# Patient Record
Sex: FEMALE | Race: BLACK OR AFRICAN AMERICAN | Employment: UNEMPLOYED | URBAN - METROPOLITAN AREA
[De-identification: names, ages, dates, MRNs, and addresses within clinical notes are randomized per-mention and may not be internally consistent; named-entity substitution may affect disease eponyms.]

---

## 2018-09-13 ENCOUNTER — APPOINTMENT (OUTPATIENT)
Dept: GENERAL RADIOLOGY | Age: 38
End: 2018-09-13
Payer: MEDICAID

## 2018-09-13 ENCOUNTER — HOSPITAL ENCOUNTER (EMERGENCY)
Age: 38
Discharge: HOME | End: 2018-09-13
Payer: MEDICAID

## 2018-09-13 VITALS
DIASTOLIC BLOOD PRESSURE: 91 MMHG | OXYGEN SATURATION: 99 % | TEMPERATURE: 97.2 F | BODY MASS INDEX: 31.41 KG/M2 | HEART RATE: 90 BPM | WEIGHT: 160 LBS | SYSTOLIC BLOOD PRESSURE: 141 MMHG | RESPIRATION RATE: 17 BRPM | HEIGHT: 60 IN

## 2018-09-13 DIAGNOSIS — S70.01XA CONTUSION OF RIGHT HIP, INITIAL ENCOUNTER: ICD-10-CM
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
## 2018-09-13 LAB
CHP ED QC CHECK: YES
PREGNANCY TEST URINE, POC: NORMAL

## 2018-09-13 PROCEDURE — 6370000000 HC RX 637 (ALT 250 FOR IP): Performed by: NURSE PRACTITIONER

## 2018-09-13 PROCEDURE — 99283 EMERGENCY DEPT VISIT LOW MDM: CPT

## 2018-09-13 PROCEDURE — 73502 X-RAY EXAM HIP UNI 2-3 VIEWS: CPT

## 2018-09-13 RX ORDER — ACETAMINOPHEN 500 MG
1000 TABLET ORAL ONCE
Status: COMPLETED | OUTPATIENT
Start: 2018-09-13 | End: 2018-09-13

## 2018-09-13 RX ORDER — ACETAMINOPHEN 325 MG/1
650 TABLET ORAL EVERY 6 HOURS PRN
Qty: 120 TABLET | Refills: 0 | Status: SHIPPED | OUTPATIENT
Start: 2018-09-13 | End: 2019-06-28 | Stop reason: ALTCHOICE

## 2018-09-13 RX ADMIN — ACETAMINOPHEN 1000 MG: 500 TABLET, FILM COATED ORAL at 15:18

## 2018-09-13 ASSESSMENT — PAIN SCALES - GENERAL: PAINLEVEL_OUTOF10: 10

## 2018-09-14 NOTE — ED PROVIDER NOTES
Atraumatic, without temporal or scalp tenderness. Eyes:  PERRL, EOMI. No periorbital ecchymoses. Conjunctiva: normal.  Ears:  External ears without lesions. Throat:  Pharynx without lesions. Airway patient. Neck:  Supple. Nontender. Chest:  Symmetrical without visible rash or tenderness. Respiratory:  Clear to auscultation and breath sounds equal.  CV:  Regular rate and rhythm, normal heart sounds, without pathological murmurs. GI:  Abdmen Soft, nontender. No firm or pulsatile mass. No abrasions, ecchymoses, or lacerations. Back:  No costovertebral, paravertebral, intervertebral, or vertebral tenderness or spasm. Musculoskeletal:  Pelvis:  Bilateral.        Tenderness: none. Stability:  stable to palpation. Hip(s):  Right: hip. Tenderness:  mild. Swelling: None. Deformity: no.       ROM: full range of motion. Skin: no erythema, rash or swelling noted. Neurovascular: Motor deficit: none. Sensory deficit: none. Pulse deficit: none. Capillary refill: normal.       Calf Tenderness:  No Bilateral.          Edema:  none Both lower extremity(s). Gait:  normal.  Integument:  No abrasions, ecchymoses, or lacerations unless noted elsewhere. Neurological:  Orientation age-appropriate. Motor functions intact. Lab / Imaging Results   (All laboratory and radiology results have been personally reviewed by myself)  Labs:  Results for orders placed or performed during the hospital encounter of 09/13/18   POC Pregnancy Urine Qual   Result Value Ref Range    Preg Test, Ur neg     QC OK? yes      Imaging: All Radiology results interpreted by Radiologist unless otherwise noted. XR HIP RIGHT (2-3 VIEWS)   Final Result   Progressive degenerative change of the upper right hip joint space. No evidence of fracture or other acute findings.         ED Course / Medical Decision Making     Medications   acetaminophen (TYLENOL) tablet 1,000 mg (1,000 mg Oral Given 9/13/18 8557)          Consult(s):   None    Procedure(s):   none    MDM: Mechanical fall, x-rays obtained, reassuring. Plan is for symptom control and appropriate outpatient follow-up. Instructed to return ED for any new or worsening symptoms. Counseling: The emergency provider has spoken with the patient and discussed todays results, in addition to providing specific details for the plan of care and counseling regarding the diagnosis and prognosis. Questions are answered at this time and they are agreeable with the plan. Assessment      1. Fall, initial encounter    2. Contusion of right hip, initial encounter      Plan   Discharge to home  Patient condition is good    New Medications     Discharge Medication List as of 9/13/2018  4:04 PM      START taking these medications    Details   acetaminophen (AMINOFEN) 325 MG tablet Take 2 tablets by mouth every 6 hours as needed for Pain, Disp-120 tablet, R-0Print           Electronically signed by MARK Lowe CNP   DD: 9/14/18  **This report was transcribed using voice recognition software. Every effort was made to ensure accuracy; however, inadvertent computerized transcription errors may be present.   END OF ED PROVIDER NOTE      MARK Henao CNP  09/14/18 [id number]

## 2018-10-15 ENCOUNTER — APPOINTMENT (OUTPATIENT)
Dept: GENERAL RADIOLOGY | Age: 38
End: 2018-10-15
Payer: MEDICAID

## 2018-10-15 ENCOUNTER — HOSPITAL ENCOUNTER (EMERGENCY)
Age: 38
Discharge: HOME | End: 2018-10-15
Attending: EMERGENCY MEDICINE
Payer: MEDICAID

## 2018-10-15 VITALS
HEART RATE: 82 BPM | DIASTOLIC BLOOD PRESSURE: 82 MMHG | SYSTOLIC BLOOD PRESSURE: 140 MMHG | WEIGHT: 190 LBS | HEIGHT: 59 IN | OXYGEN SATURATION: 98 % | RESPIRATION RATE: 16 BRPM | BODY MASS INDEX: 38.3 KG/M2 | TEMPERATURE: 97.4 F

## 2018-10-15 DIAGNOSIS — R07.9 CHEST PAIN, UNSPECIFIED TYPE: Primary | ICD-10-CM

## 2018-10-15 LAB
ALBUMIN SERPL-MCNC: 3.9 G/DL (ref 3.5–5.2)
ALP BLD-CCNC: 122 U/L (ref 35–104)
ALT SERPL-CCNC: 23 U/L (ref 0–32)
ANION GAP SERPL CALCULATED.3IONS-SCNC: 11 MMOL/L (ref 7–16)
AST SERPL-CCNC: 15 U/L (ref 0–31)
BASOPHILS ABSOLUTE: 0.02 E9/L (ref 0–0.2)
BASOPHILS RELATIVE PERCENT: 0.3 % (ref 0–2)
BILIRUB SERPL-MCNC: 0.2 MG/DL (ref 0–1.2)
BUN BLDV-MCNC: 10 MG/DL (ref 6–20)
CALCIUM SERPL-MCNC: 9.4 MG/DL (ref 8.6–10.2)
CHLORIDE BLD-SCNC: 98 MMOL/L (ref 98–107)
CO2: 27 MMOL/L (ref 22–29)
CREAT SERPL-MCNC: 0.8 MG/DL (ref 0.5–1)
EOSINOPHILS ABSOLUTE: 0.16 E9/L (ref 0.05–0.5)
EOSINOPHILS RELATIVE PERCENT: 2.1 % (ref 0–6)
GFR AFRICAN AMERICAN: >60
GFR NON-AFRICAN AMERICAN: >60 ML/MIN/1.73
GLUCOSE BLD-MCNC: 124 MG/DL (ref 74–109)
HCT VFR BLD CALC: 43.6 % (ref 34–48)
HEMOGLOBIN: 13.7 G/DL (ref 11.5–15.5)
IMMATURE GRANULOCYTES #: 0.03 E9/L
IMMATURE GRANULOCYTES %: 0.4 % (ref 0–5)
LYMPHOCYTES ABSOLUTE: 1.92 E9/L (ref 1.5–4)
LYMPHOCYTES RELATIVE PERCENT: 25.5 % (ref 20–42)
MCH RBC QN AUTO: 27 PG (ref 26–35)
MCHC RBC AUTO-ENTMCNC: 31.4 % (ref 32–34.5)
MCV RBC AUTO: 86 FL (ref 80–99.9)
MONOCYTES ABSOLUTE: 0.54 E9/L (ref 0.1–0.95)
MONOCYTES RELATIVE PERCENT: 7.2 % (ref 2–12)
NEUTROPHILS ABSOLUTE: 4.87 E9/L (ref 1.8–7.3)
NEUTROPHILS RELATIVE PERCENT: 64.5 % (ref 43–80)
PDW BLD-RTO: 13.2 FL (ref 11.5–15)
PLATELET # BLD: 221 E9/L (ref 130–450)
PMV BLD AUTO: 9.3 FL (ref 7–12)
POTASSIUM SERPL-SCNC: 3.3 MMOL/L (ref 3.5–5)
RBC # BLD: 5.07 E12/L (ref 3.5–5.5)
SODIUM BLD-SCNC: 136 MMOL/L (ref 132–146)
TOTAL PROTEIN: 7.3 G/DL (ref 6.4–8.3)
TROPONIN: <0.01 NG/ML (ref 0–0.03)
WBC # BLD: 7.5 E9/L (ref 4.5–11.5)

## 2018-10-15 PROCEDURE — 85025 COMPLETE CBC W/AUTO DIFF WBC: CPT

## 2018-10-15 PROCEDURE — 71046 X-RAY EXAM CHEST 2 VIEWS: CPT

## 2018-10-15 PROCEDURE — 6360000002 HC RX W HCPCS: Performed by: EMERGENCY MEDICINE

## 2018-10-15 PROCEDURE — 36415 COLL VENOUS BLD VENIPUNCTURE: CPT

## 2018-10-15 PROCEDURE — 84484 ASSAY OF TROPONIN QUANT: CPT

## 2018-10-15 PROCEDURE — 96374 THER/PROPH/DIAG INJ IV PUSH: CPT

## 2018-10-15 PROCEDURE — 99285 EMERGENCY DEPT VISIT HI MDM: CPT

## 2018-10-15 PROCEDURE — 80053 COMPREHEN METABOLIC PANEL: CPT

## 2018-10-15 RX ORDER — KETOROLAC TROMETHAMINE 30 MG/ML
30 INJECTION, SOLUTION INTRAMUSCULAR; INTRAVENOUS ONCE
Status: COMPLETED | OUTPATIENT
Start: 2018-10-15 | End: 2018-10-15

## 2018-10-15 RX ADMIN — KETOROLAC TROMETHAMINE 30 MG: 30 INJECTION, SOLUTION INTRAMUSCULAR; INTRAVENOUS at 16:59

## 2018-10-15 ASSESSMENT — ENCOUNTER SYMPTOMS
ABDOMINAL PAIN: 0
COUGH: 0
SINUS PRESSURE: 0
BACK PAIN: 0
NAUSEA: 0
SHORTNESS OF BREATH: 0
SINUS PAIN: 0

## 2018-10-15 ASSESSMENT — PAIN DESCRIPTION - PAIN TYPE: TYPE: ACUTE PAIN

## 2018-10-15 ASSESSMENT — PAIN SCALES - GENERAL
PAINLEVEL_OUTOF10: 10
PAINLEVEL_OUTOF10: 10

## 2018-10-15 ASSESSMENT — PAIN DESCRIPTION - ONSET: ONSET: ON-GOING

## 2018-10-15 ASSESSMENT — PAIN DESCRIPTION - FREQUENCY: FREQUENCY: INTERMITTENT

## 2018-10-15 ASSESSMENT — PAIN DESCRIPTION - DESCRIPTORS: DESCRIPTORS: SHARP

## 2018-10-15 ASSESSMENT — PAIN DESCRIPTION - LOCATION: LOCATION: CHEST

## 2018-10-15 ASSESSMENT — PAIN DESCRIPTION - ORIENTATION: ORIENTATION: MID

## 2018-10-15 NOTE — ED NOTES
FIRST PROVIDER CONTACT ASSESSMENT NOTE      Department of Emergency Medicine   10/15/18  2:13 PM    Chief Complaint: Chest Pain (mid started yesterday)      History of Present Illness:    Renee Hernandez is a 41 y.o. female who presents to the ED by private car for chest pain x 1 day. Focused Screening Exam:  Constitutional:  Alert, appears stated age and is in no distress. *ALLERGIES*     Darvocet [propoxyphene n-acetaminophen]; Ketorolac; Meloxicam; Tramadol;  Ibuprofen; and Naproxen     ED Triage Vitals [10/15/18 1407]   BP Temp Temp Source Pulse Resp SpO2 Height Weight   (!) 136/96 96.6 °F (35.9 °C) Temporal 95 17 98 % 4' 11\" (1.499 m) 190 lb (86.2 kg)        Initial Plan of Care:  Initiate Treatment-Testing, Proceed toTreatment Area When Bed Available for ED Attending/MLP to Continue Care    -----------------END OF FIRST PROVIDER CONTACT ASSESSMENT NOTE--------------  Electronically signed by MANUELA Dangelo   DD: 10/15/18       Juancarlos Dangelo  10/15/18 4147

## 2018-10-15 NOTE — ED PROVIDER NOTES
This is a 72-year-old female with past medical history of substance abuse and asthma was as to the ED for evaluation of chest pain. She states that 2 days ago she developed chest pain which she describes as located across her entire chest during the time she was lifting weights. Patient states that her pain is worsened with movements and is improved with rest. She denies any shortness of breath or nausea or recent travel or recent surgeries. The history is provided by the patient. No  was used. Review of Systems   Constitutional: Negative for fever. HENT: Negative for congestion, sinus pain and sinus pressure. Eyes: Negative for visual disturbance. Respiratory: Negative for cough and shortness of breath. Cardiovascular: Positive for chest pain. Gastrointestinal: Negative for abdominal pain and nausea. Endocrine: Negative for polyuria. Genitourinary: Negative for dysuria. Musculoskeletal: Negative for back pain. Skin: Negative for rash. Allergic/Immunologic: Negative for immunocompromised state. Neurological: Negative for headaches. Hematological: Does not bruise/bleed easily. Psychiatric/Behavioral: Negative for confusion. Physical Exam   Constitutional: She is oriented to person, place, and time. She appears well-developed and well-nourished. No distress. HENT:   Head: Normocephalic and atraumatic. Mouth/Throat: Oropharynx is clear and moist.   Eyes: EOM are normal.   Neck: Normal range of motion. Neck supple. No JVD present. Cardiovascular: Normal rate, regular rhythm and intact distal pulses. No murmur heard. Pulmonary/Chest: Effort normal and breath sounds normal. No respiratory distress. She has no wheezes. She has no rales. Chest pain elicited upon palpations to bilateral chest   Abdominal: Soft. There is no tenderness. There is no rebound and no guarding. Musculoskeletal: She exhibits no edema.    Neurological: She is alert and DO  Resident  10/15/18 9955

## 2018-10-18 ENCOUNTER — APPOINTMENT (OUTPATIENT)
Dept: GENERAL RADIOLOGY | Age: 38
End: 2018-10-18
Payer: MEDICAID

## 2018-10-18 ENCOUNTER — HOSPITAL ENCOUNTER (EMERGENCY)
Age: 38
Discharge: HOME | End: 2018-10-18
Payer: MEDICAID

## 2018-10-18 VITALS
SYSTOLIC BLOOD PRESSURE: 138 MMHG | BODY MASS INDEX: 37.9 KG/M2 | TEMPERATURE: 97.2 F | HEIGHT: 59 IN | RESPIRATION RATE: 16 BRPM | OXYGEN SATURATION: 98 % | WEIGHT: 188 LBS | HEART RATE: 100 BPM | DIASTOLIC BLOOD PRESSURE: 93 MMHG

## 2018-10-18 DIAGNOSIS — R07.89 CHEST WALL PAIN: Primary | ICD-10-CM

## 2018-10-18 DIAGNOSIS — M25.551 RIGHT HIP PAIN: ICD-10-CM

## 2018-10-18 LAB
EKG ATRIAL RATE: 98 BPM
EKG P AXIS: 53 DEGREES
EKG P-R INTERVAL: 142 MS
EKG Q-T INTERVAL: 332 MS
EKG QRS DURATION: 84 MS
EKG QTC CALCULATION (BAZETT): 423 MS
EKG R AXIS: 62 DEGREES
EKG T AXIS: -12 DEGREES
EKG VENTRICULAR RATE: 98 BPM

## 2018-10-18 PROCEDURE — 99285 EMERGENCY DEPT VISIT HI MDM: CPT

## 2018-10-18 PROCEDURE — 73502 X-RAY EXAM HIP UNI 2-3 VIEWS: CPT

## 2018-10-18 ASSESSMENT — PAIN DESCRIPTION - PAIN TYPE: TYPE: ACUTE PAIN

## 2018-10-18 ASSESSMENT — PAIN DESCRIPTION - LOCATION: LOCATION: CHEST;HIP

## 2018-10-18 ASSESSMENT — PAIN SCALES - GENERAL: PAINLEVEL_OUTOF10: 10

## 2018-10-18 ASSESSMENT — PAIN DESCRIPTION - ORIENTATION: ORIENTATION: RIGHT

## 2018-10-22 LAB
EKG ATRIAL RATE: 87 BPM
EKG ATRIAL RATE: 88 BPM
EKG P AXIS: 51 DEGREES
EKG P AXIS: 53 DEGREES
EKG P-R INTERVAL: 148 MS
EKG P-R INTERVAL: 154 MS
EKG Q-T INTERVAL: 342 MS
EKG Q-T INTERVAL: 358 MS
EKG QRS DURATION: 86 MS
EKG QRS DURATION: 90 MS
EKG QTC CALCULATION (BAZETT): 411 MS
EKG QTC CALCULATION (BAZETT): 433 MS
EKG R AXIS: 64 DEGREES
EKG R AXIS: 71 DEGREES
EKG T AXIS: -19 DEGREES
EKG T AXIS: -7 DEGREES
EKG VENTRICULAR RATE: 87 BPM
EKG VENTRICULAR RATE: 88 BPM

## 2018-10-25 ENCOUNTER — HOSPITAL ENCOUNTER (EMERGENCY)
Age: 38
Discharge: HOME | End: 2018-10-25
Payer: MEDICAID

## 2018-10-25 VITALS
WEIGHT: 194 LBS | BODY MASS INDEX: 39.18 KG/M2 | OXYGEN SATURATION: 99 % | DIASTOLIC BLOOD PRESSURE: 87 MMHG | RESPIRATION RATE: 16 BRPM | SYSTOLIC BLOOD PRESSURE: 150 MMHG | HEART RATE: 98 BPM | TEMPERATURE: 98.5 F

## 2018-10-25 DIAGNOSIS — N39.0 URINARY TRACT INFECTION WITH HEMATURIA, SITE UNSPECIFIED: Primary | ICD-10-CM

## 2018-10-25 DIAGNOSIS — R31.9 URINARY TRACT INFECTION WITH HEMATURIA, SITE UNSPECIFIED: Primary | ICD-10-CM

## 2018-10-25 LAB
BACTERIA: ABNORMAL /HPF
BILIRUBIN URINE: NEGATIVE
BLOOD, URINE: NEGATIVE
CHP ED QC CHECK: YES
CLARITY: ABNORMAL
COLOR: YELLOW
EPITHELIAL CELLS, UA: ABNORMAL /HPF
GLUCOSE URINE: NEGATIVE MG/DL
KETONES, URINE: NEGATIVE MG/DL
LEUKOCYTE ESTERASE, URINE: ABNORMAL
NITRITE, URINE: NEGATIVE
PH UA: 6.5 (ref 5–9)
PREGNANCY TEST URINE, POC: NEGATIVE
PROTEIN UA: NEGATIVE MG/DL
RBC UA: ABNORMAL /HPF (ref 0–2)
SPECIFIC GRAVITY UA: 1.02 (ref 1–1.03)
UROBILINOGEN, URINE: 0.2 E.U./DL
WBC UA: ABNORMAL /HPF (ref 0–5)

## 2018-10-25 PROCEDURE — 99283 EMERGENCY DEPT VISIT LOW MDM: CPT

## 2018-10-25 PROCEDURE — 87088 URINE BACTERIA CULTURE: CPT

## 2018-10-25 PROCEDURE — 81001 URINALYSIS AUTO W/SCOPE: CPT

## 2018-10-25 RX ORDER — PHENAZOPYRIDINE HYDROCHLORIDE 100 MG/1
100 TABLET, FILM COATED ORAL 3 TIMES DAILY PRN
Qty: 9 TABLET | Refills: 0 | Status: SHIPPED | OUTPATIENT
Start: 2018-10-25 | End: 2018-10-28

## 2018-10-25 RX ORDER — CEFDINIR 300 MG/1
300 CAPSULE ORAL 2 TIMES DAILY
Qty: 20 CAPSULE | Refills: 0 | Status: SHIPPED | OUTPATIENT
Start: 2018-10-25 | End: 2018-11-04

## 2018-10-25 ASSESSMENT — PAIN DESCRIPTION - LOCATION: LOCATION: ABDOMEN

## 2018-10-25 ASSESSMENT — PAIN SCALES - GENERAL: PAINLEVEL_OUTOF10: 10

## 2018-10-25 ASSESSMENT — PAIN DESCRIPTION - DESCRIPTORS: DESCRIPTORS: BURNING;PRESSURE

## 2018-10-25 ASSESSMENT — PAIN DESCRIPTION - ORIENTATION: ORIENTATION: LOWER

## 2018-10-25 NOTE — ED PROVIDER NOTES
°F (36.6 °C) Temporal 103 17 98 % -- 194 lb (88 kg)      Oxygen Saturation Interpretation: Normal.    · Constitutional:  Alert, development consistent with age. NAD  · HEENT:  NC/NT. Airway patent. · Neck:  Normal ROM. Supple. No midline or paraspinal tenderness. No step-offs or deformities. No meningeal signs or lymphadenopathy. · Respiratory:  Lungs Clear to auscultation and breath sounds equal.  · CV:  Regular rate and rhythm, normal heart sounds, without pathological murmurs, ectopy, gallops, or rubs. · GI:  General Appearance: normal.         Bowel sounds: normal bowel sounds. Distension:  None. Tenderness: mild tenderness is present Over the bladder, no rebound tenderness, no guarding. Nonsurgical abdomen. Liver: non-palpable. Spleen:  non-palpable. Pulsatile Mass: absent. ·    : (chaperone present during examination). not indicated / deferred. · Back: CVA Tenderness: No.  · Integument:  Normal turgor. Warm, dry, without visible rash, unless noted elsewhere. Lymphatics: No lymphangitis or adenopathy noted. · Neurological:  Oriented. Motor functions intact.     Lab / Imaging Results   (All laboratory and radiology results have been personally reviewed by myself)  Labs:  Results for orders placed or performed during the hospital encounter of 10/25/18   Urinalysis   Result Value Ref Range    Color, UA Yellow Straw/Yellow    Clarity, UA SLCLOUDY Clear    Glucose, Ur Negative Negative mg/dL    Bilirubin Urine Negative Negative    Ketones, Urine Negative Negative mg/dL    Specific Gravity, UA 1.025 1.005 - 1.030    Blood, Urine Negative Negative    pH, UA 6.5 5.0 - 9.0    Protein, UA Negative Negative mg/dL    Urobilinogen, Urine 0.2 <2.0 E.U./dL    Nitrite, Urine Negative Negative    Leukocyte Esterase, Urine SMALL (A) Negative   Microscopic Urinalysis   Result Value Ref Range    WBC, UA 5-10 0 - 5 /HPF    RBC, UA 1-3 0 - 2 /HPF

## 2018-10-27 LAB — URINE CULTURE, ROUTINE: NORMAL

## 2018-12-05 ENCOUNTER — APPOINTMENT (OUTPATIENT)
Dept: GENERAL RADIOLOGY | Age: 38
End: 2018-12-05
Payer: MEDICAID

## 2018-12-05 ENCOUNTER — HOSPITAL ENCOUNTER (EMERGENCY)
Age: 38
Discharge: HOME | End: 2018-12-05
Payer: MEDICAID

## 2018-12-05 VITALS
HEIGHT: 59 IN | TEMPERATURE: 97 F | BODY MASS INDEX: 39.31 KG/M2 | SYSTOLIC BLOOD PRESSURE: 139 MMHG | RESPIRATION RATE: 14 BRPM | HEART RATE: 91 BPM | DIASTOLIC BLOOD PRESSURE: 92 MMHG | OXYGEN SATURATION: 100 % | WEIGHT: 195 LBS

## 2018-12-05 DIAGNOSIS — S69.91XA INJURY OF FINGER OF RIGHT HAND, INITIAL ENCOUNTER: Primary | ICD-10-CM

## 2018-12-05 PROCEDURE — 6360000002 HC RX W HCPCS: Performed by: PHYSICIAN ASSISTANT

## 2018-12-05 PROCEDURE — 12001 RPR S/N/AX/GEN/TRNK 2.5CM/<: CPT

## 2018-12-05 PROCEDURE — 73140 X-RAY EXAM OF FINGER(S): CPT

## 2018-12-05 PROCEDURE — 6370000000 HC RX 637 (ALT 250 FOR IP): Performed by: PHYSICIAN ASSISTANT

## 2018-12-05 PROCEDURE — 90471 IMMUNIZATION ADMIN: CPT | Performed by: PHYSICIAN ASSISTANT

## 2018-12-05 PROCEDURE — 99282 EMERGENCY DEPT VISIT SF MDM: CPT

## 2018-12-05 PROCEDURE — 90715 TDAP VACCINE 7 YRS/> IM: CPT | Performed by: PHYSICIAN ASSISTANT

## 2018-12-05 RX ORDER — ACETAMINOPHEN 500 MG
1000 TABLET ORAL ONCE
Status: COMPLETED | OUTPATIENT
Start: 2018-12-05 | End: 2018-12-05

## 2018-12-05 RX ADMIN — ACETAMINOPHEN 1000 MG: 500 TABLET ORAL at 17:56

## 2018-12-05 RX ADMIN — TETANUS TOXOID, REDUCED DIPHTHERIA TOXOID AND ACELLULAR PERTUSSIS VACCINE, ADSORBED 0.5 ML: 5; 2.5; 8; 8; 2.5 SUSPENSION INTRAMUSCULAR at 17:56

## 2018-12-05 ASSESSMENT — PAIN SCALES - GENERAL: PAINLEVEL_OUTOF10: 10

## 2018-12-05 NOTE — ED PROVIDER NOTES
[facility]     Department of Emergency Medicine   ED  Provider Note  Admit Date/RoomTime: 12/5/2018  4:59 PM  ED Room: 31/31    Chief Complaint:   Finger Injury (shut finer in door at work)    History of Present Illness      Ryan Collier is a 41 y.o. old female presents to the emergency department for finger injury. Patient states she shut her right 5th finger in a door at work. She has 1cm superficial laceration noted. She reports pain to area but continues to have full ROM of affected finger. She is unsure of her last tetanus shot and will be updated at today's visit. She denies any other injury or concerns. Patient is alert and oriented x3 and in no apparent distress. ROS   Pertinent positives and negatives are stated within HPI, all other systems reviewed and are negative. Past Medical History:   Past Medical History:   Diagnosis Date    Asthma     Bronchitis     Movement disorder     Seizure disorder (HCC)     [facility] [address]     Suicidal overdose ([facility] [address] 6/27/2014    Tobacco abuse     Tobacco abuse      Surgical History:   Past Surgical History:   Procedure Laterality Date    CHOLECYSTECTOMY      FRACTURE SURGERY      R ANKLE TORN LIGAMENTS    ROTATOR CUFF REPAIR       Social History:  reports that she has been smoking Cigarettes. She has a 7.50 pack-year smoking history. She has never used smokeless tobacco. She reports that she does not drink alcohol or use drugs. Family History: family history includes Cancer in her mother. Allergies: Darvocet [propoxyphene n-acetaminophen]; Ketorolac; Meloxicam; Tramadol; Ibuprofen; and Naproxen    Physical Exam     Vitals:    12/05/18 1656   BP: (!) 139/92   Pulse: 91   Resp: 14   Temp: 97 °F (36.1 °C)   SpO2: 100%   Weight: 195 lb (88.5 kg)   Height: 4' 11\" (1.499 m)     Oxygen Saturation Interpretation: Normal.    Constitutional:  Alert, development consistent with age, NAD  HEENT:  NC/NT. Airway patent. Neck:  Normal ROM.

## 2019-03-03 ENCOUNTER — APPOINTMENT (OUTPATIENT)
Dept: GENERAL RADIOLOGY | Age: 39
DRG: 139 | End: 2019-03-03
Payer: MEDICAID

## 2019-03-03 ENCOUNTER — HOSPITAL ENCOUNTER (INPATIENT)
Age: 39
LOS: 4 days | Discharge: HOME | DRG: 139 | End: 2019-03-07
Attending: EMERGENCY MEDICINE | Admitting: INTERNAL MEDICINE
Payer: MEDICAID

## 2019-03-03 DIAGNOSIS — J96.01 ACUTE RESPIRATORY FAILURE WITH HYPOXIA (HCC): ICD-10-CM

## 2019-03-03 DIAGNOSIS — J18.9 PNEUMONIA DUE TO ORGANISM: ICD-10-CM

## 2019-03-03 DIAGNOSIS — A41.9 SEPTICEMIA (HCC): Primary | ICD-10-CM

## 2019-03-03 LAB
ALBUMIN SERPL-MCNC: 4.1 G/DL (ref 3.5–5.2)
ALP BLD-CCNC: 128 U/L (ref 35–104)
ALT SERPL-CCNC: 15 U/L (ref 0–32)
ANION GAP SERPL CALCULATED.3IONS-SCNC: 13 MMOL/L (ref 7–16)
AST SERPL-CCNC: 26 U/L (ref 0–31)
BASOPHILS ABSOLUTE: 0.03 E9/L (ref 0–0.2)
BASOPHILS RELATIVE PERCENT: 0.2 % (ref 0–2)
BILIRUB SERPL-MCNC: 0.2 MG/DL (ref 0–1.2)
BUN BLDV-MCNC: 6 MG/DL (ref 6–20)
CALCIUM SERPL-MCNC: 9.5 MG/DL (ref 8.6–10.2)
CHLORIDE BLD-SCNC: 103 MMOL/L (ref 98–107)
CO2: 25 MMOL/L (ref 22–29)
CREAT SERPL-MCNC: 0.8 MG/DL (ref 0.5–1)
EOSINOPHILS ABSOLUTE: 0.08 E9/L (ref 0.05–0.5)
EOSINOPHILS RELATIVE PERCENT: 0.4 % (ref 0–6)
GFR AFRICAN AMERICAN: >60
GFR NON-AFRICAN AMERICAN: >60 ML/MIN/1.73
GLUCOSE BLD-MCNC: 97 MG/DL (ref 74–99)
HCT VFR BLD CALC: 45.8 % (ref 34–48)
HEMOGLOBIN: 14.6 G/DL (ref 11.5–15.5)
IMMATURE GRANULOCYTES #: 0.15 E9/L
IMMATURE GRANULOCYTES %: 0.8 % (ref 0–5)
INFLUENZA A BY PCR: NOT DETECTED
INFLUENZA B BY PCR: NOT DETECTED
LACTIC ACID: 1.8 MMOL/L (ref 0.5–2.2)
LYMPHOCYTES ABSOLUTE: 2.01 E9/L (ref 1.5–4)
LYMPHOCYTES RELATIVE PERCENT: 10.4 % (ref 20–42)
MCH RBC QN AUTO: 26.9 PG (ref 26–35)
MCHC RBC AUTO-ENTMCNC: 31.9 % (ref 32–34.5)
MCV RBC AUTO: 84.3 FL (ref 80–99.9)
MONOCYTES ABSOLUTE: 1.18 E9/L (ref 0.1–0.95)
MONOCYTES RELATIVE PERCENT: 6.1 % (ref 2–12)
NEUTROPHILS ABSOLUTE: 15.97 E9/L (ref 1.8–7.3)
NEUTROPHILS RELATIVE PERCENT: 82.1 % (ref 43–80)
PDW BLD-RTO: 13.9 FL (ref 11.5–15)
PLATELET # BLD: 245 E9/L (ref 130–450)
PMV BLD AUTO: 10.2 FL (ref 7–12)
POTASSIUM SERPL-SCNC: 4.4 MMOL/L (ref 3.5–5)
PRO-BNP: 71 PG/ML (ref 0–125)
RBC # BLD: 5.43 E12/L (ref 3.5–5.5)
SODIUM BLD-SCNC: 141 MMOL/L (ref 132–146)
TOTAL PROTEIN: 8.3 G/DL (ref 6.4–8.3)
TROPONIN: <0.01 NG/ML (ref 0–0.03)
WBC # BLD: 19.4 E9/L (ref 4.5–11.5)

## 2019-03-03 PROCEDURE — 96375 TX/PRO/DX INJ NEW DRUG ADDON: CPT

## 2019-03-03 PROCEDURE — 2580000003 HC RX 258: Performed by: INTERNAL MEDICINE

## 2019-03-03 PROCEDURE — 83605 ASSAY OF LACTIC ACID: CPT

## 2019-03-03 PROCEDURE — 94760 N-INVAS EAR/PLS OXIMETRY 1: CPT

## 2019-03-03 PROCEDURE — 93005 ELECTROCARDIOGRAM TRACING: CPT | Performed by: EMERGENCY MEDICINE

## 2019-03-03 PROCEDURE — 6370000000 HC RX 637 (ALT 250 FOR IP): Performed by: EMERGENCY MEDICINE

## 2019-03-03 PROCEDURE — 2580000003 HC RX 258: Performed by: EMERGENCY MEDICINE

## 2019-03-03 PROCEDURE — 83880 ASSAY OF NATRIURETIC PEPTIDE: CPT

## 2019-03-03 PROCEDURE — 36415 COLL VENOUS BLD VENIPUNCTURE: CPT

## 2019-03-03 PROCEDURE — 1200000000 HC SEMI PRIVATE

## 2019-03-03 PROCEDURE — 85025 COMPLETE CBC W/AUTO DIFF WBC: CPT

## 2019-03-03 PROCEDURE — 84484 ASSAY OF TROPONIN QUANT: CPT

## 2019-03-03 PROCEDURE — 80053 COMPREHEN METABOLIC PANEL: CPT

## 2019-03-03 PROCEDURE — 71045 X-RAY EXAM CHEST 1 VIEW: CPT

## 2019-03-03 PROCEDURE — 87040 BLOOD CULTURE FOR BACTERIA: CPT

## 2019-03-03 PROCEDURE — 94640 AIRWAY INHALATION TREATMENT: CPT

## 2019-03-03 PROCEDURE — 99285 EMERGENCY DEPT VISIT HI MDM: CPT

## 2019-03-03 PROCEDURE — 6370000000 HC RX 637 (ALT 250 FOR IP): Performed by: INTERNAL MEDICINE

## 2019-03-03 PROCEDURE — 87502 INFLUENZA DNA AMP PROBE: CPT

## 2019-03-03 PROCEDURE — 94664 DEMO&/EVAL PT USE INHALER: CPT

## 2019-03-03 PROCEDURE — 6360000002 HC RX W HCPCS: Performed by: INTERNAL MEDICINE

## 2019-03-03 PROCEDURE — 96374 THER/PROPH/DIAG INJ IV PUSH: CPT

## 2019-03-03 PROCEDURE — 6360000002 HC RX W HCPCS: Performed by: EMERGENCY MEDICINE

## 2019-03-03 RX ORDER — SODIUM CHLORIDE 9 MG/ML
INJECTION, SOLUTION INTRAVENOUS CONTINUOUS
Status: DISCONTINUED | OUTPATIENT
Start: 2019-03-03 | End: 2019-03-06

## 2019-03-03 RX ORDER — IPRATROPIUM BROMIDE AND ALBUTEROL SULFATE 2.5; .5 MG/3ML; MG/3ML
1 SOLUTION RESPIRATORY (INHALATION)
Status: DISCONTINUED | OUTPATIENT
Start: 2019-03-04 | End: 2019-03-07 | Stop reason: HOSPADM

## 2019-03-03 RX ORDER — ACETAMINOPHEN 325 MG/1
650 TABLET ORAL EVERY 4 HOURS PRN
Status: DISCONTINUED | OUTPATIENT
Start: 2019-03-03 | End: 2019-03-04 | Stop reason: SDUPTHER

## 2019-03-03 RX ORDER — SODIUM CHLORIDE 0.9 % (FLUSH) 0.9 %
10 SYRINGE (ML) INJECTION PRN
Status: DISCONTINUED | OUTPATIENT
Start: 2019-03-03 | End: 2019-03-07 | Stop reason: HOSPADM

## 2019-03-03 RX ORDER — 0.9 % SODIUM CHLORIDE 0.9 %
1000 INTRAVENOUS SOLUTION INTRAVENOUS ONCE
Status: COMPLETED | OUTPATIENT
Start: 2019-03-03 | End: 2019-03-03

## 2019-03-03 RX ORDER — IPRATROPIUM BROMIDE AND ALBUTEROL SULFATE 2.5; .5 MG/3ML; MG/3ML
3 SOLUTION RESPIRATORY (INHALATION) ONCE
Status: COMPLETED | OUTPATIENT
Start: 2019-03-03 | End: 2019-03-03

## 2019-03-03 RX ORDER — FENTANYL CITRATE 50 UG/ML
25 INJECTION, SOLUTION INTRAMUSCULAR; INTRAVENOUS
Status: DISCONTINUED | OUTPATIENT
Start: 2019-03-03 | End: 2019-03-07 | Stop reason: HOSPADM

## 2019-03-03 RX ORDER — FENTANYL CITRATE 50 UG/ML
50 INJECTION, SOLUTION INTRAMUSCULAR; INTRAVENOUS ONCE
Status: COMPLETED | OUTPATIENT
Start: 2019-03-03 | End: 2019-03-03

## 2019-03-03 RX ORDER — METHYLPREDNISOLONE SODIUM SUCCINATE 125 MG/2ML
125 INJECTION, POWDER, LYOPHILIZED, FOR SOLUTION INTRAMUSCULAR; INTRAVENOUS ONCE
Status: COMPLETED | OUTPATIENT
Start: 2019-03-03 | End: 2019-03-03

## 2019-03-03 RX ORDER — SODIUM CHLORIDE 0.9 % (FLUSH) 0.9 %
10 SYRINGE (ML) INJECTION EVERY 12 HOURS SCHEDULED
Status: DISCONTINUED | OUTPATIENT
Start: 2019-03-03 | End: 2019-03-07 | Stop reason: HOSPADM

## 2019-03-03 RX ORDER — METHYLPREDNISOLONE SODIUM SUCCINATE 40 MG/ML
40 INJECTION, POWDER, LYOPHILIZED, FOR SOLUTION INTRAMUSCULAR; INTRAVENOUS EVERY 8 HOURS
Status: DISCONTINUED | OUTPATIENT
Start: 2019-03-03 | End: 2019-03-07 | Stop reason: HOSPADM

## 2019-03-03 RX ORDER — ONDANSETRON 2 MG/ML
4 INJECTION INTRAMUSCULAR; INTRAVENOUS EVERY 6 HOURS PRN
Status: DISCONTINUED | OUTPATIENT
Start: 2019-03-03 | End: 2019-03-07 | Stop reason: HOSPADM

## 2019-03-03 RX ORDER — ACETAMINOPHEN 500 MG
1000 TABLET ORAL ONCE
Status: COMPLETED | OUTPATIENT
Start: 2019-03-03 | End: 2019-03-03

## 2019-03-03 RX ADMIN — METHYLPREDNISOLONE SODIUM SUCCINATE 40 MG: 40 INJECTION, POWDER, FOR SOLUTION INTRAMUSCULAR; INTRAVENOUS at 21:04

## 2019-03-03 RX ADMIN — Medication 10 ML: at 21:04

## 2019-03-03 RX ADMIN — SODIUM CHLORIDE: 9 INJECTION, SOLUTION INTRAVENOUS at 21:03

## 2019-03-03 RX ADMIN — IPRATROPIUM BROMIDE AND ALBUTEROL SULFATE 3 AMPULE: .5; 3 SOLUTION RESPIRATORY (INHALATION) at 17:20

## 2019-03-03 RX ADMIN — CEFEPIME HYDROCHLORIDE 2 G: 2 INJECTION, POWDER, FOR SOLUTION INTRAVENOUS at 18:03

## 2019-03-03 RX ADMIN — ENOXAPARIN SODIUM 40 MG: 40 INJECTION SUBCUTANEOUS at 21:04

## 2019-03-03 RX ADMIN — VANCOMYCIN HYDROCHLORIDE 2000 MG: 10 INJECTION, POWDER, LYOPHILIZED, FOR SOLUTION INTRAVENOUS at 18:40

## 2019-03-03 RX ADMIN — METHYLPREDNISOLONE SODIUM SUCCINATE 125 MG: 125 INJECTION, POWDER, FOR SOLUTION INTRAMUSCULAR; INTRAVENOUS at 17:32

## 2019-03-03 RX ADMIN — FENTANYL CITRATE 25 MCG: 50 INJECTION INTRAMUSCULAR; INTRAVENOUS at 21:58

## 2019-03-03 RX ADMIN — FENTANYL CITRATE 50 MCG: 50 INJECTION INTRAMUSCULAR; INTRAVENOUS at 18:54

## 2019-03-03 RX ADMIN — ACETAMINOPHEN 1000 MG: 500 TABLET ORAL at 17:33

## 2019-03-03 RX ADMIN — SODIUM CHLORIDE 1000 ML: 9 INJECTION, SOLUTION INTRAVENOUS at 17:29

## 2019-03-03 RX ADMIN — ACETAMINOPHEN 650 MG: 325 TABLET, FILM COATED ORAL at 20:57

## 2019-03-03 ASSESSMENT — ENCOUNTER SYMPTOMS
SHORTNESS OF BREATH: 1
SINUS PAIN: 1
SINUS PRESSURE: 1
ABDOMINAL PAIN: 0
NAUSEA: 0
BACK PAIN: 0
COUGH: 1

## 2019-03-03 ASSESSMENT — PAIN DESCRIPTION - PAIN TYPE
TYPE: ACUTE PAIN

## 2019-03-03 ASSESSMENT — PAIN DESCRIPTION - ORIENTATION
ORIENTATION: LEFT
ORIENTATION: RIGHT;LEFT
ORIENTATION: RIGHT;LEFT

## 2019-03-03 ASSESSMENT — PAIN DESCRIPTION - PROGRESSION
CLINICAL_PROGRESSION: NOT CHANGED
CLINICAL_PROGRESSION: NOT CHANGED

## 2019-03-03 ASSESSMENT — PAIN SCALES - GENERAL
PAINLEVEL_OUTOF10: 9
PAINLEVEL_OUTOF10: 10
PAINLEVEL_OUTOF10: 0
PAINLEVEL_OUTOF10: 10
PAINLEVEL_OUTOF10: 9

## 2019-03-03 ASSESSMENT — PAIN DESCRIPTION - DESCRIPTORS
DESCRIPTORS: ACHING;SHOOTING;DISCOMFORT
DESCRIPTORS: SHOOTING;ACHING;DISCOMFORT

## 2019-03-03 ASSESSMENT — PAIN - FUNCTIONAL ASSESSMENT
PAIN_FUNCTIONAL_ASSESSMENT: PREVENTS OR INTERFERES SOME ACTIVE ACTIVITIES AND ADLS
PAIN_FUNCTIONAL_ASSESSMENT: PREVENTS OR INTERFERES SOME ACTIVE ACTIVITIES AND ADLS

## 2019-03-03 ASSESSMENT — PAIN DESCRIPTION - ONSET
ONSET: ON-GOING
ONSET: ON-GOING

## 2019-03-03 ASSESSMENT — PAIN DESCRIPTION - LOCATION
LOCATION: CHEST

## 2019-03-03 ASSESSMENT — PAIN DESCRIPTION - FREQUENCY
FREQUENCY: CONTINUOUS
FREQUENCY: CONTINUOUS

## 2019-03-03 ASSESSMENT — PAIN DESCRIPTION - DIRECTION
RADIATING_TOWARDS: UPPER
RADIATING_TOWARDS: UPPER

## 2019-03-04 LAB
ALBUMIN SERPL-MCNC: 3.6 G/DL (ref 3.5–5.2)
ALP BLD-CCNC: 115 U/L (ref 35–104)
ALT SERPL-CCNC: 21 U/L (ref 0–32)
ANION GAP SERPL CALCULATED.3IONS-SCNC: 16 MMOL/L (ref 7–16)
AST SERPL-CCNC: 23 U/L (ref 0–31)
BACTERIA: ABNORMAL /HPF
BASOPHILS ABSOLUTE: 0.02 E9/L (ref 0–0.2)
BASOPHILS RELATIVE PERCENT: 0.1 % (ref 0–2)
BILIRUB SERPL-MCNC: <0.2 MG/DL (ref 0–1.2)
BILIRUBIN URINE: NEGATIVE
BLOOD, URINE: ABNORMAL
BUN BLDV-MCNC: 8 MG/DL (ref 6–20)
CALCIUM SERPL-MCNC: 9 MG/DL (ref 8.6–10.2)
CHLORIDE BLD-SCNC: 109 MMOL/L (ref 98–107)
CLARITY: ABNORMAL
CO2: 18 MMOL/L (ref 22–29)
COLOR: YELLOW
CREAT SERPL-MCNC: 0.7 MG/DL (ref 0.5–1)
EOSINOPHILS ABSOLUTE: 0.01 E9/L (ref 0.05–0.5)
EOSINOPHILS RELATIVE PERCENT: 0 % (ref 0–6)
EPITHELIAL CELLS, UA: ABNORMAL /HPF
FILM ARRAY ADENOVIRUS: NORMAL
FILM ARRAY BORDETELLA PERTUSSIS: NORMAL
FILM ARRAY CHLAMYDOPHILIA PNEUMONIAE: NORMAL
FILM ARRAY CORONAVIRUS 229E: NORMAL
FILM ARRAY CORONAVIRUS HKU1: NORMAL
FILM ARRAY CORONAVIRUS NL63: NORMAL
FILM ARRAY CORONAVIRUS OC43: NORMAL
FILM ARRAY INFLUENZA A VIRUS 09H1: NORMAL
FILM ARRAY INFLUENZA A VIRUS H1: NORMAL
FILM ARRAY INFLUENZA A VIRUS H3: NORMAL
FILM ARRAY INFLUENZA A VIRUS: NORMAL
FILM ARRAY INFLUENZA B: NORMAL
FILM ARRAY METAPNEUMOVIRUS: NORMAL
FILM ARRAY MYCOPLASMA PNEUMONIAE: NORMAL
FILM ARRAY PARAINFLUENZA VIRUS 1: NORMAL
FILM ARRAY PARAINFLUENZA VIRUS 2: NORMAL
FILM ARRAY PARAINFLUENZA VIRUS 3: NORMAL
FILM ARRAY PARAINFLUENZA VIRUS 4: NORMAL
FILM ARRAY RESPIRATORY SYNCITIAL VIRUS: NORMAL
FILM ARRAY RHINOVIRUS/ENTEROVIRUS: NORMAL
GFR AFRICAN AMERICAN: >60
GFR NON-AFRICAN AMERICAN: >60 ML/MIN/1.73
GLUCOSE BLD-MCNC: 143 MG/DL (ref 74–99)
GLUCOSE URINE: 100 MG/DL
HCT VFR BLD CALC: 42.1 % (ref 34–48)
HEMOGLOBIN: 13.1 G/DL (ref 11.5–15.5)
IMMATURE GRANULOCYTES #: 0.25 E9/L
IMMATURE GRANULOCYTES %: 1.2 % (ref 0–5)
KETONES, URINE: NEGATIVE MG/DL
L. PNEUMOPHILA SEROGP 1 UR AG: NORMAL
LEUKOCYTE ESTERASE, URINE: NEGATIVE
LYMPHOCYTES ABSOLUTE: 1.29 E9/L (ref 1.5–4)
LYMPHOCYTES RELATIVE PERCENT: 6.3 % (ref 20–42)
MCH RBC QN AUTO: 26.7 PG (ref 26–35)
MCHC RBC AUTO-ENTMCNC: 31.1 % (ref 32–34.5)
MCV RBC AUTO: 85.9 FL (ref 80–99.9)
MONOCYTES ABSOLUTE: 0.87 E9/L (ref 0.1–0.95)
MONOCYTES RELATIVE PERCENT: 4.2 % (ref 2–12)
NEUTROPHILS ABSOLUTE: 18.11 E9/L (ref 1.8–7.3)
NEUTROPHILS RELATIVE PERCENT: 88.2 % (ref 43–80)
NITRITE, URINE: NEGATIVE
PDW BLD-RTO: 13.8 FL (ref 11.5–15)
PH UA: 5.5 (ref 5–9)
PLATELET # BLD: 247 E9/L (ref 130–450)
PMV BLD AUTO: 10.3 FL (ref 7–12)
POTASSIUM REFLEX MAGNESIUM: 4.2 MMOL/L (ref 3.5–5)
PROTEIN UA: ABNORMAL MG/DL
RBC # BLD: 4.9 E12/L (ref 3.5–5.5)
RBC UA: >20 /HPF (ref 0–2)
SODIUM BLD-SCNC: 143 MMOL/L (ref 132–146)
SPECIFIC GRAVITY UA: 1.01 (ref 1–1.03)
STREP PNEUMONIAE ANTIGEN, URINE: NORMAL
TOTAL PROTEIN: 7.3 G/DL (ref 6.4–8.3)
TRICHOMONAS: ABNORMAL /HPF
UROBILINOGEN, URINE: 0.2 E.U./DL
WBC # BLD: 20.6 E9/L (ref 4.5–11.5)
WBC UA: ABNORMAL /HPF (ref 0–5)

## 2019-03-04 PROCEDURE — 87798 DETECT AGENT NOS DNA AMP: CPT

## 2019-03-04 PROCEDURE — 87633 RESP VIRUS 12-25 TARGETS: CPT

## 2019-03-04 PROCEDURE — 87486 CHLMYD PNEUM DNA AMP PROBE: CPT

## 2019-03-04 PROCEDURE — 6360000002 HC RX W HCPCS: Performed by: INTERNAL MEDICINE

## 2019-03-04 PROCEDURE — 6370000000 HC RX 637 (ALT 250 FOR IP): Performed by: INTERNAL MEDICINE

## 2019-03-04 PROCEDURE — 80053 COMPREHEN METABOLIC PANEL: CPT

## 2019-03-04 PROCEDURE — 2700000000 HC OXYGEN THERAPY PER DAY

## 2019-03-04 PROCEDURE — 2580000003 HC RX 258: Performed by: INTERNAL MEDICINE

## 2019-03-04 PROCEDURE — 94760 N-INVAS EAR/PLS OXIMETRY 1: CPT

## 2019-03-04 PROCEDURE — 85025 COMPLETE CBC W/AUTO DIFF WBC: CPT

## 2019-03-04 PROCEDURE — 81001 URINALYSIS AUTO W/SCOPE: CPT

## 2019-03-04 PROCEDURE — 87450 HC DIRECT STREP B ANTIGEN: CPT

## 2019-03-04 PROCEDURE — 94640 AIRWAY INHALATION TREATMENT: CPT

## 2019-03-04 PROCEDURE — 1200000000 HC SEMI PRIVATE

## 2019-03-04 PROCEDURE — 87581 M.PNEUMON DNA AMP PROBE: CPT

## 2019-03-04 PROCEDURE — 36415 COLL VENOUS BLD VENIPUNCTURE: CPT

## 2019-03-04 RX ORDER — PANTOPRAZOLE SODIUM 20 MG/1
20 TABLET, DELAYED RELEASE ORAL
Status: DISCONTINUED | OUTPATIENT
Start: 2019-03-04 | End: 2019-03-07 | Stop reason: HOSPADM

## 2019-03-04 RX ORDER — ACETAMINOPHEN 325 MG/1
650 TABLET ORAL EVERY 4 HOURS PRN
Status: DISCONTINUED | OUTPATIENT
Start: 2019-03-04 | End: 2019-03-07 | Stop reason: HOSPADM

## 2019-03-04 RX ORDER — OXYCODONE AND ACETAMINOPHEN 10; 325 MG/1; MG/1
1 TABLET ORAL EVERY 6 HOURS PRN
Status: DISCONTINUED | OUTPATIENT
Start: 2019-03-04 | End: 2019-03-07 | Stop reason: HOSPADM

## 2019-03-04 RX ORDER — CARBAMAZEPINE 200 MG/1
200 TABLET, EXTENDED RELEASE ORAL NIGHTLY
Status: DISCONTINUED | OUTPATIENT
Start: 2019-03-04 | End: 2019-03-07 | Stop reason: HOSPADM

## 2019-03-04 RX ORDER — HYDROCODONE BITARTRATE AND ACETAMINOPHEN 5; 325 MG/1; MG/1
1 TABLET ORAL EVERY 6 HOURS PRN
Status: DISCONTINUED | OUTPATIENT
Start: 2019-03-04 | End: 2019-03-04

## 2019-03-04 RX ADMIN — FENTANYL CITRATE 25 MCG: 50 INJECTION INTRAMUSCULAR; INTRAVENOUS at 07:55

## 2019-03-04 RX ADMIN — IPRATROPIUM BROMIDE AND ALBUTEROL SULFATE 1 AMPULE: .5; 3 SOLUTION RESPIRATORY (INHALATION) at 16:19

## 2019-03-04 RX ADMIN — FENTANYL CITRATE 25 MCG: 50 INJECTION INTRAMUSCULAR; INTRAVENOUS at 22:54

## 2019-03-04 RX ADMIN — ENOXAPARIN SODIUM 40 MG: 40 INJECTION SUBCUTANEOUS at 07:55

## 2019-03-04 RX ADMIN — VANCOMYCIN HYDROCHLORIDE 1250 MG: 10 INJECTION, POWDER, LYOPHILIZED, FOR SOLUTION INTRAVENOUS at 17:12

## 2019-03-04 RX ADMIN — FENTANYL CITRATE 25 MCG: 50 INJECTION INTRAMUSCULAR; INTRAVENOUS at 00:14

## 2019-03-04 RX ADMIN — FENTANYL CITRATE 25 MCG: 50 INJECTION INTRAMUSCULAR; INTRAVENOUS at 05:01

## 2019-03-04 RX ADMIN — METHYLPREDNISOLONE SODIUM SUCCINATE 40 MG: 40 INJECTION, POWDER, FOR SOLUTION INTRAMUSCULAR; INTRAVENOUS at 05:01

## 2019-03-04 RX ADMIN — Medication 10 ML: at 05:01

## 2019-03-04 RX ADMIN — FENTANYL CITRATE 25 MCG: 50 INJECTION INTRAMUSCULAR; INTRAVENOUS at 18:13

## 2019-03-04 RX ADMIN — PANTOPRAZOLE SODIUM 20 MG: 20 TABLET, DELAYED RELEASE ORAL at 16:41

## 2019-03-04 RX ADMIN — FENTANYL CITRATE 25 MCG: 50 INJECTION INTRAMUSCULAR; INTRAVENOUS at 13:57

## 2019-03-04 RX ADMIN — CEFEPIME HYDROCHLORIDE 2 G: 2 INJECTION, POWDER, FOR SOLUTION INTRAVENOUS at 10:21

## 2019-03-04 RX ADMIN — Medication 10 ML: at 21:15

## 2019-03-04 RX ADMIN — FENTANYL CITRATE 25 MCG: 50 INJECTION INTRAMUSCULAR; INTRAVENOUS at 02:30

## 2019-03-04 RX ADMIN — IPRATROPIUM BROMIDE AND ALBUTEROL SULFATE 1 AMPULE: .5; 3 SOLUTION RESPIRATORY (INHALATION) at 09:16

## 2019-03-04 RX ADMIN — OXYCODONE AND ACETAMINOPHEN 1 TABLET: 10; 325 TABLET ORAL at 17:12

## 2019-03-04 RX ADMIN — HYDROCODONE BITARTRATE AND ACETAMINOPHEN 1 TABLET: 5; 325 TABLET ORAL at 09:48

## 2019-03-04 RX ADMIN — FENTANYL CITRATE 25 MCG: 50 INJECTION INTRAMUSCULAR; INTRAVENOUS at 16:07

## 2019-03-04 RX ADMIN — METHYLPREDNISOLONE SODIUM SUCCINATE 40 MG: 40 INJECTION, POWDER, FOR SOLUTION INTRAMUSCULAR; INTRAVENOUS at 13:57

## 2019-03-04 RX ADMIN — CEFEPIME HYDROCHLORIDE 2 G: 2 INJECTION, POWDER, FOR SOLUTION INTRAVENOUS at 17:12

## 2019-03-04 RX ADMIN — VANCOMYCIN HYDROCHLORIDE 1250 MG: 10 INJECTION, POWDER, LYOPHILIZED, FOR SOLUTION INTRAVENOUS at 06:13

## 2019-03-04 RX ADMIN — METHYLPREDNISOLONE SODIUM SUCCINATE 40 MG: 40 INJECTION, POWDER, FOR SOLUTION INTRAMUSCULAR; INTRAVENOUS at 20:39

## 2019-03-04 RX ADMIN — FENTANYL CITRATE 25 MCG: 50 INJECTION INTRAMUSCULAR; INTRAVENOUS at 20:39

## 2019-03-04 RX ADMIN — CEFEPIME HYDROCHLORIDE 2 G: 2 INJECTION, POWDER, FOR SOLUTION INTRAVENOUS at 02:30

## 2019-03-04 RX ADMIN — CARBAMAZEPINE 200 MG: 200 TABLET, EXTENDED RELEASE ORAL at 20:39

## 2019-03-04 RX ADMIN — IPRATROPIUM BROMIDE AND ALBUTEROL SULFATE 1 AMPULE: .5; 3 SOLUTION RESPIRATORY (INHALATION) at 20:13

## 2019-03-04 RX ADMIN — IPRATROPIUM BROMIDE AND ALBUTEROL SULFATE 1 AMPULE: .5; 3 SOLUTION RESPIRATORY (INHALATION) at 13:41

## 2019-03-04 ASSESSMENT — PAIN DESCRIPTION - DIRECTION
RADIATING_TOWARDS: UPPER

## 2019-03-04 ASSESSMENT — PAIN DESCRIPTION - PAIN TYPE
TYPE: ACUTE PAIN

## 2019-03-04 ASSESSMENT — PAIN DESCRIPTION - ONSET
ONSET: ON-GOING

## 2019-03-04 ASSESSMENT — PAIN SCALES - GENERAL
PAINLEVEL_OUTOF10: 9
PAINLEVEL_OUTOF10: 10
PAINLEVEL_OUTOF10: 8
PAINLEVEL_OUTOF10: 10
PAINLEVEL_OUTOF10: 9
PAINLEVEL_OUTOF10: 9
PAINLEVEL_OUTOF10: 10
PAINLEVEL_OUTOF10: 9
PAINLEVEL_OUTOF10: 8
PAINLEVEL_OUTOF10: 9

## 2019-03-04 ASSESSMENT — PAIN DESCRIPTION - DESCRIPTORS
DESCRIPTORS: ACHING;DISCOMFORT;SHOOTING
DESCRIPTORS: ACHING;DISCOMFORT

## 2019-03-04 ASSESSMENT — PAIN DESCRIPTION - FREQUENCY
FREQUENCY: CONTINUOUS

## 2019-03-04 ASSESSMENT — PAIN DESCRIPTION - ORIENTATION
ORIENTATION: UPPER
ORIENTATION: RIGHT;LEFT
ORIENTATION: UPPER

## 2019-03-04 ASSESSMENT — PAIN DESCRIPTION - LOCATION
LOCATION: CHEST

## 2019-03-04 ASSESSMENT — PAIN DESCRIPTION - PROGRESSION
CLINICAL_PROGRESSION: GRADUALLY IMPROVING
CLINICAL_PROGRESSION: NOT CHANGED
CLINICAL_PROGRESSION: GRADUALLY IMPROVING
CLINICAL_PROGRESSION: NOT CHANGED

## 2019-03-05 ENCOUNTER — APPOINTMENT (OUTPATIENT)
Dept: CT IMAGING | Age: 39
DRG: 139 | End: 2019-03-05
Payer: MEDICAID

## 2019-03-05 LAB
ALBUMIN SERPL-MCNC: 3.3 G/DL (ref 3.5–5.2)
ALP BLD-CCNC: 125 U/L (ref 35–104)
ALT SERPL-CCNC: 42 U/L (ref 0–32)
ANION GAP SERPL CALCULATED.3IONS-SCNC: 12 MMOL/L (ref 7–16)
AST SERPL-CCNC: 25 U/L (ref 0–31)
BASOPHILS ABSOLUTE: 0.03 E9/L (ref 0–0.2)
BASOPHILS RELATIVE PERCENT: 0.2 % (ref 0–2)
BILIRUB SERPL-MCNC: <0.2 MG/DL (ref 0–1.2)
BUN BLDV-MCNC: 14 MG/DL (ref 6–20)
CALCIUM SERPL-MCNC: 9.1 MG/DL (ref 8.6–10.2)
CHLORIDE BLD-SCNC: 106 MMOL/L (ref 98–107)
CO2: 22 MMOL/L (ref 22–29)
CREAT SERPL-MCNC: 0.7 MG/DL (ref 0.5–1)
EKG ATRIAL RATE: 115 BPM
EKG P AXIS: 37 DEGREES
EKG P-R INTERVAL: 130 MS
EKG Q-T INTERVAL: 290 MS
EKG QRS DURATION: 78 MS
EKG QTC CALCULATION (BAZETT): 401 MS
EKG R AXIS: 39 DEGREES
EKG T AXIS: -16 DEGREES
EKG VENTRICULAR RATE: 115 BPM
EOSINOPHILS ABSOLUTE: 0.01 E9/L (ref 0.05–0.5)
EOSINOPHILS RELATIVE PERCENT: 0.1 % (ref 0–6)
GFR AFRICAN AMERICAN: >60
GFR NON-AFRICAN AMERICAN: >60 ML/MIN/1.73
GLUCOSE BLD-MCNC: 125 MG/DL (ref 74–99)
HCG(URINE) PREGNANCY TEST: NEGATIVE
HCT VFR BLD CALC: 38.6 % (ref 34–48)
HEMOGLOBIN: 11.9 G/DL (ref 11.5–15.5)
IMMATURE GRANULOCYTES #: 0.4 E9/L
IMMATURE GRANULOCYTES %: 2.1 % (ref 0–5)
LYMPHOCYTES ABSOLUTE: 1.39 E9/L (ref 1.5–4)
LYMPHOCYTES RELATIVE PERCENT: 7.5 % (ref 20–42)
MCH RBC QN AUTO: 26.2 PG (ref 26–35)
MCHC RBC AUTO-ENTMCNC: 30.8 % (ref 32–34.5)
MCV RBC AUTO: 85 FL (ref 80–99.9)
MONOCYTES ABSOLUTE: 0.8 E9/L (ref 0.1–0.95)
MONOCYTES RELATIVE PERCENT: 4.3 % (ref 2–12)
NEUTROPHILS ABSOLUTE: 16 E9/L (ref 1.8–7.3)
NEUTROPHILS RELATIVE PERCENT: 85.8 % (ref 43–80)
PDW BLD-RTO: 13.6 FL (ref 11.5–15)
PLATELET # BLD: 238 E9/L (ref 130–450)
PMV BLD AUTO: 10.1 FL (ref 7–12)
POTASSIUM REFLEX MAGNESIUM: 4.6 MMOL/L (ref 3.5–5)
RBC # BLD: 4.54 E12/L (ref 3.5–5.5)
SODIUM BLD-SCNC: 140 MMOL/L (ref 132–146)
TOTAL PROTEIN: 6.9 G/DL (ref 6.4–8.3)
VANCOMYCIN TROUGH: 6.1 MCG/ML (ref 5–16)
WBC # BLD: 18.6 E9/L (ref 4.5–11.5)

## 2019-03-05 PROCEDURE — 6360000004 HC RX CONTRAST MEDICATION: Performed by: RADIOLOGY

## 2019-03-05 PROCEDURE — 6370000000 HC RX 637 (ALT 250 FOR IP): Performed by: INTERNAL MEDICINE

## 2019-03-05 PROCEDURE — 71275 CT ANGIOGRAPHY CHEST: CPT

## 2019-03-05 PROCEDURE — 2580000003 HC RX 258

## 2019-03-05 PROCEDURE — 99254 IP/OBS CNSLTJ NEW/EST MOD 60: CPT | Performed by: INTERNAL MEDICINE

## 2019-03-05 PROCEDURE — 85025 COMPLETE CBC W/AUTO DIFF WBC: CPT

## 2019-03-05 PROCEDURE — 2700000000 HC OXYGEN THERAPY PER DAY

## 2019-03-05 PROCEDURE — 94640 AIRWAY INHALATION TREATMENT: CPT

## 2019-03-05 PROCEDURE — 94760 N-INVAS EAR/PLS OXIMETRY 1: CPT

## 2019-03-05 PROCEDURE — 6360000002 HC RX W HCPCS: Performed by: INTERNAL MEDICINE

## 2019-03-05 PROCEDURE — 36415 COLL VENOUS BLD VENIPUNCTURE: CPT

## 2019-03-05 PROCEDURE — 80202 ASSAY OF VANCOMYCIN: CPT

## 2019-03-05 PROCEDURE — 2580000003 HC RX 258: Performed by: INTERNAL MEDICINE

## 2019-03-05 PROCEDURE — 81025 URINE PREGNANCY TEST: CPT

## 2019-03-05 PROCEDURE — 1200000000 HC SEMI PRIVATE

## 2019-03-05 PROCEDURE — 80053 COMPREHEN METABOLIC PANEL: CPT

## 2019-03-05 PROCEDURE — 6360000002 HC RX W HCPCS

## 2019-03-05 RX ORDER — GUAIFENESIN 400 MG/1
400 TABLET ORAL 3 TIMES DAILY
Status: DISCONTINUED | OUTPATIENT
Start: 2019-03-05 | End: 2019-03-07 | Stop reason: HOSPADM

## 2019-03-05 RX ADMIN — CARBAMAZEPINE 200 MG: 200 TABLET, EXTENDED RELEASE ORAL at 21:30

## 2019-03-05 RX ADMIN — FENTANYL CITRATE 25 MCG: 50 INJECTION INTRAMUSCULAR; INTRAVENOUS at 21:29

## 2019-03-05 RX ADMIN — FENTANYL CITRATE 25 MCG: 50 INJECTION INTRAMUSCULAR; INTRAVENOUS at 12:33

## 2019-03-05 RX ADMIN — IPRATROPIUM BROMIDE AND ALBUTEROL SULFATE 1 AMPULE: .5; 3 SOLUTION RESPIRATORY (INHALATION) at 11:40

## 2019-03-05 RX ADMIN — IPRATROPIUM BROMIDE AND ALBUTEROL SULFATE 1 AMPULE: .5; 3 SOLUTION RESPIRATORY (INHALATION) at 19:25

## 2019-03-05 RX ADMIN — METHYLPREDNISOLONE SODIUM SUCCINATE 40 MG: 40 INJECTION, POWDER, FOR SOLUTION INTRAMUSCULAR; INTRAVENOUS at 21:29

## 2019-03-05 RX ADMIN — ENOXAPARIN SODIUM 40 MG: 40 INJECTION SUBCUTANEOUS at 09:34

## 2019-03-05 RX ADMIN — CEFEPIME HYDROCHLORIDE 2 G: 2 INJECTION, POWDER, FOR SOLUTION INTRAVENOUS at 10:42

## 2019-03-05 RX ADMIN — IOPAMIDOL 60 ML: 755 INJECTION, SOLUTION INTRAVENOUS at 20:52

## 2019-03-05 RX ADMIN — FENTANYL CITRATE 25 MCG: 50 INJECTION INTRAMUSCULAR; INTRAVENOUS at 09:34

## 2019-03-05 RX ADMIN — GUAIFENESIN 400 MG: 400 TABLET ORAL at 21:29

## 2019-03-05 RX ADMIN — IPRATROPIUM BROMIDE AND ALBUTEROL SULFATE 1 AMPULE: .5; 3 SOLUTION RESPIRATORY (INHALATION) at 15:49

## 2019-03-05 RX ADMIN — VANCOMYCIN HYDROCHLORIDE 1250 MG: 10 INJECTION, POWDER, LYOPHILIZED, FOR SOLUTION INTRAVENOUS at 11:18

## 2019-03-05 RX ADMIN — Medication 10 ML: at 21:29

## 2019-03-05 RX ADMIN — CEFEPIME HYDROCHLORIDE 2 G: 2 INJECTION, POWDER, FOR SOLUTION INTRAVENOUS at 01:49

## 2019-03-05 RX ADMIN — FENTANYL CITRATE 25 MCG: 50 INJECTION INTRAMUSCULAR; INTRAVENOUS at 04:01

## 2019-03-05 RX ADMIN — PANTOPRAZOLE SODIUM 20 MG: 20 TABLET, DELAYED RELEASE ORAL at 06:40

## 2019-03-05 RX ADMIN — CEFEPIME HYDROCHLORIDE 2 G: 2 INJECTION, POWDER, FOR SOLUTION INTRAVENOUS at 16:50

## 2019-03-05 RX ADMIN — PANTOPRAZOLE SODIUM 20 MG: 20 TABLET, DELAYED RELEASE ORAL at 16:50

## 2019-03-05 RX ADMIN — OXYCODONE AND ACETAMINOPHEN 1 TABLET: 10; 325 TABLET ORAL at 01:00

## 2019-03-05 RX ADMIN — FENTANYL CITRATE 25 MCG: 50 INJECTION INTRAMUSCULAR; INTRAVENOUS at 23:58

## 2019-03-05 RX ADMIN — METHYLPREDNISOLONE SODIUM SUCCINATE 40 MG: 40 INJECTION, POWDER, FOR SOLUTION INTRAMUSCULAR; INTRAVENOUS at 03:53

## 2019-03-05 RX ADMIN — SODIUM CHLORIDE: 9 INJECTION, SOLUTION INTRAVENOUS at 16:59

## 2019-03-05 RX ADMIN — METHYLPREDNISOLONE SODIUM SUCCINATE 40 MG: 40 INJECTION, POWDER, FOR SOLUTION INTRAMUSCULAR; INTRAVENOUS at 12:33

## 2019-03-05 RX ADMIN — OXYCODONE AND ACETAMINOPHEN 1 TABLET: 10; 325 TABLET ORAL at 07:41

## 2019-03-05 RX ADMIN — SODIUM CHLORIDE: 9 INJECTION, SOLUTION INTRAVENOUS at 02:51

## 2019-03-05 RX ADMIN — VANCOMYCIN HYDROCHLORIDE 1250 MG: 10 INJECTION, POWDER, LYOPHILIZED, FOR SOLUTION INTRAVENOUS at 18:44

## 2019-03-05 RX ADMIN — OXYCODONE AND ACETAMINOPHEN 1 TABLET: 10; 325 TABLET ORAL at 19:28

## 2019-03-05 ASSESSMENT — PAIN SCALES - GENERAL
PAINLEVEL_OUTOF10: 10
PAINLEVEL_OUTOF10: 9
PAINLEVEL_OUTOF10: 9
PAINLEVEL_OUTOF10: 10
PAINLEVEL_OUTOF10: 10
PAINLEVEL_OUTOF10: 9
PAINLEVEL_OUTOF10: 9
PAINLEVEL_OUTOF10: 8
PAINLEVEL_OUTOF10: 9

## 2019-03-05 ASSESSMENT — PAIN DESCRIPTION - ONSET
ONSET: ON-GOING

## 2019-03-05 ASSESSMENT — PAIN DESCRIPTION - PAIN TYPE
TYPE: ACUTE PAIN

## 2019-03-05 ASSESSMENT — PAIN DESCRIPTION - DESCRIPTORS
DESCRIPTORS: ACHING;DISCOMFORT
DESCRIPTORS: ACHING;CRAMPING;DISCOMFORT
DESCRIPTORS: ACHING;CRAMPING;DISCOMFORT
DESCRIPTORS: ACHING;DISCOMFORT

## 2019-03-05 ASSESSMENT — PAIN DESCRIPTION - FREQUENCY
FREQUENCY: CONTINUOUS

## 2019-03-05 ASSESSMENT — PAIN DESCRIPTION - LOCATION
LOCATION: CHEST

## 2019-03-05 ASSESSMENT — PAIN DESCRIPTION - ORIENTATION
ORIENTATION: UPPER

## 2019-03-05 ASSESSMENT — PAIN DESCRIPTION - PROGRESSION
CLINICAL_PROGRESSION: NOT CHANGED

## 2019-03-06 LAB
ALBUMIN SERPL-MCNC: 3.7 G/DL (ref 3.5–5.2)
ALP BLD-CCNC: 102 U/L (ref 35–104)
ALT SERPL-CCNC: 59 U/L (ref 0–32)
ANION GAP SERPL CALCULATED.3IONS-SCNC: 17 MMOL/L (ref 7–16)
AST SERPL-CCNC: 18 U/L (ref 0–31)
BASOPHILS ABSOLUTE: 0.01 E9/L (ref 0–0.2)
BASOPHILS RELATIVE PERCENT: 0.1 % (ref 0–2)
BILIRUB SERPL-MCNC: <0.2 MG/DL (ref 0–1.2)
BUN BLDV-MCNC: 20 MG/DL (ref 6–20)
CALCIUM SERPL-MCNC: 8.7 MG/DL (ref 8.6–10.2)
CHLORIDE BLD-SCNC: 101 MMOL/L (ref 98–107)
CO2: 22 MMOL/L (ref 22–29)
CREAT SERPL-MCNC: 0.8 MG/DL (ref 0.5–1)
EOSINOPHILS ABSOLUTE: 0.02 E9/L (ref 0.05–0.5)
EOSINOPHILS RELATIVE PERCENT: 0.1 % (ref 0–6)
GFR AFRICAN AMERICAN: >60
GFR NON-AFRICAN AMERICAN: >60 ML/MIN/1.73
GLUCOSE BLD-MCNC: 166 MG/DL (ref 74–99)
HCT VFR BLD CALC: 38 % (ref 34–48)
HEMOGLOBIN: 11.9 G/DL (ref 11.5–15.5)
IMMATURE GRANULOCYTES #: 0.24 E9/L
IMMATURE GRANULOCYTES %: 1.7 % (ref 0–5)
LYMPHOCYTES ABSOLUTE: 1.36 E9/L (ref 1.5–4)
LYMPHOCYTES RELATIVE PERCENT: 9.9 % (ref 20–42)
MCH RBC QN AUTO: 26.3 PG (ref 26–35)
MCHC RBC AUTO-ENTMCNC: 31.3 % (ref 32–34.5)
MCV RBC AUTO: 83.9 FL (ref 80–99.9)
MONOCYTES ABSOLUTE: 0.35 E9/L (ref 0.1–0.95)
MONOCYTES RELATIVE PERCENT: 2.5 % (ref 2–12)
NEUTROPHILS ABSOLUTE: 11.75 E9/L (ref 1.8–7.3)
NEUTROPHILS RELATIVE PERCENT: 85.7 % (ref 43–80)
PDW BLD-RTO: 13.6 FL (ref 11.5–15)
PLATELET # BLD: 229 E9/L (ref 130–450)
PMV BLD AUTO: 9.6 FL (ref 7–12)
POTASSIUM REFLEX MAGNESIUM: 4.1 MMOL/L (ref 3.5–5)
RBC # BLD: 4.53 E12/L (ref 3.5–5.5)
SODIUM BLD-SCNC: 140 MMOL/L (ref 132–146)
TOTAL PROTEIN: 6.9 G/DL (ref 6.4–8.3)
VANCOMYCIN TROUGH: 15.8 MCG/ML (ref 5–16)
WBC # BLD: 13.7 E9/L (ref 4.5–11.5)

## 2019-03-06 PROCEDURE — 80053 COMPREHEN METABOLIC PANEL: CPT

## 2019-03-06 PROCEDURE — 94640 AIRWAY INHALATION TREATMENT: CPT

## 2019-03-06 PROCEDURE — 2580000003 HC RX 258

## 2019-03-06 PROCEDURE — 1200000000 HC SEMI PRIVATE

## 2019-03-06 PROCEDURE — 2580000003 HC RX 258: Performed by: INTERNAL MEDICINE

## 2019-03-06 PROCEDURE — 6370000000 HC RX 637 (ALT 250 FOR IP): Performed by: INTERNAL MEDICINE

## 2019-03-06 PROCEDURE — 36415 COLL VENOUS BLD VENIPUNCTURE: CPT

## 2019-03-06 PROCEDURE — 99233 SBSQ HOSP IP/OBS HIGH 50: CPT | Performed by: INTERNAL MEDICINE

## 2019-03-06 PROCEDURE — 2700000000 HC OXYGEN THERAPY PER DAY

## 2019-03-06 PROCEDURE — 6360000002 HC RX W HCPCS

## 2019-03-06 PROCEDURE — 85025 COMPLETE CBC W/AUTO DIFF WBC: CPT

## 2019-03-06 PROCEDURE — 80202 ASSAY OF VANCOMYCIN: CPT

## 2019-03-06 PROCEDURE — 6360000002 HC RX W HCPCS: Performed by: INTERNAL MEDICINE

## 2019-03-06 RX ADMIN — METHYLPREDNISOLONE SODIUM SUCCINATE 40 MG: 40 INJECTION, POWDER, FOR SOLUTION INTRAMUSCULAR; INTRAVENOUS at 20:11

## 2019-03-06 RX ADMIN — METHYLPREDNISOLONE SODIUM SUCCINATE 40 MG: 40 INJECTION, POWDER, FOR SOLUTION INTRAMUSCULAR; INTRAVENOUS at 05:41

## 2019-03-06 RX ADMIN — OXYCODONE AND ACETAMINOPHEN 1 TABLET: 10; 325 TABLET ORAL at 23:43

## 2019-03-06 RX ADMIN — VANCOMYCIN HYDROCHLORIDE 1250 MG: 10 INJECTION, POWDER, LYOPHILIZED, FOR SOLUTION INTRAVENOUS at 19:33

## 2019-03-06 RX ADMIN — VANCOMYCIN HYDROCHLORIDE 1250 MG: 10 INJECTION, POWDER, LYOPHILIZED, FOR SOLUTION INTRAVENOUS at 11:13

## 2019-03-06 RX ADMIN — ENOXAPARIN SODIUM 40 MG: 40 INJECTION SUBCUTANEOUS at 08:46

## 2019-03-06 RX ADMIN — CARBAMAZEPINE 200 MG: 200 TABLET, EXTENDED RELEASE ORAL at 20:11

## 2019-03-06 RX ADMIN — OXYCODONE AND ACETAMINOPHEN 1 TABLET: 10; 325 TABLET ORAL at 01:56

## 2019-03-06 RX ADMIN — PANTOPRAZOLE SODIUM 20 MG: 20 TABLET, DELAYED RELEASE ORAL at 18:30

## 2019-03-06 RX ADMIN — IPRATROPIUM BROMIDE AND ALBUTEROL SULFATE 1 AMPULE: .5; 3 SOLUTION RESPIRATORY (INHALATION) at 13:31

## 2019-03-06 RX ADMIN — FENTANYL CITRATE 25 MCG: 50 INJECTION INTRAMUSCULAR; INTRAVENOUS at 13:15

## 2019-03-06 RX ADMIN — CEFEPIME HYDROCHLORIDE 2 G: 2 INJECTION, POWDER, FOR SOLUTION INTRAVENOUS at 18:30

## 2019-03-06 RX ADMIN — VANCOMYCIN HYDROCHLORIDE 1250 MG: 10 INJECTION, POWDER, LYOPHILIZED, FOR SOLUTION INTRAVENOUS at 03:42

## 2019-03-06 RX ADMIN — FENTANYL CITRATE 25 MCG: 50 INJECTION INTRAMUSCULAR; INTRAVENOUS at 22:43

## 2019-03-06 RX ADMIN — FENTANYL CITRATE 25 MCG: 50 INJECTION INTRAMUSCULAR; INTRAVENOUS at 20:11

## 2019-03-06 RX ADMIN — METHYLPREDNISOLONE SODIUM SUCCINATE 40 MG: 40 INJECTION, POWDER, FOR SOLUTION INTRAMUSCULAR; INTRAVENOUS at 12:34

## 2019-03-06 RX ADMIN — CEFEPIME HYDROCHLORIDE 2 G: 2 INJECTION, POWDER, FOR SOLUTION INTRAVENOUS at 08:46

## 2019-03-06 RX ADMIN — IPRATROPIUM BROMIDE AND ALBUTEROL SULFATE 1 AMPULE: .5; 3 SOLUTION RESPIRATORY (INHALATION) at 17:01

## 2019-03-06 RX ADMIN — FENTANYL CITRATE 25 MCG: 50 INJECTION INTRAMUSCULAR; INTRAVENOUS at 05:41

## 2019-03-06 RX ADMIN — FENTANYL CITRATE 25 MCG: 50 INJECTION INTRAMUSCULAR; INTRAVENOUS at 03:10

## 2019-03-06 RX ADMIN — IPRATROPIUM BROMIDE AND ALBUTEROL SULFATE 1 AMPULE: .5; 3 SOLUTION RESPIRATORY (INHALATION) at 09:25

## 2019-03-06 RX ADMIN — CEFEPIME HYDROCHLORIDE 2 G: 2 INJECTION, POWDER, FOR SOLUTION INTRAVENOUS at 01:57

## 2019-03-06 RX ADMIN — SODIUM CHLORIDE: 9 INJECTION, SOLUTION INTRAVENOUS at 08:49

## 2019-03-06 RX ADMIN — GUAIFENESIN 400 MG: 400 TABLET ORAL at 20:11

## 2019-03-06 RX ADMIN — Medication 10 ML: at 20:12

## 2019-03-06 RX ADMIN — OXYCODONE AND ACETAMINOPHEN 1 TABLET: 10; 325 TABLET ORAL at 08:46

## 2019-03-06 RX ADMIN — FENTANYL CITRATE 25 MCG: 50 INJECTION INTRAMUSCULAR; INTRAVENOUS at 10:40

## 2019-03-06 RX ADMIN — IPRATROPIUM BROMIDE AND ALBUTEROL SULFATE 1 AMPULE: .5; 3 SOLUTION RESPIRATORY (INHALATION) at 21:05

## 2019-03-06 RX ADMIN — PANTOPRAZOLE SODIUM 20 MG: 20 TABLET, DELAYED RELEASE ORAL at 06:58

## 2019-03-06 RX ADMIN — GUAIFENESIN 400 MG: 400 TABLET ORAL at 14:10

## 2019-03-06 RX ADMIN — OXYCODONE AND ACETAMINOPHEN 1 TABLET: 10; 325 TABLET ORAL at 17:24

## 2019-03-06 RX ADMIN — GUAIFENESIN 400 MG: 400 TABLET ORAL at 08:45

## 2019-03-06 ASSESSMENT — PAIN DESCRIPTION - PROGRESSION
CLINICAL_PROGRESSION: NOT CHANGED
CLINICAL_PROGRESSION: NOT CHANGED
CLINICAL_PROGRESSION: GRADUALLY WORSENING
CLINICAL_PROGRESSION: NOT CHANGED

## 2019-03-06 ASSESSMENT — PAIN DESCRIPTION - LOCATION
LOCATION: CHEST

## 2019-03-06 ASSESSMENT — PAIN SCALES - GENERAL
PAINLEVEL_OUTOF10: 9
PAINLEVEL_OUTOF10: 7
PAINLEVEL_OUTOF10: 8
PAINLEVEL_OUTOF10: 9
PAINLEVEL_OUTOF10: 7
PAINLEVEL_OUTOF10: 10
PAINLEVEL_OUTOF10: 7
PAINLEVEL_OUTOF10: 9

## 2019-03-06 ASSESSMENT — PAIN DESCRIPTION - DESCRIPTORS
DESCRIPTORS: ACHING;CRAMPING;DISCOMFORT
DESCRIPTORS: ACHING;CRAMPING;DISCOMFORT
DESCRIPTORS: ACHING;SHARP

## 2019-03-06 ASSESSMENT — PAIN DESCRIPTION - ONSET
ONSET: ON-GOING

## 2019-03-06 ASSESSMENT — PAIN DESCRIPTION - ORIENTATION
ORIENTATION: UPPER

## 2019-03-06 ASSESSMENT — PAIN DESCRIPTION - FREQUENCY
FREQUENCY: CONTINUOUS
FREQUENCY: CONTINUOUS

## 2019-03-06 ASSESSMENT — PAIN - FUNCTIONAL ASSESSMENT: PAIN_FUNCTIONAL_ASSESSMENT: PREVENTS OR INTERFERES SOME ACTIVE ACTIVITIES AND ADLS

## 2019-03-06 ASSESSMENT — PAIN DESCRIPTION - PAIN TYPE
TYPE: ACUTE PAIN

## 2019-03-07 VITALS
HEIGHT: 59 IN | BODY MASS INDEX: 37.64 KG/M2 | DIASTOLIC BLOOD PRESSURE: 63 MMHG | WEIGHT: 186.7 LBS | HEART RATE: 78 BPM | RESPIRATION RATE: 18 BRPM | OXYGEN SATURATION: 100 % | TEMPERATURE: 97.4 F | SYSTOLIC BLOOD PRESSURE: 138 MMHG

## 2019-03-07 LAB
ALBUMIN SERPL-MCNC: 3.5 G/DL (ref 3.5–5.2)
ALP BLD-CCNC: 98 U/L (ref 35–104)
ALT SERPL-CCNC: 59 U/L (ref 0–32)
ANION GAP SERPL CALCULATED.3IONS-SCNC: 16 MMOL/L (ref 7–16)
AST SERPL-CCNC: 20 U/L (ref 0–31)
BASOPHILS ABSOLUTE: 0.03 E9/L (ref 0–0.2)
BASOPHILS RELATIVE PERCENT: 0.2 % (ref 0–2)
BILIRUB SERPL-MCNC: <0.2 MG/DL (ref 0–1.2)
BUN BLDV-MCNC: 15 MG/DL (ref 6–20)
CALCIUM SERPL-MCNC: 8.6 MG/DL (ref 8.6–10.2)
CHLORIDE BLD-SCNC: 100 MMOL/L (ref 98–107)
CO2: 25 MMOL/L (ref 22–29)
CREAT SERPL-MCNC: 0.7 MG/DL (ref 0.5–1)
EOSINOPHILS ABSOLUTE: 0.01 E9/L (ref 0.05–0.5)
EOSINOPHILS RELATIVE PERCENT: 0.1 % (ref 0–6)
GFR AFRICAN AMERICAN: >60
GFR NON-AFRICAN AMERICAN: >60 ML/MIN/1.73
GLUCOSE BLD-MCNC: 114 MG/DL (ref 74–99)
HCT VFR BLD CALC: 38.4 % (ref 34–48)
HEMOGLOBIN: 12.1 G/DL (ref 11.5–15.5)
IMMATURE GRANULOCYTES #: 0.27 E9/L
IMMATURE GRANULOCYTES %: 1.9 % (ref 0–5)
LYMPHOCYTES ABSOLUTE: 1.76 E9/L (ref 1.5–4)
LYMPHOCYTES RELATIVE PERCENT: 12.1 % (ref 20–42)
MCH RBC QN AUTO: 26.1 PG (ref 26–35)
MCHC RBC AUTO-ENTMCNC: 31.5 % (ref 32–34.5)
MCV RBC AUTO: 82.9 FL (ref 80–99.9)
MONOCYTES ABSOLUTE: 0.99 E9/L (ref 0.1–0.95)
MONOCYTES RELATIVE PERCENT: 6.8 % (ref 2–12)
NEUTROPHILS ABSOLUTE: 11.46 E9/L (ref 1.8–7.3)
NEUTROPHILS RELATIVE PERCENT: 78.9 % (ref 43–80)
PDW BLD-RTO: 13.5 FL (ref 11.5–15)
PLATELET # BLD: 248 E9/L (ref 130–450)
PMV BLD AUTO: 9.8 FL (ref 7–12)
POTASSIUM REFLEX MAGNESIUM: 4.2 MMOL/L (ref 3.5–5)
RBC # BLD: 4.63 E12/L (ref 3.5–5.5)
SODIUM BLD-SCNC: 141 MMOL/L (ref 132–146)
TOTAL PROTEIN: 6.7 G/DL (ref 6.4–8.3)
WBC # BLD: 14.5 E9/L (ref 4.5–11.5)

## 2019-03-07 PROCEDURE — 6360000002 HC RX W HCPCS: Performed by: INTERNAL MEDICINE

## 2019-03-07 PROCEDURE — 85025 COMPLETE CBC W/AUTO DIFF WBC: CPT

## 2019-03-07 PROCEDURE — 2580000003 HC RX 258

## 2019-03-07 PROCEDURE — 6360000002 HC RX W HCPCS

## 2019-03-07 PROCEDURE — 36415 COLL VENOUS BLD VENIPUNCTURE: CPT

## 2019-03-07 PROCEDURE — 2580000003 HC RX 258: Performed by: INTERNAL MEDICINE

## 2019-03-07 PROCEDURE — 94640 AIRWAY INHALATION TREATMENT: CPT

## 2019-03-07 PROCEDURE — 6370000000 HC RX 637 (ALT 250 FOR IP): Performed by: INTERNAL MEDICINE

## 2019-03-07 PROCEDURE — 80053 COMPREHEN METABOLIC PANEL: CPT

## 2019-03-07 RX ORDER — GUAIFENESIN 400 MG/1
400 TABLET ORAL 3 TIMES DAILY
Qty: 56 TABLET | Refills: 0 | Status: SHIPPED | OUTPATIENT
Start: 2019-03-07 | End: 2019-09-30 | Stop reason: ALTCHOICE

## 2019-03-07 RX ORDER — LEVOFLOXACIN 750 MG/1
750 TABLET ORAL DAILY
Qty: 2 TABLET | Refills: 0 | Status: SHIPPED | OUTPATIENT
Start: 2019-03-07 | End: 2019-03-09

## 2019-03-07 RX ADMIN — CEFEPIME HYDROCHLORIDE 2 G: 2 INJECTION, POWDER, FOR SOLUTION INTRAVENOUS at 01:19

## 2019-03-07 RX ADMIN — VANCOMYCIN HYDROCHLORIDE 1250 MG: 10 INJECTION, POWDER, LYOPHILIZED, FOR SOLUTION INTRAVENOUS at 14:23

## 2019-03-07 RX ADMIN — FENTANYL CITRATE 25 MCG: 50 INJECTION INTRAMUSCULAR; INTRAVENOUS at 00:52

## 2019-03-07 RX ADMIN — PANTOPRAZOLE SODIUM 20 MG: 20 TABLET, DELAYED RELEASE ORAL at 15:58

## 2019-03-07 RX ADMIN — Medication 10 ML: at 09:37

## 2019-03-07 RX ADMIN — CEFEPIME HYDROCHLORIDE 2 G: 2 INJECTION, POWDER, FOR SOLUTION INTRAVENOUS at 10:48

## 2019-03-07 RX ADMIN — METHYLPREDNISOLONE SODIUM SUCCINATE 40 MG: 40 INJECTION, POWDER, FOR SOLUTION INTRAMUSCULAR; INTRAVENOUS at 12:29

## 2019-03-07 RX ADMIN — IPRATROPIUM BROMIDE AND ALBUTEROL SULFATE 1 AMPULE: .5; 3 SOLUTION RESPIRATORY (INHALATION) at 13:55

## 2019-03-07 RX ADMIN — PANTOPRAZOLE SODIUM 20 MG: 20 TABLET, DELAYED RELEASE ORAL at 05:24

## 2019-03-07 RX ADMIN — OXYCODONE AND ACETAMINOPHEN 1 TABLET: 10; 325 TABLET ORAL at 15:58

## 2019-03-07 RX ADMIN — ENOXAPARIN SODIUM 40 MG: 40 INJECTION SUBCUTANEOUS at 09:37

## 2019-03-07 RX ADMIN — VANCOMYCIN HYDROCHLORIDE 1250 MG: 10 INJECTION, POWDER, LYOPHILIZED, FOR SOLUTION INTRAVENOUS at 05:24

## 2019-03-07 RX ADMIN — GUAIFENESIN 400 MG: 400 TABLET ORAL at 14:58

## 2019-03-07 RX ADMIN — FENTANYL CITRATE 25 MCG: 50 INJECTION INTRAMUSCULAR; INTRAVENOUS at 05:29

## 2019-03-07 RX ADMIN — GUAIFENESIN 400 MG: 400 TABLET ORAL at 09:37

## 2019-03-07 RX ADMIN — METHYLPREDNISOLONE SODIUM SUCCINATE 40 MG: 40 INJECTION, POWDER, FOR SOLUTION INTRAMUSCULAR; INTRAVENOUS at 05:24

## 2019-03-07 RX ADMIN — IPRATROPIUM BROMIDE AND ALBUTEROL SULFATE 1 AMPULE: .5; 3 SOLUTION RESPIRATORY (INHALATION) at 09:42

## 2019-03-07 RX ADMIN — OXYCODONE AND ACETAMINOPHEN 1 TABLET: 10; 325 TABLET ORAL at 09:38

## 2019-03-07 ASSESSMENT — PAIN DESCRIPTION - PROGRESSION
CLINICAL_PROGRESSION: NOT CHANGED
CLINICAL_PROGRESSION: NOT CHANGED

## 2019-03-07 ASSESSMENT — PAIN DESCRIPTION - FREQUENCY
FREQUENCY: INTERMITTENT
FREQUENCY: INTERMITTENT

## 2019-03-07 ASSESSMENT — PAIN DESCRIPTION - LOCATION
LOCATION: CHEST
LOCATION: CHEST

## 2019-03-07 ASSESSMENT — PAIN SCALES - GENERAL
PAINLEVEL_OUTOF10: 5
PAINLEVEL_OUTOF10: 0
PAINLEVEL_OUTOF10: 0
PAINLEVEL_OUTOF10: 8
PAINLEVEL_OUTOF10: 7
PAINLEVEL_OUTOF10: 6
PAINLEVEL_OUTOF10: 7
PAINLEVEL_OUTOF10: 10

## 2019-03-07 ASSESSMENT — PAIN DESCRIPTION - DESCRIPTORS
DESCRIPTORS: DISCOMFORT
DESCRIPTORS: DISCOMFORT

## 2019-03-07 ASSESSMENT — PAIN DESCRIPTION - PAIN TYPE
TYPE: ACUTE PAIN
TYPE: ACUTE PAIN

## 2019-03-07 ASSESSMENT — PAIN DESCRIPTION - ONSET
ONSET: GRADUAL
ONSET: GRADUAL

## 2019-03-07 ASSESSMENT — PAIN DESCRIPTION - DIRECTION: RADIATING_TOWARDS: UPPER

## 2019-03-07 ASSESSMENT — PAIN DESCRIPTION - ORIENTATION
ORIENTATION: LEFT;UPPER
ORIENTATION: LEFT;UPPER

## 2019-03-08 LAB
BLOOD CULTURE, ROUTINE: NORMAL
CULTURE, BLOOD 2: NORMAL

## 2019-03-29 ENCOUNTER — TELEPHONE (OUTPATIENT)
Dept: OTHER | Facility: CLINIC | Age: 39
End: 2019-03-29

## 2019-03-29 ENCOUNTER — HOSPITAL ENCOUNTER (EMERGENCY)
Age: 39
Discharge: HOME | End: 2019-03-29
Payer: MEDICAID

## 2019-03-29 ENCOUNTER — APPOINTMENT (OUTPATIENT)
Dept: CT IMAGING | Age: 39
End: 2019-03-29
Payer: MEDICAID

## 2019-03-29 VITALS
TEMPERATURE: 97.8 F | OXYGEN SATURATION: 95 % | WEIGHT: 185 LBS | DIASTOLIC BLOOD PRESSURE: 89 MMHG | HEART RATE: 85 BPM | HEIGHT: 58 IN | SYSTOLIC BLOOD PRESSURE: 153 MMHG | RESPIRATION RATE: 16 BRPM | BODY MASS INDEX: 38.83 KG/M2

## 2019-03-29 DIAGNOSIS — S09.90XA CLOSED HEAD INJURY, INITIAL ENCOUNTER: ICD-10-CM
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
## 2019-03-29 LAB
HCG, URINE, POC: NEGATIVE
Lab: NORMAL
NEGATIVE QC PASS/FAIL: NORMAL
POSITIVE QC PASS/FAIL: NORMAL

## 2019-03-29 PROCEDURE — 90471 IMMUNIZATION ADMIN: CPT | Performed by: NURSE PRACTITIONER

## 2019-03-29 PROCEDURE — 99285 EMERGENCY DEPT VISIT HI MDM: CPT

## 2019-03-29 PROCEDURE — 70450 CT HEAD/BRAIN W/O DYE: CPT

## 2019-03-29 PROCEDURE — 72125 CT NECK SPINE W/O DYE: CPT

## 2019-03-29 PROCEDURE — 6360000002 HC RX W HCPCS: Performed by: NURSE PRACTITIONER

## 2019-03-29 PROCEDURE — 6370000000 HC RX 637 (ALT 250 FOR IP): Performed by: NURSE PRACTITIONER

## 2019-03-29 PROCEDURE — 90715 TDAP VACCINE 7 YRS/> IM: CPT | Performed by: NURSE PRACTITIONER

## 2019-03-29 RX ORDER — HYDROCODONE BITARTRATE AND ACETAMINOPHEN 5; 325 MG/1; MG/1
1 TABLET ORAL ONCE
Status: COMPLETED | OUTPATIENT
Start: 2019-03-29 | End: 2019-03-29

## 2019-03-29 RX ADMIN — HYDROCODONE BITARTRATE AND ACETAMINOPHEN 1 TABLET: 5; 325 TABLET ORAL at 12:11

## 2019-03-29 RX ADMIN — TETANUS TOXOID, REDUCED DIPHTHERIA TOXOID AND ACELLULAR PERTUSSIS VACCINE, ADSORBED 0.5 ML: 5; 2.5; 8; 8; 2.5 SUSPENSION INTRAMUSCULAR at 12:11

## 2019-03-29 ASSESSMENT — PAIN DESCRIPTION - LOCATION: LOCATION: HEAD;EYE

## 2019-03-29 ASSESSMENT — PAIN DESCRIPTION - FREQUENCY: FREQUENCY: CONTINUOUS

## 2019-03-29 ASSESSMENT — PAIN DESCRIPTION - DESCRIPTORS: DESCRIPTORS: SHARP;THROBBING

## 2019-03-29 ASSESSMENT — PAIN SCALES - GENERAL: PAINLEVEL_OUTOF10: 10

## 2019-03-29 ASSESSMENT — PAIN DESCRIPTION - ORIENTATION: ORIENTATION: RIGHT

## 2019-03-29 ASSESSMENT — PAIN DESCRIPTION - PAIN TYPE: TYPE: ACUTE PAIN
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
## 2019-06-28 ENCOUNTER — APPOINTMENT (OUTPATIENT)
Dept: GENERAL RADIOLOGY | Age: 39
End: 2019-06-28
Payer: MEDICAID

## 2019-06-28 ENCOUNTER — HOSPITAL ENCOUNTER (EMERGENCY)
Age: 39
Discharge: HOME | End: 2019-06-28
Payer: MEDICAID

## 2019-06-28 VITALS
BODY MASS INDEX: 38.83 KG/M2 | DIASTOLIC BLOOD PRESSURE: 107 MMHG | OXYGEN SATURATION: 98 % | SYSTOLIC BLOOD PRESSURE: 163 MMHG | WEIGHT: 185 LBS | RESPIRATION RATE: 16 BRPM | HEIGHT: 58 IN | TEMPERATURE: 97.8 F | HEART RATE: 103 BPM

## 2019-06-28 DIAGNOSIS — S63.502A SPRAIN OF LEFT WRIST, INITIAL ENCOUNTER: Primary | ICD-10-CM

## 2019-06-28 DIAGNOSIS — S76.011A HIP STRAIN, RIGHT, INITIAL ENCOUNTER: ICD-10-CM

## 2019-06-28 LAB
HCG, URINE, POC: NEGATIVE
Lab: NORMAL
NEGATIVE QC PASS/FAIL: NORMAL
POSITIVE QC PASS/FAIL: NORMAL

## 2019-06-28 PROCEDURE — 73110 X-RAY EXAM OF WRIST: CPT

## 2019-06-28 PROCEDURE — 6370000000 HC RX 637 (ALT 250 FOR IP): Performed by: PHYSICIAN ASSISTANT

## 2019-06-28 PROCEDURE — 99283 EMERGENCY DEPT VISIT LOW MDM: CPT

## 2019-06-28 PROCEDURE — 73502 X-RAY EXAM HIP UNI 2-3 VIEWS: CPT

## 2019-06-28 RX ORDER — ACETAMINOPHEN 325 MG/1
650 TABLET ORAL EVERY 6 HOURS PRN
Qty: 20 TABLET | Refills: 0 | Status: SHIPPED | OUTPATIENT
Start: 2019-06-28 | End: 2019-09-30 | Stop reason: DRUGHIGH

## 2019-06-28 RX ORDER — CYCLOBENZAPRINE HCL 10 MG
10 TABLET ORAL 3 TIMES DAILY PRN
Qty: 15 TABLET | Refills: 0 | Status: SHIPPED | OUTPATIENT
Start: 2019-06-28 | End: 2019-07-03

## 2019-06-28 RX ORDER — ACETAMINOPHEN 325 MG/1
650 TABLET ORAL ONCE
Status: COMPLETED | OUTPATIENT
Start: 2019-06-28 | End: 2019-06-28

## 2019-06-28 RX ADMIN — ACETAMINOPHEN 650 MG: 325 TABLET, FILM COATED ORAL at 17:07

## 2019-06-28 ASSESSMENT — PAIN SCALES - GENERAL
PAINLEVEL_OUTOF10: 10
PAINLEVEL_OUTOF10: 10

## 2019-06-28 ASSESSMENT — PAIN DESCRIPTION - FREQUENCY: FREQUENCY: CONTINUOUS

## 2019-06-28 ASSESSMENT — PAIN DESCRIPTION - PAIN TYPE: TYPE: ACUTE PAIN

## 2019-06-28 ASSESSMENT — PAIN DESCRIPTION - ORIENTATION: ORIENTATION: RIGHT

## 2019-06-28 ASSESSMENT — PAIN DESCRIPTION - PROGRESSION: CLINICAL_PROGRESSION: GRADUALLY WORSENING

## 2019-06-28 ASSESSMENT — PAIN DESCRIPTION - LOCATION: LOCATION: HIP;WRIST

## 2019-06-28 ASSESSMENT — PAIN DESCRIPTION - DESCRIPTORS: DESCRIPTORS: ACHING

## 2019-06-28 NOTE — ED PROVIDER NOTES
characterization. ED Course / Medical Decision Making     Medications   acetaminophen (TYLENOL) tablet 650 mg (650 mg Oral Given 6/28/19 1705)   Consult(s):   None    Procedure(s):   none    MDM:   Patient in no acute distress. Vital signs stable. Imaging negative for acute process. Placed in a Velcro wrist splint. patient will follow with PCP. Educated on the signs and symptoms to return to the ED. Discharged in stable condition. Counseling: The emergency provider has spoken with the patient and discussed todays results, in addition to providing specific details for the plan of care and counseling regarding the diagnosis and prognosis. Questions are answered at this time and they are agreeable with the plan. Assessment      1. Sprain of left wrist, initial encounter    2. Hip strain, right, initial encounter      Plan   Discharge to home  Patient condition is good    New Medications     New Prescriptions    ACETAMINOPHEN (TYLENOL) 325 MG TABLET    Take 2 tablets by mouth every 6 hours as needed for Pain    CYCLOBENZAPRINE (FLEXERIL) 10 MG TABLET    Take 1 tablet by mouth 3 times daily as needed for Muscle spasms     Electronically signed by MANUELA Alicia   DD: 6/28/19  **This report was transcribed using voice recognition software. Every effort was made to ensure accuracy; however, inadvertent computerized transcription errors may be present.   END OF ED PROVIDER NOTE         Juancarlos Alicia  06/28/19 4721

## 2019-09-30 ENCOUNTER — HOSPITAL ENCOUNTER (EMERGENCY)
Age: 39
Discharge: HOME | End: 2019-10-01
Attending: EMERGENCY MEDICINE
Payer: MEDICAID

## 2019-09-30 DIAGNOSIS — F14.10 COCAINE ABUSE (HCC): ICD-10-CM

## 2019-09-30 DIAGNOSIS — F10.10 ALCOHOL ABUSE: Primary | ICD-10-CM

## 2019-09-30 LAB
ACETAMINOPHEN LEVEL: <5 MCG/ML (ref 10–30)
ALBUMIN SERPL-MCNC: 4 G/DL (ref 3.5–5.2)
ALP BLD-CCNC: 122 U/L (ref 35–104)
ALT SERPL-CCNC: 15 U/L (ref 0–32)
AMPHETAMINE SCREEN, URINE: NOT DETECTED
ANION GAP SERPL CALCULATED.3IONS-SCNC: 11 MMOL/L (ref 7–16)
AST SERPL-CCNC: 16 U/L (ref 0–31)
BARBITURATE SCREEN URINE: NOT DETECTED
BASOPHILS ABSOLUTE: 0.03 E9/L (ref 0–0.2)
BASOPHILS RELATIVE PERCENT: 0.3 % (ref 0–2)
BENZODIAZEPINE SCREEN, URINE: NOT DETECTED
BILIRUB SERPL-MCNC: 0.2 MG/DL (ref 0–1.2)
BILIRUBIN URINE: NEGATIVE
BLOOD, URINE: NEGATIVE
BUN BLDV-MCNC: 11 MG/DL (ref 6–20)
CALCIUM SERPL-MCNC: 9.3 MG/DL (ref 8.6–10.2)
CANNABINOID SCREEN URINE: NOT DETECTED
CHLORIDE BLD-SCNC: 104 MMOL/L (ref 98–107)
CLARITY: CLEAR
CO2: 26 MMOL/L (ref 22–29)
COCAINE METABOLITE SCREEN URINE: POSITIVE
COLOR: YELLOW
CREAT SERPL-MCNC: 0.8 MG/DL (ref 0.5–1)
EOSINOPHILS ABSOLUTE: 0.16 E9/L (ref 0.05–0.5)
EOSINOPHILS RELATIVE PERCENT: 1.8 % (ref 0–6)
ETHANOL: <10 MG/DL (ref 0–0.08)
GFR AFRICAN AMERICAN: >60
GFR NON-AFRICAN AMERICAN: >60 ML/MIN/1.73
GLUCOSE BLD-MCNC: 102 MG/DL (ref 74–99)
GLUCOSE URINE: NEGATIVE MG/DL
HCG, URINE, POC: NEGATIVE
HCT VFR BLD CALC: 42.1 % (ref 34–48)
HEMOGLOBIN: 13.6 G/DL (ref 11.5–15.5)
IMMATURE GRANULOCYTES #: 0.09 E9/L
IMMATURE GRANULOCYTES %: 1 % (ref 0–5)
KETONES, URINE: NEGATIVE MG/DL
LEUKOCYTE ESTERASE, URINE: NEGATIVE
LYMPHOCYTES ABSOLUTE: 1.81 E9/L (ref 1.5–4)
LYMPHOCYTES RELATIVE PERCENT: 20.9 % (ref 20–42)
Lab: ABNORMAL
Lab: NORMAL
MCH RBC QN AUTO: 26.5 PG (ref 26–35)
MCHC RBC AUTO-ENTMCNC: 32.3 % (ref 32–34.5)
MCV RBC AUTO: 82.1 FL (ref 80–99.9)
METHADONE SCREEN, URINE: NOT DETECTED
MONOCYTES ABSOLUTE: 0.76 E9/L (ref 0.1–0.95)
MONOCYTES RELATIVE PERCENT: 8.8 % (ref 2–12)
NEGATIVE QC PASS/FAIL: NORMAL
NEUTROPHILS ABSOLUTE: 5.81 E9/L (ref 1.8–7.3)
NEUTROPHILS RELATIVE PERCENT: 67.2 % (ref 43–80)
NITRITE, URINE: NEGATIVE
OPIATE SCREEN URINE: NOT DETECTED
PDW BLD-RTO: 14.6 FL (ref 11.5–15)
PH UA: 5.5 (ref 5–9)
PHENCYCLIDINE SCREEN URINE: NOT DETECTED
PLATELET # BLD: 245 E9/L (ref 130–450)
PMV BLD AUTO: 9.1 FL (ref 7–12)
POSITIVE QC PASS/FAIL: NORMAL
POTASSIUM SERPL-SCNC: 4.4 MMOL/L (ref 3.5–5)
PROPOXYPHENE SCREEN: NOT DETECTED
PROTEIN UA: NEGATIVE MG/DL
RBC # BLD: 5.13 E12/L (ref 3.5–5.5)
SALICYLATE, SERUM: <0.3 MG/DL (ref 0–30)
SODIUM BLD-SCNC: 141 MMOL/L (ref 132–146)
SPECIFIC GRAVITY UA: 1.02 (ref 1–1.03)
TOTAL PROTEIN: 7.4 G/DL (ref 6.4–8.3)
TRICYCLIC ANTIDEPRESSANTS SCREEN SERUM: NEGATIVE NG/ML
UROBILINOGEN, URINE: 0.2 E.U./DL
WBC # BLD: 8.7 E9/L (ref 4.5–11.5)

## 2019-09-30 PROCEDURE — 80307 DRUG TEST PRSMV CHEM ANLYZR: CPT

## 2019-09-30 PROCEDURE — G0480 DRUG TEST DEF 1-7 CLASSES: HCPCS

## 2019-09-30 PROCEDURE — 81003 URINALYSIS AUTO W/O SCOPE: CPT

## 2019-09-30 PROCEDURE — 93005 ELECTROCARDIOGRAM TRACING: CPT | Performed by: EMERGENCY MEDICINE

## 2019-09-30 PROCEDURE — 85025 COMPLETE CBC W/AUTO DIFF WBC: CPT

## 2019-09-30 PROCEDURE — 80053 COMPREHEN METABOLIC PANEL: CPT

## 2019-09-30 PROCEDURE — 6370000000 HC RX 637 (ALT 250 FOR IP): Performed by: EMERGENCY MEDICINE

## 2019-09-30 PROCEDURE — 36415 COLL VENOUS BLD VENIPUNCTURE: CPT

## 2019-09-30 PROCEDURE — 99284 EMERGENCY DEPT VISIT MOD MDM: CPT

## 2019-09-30 RX ORDER — FOLIC ACID 1 MG/1
1 TABLET ORAL DAILY
Status: DISCONTINUED | OUTPATIENT
Start: 2019-09-30 | End: 2019-10-01 | Stop reason: HOSPADM

## 2019-09-30 RX ORDER — MIRTAZAPINE 45 MG/1
45 TABLET, FILM COATED ORAL NIGHTLY
Refills: 0 | COMMUNITY
Start: 2019-08-19 | End: 2021-04-15 | Stop reason: ALTCHOICE

## 2019-09-30 RX ORDER — HYDROCHLOROTHIAZIDE 25 MG/1
25 TABLET ORAL DAILY
COMMUNITY
End: 2021-04-15 | Stop reason: ALTCHOICE

## 2019-09-30 RX ORDER — PSEUDOEPHED/ACETAMINOPH/DIPHEN 30MG-500MG
1000 TABLET ORAL EVERY 4 HOURS PRN
Refills: 0 | COMMUNITY
Start: 2019-08-06 | End: 2020-02-23 | Stop reason: ALTCHOICE

## 2019-09-30 RX ORDER — CARBAMAZEPINE 200 MG/1
200 TABLET ORAL EVERY 12 HOURS
Refills: 0 | COMMUNITY
Start: 2019-07-31 | End: 2021-04-15 | Stop reason: ALTCHOICE

## 2019-09-30 RX ORDER — GUAIFENESIN AND DEXTROMETHORPHAN HYDROBROMIDE 400; 20 MG/1; MG/1
1 TABLET ORAL ONCE
Status: COMPLETED | OUTPATIENT
Start: 2019-09-30 | End: 2019-09-30

## 2019-09-30 RX ORDER — THIAMINE MONONITRATE (VIT B1) 100 MG
100 TABLET ORAL DAILY
Status: DISCONTINUED | OUTPATIENT
Start: 2019-09-30 | End: 2019-10-01 | Stop reason: HOSPADM

## 2019-09-30 RX ADMIN — FOLIC ACID 1 MG: 1 TABLET ORAL at 17:23

## 2019-09-30 RX ADMIN — DEXTROMETHORPHAN HBR AND GUAIFENESIN 1 TABLET: 20; 400 TABLET, FILM COATED ORAL at 20:07

## 2019-09-30 RX ADMIN — Medication 100 MG: at 18:52

## 2019-10-01 VITALS
WEIGHT: 185 LBS | BODY MASS INDEX: 38.83 KG/M2 | HEART RATE: 88 BPM | OXYGEN SATURATION: 99 % | DIASTOLIC BLOOD PRESSURE: 87 MMHG | HEIGHT: 58 IN | SYSTOLIC BLOOD PRESSURE: 159 MMHG | RESPIRATION RATE: 16 BRPM | TEMPERATURE: 98.7 F

## 2019-10-01 LAB
EKG ATRIAL RATE: 73 BPM
EKG P AXIS: 46 DEGREES
EKG P-R INTERVAL: 132 MS
EKG Q-T INTERVAL: 396 MS
EKG QRS DURATION: 86 MS
EKG QTC CALCULATION (BAZETT): 436 MS
EKG R AXIS: 55 DEGREES
EKG T AXIS: 23 DEGREES
EKG VENTRICULAR RATE: 73 BPM

## 2019-10-01 PROCEDURE — 93010 ELECTROCARDIOGRAM REPORT: CPT | Performed by: INTERNAL MEDICINE

## 2019-10-01 PROCEDURE — 6370000000 HC RX 637 (ALT 250 FOR IP): Performed by: EMERGENCY MEDICINE

## 2019-10-01 RX ORDER — ACETAMINOPHEN 500 MG
1000 TABLET ORAL ONCE
Status: COMPLETED | OUTPATIENT
Start: 2019-10-01 | End: 2019-10-01

## 2019-10-01 RX ADMIN — FOLIC ACID 1 MG: 1 TABLET ORAL at 08:56

## 2019-10-01 RX ADMIN — ACETAMINOPHEN 1000 MG: 500 TABLET ORAL at 04:18

## 2019-10-01 RX ADMIN — Medication 100 MG: at 08:56

## 2019-10-01 ASSESSMENT — PAIN SCALES - GENERAL: PAINLEVEL_OUTOF10: 10

## 2019-10-05 ENCOUNTER — HOSPITAL ENCOUNTER (EMERGENCY)
Age: 39
Discharge: HOME | End: 2019-10-05
Attending: EMERGENCY MEDICINE
Payer: MEDICAID

## 2019-10-05 ENCOUNTER — APPOINTMENT (OUTPATIENT)
Dept: GENERAL RADIOLOGY | Age: 39
End: 2019-10-05
Payer: MEDICAID

## 2019-10-05 VITALS
SYSTOLIC BLOOD PRESSURE: 147 MMHG | RESPIRATION RATE: 16 BRPM | WEIGHT: 180 LBS | HEART RATE: 108 BPM | BODY MASS INDEX: 36.29 KG/M2 | DIASTOLIC BLOOD PRESSURE: 78 MMHG | HEIGHT: 59 IN | TEMPERATURE: 99 F | OXYGEN SATURATION: 96 %

## 2019-10-05 DIAGNOSIS — J18.9 PNEUMONIA DUE TO ORGANISM: Primary | ICD-10-CM

## 2019-10-05 LAB
ANION GAP SERPL CALCULATED.3IONS-SCNC: 15 MMOL/L (ref 7–16)
BUN BLDV-MCNC: 7 MG/DL (ref 6–20)
CALCIUM SERPL-MCNC: 9.5 MG/DL (ref 8.6–10.2)
CHLORIDE BLD-SCNC: 98 MMOL/L (ref 98–107)
CO2: 20 MMOL/L (ref 22–29)
COCAINE, CONFIRM, URINE: >1000 NG/ML
CREAT SERPL-MCNC: 0.7 MG/DL (ref 0.5–1)
GFR AFRICAN AMERICAN: >60
GFR NON-AFRICAN AMERICAN: >60 ML/MIN/1.73
GLUCOSE BLD-MCNC: 110 MG/DL (ref 74–99)
HCT VFR BLD CALC: 43.8 % (ref 34–48)
HEMOGLOBIN: 14.1 G/DL (ref 11.5–15.5)
LACTIC ACID: 2.8 MMOL/L (ref 0.5–2.2)
MCH RBC QN AUTO: 26.8 PG (ref 26–35)
MCHC RBC AUTO-ENTMCNC: 32.2 % (ref 32–34.5)
MCV RBC AUTO: 83.1 FL (ref 80–99.9)
PDW BLD-RTO: 14.6 FL (ref 11.5–15)
PLATELET # BLD: 226 E9/L (ref 130–450)
PMV BLD AUTO: 9.7 FL (ref 7–12)
POTASSIUM REFLEX MAGNESIUM: 4.4 MMOL/L (ref 3.5–5)
RBC # BLD: 5.27 E12/L (ref 3.5–5.5)
SODIUM BLD-SCNC: 133 MMOL/L (ref 132–146)
WBC # BLD: 23.3 E9/L (ref 4.5–11.5)

## 2019-10-05 PROCEDURE — 99284 EMERGENCY DEPT VISIT MOD MDM: CPT

## 2019-10-05 PROCEDURE — 6370000000 HC RX 637 (ALT 250 FOR IP): Performed by: GENERAL PRACTICE

## 2019-10-05 PROCEDURE — 96367 TX/PROPH/DG ADDL SEQ IV INF: CPT

## 2019-10-05 PROCEDURE — 83605 ASSAY OF LACTIC ACID: CPT

## 2019-10-05 PROCEDURE — 6360000002 HC RX W HCPCS: Performed by: GENERAL PRACTICE

## 2019-10-05 PROCEDURE — 96365 THER/PROPH/DIAG IV INF INIT: CPT

## 2019-10-05 PROCEDURE — 94640 AIRWAY INHALATION TREATMENT: CPT

## 2019-10-05 PROCEDURE — 6360000002 HC RX W HCPCS

## 2019-10-05 PROCEDURE — 85027 COMPLETE CBC AUTOMATED: CPT

## 2019-10-05 PROCEDURE — 96375 TX/PRO/DX INJ NEW DRUG ADDON: CPT

## 2019-10-05 PROCEDURE — 94664 DEMO&/EVAL PT USE INHALER: CPT

## 2019-10-05 PROCEDURE — 2580000003 HC RX 258: Performed by: GENERAL PRACTICE

## 2019-10-05 PROCEDURE — 80048 BASIC METABOLIC PNL TOTAL CA: CPT

## 2019-10-05 PROCEDURE — 71045 X-RAY EXAM CHEST 1 VIEW: CPT

## 2019-10-05 RX ORDER — IPRATROPIUM BROMIDE AND ALBUTEROL SULFATE 2.5; .5 MG/3ML; MG/3ML
3 SOLUTION RESPIRATORY (INHALATION) ONCE
Status: COMPLETED | OUTPATIENT
Start: 2019-10-05 | End: 2019-10-05

## 2019-10-05 RX ORDER — ONDANSETRON 2 MG/ML
4 INJECTION INTRAMUSCULAR; INTRAVENOUS EVERY 6 HOURS PRN
Status: DISCONTINUED | OUTPATIENT
Start: 2019-10-05 | End: 2019-10-05 | Stop reason: HOSPADM

## 2019-10-05 RX ORDER — METHYLPREDNISOLONE SODIUM SUCCINATE 125 MG/2ML
125 INJECTION, POWDER, LYOPHILIZED, FOR SOLUTION INTRAMUSCULAR; INTRAVENOUS DAILY
Status: DISCONTINUED | OUTPATIENT
Start: 2019-10-05 | End: 2019-10-05

## 2019-10-05 RX ORDER — AZITHROMYCIN 250 MG/1
250 TABLET, FILM COATED ORAL DAILY
Qty: 4 TABLET | Refills: 0 | Status: SHIPPED | OUTPATIENT
Start: 2019-10-05 | End: 2019-10-09

## 2019-10-05 RX ORDER — CEFDINIR 300 MG/1
300 CAPSULE ORAL 2 TIMES DAILY
Qty: 20 CAPSULE | Refills: 0 | Status: SHIPPED | OUTPATIENT
Start: 2019-10-05 | End: 2019-10-15

## 2019-10-05 RX ORDER — METHYLPREDNISOLONE SODIUM SUCCINATE 125 MG/2ML
INJECTION, POWDER, LYOPHILIZED, FOR SOLUTION INTRAMUSCULAR; INTRAVENOUS
Status: COMPLETED
Start: 2019-10-05 | End: 2019-10-05

## 2019-10-05 RX ORDER — ALBUTEROL SULFATE 90 UG/1
2 AEROSOL, METERED RESPIRATORY (INHALATION) 4 TIMES DAILY PRN
Qty: 1 INHALER | Refills: 0 | Status: SHIPPED | OUTPATIENT
Start: 2019-10-05 | End: 2019-11-30 | Stop reason: SDUPTHER

## 2019-10-05 RX ORDER — METHYLPREDNISOLONE SODIUM SUCCINATE 125 MG/2ML
125 INJECTION, POWDER, LYOPHILIZED, FOR SOLUTION INTRAMUSCULAR; INTRAVENOUS ONCE
Status: COMPLETED | OUTPATIENT
Start: 2019-10-05 | End: 2019-10-05

## 2019-10-05 RX ORDER — PREDNISONE 20 MG/1
50 TABLET ORAL DAILY
Qty: 5 TABLET | Refills: 0 | Status: SHIPPED | OUTPATIENT
Start: 2019-10-05 | End: 2019-10-10

## 2019-10-05 RX ADMIN — CEFTRIAXONE 1 G: 1 INJECTION, POWDER, FOR SOLUTION INTRAMUSCULAR; INTRAVENOUS at 06:12

## 2019-10-05 RX ADMIN — ONDANSETRON 4 MG: 2 INJECTION INTRAMUSCULAR; INTRAVENOUS at 05:47

## 2019-10-05 RX ADMIN — IPRATROPIUM BROMIDE AND ALBUTEROL SULFATE 3 AMPULE: .5; 3 SOLUTION RESPIRATORY (INHALATION) at 05:42

## 2019-10-05 RX ADMIN — HYDROCODONE BITARTRATE AND HOMATROPINE METHYLBROMIDE 5 ML: 5; 1.5 SOLUTION ORAL at 06:26

## 2019-10-05 RX ADMIN — METHYLPREDNISOLONE SODIUM SUCCINATE 125 MG: 125 INJECTION, POWDER, FOR SOLUTION INTRAMUSCULAR; INTRAVENOUS at 05:46

## 2019-10-05 RX ADMIN — METHYLPREDNISOLONE SODIUM SUCCINATE 125 MG: 125 INJECTION, POWDER, LYOPHILIZED, FOR SOLUTION INTRAMUSCULAR; INTRAVENOUS at 05:46

## 2019-10-05 RX ADMIN — AZITHROMYCIN DIHYDRATE 500 MG: 500 INJECTION, POWDER, LYOPHILIZED, FOR SOLUTION INTRAVENOUS at 06:26

## 2019-10-05 ASSESSMENT — ENCOUNTER SYMPTOMS
SORE THROAT: 0
COUGH: 1
VOMITING: 0
CHEST TIGHTNESS: 0
SHORTNESS OF BREATH: 1
CHOKING: 0
NAUSEA: 0
DIARRHEA: 0
ABDOMINAL PAIN: 0
SINUS PAIN: 0
EYE PAIN: 0
WHEEZING: 1

## 2019-10-05 ASSESSMENT — PAIN DESCRIPTION - PAIN TYPE: TYPE: ACUTE PAIN

## 2019-10-05 ASSESSMENT — PAIN DESCRIPTION - LOCATION: LOCATION: BACK

## 2019-10-05 ASSESSMENT — PAIN SCALES - GENERAL: PAINLEVEL_OUTOF10: 10

## 2019-11-30 ENCOUNTER — APPOINTMENT (OUTPATIENT)
Dept: GENERAL RADIOLOGY | Age: 39
End: 2019-11-30
Payer: MEDICAID

## 2019-11-30 ENCOUNTER — HOSPITAL ENCOUNTER (EMERGENCY)
Age: 39
Discharge: HOME | End: 2019-11-30
Payer: MEDICAID

## 2019-11-30 VITALS
DIASTOLIC BLOOD PRESSURE: 98 MMHG | HEIGHT: 59 IN | HEART RATE: 85 BPM | OXYGEN SATURATION: 100 % | TEMPERATURE: 97.9 F | SYSTOLIC BLOOD PRESSURE: 185 MMHG | WEIGHT: 185 LBS | RESPIRATION RATE: 14 BRPM | BODY MASS INDEX: 37.29 KG/M2

## 2019-11-30 DIAGNOSIS — S61.209A AVULSION OF SKIN OF FINGER, INITIAL ENCOUNTER: Primary | ICD-10-CM

## 2019-11-30 PROCEDURE — 12001 RPR S/N/AX/GEN/TRNK 2.5CM/<: CPT

## 2019-11-30 PROCEDURE — 90471 IMMUNIZATION ADMIN: CPT | Performed by: PHYSICIAN ASSISTANT

## 2019-11-30 PROCEDURE — 6370000000 HC RX 637 (ALT 250 FOR IP): Performed by: PHYSICIAN ASSISTANT

## 2019-11-30 PROCEDURE — 90715 TDAP VACCINE 7 YRS/> IM: CPT | Performed by: PHYSICIAN ASSISTANT

## 2019-11-30 PROCEDURE — 73140 X-RAY EXAM OF FINGER(S): CPT

## 2019-11-30 PROCEDURE — 99283 EMERGENCY DEPT VISIT LOW MDM: CPT

## 2019-11-30 PROCEDURE — 6360000002 HC RX W HCPCS: Performed by: PHYSICIAN ASSISTANT

## 2019-11-30 RX ORDER — ACETAMINOPHEN 650 MG
TABLET, EXTENDED RELEASE ORAL ONCE
Status: DISCONTINUED | OUTPATIENT
Start: 2019-11-30 | End: 2019-12-01 | Stop reason: HOSPADM

## 2019-11-30 RX ORDER — ACETAMINOPHEN 500 MG
1000 TABLET ORAL ONCE
Status: COMPLETED | OUTPATIENT
Start: 2019-11-30 | End: 2019-11-30

## 2019-11-30 RX ORDER — BACITRACIN, NEOMYCIN, POLYMYXIN B 400; 3.5; 5 [USP'U]/G; MG/G; [USP'U]/G
OINTMENT TOPICAL
Qty: 1 TUBE | Refills: 0 | Status: SHIPPED | OUTPATIENT
Start: 2019-11-30 | End: 2019-12-10

## 2019-11-30 RX ADMIN — ACETAMINOPHEN 1000 MG: 500 TABLET ORAL at 21:15

## 2019-11-30 RX ADMIN — TETANUS TOXOID, REDUCED DIPHTHERIA TOXOID AND ACELLULAR PERTUSSIS VACCINE, ADSORBED 0.5 ML: 5; 2.5; 8; 8; 2.5 SUSPENSION INTRAMUSCULAR at 20:57

## 2019-11-30 ASSESSMENT — PAIN DESCRIPTION - DESCRIPTORS: DESCRIPTORS: ACHING;THROBBING

## 2019-11-30 ASSESSMENT — PAIN DESCRIPTION - LOCATION: LOCATION: FINGER (COMMENT WHICH ONE)

## 2019-11-30 ASSESSMENT — PAIN SCALES - GENERAL
PAINLEVEL_OUTOF10: 10
PAINLEVEL_OUTOF10: 10

## 2019-11-30 ASSESSMENT — PAIN DESCRIPTION - PAIN TYPE: TYPE: ACUTE PAIN

## 2019-11-30 ASSESSMENT — PAIN DESCRIPTION - ORIENTATION: ORIENTATION: LEFT

## 2019-11-30 ASSESSMENT — PAIN DESCRIPTION - ONSET: ONSET: GRADUAL

## 2019-11-30 ASSESSMENT — PAIN DESCRIPTION - PROGRESSION: CLINICAL_PROGRESSION: GRADUALLY WORSENING

## 2019-11-30 ASSESSMENT — PAIN - FUNCTIONAL ASSESSMENT: PAIN_FUNCTIONAL_ASSESSMENT: ACTIVITIES ARE NOT PREVENTED

## 2019-11-30 ASSESSMENT — PAIN DESCRIPTION - FREQUENCY: FREQUENCY: CONTINUOUS

## 2020-02-09 ENCOUNTER — HOSPITAL ENCOUNTER (EMERGENCY)
Age: 40
Discharge: HOME | End: 2020-02-09
Attending: EMERGENCY MEDICINE
Payer: MEDICAID

## 2020-02-09 ENCOUNTER — APPOINTMENT (OUTPATIENT)
Dept: CT IMAGING | Age: 40
End: 2020-02-09
Payer: MEDICAID

## 2020-02-09 ENCOUNTER — APPOINTMENT (OUTPATIENT)
Dept: GENERAL RADIOLOGY | Age: 40
End: 2020-02-09
Payer: MEDICAID

## 2020-02-09 VITALS
OXYGEN SATURATION: 99 % | WEIGHT: 189 LBS | HEIGHT: 59 IN | RESPIRATION RATE: 16 BRPM | TEMPERATURE: 98 F | SYSTOLIC BLOOD PRESSURE: 120 MMHG | HEART RATE: 79 BPM | BODY MASS INDEX: 38.1 KG/M2 | DIASTOLIC BLOOD PRESSURE: 83 MMHG

## 2020-02-09 LAB
HCG, URINE, POC: NEGATIVE
Lab: NORMAL
NEGATIVE QC PASS/FAIL: NORMAL
POSITIVE QC PASS/FAIL: NORMAL

## 2020-02-09 PROCEDURE — 6370000000 HC RX 637 (ALT 250 FOR IP): Performed by: STUDENT IN AN ORGANIZED HEALTH CARE EDUCATION/TRAINING PROGRAM

## 2020-02-09 PROCEDURE — 72125 CT NECK SPINE W/O DYE: CPT

## 2020-02-09 PROCEDURE — 73502 X-RAY EXAM HIP UNI 2-3 VIEWS: CPT

## 2020-02-09 PROCEDURE — 99284 EMERGENCY DEPT VISIT MOD MDM: CPT

## 2020-02-09 PROCEDURE — 70450 CT HEAD/BRAIN W/O DYE: CPT

## 2020-02-09 RX ORDER — HYDROCODONE BITARTRATE AND ACETAMINOPHEN 5; 325 MG/1; MG/1
2 TABLET ORAL ONCE
Status: COMPLETED | OUTPATIENT
Start: 2020-02-09 | End: 2020-02-09

## 2020-02-09 RX ORDER — HYDROCODONE BITARTRATE AND ACETAMINOPHEN 5; 325 MG/1; MG/1
1 TABLET ORAL EVERY 4 HOURS PRN
Qty: 15 TABLET | Refills: 0 | Status: SHIPPED | OUTPATIENT
Start: 2020-02-09 | End: 2020-02-12

## 2020-02-09 RX ADMIN — HYDROCODONE BITARTRATE AND ACETAMINOPHEN 2 TABLET: 5; 325 TABLET ORAL at 03:48

## 2020-02-09 ASSESSMENT — ENCOUNTER SYMPTOMS
NAUSEA: 0
CHEST TIGHTNESS: 0
VISUAL CHANGE: 0
SHORTNESS OF BREATH: 0
PHOTOPHOBIA: 0
ABDOMINAL PAIN: 0
WHEEZING: 0
BOWEL INCONTINENCE: 0
VOMITING: 0

## 2020-02-09 ASSESSMENT — PAIN SCALES - GENERAL
PAINLEVEL_OUTOF10: 10
PAINLEVEL_OUTOF10: 3
PAINLEVEL_OUTOF10: 10

## 2020-02-09 ASSESSMENT — PAIN DESCRIPTION - ORIENTATION: ORIENTATION: RIGHT

## 2020-02-09 ASSESSMENT — PAIN DESCRIPTION - LOCATION: LOCATION: LEG

## 2020-02-09 ASSESSMENT — PAIN DESCRIPTION - PAIN TYPE
TYPE: ACUTE PAIN
TYPE: ACUTE PAIN

## 2020-02-09 NOTE — ED PROVIDER NOTES
incontinence, nausea and vomiting. Genitourinary: Negative for hematuria. Musculoskeletal: Positive for neck pain. Negative for neck stiffness. Skin: Negative for pallor, rash and wound. Neurological: Positive for tingling and weakness. Negative for dizziness, loss of consciousness, syncope, light-headedness, numbness and headaches. Physical Exam  Vitals signs and nursing note reviewed. Constitutional:       General: She is not in acute distress. Appearance: Normal appearance. She is well-developed. She is not ill-appearing. HENT:      Head: Normocephalic and atraumatic. Mouth/Throat:      Mouth: Mucous membranes are moist.   Eyes:      Extraocular Movements: Extraocular movements intact. Pupils: Pupils are equal, round, and reactive to light. Neck:      Musculoskeletal: Normal range of motion and neck supple. Muscular tenderness (Mild midline tenderness palpation.) present. Cardiovascular:      Rate and Rhythm: Normal rate and regular rhythm. Pulses: Normal pulses. Heart sounds: Normal heart sounds. No murmur. Pulmonary:      Effort: Pulmonary effort is normal. No respiratory distress. Breath sounds: Normal breath sounds. No wheezing or rales. Abdominal:      General: Abdomen is flat. Bowel sounds are normal.      Palpations: Abdomen is soft. Tenderness: There is no abdominal tenderness. There is no guarding or rebound. Musculoskeletal:         General: Tenderness (Tenderness to palpation of the right lateral thigh.) present. No swelling. Skin:     General: Skin is warm and dry. Neurological:      Mental Status: She is alert and oriented to person, place, and time. Cranial Nerves: No cranial nerve deficit.       Coordination: Coordination normal.          Procedures     MDM  Number of Diagnoses or Management Options  Fall, initial encounter:   Neck pain:   Right leg pain:   Diagnosis management comments: Patient is a 42-year-old female that presents to the emergency department for evaluation of fall, neck pain, leg pain. Patient resting in bed though mildly uncomfortable. There is midline tenderness palpation and CT of the head and neck were performed. Both were unremarkable showing no acute process. X-ray of the right hip and pelvis showed no signs of fracture. Patient was able to ambulate after giving Norco for pain control. She felt comfortable being discharged home. Patient instructed to follow-up with her primary care physician in 1 week if symptoms have not improved. Symptoms for return to the emergency room were discussed with the patient. Discharged home in stable condition.              --------------------------------------------- PAST HISTORY ---------------------------------------------  Past Medical History:  has a past medical history of Asthma, Bronchitis, Movement disorder, Seizure disorder ([facility] [address] Seizure disorder ([facility] [facility] Tobacco abuse, and Tobacco abuse. Past Surgical History:  has a past surgical history that includes fracture surgery; Cholecystectomy; and Rotator cuff repair. Social History:  reports that she has been smoking cigarettes. She has a 7.50 pack-year smoking history. She has never used smokeless tobacco. She reports current alcohol use. She reports that she does not use drugs. Family History: family history includes Cancer in her mother. The patients home medications have been reviewed. Allergies: Tramadol; Darvocet [propoxyphene n-acetaminophen];  Ibuprofen; Ketorolac; Meloxicam; and Naproxen    -------------------------------------------------- RESULTS -------------------------------------------------  Labs:  Results for orders placed or performed during the hospital encounter of 02/09/20   POC Pregnancy Urine Qual   Result Value Ref Range    HCG, Urine, POC Negative Negative    Lot Number [id number]     Positive QC Pass/Fail Pass     Negative QC Pass/Fail Pass Radiology:  CT Head WO Contrast   Final Result   No evidence of acute intracranial hemorrhage. This report has been electronically signed by Mega Gandhi MD.      CT Cervical Spine WO Contrast   Final Result   No evidence of acute fracture. This report has been electronically signed by Mega Gandhi MD.      XR HIP 2-3 VW W PELVIS RIGHT    (Results Pending)       ------------------------- NURSING NOTES AND VITALS REVIEWED ---------------------------  Date / Time Roomed:  2/9/2020  3:10 AM  ED Bed Assignment:  17/17    The nursing notes within the ED encounter and vital signs as below have been reviewed. /83   Pulse 79   Temp 98 °F (36.7 °C) (Oral)   Resp 16   Ht 4' 11\" (1.499 m)   Wt 189 lb (85.7 kg)   LMP 01/30/2020   SpO2 99%   BMI 38.17 kg/m²   Oxygen Saturation Interpretation: Normal      ------------------------------------------ PROGRESS NOTES ------------------------------------------  6:34 AM  I have spoken with the patient and discussed todays results, in addition to providing specific details for the plan of care and counseling regarding the diagnosis and prognosis. Their questions are answered at this time and they are agreeable with the plan. I discussed at length with them reasons for immediate return here for re evaluation. They will followup with their primary care physician by calling their office on Monday.      --------------------------------- ADDITIONAL PROVIDER NOTES ---------------------------------  At this time the patient is without objective evidence of an acute process requiring hospitalization or inpatient management. They have remained hemodynamically stable throughout their entire ED visit and are stable for discharge with outpatient follow-up. The plan has been discussed in detail and they are aware of the specific conditions for emergent return, as well as the importance of follow-up.       New Prescriptions    HYDROCODONE-ACETAMINOPHEN ([address]) 5-325 MG PER TABLET    Take 1 tablet by mouth every 4 hours as needed for Pain for up to 3 days. Intended supply: 3 days. Take lowest dose possible to manage pain       Diagnosis:  1. Fall, initial encounter    2. Right leg pain    3. Neck pain        Disposition:  Patient's disposition: Discharge to home  Patient's condition is stable.        Amaury Rodriguez., DO  Resident  02/09/20 4519

## 2020-02-23 ENCOUNTER — HOSPITAL ENCOUNTER (EMERGENCY)
Age: 40
Discharge: HOME | End: 2020-02-23
Attending: EMERGENCY MEDICINE
Payer: MEDICAID

## 2020-02-23 ENCOUNTER — APPOINTMENT (OUTPATIENT)
Dept: CT IMAGING | Age: 40
End: 2020-02-23
Payer: MEDICAID

## 2020-02-23 VITALS
HEART RATE: 88 BPM | DIASTOLIC BLOOD PRESSURE: 66 MMHG | SYSTOLIC BLOOD PRESSURE: 120 MMHG | OXYGEN SATURATION: 98 % | WEIGHT: 189 LBS | TEMPERATURE: 98.5 F | BODY MASS INDEX: 38.1 KG/M2 | RESPIRATION RATE: 18 BRPM | HEIGHT: 59 IN

## 2020-02-23 PROCEDURE — 6360000002 HC RX W HCPCS: Performed by: EMERGENCY MEDICINE

## 2020-02-23 PROCEDURE — 99284 EMERGENCY DEPT VISIT MOD MDM: CPT

## 2020-02-23 PROCEDURE — 72192 CT PELVIS W/O DYE: CPT

## 2020-02-23 PROCEDURE — 6370000000 HC RX 637 (ALT 250 FOR IP): Performed by: EMERGENCY MEDICINE

## 2020-02-23 PROCEDURE — 96372 THER/PROPH/DIAG INJ SC/IM: CPT

## 2020-02-23 RX ORDER — ONDANSETRON 4 MG/1
8 TABLET, ORALLY DISINTEGRATING ORAL ONCE
Status: COMPLETED | OUTPATIENT
Start: 2020-02-23 | End: 2020-02-23

## 2020-02-23 RX ORDER — HYDROCODONE BITARTRATE AND ACETAMINOPHEN 5; 325 MG/1; MG/1
1 TABLET ORAL EVERY 6 HOURS PRN
Qty: 8 TABLET | Refills: 0 | Status: SHIPPED | OUTPATIENT
Start: 2020-02-23 | End: 2020-02-26

## 2020-02-23 RX ORDER — TRAZODONE HYDROCHLORIDE 100 MG/1
100 TABLET ORAL NIGHTLY PRN
COMMUNITY
End: 2021-04-15 | Stop reason: ALTCHOICE

## 2020-02-23 RX ORDER — CYCLOBENZAPRINE HCL 10 MG
10 TABLET ORAL 3 TIMES DAILY PRN
Qty: 12 TABLET | Refills: 0 | Status: SHIPPED | OUTPATIENT
Start: 2020-02-23 | End: 2020-03-04

## 2020-02-23 RX ADMIN — ONDANSETRON 8 MG: 4 TABLET, ORALLY DISINTEGRATING ORAL at 03:50

## 2020-02-23 RX ADMIN — HYDROMORPHONE HYDROCHLORIDE 1 MG: 1 INJECTION, SOLUTION INTRAMUSCULAR; INTRAVENOUS; SUBCUTANEOUS at 03:50

## 2020-02-23 ASSESSMENT — PAIN SCALES - GENERAL
PAINLEVEL_OUTOF10: 10
PAINLEVEL_OUTOF10: 10

## 2020-02-23 ASSESSMENT — PAIN DESCRIPTION - PROGRESSION: CLINICAL_PROGRESSION: NOT CHANGED

## 2020-02-23 ASSESSMENT — PAIN DESCRIPTION - DESCRIPTORS: DESCRIPTORS: ACHING;CONSTANT;DISCOMFORT

## 2020-02-23 ASSESSMENT — PAIN DESCRIPTION - LOCATION: LOCATION: BACK;LEG

## 2020-02-23 ASSESSMENT — PAIN DESCRIPTION - ORIENTATION: ORIENTATION: RIGHT;LEFT

## 2020-02-23 ASSESSMENT — PAIN DESCRIPTION - ONSET: ONSET: ON-GOING

## 2020-02-23 ASSESSMENT — PAIN DESCRIPTION - FREQUENCY: FREQUENCY: CONTINUOUS

## 2020-02-23 ASSESSMENT — PAIN DESCRIPTION - PAIN TYPE: TYPE: ACUTE PAIN

## 2020-02-23 NOTE — ED PROVIDER NOTES
HPI:  2/23/20, Time: 3:36 AM        Rufino Snow is a 46 y.o. female presenting to the ED for severe pain after falling backward onto her buttocks after falling down stairs, beginning 2 hours ago. The complaint has been persistent, severe in severity, and worsened by changing position. No headache, no neck pain, no chest/rib pain, no abdominal pain, no n/v/d, No other complaints. Review of Systems:   Pertinent positives and negatives are stated within HPI, all other systems reviewed and are negative.    --------------------------------------------- PAST HISTORY ---------------------------------------------  Past Medical History:  has a past medical history of Asthma, Bronchitis, Movement disorder, Seizure disorder ([facility] [address] Seizure disorder ([facility] Suicidal overdose ([facility] Tobacco abuse, and Tobacco abuse. Past Surgical History:  has a past surgical history that includes fracture surgery; Cholecystectomy; and Rotator cuff repair. Social History:  reports that she has been smoking cigarettes. She has a 7.50 pack-year smoking history. She has never used smokeless tobacco. She reports current alcohol use. She reports that she does not use drugs. Family History: family history includes Cancer in her mother. The patients home medications have been reviewed. Allergies: Tramadol; Darvocet [propoxyphene n-acetaminophen]; Ibuprofen; Ketorolac; Meloxicam; and Naproxen    -------------------------------------------------- RESULTS -------------------------------------------------  All laboratory and radiology results have been personally reviewed by myself   LABS:  No results found for this visit on 02/23/20. RADIOLOGY:  Interpreted by Radiologist.  CT PELVIS WO CONTRAST Additional Contrast? None   Final Result   1. There are no acute osseous findings. 2. Severe degenerative joint disease of the right hip as described above.     3. Diverticulosis       This report has been electronically signed by

## 2020-06-30 ENCOUNTER — HOSPITAL ENCOUNTER (EMERGENCY)
Age: 40
Discharge: HOME | End: 2020-06-30
Attending: EMERGENCY MEDICINE
Payer: MEDICAID

## 2020-06-30 ENCOUNTER — APPOINTMENT (OUTPATIENT)
Dept: GENERAL RADIOLOGY | Age: 40
End: 2020-06-30
Payer: MEDICAID

## 2020-06-30 ENCOUNTER — APPOINTMENT (OUTPATIENT)
Dept: CT IMAGING | Age: 40
End: 2020-06-30
Payer: MEDICAID

## 2020-06-30 VITALS
WEIGHT: 190 LBS | HEART RATE: 81 BPM | HEIGHT: 60 IN | TEMPERATURE: 98.6 F | SYSTOLIC BLOOD PRESSURE: 108 MMHG | BODY MASS INDEX: 37.3 KG/M2 | RESPIRATION RATE: 18 BRPM | DIASTOLIC BLOOD PRESSURE: 80 MMHG | OXYGEN SATURATION: 99 %

## 2020-06-30 LAB
ACETAMINOPHEN LEVEL: <5 MCG/ML (ref 10–30)
ALBUMIN SERPL-MCNC: 4.4 G/DL (ref 3.5–5.2)
ALP BLD-CCNC: 121 U/L (ref 35–104)
ALT SERPL-CCNC: 16 U/L (ref 0–32)
AMPHETAMINE SCREEN, URINE: NOT DETECTED
ANION GAP SERPL CALCULATED.3IONS-SCNC: 19 MMOL/L (ref 7–16)
APTT: 24.9 SEC (ref 24.5–35.1)
AST SERPL-CCNC: 17 U/L (ref 0–31)
BARBITURATE SCREEN URINE: NOT DETECTED
BASOPHILS ABSOLUTE: 0.03 E9/L (ref 0–0.2)
BASOPHILS RELATIVE PERCENT: 0.2 % (ref 0–2)
BENZODIAZEPINE SCREEN, URINE: NOT DETECTED
BILIRUB SERPL-MCNC: 0.3 MG/DL (ref 0–1.2)
BILIRUBIN URINE: NEGATIVE
BLOOD, URINE: NEGATIVE
BUN BLDV-MCNC: 10 MG/DL (ref 6–20)
CALCIUM SERPL-MCNC: 8.4 MG/DL (ref 8.6–10.2)
CANNABINOID SCREEN URINE: NOT DETECTED
CHLORIDE BLD-SCNC: 105 MMOL/L (ref 98–107)
CLARITY: CLEAR
CO2: 16 MMOL/L (ref 22–29)
COCAINE METABOLITE SCREEN URINE: POSITIVE
COLOR: YELLOW
CREAT SERPL-MCNC: 0.9 MG/DL (ref 0.5–1)
EKG ATRIAL RATE: 125 BPM
EKG P AXIS: 43 DEGREES
EKG P-R INTERVAL: 144 MS
EKG Q-T INTERVAL: 310 MS
EKG QRS DURATION: 74 MS
EKG QTC CALCULATION (BAZETT): 447 MS
EKG R AXIS: 71 DEGREES
EKG T AXIS: 10 DEGREES
EKG VENTRICULAR RATE: 125 BPM
EOSINOPHILS ABSOLUTE: 0.06 E9/L (ref 0.05–0.5)
EOSINOPHILS RELATIVE PERCENT: 0.4 % (ref 0–6)
ETHANOL: 218 MG/DL (ref 0–0.08)
FENTANYL SCREEN, URINE: NOT DETECTED
GFR AFRICAN AMERICAN: >60
GFR NON-AFRICAN AMERICAN: >60 ML/MIN/1.73
GLUCOSE BLD-MCNC: 101 MG/DL (ref 74–99)
GLUCOSE URINE: NEGATIVE MG/DL
HCT VFR BLD CALC: 39.3 % (ref 34–48)
HEMOGLOBIN: 12.8 G/DL (ref 11.5–15.5)
IMMATURE GRANULOCYTES #: 0.1 E9/L
IMMATURE GRANULOCYTES %: 0.7 % (ref 0–5)
INR BLD: 1.2
KETONES, URINE: NEGATIVE MG/DL
LEUKOCYTE ESTERASE, URINE: NEGATIVE
LYMPHOCYTES ABSOLUTE: 2.51 E9/L (ref 1.5–4)
LYMPHOCYTES RELATIVE PERCENT: 17.5 % (ref 20–42)
Lab: ABNORMAL
MAGNESIUM: 2.4 MG/DL (ref 1.6–2.6)
MCH RBC QN AUTO: 25.1 PG (ref 26–35)
MCHC RBC AUTO-ENTMCNC: 32.6 % (ref 32–34.5)
MCV RBC AUTO: 77.1 FL (ref 80–99.9)
METER GLUCOSE: 104 MG/DL (ref 74–99)
METHADONE SCREEN, URINE: NOT DETECTED
MONOCYTES ABSOLUTE: 0.98 E9/L (ref 0.1–0.95)
MONOCYTES RELATIVE PERCENT: 6.8 % (ref 2–12)
NEUTROPHILS ABSOLUTE: 10.7 E9/L (ref 1.8–7.3)
NEUTROPHILS RELATIVE PERCENT: 74.4 % (ref 43–80)
NITRITE, URINE: NEGATIVE
OPIATE SCREEN URINE: NOT DETECTED
OXYCODONE URINE: NOT DETECTED
PDW BLD-RTO: 14.9 FL (ref 11.5–15)
PH UA: 6 (ref 5–9)
PHENCYCLIDINE SCREEN URINE: NOT DETECTED
PLATELET # BLD: 239 E9/L (ref 130–450)
PMV BLD AUTO: 9.5 FL (ref 7–12)
POTASSIUM REFLEX MAGNESIUM: 3.4 MMOL/L (ref 3.5–5)
PROTEIN UA: NEGATIVE MG/DL
PROTHROMBIN TIME: 13 SEC (ref 9.3–12.4)
RBC # BLD: 5.1 E12/L (ref 3.5–5.5)
SALICYLATE, SERUM: <0.3 MG/DL (ref 0–30)
SODIUM BLD-SCNC: 140 MMOL/L (ref 132–146)
SPECIFIC GRAVITY UA: <=1.005 (ref 1–1.03)
TOTAL PROTEIN: 7.4 G/DL (ref 6.4–8.3)
TRICYCLIC ANTIDEPRESSANTS SCREEN SERUM: NEGATIVE NG/ML
TROPONIN: <0.01 NG/ML (ref 0–0.03)
UROBILINOGEN, URINE: 0.2 E.U./DL
WBC # BLD: 14.4 E9/L (ref 4.5–11.5)

## 2020-06-30 PROCEDURE — 85025 COMPLETE CBC W/AUTO DIFF WBC: CPT

## 2020-06-30 PROCEDURE — 82962 GLUCOSE BLOOD TEST: CPT

## 2020-06-30 PROCEDURE — 85730 THROMBOPLASTIN TIME PARTIAL: CPT

## 2020-06-30 PROCEDURE — 72125 CT NECK SPINE W/O DYE: CPT

## 2020-06-30 PROCEDURE — 81003 URINALYSIS AUTO W/O SCOPE: CPT

## 2020-06-30 PROCEDURE — 70450 CT HEAD/BRAIN W/O DYE: CPT

## 2020-06-30 PROCEDURE — 6360000002 HC RX W HCPCS: Performed by: EMERGENCY MEDICINE

## 2020-06-30 PROCEDURE — 93005 ELECTROCARDIOGRAM TRACING: CPT | Performed by: EMERGENCY MEDICINE

## 2020-06-30 PROCEDURE — 83735 ASSAY OF MAGNESIUM: CPT

## 2020-06-30 PROCEDURE — 93010 ELECTROCARDIOGRAM REPORT: CPT | Performed by: INTERNAL MEDICINE

## 2020-06-30 PROCEDURE — 99284 EMERGENCY DEPT VISIT MOD MDM: CPT

## 2020-06-30 PROCEDURE — 85610 PROTHROMBIN TIME: CPT

## 2020-06-30 PROCEDURE — 96374 THER/PROPH/DIAG INJ IV PUSH: CPT

## 2020-06-30 PROCEDURE — 80053 COMPREHEN METABOLIC PANEL: CPT

## 2020-06-30 PROCEDURE — 84484 ASSAY OF TROPONIN QUANT: CPT

## 2020-06-30 PROCEDURE — 96375 TX/PRO/DX INJ NEW DRUG ADDON: CPT

## 2020-06-30 PROCEDURE — G0480 DRUG TEST DEF 1-7 CLASSES: HCPCS

## 2020-06-30 PROCEDURE — 80307 DRUG TEST PRSMV CHEM ANLYZR: CPT

## 2020-06-30 PROCEDURE — 2580000003 HC RX 258: Performed by: EMERGENCY MEDICINE

## 2020-06-30 PROCEDURE — 71045 X-RAY EXAM CHEST 1 VIEW: CPT

## 2020-06-30 RX ORDER — NALOXONE HYDROCHLORIDE 1 MG/ML
2 INJECTION INTRAMUSCULAR; INTRAVENOUS; SUBCUTANEOUS ONCE
Status: COMPLETED | OUTPATIENT
Start: 2020-06-30 | End: 2020-06-30

## 2020-06-30 RX ORDER — LEVETIRACETAM 10 MG/ML
1000 INJECTION INTRAVASCULAR ONCE
Status: COMPLETED | OUTPATIENT
Start: 2020-06-30 | End: 2020-06-30

## 2020-06-30 RX ORDER — SODIUM CHLORIDE 9 MG/ML
1000 INJECTION, SOLUTION INTRAVENOUS CONTINUOUS
Status: DISCONTINUED | OUTPATIENT
Start: 2020-06-30 | End: 2020-06-30 | Stop reason: HOSPADM

## 2020-06-30 RX ORDER — LORAZEPAM 2 MG/ML
1 INJECTION INTRAMUSCULAR ONCE
Status: COMPLETED | OUTPATIENT
Start: 2020-06-30 | End: 2020-06-30

## 2020-06-30 RX ADMIN — LEVETIRACETAM 1000 MG: 10 INJECTION INTRAVENOUS at 12:14

## 2020-06-30 RX ADMIN — NALOXONE HYDROCHLORIDE 2 MG: 1 INJECTION PARENTERAL at 11:52

## 2020-06-30 RX ADMIN — LORAZEPAM 1 MG: 2 INJECTION INTRAMUSCULAR; INTRAVENOUS at 12:42

## 2020-06-30 RX ADMIN — SODIUM CHLORIDE 1000 ML: 9 INJECTION, SOLUTION INTRAVENOUS at 11:52

## 2020-06-30 NOTE — ED NOTES
Bed: 13  Expected date:   Expected time:   Means of arrival:   Comments:  [address], RN  06/30/20 [id number]

## 2020-06-30 NOTE — ED NOTES
This rn transported pt. To CT when she had seizure, approximately 30 seconds. Notified Dr. Jeremias Wright no new orders at this time, will continue to monitor.      Lisa Rivera RN  06/30/20 Alphonse Roche RN  06/30/20 [id number]
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
## 2020-10-05 ENCOUNTER — HOSPITAL ENCOUNTER (EMERGENCY)
Age: 40
Discharge: HOME | End: 2020-10-05
Payer: MEDICAID

## 2020-10-05 VITALS
DIASTOLIC BLOOD PRESSURE: 82 MMHG | BODY MASS INDEX: 37.3 KG/M2 | SYSTOLIC BLOOD PRESSURE: 137 MMHG | HEART RATE: 92 BPM | TEMPERATURE: 99.5 F | HEIGHT: 60 IN | OXYGEN SATURATION: 99 % | WEIGHT: 190 LBS | RESPIRATION RATE: 16 BRPM

## 2020-10-05 LAB — STREP GRP A PCR: POSITIVE

## 2020-10-05 PROCEDURE — 6370000000 HC RX 637 (ALT 250 FOR IP): Performed by: PHYSICIAN ASSISTANT

## 2020-10-05 PROCEDURE — 87880 STREP A ASSAY W/OPTIC: CPT

## 2020-10-05 PROCEDURE — 99283 EMERGENCY DEPT VISIT LOW MDM: CPT

## 2020-10-05 PROCEDURE — 99282 EMERGENCY DEPT VISIT SF MDM: CPT

## 2020-10-05 RX ORDER — AMOXICILLIN 250 MG/1
500 CAPSULE ORAL ONCE
Status: COMPLETED | OUTPATIENT
Start: 2020-10-05 | End: 2020-10-05

## 2020-10-05 RX ORDER — AMOXICILLIN 500 MG/1
500 CAPSULE ORAL 2 TIMES DAILY
Qty: 14 CAPSULE | Refills: 0 | Status: SHIPPED | OUTPATIENT
Start: 2020-10-05 | End: 2020-10-12

## 2020-10-05 RX ORDER — LIDOCAINE HYDROCHLORIDE 20 MG/ML
15 SOLUTION OROPHARYNGEAL ONCE
Status: COMPLETED | OUTPATIENT
Start: 2020-10-05 | End: 2020-10-05

## 2020-10-05 RX ADMIN — LIDOCAINE HYDROCHLORIDE 15 ML: 20 SOLUTION ORAL; TOPICAL at 14:09

## 2020-10-05 RX ADMIN — AMOXICILLIN 500 MG: 250 CAPSULE ORAL at 14:08

## 2020-10-05 ASSESSMENT — PAIN DESCRIPTION - LOCATION: LOCATION: THROAT

## 2020-10-05 ASSESSMENT — PAIN DESCRIPTION - PAIN TYPE: TYPE: ACUTE PAIN

## 2020-10-05 ASSESSMENT — PAIN SCALES - GENERAL: PAINLEVEL_OUTOF10: 10

## 2020-10-05 ASSESSMENT — PAIN DESCRIPTION - DESCRIPTORS: DESCRIPTORS: ACHING

## 2020-10-05 NOTE — ED PROVIDER NOTES
[facility]    HPI:  10/5/20, Time: 1:03 PM EDT         Jasbir Gaming is a 45 y.o. female presenting to the ED for sore throat, beginning 3 days ago. The complaint has been persistent, moderate in severity, and worsened by swallowing. Patient states she feels the anterior aspect of her neck is also slightly swollen. Afebrile at home without recent travel or sick contacts. Denies all other symptoms at this time. Review of Systems:   Pertinent positives and negatives are stated within HPI, all other systems reviewed and are negative.          --------------------------------------------- PAST HISTORY ---------------------------------------------  Past Medical History:  has a past medical history of Asthma, Bronchitis, Movement disorder, Seizure disorder ([facility] [address] Seizure disorder ([facility] [address] Suicidal overdose ([facility] Tobacco abuse, and Tobacco abuse. Past Surgical History:  has a past surgical history that includes fracture surgery; Cholecystectomy; and Rotator cuff repair. Social History:  reports that she has been smoking cigarettes. She has a 7.50 pack-year smoking history. She has never used smokeless tobacco. She reports current alcohol use. She reports that she does not use drugs. Family History: family history includes Cancer in her mother. The patients home medications have been reviewed. Allergies: Tramadol; Darvocet [propoxyphene n-acetaminophen];  Ibuprofen; Ketorolac; Meloxicam; and Naproxen    -------------------------------------------------- RESULTS -------------------------------------------------  All laboratory and radiology results have been personally reviewed by myself   LABS:  Results for orders placed or performed during the hospital encounter of 10/05/20   Strep Screen Group A Throat    Specimen: Throat   Result Value Ref Range    Strep Grp A PCR POSITIVE Negative       RADIOLOGY:  Interpreted by Radiologist.  No orders to display       ------------------------- NURSING NOTES AND VITALS REVIEWED ---------------------------   The nursing notes within the ED encounter and vital signs as below have been reviewed. /82   Pulse 92   Temp 99.5 °F (37.5 °C)   Resp 16   Ht 5' (1.524 m)   Wt 190 lb (86.2 kg)   SpO2 99%   BMI 37.11 kg/m²   Oxygen Saturation Interpretation: Normal      ---------------------------------------------------PHYSICAL EXAM--------------------------------------      Constitutional/General: Alert and oriented x3, well appearing, non toxic in NAD  Head: Normocephalic and atraumatic  Eyes: PERRL, EOMI  Mouth: Oropharynx clear, handling secretions, no trismus, mild tonsillar enlargement bilaterally with erythema, uvula midline, no airway compromise. Neck: Supple, full ROM, tender bilateral anterior cervical lymphadenopathy present  Pulmonary: Lungs clear to auscultation bilaterally, no wheezes, rales, or rhonchi. Not in respiratory distress  Cardiovascular:  Regular rate and rhythm, no murmurs, gallops, or rubs. 2+ distal pulses  Abdomen: Soft, non tender, non distended,   Extremities: Moves all extremities x 4. Warm and well perfused  Skin: warm and dry without rash  Neurologic: GCS 15,  Psych: Normal Affect      ------------------------------ ED COURSE/MEDICAL DECISION MAKING----------------------  Medications   lidocaine viscous hcl (XYLOCAINE) 2 % solution 15 mL (15 mLs Mouth/Throat Given 10/5/20 4849)   amoxicillin (AMOXIL) capsule 500 mg (500 mg Oral Given 10/5/20 1408)         ED COURSE:  ED Course as of Oct 05 2010   Mon Oct 05, 2020   1353 Reassessed patient. Remains stable. No respiratory distress or airway compromise. Discussed results and recommendations. [MS]      ED Course User Index  [MS] Juancarlos Harvey       Medical Decision Making:    Patient is a 60-year-old female presenting to the emergency department sore throat. Rapid strep was positive. Antibiotic therapy was initiated in the ED.   Patient prescribed antibiotics and Magic mouthwash for symptom control at home. Nontoxic-appearing, afebrile in the ED. Recommended close follow-up with PCP for recheck return to the emergency department any new or worsening symptoms. Patient voiced understanding is agreeable to the above treatment plan. Counseling: The emergency provider has spoken with the patient and discussed todays results, in addition to providing specific details for the plan of care and counseling regarding the diagnosis and prognosis. Questions are answered at this time and they are agreeable with the plan.      --------------------------------- IMPRESSION AND DISPOSITION ---------------------------------    IMPRESSION  1. Strep pharyngitis        DISPOSITION  Disposition: Discharge to home  Patient condition is stable      NOTE: This report was transcribed using voice recognition software.  Every effort was made to ensure accuracy; however, inadvertent computerized transcription errors may be present        Geovanny Paul, [address]  10/05/20 2010

## 2020-11-25 VITALS
DIASTOLIC BLOOD PRESSURE: 86 MMHG | SYSTOLIC BLOOD PRESSURE: 134 MMHG | HEART RATE: 95 BPM | OXYGEN SATURATION: 97 % | RESPIRATION RATE: 18 BRPM | HEIGHT: 59 IN | WEIGHT: 185 LBS | TEMPERATURE: 97.3 F | BODY MASS INDEX: 37.29 KG/M2

## 2020-11-25 PROCEDURE — 96375 TX/PRO/DX INJ NEW DRUG ADDON: CPT

## 2020-11-25 PROCEDURE — 93005 ELECTROCARDIOGRAM TRACING: CPT | Performed by: PHYSICIAN ASSISTANT

## 2020-11-25 PROCEDURE — 99283 EMERGENCY DEPT VISIT LOW MDM: CPT

## 2020-11-25 PROCEDURE — 96374 THER/PROPH/DIAG INJ IV PUSH: CPT

## 2020-11-26 ENCOUNTER — APPOINTMENT (OUTPATIENT)
Dept: GENERAL RADIOLOGY | Age: 40
End: 2020-11-26
Payer: MEDICAID

## 2020-11-26 ENCOUNTER — HOSPITAL ENCOUNTER (EMERGENCY)
Age: 40
Discharge: HOME | End: 2020-11-26
Payer: MEDICAID

## 2020-11-26 LAB
ALBUMIN SERPL-MCNC: 3.7 G/DL (ref 3.5–5.2)
ALP BLD-CCNC: 100 U/L (ref 35–104)
ALT SERPL-CCNC: 23 U/L (ref 0–32)
AMPHETAMINE SCREEN, URINE: NOT DETECTED
ANION GAP SERPL CALCULATED.3IONS-SCNC: 12 MMOL/L (ref 7–16)
AST SERPL-CCNC: 25 U/L (ref 0–31)
BACTERIA: ABNORMAL /HPF
BARBITURATE SCREEN URINE: NOT DETECTED
BASOPHILS ABSOLUTE: 0.03 E9/L (ref 0–0.2)
BASOPHILS RELATIVE PERCENT: 0.4 % (ref 0–2)
BENZODIAZEPINE SCREEN, URINE: NOT DETECTED
BILIRUB SERPL-MCNC: <0.2 MG/DL (ref 0–1.2)
BILIRUBIN URINE: NEGATIVE
BLOOD, URINE: ABNORMAL
BUN BLDV-MCNC: 16 MG/DL (ref 6–20)
CALCIUM SERPL-MCNC: 8.5 MG/DL (ref 8.6–10.2)
CANNABINOID SCREEN URINE: NOT DETECTED
CHLORIDE BLD-SCNC: 103 MMOL/L (ref 98–107)
CLARITY: CLEAR
CO2: 22 MMOL/L (ref 22–29)
COCAINE METABOLITE SCREEN URINE: POSITIVE
COLOR: YELLOW
CREAT SERPL-MCNC: 0.9 MG/DL (ref 0.5–1)
EKG ATRIAL RATE: 100 BPM
EKG P AXIS: 56 DEGREES
EKG P-R INTERVAL: 130 MS
EKG Q-T INTERVAL: 362 MS
EKG QRS DURATION: 82 MS
EKG QTC CALCULATION (BAZETT): 466 MS
EKG R AXIS: 76 DEGREES
EKG T AXIS: 33 DEGREES
EKG VENTRICULAR RATE: 100 BPM
EOSINOPHILS ABSOLUTE: 0.14 E9/L (ref 0.05–0.5)
EOSINOPHILS RELATIVE PERCENT: 1.7 % (ref 0–6)
EPITHELIAL CELLS, UA: ABNORMAL /HPF
FENTANYL SCREEN, URINE: NOT DETECTED
GFR AFRICAN AMERICAN: >60
GFR NON-AFRICAN AMERICAN: >60 ML/MIN/1.73
GLUCOSE BLD-MCNC: 97 MG/DL (ref 74–99)
GLUCOSE URINE: NEGATIVE MG/DL
HCG(URINE) PREGNANCY TEST: NEGATIVE
HCT VFR BLD CALC: 37.3 % (ref 34–48)
HEMOGLOBIN: 12.1 G/DL (ref 11.5–15.5)
IMMATURE GRANULOCYTES #: 0.03 E9/L
IMMATURE GRANULOCYTES %: 0.4 % (ref 0–5)
KETONES, URINE: NEGATIVE MG/DL
LEUKOCYTE ESTERASE, URINE: ABNORMAL
LIPASE: 32 U/L (ref 13–60)
LYMPHOCYTES ABSOLUTE: 2.88 E9/L (ref 1.5–4)
LYMPHOCYTES RELATIVE PERCENT: 35 % (ref 20–42)
Lab: ABNORMAL
MCH RBC QN AUTO: 26.4 PG (ref 26–35)
MCHC RBC AUTO-ENTMCNC: 32.4 % (ref 32–34.5)
MCV RBC AUTO: 81.3 FL (ref 80–99.9)
METHADONE SCREEN, URINE: NOT DETECTED
MONOCYTES ABSOLUTE: 0.79 E9/L (ref 0.1–0.95)
MONOCYTES RELATIVE PERCENT: 9.6 % (ref 2–12)
NEUTROPHILS ABSOLUTE: 4.35 E9/L (ref 1.8–7.3)
NEUTROPHILS RELATIVE PERCENT: 52.9 % (ref 43–80)
NITRITE, URINE: NEGATIVE
OPIATE SCREEN URINE: NOT DETECTED
OXYCODONE URINE: NOT DETECTED
PDW BLD-RTO: 16.1 FL (ref 11.5–15)
PH UA: 5.5 (ref 5–9)
PHENCYCLIDINE SCREEN URINE: NOT DETECTED
PLATELET # BLD: 266 E9/L (ref 130–450)
PMV BLD AUTO: 9.7 FL (ref 7–12)
POTASSIUM SERPL-SCNC: 3.8 MMOL/L (ref 3.5–5)
PROTEIN UA: NEGATIVE MG/DL
RBC # BLD: 4.59 E12/L (ref 3.5–5.5)
RBC UA: ABNORMAL /HPF (ref 0–2)
SODIUM BLD-SCNC: 137 MMOL/L (ref 132–146)
SPECIFIC GRAVITY UA: 1.02 (ref 1–1.03)
TOTAL PROTEIN: 7.2 G/DL (ref 6.4–8.3)
TROPONIN: <0.01 NG/ML (ref 0–0.03)
UROBILINOGEN, URINE: 0.2 E.U./DL
WBC # BLD: 8.2 E9/L (ref 4.5–11.5)
WBC UA: ABNORMAL /HPF (ref 0–5)

## 2020-11-26 PROCEDURE — 96375 TX/PRO/DX INJ NEW DRUG ADDON: CPT

## 2020-11-26 PROCEDURE — 80053 COMPREHEN METABOLIC PANEL: CPT

## 2020-11-26 PROCEDURE — 85025 COMPLETE CBC W/AUTO DIFF WBC: CPT

## 2020-11-26 PROCEDURE — 6360000002 HC RX W HCPCS: Performed by: PHYSICIAN ASSISTANT

## 2020-11-26 PROCEDURE — C9113 INJ PANTOPRAZOLE SODIUM, VIA: HCPCS | Performed by: PHYSICIAN ASSISTANT

## 2020-11-26 PROCEDURE — 84484 ASSAY OF TROPONIN QUANT: CPT

## 2020-11-26 PROCEDURE — 83690 ASSAY OF LIPASE: CPT

## 2020-11-26 PROCEDURE — 96374 THER/PROPH/DIAG INJ IV PUSH: CPT

## 2020-11-26 PROCEDURE — 71046 X-RAY EXAM CHEST 2 VIEWS: CPT

## 2020-11-26 PROCEDURE — 2580000003 HC RX 258

## 2020-11-26 PROCEDURE — 80307 DRUG TEST PRSMV CHEM ANLYZR: CPT

## 2020-11-26 PROCEDURE — 81025 URINE PREGNANCY TEST: CPT

## 2020-11-26 PROCEDURE — 6370000000 HC RX 637 (ALT 250 FOR IP): Performed by: PHYSICIAN ASSISTANT

## 2020-11-26 PROCEDURE — 81001 URINALYSIS AUTO W/SCOPE: CPT

## 2020-11-26 RX ORDER — HYDROCODONE BITARTRATE AND ACETAMINOPHEN 5; 325 MG/1; MG/1
1 TABLET ORAL EVERY 6 HOURS PRN
Qty: 6 TABLET | Refills: 0 | Status: SHIPPED | OUTPATIENT
Start: 2020-11-26 | End: 2020-11-28

## 2020-11-26 RX ORDER — PANTOPRAZOLE SODIUM 40 MG/1
40 TABLET, DELAYED RELEASE ORAL DAILY
Qty: 10 TABLET | Refills: 0 | Status: SHIPPED | OUTPATIENT
Start: 2020-11-26 | End: 2021-04-15 | Stop reason: ALTCHOICE

## 2020-11-26 RX ORDER — PANTOPRAZOLE SODIUM 40 MG/10ML
40 INJECTION, POWDER, LYOPHILIZED, FOR SOLUTION INTRAVENOUS ONCE
Status: COMPLETED | OUTPATIENT
Start: 2020-11-26 | End: 2020-11-26

## 2020-11-26 RX ORDER — SUCRALFATE 1 G/1
1 TABLET ORAL 4 TIMES DAILY
Qty: 120 TABLET | Refills: 3 | Status: SHIPPED | OUTPATIENT
Start: 2020-11-26 | End: 2021-04-15 | Stop reason: ALTCHOICE

## 2020-11-26 RX ORDER — FENTANYL CITRATE 50 UG/ML
75 INJECTION, SOLUTION INTRAMUSCULAR; INTRAVENOUS ONCE
Status: COMPLETED | OUTPATIENT
Start: 2020-11-26 | End: 2020-11-26

## 2020-11-26 RX ADMIN — LIDOCAINE HYDROCHLORIDE: 20 SOLUTION ORAL; TOPICAL at 01:55

## 2020-11-26 RX ADMIN — FENTANYL CITRATE 75 MCG: 50 INJECTION, SOLUTION INTRAMUSCULAR; INTRAVENOUS at 02:55

## 2020-11-26 RX ADMIN — PANTOPRAZOLE SODIUM 40 MG: 40 INJECTION, POWDER, FOR SOLUTION INTRAVENOUS at 01:55

## 2020-11-26 RX ADMIN — WATER 10 ML: 1 INJECTION INTRAMUSCULAR; INTRAVENOUS; SUBCUTANEOUS at 01:55

## 2020-11-26 ASSESSMENT — PAIN SCALES - GENERAL: PAINLEVEL_OUTOF10: 10

## 2020-11-26 NOTE — ED PROVIDER NOTES
[address]  Department of Emergency Medicine   ED  Encounter Note  Admit Date/RoomTime: 11/26/2020 12:23 AM  ED Room: 17/17    Chief Complaint:   Back Pain (mid-upper back pain and breast pain x4 days); Breast Pain; and Emesis    History of Present Illness   Source of history provided by:  patient. History/Exam Limitations: none. Kiera Duque is a 45 y.o. old female who presents to the emergency department by private vehicle, for acute, aching upper back pain upper and middle thoracic spine pain without radiation, for 4 day(s) prior to arrival.  The pain was caused by no known injury. She has a history of no prior back problems, but reports arthritis. Since onset the symptoms have been constant and moderate in severity. There has been no bowel or bladder incontinence associated with the pain. There have been associated symptoms of nipple pain left >right and denies any weakness, numbness, hematuria, abdominal pain, fever, recent steroid use, new numbness, new weakness, chest pain, pelvic pain, perianal numbness or URI symptoms. The the pain is aggraveated by pressure on th eare and moving and relieved by nothing, pt has not tried any tylenol. She is allergic to motrin. Pt denies any injury. She works as a house keeper. Pt just finished menstrual period today. No recent travel or surgeries, no history of clots or CAD. Pt is a smoker, no oral contraceptives. .  ROS   Pertinent positives and negatives are stated within HPI, all other systems reviewed and are negative. Past Medical History:  has a past medical history of Asthma, Bronchitis, Movement disorder, Seizure disorder ([address] Seizure disorder ([address] Suicidal overdose ([address] Tobacco abuse, and Tobacco abuse. Surgical History:  has a past surgical history that includes fracture surgery; Cholecystectomy; and Rotator cuff repair.     Social History:  reports that she has been smoking (All laboratory and radiology results have been personally reviewed by myself)  Labs:  Results for orders placed or performed during the hospital encounter of 11/26/20   CBC Auto Differential   Result Value Ref Range    WBC 8.2 4.5 - 11.5 E9/L    RBC 4.59 3.50 - 5.50 E12/L    Hemoglobin 12.1 11.5 - 15.5 g/dL    Hematocrit 37.3 34.0 - 48.0 %    MCV 81.3 80.0 - 99.9 fL    MCH 26.4 26.0 - 35.0 pg    MCHC 32.4 32.0 - 34.5 %    RDW 16.1 (H) 11.5 - 15.0 fL    Platelets 295 398 - 175 E9/L    MPV 9.7 7.0 - 12.0 fL    Neutrophils % 52.9 43.0 - 80.0 %    Immature Granulocytes % 0.4 0.0 - 5.0 %    Lymphocytes % 35.0 20.0 - 42.0 %    Monocytes % 9.6 2.0 - 12.0 %    Eosinophils % 1.7 0.0 - 6.0 %    Basophils % 0.4 0.0 - 2.0 %    Neutrophils Absolute 4.35 1.80 - 7.30 E9/L    Immature Granulocytes # 0.03 E9/L    Lymphocytes Absolute 2.88 1.50 - 4.00 E9/L    Monocytes Absolute 0.79 0.10 - 0.95 E9/L    Eosinophils Absolute 0.14 0.05 - 0.50 E9/L    Basophils Absolute 0.03 0.00 - 0.20 E9/L   Comprehensive Metabolic Panel   Result Value Ref Range    Sodium 137 132 - 146 mmol/L    Potassium 3.8 3.5 - 5.0 mmol/L    Chloride 103 98 - 107 mmol/L    CO2 22 22 - 29 mmol/L    Anion Gap 12 7 - 16 mmol/L    Glucose 97 74 - 99 mg/dL    BUN 16 6 - 20 mg/dL    CREATININE 0.9 0.5 - 1.0 mg/dL    GFR Non-African American >60 >=60 mL/min/1.73    GFR African American >60     Calcium 8.5 (L) 8.6 - 10.2 mg/dL    Total Protein 7.2 6.4 - 8.3 g/dL    Alb 3.7 3.5 - 5.2 g/dL    Total Bilirubin <0.2 0.0 - 1.2 mg/dL    Alkaline Phosphatase 100 35 - 104 U/L    ALT 23 0 - 32 U/L    AST 25 0 - 31 U/L   Troponin   Result Value Ref Range    Troponin <0.01 0.00 - 0.03 ng/mL   Urinalysis   Result Value Ref Range    Color, UA Yellow Straw/Yellow    Clarity, UA Clear Clear    Glucose, Ur Negative Negative mg/dL    Bilirubin Urine Negative Negative    Ketones, Urine Negative Negative mg/dL    Specific Gravity, UA 1.025 1.005 - 1.030    Blood, Urine LARGE (A) Negative pH, UA 5.5 5.0 - 9.0    Protein, UA Negative Negative mg/dL    Urobilinogen, Urine 0.2 <2.0 E.U./dL    Nitrite, Urine Negative Negative    Leukocyte Esterase, Urine TRACE (A) Negative   Lipase   Result Value Ref Range    Lipase 32 13 - 60 U/L   Urine Drug Screen   Result Value Ref Range    Amphetamine Screen, Urine NOT DETECTED Negative <1000 ng/mL    Barbiturate Screen, Ur NOT DETECTED Negative < 200 ng/mL    Benzodiazepine Screen, Urine NOT DETECTED Negative < 200 ng/mL    Cannabinoid Scrn, Ur NOT DETECTED Negative < 50ng/mL    Cocaine Metabolite Screen, Urine POSITIVE (A) Negative < 300 ng/mL    Opiate Scrn, Ur NOT DETECTED Negative < 300ng/mL    PCP Screen, Urine NOT DETECTED Negative < 25 ng/mL    Methadone Screen, Urine NOT DETECTED Negative <300 ng/mL    Oxycodone Urine NOT DETECTED Negative <100 ng/mL    FENTANYL SCREEN, URINE NOT DETECTED Negative <1 ng/mL    Drug Screen Comment: see below    Microscopic Urinalysis   Result Value Ref Range    WBC, UA 1-3 0 - 5 /HPF    RBC, UA 5-10 (A) 0 - 2 /HPF    Epithelial Cells, UA MODERATE /HPF    Bacteria, UA MODERATE (A) None Seen /HPF   Pregnancy, urine   Result Value Ref Range    HCG(Urine) Pregnancy Test NEGATIVE NEGATIVE   EKG 12 Lead   Result Value Ref Range    Ventricular Rate 100 BPM    Atrial Rate 100 BPM    P-R Interval 130 ms    QRS Duration 82 ms    Q-T Interval 362 ms    QTc Calculation (Bazett) 466 ms    P Axis 56 degrees    R Axis 76 degrees    T Axis 33 degrees     EKG #1:  Interpreted by emergency department physician unless otherwise noted. Time:  2233    Rate: 100  Rhythm: Sinus. Interpretation: normal EKG, normal sinus rhythm. Imaging: All Radiology results interpreted by Radiologist unless otherwise noted. XR CHEST (2 VW)   Final Result   There is no acute abnormality seen.           ED Course / Medical Decision Making     Medications   pantoprazole (PROTONIX) injection 40 mg (40 mg Intravenous Given 11/26/20 0155)   aluminum & magnesium hydroxide-simethicone (MAALOX) 30 mL, lidocaine viscous hcl (XYLOCAINE) 5 mL (GI COCKTAIL) ( Oral Given 11/26/20 0155)   sterile water injection (10 mLs  Given 11/26/20 0155)   fentaNYL (SUBLIMAZE) injection 75 mcg (75 mcg Intravenous Given 11/26/20 0255)        Re-examination:  11/26/20       Time: Pt had no improvement with GI cocktail. Did some fentanyl and she did have complete pain relief with that. Patient then wanted a TV dinner which I prepared for her. I discussed findings with patient at this time. Patient reports she just got off her menstrual cycle which accounts for the blood in her urine. Patient reports that sometimes her menstrual periods are 2 weeks long sometimes lasting the whole month. She reports that her primary care doctor told her that this was normal.  I advised her that this is not normal and that there are things that she can take and do for excessive menstrual periods including an IUD. I advised patient to have her primary care doctor set her up an appointment with a gynecologist for a full gynecologic evaluation and discussion of options for heavy menstrual periods. Patient will also be starting Protonix and carafate    Consult(s):   None    Procedure(s):   none    Medical Decision Making:    Pt presents with middle back aching and nipple pain. No fevers, cough, shortness of breath or other URI illness. Pt has history of PUD but has not been taking PPI for some time. She has no abdominal pain or GI symptoms. Pain controlled with fentanyl, pt had no relief with GI cocktail. Pt physical exam normal, ekg nsr. Pt urine results with blood on the last day of menses and positive for Cocaine. Pt advised follow upw with pcp for recheck, outpatient echocardiogram as needed, and referral to GYN for menorrhagia. Encouraged to DC drug and tobacco use. Counseling:     The emergency provider has spoken with the patient and discussed todays results, in addition to providing specific details for the plan of care and counseling regarding the diagnosis and prognosis. Questions are answered at this time and they are agreeable with the plan. Assessment      1. Acute upper back pain    2. Nipple pain      Plan   Discharge to home  Patient condition is stable    New Medications     Discharge Medication List as of 11/26/2020  3:53 AM      START taking these medications    Details   HYDROcodone-acetaminophen (NORCO) 5-325 MG per tablet Take 1 tablet by mouth every 6 hours as needed for Pain for up to 2 days. , Disp-6 tablet,R-0Print      sucralfate (CARAFATE) 1 GM tablet Take 1 tablet by mouth 4 times daily, Disp-120 tablet,R-3Print           Electronically signed by Brit France PA-C   DD: 11/26/20  **This report was transcribed using voice recognition software. Every effort was made to ensure accuracy; however, inadvertent computerized transcription errors may be present.   END OF ED PROVIDER NOTE       Brit France PA-C  11/26/20 6133

## 2020-11-26 NOTE — ED NOTES
Pt given d/c instructions. Pt to f.u with pcp in 3 days. Pt given instructions on when to return to ED. Pt verbalized understanding of instructions. Pt left in stable and ambulatory condition. Pt IV d/c without any complications.      Matti Prado RN  11/26/20 [id number]

## 2020-11-26 NOTE — ED NOTES
FIRST PROVIDER CONTACT ASSESSMENT NOTE      Department of Emergency Medicine   Admit Date: No admission date for patient encounter. Chief Complaint: Back Pain (mid-upper back pain and breast pain x4 days);  Breast Pain; and Emesis      History of Present Illness:    Chay Schreiber is a 45 y.o. female who presents to the ED for mid back apin and  Breast pain x 1 day, with nausea and vomiting, no fever or URI symptoms, no known contacts with covid.          -----------------END OF FIRST PROVIDER CONTACT ASSESSMENT NOTE--------------  Electronically signed by Keyla Valadez PA-C   DD: 11/25/20               Keyla Valadez PA-C  11/25/20 2152

## 2020-12-04 ENCOUNTER — HOSPITAL ENCOUNTER (EMERGENCY)
Age: 40
Discharge: HOME | End: 2020-12-04
Payer: MEDICAID

## 2020-12-04 ENCOUNTER — APPOINTMENT (OUTPATIENT)
Dept: CT IMAGING | Age: 40
End: 2020-12-04
Payer: MEDICAID

## 2020-12-04 ENCOUNTER — APPOINTMENT (OUTPATIENT)
Dept: GENERAL RADIOLOGY | Age: 40
End: 2020-12-04
Payer: MEDICAID

## 2020-12-04 VITALS
DIASTOLIC BLOOD PRESSURE: 103 MMHG | OXYGEN SATURATION: 100 % | BODY MASS INDEX: 37.29 KG/M2 | RESPIRATION RATE: 16 BRPM | TEMPERATURE: 98.1 F | WEIGHT: 185 LBS | SYSTOLIC BLOOD PRESSURE: 187 MMHG | HEART RATE: 92 BPM | HEIGHT: 59 IN

## 2020-12-04 PROCEDURE — 73502 X-RAY EXAM HIP UNI 2-3 VIEWS: CPT

## 2020-12-04 PROCEDURE — 72125 CT NECK SPINE W/O DYE: CPT

## 2020-12-04 PROCEDURE — 73552 X-RAY EXAM OF FEMUR 2/>: CPT

## 2020-12-04 PROCEDURE — 99284 EMERGENCY DEPT VISIT MOD MDM: CPT

## 2020-12-04 PROCEDURE — 6370000000 HC RX 637 (ALT 250 FOR IP): Performed by: PHYSICIAN ASSISTANT

## 2020-12-04 PROCEDURE — 73590 X-RAY EXAM OF LOWER LEG: CPT

## 2020-12-04 PROCEDURE — 70450 CT HEAD/BRAIN W/O DYE: CPT

## 2020-12-04 RX ORDER — HYDROCODONE BITARTRATE AND ACETAMINOPHEN 5; 325 MG/1; MG/1
TABLET ORAL
Status: DISCONTINUED
Start: 2020-12-04 | End: 2020-12-05 | Stop reason: HOSPADM

## 2020-12-04 RX ORDER — ORPHENADRINE CITRATE 100 MG/1
100 TABLET, EXTENDED RELEASE ORAL 2 TIMES DAILY
Qty: 20 TABLET | Refills: 0 | Status: SHIPPED | OUTPATIENT
Start: 2020-12-04 | End: 2020-12-14

## 2020-12-04 RX ORDER — HYDROCODONE BITARTRATE AND ACETAMINOPHEN 5; 325 MG/1; MG/1
1 TABLET ORAL ONCE
Status: COMPLETED | OUTPATIENT
Start: 2020-12-04 | End: 2020-12-04

## 2020-12-04 RX ADMIN — HYDROCODONE BITARTRATE AND ACETAMINOPHEN 1 TABLET: 5; 325 TABLET ORAL at 21:32

## 2020-12-04 ASSESSMENT — PAIN DESCRIPTION - ONSET: ONSET: SUDDEN

## 2020-12-04 ASSESSMENT — PAIN DESCRIPTION - LOCATION: LOCATION: HIP

## 2020-12-04 ASSESSMENT — PAIN SCALES - GENERAL
PAINLEVEL_OUTOF10: 10
PAINLEVEL_OUTOF10: 10

## 2020-12-04 ASSESSMENT — PAIN DESCRIPTION - FREQUENCY: FREQUENCY: CONTINUOUS

## 2020-12-04 ASSESSMENT — PAIN DESCRIPTION - ORIENTATION: ORIENTATION: RIGHT

## 2020-12-04 ASSESSMENT — PAIN DESCRIPTION - PAIN TYPE: TYPE: ACUTE PAIN

## 2020-12-04 ASSESSMENT — PAIN DESCRIPTION - DESCRIPTORS: DESCRIPTORS: THROBBING

## 2020-12-04 ASSESSMENT — PAIN - FUNCTIONAL ASSESSMENT: PAIN_FUNCTIONAL_ASSESSMENT: PREVENTS OR INTERFERES SOME ACTIVE ACTIVITIES AND ADLS

## 2020-12-04 NOTE — ED PROVIDER NOTES
Independent MLP  HPI:  12/4/20, Time: 6:15 PM RICKY Jacobs is a 45 y.o. female presenting to the ED for fall , beginning today . The complaint has been persistent, moderate in severity, and worsened by walking. Patient comes in with complaint of falling down 10 steps. She states she fell onto her right side. She does complain of right hip pain right-sided leg pain. She denies any head injury no loss of consciousness. Patient is able to ambulate. Review of Systems:   A complete review of systems was performed and pertinent positives and negatives are stated within HPI, all other systems reviewed and are negative.          --------------------------------------------- PAST HISTORY ---------------------------------------------  Past Medical History:  has a past medical history of Asthma, Bronchitis, Movement disorder, Seizure disorder ([facility] [address] Seizure disorder ([facility] [address] [facility] Tobacco abuse, and Tobacco abuse. Past Surgical History:  has a past surgical history that includes fracture surgery; Cholecystectomy; and Rotator cuff repair. Social History:  reports that she has been smoking cigarettes. She has a 7.50 pack-year smoking history. She has never used smokeless tobacco. She reports current alcohol use. She reports that she does not use drugs. Family History: family history includes Cancer in her mother. The patients home medications have been reviewed. Allergies: Tramadol; Darvocet [propoxyphene n-acetaminophen]; Ibuprofen; Ketorolac; Meloxicam; and Naproxen    -------------------------------------------------- RESULTS -------------------------------------------------  All laboratory and radiology results have been personally reviewed by myself   LABS:  No results found for this visit on 12/04/20. RADIOLOGY:  Interpreted by Radiologist.  [address]   Final Result   1. No acute intracranial hemorrhage or edema.    2.  No cervical spine fracture or malalignment. CT HEAD WO CONTRAST   Final Result   1. No acute intracranial hemorrhage or edema. 2.  No cervical spine fracture or malalignment. XR HIP RIGHT (2-3 VIEWS)   Final Result   No acute bony pathology. XR FEMUR RIGHT (MIN 2 VIEWS)   Final Result   No acute osseous abnormality. XR TIBIA FIBULA RIGHT (2 VIEWS)   Final Result   No acute osseous abnormality.          ------------------------- NURSING NOTES AND VITALS REVIEWED ---------------------------   The nursing notes within the ED encounter and vital signs as below have been reviewed. BP (!) 187/103   Pulse 92   Temp 98.1 °F (36.7 °C) (Temporal)   Resp 16   Ht 4' 11\" (1.499 m)   Wt 185 lb (83.9 kg)   SpO2 100%   BMI 37.37 kg/m²   Oxygen Saturation Interpretation: Normal      ---------------------------------------------------PHYSICAL EXAM--------------------------------------      Constitutional/General: Alert and oriented x3, well appearing, non toxic in NAD  Head: Normocephalic and atraumatic  Eyes: PERRL, EOMI  Mouth: Oropharynx clear, handling secretions, no trismus  Neck: Supple, full ROM, lower cervical vertebral point tenderness present  Pulmonary: Lungs clear to auscultation bilaterally, no wheezes, rales, or rhonchi. Not in respiratory distress  Cardiovascular:  Regular rate and rhythm, no murmurs, gallops, or rubs. 2+ distal pulses  Abdomen: Soft, non tender, non distended,   Back no thoracic or lumbar vertebral tenderness. Extremities: Moves all extremities x 4. Warm and well perfused tender left hip pain with diffuse tenderness of the right upper and lower leg.   Skin: warm and dry without rash  Neurologic: GCS 15,  Psych: Normal Affect      ------------------------------ ED COURSE/MEDICAL DECISION MAKING----------------------  Medications   HYDROcodone-acetaminophen (NORCO) 5-325 MG per tablet (has no administration in time range)   HYDROcodone-acetaminophen (NORCO) 5-325 MG per tablet 1 tablet (1 tablet Oral Given 12/4/20 2132)         ED COURSE:     2125 Patient updated awaiting Ct results       Medical Decision Making:    Patient came in for mechanical fall falling down 10 steps CT head no intracranial bleed CT cervical no fracture x-ray of the right hip right upper lobe right lower leg no fracture or dislocation present. Patient is able to ambulate. She was discharged with Norflex. Counseling: The emergency provider has spoken with the patient and discussed todays results, in addition to providing specific details for the plan of care and counseling regarding the diagnosis and prognosis. Questions are answered at this time and they are agreeable with the plan.      --------------------------------- IMPRESSION AND DISPOSITION ---------------------------------    IMPRESSION  1. Closed head injury, initial encounter    2. Strain of neck muscle, initial encounter    3. Contusion of left hip, initial encounter    4. Multiple leg contusions, left, initial encounter        DISPOSITION  Disposition: Discharge to home  Patient condition is good      NOTE: This report was transcribed using voice recognition software.  Every effort was made to ensure accuracy; however, inadvertent computerized transcription errors may be present     Juancarlos Burgos  12/04/20 [id number]

## 2021-01-24 ENCOUNTER — HOSPITAL ENCOUNTER (EMERGENCY)
Age: 41
Discharge: HOME | End: 2021-01-24
Payer: MEDICAID

## 2021-01-24 ENCOUNTER — APPOINTMENT (OUTPATIENT)
Dept: CT IMAGING | Age: 41
End: 2021-01-24
Payer: MEDICAID

## 2021-01-24 ENCOUNTER — APPOINTMENT (OUTPATIENT)
Dept: GENERAL RADIOLOGY | Age: 41
End: 2021-01-24
Payer: MEDICAID

## 2021-01-24 VITALS
SYSTOLIC BLOOD PRESSURE: 146 MMHG | DIASTOLIC BLOOD PRESSURE: 88 MMHG | BODY MASS INDEX: 36.29 KG/M2 | HEIGHT: 59 IN | WEIGHT: 180 LBS | RESPIRATION RATE: 16 BRPM | OXYGEN SATURATION: 99 % | TEMPERATURE: 98.5 F | HEART RATE: 90 BPM
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
## 2021-01-24 DIAGNOSIS — S39.012A STRAIN OF LUMBAR REGION, INITIAL ENCOUNTER: Primary | ICD-10-CM

## 2021-01-24 DIAGNOSIS — M51.26 PROTRUSION OF LUMBAR INTERVERTEBRAL DISC: ICD-10-CM

## 2021-01-24 DIAGNOSIS — M25.551 RIGHT HIP PAIN: ICD-10-CM

## 2021-01-24 LAB
HCG, URINE, POC: NEGATIVE
Lab: NORMAL
NEGATIVE QC PASS/FAIL: NORMAL
POSITIVE QC PASS/FAIL: NORMAL

## 2021-01-24 PROCEDURE — 73552 X-RAY EXAM OF FEMUR 2/>: CPT

## 2021-01-24 PROCEDURE — 73502 X-RAY EXAM HIP UNI 2-3 VIEWS: CPT

## 2021-01-24 PROCEDURE — 99284 EMERGENCY DEPT VISIT MOD MDM: CPT

## 2021-01-24 PROCEDURE — 6370000000 HC RX 637 (ALT 250 FOR IP): Performed by: NURSE PRACTITIONER

## 2021-01-24 PROCEDURE — 72131 CT LUMBAR SPINE W/O DYE: CPT

## 2021-01-24 RX ORDER — HYDROCODONE BITARTRATE AND ACETAMINOPHEN 5; 325 MG/1; MG/1
1 TABLET ORAL ONCE
Status: COMPLETED | OUTPATIENT
Start: 2021-01-24 | End: 2021-01-24

## 2021-01-24 RX ADMIN — HYDROCODONE BITARTRATE AND ACETAMINOPHEN 1 TABLET: 5; 325 TABLET ORAL at 14:13

## 2021-01-24 ASSESSMENT — PAIN SCALES - GENERAL: PAINLEVEL_OUTOF10: 10

## 2021-01-24 NOTE — ED PROVIDER NOTES
[facility]  Department of Emergency Medicine   ED  Encounter Note  Admit Date/RoomTime: 2021 12:54 PM  ED Room: 37    NAME: Esmer Boo  : 1980  MRN: [id number]     Chief Complaint:  Back Pain (slipped and fell on ice yesterday) and Hip Pain (right )    History of Present Illness       Esmer Boo is a 45 y.o. old female who presents to the emergency department by private vehicle, for a mechanical fall which occured 1 day(s) prior to arrival. She was reportedly slipped on the porch steps outside due to ice. While at home prior to incident with complaints of lower back and right hip pauin. The patients tetanus status is within past 5 years. Since onset the symptoms have been remaining constant. Her pain is aggraveated by any movement, any use of or pressure on or palpation of and relieved by nothing. She denies any head injury, headache, loss of consciousness, confusion, dizziness, neck pain, chest pain, abdominal pain, numbness, weakness, blurred vision, nausea or vomiting. She takes no blood thinning agents. .  ROS   Pertinent positives and negatives are stated within HPI, all other systems reviewed and are negative. Past Medical History:  has a past medical history of Asthma, Bronchitis, Movement disorder, Seizure disorder ([address] Seizure disorder ([address] Suicidal overdose ([address] Tobacco abuse, and Tobacco abuse. Surgical History:  has a past surgical history that includes fracture surgery; Cholecystectomy; and Rotator cuff repair. Social History:  reports that she has been smoking cigarettes. She has a 7.50 pack-year smoking history. She has never used smokeless tobacco. She reports current alcohol use. She reports that she does not use drugs. Family History: family history includes Cancer in her mother.      Allergies: Tramadol, Darvocet [propoxyphene n-acetaminophen], Ibuprofen, Ketorolac, Meloxicam, and Naproxen Physical Exam   Oxygen Saturation Interpretation: Normal.        ED Triage Vitals [01/24/21 1256]   BP Temp Temp Source Pulse Resp SpO2 Height Weight   (!) 171/99 98.5 °F (36.9 °C) Oral 92 16 99 % 4' 11\" (1.499 m) 180 lb (81.6 kg)       Physical Exam  Constitutional:  Alert, development consistent with age. HEENT:  NC/NT. Airway patent. Neck:  No midline or paravertebral tenderness. Normal ROM. Supple. Chest:  Symmetrical without visible rash or tenderness. Respiratory:  Lungs Clear to auscultation and breath sounds equal.  CV:  Regular rate and rhythm, normal heart sounds, without pathological murmurs, ectopy, gallops, or rubs. GI:  Abdomen Soft, nontender, good bowel sounds. No firm or pulsatile mass. Pelvis:  Stable, nontender to palpation. Back: lower lumbar spine right sided and midline. Tenderness: Moderate. Swelling: no.              Range of Motion: full range with pain. CVA Tenderness: No.            Straight leg raising:  Right positive at 30 degrees. Skin:  no erythema, rash or swelling noted. Distal Function:              Motor deficit: none. Sensory deficit: none. Pulse deficit: none. Calf Tenderness:  No Bilateral.               Edema:  none Both lower extremity(s). Deep Tendon Reflexes (DTR's): Bilateral Knee/Patellar reflex (L2-L4):  2 - normal  Gait:  normal without use of mobility aid. Extremities: No tenderness or edema noted. Integument:  Normal turgor. Warm, dry, without visible rash, unless noted elsewhere. Lymphatic: no lymphadenopathy noted  Neurological:  Oriented x3, GCS 15. Motor functions intact.      Lab / Imaging Results   (All laboratory and radiology results have been personally reviewed by myself)  Labs:  Results for orders placed or performed during the hospital encounter of 01/24/21   POC Pregnancy Urine   Result Value Ref Range    HCG, Urine, POC Negative Negative Lot Number [id number]     Positive QC Pass/Fail Acceptable     Negative QC Pass/Fail Acceptable        Imaging: All Radiology results interpreted by Radiologist unless otherwise noted. XR HIP RIGHT (2-3 VIEWS)   Final Result   No acute osseous abnormality. Advanced degenerative changes of the right hip      XR FEMUR RIGHT (MIN 2 VIEWS)   Final Result   No acute osseous abnormality. Advanced degenerative changes of the right hip      CT LUMBAR SPINE WO CONTRAST   Final Result   1. No fracture or acute osseous abnormality. Normal lumbar vertebral   alignment. 2.  L4-5 and L5-S1 minor posterior disc bulging. No spinal stenosis. ED Course / Medical Decision Making     Medications   HYDROcodone-acetaminophen (NORCO) 5-325 MG per tablet 1 tablet (1 tablet Oral Given 1/24/21 1413)        Re-examination:  1/24/21     Time: 1449-reviewed all results with patient. Instructed patient do not drive at discharge receiving pain medication the ER. She states verbal understanding. Patient continues have no neurological deficits upon reevaluation. Discussed over the counter pain medications for treatment. Patients symptoms are improving.     Consult(s):   None    Procedure(s):   none MDM:   Patient presents to the ED for fall causing low back pain right hip pain. Differential diagnoses included but not limited to x-rays dislocation versus strain. Workup in the ED revealed urine pregnancy was negative. X-ray of the right hip and thigh revealed no acute osseous abnormalities. There is advanced degenerative changes at the right hip. Discussed this with patient. CT of the lumbar spine without contrast reveals no fracture or acute osseous abnormalities. Normal lumbar vertebral alignment. There is L4-5 and L5-S1 minor posterior disc bulge. No spinal stenosis noted she had no neurological deficits. Patient was given Norco for their symptoms with moderate improvement. Patient continues to be non-toxic on re-evaluation. Findings were discussed with the patient and reasons to immediately return to the ED were articulated to them. They will follow-up with their PMD.     Plan of Care/Counseling:  I reviewed today's visit with the patient in addition to providing specific details for the plan of care and counseling regarding the diagnosis and prognosis. Questions are answered at this time and are agreeable with the plan. Assessment      1. Strain of lumbar region, initial encounter    2. Fall, initial encounter    3. Right hip pain    4. Protrusion of lumbar intervertebral disc      Plan   Discharge home. Patient condition is stable    New Medications     Discharge Medication List as of 1/24/2021  2:57 PM        Electronically signed by MARK Oliveira CNP   DD: 1/24/21  **This report was transcribed using voice recognition software. Every effort was made to ensure accuracy; however, inadvertent computerized transcription errors may be present.   END OF ED PROVIDER NOTE     MARK Oliveira CNP  01/24/21 2006

## 2021-04-15 ENCOUNTER — HOSPITAL ENCOUNTER (EMERGENCY)
Age: 41
Discharge: HOME | End: 2021-04-15
Attending: EMERGENCY MEDICINE
Payer: MEDICAID

## 2021-04-15 ENCOUNTER — APPOINTMENT (OUTPATIENT)
Dept: CT IMAGING | Age: 41
End: 2021-04-15
Payer: MEDICAID

## 2021-04-15 ENCOUNTER — APPOINTMENT (OUTPATIENT)
Dept: GENERAL RADIOLOGY | Age: 41
End: 2021-04-15
Payer: MEDICAID

## 2021-04-15 VITALS
WEIGHT: 180 LBS | HEART RATE: 95 BPM | RESPIRATION RATE: 18 BRPM | HEIGHT: 59 IN | OXYGEN SATURATION: 96 % | SYSTOLIC BLOOD PRESSURE: 158 MMHG | TEMPERATURE: 98.5 F | DIASTOLIC BLOOD PRESSURE: 81 MMHG | BODY MASS INDEX: 36.29 KG/M2

## 2021-04-15 DIAGNOSIS — R06.02 SHORTNESS OF BREATH: ICD-10-CM

## 2021-04-15 DIAGNOSIS — J40 BRONCHITIS: Primary | ICD-10-CM

## 2021-04-15 LAB
ALBUMIN SERPL-MCNC: 4 G/DL (ref 3.5–5.2)
ALP BLD-CCNC: 105 U/L (ref 35–104)
ALT SERPL-CCNC: 13 U/L (ref 0–32)
ANION GAP SERPL CALCULATED.3IONS-SCNC: 12 MMOL/L (ref 7–16)
AST SERPL-CCNC: 15 U/L (ref 0–31)
BASOPHILS ABSOLUTE: 0.02 E9/L (ref 0–0.2)
BASOPHILS RELATIVE PERCENT: 0.3 % (ref 0–2)
BILIRUB SERPL-MCNC: <0.2 MG/DL (ref 0–1.2)
BUN BLDV-MCNC: 14 MG/DL (ref 6–20)
CALCIUM SERPL-MCNC: 9.5 MG/DL (ref 8.6–10.2)
CHLORIDE BLD-SCNC: 102 MMOL/L (ref 98–107)
CO2: 22 MMOL/L (ref 22–29)
CREAT SERPL-MCNC: 0.8 MG/DL (ref 0.5–1)
EOSINOPHILS ABSOLUTE: 0.06 E9/L (ref 0.05–0.5)
EOSINOPHILS RELATIVE PERCENT: 0.9 % (ref 0–6)
GFR AFRICAN AMERICAN: >60
GFR NON-AFRICAN AMERICAN: >60 ML/MIN/1.73
GLUCOSE BLD-MCNC: 92 MG/DL (ref 74–99)
HCG, URINE, POC: NEGATIVE
HCT VFR BLD CALC: 45 % (ref 34–48)
HEMOGLOBIN: 14.1 G/DL (ref 11.5–15.5)
IMMATURE GRANULOCYTES #: 0.02 E9/L
IMMATURE GRANULOCYTES %: 0.3 % (ref 0–5)
LACTIC ACID: 0.9 MMOL/L (ref 0.5–2.2)
LYMPHOCYTES ABSOLUTE: 1.16 E9/L (ref 1.5–4)
LYMPHOCYTES RELATIVE PERCENT: 17 % (ref 20–42)
Lab: NORMAL
MCH RBC QN AUTO: 26.2 PG (ref 26–35)
MCHC RBC AUTO-ENTMCNC: 31.3 % (ref 32–34.5)
MCV RBC AUTO: 83.6 FL (ref 80–99.9)
MONOCYTES ABSOLUTE: 0.55 E9/L (ref 0.1–0.95)
MONOCYTES RELATIVE PERCENT: 8 % (ref 2–12)
NEGATIVE QC PASS/FAIL: NORMAL
NEUTROPHILS ABSOLUTE: 5.03 E9/L (ref 1.8–7.3)
NEUTROPHILS RELATIVE PERCENT: 73.5 % (ref 43–80)
PDW BLD-RTO: 14.3 FL (ref 11.5–15)
PLATELET # BLD: 260 E9/L (ref 130–450)
PMV BLD AUTO: 9.4 FL (ref 7–12)
POSITIVE QC PASS/FAIL: NORMAL
POTASSIUM REFLEX MAGNESIUM: 4.5 MMOL/L (ref 3.5–5)
PRO-BNP: 24 PG/ML (ref 0–125)
RBC # BLD: 5.38 E12/L (ref 3.5–5.5)
REASON FOR REJECTION: NORMAL
REJECTED TEST: NORMAL
SARS-COV-2, NAAT: NOT DETECTED
SODIUM BLD-SCNC: 136 MMOL/L (ref 132–146)
TOTAL PROTEIN: 7.8 G/DL (ref 6.4–8.3)
TROPONIN: <0.01 NG/ML (ref 0–0.03)
WBC # BLD: 6.8 E9/L (ref 4.5–11.5)

## 2021-04-15 PROCEDURE — 2580000003 HC RX 258: Performed by: PHYSICIAN ASSISTANT

## 2021-04-15 PROCEDURE — 87635 SARS-COV-2 COVID-19 AMP PRB: CPT

## 2021-04-15 PROCEDURE — 83880 ASSAY OF NATRIURETIC PEPTIDE: CPT

## 2021-04-15 PROCEDURE — 84484 ASSAY OF TROPONIN QUANT: CPT

## 2021-04-15 PROCEDURE — 93005 ELECTROCARDIOGRAM TRACING: CPT | Performed by: PHYSICIAN ASSISTANT

## 2021-04-15 PROCEDURE — 99285 EMERGENCY DEPT VISIT HI MDM: CPT

## 2021-04-15 PROCEDURE — 83605 ASSAY OF LACTIC ACID: CPT

## 2021-04-15 PROCEDURE — 85025 COMPLETE CBC W/AUTO DIFF WBC: CPT

## 2021-04-15 PROCEDURE — 71046 X-RAY EXAM CHEST 2 VIEWS: CPT

## 2021-04-15 PROCEDURE — 71275 CT ANGIOGRAPHY CHEST: CPT

## 2021-04-15 PROCEDURE — 80053 COMPREHEN METABOLIC PANEL: CPT

## 2021-04-15 PROCEDURE — 6370000000 HC RX 637 (ALT 250 FOR IP): Performed by: PHYSICIAN ASSISTANT

## 2021-04-15 PROCEDURE — 6360000004 HC RX CONTRAST MEDICATION: Performed by: RADIOLOGY

## 2021-04-15 RX ORDER — HYDROCHLOROTHIAZIDE 25 MG/1
TABLET ORAL
COMMUNITY
Start: 2020-01-09 | End: 2021-06-19

## 2021-04-15 RX ORDER — ACETAMINOPHEN 500 MG
500 TABLET ORAL EVERY 6 HOURS PRN
Qty: 28 TABLET | Refills: 0 | Status: ON HOLD | OUTPATIENT
Start: 2021-04-15 | End: 2021-05-03 | Stop reason: HOSPADM

## 2021-04-15 RX ORDER — PANTOPRAZOLE SODIUM 40 MG/1
TABLET, DELAYED RELEASE ORAL
COMMUNITY
Start: 2019-08-06 | End: 2022-03-14

## 2021-04-15 RX ORDER — CARBAMAZEPINE 200 MG/1
200 TABLET ORAL 2 TIMES DAILY WITH MEALS
COMMUNITY
Start: 2020-01-09

## 2021-04-15 RX ORDER — TIZANIDINE 4 MG/1
TABLET ORAL
COMMUNITY
Start: 2021-04-13 | End: 2021-08-19

## 2021-04-15 RX ORDER — BENZONATATE 100 MG/1
100 CAPSULE ORAL 3 TIMES DAILY PRN
Qty: 21 CAPSULE | Refills: 0 | Status: SHIPPED | OUTPATIENT
Start: 2021-04-15 | End: 2021-04-22

## 2021-04-15 RX ORDER — 0.9 % SODIUM CHLORIDE 0.9 %
1000 INTRAVENOUS SOLUTION INTRAVENOUS ONCE
Status: COMPLETED | OUTPATIENT
Start: 2021-04-15 | End: 2021-04-15

## 2021-04-15 RX ORDER — ALBUTEROL SULFATE 90 UG/1
2 AEROSOL, METERED RESPIRATORY (INHALATION) EVERY 6 HOURS PRN
Qty: 1 INHALER | Refills: 0 | Status: SHIPPED | OUTPATIENT
Start: 2021-04-15 | End: 2021-06-19 | Stop reason: SDUPTHER

## 2021-04-15 RX ORDER — OXYCODONE HYDROCHLORIDE AND ACETAMINOPHEN 5; 325 MG/1; MG/1
1 TABLET ORAL ONCE
Status: COMPLETED | OUTPATIENT
Start: 2021-04-15 | End: 2021-04-15

## 2021-04-15 RX ORDER — TRIAMCINOLONE ACETONIDE 1 MG/G
CREAM TOPICAL
Status: ON HOLD | COMMUNITY
Start: 2020-01-09 | End: 2021-05-03 | Stop reason: HOSPADM

## 2021-04-15 RX ADMIN — SODIUM CHLORIDE 1000 ML: 9 INJECTION, SOLUTION INTRAVENOUS at 17:16

## 2021-04-15 RX ADMIN — OXYCODONE HYDROCHLORIDE AND ACETAMINOPHEN 1 TABLET: 5; 325 TABLET ORAL at 15:35

## 2021-04-15 RX ADMIN — IOPAMIDOL 75 ML: 755 INJECTION, SOLUTION INTRAVENOUS at 17:14

## 2021-04-15 ASSESSMENT — PAIN DESCRIPTION - FREQUENCY: FREQUENCY: CONTINUOUS

## 2021-04-15 NOTE — ED PROVIDER NOTES
ED Attending  CC: Anne     [facility]  Department of Emergency Medicine   ED  Encounter Note  Admit Date/RoomTime: 4/15/2021  1:07 PM  ED Room: [facility]    NAME: Payal Kong  : 1980  MRN: [id number]     Chief Complaint:  Shortness of Breath, Cough, and Emesis    History of Present Illness      Payal Kong is a 45 y.o. old female who presents to the emergency department by ambulance with constant of cough which began 1 week(s) prior to arrival. States she started coughing 1 week ago and yesterday started with some chest pain with coughing and SOB. Her symptoms are associated with chest pain, SOB, and one episode of a tiny amount of hemoptysis, and denies abdominal pain, calf pain, dizziness, leg swelling, palpitations, rapid heart beat, syncope or fevers, chills, NVD. Since onset the symptoms have been gradually worsening. The symptoms are aggravated by nothing and relieved by nothing. She states that she has been having back pain that radiates to her chest starting yesterday whenever she coughs. Pt notes a hx of back pain and reports this feels like her normal back pain. Pt states that she sees black mold in her basement and is afraid that is why she is sick. States that she has seen black mold in her basement for over 1 year but her landlord \"has not looked into it. \" She denies any sick contacts. Pt is a smoker. Denies asthma, COPD, known contact with COVID, getting COVID vaccine, HTN, DM, hypercholesterolemia, hx of cardiac problems. States her mom and grandma had heart problems but she does not remember what they had. Denies recent travel, surgery, calf pain/swelling, hx of blood clots, estrogen supplementation, or hx of COVID. Notes allergy to NSAIDs and said she can't take ASA. Pt states she takes Percocet with no problem. ROS   Pertinent positives and negatives are stated within HPI, all other systems reviewed and are negative.     Past Medical History:  has a past medical history of Asthma, Bronchitis, Movement disorder, Seizure disorder ([facility] [address] Seizure disorder ([facility] [address] Suicidal overdose ([facility] [address] Tobacco abuse, and Tobacco abuse. Surgical History:  has a past surgical history that includes fracture surgery; Cholecystectomy; and Rotator cuff repair. Social History:  reports that she has been smoking cigarettes. She has a 7.50 pack-year smoking history. She has never used smokeless tobacco. She reports current alcohol use. She reports that she does not use drugs. Family History: family history includes Cancer in her mother. Allergies: Tramadol, Darvocet [propoxyphene n-acetaminophen], Ibuprofen, Ketorolac, Meloxicam, and Naproxen    Physical Exam   Oxygen Saturation Interpretation: Normal.        ED Triage Vitals [04/15/21 1316]   BP Temp Temp Source Pulse Resp SpO2 Height Weight   (!) 185/103 98.7 °F (37.1 °C) Oral 81 18 100 % 4' 11\" (1.499 m) 180 lb (81.6 kg)       Vitals:    04/15/21 1316 04/15/21 1826   BP: (!) 185/103 (!) 158/81   Pulse: 81 95   Resp: 18 18   Temp: 98.7 °F (37.1 °C) 98.5 °F (36.9 °C)   TempSrc: Oral    SpO2: 100% 96%   Weight: 180 lb (81.6 kg)    Height: 4' 11\" (1.499 m)      General Appearance/Constitutional:  Alert,.  · HEENT:  NC/NT. PERRLA. Airway patent. · Neck:  Normal ROM. Supple. · Respiratoty:  Breath sounds: equal bilaterally. Lung sounds: normal.   · Chest: No TTP of chest; no wounds, erythema, or swelling  · CV:  Regular rate and rhythm, normal heart sounds, without pathological murmurs, ectopy, gallops, or rubs. .  · GI:  Soft, nontender, good bowel sounds. No firm or pulsatile mass. · Back: TTP of paraspinal muscles along thoracic spine which does reproduce her back pain; no midline cervical, thoracic, or lumbar TTP; no step-offs or deformities  · Integument:  Normal turgor. Warm, dry, without visible rash. · Extremities/Lymphatics:  Edema:  none Bilateral lower extremity(s).  No evidence of DVT seen on physical exam..  · Neurological:  Oriented x3. Motor functions intact.     Lab / Imaging Results   (All laboratory and radiology results have been personally reviewed by myself)  Labs:  Results for orders placed or performed during the hospital encounter of 04/15/21   COVID-19, Rapid    Specimen: Nasopharyngeal Swab   Result Value Ref Range    SARS-CoV-2, NAAT Not Detected Not Detected   Comprehensive Metabolic Panel w/ Reflex to MG   Result Value Ref Range    Sodium 136 132 - 146 mmol/L    Potassium reflex Magnesium 4.5 3.5 - 5.0 mmol/L    Chloride 102 98 - 107 mmol/L    CO2 22 22 - 29 mmol/L    Anion Gap 12 7 - 16 mmol/L    Glucose 92 74 - 99 mg/dL    BUN 14 6 - 20 mg/dL    CREATININE 0.8 0.5 - 1.0 mg/dL    GFR Non-African American >60 >=60 mL/min/1.73    GFR African American >60     Calcium 9.5 8.6 - 10.2 mg/dL    Total Protein 7.8 6.4 - 8.3 g/dL    Albumin 4.0 3.5 - 5.2 g/dL    Total Bilirubin <0.2 0.0 - 1.2 mg/dL    Alkaline Phosphatase 105 (H) 35 - 104 U/L    ALT 13 0 - 32 U/L    AST 15 0 - 31 U/L   LACTIC ACID, PLASMA   Result Value Ref Range    Lactic Acid 0.9 0.5 - 2.2 mmol/L   Troponin   Result Value Ref Range    Troponin <0.01 0.00 - 0.03 ng/mL   Brain Natriuretic Peptide   Result Value Ref Range    Pro-BNP 24 0 - 125 pg/mL   CBC Auto Differential   Result Value Ref Range    WBC 6.8 4.5 - 11.5 E9/L    RBC 5.38 3.50 - 5.50 E12/L    Hemoglobin 14.1 11.5 - 15.5 g/dL    Hematocrit 45.0 34.0 - 48.0 %    MCV 83.6 80.0 - 99.9 fL    MCH 26.2 26.0 - 35.0 pg    MCHC 31.3 (L) 32.0 - 34.5 %    RDW 14.3 11.5 - 15.0 fL    Platelets 748 402 - 107 E9/L    MPV 9.4 7.0 - 12.0 fL    Neutrophils % 73.5 43.0 - 80.0 %    Immature Granulocytes % 0.3 0.0 - 5.0 %    Lymphocytes % 17.0 (L) 20.0 - 42.0 %    Monocytes % 8.0 2.0 - 12.0 %    Eosinophils % 0.9 0.0 - 6.0 %    Basophils % 0.3 0.0 - 2.0 %    Neutrophils Absolute 5.03 1.80 - 7.30 E9/L    Immature Granulocytes # 0.02 E9/L    Lymphocytes Absolute 1.16 (L) 1.50 - 4.00 E9/L Monocytes Absolute 0.55 0.10 - 0.95 E9/L    Eosinophils Absolute 0.06 0.05 - 0.50 E9/L    Basophils Absolute 0.02 0.00 - 0.20 E9/L   SPECIMEN REJECTION   Result Value Ref Range    Rejected Test Dimer     Reason for Rejection see below    POC Pregnancy Urine Qual   Result Value Ref Range    HCG, Urine, POC Negative Negative    Lot Number [id number]     Positive QC Pass/Fail Pass     Negative QC Pass/Fail Pass    EKG 12 Lead   Result Value Ref Range    Ventricular Rate 69 BPM    Atrial Rate 69 BPM    P-R Interval 134 ms    QRS Duration 76 ms    Q-T Interval 394 ms    QTc Calculation (Bazett) 422 ms    P Axis 36 degrees    R Axis 53 degrees    T Axis 28 degrees     Imaging: All Radiology results interpreted by Radiologist unless otherwise noted. CTA PULMONARY W CONTRAST   Final Result   No evidence of pulmonary embolism or acute pulmonary abnormality. XR CHEST (2 VW)   Final Result   No acute process. EKG #1:  Interpreted by emergency department physician unless otherwise noted. Time:  1428    Rate: 69  Rhythm: Sinus rhythm. Interpretation: Normal sinus rhythm. Unchanged from previous EKG    ED Course / Medical Decision Making     Medications   oxyCODONE-acetaminophen (PERCOCET) 5-325 MG per tablet 1 tablet (1 tablet Oral Given 4/15/21 1535)   0.9 % sodium chloride bolus (0 mLs Intravenous Stopped 4/15/21 1826)   iopamidol (ISOVUE-370) 76 % injection 75 mL (75 mLs Intravenous Given 4/15/21 1714)     ED Course as of Apr 15 2238   Thu Apr 15, 2021   1432 EKG: This EKG is signed and interpreted by me. Rate: 69  Rhythm: Sinus  Interpretation: Normal sinus rhythm, normal PA interval, normal QRS, normal QT interval, no acute ST or T wave changes  Comparison: stable as compared to patient's most recent EKG        [JA]   1601 Patient is a 59-year-old female presenting with shortness of breath and nonproductive cough.   Also states that she had one episode of hemoptysis with a small amount of blood streaking in her sputum. She is a current smoker. No documented fevers. Also states she is having acute on chronic back pain. No falls or trauma. No syncope, lower extremity edema, calf tenderness, recent travel or immobilization, or history of DVT/PE. Also states she was having pain in her chest when she coughs only. Currently no pain. No cardiac history. No family history of sudden cardiac death. I reviewed labs and imaging. Chest x-ray clear. Labs unremarkable. No acute EKG changes. Symptoms are consistent with likely bronchitis. D-dimer is pending at this time. [JA]      ED Course User Index  [JA] Francia Cates MD     Consultations:             None    Procedures:   none    MDM:  Pt is a 44 yo F presenting to the ER for a cough and SOB. Notes she started coughing 1 week ago. She reports that yesterday she started having some chest pain and shortness of breath. States it hurts whenever she coughs. Notes one tiny episode of hemoptysis. Labs and imaging ordered and reviewed above. Lungs clear to auscultation bilaterally. Pt not hypoxic nor tachycardic. Not actively coughing in ER. Not in respiratory distress. Pt unable to take ASA. Chest pain associated with cough and does not appear to be cardiac in nature. CTA ordered due to hemolyzed dimer. Imaging and labs WNL. COVID negative. CTA WNL. Most likely bronchitis. Pt ambulated and pulse ox remained 97% and HR 94. Pt did not become symptomatic with ambulation. She was told that her BP was elevated. Will send patient home with inhaler, cough medication, and Tylenol for the back pain. All questions answered. Patient agreeable with the plan. Patient is in no acute distress, non-toxic, and appropriate for outpatient management. Pt educated on reasons to return to the ER, including need to return for new or worsening symptoms.     Plan of Care/Counseling:  I reviewed today's visit with the patient in addition to providing specific details for the plan of care and counseling regarding the diagnosis and prognosis. Questions are answered at this time and are agreeable with the plan. Assessment      1. Bronchitis    2. Shortness of breath      This patient's ED course included: a personal history and physicial examination, re-evaluation prior to disposition, multiple bedside re-evaluations, IV medications and cardiac monitoring  This patient has remained hemodynamically stable and improved during their ED course. Plan   Discharge home. Patient condition is good. New Medications     Discharge Medication List as of 4/15/2021  6:27 PM      START taking these medications    Details   benzonatate (TESSALON PERLES) 100 MG capsule Take 1 capsule by mouth 3 times daily as needed for Cough, Disp-21 capsule, R-0Print      acetaminophen (APAP EXTRA STRENGTH) 500 MG tablet Take 1 tablet by mouth every 6 hours as needed for Pain, Disp-28 tablet, R-0Print      albuterol sulfate HFA (VENTOLIN HFA) 108 (90 Base) MCG/ACT inhaler Inhale 2 puffs into the lungs every 6 hours as needed for Wheezing or Shortness of Breath, Disp-1 Inhaler, R-0Print           Electronically signed by Mercedes Rees PA-C   DD: 4/15/21  **This report was transcribed using voice recognition software. Every effort was made to ensure accuracy; however, inadvertent computerized transcription errors may be present.   END OF PROVIDER NOTE       Lea Garcia PA-C  04/15/21 [id number]

## 2021-04-16 LAB
EKG ATRIAL RATE: 69 BPM
EKG P AXIS: 36 DEGREES
EKG P-R INTERVAL: 134 MS
EKG Q-T INTERVAL: 394 MS
EKG QRS DURATION: 76 MS
EKG QTC CALCULATION (BAZETT): 422 MS
EKG R AXIS: 53 DEGREES
EKG T AXIS: 28 DEGREES
EKG VENTRICULAR RATE: 69 BPM

## 2021-04-20 ENCOUNTER — HOSPITAL ENCOUNTER (EMERGENCY)
Age: 41
Discharge: HOME | End: 2021-04-20
Attending: EMERGENCY MEDICINE
Payer: MEDICAID

## 2021-04-20 ENCOUNTER — APPOINTMENT (OUTPATIENT)
Dept: ULTRASOUND IMAGING | Age: 41
End: 2021-04-20
Payer: MEDICAID

## 2021-04-20 VITALS
HEART RATE: 90 BPM | OXYGEN SATURATION: 100 % | BODY MASS INDEX: 37.29 KG/M2 | DIASTOLIC BLOOD PRESSURE: 98 MMHG | SYSTOLIC BLOOD PRESSURE: 190 MMHG | HEIGHT: 59 IN | TEMPERATURE: 98.2 F | WEIGHT: 185 LBS | RESPIRATION RATE: 14 BRPM

## 2021-04-20 DIAGNOSIS — R10.9 FLANK PAIN: Primary | ICD-10-CM

## 2021-04-20 DIAGNOSIS — R31.9 HEMATURIA, UNSPECIFIED TYPE: ICD-10-CM

## 2021-04-20 LAB
BACTERIA: ABNORMAL /HPF
BILIRUBIN URINE: NEGATIVE
BLOOD, URINE: ABNORMAL
CLARITY: ABNORMAL
COLOR: ABNORMAL
GLUCOSE URINE: NEGATIVE MG/DL
HCG, URINE, POC: NEGATIVE
KETONES, URINE: NEGATIVE MG/DL
LEUKOCYTE ESTERASE, URINE: NEGATIVE
Lab: NORMAL
NEGATIVE QC PASS/FAIL: NORMAL
NITRITE, URINE: NEGATIVE
PH UA: 5.5 (ref 5–9)
POSITIVE QC PASS/FAIL: NORMAL
PROTEIN UA: NEGATIVE MG/DL
RBC UA: >20 /HPF (ref 0–2)
SPECIFIC GRAVITY UA: 1.01 (ref 1–1.03)
UROBILINOGEN, URINE: 0.2 E.U./DL
WBC UA: ABNORMAL /HPF (ref 0–5)

## 2021-04-20 PROCEDURE — 96372 THER/PROPH/DIAG INJ SC/IM: CPT

## 2021-04-20 PROCEDURE — 76770 US EXAM ABDO BACK WALL COMP: CPT

## 2021-04-20 PROCEDURE — 81001 URINALYSIS AUTO W/SCOPE: CPT

## 2021-04-20 PROCEDURE — 6360000002 HC RX W HCPCS: Performed by: STUDENT IN AN ORGANIZED HEALTH CARE EDUCATION/TRAINING PROGRAM

## 2021-04-20 PROCEDURE — 6370000000 HC RX 637 (ALT 250 FOR IP): Performed by: STUDENT IN AN ORGANIZED HEALTH CARE EDUCATION/TRAINING PROGRAM

## 2021-04-20 PROCEDURE — 99283 EMERGENCY DEPT VISIT LOW MDM: CPT

## 2021-04-20 RX ORDER — ACETAMINOPHEN 325 MG/1
650 TABLET ORAL ONCE
Status: COMPLETED | OUTPATIENT
Start: 2021-04-20 | End: 2021-04-20

## 2021-04-20 RX ORDER — GUAIFENESIN AND PSEUDOEPHEDRINE HCL 1200; 120 MG/1; MG/1
1 TABLET, EXTENDED RELEASE ORAL 2 TIMES DAILY
Qty: 20 TABLET | Refills: 0 | Status: ON HOLD | OUTPATIENT
Start: 2021-04-20 | End: 2021-05-03 | Stop reason: HOSPADM

## 2021-04-20 RX ORDER — ORPHENADRINE CITRATE 30 MG/ML
60 INJECTION INTRAMUSCULAR; INTRAVENOUS ONCE
Status: COMPLETED | OUTPATIENT
Start: 2021-04-20 | End: 2021-04-20

## 2021-04-20 RX ADMIN — ACETAMINOPHEN 650 MG: 325 TABLET ORAL at 18:21

## 2021-04-20 RX ADMIN — ORPHENADRINE CITRATE 60 MG: 30 INJECTION INTRAMUSCULAR; INTRAVENOUS at 18:21

## 2021-04-20 ASSESSMENT — PAIN SCALES - GENERAL: PAINLEVEL_OUTOF10: 10

## 2021-04-20 NOTE — ED PROVIDER NOTES
vomiting. Endocrine: Negative for polydipsia and polyuria. Genitourinary: Positive for flank pain (left). Negative for dysuria and frequency. Musculoskeletal: Negative for back pain and myalgias. Skin: Negative for rash and wound. Neurological: Negative for weakness and headaches. Psychiatric/Behavioral: Negative for confusion. All other systems reviewed and are negative. Physical Exam  Vitals signs and nursing note reviewed. Constitutional:       General: She is not in acute distress. Appearance: She is not ill-appearing. HENT:      Head: Normocephalic and atraumatic. Right Ear: External ear normal.      Left Ear: External ear normal.      Nose: Nose normal. No rhinorrhea. Mouth/Throat:      Mouth: Mucous membranes are moist.      Pharynx: Oropharynx is clear. Eyes:      Extraocular Movements: Extraocular movements intact. Conjunctiva/sclera: Conjunctivae normal.      Pupils: Pupils are equal, round, and reactive to light. Neck:      Musculoskeletal: Normal range of motion and neck supple. Cardiovascular:      Rate and Rhythm: Normal rate and regular rhythm. Pulses: Normal pulses. Heart sounds: Normal heart sounds. Pulmonary:      Effort: Pulmonary effort is normal. No respiratory distress. Breath sounds: Normal breath sounds. No wheezing or rales. Abdominal:      General: Bowel sounds are normal. There is no distension. Palpations: Abdomen is soft. Tenderness: There is no abdominal tenderness. There is no right CVA tenderness, left CVA tenderness, guarding or rebound. Musculoskeletal: Normal range of motion. General: No swelling or tenderness. Right lower leg: No edema. Left lower leg: No edema. Skin:     General: Skin is warm and dry. Capillary Refill: Capillary refill takes less than 2 seconds. Coloration: Skin is not jaundiced or pale. Findings: No bruising, erythema or rash.    Neurological: General: No focal deficit present. Mental Status: She is alert and oriented to person, place, and time. Sensory: No sensory deficit. Psychiatric:         Mood and Affect: Mood normal.         Behavior: Behavior normal.          Procedures       MDM     28-year-old female with recent encounter for right sided back pain and negative CTA on 4/15 presents to the ED due to chief complaint of left flank and \"kidney pain. \"  Vitals significant for hypertension; otherwise stable. Patient AAOx4; she answers questions and responds to examination appropriately; she is in no acute distress. On exam there is mild TTP and slight stiffness/spasticity felt of musculature of left side and back just inferior and lateral to lower left ribs. There are no rashes noted. No CVA tenderness. Lungs CTA bilaterally. There is no abdominal tenderness; abdomen is soft, there is no rebound, guarding, or rigidity. Urinalysis positive for blood, patient is currently on her menstrual cycle. POC pregnancy is negative. No further lab values were obtained at this time given that patient's labs from just 3 days prior were all unremarkable and her only new symptom is left flank pain which is felt to be musculoskeletal in nature; patient was given Norflex and p.o. Tylenol with marked improvement/resolution of symptoms; repeat examinations are also improved. Ultrasound of bilateral kidneys and urinary bladder are unremarkable and show no evidence of hydronephrosis or intrarenal stones. Given these findings, patient's improvement of symptoms, and benign work-up, feel she is appropriate for discharge with instruction for outpatient follow-up. Regarding her hypertension, patient verbalized that she did not take her antihypertensive medication today; she states that she will take it when she gets home. Discussed results and plan with the patient, she is amenable and her questions were answered. Return precautions discussed.   She was given referral for urology follow-up if symptoms persist.    I have discussed this patient with my attending, who has seen the patient and agrees with this disposition. Patient was seen and evaluated by myself and my attending Joni Garcia DO. Assessment and Plan discussed with attending provider, please see attestation for final plan of care.            --------------------------------------------- PAST HISTORY ---------------------------------------------  Past Medical History:  has a past medical history of Asthma, Bronchitis, Movement disorder, Seizure disorder ([facility] [address] Seizure disorder ([facility] [address] Suicidal overdose ([facility] [address] Tobacco abuse, and Tobacco abuse. Past Surgical History:  has a past surgical history that includes fracture surgery; Cholecystectomy; and Rotator cuff repair. Social History:  reports that she has been smoking cigarettes. She has a 7.50 pack-year smoking history. She has never used smokeless tobacco. She reports current alcohol use. She reports that she does not use drugs. Family History: family history includes Cancer in her mother. The patients home medications have been reviewed.     Allergies: Tramadol, Darvocet [propoxyphene n-acetaminophen], Ibuprofen, Ketorolac, Meloxicam, and Naproxen    -------------------------------------------------- RESULTS -------------------------------------------------  Labs:  Results for orders placed or performed during the hospital encounter of 04/20/21   Urinalysis with Microscopic   Result Value Ref Range    Color, UA BLOODY Straw/Yellow    Clarity, UA SL CLOUDY Clear    Glucose, Ur Negative Negative mg/dL    Bilirubin Urine Negative Negative    Ketones, Urine Negative Negative mg/dL    Specific Gravity, UA 1.010 1.005 - 1.030    Blood, Urine LARGE (A) Negative    pH, UA 5.5 5.0 - 9.0    Protein, UA Negative Negative mg/dL    Urobilinogen, Urine 0.2 <2.0 E.U./dL    Nitrite, Urine Negative Negative    Leukocyte Esterase, Urine Negative Negative 4/20/2021  8:07 PM      START taking these medications    Details   pseudoephedrine-guaiFENesin (MUCINEX D MAX STRENGTH) 120-1200 MG TB12 Take 1 tablet by mouth 2 times daily, Disp-20 tablet, R-0Print             Diagnosis:  1. Flank pain Stable   2. Hematuria, unspecified type Stable       Disposition:  Patient's disposition: Discharge to home  Patient's condition is stable.        Jimena De La Vega,   Resident  04/23/21 2052

## 2021-04-21 NOTE — ED NOTES
notified of manual BP, patient verbalized that she had not taken her hydrodiuril today,  aware and is ok with patient being discharged.  Discharge instructions given and patient verbalizes that she will take her medication when she gets home     [facility]  36/21/56 2018

## 2021-04-22 ASSESSMENT — ENCOUNTER SYMPTOMS
SORE THROAT: 0
DIARRHEA: 0
WHEEZING: 0
SHORTNESS OF BREATH: 0
COUGH: 0
VOMITING: 0
RHINORRHEA: 0
ABDOMINAL PAIN: 0
NAUSEA: 0
BACK PAIN: 0

## 2021-05-01 ENCOUNTER — HOSPITAL ENCOUNTER (OUTPATIENT)
Age: 41
Setting detail: OBSERVATION
Discharge: HOME | End: 2021-05-03
Attending: EMERGENCY MEDICINE | Admitting: STUDENT IN AN ORGANIZED HEALTH CARE EDUCATION/TRAINING PROGRAM
Payer: MEDICAID

## 2021-05-01 ENCOUNTER — APPOINTMENT (OUTPATIENT)
Dept: CT IMAGING | Age: 41
End: 2021-05-01
Payer: MEDICAID

## 2021-05-01 DIAGNOSIS — R10.13 ABDOMINAL PAIN, EPIGASTRIC: Primary | ICD-10-CM

## 2021-05-01 LAB
ALBUMIN SERPL-MCNC: 3.6 G/DL (ref 3.5–5.2)
ALP BLD-CCNC: 87 U/L (ref 35–104)
ALT SERPL-CCNC: 18 U/L (ref 0–32)
ANION GAP SERPL CALCULATED.3IONS-SCNC: 11 MMOL/L (ref 7–16)
AST SERPL-CCNC: 20 U/L (ref 0–31)
BASOPHILS ABSOLUTE: 0.03 E9/L (ref 0–0.2)
BASOPHILS RELATIVE PERCENT: 0.4 % (ref 0–2)
BILIRUB SERPL-MCNC: <0.2 MG/DL (ref 0–1.2)
BILIRUBIN URINE: NEGATIVE
BLOOD, URINE: NEGATIVE
BUN BLDV-MCNC: 10 MG/DL (ref 6–20)
CALCIUM SERPL-MCNC: 8.5 MG/DL (ref 8.6–10.2)
CHLORIDE BLD-SCNC: 102 MMOL/L (ref 98–107)
CLARITY: CLEAR
CO2: 23 MMOL/L (ref 22–29)
COLOR: YELLOW
CREAT SERPL-MCNC: 0.8 MG/DL (ref 0.5–1)
EKG ATRIAL RATE: 76 BPM
EKG P AXIS: 54 DEGREES
EKG P-R INTERVAL: 146 MS
EKG Q-T INTERVAL: 428 MS
EKG QRS DURATION: 92 MS
EKG QTC CALCULATION (BAZETT): 481 MS
EKG R AXIS: 76 DEGREES
EKG T AXIS: 64 DEGREES
EKG VENTRICULAR RATE: 76 BPM
EOSINOPHILS ABSOLUTE: 0.11 E9/L (ref 0.05–0.5)
EOSINOPHILS RELATIVE PERCENT: 1.4 % (ref 0–6)
GFR AFRICAN AMERICAN: >60
GFR NON-AFRICAN AMERICAN: >60 ML/MIN/1.73
GLUCOSE BLD-MCNC: 97 MG/DL (ref 74–99)
GLUCOSE URINE: NEGATIVE MG/DL
HCG, URINE, POC: NEGATIVE
HCT VFR BLD CALC: 35.8 % (ref 34–48)
HEMOGLOBIN: 11.4 G/DL (ref 11.5–15.5)
IMMATURE GRANULOCYTES #: 0.03 E9/L
IMMATURE GRANULOCYTES %: 0.4 % (ref 0–5)
KETONES, URINE: NEGATIVE MG/DL
LACTIC ACID: 2.8 MMOL/L (ref 0.5–2.2)
LEUKOCYTE ESTERASE, URINE: NEGATIVE
LIPASE: 36 U/L (ref 13–60)
LYMPHOCYTES ABSOLUTE: 2.21 E9/L (ref 1.5–4)
LYMPHOCYTES RELATIVE PERCENT: 28.5 % (ref 20–42)
Lab: NORMAL
MCH RBC QN AUTO: 26.8 PG (ref 26–35)
MCHC RBC AUTO-ENTMCNC: 31.8 % (ref 32–34.5)
MCV RBC AUTO: 84 FL (ref 80–99.9)
MONOCYTES ABSOLUTE: 0.58 E9/L (ref 0.1–0.95)
MONOCYTES RELATIVE PERCENT: 7.5 % (ref 2–12)
NEGATIVE QC PASS/FAIL: NORMAL
NEUTROPHILS ABSOLUTE: 4.79 E9/L (ref 1.8–7.3)
NEUTROPHILS RELATIVE PERCENT: 61.8 % (ref 43–80)
NITRITE, URINE: NEGATIVE
PDW BLD-RTO: 14.8 FL (ref 11.5–15)
PH UA: 5.5 (ref 5–9)
PLATELET # BLD: 221 E9/L (ref 130–450)
PMV BLD AUTO: 9.9 FL (ref 7–12)
POSITIVE QC PASS/FAIL: NORMAL
POTASSIUM REFLEX MAGNESIUM: 3.7 MMOL/L (ref 3.5–5)
PROTEIN UA: NEGATIVE MG/DL
RBC # BLD: 4.26 E12/L (ref 3.5–5.5)
SODIUM BLD-SCNC: 136 MMOL/L (ref 132–146)
SPECIFIC GRAVITY UA: 1.02 (ref 1–1.03)
TOTAL PROTEIN: 6.3 G/DL (ref 6.4–8.3)
TROPONIN: <0.01 NG/ML (ref 0–0.03)
TROPONIN: <0.01 NG/ML (ref 0–0.03)
UROBILINOGEN, URINE: 0.2 E.U./DL
WBC # BLD: 7.8 E9/L (ref 4.5–11.5)

## 2021-05-01 PROCEDURE — 6360000002 HC RX W HCPCS: Performed by: PHYSICIAN ASSISTANT

## 2021-05-01 PROCEDURE — 99219 PR INITIAL OBSERVATION CARE/DAY 50 MINUTES: CPT | Performed by: STUDENT IN AN ORGANIZED HEALTH CARE EDUCATION/TRAINING PROGRAM

## 2021-05-01 PROCEDURE — 36415 COLL VENOUS BLD VENIPUNCTURE: CPT

## 2021-05-01 PROCEDURE — 2500000003 HC RX 250 WO HCPCS: Performed by: STUDENT IN AN ORGANIZED HEALTH CARE EDUCATION/TRAINING PROGRAM

## 2021-05-01 PROCEDURE — 93005 ELECTROCARDIOGRAM TRACING: CPT | Performed by: PHYSICIAN ASSISTANT

## 2021-05-01 PROCEDURE — 83690 ASSAY OF LIPASE: CPT

## 2021-05-01 PROCEDURE — 96374 THER/PROPH/DIAG INJ IV PUSH: CPT

## 2021-05-01 PROCEDURE — 85025 COMPLETE CBC W/AUTO DIFF WBC: CPT

## 2021-05-01 PROCEDURE — G0378 HOSPITAL OBSERVATION PER HR: HCPCS

## 2021-05-01 PROCEDURE — 6370000000 HC RX 637 (ALT 250 FOR IP): Performed by: STUDENT IN AN ORGANIZED HEALTH CARE EDUCATION/TRAINING PROGRAM

## 2021-05-01 PROCEDURE — 6360000002 HC RX W HCPCS

## 2021-05-01 PROCEDURE — 74177 CT ABD & PELVIS W/CONTRAST: CPT

## 2021-05-01 PROCEDURE — 96376 TX/PRO/DX INJ SAME DRUG ADON: CPT

## 2021-05-01 PROCEDURE — 6360000002 HC RX W HCPCS: Performed by: STUDENT IN AN ORGANIZED HEALTH CARE EDUCATION/TRAINING PROGRAM

## 2021-05-01 PROCEDURE — 2580000003 HC RX 258: Performed by: EMERGENCY MEDICINE

## 2021-05-01 PROCEDURE — 6360000004 HC RX CONTRAST MEDICATION: Performed by: RADIOLOGY

## 2021-05-01 PROCEDURE — 93010 ELECTROCARDIOGRAM REPORT: CPT | Performed by: INTERNAL MEDICINE

## 2021-05-01 PROCEDURE — 6360000002 HC RX W HCPCS: Performed by: EMERGENCY MEDICINE

## 2021-05-01 PROCEDURE — 96375 TX/PRO/DX INJ NEW DRUG ADDON: CPT

## 2021-05-01 PROCEDURE — 6370000000 HC RX 637 (ALT 250 FOR IP): Performed by: EMERGENCY MEDICINE

## 2021-05-01 PROCEDURE — 83605 ASSAY OF LACTIC ACID: CPT

## 2021-05-01 PROCEDURE — C9113 INJ PANTOPRAZOLE SODIUM, VIA: HCPCS | Performed by: EMERGENCY MEDICINE

## 2021-05-01 PROCEDURE — 80053 COMPREHEN METABOLIC PANEL: CPT

## 2021-05-01 PROCEDURE — 2580000003 HC RX 258: Performed by: STUDENT IN AN ORGANIZED HEALTH CARE EDUCATION/TRAINING PROGRAM

## 2021-05-01 PROCEDURE — 99285 EMERGENCY DEPT VISIT HI MDM: CPT

## 2021-05-01 PROCEDURE — 84484 ASSAY OF TROPONIN QUANT: CPT

## 2021-05-01 PROCEDURE — 96361 HYDRATE IV INFUSION ADD-ON: CPT

## 2021-05-01 PROCEDURE — C9113 INJ PANTOPRAZOLE SODIUM, VIA: HCPCS

## 2021-05-01 PROCEDURE — 81003 URINALYSIS AUTO W/O SCOPE: CPT

## 2021-05-01 PROCEDURE — 2580000003 HC RX 258: Performed by: PHYSICIAN ASSISTANT

## 2021-05-01 RX ORDER — HYDROCHLOROTHIAZIDE 25 MG/1
25 TABLET ORAL DAILY
Status: DISCONTINUED | OUTPATIENT
Start: 2021-05-01 | End: 2021-05-01

## 2021-05-01 RX ORDER — PANTOPRAZOLE SODIUM 40 MG/10ML
40 INJECTION, POWDER, LYOPHILIZED, FOR SOLUTION INTRAVENOUS ONCE
Status: COMPLETED | OUTPATIENT
Start: 2021-05-01 | End: 2021-05-01

## 2021-05-01 RX ORDER — METOPROLOL TARTRATE 5 MG/5ML
10 INJECTION INTRAVENOUS ONCE
Status: DISCONTINUED | OUTPATIENT
Start: 2021-05-01 | End: 2021-05-01

## 2021-05-01 RX ORDER — OXYCODONE HYDROCHLORIDE 5 MG/1
2.5 TABLET ORAL EVERY 4 HOURS PRN
Status: DISCONTINUED | OUTPATIENT
Start: 2021-05-01 | End: 2021-05-03 | Stop reason: HOSPADM

## 2021-05-01 RX ORDER — TIZANIDINE 4 MG/1
4 TABLET ORAL EVERY 8 HOURS PRN
Status: DISCONTINUED | OUTPATIENT
Start: 2021-05-01 | End: 2021-05-03 | Stop reason: HOSPADM

## 2021-05-01 RX ORDER — FENTANYL CITRATE 50 UG/ML
25 INJECTION, SOLUTION INTRAMUSCULAR; INTRAVENOUS ONCE
Status: COMPLETED | OUTPATIENT
Start: 2021-05-01 | End: 2021-05-01

## 2021-05-01 RX ORDER — FENTANYL CITRATE 50 UG/ML
INJECTION, SOLUTION INTRAMUSCULAR; INTRAVENOUS
Status: COMPLETED
Start: 2021-05-01 | End: 2021-05-01

## 2021-05-01 RX ORDER — FENTANYL CITRATE 50 UG/ML
25 INJECTION, SOLUTION INTRAMUSCULAR; INTRAVENOUS ONCE
Status: DISCONTINUED | OUTPATIENT
Start: 2021-05-01 | End: 2021-05-01

## 2021-05-01 RX ORDER — 0.9 % SODIUM CHLORIDE 0.9 %
1000 INTRAVENOUS SOLUTION INTRAVENOUS ONCE
Status: COMPLETED | OUTPATIENT
Start: 2021-05-01 | End: 2021-05-01

## 2021-05-01 RX ORDER — OXYCODONE AND ACETAMINOPHEN 2.5; 325 MG/1; MG/1
1 TABLET ORAL EVERY 4 HOURS PRN
Status: ON HOLD | COMMUNITY
End: 2021-05-03 | Stop reason: HOSPADM

## 2021-05-01 RX ORDER — NIFEDIPINE 30 MG/1
30 TABLET, FILM COATED, EXTENDED RELEASE ORAL DAILY
Status: DISCONTINUED | OUTPATIENT
Start: 2021-05-01 | End: 2021-05-03 | Stop reason: HOSPADM

## 2021-05-01 RX ORDER — SODIUM CHLORIDE 9 MG/ML
INJECTION, SOLUTION INTRAVENOUS CONTINUOUS
Status: DISCONTINUED | OUTPATIENT
Start: 2021-05-01 | End: 2021-05-03 | Stop reason: HOSPADM

## 2021-05-01 RX ORDER — HYDRALAZINE HYDROCHLORIDE 20 MG/ML
10 INJECTION INTRAMUSCULAR; INTRAVENOUS EVERY 6 HOURS PRN
Status: DISCONTINUED | OUTPATIENT
Start: 2021-05-01 | End: 2021-05-03 | Stop reason: HOSPADM

## 2021-05-01 RX ORDER — ONDANSETRON 2 MG/ML
4 INJECTION INTRAMUSCULAR; INTRAVENOUS ONCE
Status: COMPLETED | OUTPATIENT
Start: 2021-05-01 | End: 2021-05-01

## 2021-05-01 RX ORDER — METOPROLOL TARTRATE 5 MG/5ML
5 INJECTION INTRAVENOUS ONCE
Status: DISCONTINUED | OUTPATIENT
Start: 2021-05-01 | End: 2021-05-03 | Stop reason: HOSPADM

## 2021-05-01 RX ORDER — MORPHINE SULFATE 4 MG/ML
4 INJECTION, SOLUTION INTRAMUSCULAR; INTRAVENOUS ONCE
Status: COMPLETED | OUTPATIENT
Start: 2021-05-01 | End: 2021-05-01

## 2021-05-01 RX ORDER — OXYCODONE AND ACETAMINOPHEN 2.5; 325 MG/1; MG/1
1 TABLET ORAL EVERY 4 HOURS PRN
Status: DISCONTINUED | OUTPATIENT
Start: 2021-05-01 | End: 2021-05-01 | Stop reason: CLARIF

## 2021-05-01 RX ORDER — NICOTINE 21 MG/24HR
1 PATCH, TRANSDERMAL 24 HOURS TRANSDERMAL DAILY
Status: DISCONTINUED | OUTPATIENT
Start: 2021-05-01 | End: 2021-05-03 | Stop reason: HOSPADM

## 2021-05-01 RX ORDER — FENTANYL CITRATE 50 UG/ML
50 INJECTION, SOLUTION INTRAMUSCULAR; INTRAVENOUS ONCE
Status: COMPLETED | OUTPATIENT
Start: 2021-05-01 | End: 2021-05-01

## 2021-05-01 RX ORDER — ALBUTEROL SULFATE 2.5 MG/3ML
2.5 SOLUTION RESPIRATORY (INHALATION) EVERY 4 HOURS PRN
Status: DISCONTINUED | OUTPATIENT
Start: 2021-05-01 | End: 2021-05-03 | Stop reason: HOSPADM

## 2021-05-01 RX ORDER — HYDROCHLOROTHIAZIDE 25 MG/1
50 TABLET ORAL DAILY
Status: DISCONTINUED | OUTPATIENT
Start: 2021-05-01 | End: 2021-05-03 | Stop reason: HOSPADM

## 2021-05-01 RX ORDER — CARBAMAZEPINE 200 MG/1
200 TABLET ORAL 2 TIMES DAILY
Status: DISCONTINUED | OUTPATIENT
Start: 2021-05-01 | End: 2021-05-03 | Stop reason: HOSPADM

## 2021-05-01 RX ORDER — CLONIDINE HYDROCHLORIDE 0.1 MG/1
0.1 TABLET ORAL EVERY 4 HOURS PRN
Status: DISCONTINUED | OUTPATIENT
Start: 2021-05-01 | End: 2021-05-03 | Stop reason: HOSPADM

## 2021-05-01 RX ORDER — ACETAMINOPHEN 325 MG/1
325 TABLET ORAL EVERY 4 HOURS PRN
Status: DISCONTINUED | OUTPATIENT
Start: 2021-05-01 | End: 2021-05-03 | Stop reason: HOSPADM

## 2021-05-01 RX ORDER — ONDANSETRON 2 MG/ML
INJECTION INTRAMUSCULAR; INTRAVENOUS
Status: COMPLETED
Start: 2021-05-01 | End: 2021-05-01

## 2021-05-01 RX ORDER — PANTOPRAZOLE SODIUM 40 MG/1
40 TABLET, DELAYED RELEASE ORAL
Status: DISCONTINUED | OUTPATIENT
Start: 2021-05-02 | End: 2021-05-03 | Stop reason: HOSPADM

## 2021-05-01 RX ORDER — PANTOPRAZOLE SODIUM 40 MG/10ML
INJECTION, POWDER, LYOPHILIZED, FOR SOLUTION INTRAVENOUS
Status: COMPLETED
Start: 2021-05-01 | End: 2021-05-01

## 2021-05-01 RX ORDER — MORPHINE SULFATE 2 MG/ML
2 INJECTION, SOLUTION INTRAMUSCULAR; INTRAVENOUS EVERY 8 HOURS PRN
Status: DISCONTINUED | OUTPATIENT
Start: 2021-05-01 | End: 2021-05-03 | Stop reason: HOSPADM

## 2021-05-01 RX ADMIN — PANTOPRAZOLE SODIUM 40 MG: 40 INJECTION, POWDER, FOR SOLUTION INTRAVENOUS at 07:58

## 2021-05-01 RX ADMIN — ACETAMINOPHEN 325 MG: 325 TABLET ORAL at 21:29

## 2021-05-01 RX ADMIN — MORPHINE SULFATE 2 MG: 2 INJECTION, SOLUTION INTRAMUSCULAR; INTRAVENOUS at 12:56

## 2021-05-01 RX ADMIN — NIFEDIPINE 30 MG: 30 TABLET, EXTENDED RELEASE ORAL at 18:43

## 2021-05-01 RX ADMIN — CLONIDINE HYDROCHLORIDE 0.1 MG: 0.1 TABLET ORAL at 17:39

## 2021-05-01 RX ADMIN — SODIUM CHLORIDE: 9 INJECTION, SOLUTION INTRAVENOUS at 18:44

## 2021-05-01 RX ADMIN — IOPAMIDOL 75 ML: 755 INJECTION, SOLUTION INTRAVENOUS at 07:17

## 2021-05-01 RX ADMIN — FENTANYL CITRATE 25 MCG: 50 INJECTION, SOLUTION INTRAMUSCULAR; INTRAVENOUS at 03:10

## 2021-05-01 RX ADMIN — ONDANSETRON 4 MG: 2 INJECTION INTRAMUSCULAR; INTRAVENOUS at 03:09

## 2021-05-01 RX ADMIN — HYDROCHLOROTHIAZIDE 50 MG: 25 TABLET ORAL at 12:56

## 2021-05-01 RX ADMIN — SODIUM CHLORIDE 1000 ML: 9 INJECTION, SOLUTION INTRAVENOUS at 03:11

## 2021-05-01 RX ADMIN — FENTANYL CITRATE 25 MCG: 50 INJECTION, SOLUTION INTRAMUSCULAR; INTRAVENOUS at 07:26

## 2021-05-01 RX ADMIN — FENTANYL CITRATE 50 MCG: 50 INJECTION, SOLUTION INTRAMUSCULAR; INTRAVENOUS at 04:05

## 2021-05-01 RX ADMIN — HYDROMORPHONE HYDROCHLORIDE 1 MG: 1 INJECTION, SOLUTION INTRAMUSCULAR; INTRAVENOUS; SUBCUTANEOUS at 10:15

## 2021-05-01 RX ADMIN — PANTOPRAZOLE SODIUM 40 MG: 40 INJECTION, POWDER, LYOPHILIZED, FOR SOLUTION INTRAVENOUS at 03:10

## 2021-05-01 RX ADMIN — TIZANIDINE 4 MG: 4 TABLET ORAL at 21:29

## 2021-05-01 RX ADMIN — PANTOPRAZOLE SODIUM 40 MG: 40 INJECTION, POWDER, FOR SOLUTION INTRAVENOUS at 03:10

## 2021-05-01 RX ADMIN — MORPHINE SULFATE 4 MG: 4 INJECTION, SOLUTION INTRAMUSCULAR; INTRAVENOUS at 07:58

## 2021-05-01 RX ADMIN — CARBAMAZEPINE 200 MG: 200 TABLET ORAL at 14:55

## 2021-05-01 RX ADMIN — OXYCODONE 2.5 MG: 5 TABLET ORAL at 21:29

## 2021-05-01 RX ADMIN — HYDRALAZINE HYDROCHLORIDE 10 MG: 20 INJECTION, SOLUTION INTRAMUSCULAR; INTRAVENOUS at 12:56

## 2021-05-01 RX ADMIN — SODIUM CHLORIDE 1000 ML: 9 INJECTION, SOLUTION INTRAVENOUS at 07:58

## 2021-05-01 RX ADMIN — SODIUM CHLORIDE: 9 INJECTION, SOLUTION INTRAVENOUS at 12:06

## 2021-05-01 RX ADMIN — CARBAMAZEPINE 200 MG: 200 TABLET ORAL at 21:28

## 2021-05-01 RX ADMIN — LIDOCAINE HYDROCHLORIDE: 20 SOLUTION ORAL; TOPICAL at 07:58

## 2021-05-01 ASSESSMENT — PAIN DESCRIPTION - ONSET: ONSET: ON-GOING

## 2021-05-01 ASSESSMENT — PAIN - FUNCTIONAL ASSESSMENT: PAIN_FUNCTIONAL_ASSESSMENT: PREVENTS OR INTERFERES SOME ACTIVE ACTIVITIES AND ADLS

## 2021-05-01 ASSESSMENT — PAIN SCALES - GENERAL
PAINLEVEL_OUTOF10: 10

## 2021-05-01 ASSESSMENT — PAIN DESCRIPTION - FREQUENCY: FREQUENCY: CONTINUOUS

## 2021-05-01 ASSESSMENT — PAIN DESCRIPTION - ORIENTATION
ORIENTATION: UPPER
ORIENTATION: MID;UPPER

## 2021-05-01 ASSESSMENT — PAIN DESCRIPTION - PROGRESSION: CLINICAL_PROGRESSION: NOT CHANGED

## 2021-05-01 ASSESSMENT — PAIN DESCRIPTION - LOCATION
LOCATION: ABDOMEN
LOCATION: ABDOMEN

## 2021-05-01 ASSESSMENT — PAIN DESCRIPTION - PAIN TYPE: TYPE: ACUTE PAIN

## 2021-05-01 NOTE — CONSULTS
General Surgery Consult    Patient's Name/Date of Birth: River Leon / 1980    Date: May 1, 2021     Consulting Surgeon: Lawrence Krause M.D.    PCP: Luciano David MD     Chief Complaint: epigastric pain    HPI:   River Leon is a 45 y.o. female who presents for evaluation of upper abdominal pain, beginning 1 week ago. The complaint has been persistent, severe in severity, and worsened by nothing. Patient states is does radiate towards her back. She denies any nausea, vomiting, diarrhea, constipation, urinary frequency, urgency, chest pain, shortness of breath, palpitations, diaphoresis, fevers or chills. She states pain is not made worse by eating.       Past Medical History:   Diagnosis Date    Asthma     Bronchitis     Movement disorder     Seizure disorder ([facility] [address]     Seizure disorder ([facility] [address]     Suicidal overdose ([facility] [address] 6/27/2014    Tobacco abuse     Tobacco abuse        Past Surgical History:   Procedure Laterality Date    CHOLECYSTECTOMY      FRACTURE SURGERY      R ANKLE TORN LIGAMENTS    ROTATOR CUFF REPAIR         Current Facility-Administered Medications   Medication Dose Route Frequency Provider Last Rate Last Admin    nicotine (NICODERM CQ) 21 MG/24HR 1 patch  1 patch Transdermal Daily Sujata Stephen MD   1 patch at 05/01/21 1200    albuterol (PROVENTIL) nebulizer solution 2.5 mg  2.5 mg Nebulization Q4H PRN Sujata Stephen MD        carBAMazepine (TEGRETOL) tablet 200 mg  200 mg Oral BID Sujata Stephen MD        [START ON 5/2/2021] pantoprazole (PROTONIX) tablet 40 mg  40 mg Oral QAM AC Sujata Stephen MD        tiZANidine (ZANAFLEX) tablet 4 mg  4 mg Oral Q8H PRN Sujata Stephen MD        0.9 % sodium chloride infusion   Intravenous Continuous Sujata Stephen  mL/hr at 05/01/21 1206 New Bag at 05/01/21 1206    morphine (PF) injection 2 mg  2 mg Intravenous Q8H PRN Sujata Stephen MD   2 mg at 05/01/21 1256    hydroCHLOROthiazide (HYDRODIURIL) tablet 50 mg  50 mg Oral Daily Jazz Garcia MD   50 mg at 05/01/21 1256    hydrALAZINE (APRESOLINE) injection 10 mg  10 mg Intravenous Q6H PRN Jazz Garcia MD   10 mg at 05/01/21 1256    oxyCODONE (ROXICODONE) immediate release tablet 2.5 mg  2.5 mg Oral Q4H PRN Jazz Garcia MD        And    acetaminophen (TYLENOL) tablet 325 mg  325 mg Oral Q4H PRN Jazz Garcia MD           Allergies   Allergen Reactions    Tramadol      Patient does not remember the reaction    Darvocet [Propoxyphene N-Acetaminophen] Other (See Comments)     Dizziness    Ibuprofen Nausea And Vomiting    Ketorolac Nausea And Vomiting    Meloxicam Hives    Naproxen Nausea And Vomiting       Family History   Problem Relation Age of Onset    Cancer Mother         colon       Social History     Socioeconomic History    Marital status:      Spouse name: Not on file    Number of children: 0    Years of education: 15    Highest education level: Not on file   Occupational History     Comment: SSDI   Social Needs    Financial resource strain: Not on file    Food insecurity     Worry: Not on file     Inability: Not on file   [employer] needs     Medical: Not on file     Non-medical: Not on file   Tobacco Use    Smoking status: Current Every Day Smoker     Packs/day: 0.50     Years: 15.00     Pack years: 7.50     Types: Cigarettes    Smokeless tobacco: Never Used    Tobacco comment: SAYS CANNOT USE PATCHES OR GUM   Substance and Sexual Activity    Alcohol use:  Yes     Alcohol/week: 0.0 standard drinks     Comment: occassional-WINE COOLERS    Drug use: No    Sexual activity: Yes     Partners: Male   Lifestyle    Physical activity     Days per week: Not on file     Minutes per session: Not on file    Stress: Not on file   Relationships    Social connections     Talks on phone: Not on file     Gets together: Not on file     Attends Confucianist service: Not on file     Active member of club or organization: Not on file     Attends meetings of clubs or organizations: Not on file     Relationship status: Not on file    Intimate partner violence     Fear of current or ex partner: Not on file     Emotionally abused: Not on file     Physically abused: Not on file     Forced sexual activity: Not on file   Other Topics Concern    Not on file   Social History Narrative    Not on file           Review of Systems  Negative times ten except what was stated in HPI and PMH    Physical exam:   BP (!) 200/103   Pulse 84   Temp 96.8 °F (36 °C) (Infrared)   Resp 16   Ht 4' 11\" (1.499 m)   Wt 159 lb 1.6 oz (72.2 kg)   SpO2 95%   BMI 32.13 kg/m²   General appearance: AAOx3, NAD  Head: NCAT, PERRLA, EOMI, red conjunctiva  Neck: supple, no masses  Lungs: CTAB, equal chest rise bilateral  Heart: Reg rate  Abdomen: soft, nontender, no guarding/rebound, nondistended, no palpable hernias or defects  Skin: no lesions  Gu: no cva tenderness  Extremities: extremities normal, atraumatic, no cyanosis or edema    Labs:  Results for Carie Delon (MRN [id number]) as of 5/1/2021 13:08   Ref.  Range 5/1/2021 02:57   Sodium Latest Ref Range: 132 - 146 mmol/L 136   Potassium Latest Ref Range: 3.5 - 5.0 mmol/L 3.7   Chloride Latest Ref Range: 98 - 107 mmol/L 102   CO2 Latest Ref Range: 22 - 29 mmol/L 23   BUN Latest Ref Range: 6 - 20 mg/dL 10   Creatinine Latest Ref Range: 0.5 - 1.0 mg/dL 0.8   Anion Gap Latest Ref Range: 7 - 16 mmol/L 11   GFR Non- Latest Ref Range: >=60 mL/min/1.73 >60   GFR African American Unknown >60   Lactic Acid Latest Ref Range: 0.5 - 2.2 mmol/L 2.8 (H)   Glucose Latest Ref Range: 74 - 99 mg/dL 97   Calcium Latest Ref Range: 8.6 - 10.2 mg/dL 8.5 (L)   Total Protein Latest Ref Range: 6.4 - 8.3 g/dL 6.3 (L)   Troponin Latest Ref Range: 0.00 - 0.03 ng/mL <0.01   Albumin Latest Ref Range: 3.5 - 5.2 g/dL 3.6   Alk Phos Latest Ref Range: 35 - 104 U/L 87   ALT Latest Ref Range: 0 - 32 U/L 18   AST Latest Ref Range: 0 - 31 U/L 20   Bilirubin Latest Ref Range: 0.0 - 1.2 mg/dL <0.2   Lipase Latest Ref Range: 13 - 60 U/L 36   WBC Latest Ref Range: 4.5 - 11.5 E9/L 7.8   RBC Latest Ref Range: 3.50 - 5.50 E12/L 4.26   Hemoglobin Quant Latest Ref Range: 11.5 - 15.5 g/dL 11.4 (L)   Hematocrit Latest Ref Range: 34.0 - 48.0 % 35.8   MCV Latest Ref Range: 80.0 - 99.9 fL 84.0   MCH Latest Ref Range: 26.0 - 35.0 pg 26.8   MCHC Latest Ref Range: 32.0 - 34.5 % 31.8 (L)   MPV Latest Ref Range: 7.0 - 12.0 fL 9.9   RDW Latest Ref Range: 11.5 - 15.0 fL 14.8   Platelet Count Latest Ref Range: 130 - 450 E9/L 221   Neutrophils % Latest Ref Range: 43.0 - 80.0 % 61.8   Immature Granulocytes % Latest Ref Range: 0.0 - 5.0 % 0.4   Lymphocyte % Latest Ref Range: 20.0 - 42.0 % 28.5   Monocytes % Latest Ref Range: 2.0 - 12.0 % 7.5   Eosinophils % Latest Ref Range: 0.0 - 6.0 % 1.4   Basophils % Latest Ref Range: 0.0 - 2.0 % 0.4   Neutrophils Absolute Latest Ref Range: 1.80 - 7.30 E9/L 4.79   Immature Granulocytes # Latest Units: E9/L 0.03   Lymphocytes Absolute Latest Ref Range: 1.50 - 4.00 E9/L 2.21   Monocytes Absolute Latest Ref Range: 0.10 - 0.95 E9/L 0.58   Eosinophils Absolute Latest Ref Range: 0.05 - 0.50 E9/L 0.11   Basophils Absolute Latest Ref Range: 0.00 - 0.20 E9/L 0.03       Radiology:  CT abdomen/pelvis:   Impression   Minimal free fluid in the pelvis, likely physiologic.  Other occult   inflammatory process thought less likely.       Other chronic appearing findings.  Short-term follow-up if symptoms persist.         Assessment:  45 y.o. female with epigastric pain    Plan:  PO trial  Inpatient vs. Outpatient endoscopy (if able to tolerate diet)      Mirta Mercado MD  5/1/2021  1:07 PM

## 2021-05-01 NOTE — ED NOTES
[facility] faxed, spoke to floor, confirmation received. Pt chimed.       Malou Marshall RN  05/01/21 [id number]

## 2021-05-01 NOTE — PLAN OF CARE
Problem: Health Behavior:  Goal: Will modify at least one risk factor affecting health status  Description: Will modify at least one risk factor affecting health status  Outcome: Met This Shift     Problem: Pain:  Goal: Pain level will decrease  Description: Pain level will decrease  Outcome: Met This Shift     Problem: Physical Regulation:  Goal: Complications related to the disease process, condition or treatment will be avoided or minimized  Description: Complications related to the disease process, condition or treatment will be avoided or minimized  Outcome: Met This Shift

## 2021-05-01 NOTE — PROGRESS NOTES
Called down to emergency room  to ensure that general surgery consult was called out. They confirmed consult was completed and that doctor is aware.    Will place call out to general surgery for diet order per Dr. Michi Lerner

## 2021-05-01 NOTE — H&P
[facility] History and Physical      CHIEF COMPLAINT:  Pain abd     History of Present Illness: Patient is a 81-year-old female status post cholecystectomy who presented with pain abdomen more in the epigastrium along with right inguinal and right lower quadrant, on imaging patient has severe degenerative changes in the right hip. Patient  S/p GI protocol, fentanyl 25 twice, fentanyl 50 x 1, dilaudid 1 mg, morphine 4, zofran 4 mg, protonix 40 x 2, general surgery was consulted by ER and recommendation was to get an EGD. Patient is occasional alcoholic, smoker (4 to 5 cigarettes/day), no recreational drug use. Patient reports her LMP was a month ago, does not believe she is pregnant, her urine pregnancy test was negative. Patient does report having ongoing dark stools, denies any NSAID abuse, iron pill supplementation. Patient also had one accompanying episode of vomiting. Patient denies any vaginal discharge, bleeding per vagina, chest pain, shortness of breath, fevers, urinary symptoms. Informant(s) for H&P:    REVIEW OF SYSTEMS:  A comprehensive review of systems was negative except for: what is in the HPI. PMH:  Past Medical History:   Diagnosis Date    Asthma     Bronchitis     Movement disorder     Seizure disorder (HCC)     Seizure disorder (HCC)     Suicidal overdose ([facility] [address] 6/27/2014    Tobacco abuse     Tobacco abuse        Surgical History:  Past Surgical History:   Procedure Laterality Date    CHOLECYSTECTOMY      FRACTURE SURGERY      R ANKLE TORN LIGAMENTS    ROTATOR CUFF REPAIR         Medications Prior to Admission:    Prior to Admission medications    Medication Sig Start Date End Date Taking?  Authorizing Provider   pseudoephedrine-guaiFENesin (MUCINEX D MAX STRENGTH) 120-1200 MG TB12 Take 1 tablet by mouth 2 times daily 4/20/21   Zuleima Conley DO   tiZANidine (ZANAFLEX) 4 MG tablet take 1 tablet by mouth every 8 hours 4/13/21   Historical Provider, MD triamcinolone (KENALOG) 0.1 % cream APPLY A THIN LAYER TO THE AFFECTED AREA(S) BY TOPICAL ROUTE 2 TIMES PER DAY 1/9/20   Historical Provider, MD   carBAMazepine (TEGRETOL) 200 MG tablet Take 1 tablet every 12 hours by oral route with meals for 30 days. 1/9/20   Historical Provider, MD   hydroCHLOROthiazide (HYDRODIURIL) 25 MG tablet Take 1 tablet every day by oral route for 30 days. 1/9/20   Historical Provider, MD   pantoprazole (PROTONIX) 40 MG tablet Take 1 tablet every day by oral route for 30 days. 8/6/19   Historical Provider, MD   acetaminophen (APAP EXTRA STRENGTH) 500 MG tablet Take 1 tablet by mouth every 6 hours as needed for Pain 4/15/21 4/22/21  Tommy Johnston PA-C   albuterol sulfate HFA (VENTOLIN HFA) 108 (90 Base) MCG/ACT inhaler Inhale 2 puffs into the lungs every 6 hours as needed for Wheezing or Shortness of Breath 4/15/21   Tommy Johnston PA-C       Allergies:    Tramadol, Darvocet [propoxyphene n-acetaminophen], Ibuprofen, Ketorolac, Meloxicam, and Naproxen    Social History:    reports that she has been smoking cigarettes. She has a 7.50 pack-year smoking history. She has never used smokeless tobacco. She reports current alcohol use. She reports that she does not use drugs. Family History:   family history includes Cancer in her mother.        PHYSICAL EXAM:  Vitals:  BP (!) 190/89   Pulse 74   Temp 97.7 °F (36.5 °C)   Resp 16   SpO2 99%     General Appearance: alert and oriented to person, place and time and in no acute distress  Skin: warm and dry, tattoos +  Head: normocephalic and atraumatic  Eyes: pupils equal, round, and reactive to light, extraocular eye movements intact, conjunctivae normal  Neck: neck supple and non tender without mass   Pulmonary/Chest: clear to auscultation bilaterally- no wheezes, rales or rhonchi, normal air movement, no respiratory distress  Cardiovascular: normal rate, normal S1 and S2 and no carotid bruits  Abdomen: soft, tender epigastrium, non-distended, normal bowel sounds, no masses or organomegaly  Extremities: no cyanosis, no clubbing and no edema, painful restricted movements at right hip joint  Neurologic: no cranial nerve deficit and speech normal        LABS:  Recent Labs     05/01/21  0257      K 3.7      CO2 23   BUN 10   CREATININE 0.8   GLUCOSE 97   CALCIUM 8.5*       Recent Labs     05/01/21  0257   WBC 7.8   RBC 4.26   HGB 11.4*   HCT 35.8   MCV 84.0   MCH 26.8   MCHC 31.8*   RDW 14.8      MPV 9.9       No results for input(s): POCGLU in the last 72 hours. Radiology:   CT ABDOMEN PELVIS W IV CONTRAST Additional Contrast? None   Final Result   Minimal free fluid in the pelvis, likely physiologic. Other occult   inflammatory process thought less likely. Other chronic appearing findings. Short-term follow-up if symptoms persist.             EKG: Unremarkable    ASSESSMENT:      Active Problems:    Epigastric pain  Resolved Problems:    * No resolved hospital problems. *      PLAN:    1. Pain epigastrium S/P Cholecystectomy  -history of melena-  troponin's neg, UA & EKG unremarkable, Urine pg test neg, GS on board. S/p GI protocol, fentanyl 25 twice, fentanyl 50 x 1, dilaudid 1 mg, morphine 4, zofran 4 mg, protonix 40 x 2, Plan for EGD. We will keep n.p.o. for now. 2. Lactic Acidosis - s/p 2L fluid in the ER, continue IV fluids. 3. Hypertensive urgency - on HCTZ 25, as needed hydralazine    4. Hx of Hematuria & left flank -resolved - CT- minimal physiological fluid in pelvis, last week was at ER, sent after conservative management at ED. 5.  Severe degenerative changes of the right hip and mild degenerative change of the left hip per imaging - on tizanidine, since patient in a lot of pain, will consult Ortho for their opinion. 6. Normocytic Anemia     7. Seizure disorder- on carbamazepine. 8. Hx of intentional Carbamazepine OD/ self harm. Depression & Anxiety.     9.  Asthma - on prn

## 2021-05-01 NOTE — ED PROVIDER NOTES
ED Attending  CC: No  HPI:  5/1/21, Time: 2:38 AM EDT         Leslie Rausch is a 45 y.o. female presenting to the ED for upper abdominal pain, beginning 1 week ago. The complaint has been persistent, severe in severity, and worsened by nothing. Patient comes in with complaint of upper abdominal pain. She states the pain initially started 1 week ago progressively worse. Pain is now constant sharp pain radiating towards her back. She denies any nausea vomiting diarrhea constipation no urinary frequency urgency burning. She denies any chest pain shortness of breath palpitations diaphoresis. She states pain is not made worse by eating. Review of Systems:   A complete review of systems was performed and pertinent positives and negatives are stated within HPI, all other systems reviewed and are negative.          --------------------------------------------- PAST HISTORY ---------------------------------------------  Past Medical History:  has a past medical history of Asthma, Bronchitis, Movement disorder, Seizure disorder ([facility] [address] Seizure disorder ([facility] [address] [facility] Tobacco abuse, and Tobacco abuse. Past Surgical History:  has a past surgical history that includes fracture surgery; Cholecystectomy; and Rotator cuff repair. Social History:  reports that she has been smoking cigarettes. She has a 7.50 pack-year smoking history. She has never used smokeless tobacco. She reports current alcohol use. She reports that she does not use drugs. Family History: family history includes Cancer in her mother. The patients home medications have been reviewed.     Allergies: Tramadol, Darvocet [propoxyphene n-acetaminophen], Ibuprofen, Ketorolac, Meloxicam, and Naproxen    -------------------------------------------------- RESULTS -------------------------------------------------  All laboratory and radiology results have been personally reviewed by myself   LABS:  Results for orders placed or pH, UA 5.5 5.0 - 9.0    Protein, UA Negative Negative mg/dL    Urobilinogen, Urine 0.2 <2.0 E.U./dL    Nitrite, Urine Negative Negative    Leukocyte Esterase, Urine Negative Negative   Troponin   Result Value Ref Range    Troponin <0.01 0.00 - 0.03 ng/mL   Troponin   Result Value Ref Range    Troponin <0.01 0.00 - 0.03 ng/mL   Troponin   Result Value Ref Range    Troponin <0.01 0.00 - 0.03 ng/mL   CBC   Result Value Ref Range    WBC 6.3 4.5 - 11.5 E9/L    RBC 4.92 3.50 - 5.50 E12/L    Hemoglobin 13.0 11.5 - 15.5 g/dL    Hematocrit 40.8 34.0 - 48.0 %    MCV 82.9 80.0 - 99.9 fL    MCH 26.4 26.0 - 35.0 pg    MCHC 31.9 (L) 32.0 - 34.5 %    RDW 14.6 11.5 - 15.0 fL    Platelets 941 142 - 189 E9/L    MPV 9.5 7.0 - 12.0 fL   Basic Metabolic Panel   Result Value Ref Range    Sodium 136 132 - 146 mmol/L    Potassium 4.0 3.5 - 5.0 mmol/L    Chloride 100 98 - 107 mmol/L    CO2 26 22 - 29 mmol/L    Anion Gap 10 7 - 16 mmol/L    Glucose 93 74 - 99 mg/dL    BUN 6 6 - 20 mg/dL    CREATININE 0.6 0.5 - 1.0 mg/dL    GFR Non-African American >60 >=60 mL/min/1.73    GFR African American >60     Calcium 9.0 8.6 - 10.2 mg/dL   Lactic acid, plasma   Result Value Ref Range    Lactic Acid 1.0 0.5 - 2.2 mmol/L   POC Pregnancy Urine Qual   Result Value Ref Range    HCG, Urine, POC Negative Negative    Lot Number [id number]     Positive QC Pass/Fail Pass     Negative QC Pass/Fail Pass    EKG 12 Lead   Result Value Ref Range    Ventricular Rate 76 BPM    Atrial Rate 76 BPM    P-R Interval 146 ms    QRS Duration 92 ms    Q-T Interval 428 ms    QTc Calculation (Bazett) 481 ms    P Axis 54 degrees    R Axis 76 degrees    T Axis 64 degrees       RADIOLOGY:  Interpreted by Radiologist.  CT ABDOMEN PELVIS W IV CONTRAST Additional Contrast? None   Final Result   Minimal free fluid in the pelvis, likely physiologic. Other occult   inflammatory process thought less likely. Other chronic appearing findings.   Short-term follow-up if symptoms persist. ------------------------- NURSING NOTES AND VITALS REVIEWED ---------------------------   The nursing notes within the ED encounter and vital signs as below have been reviewed. /79   Pulse 75   Temp 97.7 °F (36.5 °C) (Temporal)   Resp 16   Ht 4' 11\" (1.499 m)   Wt 159 lb 1.6 oz (72.2 kg)   SpO2 100%   BMI 32.13 kg/m²   Oxygen Saturation Interpretation: Normal      ---------------------------------------------------PHYSICAL EXAM--------------------------------------      Constitutional/General: Alert and oriented x3, well appearing, non toxic in NAD  Head: Normocephalic and atraumatic  Eyes: PERRL, EOMI  Mouth: Oropharynx clear, handling secretions, no trismus  Neck: Supple, full ROM,   Pulmonary: Lungs clear to auscultation bilaterally, no wheezes, rales, or rhonchi. Not in respiratory distress  Cardiovascular:  Regular rate and rhythm, no murmurs, gallops, or rubs. 2+ distal pulses  Abdomen: Soft, tender right upper quadrant midepigastric r, non distended, guarding present  Extremities: Moves all extremities x 4.  Warm and well perfused  Skin: warm and dry without rash  Neurologic: GCS 15,  Psych: Normal Affect      ------------------------------ ED COURSE/MEDICAL DECISION MAKING----------------------  Medications   nicotine (NICODERM CQ) 21 MG/24HR 1 patch (1 patch Transdermal Patch Removed 5/2/21 0822)   albuterol (PROVENTIL) nebulizer solution 2.5 mg (has no administration in time range)   carBAMazepine (TEGRETOL) tablet 200 mg (200 mg Oral Given 5/1/21 2128)   pantoprazole (PROTONIX) tablet 40 mg (40 mg Oral Given 5/2/21 0626)   tiZANidine (ZANAFLEX) tablet 4 mg (4 mg Oral Given 5/1/21 2129)   0.9 % sodium chloride infusion ( Intravenous New Bag 5/2/21 0201)   morphine (PF) injection 2 mg (2 mg Intravenous Given 5/1/21 1256)   hydroCHLOROthiazide (HYDRODIURIL) tablet 50 mg (50 mg Oral Given 5/1/21 1256)   hydrALAZINE (APRESOLINE) injection 10 mg (10 mg Intravenous Given 5/1/21 1256) ongoing for a week. CT scan of the abdomen demonstrated no acute findings. Rectal exam was negative. Patient required multiple doses of analgesic medications without any relief of her pain. I did speak with on-call general surgery and recommended admission with surgical consultation. It is per the hospitalist who accepted admission. Counseling: The emergency provider has spoken with the patient and discussed todays results, in addition to providing specific details for the plan of care and counseling regarding the diagnosis and prognosis. Questions are answered at this time and they are agreeable with the plan.      --------------------------------- IMPRESSION AND DISPOSITION ---------------------------------    IMPRESSION  1. Abdominal pain, epigastric        DISPOSITION  Disposition: Admit to med/surg floor  Patient condition is stable      NOTE: This report was transcribed using voice recognition software.  Every effort was made to ensure accuracy; however, inadvertent computerized transcription errors may be present     Marine Edwards DO  05/02/21 [id number]

## 2021-05-01 NOTE — PROGRESS NOTES
Dr. Shakeel Freitas returned callback. From an ortho POV the CT scan results will be done outpatient. Dr. Shakeel Freitas will see her during her stay. However, no need from ortho standpoint to hold off discharge for her to be seen.

## 2021-05-01 NOTE — PROGRESS NOTES
Call out for new ortho consult as Dr. Burton Kyle is not one call.  New consult call placed to Dr. Matt Mclean

## 2021-05-02 LAB
ANION GAP SERPL CALCULATED.3IONS-SCNC: 10 MMOL/L (ref 7–16)
BUN BLDV-MCNC: 6 MG/DL (ref 6–20)
CALCIUM SERPL-MCNC: 9 MG/DL (ref 8.6–10.2)
CHLORIDE BLD-SCNC: 100 MMOL/L (ref 98–107)
CO2: 26 MMOL/L (ref 22–29)
CREAT SERPL-MCNC: 0.6 MG/DL (ref 0.5–1)
GFR AFRICAN AMERICAN: >60
GFR NON-AFRICAN AMERICAN: >60 ML/MIN/1.73
GLUCOSE BLD-MCNC: 93 MG/DL (ref 74–99)
HCT VFR BLD CALC: 40.8 % (ref 34–48)
HEMOGLOBIN: 13 G/DL (ref 11.5–15.5)
LACTIC ACID: 1 MMOL/L (ref 0.5–2.2)
MCH RBC QN AUTO: 26.4 PG (ref 26–35)
MCHC RBC AUTO-ENTMCNC: 31.9 % (ref 32–34.5)
MCV RBC AUTO: 82.9 FL (ref 80–99.9)
PDW BLD-RTO: 14.6 FL (ref 11.5–15)
PLATELET # BLD: 215 E9/L (ref 130–450)
PMV BLD AUTO: 9.5 FL (ref 7–12)
POTASSIUM SERPL-SCNC: 4 MMOL/L (ref 3.5–5)
RBC # BLD: 4.92 E12/L (ref 3.5–5.5)
SODIUM BLD-SCNC: 136 MMOL/L (ref 132–146)
TROPONIN: <0.01 NG/ML (ref 0–0.03)
WBC # BLD: 6.3 E9/L (ref 4.5–11.5)

## 2021-05-02 PROCEDURE — 99225 PR SBSQ OBSERVATION CARE/DAY 25 MINUTES: CPT | Performed by: STUDENT IN AN ORGANIZED HEALTH CARE EDUCATION/TRAINING PROGRAM

## 2021-05-02 PROCEDURE — 85027 COMPLETE CBC AUTOMATED: CPT

## 2021-05-02 PROCEDURE — 80048 BASIC METABOLIC PNL TOTAL CA: CPT

## 2021-05-02 PROCEDURE — 83605 ASSAY OF LACTIC ACID: CPT

## 2021-05-02 PROCEDURE — 84484 ASSAY OF TROPONIN QUANT: CPT

## 2021-05-02 PROCEDURE — 6370000000 HC RX 637 (ALT 250 FOR IP): Performed by: STUDENT IN AN ORGANIZED HEALTH CARE EDUCATION/TRAINING PROGRAM

## 2021-05-02 PROCEDURE — 36415 COLL VENOUS BLD VENIPUNCTURE: CPT

## 2021-05-02 PROCEDURE — 2580000003 HC RX 258: Performed by: STUDENT IN AN ORGANIZED HEALTH CARE EDUCATION/TRAINING PROGRAM

## 2021-05-02 PROCEDURE — G0378 HOSPITAL OBSERVATION PER HR: HCPCS

## 2021-05-02 RX ADMIN — SODIUM CHLORIDE: 9 INJECTION, SOLUTION INTRAVENOUS at 10:35

## 2021-05-02 RX ADMIN — PANTOPRAZOLE SODIUM 40 MG: 40 TABLET, DELAYED RELEASE ORAL at 06:26

## 2021-05-02 RX ADMIN — OXYCODONE 2.5 MG: 5 TABLET ORAL at 21:19

## 2021-05-02 RX ADMIN — HYDROCHLOROTHIAZIDE 50 MG: 25 TABLET ORAL at 10:36

## 2021-05-02 RX ADMIN — ACETAMINOPHEN 325 MG: 325 TABLET ORAL at 21:19

## 2021-05-02 RX ADMIN — CARBAMAZEPINE 200 MG: 200 TABLET ORAL at 10:37

## 2021-05-02 RX ADMIN — SODIUM CHLORIDE: 9 INJECTION, SOLUTION INTRAVENOUS at 17:11

## 2021-05-02 RX ADMIN — CARBAMAZEPINE 200 MG: 200 TABLET ORAL at 21:18

## 2021-05-02 RX ADMIN — OXYCODONE 2.5 MG: 5 TABLET ORAL at 10:36

## 2021-05-02 RX ADMIN — NIFEDIPINE 30 MG: 30 TABLET, EXTENDED RELEASE ORAL at 11:25

## 2021-05-02 RX ADMIN — ACETAMINOPHEN 325 MG: 325 TABLET ORAL at 10:35

## 2021-05-02 RX ADMIN — OXYCODONE 2.5 MG: 5 TABLET ORAL at 17:11

## 2021-05-02 RX ADMIN — ACETAMINOPHEN 325 MG: 325 TABLET ORAL at 17:11

## 2021-05-02 RX ADMIN — SODIUM CHLORIDE: 9 INJECTION, SOLUTION INTRAVENOUS at 02:01

## 2021-05-02 ASSESSMENT — PAIN DESCRIPTION - LOCATION
LOCATION: BACK
LOCATION: ABDOMEN

## 2021-05-02 ASSESSMENT — PAIN DESCRIPTION - FREQUENCY
FREQUENCY: INTERMITTENT
FREQUENCY: INTERMITTENT

## 2021-05-02 ASSESSMENT — PAIN SCALES - GENERAL
PAINLEVEL_OUTOF10: 0
PAINLEVEL_OUTOF10: 4
PAINLEVEL_OUTOF10: 9

## 2021-05-02 ASSESSMENT — PAIN DESCRIPTION - PAIN TYPE
TYPE: ACUTE PAIN
TYPE: ACUTE PAIN

## 2021-05-02 ASSESSMENT — PAIN - FUNCTIONAL ASSESSMENT
PAIN_FUNCTIONAL_ASSESSMENT: ACTIVITIES ARE NOT PREVENTED
PAIN_FUNCTIONAL_ASSESSMENT: PREVENTS OR INTERFERES SOME ACTIVE ACTIVITIES AND ADLS

## 2021-05-02 ASSESSMENT — PAIN DESCRIPTION - PROGRESSION
CLINICAL_PROGRESSION: RESOLVED
CLINICAL_PROGRESSION: GRADUALLY WORSENING

## 2021-05-02 ASSESSMENT — PAIN DESCRIPTION - ONSET
ONSET: ON-GOING
ONSET: ON-GOING

## 2021-05-02 ASSESSMENT — PAIN DESCRIPTION - ORIENTATION
ORIENTATION: UPPER
ORIENTATION: UPPER

## 2021-05-02 NOTE — PROGRESS NOTES
Stool occult test completed as patient had her first bowel movement. Result was positive .  Notified Dr. Dorita Beltran

## 2021-05-02 NOTE — CONSULTS
Orthopaedic Consultation  Hugo Naylor DO      CHIEF COMPLAINT: Epigastric discomfort    History of Present Illness: Patient is a very pleasant 61-year-old female who is seen today in consultation for right hip pain. X-rays were obtained which revealed evidence of osteoarthritis of the right hip. Patient was apparently admitted several days ago with increased pain in the epigastric region. She is currently undergoing further evaluation by GI. With regard to her right hip, she has had pain for the last few months. This has been increased recently. Patient states that her only known injury to the right hip is a fall down several steps a few months ago. Otherwise, she denies significant fracture or injury to the right hip. She has not previously seen another provider for this problem. Pain is both in her right groin as well as located along the lateral aspect of the right hip. She denies any paresthesias affecting the lower aspect of the right lower extremity. She denies any sort of radiculopathy or previous history of back pain. Patient denies fevers or chills or recent weight gain/weight loss. Other than her epigastric pain, she has no other complaints at this time.         Past Medical History:   Diagnosis Date    Asthma     Bronchitis     Movement disorder     Seizure disorder (HCC)     Seizure disorder ([facility] [address]     Suicidal overdose ([facility] [address] 6/27/2014    Tobacco abuse     Tobacco abuse          Past Surgical History:   Procedure Laterality Date    CHOLECYSTECTOMY      FRACTURE SURGERY      R ANKLE TORN LIGAMENTS    ROTATOR CUFF REPAIR         Medications Prior to Admission:    Medications Prior to Admission: oxyCODONE-acetaminophen (PERCOCET) 2.5-325 MG per tablet, Take 1 tablet by mouth every 4 hours as needed for Pain.  pseudoephedrine-guaiFENesin (MUCINEX D MAX STRENGTH) 120-1200 MG TB12, Take 1 tablet by mouth 2 times daily  tiZANidine (ZANAFLEX) 4 MG tablet, take 1 tablet by mouth every 8 hours  carBAMazepine (TEGRETOL) 200 MG tablet, Take 1 tablet every 12 hours by oral route with meals for 30 days. hydroCHLOROthiazide (HYDRODIURIL) 25 MG tablet, Take 1 tablet every day by oral route for 30 days. pantoprazole (PROTONIX) 40 MG tablet, Take 1 tablet every day by oral route for 30 days. albuterol sulfate HFA (VENTOLIN HFA) 108 (90 Base) MCG/ACT inhaler, Inhale 2 puffs into the lungs every 6 hours as needed for Wheezing or Shortness of Breath  triamcinolone (KENALOG) 0.1 % cream, APPLY A THIN LAYER TO THE AFFECTED AREA(S) BY TOPICAL ROUTE 2 TIMES PER DAY  acetaminophen (APAP EXTRA STRENGTH) 500 MG tablet, Take 1 tablet by mouth every 6 hours as needed for Pain    Allergies:    Tramadol, Darvocet [propoxyphene n-acetaminophen], Ibuprofen, Ketorolac, Meloxicam, and Naproxen    Social History:    reports that she has been smoking cigarettes. She has a 7.50 pack-year smoking history. She has never used smokeless tobacco. She reports current alcohol use. She reports that she does not use drugs. Family History:   family history includes Cancer in her mother. REVIEW OF SYSTEMS:  As above in the HPI, otherwise negative    PHYSICAL EXAM:    Vitals:  BP (!) 141/78   Pulse 88   Temp 97.5 °F (36.4 °C) (Temporal)   Resp 16   Ht 4' 11\" (1.499 m)   Wt 159 lb 1.6 oz (72.2 kg)   SpO2 100%   BMI 32.13 kg/m²     General:  Awake, alert, oriented X 3. Well developed, well nourished, well groomed. No apparent distress. HEENT:  Normocephalic, atraumatic. Pupils equal, round, reactive to light. No scleral icterus. No conjunctival injection. Normal lips, teeth, and gums. No nasal discharge. Neck:  Supple  Heart:  RRR, no murmurs, gallops, or rubs  Lungs:  CTA bilaterally, bilat symmetrical expansion, no wheeze, rales, or rhonchi  Abdomen: Bowel sounds present, soft, nontender, no masses, no organomegaly, no peritoneal signs  Extremities: Patient has pain about the greater trochanter of the right hip. She also reports pain in the right groin. She has 5 out of 5 strength with resisted hip flexion. There is no pain with para logrolling. She does have discomfort with terminal end ranges of internal and external rotation. Dorsiflexion/plantarflexion, EHL strength is 5 out of 5.  DP and PT pulses 2+. All compartments soft compressible. Skin:  Warm and dry, no open lesions or rash  Neuro:  Cranial nerves 2-12 intact, no focal deficits  Breast: deferred  Rectal: deferred  Genitalia:  deferred        ASSESSMENT:      Patient Active Problem List   Diagnosis    Tobacco abuse    [facility] ([facility] [address]    Asthma    Carbamazepine overdose with self-harm intent    Depression    Su[facility] overdose ([facility] [address]    Pneumonia    Epigastric pain       PLAN:    X-rays of the right hip are reviewed. These are compared to previous films from December 2020. Patient does have moderate osteoarthritic change of the right hip. This is slightly more significant than what I would expect for someone of her age. There are tonnis grade 3 changes with a pincer deformity as well as cam type impingement on the frog-lateral.  This seems to be more of a primary arthritis type pattern and could be either genetic and or related to previous trauma though the patient denies any significant injury. Another differential includes early onset AVN especially due to her cocaine history. However, this does not seem to be related to early onset AVN. The femoral head is overall well intact and again this seems to be more of a primary arthritis type pattern. I would recommend PT/OT. Patient would certainly be a candidate for anti-inflammatory medication/a trial of short term oral steroids. However, I would be hesitant to prescribe any of these medications given her ongoing GI history. I discussed this with her. I will defer to medicine for any sort of pharmacologic treatment. Patient would benefit from PT/OT as tolerated.   She may follow-up in my office on an outpatient basis upon discharge. No need for any immediate treatment while patient is inpatient.     Anitha Holt , [address] [facility]

## 2021-05-02 NOTE — PROGRESS NOTES
[facility] Progress Note    Admitting Date and Time: 5/1/2021  2:28 AM  Admit Dx: Epigastric pain [R10.13]    Subjective:  Patient is being followed for Epigastric pain [R10.13]   Pt feels her pain is better controlled. Per RN: No major issues. ROS: denies fever, chills, cp, sob, n/v, HA unless stated above.       nicotine  1 patch Transdermal Daily    carBAMazepine  200 mg Oral BID    pantoprazole  40 mg Oral QAM AC    hydroCHLOROthiazide  50 mg Oral Daily    metoprolol  5 mg Intravenous Once    NIFEdipine  30 mg Oral Daily     albuterol, 2.5 mg, Q4H PRN  tiZANidine, 4 mg, Q8H PRN  morphine, 2 mg, Q8H PRN  hydrALAZINE, 10 mg, Q6H PRN  oxyCODONE, 2.5 mg, Q4H PRN    And  acetaminophen, 325 mg, Q4H PRN  cloNIDine, 0.1 mg, Q4H PRN         Objective:    /79   Pulse 75   Temp 97.7 °F (36.5 °C) (Temporal)   Resp 16   Ht 4' 11\" (1.499 m)   Wt 159 lb 1.6 oz (72.2 kg)   SpO2 100%   BMI 32.13 kg/m²      General Appearance: alert and oriented to person, place and time and in no acute distress  Skin: warm and dry  Head: normocephalic and atraumatic  Eyes: pupils equal, round, and reactive to light, extraocular eye movements intact, conjunctivae normal  Neck: neck supple and non tender without mass   Pulmonary/Chest: clear to auscultation bilaterally- no wheezes, rales or rhonchi, normal air movement, no respiratory distress  Cardiovascular: normal rate, normal S1 and S2 and no carotid bruits  Abdomen: soft, non-tender, non-distended, normal bowel sounds, no masses or organomegaly  Extremities: no cyanosis, no clubbing and no edema  Neurologic: no cranial nerve deficit and speech normal        Recent Labs     05/01/21  0257 05/02/21  0634    136   K 3.7 4.0    100   CO2 23 26   BUN 10 6   CREATININE 0.8 0.6   GLUCOSE 97 93   CALCIUM 8.5* 9.0       Recent Labs     05/01/21 0257 05/02/21  0634   WBC 7.8 6.3   RBC 4.26 4.92   HGB 11.4* 13.0   HCT 35.8 40.8   MCV 84.0 82.9 MCH 26.8 26.4   [facility] 31.[facility]*   RDW 14.8 14.6    215   MPV 9.9 9.5       Micro:  No components found for: [facility])    Radiology:   Ct Abdomen Pelvis W Iv Contrast Additional Contrast? None    Result Date: 5/1/2021  EXAMINATION: CT OF THE ABDOMEN AND PELVIS WITH CONTRAST 5/1/2021 7:17 am TECHNIQUE: CT of the abdomen and pelvis was performed with the administration of intravenous contrast. Multiplanar reformatted images are provided for review. Dose modulation, iterative reconstruction, and/or weight based adjustment of the mA/kV was utilized to reduce the radiation dose to as low as reasonably achievable. COMPARISON: 10/19/2016 HISTORY: ORDERING SYSTEM PROVIDED HISTORY: upper abdominal pain TECHNOLOGIST PROVIDED HISTORY: Reason for exam:->upper abdominal pain Additional Contrast?->None Decision Support Exception - unselect if not a suspected or confirmed emergency medical condition->Emergency Medical Condition (MA) FINDINGS: Patient is status post cholecystectomy. Liver is homogeneous. Spleen is normal in size. Pancreas is unremarkable. No adrenal mass. No hydronephrosis, cyst or solid renal mass identified. Scattered vascular calcifications. No bowel obstruction. Appendix is normal.  Small fat containing umbilical hernia. No acute bowel related inflammatory change identified. Urinary bladder is unremarkable. Low-attenuation in the central uterus likely due to stage of menstrual cycle. Small low-attenuation foci in the ovaries likely represent follicles. Minimal free fluid in the pelvis. No abscess or free air identified. Scattered areas of scarring and/or atelectasis in the lung bases. No bony destruction, dislocation or acute fracture identified. Severe degenerative changes of the right hip and mild degenerative change of the left hip. Minimal free fluid in the pelvis, likely physiologic. Other occult inflammatory process thought less likely. Other chronic appearing findings.   Short-term follow-up if symptoms persist.       Assessment:    Active Problems:    Epigastric pain  Resolved Problems:    * No resolved hospital problems. *      Plan:  1. Pain epigastrium S/P Cholecystectomy  -history of melena-  troponin's neg, UA & EKG unremarkable, Urine pg test neg, GS on board. S/p GI protocol, fentanyl 25 twice, fentanyl 50 x 1, dilaudid 1 mg, morphine 4, zofran 4 mg, protonix 40 x 2, FOBT +, Plan for EGD - inpatient versus outpatient. On clear liquid diet. 2. Lactic Acidosis -resolved - s/p 2L fluid in the ER, continue IV fluids.     3. Hypertensive urgency - on HCTZ 25, as needed hydralazine, added nifedipine, will avoid beta-blockers because of UDS positive for cocaine     4. Hx of Hematuria & left flank -resolved - CT- minimal physiological fluid in pelvis, last week was at ER, sent after conservative management at ED.     5.  Severe degenerative changes of the right hip and mild degenerative change of the left hip per imaging - on tizanidine, since patient in a lot of pain, consulted Ortho, recommend outpatient management.     6. Normocytic Anemia      7. Seizure disorder- on carbamazepine.     8. Hx of intentional Carbamazepine OD/ self harm. Depression & Anxiety.     9. Asthma - on prn albuterol    10. Cocaine abuse     Code Status: Full  DVT prophylaxis: PCDs      NOTE: This report was transcribed using voice recognition software. Every effort was made to ensure accuracy; however, inadvertent computerized transcription errors may be present.   Electronically signed by Ferdinand Samuels MD on 5/2/2021 at 8:53 AM

## 2021-05-03 ENCOUNTER — ANESTHESIA (OUTPATIENT)
Dept: ENDOSCOPY | Age: 41
End: 2021-05-03
Payer: MEDICAID

## 2021-05-03 ENCOUNTER — ANESTHESIA EVENT (OUTPATIENT)
Dept: ENDOSCOPY | Age: 41
End: 2021-05-03
Payer: MEDICAID

## 2021-05-03 VITALS — DIASTOLIC BLOOD PRESSURE: 63 MMHG | SYSTOLIC BLOOD PRESSURE: 142 MMHG | OXYGEN SATURATION: 98 %

## 2021-05-03 VITALS
OXYGEN SATURATION: 99 % | HEART RATE: 94 BPM | BODY MASS INDEX: 32.08 KG/M2 | WEIGHT: 159.1 LBS | RESPIRATION RATE: 16 BRPM | SYSTOLIC BLOOD PRESSURE: 142 MMHG | DIASTOLIC BLOOD PRESSURE: 74 MMHG | TEMPERATURE: 99 F | HEIGHT: 59 IN

## 2021-05-03 LAB
ANION GAP SERPL CALCULATED.3IONS-SCNC: 8 MMOL/L (ref 7–16)
BUN BLDV-MCNC: 3 MG/DL (ref 6–20)
CALCIUM SERPL-MCNC: 8.6 MG/DL (ref 8.6–10.2)
CHLORIDE BLD-SCNC: 101 MMOL/L (ref 98–107)
CO2: 24 MMOL/L (ref 22–29)
CREAT SERPL-MCNC: 0.6 MG/DL (ref 0.5–1)
GFR AFRICAN AMERICAN: >60
GFR NON-AFRICAN AMERICAN: >60 ML/MIN/1.73
GLUCOSE BLD-MCNC: 89 MG/DL (ref 74–99)
HCG(URINE) PREGNANCY TEST: NEGATIVE
HCT VFR BLD CALC: 39.2 % (ref 34–48)
HEMOGLOBIN: 12.8 G/DL (ref 11.5–15.5)
MCH RBC QN AUTO: 26.9 PG (ref 26–35)
MCHC RBC AUTO-ENTMCNC: 32.7 % (ref 32–34.5)
MCV RBC AUTO: 82.5 FL (ref 80–99.9)
PDW BLD-RTO: 14.4 FL (ref 11.5–15)
PLATELET # BLD: 218 E9/L (ref 130–450)
PMV BLD AUTO: 9.3 FL (ref 7–12)
POTASSIUM SERPL-SCNC: 4.1 MMOL/L (ref 3.5–5)
RBC # BLD: 4.75 E12/L (ref 3.5–5.5)
SODIUM BLD-SCNC: 133 MMOL/L (ref 132–146)
WBC # BLD: 7.4 E9/L (ref 4.5–11.5)

## 2021-05-03 PROCEDURE — 80048 BASIC METABOLIC PNL TOTAL CA: CPT

## 2021-05-03 PROCEDURE — 2500000003 HC RX 250 WO HCPCS: Performed by: NURSE ANESTHETIST, CERTIFIED REGISTERED

## 2021-05-03 PROCEDURE — 2709999900 HC NON-CHARGEABLE SUPPLY: Performed by: SURGERY

## 2021-05-03 PROCEDURE — 3700000001 HC ADD 15 MINUTES (ANESTHESIA): Performed by: SURGERY

## 2021-05-03 PROCEDURE — G0378 HOSPITAL OBSERVATION PER HR: HCPCS

## 2021-05-03 PROCEDURE — 3609012400 HC EGD TRANSORAL BIOPSY SINGLE/MULTIPLE: Performed by: SURGERY

## 2021-05-03 PROCEDURE — 94640 AIRWAY INHALATION TREATMENT: CPT

## 2021-05-03 PROCEDURE — 36415 COLL VENOUS BLD VENIPUNCTURE: CPT

## 2021-05-03 PROCEDURE — 88305 TISSUE EXAM BY PATHOLOGIST: CPT

## 2021-05-03 PROCEDURE — 3700000000 HC ANESTHESIA ATTENDED CARE: Performed by: SURGERY

## 2021-05-03 PROCEDURE — 6360000002 HC RX W HCPCS: Performed by: STUDENT IN AN ORGANIZED HEALTH CARE EDUCATION/TRAINING PROGRAM

## 2021-05-03 PROCEDURE — 6370000000 HC RX 637 (ALT 250 FOR IP): Performed by: STUDENT IN AN ORGANIZED HEALTH CARE EDUCATION/TRAINING PROGRAM

## 2021-05-03 PROCEDURE — 6360000002 HC RX W HCPCS: Performed by: NURSE ANESTHETIST, CERTIFIED REGISTERED

## 2021-05-03 PROCEDURE — 99217 PR OBSERVATION CARE DISCHARGE MANAGEMENT: CPT | Performed by: FAMILY MEDICINE

## 2021-05-03 PROCEDURE — 81025 URINE PREGNANCY TEST: CPT

## 2021-05-03 PROCEDURE — 2580000003 HC RX 258: Performed by: NURSE ANESTHETIST, CERTIFIED REGISTERED

## 2021-05-03 PROCEDURE — 7100000010 HC PHASE II RECOVERY - FIRST 15 MIN: Performed by: SURGERY

## 2021-05-03 PROCEDURE — 85027 COMPLETE CBC AUTOMATED: CPT

## 2021-05-03 PROCEDURE — 7100000011 HC PHASE II RECOVERY - ADDTL 15 MIN: Performed by: SURGERY

## 2021-05-03 RX ORDER — NICOTINE 21 MG/24HR
1 PATCH, TRANSDERMAL 24 HOURS TRANSDERMAL DAILY
Qty: 30 PATCH | Refills: 0 | Status: SHIPPED | OUTPATIENT
Start: 2021-05-04 | End: 2022-10-28 | Stop reason: ALTCHOICE

## 2021-05-03 RX ORDER — LIDOCAINE HYDROCHLORIDE 20 MG/ML
INJECTION, SOLUTION INTRAVENOUS PRN
Status: DISCONTINUED | OUTPATIENT
Start: 2021-05-03 | End: 2021-05-03 | Stop reason: SDUPTHER

## 2021-05-03 RX ORDER — NIFEDIPINE 30 MG/1
30 TABLET, FILM COATED, EXTENDED RELEASE ORAL DAILY
Qty: 30 TABLET | Refills: 0 | Status: SHIPPED | OUTPATIENT
Start: 2021-05-04 | End: 2021-06-19

## 2021-05-03 RX ORDER — PROPOFOL 10 MG/ML
INJECTION, EMULSION INTRAVENOUS PRN
Status: DISCONTINUED | OUTPATIENT
Start: 2021-05-03 | End: 2021-05-03 | Stop reason: SDUPTHER

## 2021-05-03 RX ORDER — SODIUM CHLORIDE 9 MG/ML
INJECTION, SOLUTION INTRAVENOUS CONTINUOUS PRN
Status: DISCONTINUED | OUTPATIENT
Start: 2021-05-03 | End: 2021-05-03 | Stop reason: SDUPTHER

## 2021-05-03 RX ORDER — GLYCOPYRROLATE 1 MG/5 ML
SYRINGE (ML) INTRAVENOUS PRN
Status: DISCONTINUED | OUTPATIENT
Start: 2021-05-03 | End: 2021-05-03 | Stop reason: SDUPTHER

## 2021-05-03 RX ADMIN — NIFEDIPINE 30 MG: 30 TABLET, EXTENDED RELEASE ORAL at 10:37

## 2021-05-03 RX ADMIN — CARBAMAZEPINE 200 MG: 200 TABLET ORAL at 10:37

## 2021-05-03 RX ADMIN — Medication 0.1 MG: at 08:43

## 2021-05-03 RX ADMIN — SODIUM CHLORIDE: 9 INJECTION, SOLUTION INTRAVENOUS at 08:40

## 2021-05-03 RX ADMIN — ALBUTEROL SULFATE 2.5 MG: 2.5 SOLUTION RESPIRATORY (INHALATION) at 03:09

## 2021-05-03 RX ADMIN — PANTOPRAZOLE SODIUM 40 MG: 40 TABLET, DELAYED RELEASE ORAL at 10:37

## 2021-05-03 RX ADMIN — PROPOFOL 130 MG: 10 INJECTION, EMULSION INTRAVENOUS at 08:53

## 2021-05-03 RX ADMIN — LIDOCAINE HYDROCHLORIDE 60 MG: 20 INJECTION, SOLUTION INTRAVENOUS at 08:53

## 2021-05-03 RX ADMIN — HYDROCHLOROTHIAZIDE 50 MG: 25 TABLET ORAL at 10:37

## 2021-05-03 ASSESSMENT — LIFESTYLE VARIABLES: SMOKING_STATUS: 1

## 2021-05-03 ASSESSMENT — PAIN SCALES - GENERAL
PAINLEVEL_OUTOF10: 0
PAINLEVEL_OUTOF10: 5
PAINLEVEL_OUTOF10: 5

## 2021-05-03 ASSESSMENT — PAIN DESCRIPTION - ORIENTATION
ORIENTATION: UPPER;LEFT;RIGHT
ORIENTATION: UPPER

## 2021-05-03 ASSESSMENT — PAIN DESCRIPTION - LOCATION: LOCATION: BACK

## 2021-05-03 ASSESSMENT — ENCOUNTER SYMPTOMS: SHORTNESS OF BREATH: 0

## 2021-05-03 NOTE — DISCHARGE SUMMARY
movement, no respiratory distress  Cardiovascular: normal rate, normal S1 and S2 and no carotid bruits  Abdomen: soft, non-tender, non-distended, normal bowel sounds, no masses or organomegaly  Extremities: no cyanosis, no clubbing and no edema  Neurologic: no cranial nerve deficit and speech normal    I/O last 3 completed shifts: In: 2800 [P.O.:1400; I.V.:1400]  Out: -   I/O this shift: In: 900 [I.V.:900]  Out: -       LABS:  Recent Labs     05/01/21 0257 05/02/21  0634 05/03/21  0300    136 133   K 3.7 4.0 4.1    100 101   CO2 23 26 24   BUN 10 6 3*   CREATININE 0.8 0.6 0.6   GLUCOSE 97 93 89   CALCIUM 8.5* 9.0 8.6       Recent Labs     05/01/21 0257 05/02/21  0634 05/03/21  0300   WBC 7.8 6.3 7.4   RBC 4.26 4.92 4.75   HGB 11.4* 13.0 12.8   HCT 35.8 40.8 39.2   MCV 84.0 82.9 82.5   MCH 26.8 26.4 26.9   MCHC 31.8* 31.9* 32.7   RDW 14.8 14.6 14.4    215 218   MPV 9.9 9.5 9.3       No results for input(s): POCGLU in the last 72 hours. Imaging:  Ct Abdomen Pelvis W Iv Contrast Additional Contrast? None    Result Date: 5/1/2021  EXAMINATION: CT OF THE ABDOMEN AND PELVIS WITH CONTRAST 5/1/2021 7:17 am TECHNIQUE: CT of the abdomen and pelvis was performed with the administration of intravenous contrast. Multiplanar reformatted images are provided for review. Dose modulation, iterative reconstruction, and/or weight based adjustment of the mA/kV was utilized to reduce the radiation dose to as low as reasonably achievable. COMPARISON: 10/19/2016 HISTORY: ORDERING SYSTEM PROVIDED HISTORY: upper abdominal pain TECHNOLOGIST PROVIDED HISTORY: Reason for exam:->upper abdominal pain Additional Contrast?->None Decision Support Exception - unselect if not a suspected or confirmed emergency medical condition->Emergency Medical Condition (MA) FINDINGS: Patient is status post cholecystectomy. Liver is homogeneous. Spleen is normal in size. Pancreas is unremarkable. No adrenal mass.   No hydronephrosis, cyst or solid renal mass identified. Scattered vascular calcifications. No bowel obstruction. Appendix is normal.  Small fat containing umbilical hernia. No acute bowel related inflammatory change identified. Urinary bladder is unremarkable. Low-attenuation in the central uterus likely due to stage of menstrual cycle. Small low-attenuation foci in the ovaries likely represent follicles. Minimal free fluid in the pelvis. No abscess or free air identified. Scattered areas of scarring and/or atelectasis in the lung bases. No bony destruction, dislocation or acute fracture identified. Severe degenerative changes of the right hip and mild degenerative change of the left hip. Minimal free fluid in the pelvis, likely physiologic. Other occult inflammatory process thought less likely. Other chronic appearing findings. Short-term follow-up if symptoms persist.       Patient Instructions:      Medication List      START taking these medications    nicotine 21 MG/24HR  Commonly known as: [id number] [facility] 1 patch onto the skin daily  Start taking on: May 4, 2021     NIFEdipine 30 MG extended release tablet  Commonly known as: ADALAT CC  Take 1 tablet by mouth daily  Start taking on:  May 4, 2021        CONTINUE taking these medications    albuterol sulfate  (90 Base) MCG/ACT inhaler  Commonly known as: Ventolin HFA  Inhale 2 puffs into the lungs every 6 hours as needed for Wheezing or Shortness of Breath     carBAMazepine 200 MG tablet  Commonly known as: TEGRETOL     hydroCHLOROthiazide 25 MG tablet  Commonly known as: HYDRODIURIL     pantoprazole 40 MG tablet  Commonly known as: PROTONIX     tiZANidine 4 MG tablet  Commonly known as: [employer]        STOP taking these medications    acetaminophen 500 MG tablet  Commonly known as: APAP Extra Strength     Percocet 2.5-325 MG per tablet  Generic drug: oxyCODONE-acetaminophen     pseudoephedrine-guaiFENesin 120-1200 MG Tb12  Commonly known as: Mucinex D Max Strength     triamcinolone 0.1 % cream  Commonly known as: KENALOG           Where to Get Your Medications      These medications were sent to [address] [place] [id number]    Phone: [phone]   · nicotine 21 MG/24HR  · NIFEdipine 30 MG extended release tablet           Note that more than 31 minutes was spent in preparing discharge papers, discussing discharge with patient, medication review, etc.    Signed:  Electronically signed by Mazie Dance, MD on 5/3/2021 at 2:22 PM

## 2021-05-03 NOTE — ANESTHESIA PRE PROCEDURE
Department of Anesthesiology  Preprocedure Note       Name:  Rufina Flores   Age:  45 y.o.  :  1980                                          MRN:  [id number]         Date:  5/3/2021      Surgeon: Vincenzo Murillo):  Wayne Jurado MD    Procedure: Procedure(s):  EGD ESOPHAGOGASTRODUODENOSCOPY    Medications prior to admission:   Prior to Admission medications    Medication Sig Start Date End Date Taking? Authorizing Provider   oxyCODONE-acetaminophen (PERCOCET) 2.5-325 MG per tablet Take 1 tablet by mouth every 4 hours as needed for Pain. Yes Historical Provider, MD   pseudoephedrine-guaiFENesin (MUCINEX D MAX STRENGTH) 120-1200 MG TB12 Take 1 tablet by mouth 2 times daily 21  Yes Ethel Desir DO   tiZANidine (ZANAFLEX) 4 MG tablet take 1 tablet by mouth every 8 hours 21  Yes Historical Provider, MD   carBAMazepine (TEGRETOL) 200 MG tablet Take 1 tablet every 12 hours by oral route with meals for 30 days. 20  Yes Historical Provider, MD   hydroCHLOROthiazide (HYDRODIURIL) 25 MG tablet Take 1 tablet every day by oral route for 30 days. 20  Yes Historical Provider, MD   pantoprazole (PROTONIX) 40 MG tablet Take 1 tablet every day by oral route for 30 days.  19  Yes Historical Provider, MD   albuterol sulfate HFA (VENTOLIN HFA) 108 (90 Base) MCG/ACT inhaler Inhale 2 puffs into the lungs every 6 hours as needed for Wheezing or Shortness of Breath 4/15/21  Yes Holli Johnston PA-C   triamcinolone (KENALOG) 0.1 % cream APPLY A THIN LAYER TO THE AFFECTED AREA(S) BY TOPICAL ROUTE 2 TIMES PER DAY 20   Historical Provider, MD   acetaminophen (APAP EXTRA STRENGTH) 500 MG tablet Take 1 tablet by mouth every 6 hours as needed for Pain 4/15/21 4/22/21  Nithya Marte PA-C       Current medications:    Current Facility-Administered Medications   Medication Dose Route Frequency Provider Last Rate Last Admin    nicotine (NICODERM CQ) 21 MG/24HR 1 patch  1 patch Transdermal Daily Magno Padron MD 1 patch at 05/02/21 1038    albuterol (PROVENTIL) nebulizer solution 2.5 mg  2.5 mg Nebulization Q4H PRN Angie Jean Baptiste MD   2.5 mg at 05/03/21 0309    carBAMazepine (TEGRETOL) tablet 200 mg  200 mg Oral BID Angie Jean Baptiste MD   200 mg at 05/02/21 2118    pantoprazole (PROTONIX) tablet 40 mg  40 mg Oral QAM AC Angie Jean Baptiste MD   40 mg at 05/02/21 0626    tiZANidine (ZANAFLEX) tablet 4 mg  4 mg Oral Q8H PRN Angie Jean Baptiste MD   4 mg at 05/01/21 2129    0.9 % sodium chloride infusion   Intravenous Continuous Angie Jean Baptiste  mL/hr at 05/02/21 1711 New Bag at 05/02/21 1711    morphine (PF) injection 2 mg  2 mg Intravenous Q8H PRN Angie Jean Baptiste MD   2 mg at 05/01/21 1256    hydroCHLOROthiazide (HYDRODIURIL) tablet 50 mg  50 mg Oral Daily Angie Jean Baptiste MD   50 mg at 05/02/21 1036    hydrALAZINE (APRESOLINE) injection 10 mg  10 mg Intravenous Q6H PRN Angie Jean Baptiste MD   10 mg at 05/01/21 1256    oxyCODONE (ROXICODONE) immediate release tablet 2.5 mg  2.5 mg Oral Q4H PRN Angie Jean Baptiste MD   2.5 mg at 05/02/21 2119    And    acetaminophen (TYLENOL) tablet 325 mg  325 mg Oral Q4H PRN Angie Jean Baptiste MD   325 mg at 05/02/21 2119    metoprolol (LOPRESSOR) injection 5 mg  5 mg Intravenous Once Angie Jean Baptiste MD        NIFEdipine (ADALAT CC) extended release tablet 30 mg  30 mg Oral Daily Angie Jean Baptiste MD   30 mg at 05/02/21 1125    cloNIDine (CATAPRES) tablet 0.1 mg  0.1 mg Oral Q4H PRN Angie Jean Baptiste MD   0.1 mg at 05/01/21 1739       Allergies:     Allergies   Allergen Reactions    Tramadol      Patient does not remember the reaction    Darvocet [Propoxyphene N-Acetaminophen] Other (See Comments)     Dizziness    Ibuprofen Nausea And Vomiting    Ketorolac Nausea And Vomiting    Meloxicam Hives    Naproxen Nausea And Vomiting       Problem List:    Patient Active Problem List   Diagnosis Code    Tobacco abuse Z72.0    [facility] G40.[facility]    Asthma J45.909    Carbamazepine overdose with self-harm intent T42.1X4A    Depression F32.9    Suicidal overdose ([facility] T50.902A    Pneumonia J18.9    Epigastric pain R10.13       Past Medical History:        Diagnosis Date    Asthma     Bronchitis     Movement disorder     Seizure disorder (HCC)     Seizure disorder (HCC)     Suicidal overdose ([facility] 6/27/2014    Tobacco abuse     Tobacco abuse        Past Surgical History:        Procedure Laterality Date    CHOLECYSTECTOMY      FRACTURE SURGERY      R ANKLE TORN LIGAMENTS    ROTATOR CUFF REPAIR         Social History:    Social History     Tobacco Use    Smoking status: Current Every Day Smoker     Packs/day: 0.50     Years: 15.00     Pack years: 7.50     Types: Cigarettes    Smokeless tobacco: Never Used    Tobacco comment: SAYS CANNOT USE PATCHES OR GUM   Substance Use Topics    Alcohol use:  Yes     Alcohol/week: 0.0 standard drinks     Comment: occassional-WINE COOLERS                                Ready to quit: Not Answered  Counseling given: Not Answered  Comment: SAYS CANNOT USE PATCHES OR GUM      Vital Signs (Current):   Vitals:    05/02/21 0815 05/02/21 1148 05/02/21 1945 05/03/21 0756   BP: 133/79 (!) 141/78 (!) 142/78 (!) 163/91   Pulse: 75 88 83 75   Resp: 16 16 16 16   Temp: 97.7 °F (36.5 °C) 97.5 °F (36.4 °C) 98.1 °F (36.7 °C) 98.3 °F (36.8 °C)   TempSrc: Temporal Temporal Oral Oral   SpO2: 100%  98% 95%   Weight:       Height:                                                  BP Readings from Last 3 Encounters:   05/03/21 (!) 163/91   04/20/21 (!) 190/98   04/15/21 (!) 158/81       NPO Status: Time of last liquid consumption: 2300                        Time of last solid consumption: 2300                        Date of last liquid consumption: 05/02/21                        Date of last solid food consumption: 05/02/21    BMI:   Wt Readings from Last 3 Encounters:   05/01/21 159 lb 1.6 oz (72.2 kg)   04/20/21 185 lb (83.9 kg)   04/15/21 180 lb (81.6 kg)     Body mass index is 32.13 kg/m².    CBC:   Lab Results   Component Value Date    WBC 7.4 05/03/2021    RBC 4.75 05/03/2021    HGB 12.8 05/03/2021    HCT 39.2 05/03/2021    MCV 82.5 05/03/2021    RDW 14.4 05/03/2021     05/03/2021       CMP:   Lab Results   Component Value Date     05/03/2021    K 4.1 05/03/2021    K 3.7 05/01/2021     05/03/2021    CO2 24 05/03/2021    BUN 3 05/03/2021    CREATININE 0.6 05/03/2021    GFRAA >60 05/03/2021    LABGLOM >60 05/03/2021    GLUCOSE 89 05/03/2021    GLUCOSE 90 07/24/2011    PROT 6.3 05/01/2021    CALCIUM 8.6 05/03/2021    BILITOT <0.2 05/01/2021    ALKPHOS 87 05/01/2021    AST 20 05/01/2021    ALT 18 05/01/2021       POC Tests: No results for input(s): POCGLU, POCNA, POCK, POCCL, POCBUN, POCHEMO, POCHCT in the last 72 hours. Coags:   Lab Results   Component Value Date    PROTIME 13.0 06/30/2020    INR 1.2 06/30/2020    APTT 24.9 06/30/2020       HCG (If Applicable):   Lab Results   Component Value Date    PREGTESTUR NEGATIVE 05/03/2021    PREGSERUM NEGATIVE 06/15/2011    HCG <0.1 12/08/2013        ABGs: No results found for: PHART, PO2ART, ZUI9ADC, YVP5NDV, BEART, J5FVMSZY     Type & Screen (If Applicable):  No results found for: LABABO, LABRH    Drug/Infectious Status (If Applicable):  No results found for: HIV, HEPCAB    COVID-19 Screening (If Applicable):   Lab Results   Component Value Date    COVID19 Not Detected 04/15/2021           Anesthesia Evaluation  Patient summary reviewed  Airway: Mallampati: II  TM distance: >3 FB   Neck ROM: full  Mouth opening: > = 3 FB Dental: normal exam         Pulmonary: breath sounds clear to auscultation  (+) pneumonia: resolved,  asthma: current smoker    (-) shortness of breath          Patient did not smoke on day of surgery.                  Cardiovascular:  Exercise tolerance: good (>4 METS),       (-) past MI and CAD    ECG reviewed  Rhythm: regular  Rate: normal                    Neuro/Psych:   (+) seizures:, psychiatric history: stable with treatmentdepression/anxiety             GI/Hepatic/Renal:        (-) liver disease and no renal disease       Endo/Other: Negative Endo/Other ROS                    Abdominal:         (-) obese     Vascular: negative vascular ROS. Anesthesia Plan      MAC     ASA 3       Induction: intravenous. MIPS: Prophylactic antiemetics administered. Anesthetic plan and risks discussed with patient. Plan discussed with CRNA.                   Tanika Alcaraz MD   5/3/2021

## 2021-05-03 NOTE — PLAN OF CARE
Problem: Pain:  Goal: Pain level will decrease  Description: Pain level will decrease  Outcome: Completed     Problem: Pain:  Goal: Control of acute pain  Description: Control of acute pain  Outcome: Completed     Problem: Pain:  Goal: Control of chronic pain  Description: Control of chronic pain  Outcome: Completed     Problem: Cardiac:  Goal: Ability to maintain vital signs within normal range will improve  Description: Ability to maintain vital signs within normal range will improve  Outcome: Completed     Problem: Cardiac:  Goal: Cardiovascular alteration will improve  Description: Cardiovascular alteration will improve  Outcome: Completed     Problem: Health Behavior:  Goal: Will modify at least one risk factor affecting health status  Description: Will modify at least one risk factor affecting health status  Outcome: Completed     Problem: Health Behavior:  Goal: Identification of resources available to assist in meeting health care needs will improve  Description: Identification of resources available to assist in meeting health care needs will improve  Outcome: Completed     Problem: Physical Regulation:  Goal: Complications related to the disease process, condition or treatment will be avoided or minimized  Description: Complications related to the disease process, condition or treatment will be avoided or minimized  Outcome: Completed

## 2021-05-03 NOTE — PROGRESS NOTES
General Surgery Progress Note    Surgeon:  Dr. Deanna Cool  Subjective:   Pain:    Minimal  Diet:    Tolerated clears  Nausea: None  FOBT + stools    BP (!) 142/78   Pulse 83   Temp 98.1 °F (36.7 °C) (Oral)   Resp 16   Ht 4' 11\" (1.499 m)   Wt 159 lb 1.6 oz (72.2 kg)   SpO2 98%   BMI 32.13 kg/m²      General:  no acute distress  Lungs:  non-labored breathing  Heart:   Pulse is regular  Abdomen:  soft, minimal tender epigastrium, nondistended, no guarding/rebound tenderness    ASSESSMENT / PLAN:   · Epigastric pain, +FOBT  · Recommend EGD tomorrow    Greg Colindres MD  5/2/2021  8:22 PM

## 2021-05-03 NOTE — OP NOTE
Operative Note      Patient: Arnaud Henry  YOB: 1980  MRN: [id number]    Date of Procedure: 5/3/2021    Pre-Op Diagnosis: Epigastric pain, FOBT+    Post-Op Diagnosis: minimal gastritis       Procedure(s):  EGD BIOPSY    Surgeon(s):  Alejandra Solis MD    Assistant:   * No surgical staff found *    Anesthesia: Monitor Anesthesia Care    Estimated Blood Loss (mL): 1    Complications: none    Specimens:   ID Type Source Tests Collected by Time Destination   A : ANTRAL BX Tissue Stomach SURGICAL PATHOLOGY Alejandra Solis MD 5/3/2021 2996        Implants:  * No implants in log *      Drains: * No LDAs found *      BRIEF HISTORY:  This is a 45 y.o. female who presents with the complaint of epigastric pain. It was recommended the patient undergo an esophagogastrduodenoscopy for further evaluation. The risks/benefits/alternatives/expected outcomes were explained to the patient which include but are not limited to, bleeding, perforation and aspiration. The patient understands and agrees to proceed. PROCEDURE:  The patient was brought into the endoscopy suite and placed in the left lateral decubitus position. A bite block was placed in the patients mouth. After the initiation of LMAC anesthesia, an endoscope was inserted into the patient's mouth and passed into the esophagus and into the stomach. The scope was advanced through the pylorus into the first and second portion of the duodenum making the note of grossly normal mucosa. The scope was then withdrawn back into the stomach where it was retroflexed. There was no hiatal hernia noted. The scope was then straightened minimal gastritis was visualized. Antral biopsies were taken with cold forceps to rule out H. Pylori. Hemostasis was excellent. The stomach was desufflated. The scope was then withdrawn the entire length of the esophagus, making the note of a normal esophagus without masses, ulcerations, or lesions noted.   The scope was withdrawn entirely. The patient tolerated the procedure well and there were no complications. The patient was taken to PACU in stable condition.     Shaylee Fuller MD  5/3/2021

## 2021-05-03 NOTE — PLAN OF CARE
Problem: Pain:  Goal: Pain level will decrease  Description: Pain level will decrease  5/3/2021 0106 by Kia Gipson RN  Outcome: Met This Shift  5/2/2021 1546 by Diane Lema RN  Outcome: Met This Shift  Goal: Control of acute pain  Description: Control of acute pain  Outcome: Met This Shift  Goal: Control of chronic pain  Description: Control of chronic pain  Outcome: Met This Shift     Problem: Cardiac:  Goal: Ability to maintain vital signs within normal range will improve  Description: Ability to maintain vital signs within normal range will improve  5/3/2021 0106 by Kia Gipson RN  Outcome: Met This Shift  5/2/2021 1546 by Diane Lema RN  Outcome: Met This Shift  Goal: Cardiovascular alteration will improve  Description: Cardiovascular alteration will improve  Outcome: Met This Shift     Problem: Health Behavior:  Goal: Will modify at least one risk factor affecting health status  Description: Will modify at least one risk factor affecting health status  5/3/2021 0106 by Kia Gipson RN  Outcome: Met This Shift  5/2/2021 1546 by Diane Lema RN  Outcome: Met This Shift  Goal: Identification of resources available to assist in meeting health care needs will improve  Description: Identification of resources available to assist in meeting health care needs will improve  Outcome: Met This Shift     Problem: Physical Regulation:  Goal: Complications related to the disease process, condition or treatment will be avoided or minimized  Description: Complications related to the disease process, condition or treatment will be avoided or minimized  5/3/2021 0106 by Kia Gipson RN  Outcome: Met This Shift  5/2/2021 1546 by Diane Lema RN  Outcome: Met This Shift

## 2021-05-03 NOTE — ANESTHESIA POSTPROCEDURE EVALUATION
Department of Anesthesiology  Postprocedure Note    Patient: Yaron Yu  MRN: [id number]  YOB: 1980  Date of evaluation: 5/3/2021  Time:  10:23 AM     Procedure Summary     Date: 05/03/21 Room / Location: [address]    Anesthesia Start: 3792 Anesthesia Stop: 3011    Procedure: EGD BIOPSY (N/A ) Diagnosis: (-)    Surgeons: Genevieve Burns MD Responsible Provider: Genet Treadwell MD    Anesthesia Type: MAC ASA Status: 3          Anesthesia Type: MAC    Aye Phase I:      Aye Phase II: Aye Score: 10    Last vitals: Reviewed and per EMR flowsheets.        Anesthesia Post Evaluation    Patient location during evaluation: PACU  Patient participation: complete - patient participated  Level of consciousness: awake and alert  Airway patency: patent  Nausea & Vomiting: no vomiting and no nausea  Complications: no  Cardiovascular status: hemodynamically stable  Respiratory status: acceptable  Hydration status: stable

## 2021-06-13 NOTE — ED NOTES
Radiology Procedure Waiver   Name: Gloria Floyd  : 1980  MRN: [id number]    Date:  20    Time: 11:47 AM EDT    Benefits of immediately proceeding with Radiology exam(s) without pre-testing outweigh the risks or are not indicated as specified below and therefore the following is/are being waived:    [] Pregnancy test   [x] Patients LMP on-time and regular.   [] Patient had Tubal Ligation or has other Contraception Device. [] Patient  is Menopausal or Premenarcheal.    [] Patient had Full or Partial Hysterectomy. [] Protocol for Iodine allergy    [] MRI Questionnaire     [] BUN/Creatinine   [] Patient age w/no hx of renal dysfunction. [] Patient on Dialysis. [] Recent Normal Labs.   Electronically signed by Leonel Felder MD on 20 at 11:47 AM EDT               Leonel Felder MD  [id number]

## 2021-06-19 ENCOUNTER — APPOINTMENT (OUTPATIENT)
Dept: GENERAL RADIOLOGY | Age: 41
End: 2021-06-19
Payer: MEDICAID

## 2021-06-19 ENCOUNTER — HOSPITAL ENCOUNTER (EMERGENCY)
Age: 41
Discharge: HOME | End: 2021-06-19
Attending: EMERGENCY MEDICINE
Payer: MEDICAID

## 2021-06-19 VITALS
RESPIRATION RATE: 14 BRPM | SYSTOLIC BLOOD PRESSURE: 193 MMHG | DIASTOLIC BLOOD PRESSURE: 89 MMHG | TEMPERATURE: 98 F | HEIGHT: 59 IN | BODY MASS INDEX: 30.24 KG/M2 | HEART RATE: 94 BPM | WEIGHT: 150 LBS | OXYGEN SATURATION: 99 %

## 2021-06-19 DIAGNOSIS — J45.21 MILD INTERMITTENT ASTHMATIC BRONCHITIS WITH ACUTE EXACERBATION: Primary | ICD-10-CM

## 2021-06-19 DIAGNOSIS — M25.511 CHRONIC RIGHT SHOULDER PAIN: ICD-10-CM

## 2021-06-19 DIAGNOSIS — G89.29 CHRONIC RIGHT SHOULDER PAIN: ICD-10-CM

## 2021-06-19 LAB — SARS-COV-2, NAAT: NOT DETECTED

## 2021-06-19 PROCEDURE — 87635 SARS-COV-2 COVID-19 AMP PRB: CPT

## 2021-06-19 PROCEDURE — 99285 EMERGENCY DEPT VISIT HI MDM: CPT

## 2021-06-19 PROCEDURE — 6370000000 HC RX 637 (ALT 250 FOR IP): Performed by: EMERGENCY MEDICINE

## 2021-06-19 PROCEDURE — 71046 X-RAY EXAM CHEST 2 VIEWS: CPT

## 2021-06-19 PROCEDURE — 99284 EMERGENCY DEPT VISIT MOD MDM: CPT

## 2021-06-19 PROCEDURE — 73030 X-RAY EXAM OF SHOULDER: CPT

## 2021-06-19 PROCEDURE — 94640 AIRWAY INHALATION TREATMENT: CPT

## 2021-06-19 RX ORDER — HYDROCODONE BITARTRATE AND ACETAMINOPHEN 5; 325 MG/1; MG/1
1 TABLET ORAL EVERY 6 HOURS PRN
Qty: 12 TABLET | Refills: 0 | Status: SHIPPED | OUTPATIENT
Start: 2021-06-19 | End: 2021-06-22

## 2021-06-19 RX ORDER — PREDNISONE 20 MG/1
60 TABLET ORAL ONCE
Status: COMPLETED | OUTPATIENT
Start: 2021-06-19 | End: 2021-06-19

## 2021-06-19 RX ORDER — GUAIFENESIN/DEXTROMETHORPHAN 100-10MG/5
5 SYRUP ORAL ONCE
Status: COMPLETED | OUTPATIENT
Start: 2021-06-19 | End: 2021-06-19

## 2021-06-19 RX ORDER — HYDROCODONE BITARTRATE AND ACETAMINOPHEN 5; 325 MG/1; MG/1
1 TABLET ORAL ONCE
Status: COMPLETED | OUTPATIENT
Start: 2021-06-19 | End: 2021-06-19

## 2021-06-19 RX ORDER — PREDNISONE 10 MG/1
40 TABLET ORAL DAILY
Qty: 20 TABLET | Refills: 0 | Status: SHIPPED | OUTPATIENT
Start: 2021-06-19 | End: 2021-06-24

## 2021-06-19 RX ORDER — ALBUTEROL SULFATE 90 UG/1
2 AEROSOL, METERED RESPIRATORY (INHALATION) 4 TIMES DAILY
Qty: 1 INHALER | Refills: 0 | Status: SHIPPED | OUTPATIENT
Start: 2021-06-19 | End: 2022-10-28 | Stop reason: ALTCHOICE

## 2021-06-19 RX ORDER — IPRATROPIUM BROMIDE AND ALBUTEROL SULFATE 2.5; .5 MG/3ML; MG/3ML
1 SOLUTION RESPIRATORY (INHALATION)
Status: COMPLETED | OUTPATIENT
Start: 2021-06-19 | End: 2021-06-19

## 2021-06-19 RX ADMIN — IPRATROPIUM BROMIDE AND ALBUTEROL SULFATE 1 AMPULE: .5; 3 SOLUTION RESPIRATORY (INHALATION) at 09:54

## 2021-06-19 RX ADMIN — IPRATROPIUM BROMIDE AND ALBUTEROL SULFATE 1 AMPULE: .5; 3 SOLUTION RESPIRATORY (INHALATION) at 09:52

## 2021-06-19 RX ADMIN — GUAIFENESIN AND DEXTROMETHORPHAN 5 ML: 100; 10 SYRUP ORAL at 09:07

## 2021-06-19 RX ADMIN — IPRATROPIUM BROMIDE AND ALBUTEROL SULFATE 1 AMPULE: .5; 3 SOLUTION RESPIRATORY (INHALATION) at 09:50

## 2021-06-19 RX ADMIN — HYDROCODONE BITARTRATE AND ACETAMINOPHEN 1 TABLET: 5; 325 TABLET ORAL at 09:06

## 2021-06-19 RX ADMIN — PREDNISONE 60 MG: 20 TABLET ORAL at 09:06

## 2021-06-19 ASSESSMENT — PAIN DESCRIPTION - LOCATION: LOCATION: BACK;SHOULDER

## 2021-06-19 ASSESSMENT — PAIN DESCRIPTION - ORIENTATION: ORIENTATION: RIGHT

## 2021-06-19 ASSESSMENT — PAIN SCALES - GENERAL
PAINLEVEL_OUTOF10: 10
PAINLEVEL_OUTOF10: 10

## 2021-06-19 ASSESSMENT — PAIN DESCRIPTION - PAIN TYPE: TYPE: ACUTE PAIN

## 2021-06-19 NOTE — ED PROVIDER NOTES
HPI:  6/19/21,   Time: 8:56 AM EDT         Michelle Rojas is a 45 y.o. female presenting to the ED for Cough SOB , beginning several days ago. The complaint has been persistent, moderate in severity, and worsened by nothing. Patient she is having productive cough expectorating yellow mucus shortness of breath chills and some wheezing. She does suffer from asthma. She is a smoker. Been ongoing for several days. Also secondary complaint of right shoulder pain has been ongoing for several years she attributed to repetitive movements at work. She is scheduled to see orthopedic surgery when she gets referral from her PCP next week. He is right-hand dominant. She denies hemoptysis. States he was tested for Covid proxy 1 to 2 months ago which was negative. She has no shortness of breath with exertion. She has been using her inhaler more frequently with no relief of her asthmatic bronchitis symptoms. ROS:   Pertinent positives and negatives are stated within HPI, all other systems reviewed and are negative.  --------------------------------------------- PAST HISTORY ---------------------------------------------  Past Medical History:  has a past medical history of Asthma, Bronchitis, Movement disorder, [facility] disorder ([facility] Se[facility]ure disorder ([facility] Tobacco abuse, and Tobacco abuse. Past Surgical History:  has a past surgical history that includes fracture surgery; Cholecystectomy; Rotator cuff repair; and Upper gastrointestinal endoscopy (N/A, 5/3/2021). Social History:  reports that she has been smoking cigarettes. She has a 7.50 pack-year smoking history. She has never used smokeless tobacco. She reports current alcohol use. She reports that she does not use drugs. Family History: family history includes Cancer in her mother. The patients home medications have been reviewed.     Allergies: Tramadol, Darvocet [propoxyphene n-acetaminophen], Ibuprofen, Ketorolac, Meloxicam, and Naproxen    -------------------------------------------------- RESULTS -------------------------------------------------  All laboratory and radiology results have been personally reviewed by myself   LABS:  Results for orders placed or performed during the hospital encounter of 06/19/21   COVID-19, Rapid    Specimen: Nasopharyngeal Swab; Nasal   Result Value Ref Range    SARS-CoV-2, NAAT Not Detected Not Detected       RADIOLOGY:  Interpreted by Radiologist.  XR CHEST (2 VW)   Final Result   No acute process. XR SHOULDER RIGHT (MIN 2 VIEWS)   Final Result   No acute abnormality.             ------------------------- NURSING NOTES AND VITALS REVIEWED ---------------------------   The nursing notes within the ED encounter and vital signs as below have been reviewed. BP (!) 193/89   Pulse 94   Temp 98 °F (36.7 °C) (Temporal)   Resp 14   Ht 4' 11\" (1.499 m)   Wt 150 lb (68 kg)   LMP 06/09/2021 (Exact Date)   SpO2 99%   BMI 30.30 kg/m²   Oxygen Saturation Interpretation: Normal      ---------------------------------------------------PHYSICAL EXAM--------------------------------------      Constitutional/General: Alert and oriented x3, well appearing, non toxic in NAD  Head: NC/AT  Eyes: PERRL, EOMI  Mouth: Oropharynx clear, handling secretions, no trismus  Neck: Supple, full ROM, no meningeal signs  Pulmonary: Lungs clear to auscultation bilaterally, Mild JEAN wheezes, NO  rales, or rhonchi. Not in respiratory distress  Cardiovascular:  Regular rate and rhythm, no murmurs, gallops, or rubs. 2+ distal pulses  Abdomen: Soft, non tender, non distended,   Extremities: Moves all extremities x 4. Warm and well perfused, patient has full range of motion of the right shoulder. Sensations intact there is no focal deficits of the right arm. Skin: warm and dry without rash  Neurologic: GCS 15, 5 out of 5 motor strength.    Psych: Normal Affect      ------------------------------ ED COURSE/MEDICAL DECISION MAKING----------------------  Medications   ipratropium-albuterol (DUONEB) nebulizer solution 1 ampule (1 ampule Inhalation Given 6/19/21 0954)   predniSONE (DELTASONE) tablet 60 mg (60 mg Oral Given 6/19/21 0906)   guaiFENesin-dextromethorphan (ROBITUSSIN DM) 100-10 MG/5ML syrup 5 mL (5 mLs Oral Given 6/19/21 0907)   HYDROcodone-acetaminophen (NORCO) 5-325 MG per tablet 1 tablet (1 tablet Oral Given 6/19/21 0906)         Medical Decision Making:    Chest x-ray, DuoNeb treatments, antitussives, oral prednisone she was given Norco for pain control. X-rays of the chest and shoulder are pending. Anticipate discharge home after conservative treatment. ED Course as of Jun 19 1101   Sat Jun 19, 2021   1021 Patient's lung sounds better after DuoNeb treatments. Still complains of shoulder pain despite Norco tablet. Patient's cough is improved with Robitussin. [JN]      ED Course User Index  [JN] Madhavi Cotter DO       Counseling: The emergency provider has spoken with the patient and discussed todays results, in addition to providing specific details for the plan of care and counseling regarding the diagnosis and prognosis. Questions are answered at this time and they are agreeable with the plan.      --------------------------------- IMPRESSION AND DISPOSITION ---------------------------------    IMPRESSION  1. Mild intermittent asthmatic bronchitis with acute exacerbation Stable   2.  Chronic right shoulder pain Not Improved       DISPOSITION  Disposition: Discharge to home  Patient condition is stable                Madhavi Cotter DO  06/19/21 1101

## 2021-07-01 ENCOUNTER — HOSPITAL ENCOUNTER (EMERGENCY)
Age: 41
Discharge: HOME | End: 2021-07-01
Payer: MEDICAID

## 2021-07-01 ENCOUNTER — APPOINTMENT (OUTPATIENT)
Dept: CT IMAGING | Age: 41
End: 2021-07-01
Payer: MEDICAID

## 2021-07-01 ENCOUNTER — APPOINTMENT (OUTPATIENT)
Dept: GENERAL RADIOLOGY | Age: 41
End: 2021-07-01
Payer: MEDICAID

## 2021-07-01 VITALS
TEMPERATURE: 98 F | HEART RATE: 85 BPM | OXYGEN SATURATION: 98 % | SYSTOLIC BLOOD PRESSURE: 160 MMHG | DIASTOLIC BLOOD PRESSURE: 96 MMHG | RESPIRATION RATE: 14 BRPM | BODY MASS INDEX: 32.25 KG/M2 | HEIGHT: 59 IN | WEIGHT: 160 LBS

## 2021-07-01 DIAGNOSIS — R74.8 ELEVATED ALKALINE PHOSPHATASE LEVEL: ICD-10-CM

## 2021-07-01 DIAGNOSIS — M25.512 ACUTE PAIN OF LEFT SHOULDER: ICD-10-CM

## 2021-07-01 DIAGNOSIS — I10 HYPERTENSION, UNSPECIFIED TYPE: ICD-10-CM

## 2021-07-01 DIAGNOSIS — N63.20 LEFT BREAST MASS: Primary | ICD-10-CM

## 2021-07-01 LAB
ALBUMIN SERPL-MCNC: 3.9 G/DL (ref 3.5–5.2)
ALP BLD-CCNC: 125 U/L (ref 35–104)
ALT SERPL-CCNC: 14 U/L (ref 0–32)
ANION GAP SERPL CALCULATED.3IONS-SCNC: 10 MMOL/L (ref 7–16)
AST SERPL-CCNC: 17 U/L (ref 0–31)
BASOPHILS ABSOLUTE: 0.02 E9/L (ref 0–0.2)
BASOPHILS RELATIVE PERCENT: 0.2 % (ref 0–2)
BILIRUB SERPL-MCNC: 0.4 MG/DL (ref 0–1.2)
BUN BLDV-MCNC: 12 MG/DL (ref 6–20)
CALCIUM SERPL-MCNC: 9.8 MG/DL (ref 8.6–10.2)
CHLORIDE BLD-SCNC: 103 MMOL/L (ref 98–107)
CO2: 24 MMOL/L (ref 22–29)
CREAT SERPL-MCNC: 0.9 MG/DL (ref 0.5–1)
EOSINOPHILS ABSOLUTE: 0.16 E9/L (ref 0.05–0.5)
EOSINOPHILS RELATIVE PERCENT: 1.6 % (ref 0–6)
GFR AFRICAN AMERICAN: >60
GFR NON-AFRICAN AMERICAN: >60 ML/MIN/1.73
GLUCOSE BLD-MCNC: 107 MG/DL (ref 74–99)
HCG, URINE, POC: NEGATIVE
HCT VFR BLD CALC: 40.9 % (ref 34–48)
HEMOGLOBIN: 13.4 G/DL (ref 11.5–15.5)
IMMATURE GRANULOCYTES #: 0.04 E9/L
IMMATURE GRANULOCYTES %: 0.4 % (ref 0–5)
LACTIC ACID: 0.8 MMOL/L (ref 0.5–2.2)
LYMPHOCYTES ABSOLUTE: 1.66 E9/L (ref 1.5–4)
LYMPHOCYTES RELATIVE PERCENT: 16.9 % (ref 20–42)
Lab: NORMAL
MCH RBC QN AUTO: 26.9 PG (ref 26–35)
MCHC RBC AUTO-ENTMCNC: 32.8 % (ref 32–34.5)
MCV RBC AUTO: 82 FL (ref 80–99.9)
MONOCYTES ABSOLUTE: 0.67 E9/L (ref 0.1–0.95)
MONOCYTES RELATIVE PERCENT: 6.8 % (ref 2–12)
NEGATIVE QC PASS/FAIL: NORMAL
NEUTROPHILS ABSOLUTE: 7.3 E9/L (ref 1.8–7.3)
NEUTROPHILS RELATIVE PERCENT: 74.1 % (ref 43–80)
PDW BLD-RTO: 14.2 FL (ref 11.5–15)
PLATELET # BLD: 235 E9/L (ref 130–450)
PMV BLD AUTO: 9.3 FL (ref 7–12)
POSITIVE QC PASS/FAIL: NORMAL
POTASSIUM REFLEX MAGNESIUM: 3.8 MMOL/L (ref 3.5–5)
RBC # BLD: 4.99 E12/L (ref 3.5–5.5)
SODIUM BLD-SCNC: 137 MMOL/L (ref 132–146)
TOTAL PROTEIN: 7.9 G/DL (ref 6.4–8.3)
WBC # BLD: 9.9 E9/L (ref 4.5–11.5)

## 2021-07-01 PROCEDURE — 2580000003 HC RX 258: Performed by: PHYSICIAN ASSISTANT

## 2021-07-01 PROCEDURE — 71046 X-RAY EXAM CHEST 2 VIEWS: CPT

## 2021-07-01 PROCEDURE — 96375 TX/PRO/DX INJ NEW DRUG ADDON: CPT

## 2021-07-01 PROCEDURE — 80053 COMPREHEN METABOLIC PANEL: CPT

## 2021-07-01 PROCEDURE — 99284 EMERGENCY DEPT VISIT MOD MDM: CPT

## 2021-07-01 PROCEDURE — 96374 THER/PROPH/DIAG INJ IV PUSH: CPT

## 2021-07-01 PROCEDURE — 6360000004 HC RX CONTRAST MEDICATION: Performed by: RADIOLOGY

## 2021-07-01 PROCEDURE — 71260 CT THORAX DX C+: CPT

## 2021-07-01 PROCEDURE — 83605 ASSAY OF LACTIC ACID: CPT

## 2021-07-01 PROCEDURE — 85025 COMPLETE CBC W/AUTO DIFF WBC: CPT

## 2021-07-01 PROCEDURE — 73030 X-RAY EXAM OF SHOULDER: CPT

## 2021-07-01 PROCEDURE — 6360000002 HC RX W HCPCS: Performed by: PHYSICIAN ASSISTANT

## 2021-07-01 RX ORDER — FENTANYL CITRATE 50 UG/ML
25 INJECTION, SOLUTION INTRAMUSCULAR; INTRAVENOUS ONCE
Status: COMPLETED | OUTPATIENT
Start: 2021-07-01 | End: 2021-07-01

## 2021-07-01 RX ORDER — 0.9 % SODIUM CHLORIDE 0.9 %
1000 INTRAVENOUS SOLUTION INTRAVENOUS ONCE
Status: COMPLETED | OUTPATIENT
Start: 2021-07-01 | End: 2021-07-01

## 2021-07-01 RX ORDER — MORPHINE SULFATE 4 MG/ML
4 INJECTION, SOLUTION INTRAMUSCULAR; INTRAVENOUS ONCE
Status: COMPLETED | OUTPATIENT
Start: 2021-07-01 | End: 2021-07-01

## 2021-07-01 RX ORDER — DOXYCYCLINE HYCLATE 100 MG
100 TABLET ORAL 2 TIMES DAILY
Qty: 20 TABLET | Refills: 0 | Status: SHIPPED | OUTPATIENT
Start: 2021-07-01 | End: 2021-07-11

## 2021-07-01 RX ORDER — NAPROXEN 500 MG/1
500 TABLET ORAL 2 TIMES DAILY
Qty: 14 TABLET | Refills: 0 | Status: SHIPPED | OUTPATIENT
Start: 2021-07-01 | End: 2022-10-28 | Stop reason: ALTCHOICE

## 2021-07-01 RX ADMIN — SODIUM CHLORIDE 1000 ML: 9 INJECTION, SOLUTION INTRAVENOUS at 16:57

## 2021-07-01 RX ADMIN — MORPHINE SULFATE 4 MG: 4 INJECTION, SOLUTION INTRAMUSCULAR; INTRAVENOUS at 17:29

## 2021-07-01 RX ADMIN — IOPAMIDOL 75 ML: 755 INJECTION, SOLUTION INTRAVENOUS at 18:09

## 2021-07-01 RX ADMIN — FENTANYL CITRATE 25 MCG: 50 INJECTION, SOLUTION INTRAMUSCULAR; INTRAVENOUS at 19:48

## 2021-07-01 ASSESSMENT — PAIN SCALES - GENERAL
PAINLEVEL_OUTOF10: 10

## 2021-07-01 NOTE — ED NOTES
Bed: 07  Expected date:   Expected time:   Means of arrival:   Comments:  EMS     Melo Alonzo RN  07/01/21 9935

## 2021-07-01 NOTE — ED PROVIDER NOTES
[address] [place] [address]  Department of Emergency Medicine   ED  Encounter Note  Admit Date/RoomTime: 2021  4:02 PM  ED Room:     NAME: Sulaiman Gregory  : 1980  MRN: [id number]     Chief Complaint:  Breast Mass (left side x 3 days)    HISTORY OF PRESENT ILLNESS        Sulaiman Gregory is a 45 y.o. female who presents to the ED by private vehicle for pain to L nipple, beginning 3 days ago. The complaint has been remaining constant and are moderate in severity. She states that 3 days ago she started having left nipple pain. She also states that she has been sweating some. She states the pain is constant. She states that it radiates to her left shoulder. She states that she was in the shower yesterday and noticed a mass behind her left nipple. She states that it hurts whenever you touch it or move her shoulder. She denies any drainage from the nipple or rash. She denies any symptoms to the right breast.  She states that her grandma has a history of breast cancer but denies any personal history of breast cancer. She has never had kids. She denies ever breastfeeding. She states the pain is aggravated by palpation and relieved by nothing. She denies any hx of similar sxs. Pt denies fever, chills, NVD, abdominal pain, urinary sxs, CP, SOB, cough, pain with inspiration. Denies weight loss or personal hx of cancer. She does not have a gynecologist. Pt is a smoker. ROS   Pertinent positives and negatives are stated within HPI, all other systems reviewed and are negative. Past Medical History:  has a past medical history of Asthma, Bronchitis, Movement disorder, Seizure disorder ([address] Seizure disorder ([address] Suicidal overdose ([address] Tobacco abuse, and Tobacco abuse. Surgical History:  has a past surgical history that includes fracture surgery; Cholecystectomy; Rotator cuff repair; and Upper gastrointestinal endoscopy (N/A, 5/3/2021).     Social History:  reports that she has been smoking cigarettes. She has a 7.50 pack-year smoking history. She has never used smokeless tobacco. She reports current alcohol use. She reports that she does not use drugs. Family History: family history includes Cancer in her mother. Allergies: Tramadol, Darvocet [propoxyphene n-acetaminophen], Ibuprofen, Ketorolac, Meloxicam, and Naproxen    PHYSICAL EXAM   Oxygen Saturation Interpretation: Normal on room air analysis. ED Triage Vitals [07/01/21 1606]   BP Temp Temp src Pulse Resp SpO2 Height Weight   (!) 182/107 97.9 °F (36.6 °C) -- 97 16 98 % 4' 11\" (1.499 m) 160 lb (72.6 kg)       Vitals:    07/01/21 1606 07/01/21 2102 07/01/21 2143   BP: (!) 182/107 (!) 182/95 (!) 160/96   Pulse: 97 85    Resp: 16 14    Temp: 97.9 °F (36.6 °C) 98 °F (36.7 °C)    TempSrc:  Oral    SpO2: 98% 98%    Weight: 160 lb (72.6 kg)     Height: 4' 11\" (1.499 m)         Physical Exam  Constitutional/General: Alert and oriented x3, well appearing, non toxic. HEENT:  NC/NT. PERRLA,  Airway patent. Neck: Supple, full ROM, non tender to palpation in the midline, no stridor, no crepitus, no meningeal signs  Respiratory: Rhonchi throughout all lung fields; occasional expiratory wheezing in multiple lung fields; no stridor; Not in respiratory distress  CV:  Regular rate. Regular rhythm. No murmurs, gallops, or rubs. 2+ distal pulses  Breasts: TTP to L nipple with 1 cm in diameter round, hard, mobile mass noted under nipple and superficial; palpating L nipple reproduces the pain; no other masses noted to L breast; No rash or drainage from nipples or to breasts bilaterally; R breast exam WNL; no bilateral axillary lymphadenopathy noted  Chest: No chest wall tenderness  GI:  Abdomen Soft, Non tender, Non distended. +BS. No rebound, guarding, or rigidity. No pulsatile masses. Musculoskeletal: Pain with ROM of L shoulder; Moves all extremities x 4.  Strength 5/5 to BUE; sensation intact to BUE; radial pulses 2+ bilaterally; Warm and well perfused, no clubbing, cyanosis, or edema. Capillary refill <3 seconds  Integument: skin warm and dry. No rashes.    Lymphatic: no lymphadenopathy noted  Neurologic: GCS 15, no focal deficits, symmetric strength 5/5 in the upper and lower extremities bilaterally  Psychiatric: Normal Affect    Lab / Imaging Results   (All laboratory and radiology results have been personally reviewed by myself)  Labs:  Results for orders placed or performed during the hospital encounter of 07/01/21   CBC Auto Differential   Result Value Ref Range    WBC 9.9 4.5 - 11.5 E9/L    RBC 4.99 3.50 - 5.50 E12/L    Hemoglobin 13.4 11.5 - 15.5 g/dL    Hematocrit 40.9 34.0 - 48.0 %    MCV 82.0 80.0 - 99.9 fL    MCH 26.9 26.0 - 35.0 pg    MCHC 32.8 32.0 - 34.5 %    RDW 14.2 11.5 - 15.0 fL    Platelets 394 043 - 329 E9/L    MPV 9.3 7.0 - 12.0 fL    Neutrophils % 74.1 43.0 - 80.0 %    Immature Granulocytes % 0.4 0.0 - 5.0 %    Lymphocytes % 16.9 (L) 20.0 - 42.0 %    Monocytes % 6.8 2.0 - 12.0 %    Eosinophils % 1.6 0.0 - 6.0 %    Basophils % 0.2 0.0 - 2.0 %    Neutrophils Absolute 7.30 1.80 - 7.30 E9/L    Immature Granulocytes # 0.04 E9/L    Lymphocytes Absolute 1.66 1.50 - 4.00 E9/L    Monocytes Absolute 0.67 0.10 - 0.95 E9/L    Eosinophils Absolute 0.16 0.05 - 0.50 E9/L    Basophils Absolute 0.02 0.00 - 0.20 E9/L   Comprehensive Metabolic Panel w/ Reflex to MG   Result Value Ref Range    Sodium 137 132 - 146 mmol/L    Potassium reflex Magnesium 3.8 3.5 - 5.0 mmol/L    Chloride 103 98 - 107 mmol/L    CO2 24 22 - 29 mmol/L    Anion Gap 10 7 - 16 mmol/L    Glucose 107 (H) 74 - 99 mg/dL    BUN 12 6 - 20 mg/dL    CREATININE 0.9 0.5 - 1.0 mg/dL    GFR Non-African American >60 >=60 mL/min/1.73    GFR African American >60     Calcium 9.8 8.6 - 10.2 mg/dL    Total Protein 7.9 6.4 - 8.3 g/dL    Albumin 3.9 3.5 - 5.2 g/dL    Total Bilirubin 0.4 0.0 - 1.2 mg/dL    Alkaline Phosphatase 125 (H) 35 - 104 U/L    ALT 14 0 - 32 U/L    AST 17 0 - 31 U/L   LACTIC ACID, PLASMA   Result Value Ref Range    Lactic Acid 0.8 0.5 - 2.2 mmol/L   POC Pregnancy Urine Qual   Result Value Ref Range    HCG, Urine, POC Negative Negative    Lot Number [id number]     Positive QC Pass/Fail Pass     Negative QC Pass/Fail Pass      Imaging: All Radiology results interpreted by Radiologist unless otherwise noted. CT CHEST W CONTRAST   Final Result   The left nipple is clinically included on the images. Possible prominent   subareolar tissue left breast.  This can be further characterized with   ultrasound. Nonspecific areas of ground-glass appearance involving both lower lobes right   greater than left. Findings may represent infectious or inflammatory process. XR SHOULDER LEFT (MIN 2 VIEWS)   Final Result   No acute abnormality. XR CHEST (2 VW)   Final Result   No acute process. ED Course / Medical Decision Making     Medications   0.9 % sodium chloride bolus (0 mLs Intravenous Stopped 7/1/21 1815)   morphine sulfate (PF) injection 4 mg (4 mg Intravenous Given 7/1/21 1729)   iopamidol (ISOVUE-370) 76 % injection 75 mL (75 mLs Intravenous Given 7/1/21 1809)   fentaNYL (SUBLIMAZE) injection 25 mcg (25 mcg Intravenous Given 7/1/21 1948)     Consultations:             [address] TO GENERAL SURGERY  Time: 2110  Dr. Shavon Huerta states that he would just refer her to general surgery and that we should touch base with them. Time: 2140  I spoke with Dr. Hector Goldstein. He agrees with plan for doxycycline and NSAIDs and states she can call his office to follow-up. Procedures:   none    MDM:  Pt presents with L nipple pain/mass. No CP/SOB. Pain is reproducible when L nipple palpated. PE shows hard, mobile mass. Pain is reproduced with movement of L shoulder. No cardiac sxs at this time. Pain is all reproducible. Labs/imaging ordered and reviewed above. No abscess on imaging.  Resident (Dr. Dangelo Lang) performed bedside ultrasound and states that he did not see an abscess. NO systemic sxs. Labs WNL. Vitals WNL. Spoke with general surgery. They agree with plan. Pt told about lab/imaging findings. Will treat with doxycycline for possible start of infection/abscess and have her follow-up with general surgery. Pt not having any cough, SOB, or URI sxs. She is a smoker which is most likely cause of ground glass opacities/chronic changes on CT and rhonchi. I did show this to Dr. Delos Dance and she agrees no need to treat for PNA at this time. All questions answered. Patient agreeable with the plan. Patient is in no acute distress, non-toxic, and appropriate for outpatient management. Pt educated on reasons to return to the ER, including need to return for new or worsening symptoms. Plan of Care/Counseling:  SOLOMON JACINTO PA-C reviewed today's visit with the patient in addition to providing specific details for the plan of care and counseling regarding the diagnosis and prognosis. Questions are answered at this time and are agreeable with the plan. ASSESSMENT     1. Left breast mass    2. Elevated alkaline phosphatase level    3. Hypertension, unspecified type    4. Acute pain of left shoulder      This patient's ED course included: a personal history and physicial examination and re-evaluation prior to disposition  This patient has improved during their ED course. PLAN   Discharged home. Patient condition is good. New Medications     Discharge Medication List as of 7/1/2021  9:43 PM      START taking these medications    Details   doxycycline hyclate (VIBRA-TABS) 100 MG tablet Take 1 tablet by mouth 2 times daily for 10 days May substitute for Doxycycline Monohydrate, Disp-20 tablet, R-0Print      naproxen (NAPROSYN) 500 MG tablet Take 1 tablet by mouth 2 times daily for 7 days, Disp-14 tablet, R-0Print           Electronically signed by Emkea Duarte PA-C   DD: 7/1/21  **This report was transcribed using voice recognition software.

## 2021-07-07 ENCOUNTER — OFFICE VISIT (OUTPATIENT)
Dept: SURGERY | Age: 41
End: 2021-07-07
Payer: MEDICAID

## 2021-07-07 ENCOUNTER — TELEPHONE (OUTPATIENT)
Dept: SURGERY | Age: 41
End: 2021-07-07

## 2021-07-07 VITALS
SYSTOLIC BLOOD PRESSURE: 180 MMHG | HEART RATE: 81 BPM | RESPIRATION RATE: 18 BRPM | HEIGHT: 59 IN | OXYGEN SATURATION: 98 % | BODY MASS INDEX: 30.68 KG/M2 | WEIGHT: 152.2 LBS | TEMPERATURE: 97.9 F | DIASTOLIC BLOOD PRESSURE: 90 MMHG

## 2021-07-07 DIAGNOSIS — N63.0 BREAST MASS IN FEMALE: Primary | ICD-10-CM

## 2021-07-07 PROCEDURE — 99204 OFFICE O/P NEW MOD 45 MIN: CPT | Performed by: SURGERY

## 2021-07-07 PROCEDURE — G8417 CALC BMI ABV UP PARAM F/U: HCPCS | Performed by: SURGERY

## 2021-07-07 PROCEDURE — G8427 DOCREV CUR MEDS BY ELIG CLIN: HCPCS | Performed by: SURGERY

## 2021-07-07 PROCEDURE — 4004F PT TOBACCO SCREEN RCVD TLK: CPT | Performed by: SURGERY

## 2021-07-07 RX ORDER — IBUPROFEN 600 MG/1
600 TABLET ORAL 3 TIMES DAILY PRN
Qty: 90 TABLET | Refills: 0 | Status: SHIPPED
Start: 2021-07-07 | End: 2021-08-01

## 2021-07-07 RX ORDER — CLINDAMYCIN HYDROCHLORIDE 300 MG/1
300 CAPSULE ORAL 4 TIMES DAILY
Qty: 40 CAPSULE | Refills: 0 | Status: SHIPPED | OUTPATIENT
Start: 2021-07-07 | End: 2021-07-17

## 2021-07-07 NOTE — TELEPHONE ENCOUNTER
Patient in ED 7/1/21 LEFT breast mass. Patient called in complaining of pain in Left breast, yellowish discharge mixed with blood. Purple/black color noted around breast. States pain meds given in ED without relief. Patient placed on schedule today as instructed by MA.

## 2021-07-09 ENCOUNTER — HOSPITAL ENCOUNTER (EMERGENCY)
Age: 41
Discharge: HOME | End: 2021-07-09
Payer: MEDICAID

## 2021-07-09 ENCOUNTER — APPOINTMENT (OUTPATIENT)
Dept: GENERAL RADIOLOGY | Age: 41
End: 2021-07-09
Payer: MEDICAID

## 2021-07-09 VITALS
OXYGEN SATURATION: 98 % | SYSTOLIC BLOOD PRESSURE: 163 MMHG | WEIGHT: 160 LBS | RESPIRATION RATE: 14 BRPM | BODY MASS INDEX: 32.25 KG/M2 | DIASTOLIC BLOOD PRESSURE: 97 MMHG | HEART RATE: 78 BPM | TEMPERATURE: 98.2 F | HEIGHT: 59 IN

## 2021-07-09 DIAGNOSIS — S43.401A SPRAIN OF RIGHT SHOULDER, UNSPECIFIED SHOULDER SPRAIN TYPE, INITIAL ENCOUNTER: Primary | ICD-10-CM

## 2021-07-09 DIAGNOSIS — S70.01XA CONTUSION OF RIGHT HIP, INITIAL ENCOUNTER: ICD-10-CM
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
## 2021-07-09 DIAGNOSIS — M16.11 ARTHRITIS OF RIGHT HIP: ICD-10-CM

## 2021-07-09 LAB
HCG, URINE, POC: NEGATIVE
Lab: NORMAL
NEGATIVE QC PASS/FAIL: NORMAL
POSITIVE QC PASS/FAIL: NORMAL

## 2021-07-09 PROCEDURE — 73030 X-RAY EXAM OF SHOULDER: CPT

## 2021-07-09 PROCEDURE — 73502 X-RAY EXAM HIP UNI 2-3 VIEWS: CPT

## 2021-07-09 PROCEDURE — 73552 X-RAY EXAM OF FEMUR 2/>: CPT

## 2021-07-09 PROCEDURE — 99285 EMERGENCY DEPT VISIT HI MDM: CPT

## 2021-07-09 PROCEDURE — 73080 X-RAY EXAM OF ELBOW: CPT

## 2021-07-09 PROCEDURE — 73110 X-RAY EXAM OF WRIST: CPT

## 2021-07-09 PROCEDURE — 6370000000 HC RX 637 (ALT 250 FOR IP): Performed by: NURSE PRACTITIONER

## 2021-07-09 RX ORDER — HYDROCODONE BITARTRATE AND ACETAMINOPHEN 5; 325 MG/1; MG/1
1 TABLET ORAL EVERY 8 HOURS PRN
Qty: 9 TABLET | Refills: 0 | Status: SHIPPED | OUTPATIENT
Start: 2021-07-09 | End: 2021-07-12

## 2021-07-09 RX ORDER — HYDROCODONE BITARTRATE AND ACETAMINOPHEN 5; 325 MG/1; MG/1
1 TABLET ORAL ONCE
Status: COMPLETED | OUTPATIENT
Start: 2021-07-09 | End: 2021-07-09

## 2021-07-09 RX ORDER — METHYLPREDNISOLONE 4 MG/1
TABLET ORAL
Qty: 1 KIT | Status: SHIPPED | OUTPATIENT
Start: 2021-07-09 | End: 2021-07-15

## 2021-07-09 RX ADMIN — HYDROCODONE BITARTRATE AND ACETAMINOPHEN 1 TABLET: 5; 325 TABLET ORAL at 11:14

## 2021-07-09 ASSESSMENT — ENCOUNTER SYMPTOMS
COUGH: 0
CONSTIPATION: 0
SHORTNESS OF BREATH: 0
DIARRHEA: 0
EYE REDNESS: 0
ABDOMINAL PAIN: 0
BLOOD IN STOOL: 0
NAUSEA: 0
VOMITING: 0
SORE THROAT: 0
BACK PAIN: 0
WHEEZING: 0
PHOTOPHOBIA: 0

## 2021-07-09 ASSESSMENT — PAIN DESCRIPTION - LOCATION: LOCATION: SHOULDER;HIP

## 2021-07-09 ASSESSMENT — PAIN DESCRIPTION - DESCRIPTORS: DESCRIPTORS: ACHING

## 2021-07-09 ASSESSMENT — PAIN DESCRIPTION - ORIENTATION: ORIENTATION: RIGHT

## 2021-07-09 ASSESSMENT — PAIN DESCRIPTION - FREQUENCY: FREQUENCY: CONTINUOUS

## 2021-07-09 ASSESSMENT — PAIN SCALES - GENERAL
PAINLEVEL_OUTOF10: 10
PAINLEVEL_OUTOF10: 10

## 2021-07-09 NOTE — ED PROVIDER NOTES
[facility]  Department of Emergency Medicine   ED  Encounter Note  Admit Date/RoomTime: 2021 10:47 AM  ED Room:     NAME: Jenna Harper  : 1980  MRN: [id number]     Chief Complaint:  Shoulder Pain (slip and fall down steps yesterday. Denies LOC/thinners. Pt reports right shoulder/hip pain.) and Hip Pain    History of Present Illness       Jenna Harper is a 45 y.o. old female who presents to the emergency department by ambulance, for a mechanical fall which occured at 6 PM yesterday 1 day prior to arrival.. She reportedly tripped while walking down her steps and landed on her right shoulder and right hip and is guarding all movements to the right arm. Patient denied any head injury, loss of consciousness, neck pain, chest pain, abdominal pain, nausea or vomiting. The patients tetanus status is unknown and denies any abrasions or lacerations. Since onset the symptoms have been persistent and gradually worsening. Her pain is aggraveated by any movement, any use of, certain movements, pressure on or palpation of painful area or weightbearing and relieved by nothing and OTC NSAIDS. She denies any confusion, dizziness, numbness, weakness or blurred vision. She takes no blood thinning agents. ROS   Pertinent positives and negatives are stated within HPI, all other systems reviewed and are negative. Past Medical History:  has a past medical history of Asthma, Bronchitis, Movement disorder, Seizure disorder ([address] Seizure disorder ([address] Suicidal overdose ([address] Tobacco abuse, and Tobacco abuse. Surgical History:  has a past surgical history that includes fracture surgery; Cholecystectomy; Rotator cuff repair; and Upper gastrointestinal endoscopy (N/A, 5/3/2021). Social History:  reports that she has been smoking cigarettes. She has a 0.30 pack-year smoking history. She has never used smokeless tobacco. She reports current alcohol use. She reports that she does not use drugs. Family History: family history includes Cancer in her mother. Allergies: Tramadol, Darvocet [propoxyphene n-acetaminophen], Ibuprofen, Ketorolac, Meloxicam, and Naproxen    Physical Exam   Oxygen Saturation Interpretation: Normal.        ED Triage Vitals   BP Temp Temp src Pulse Resp SpO2 Height Weight   176/119  98.2 Fahrenheit -- 78  14  98% -- --         Physical Exam  Constitutional:  Alert, development consistent with age. HEENT:  NC/NT. Airway patent. No raccoons or tarango sign noted. No facial bony tenderness. PERRLA 4/3. No oral abrasions or loose dentition. Neck:  No midline or paravertebral tenderness to firm palpation. Normal ROM. Supple. Chest:  Symmetrical without visible rash or tenderness. Respiratory:  Lungs Clear to auscultation and breath sounds equal.  CV:  Regular rate and rhythm, normal heart sounds, without pathological murmurs, ectopy, gallops, or rubs. GI:  Abdomen Soft, nontender, good bowel sounds. No firm or pulsatile mass. Pelvis:  Stable, nontender to palpation. Back:  No midline or paravertebral tenderness in the thoracic,  lumbar spine or sacral spine. No flank ecchymosis. .  No costovertebral tenderness. Extremities: No tenderness or edema noted. Lateral hip tenderness with no ecchymosis and no shortening of the leg. 2+ pedal and posterior tibial pulses intact. Full range of motion of fingers tenderness to the right wrist elbow and right shoulder with no deformity noted. All compartments are soft and compressible. Capillary refill less than 3 seconds in distal extremities. No snuffbox tenderness. Full range of motion of all fingers with flexion extension and capillary refill less than 3 seconds in distal fingers. Integument:  Normal turgor. Warm, dry, without visible rash, unless noted elsewhere. Lymphatic: no lymphadenopathy noted  Neurological:  Oriented x3, GCS 15. Motor functions intact.   Cranial nerves II through XII grossly intact. Lab / Imaging Results   (All laboratory and radiology results have been personally reviewed by myself)  Labs:  Results for orders placed or performed during the hospital encounter of 07/09/21   POC Pregnancy Urine Qual   Result Value Ref Range    HCG, Urine, POC Negative Negative    Lot Number [id number]     Positive QC Pass/Fail Acceptable     Negative QC Pass/Fail Acceptable        Imaging: All Radiology results interpreted by Radiologist unless otherwise noted. XR HIP 2-3 VW W PELVIS RIGHT   Final Result   No acute fracture or dislocation. Severe osteoarthritis at the right hip. XR FEMUR RIGHT (MIN 2 VIEWS)   Final Result   1. There is no fracture of the right femur   2. Advanced degenerative changes of the right hip. XR SHOULDER RIGHT (MIN 2 VIEWS)   Final Result   No acute fracture or dislocation. Severe osteoarthritis at the right hip. XR ELBOW RIGHT (MIN 3 VIEWS)   Final Result   No acute fracture or dislocation. Severe osteoarthritis at the right hip. XR WRIST RIGHT (MIN 3 VIEWS)   Final Result   No acute fracture or dislocation. Severe osteoarthritis at the right hip. ED Course / Medical Decision Making     Medications   HYDROcodone-acetaminophen (NORCO) 5-325 MG per tablet 1 tablet (1 tablet Oral Given 7/9/21 1114)        Re-examination:  7/9/21     Time: 1034 discussed findings of x-rays with patient. Will ambulate. Patients symptoms are improving. 1340 patient ambulated without any difficulty and advised on signs and symptoms warranting immediate return to the ED for reevaluation. Advised that she will only be getting a 3-day course of pain medication she will have to follow-up with chronic pain management or her PCP for further medications. .     Consult(s):   None    Procedure(s):  None    MDM:   This is a 42-year-old female who arrived today by ambulance for complaints of right shoulder and right hip pain that occurred 1 day prior to arrival after falling down several steps. She denied any head injury, loss of consciousness or any alcohol use. Patient is right-hand dominant. X-ray of the shoulder, arm and wrist were negative for any acute findings. X-ray of the hip revealed no acute fracture but did reveal severe osteoarthrosis of the right hip. Patient will be given referral to orthopedic surgeon she is unable to fully raise her right arm above her head or touch the opposite shoulder. Patient placed in a sling for symptomatic relief and advised to take her arm out every 4 hours to do pendulum swings and rotations not to get a frozen joint. Patient will be given short course of narcotic for pain management and advised not to drink alcohol, drive or operate heavy equipment while taking narcotic medications. Controlled Substance Monitoring:    Acute and Chronic Pain Monitoring:   RX Monitoring 7/9/2021   Periodic Controlled Substance Monitoring Possible medication side effects, risk of tolerance/dependence & alternative treatments discussed. ;No signs of potential drug abuse or diversion identified. Plan of Care/Counseling:  MARK Osborne CNP reviewed today's visit with the patient in addition to providing specific details for the plan of care and counseling regarding the diagnosis and prognosis. Questions are answered at this time and are agreeable with the plan. Assessment      1. Sprain of right shoulder, unspecified shoulder sprain type, initial encounter    2. Contusion of right hip, initial encounter    3. Fall down steps, initial encounter    4. Arthritis of right hip      Plan   Discharged home. Patient condition is good    New Medications     New Prescriptions    HYDROCODONE-ACETAMINOPHEN (NORCO) 5-325 MG PER TABLET    Take 1 tablet by mouth every 8 hours as needed for Pain for up to 3 days. METHYLPREDNISOLONE (MEDROL, AMANDA,) 4 MG TABLET    Take by mouth.      Electronically signed by MARK Argueta CNP   DD: 7/9/21  **This report was transcribed using voice recognition software. Every effort was made to ensure accuracy; however, inadvertent computerized transcription errors may be present.   END OF ED PROVIDER NOTE     MARK Argueta CNP  07/10/21 0021

## 2021-07-09 NOTE — PROGRESS NOTES
History and Physical    Patient's Name/Date of Birth: Johnathon León / 1980 (8 y.o.)    Date: July 9, 2021     Chief Complaint:   Chief Complaint   Patient presents with    New Patient     follow up to er visit    Breast Mass     left mass, pain yellow discharge        HPI: Patient is a 41-year-old female who complains of a left-sided breast mass. Is recently developed over the last week or so. It is very painful. She does report some yellowish or bloody discharge from her nipple. She had a CT of the chest on a recent ER visit however the left nipple was not visualized on the images. They suggested possible prominent subareolar tissue on the left breast.  She does report family history of breast cancer in 2 relatives. She has never had a mammogram.    Past Medical History:   Diagnosis Date    Asthma     Bronchitis     Movement disorder     Seizure disorder ([facility] [address]     Seizure disorder ([facility] [address]     Suicidal overdose ([address] 6/27/2014    Tobacco abuse     Tobacco abuse        Past Surgical History:   Procedure Laterality Date    CHOLECYSTECTOMY      FRACTURE SURGERY      R ANKLE TORN LIGAMENTS    ROTATOR CUFF REPAIR      UPPER GASTROINTESTINAL ENDOSCOPY N/A 5/3/2021    EGD BIOPSY performed by Sharyn Pepper MD at [address]       Prior to Admission medications    Medication Sig Start Date End Date Taking?  Authorizing Provider   ibuprofen (ADVIL;MOTRIN) 600 MG tablet Take 1 tablet by mouth 3 times daily as needed for Pain 7/7/21  Yes Declan Maria,    clindamycin (CLEOCIN) 300 MG capsule Take 1 capsule by mouth 4 times daily for 10 days 7/7/21 7/17/21 Yes Declan Maria, DO   doxycycline hyclate (VIBRA-TABS) 100 MG tablet Take 1 tablet by mouth 2 times daily for 10 days May substitute for Doxycycline Monohydrate 7/1/21 7/11/21 Yes Erika Johnston PA-C   albuterol sulfate HFA (VENTOLIN HFA) 108 (90 Base) MCG/ACT inhaler Inhale 2 puffs into the lungs 4 times daily 6/19/21  Yes Dian Cazares DO Cynthia   nicotine (NICODERM CQ) 21 MG/24HR Place 1 patch onto the skin daily 5/4/21  Yes Layne De La Garza MD   tiZANidine (ZANAFLEX) 4 MG tablet take 1 tablet by mouth every 8 hours 4/13/21  Yes Historical Provider, MD   carBAMazepine (TEGRETOL) 200 MG tablet Take 1 tablet every 12 hours by oral route with meals for 30 days. 1/9/20  Yes Historical Provider, MD   pantoprazole (PROTONIX) 40 MG tablet Take 1 tablet every day by oral route for 30 days. 8/6/19  Yes Historical Provider, MD   naproxen (NAPROSYN) 500 MG tablet Take 1 tablet by mouth 2 times daily for 7 days  Patient not taking: Reported on 7/7/2021 7/1/21 7/8/21  Kem Johnston PA-C       Allergies   Allergen Reactions    Tramadol      Patient does not remember the reaction    Darvocet [Propoxyphene N-Acetaminophen] Other (See Comments)     Dizziness    Ibuprofen Nausea And Vomiting    Ketorolac Nausea And Vomiting    Meloxicam Hives    Naproxen Nausea And Vomiting       Family History   Problem Relation Age of Onset    Cancer Mother         colon          Social History     Socioeconomic History    Marital status:      Spouse name: Not on file    Number of children: 0    Years of education: 15    Highest education level: Not on file   Occupational History     Comment: SSDI   Tobacco Use    Smoking status: Current Every Day Smoker     Packs/day: 0.02     Years: 15.00     Pack years: 0.30     Types: Cigarettes    Smokeless tobacco: Never Used    Tobacco comment: SAYS CANNOT USE PATCHES OR GUM   Vaping Use    Vaping Use: Never used   Substance and Sexual Activity    Alcohol use:  Yes     Alcohol/week: 0.0 standard drinks     Comment: occassional-WINE COOLERS    Drug use: No    Sexual activity: Yes     Partners: Male   Other Topics Concern    Not on file   Social History Narrative    Not on file     Social Determinants of Health     Financial Resource Strain:     Difficulty of Paying Living Expenses:    Food Insecurity:     Worried About [facility] in the Last Year:    [address] in the Last Year:    Transportation Needs:     Lack of Transportation (Medical):  Lack of Transportation (Non-Medical):    Physical Activity:     Days of Exercise per Week:     Minutes of Exercise per Session:    Stress:     Feeling of Stress :    Social Connections:     Frequency of Communication with Friends and Family:     Frequency of Social Gatherings with Friends and Family:     Attends Jehov[employer] witness Services:     Active Member of Clubs or Organizations:     Attends Club or Organization Meetings:     Marital Status:    Intimate Partner Violence:     Fear of Current or Ex-Partner:     Emotionally Abused:     Physically Abused:     Sexually Abused:        Review of Systems:   Review of Systems   Constitutional: Negative for chills and fever. HENT: Negative for ear pain, nosebleeds, sore throat and tinnitus. Eyes: Negative for photophobia and redness. Respiratory: Negative for cough, shortness of breath and wheezing. Cardiovascular: Negative for chest pain and palpitations. Gastrointestinal: Negative for abdominal pain, blood in stool, constipation, diarrhea, nausea and vomiting. Endocrine: Negative for polydipsia. Genitourinary: Negative for dysuria, hematuria and urgency. Musculoskeletal: Negative for back pain and neck pain. Left breast pain and swelling, nipple discharge   Skin: Negative for rash. Neurological: Negative for dizziness, tremors and seizures. Hematological: Does not bruise/bleed easily. All other systems reviewed and are negative.        Physical Exam:  Vitals:    07/07/21 1435   BP: (!) 180/90   Pulse: 81   Resp: 18   Temp: 97.9 °F (36.6 °C)   TempSrc: Temporal   SpO2: 98%   Weight: 152 lb 3.2 oz (69 kg)   Height: 4' 11\" (1.499 m)       General: Well nourished, well developed, no acute distress  Eyes:  PERRL   Conjunctiva unremarkable   ENT:  TM's intact bilaterally, no effusion   Nasal:  Nomucosal edema     No nasal drainage   Oral:  mucosa moist and pink   Neck:  Supple   No palpable cervical lymphoadenopathy   Thyroid without mass or enlargement  Resp: Lungs CTAB   Equal and adequate air exchange without accessory muscle use   No rales, rhonchi or wheeze    Breast: There is a palpable subareolar tender mass approximately 2 and half centimeters maximal diameter. I am unable to elicit any nipple discharge. There are no other palpable masses in either breast.  There is no palpable axillary lymphadenopathy on either side. CV: S1S2 RRR   No murmur   Intact distal pulses   No edema  GI: Abdomen Soft, nontender, non distended   Normoactive bowel sounds   No palpable hepatosplenomegaly  MS: Physiologic ROM of all extremities    Intact distal pulses   No clubbing or cyanosis   Skin Warmand dry; no rash or lesion  Neuro: Alert and oriented; normal and intact dtr's   Psych: Euthymic mood, congruent affect      XR CHEST (2 VW)    Result Date: 7/1/2021  EXAMINATION: TWO XRAY VIEWS OF THE CHEST 7/1/2021 4:38 pm COMPARISON: None. HISTORY: ORDERING SYSTEM PROVIDED HISTORY: L nipple pain/mass TECHNOLOGIST PROVIDED HISTORY: Reason for exam:->L nipple pain/mass FINDINGS: The lungs are without acute focal process. There is no effusion or pneumothorax. The cardiomediastinal silhouette is without acute process. The osseous structures are without acute process. No acute process. XR CHEST (2 VW)    Result Date: 6/19/2021  EXAMINATION: TWO XRAY VIEWS OF THE CHEST 6/19/2021 9:44 am COMPARISON: None. HISTORY: ORDERING SYSTEM PROVIDED HISTORY: cough TECHNOLOGIST PROVIDED HISTORY: Reason for exam:->cough FINDINGS: The lungs are without acute focal process. There is no effusion or pneumothorax. The cardiomediastinal silhouette is without acute process. The osseous structures are without acute process. No acute process.      XR SHOULDER RIGHT (MIN 2 VIEWS)    Result Date: 6/19/2021  EXAMINATION: THREE XRAY VIEWS OF THE RIGHT SHOULDER 6/19/2021 9:44 am COMPARISON: None. HISTORY: ORDERING SYSTEM PROVIDED HISTORY: [facility] pain TECHNOLOGIST PROVIDED HISTORY: Reason for exam:->chronc pain FINDINGS: Glenohumeral joint is normally aligned. No evidence of acute fracture or dislocation. No abnormal periarticular calcifications. The [facility] joint is unremarkable in appearance. Visualized lung is unremarkable. No acute abnormality. CT CHEST W CONTRAST    Result Date: 7/1/2021  EXAMINATION: CT OF THE CHEST WITH CONTRAST 7/1/2021 6:09 pm TECHNIQUE: CT of the chest was performed with the administration of intravenous contrast. Multiplanar reformatted images are provided for review. Dose modulation, iterative reconstruction, and/or weight based adjustment of the mA/kV was utilized to reduce the radiation dose to as low as reasonably achievable. COMPARISON: None. HISTORY: ORDERING SYSTEM PROVIDED HISTORY: L nipple pain; rule out abscess TECHNOLOGIST PROVIDED HISTORY: Reason for exam:->L nipple pain; rule out abscess Decision Support Exception - unselect if not a suspected or confirmed emergency medical condition->Emergency Medical Condition (MA) FINDINGS: Mediastinum: The thoracic aorta, heart and pericardium are normal.  Scattered mediastinal adenopathy. Lungs/pleura: Areas of ground-glass appearance involving the both lower lobes right greater than left. No pneumothorax or pleural effusion. Upper Abdomen: Is unremarkable. Soft Tissues/Bones: Normal osseous structures. The left nipple is not completely included on the image. Possible prominent subareolar tissue left breast.     The left nipple is clinically included on the images. Possible prominent subareolar tissue left breast.  This can be further characterized with ultrasound. Nonspecific areas of ground-glass appearance involving both lower lobes right greater than left. Findings may represent infectious or inflammatory process. XR SHOULDER LEFT (MIN 2 VIEWS)    Result Date: 7/1/2021  EXAMINATION: THREE XRAY VIEWS OF THE LEFT SHOULDER 7/1/2021 4:38 pm COMPARISON: None. HISTORY: ORDERING SYSTEM PROVIDED HISTORY: Left shoulder pain TECHNOLOGIST PROVIDED HISTORY: Reason for exam:->Left shoulder pain FINDINGS: Glenohumeral joint is normally aligned. No evidence of acute fracture or dislocation. No abnormal periarticular calcifications. The [facility] joint is unremarkable in appearance. Visualized lung is unremarkable. No acute abnormality. Assessment/Plan:  3 30-year-old female with left breast mass, likely infectious in nature. She is presently taking doxycycline. I have added clindamycin. I am also going to send her for a diagnostic mammogram and breast ultrasound. Recheck her after these are complete.         Electronically signed by Rubin White DO on 7/9/21 at 10:02 AM EDT

## 2021-07-13 ENCOUNTER — TELEPHONE (OUTPATIENT)
Dept: SURGERY | Age: 41
End: 2021-07-13

## 2021-07-13 DIAGNOSIS — N63.0 BREAST MASS IN FEMALE: ICD-10-CM

## 2021-07-13 NOTE — TELEPHONE ENCOUNTER
Ma called pt and left vm to make follow up from imaging   Electronically signed by Jj Richards MA on 7/13/21 at 9:17 AM EDT

## 2021-07-21 DIAGNOSIS — N63.0 BREAST MASS IN FEMALE: ICD-10-CM

## 2021-07-28 ENCOUNTER — HOSPITAL ENCOUNTER (EMERGENCY)
Age: 41
Discharge: HOME | End: 2021-07-28
Payer: MEDICAID

## 2021-07-28 ENCOUNTER — APPOINTMENT (OUTPATIENT)
Dept: GENERAL RADIOLOGY | Age: 41
End: 2021-07-28
Payer: MEDICAID

## 2021-07-28 VITALS
SYSTOLIC BLOOD PRESSURE: 139 MMHG | TEMPERATURE: 97.8 F | HEART RATE: 82 BPM | DIASTOLIC BLOOD PRESSURE: 64 MMHG | OXYGEN SATURATION: 98 % | RESPIRATION RATE: 18 BRPM

## 2021-07-28 DIAGNOSIS — S93.401A SPRAIN OF RIGHT ANKLE, UNSPECIFIED LIGAMENT, INITIAL ENCOUNTER: Primary | ICD-10-CM

## 2021-07-28 PROCEDURE — 99284 EMERGENCY DEPT VISIT MOD MDM: CPT

## 2021-07-28 PROCEDURE — 6370000000 HC RX 637 (ALT 250 FOR IP): Performed by: PHYSICIAN ASSISTANT

## 2021-07-28 PROCEDURE — 73610 X-RAY EXAM OF ANKLE: CPT

## 2021-07-28 RX ORDER — BACLOFEN 10 MG/1
10 TABLET ORAL 3 TIMES DAILY
Qty: 10 TABLET | Refills: 0 | Status: SHIPPED | OUTPATIENT
Start: 2021-07-28 | End: 2021-07-31

## 2021-07-28 RX ORDER — ACETAMINOPHEN 500 MG
1000 TABLET ORAL EVERY 8 HOURS PRN
Qty: 60 TABLET | Refills: 0 | Status: SHIPPED | OUTPATIENT
Start: 2021-07-28 | End: 2021-08-19

## 2021-07-28 RX ORDER — HYDROCODONE BITARTRATE AND ACETAMINOPHEN 5; 325 MG/1; MG/1
1 TABLET ORAL ONCE
Status: COMPLETED | OUTPATIENT
Start: 2021-07-28 | End: 2021-07-28

## 2021-07-28 RX ADMIN — HYDROCODONE BITARTRATE AND ACETAMINOPHEN 1 TABLET: 5; 325 TABLET ORAL at 16:18

## 2021-07-28 ASSESSMENT — PAIN SCALES - GENERAL
PAINLEVEL_OUTOF10: 3
PAINLEVEL_OUTOF10: 9
PAINLEVEL_OUTOF10: 6

## 2021-07-28 ASSESSMENT — ENCOUNTER SYMPTOMS
SHORTNESS OF BREATH: 0
COUGH: 0
BACK PAIN: 0
COLOR CHANGE: 0
CHEST TIGHTNESS: 0

## 2021-07-28 NOTE — ED NOTES
Pt alert and oriented x4. Speech clear. Respirations easy/unlabored. Skin warm/dry. Appropriate color. No signs of acute distress noted. Pt/family teaching provided; verbalized understanding. Pt stable for discharge.        Rosemarie Dennis RN  07/28/21 1413
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
## 2021-08-01 ENCOUNTER — APPOINTMENT (OUTPATIENT)
Dept: GENERAL RADIOLOGY | Age: 41
End: 2021-08-01
Payer: MEDICAID

## 2021-08-01 ENCOUNTER — HOSPITAL ENCOUNTER (EMERGENCY)
Age: 41
Discharge: HOME | End: 2021-08-01
Payer: MEDICAID

## 2021-08-01 VITALS
OXYGEN SATURATION: 99 % | WEIGHT: 180 LBS | HEART RATE: 85 BPM | RESPIRATION RATE: 14 BRPM | SYSTOLIC BLOOD PRESSURE: 180 MMHG | TEMPERATURE: 98 F | BODY MASS INDEX: 36.29 KG/M2 | HEIGHT: 59 IN | DIASTOLIC BLOOD PRESSURE: 80 MMHG

## 2021-08-01 DIAGNOSIS — M79.671 RIGHT FOOT PAIN: ICD-10-CM

## 2021-08-01 DIAGNOSIS — S93.601A SPRAIN OF RIGHT FOOT, INITIAL ENCOUNTER: Primary | ICD-10-CM

## 2021-08-01 DIAGNOSIS — S40.022A CONTUSION OF LEFT UPPER EXTREMITY, INITIAL ENCOUNTER: ICD-10-CM

## 2021-08-01 PROCEDURE — 99284 EMERGENCY DEPT VISIT MOD MDM: CPT

## 2021-08-01 PROCEDURE — 73630 X-RAY EXAM OF FOOT: CPT

## 2021-08-01 PROCEDURE — 6370000000 HC RX 637 (ALT 250 FOR IP): Performed by: PHYSICIAN ASSISTANT

## 2021-08-01 RX ORDER — HYDROCODONE BITARTRATE AND ACETAMINOPHEN 5; 325 MG/1; MG/1
1 TABLET ORAL ONCE
Status: COMPLETED | OUTPATIENT
Start: 2021-08-01 | End: 2021-08-01

## 2021-08-01 RX ORDER — HYDROCODONE BITARTRATE AND ACETAMINOPHEN 5; 325 MG/1; MG/1
1 TABLET ORAL EVERY 6 HOURS PRN
Qty: 12 TABLET | Refills: 0 | Status: SHIPPED | OUTPATIENT
Start: 2021-08-01 | End: 2021-08-04

## 2021-08-01 RX ADMIN — HYDROCODONE BITARTRATE AND ACETAMINOPHEN 1 TABLET: 5; 325 TABLET ORAL at 14:20

## 2021-08-01 ASSESSMENT — PAIN DESCRIPTION - PAIN TYPE: TYPE: ACUTE PAIN

## 2021-08-01 ASSESSMENT — PAIN DESCRIPTION - ORIENTATION: ORIENTATION: RIGHT

## 2021-08-01 ASSESSMENT — PAIN SCALES - GENERAL
PAINLEVEL_OUTOF10: 10
PAINLEVEL_OUTOF10: 10

## 2021-08-01 ASSESSMENT — PAIN DESCRIPTION - LOCATION: LOCATION: FOOT
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
## 2021-08-15 ENCOUNTER — HOSPITAL ENCOUNTER (EMERGENCY)
Age: 41
Discharge: HOME | End: 2021-08-15
Payer: MEDICAID

## 2021-08-15 ENCOUNTER — APPOINTMENT (OUTPATIENT)
Dept: GENERAL RADIOLOGY | Age: 41
End: 2021-08-15
Payer: MEDICAID

## 2021-08-15 VITALS
HEIGHT: 59 IN | HEART RATE: 84 BPM | OXYGEN SATURATION: 97 % | TEMPERATURE: 98 F | RESPIRATION RATE: 14 BRPM | DIASTOLIC BLOOD PRESSURE: 70 MMHG | SYSTOLIC BLOOD PRESSURE: 141 MMHG | WEIGHT: 175 LBS | BODY MASS INDEX: 35.28 KG/M2

## 2021-08-15 DIAGNOSIS — S80.01XA CONTUSION OF RIGHT KNEE, INITIAL ENCOUNTER: ICD-10-CM
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
## 2021-08-15 PROCEDURE — 73552 X-RAY EXAM OF FEMUR 2/>: CPT

## 2021-08-15 PROCEDURE — 99284 EMERGENCY DEPT VISIT MOD MDM: CPT

## 2021-08-15 PROCEDURE — 6370000000 HC RX 637 (ALT 250 FOR IP): Performed by: PHYSICIAN ASSISTANT

## 2021-08-15 RX ORDER — IBUPROFEN 600 MG/1
600 TABLET ORAL ONCE
Status: COMPLETED | OUTPATIENT
Start: 2021-08-15 | End: 2021-08-15

## 2021-08-15 RX ORDER — ONDANSETRON 4 MG/1
4 TABLET, ORALLY DISINTEGRATING ORAL ONCE
Status: COMPLETED | OUTPATIENT
Start: 2021-08-15 | End: 2021-08-15

## 2021-08-15 RX ADMIN — ONDANSETRON 4 MG: 4 TABLET, ORALLY DISINTEGRATING ORAL at 15:43

## 2021-08-15 RX ADMIN — IBUPROFEN 600 MG: 600 TABLET, FILM COATED ORAL at 15:43

## 2021-08-15 ASSESSMENT — PAIN DESCRIPTION - LOCATION: LOCATION: LEG

## 2021-08-15 ASSESSMENT — PAIN DESCRIPTION - PAIN TYPE: TYPE: ACUTE PAIN

## 2021-08-15 ASSESSMENT — PAIN SCALES - GENERAL
PAINLEVEL_OUTOF10: 10
PAINLEVEL_OUTOF10: 10

## 2021-08-15 ASSESSMENT — PAIN DESCRIPTION - ORIENTATION: ORIENTATION: RIGHT
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
## 2021-08-19 ENCOUNTER — HOSPITAL ENCOUNTER (EMERGENCY)
Age: 41
Discharge: HOME | End: 2021-08-19
Payer: MEDICAID

## 2021-08-19 VITALS
OXYGEN SATURATION: 97 % | RESPIRATION RATE: 18 BRPM | SYSTOLIC BLOOD PRESSURE: 136 MMHG | DIASTOLIC BLOOD PRESSURE: 72 MMHG | TEMPERATURE: 96.6 F | HEART RATE: 105 BPM

## 2021-08-19 DIAGNOSIS — M25.511 ACUTE PAIN OF RIGHT SHOULDER: ICD-10-CM

## 2021-08-19 DIAGNOSIS — R21 RASH AND OTHER NONSPECIFIC SKIN ERUPTION: Primary | ICD-10-CM

## 2021-08-19 PROCEDURE — 99283 EMERGENCY DEPT VISIT LOW MDM: CPT

## 2021-08-19 PROCEDURE — 6370000000 HC RX 637 (ALT 250 FOR IP): Performed by: NURSE PRACTITIONER

## 2021-08-19 RX ORDER — DIAPER,BRIEF,INFANT-TODD,DISP
EACH MISCELLANEOUS
Qty: 1 TUBE | Refills: 0 | Status: SHIPPED | OUTPATIENT
Start: 2021-08-19 | End: 2021-08-26

## 2021-08-19 RX ORDER — CYCLOBENZAPRINE HCL 5 MG
TABLET ORAL
COMMUNITY
Start: 2021-06-16 | End: 2022-04-20

## 2021-08-19 RX ORDER — OXYCODONE HYDROCHLORIDE AND ACETAMINOPHEN 5; 325 MG/1; MG/1
1 TABLET ORAL ONCE
Status: COMPLETED | OUTPATIENT
Start: 2021-08-19 | End: 2021-08-19

## 2021-08-19 RX ORDER — DIPHENHYDRAMINE HCL 25 MG
25 TABLET ORAL ONCE
Status: COMPLETED | OUTPATIENT
Start: 2021-08-19 | End: 2021-08-19

## 2021-08-19 RX ADMIN — OXYCODONE HYDROCHLORIDE AND ACETAMINOPHEN 1 TABLET: 5; 325 TABLET ORAL at 20:54

## 2021-08-19 RX ADMIN — DIPHENHYDRAMINE HYDROCHLORIDE 25 MG: 25 TABLET ORAL at 20:54

## 2021-08-19 ASSESSMENT — PAIN SCALES - GENERAL
PAINLEVEL_OUTOF10: 10
PAINLEVEL_OUTOF10: 10
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
## 2021-08-22 ENCOUNTER — APPOINTMENT (OUTPATIENT)
Dept: CT IMAGING | Age: 41
End: 2021-08-22
Payer: MEDICAID

## 2021-08-22 ENCOUNTER — HOSPITAL ENCOUNTER (EMERGENCY)
Age: 41
Discharge: HOME | End: 2021-08-23
Attending: EMERGENCY MEDICINE
Payer: MEDICAID

## 2021-08-22 DIAGNOSIS — I10 HYPERTENSION, UNSPECIFIED TYPE: ICD-10-CM

## 2021-08-22 DIAGNOSIS — K59.00 CONSTIPATION, UNSPECIFIED CONSTIPATION TYPE: ICD-10-CM

## 2021-08-22 DIAGNOSIS — R10.9 ABDOMINAL PAIN, UNSPECIFIED ABDOMINAL LOCATION: Primary | ICD-10-CM

## 2021-08-22 LAB
ALBUMIN SERPL-MCNC: 4 G/DL (ref 3.5–5.2)
ALP BLD-CCNC: 92 U/L (ref 35–104)
ALT SERPL-CCNC: 15 U/L (ref 0–32)
ANION GAP SERPL CALCULATED.3IONS-SCNC: 10 MMOL/L (ref 7–16)
AST SERPL-CCNC: 16 U/L (ref 0–31)
BACTERIA: ABNORMAL /HPF
BASOPHILS ABSOLUTE: 0.03 E9/L (ref 0–0.2)
BASOPHILS RELATIVE PERCENT: 0.4 % (ref 0–2)
BILIRUB SERPL-MCNC: <0.2 MG/DL (ref 0–1.2)
BILIRUBIN URINE: NEGATIVE
BLOOD, URINE: NEGATIVE
BUN BLDV-MCNC: 17 MG/DL (ref 6–20)
CALCIUM SERPL-MCNC: 9.4 MG/DL (ref 8.6–10.2)
CHLORIDE BLD-SCNC: 103 MMOL/L (ref 98–107)
CLARITY: CLEAR
CO2: 24 MMOL/L (ref 22–29)
COLOR: YELLOW
CREAT SERPL-MCNC: 0.9 MG/DL (ref 0.5–1)
EOSINOPHILS ABSOLUTE: 0.32 E9/L (ref 0.05–0.5)
EOSINOPHILS RELATIVE PERCENT: 4 % (ref 0–6)
EPITHELIAL CELLS, UA: ABNORMAL /HPF
GFR AFRICAN AMERICAN: >60
GFR NON-AFRICAN AMERICAN: >60 ML/MIN/1.73
GLUCOSE BLD-MCNC: 97 MG/DL (ref 74–99)
GLUCOSE URINE: NEGATIVE MG/DL
HCG, URINE, POC: NEGATIVE
HCT VFR BLD CALC: 41 % (ref 34–48)
HEMOGLOBIN: 13.1 G/DL (ref 11.5–15.5)
IMMATURE GRANULOCYTES #: 0.03 E9/L
IMMATURE GRANULOCYTES %: 0.4 % (ref 0–5)
KETONES, URINE: NEGATIVE MG/DL
LACTIC ACID: 1.7 MMOL/L (ref 0.5–2.2)
LEUKOCYTE ESTERASE, URINE: ABNORMAL
LIPASE: 47 U/L (ref 13–60)
LYMPHOCYTES ABSOLUTE: 1.77 E9/L (ref 1.5–4)
LYMPHOCYTES RELATIVE PERCENT: 22.3 % (ref 20–42)
Lab: NORMAL
MCH RBC QN AUTO: 26 PG (ref 26–35)
MCHC RBC AUTO-ENTMCNC: 32 % (ref 32–34.5)
MCV RBC AUTO: 81.5 FL (ref 80–99.9)
MONOCYTES ABSOLUTE: 0.55 E9/L (ref 0.1–0.95)
MONOCYTES RELATIVE PERCENT: 6.9 % (ref 2–12)
NEGATIVE QC PASS/FAIL: NORMAL
NEUTROPHILS ABSOLUTE: 5.25 E9/L (ref 1.8–7.3)
NEUTROPHILS RELATIVE PERCENT: 66 % (ref 43–80)
NITRITE, URINE: NEGATIVE
PDW BLD-RTO: 15.1 FL (ref 11.5–15)
PH UA: 5.5 (ref 5–9)
PLATELET # BLD: 221 E9/L (ref 130–450)
PMV BLD AUTO: 9.7 FL (ref 7–12)
POSITIVE QC PASS/FAIL: NORMAL
POTASSIUM SERPL-SCNC: 3.9 MMOL/L (ref 3.5–5)
PROTEIN UA: NEGATIVE MG/DL
RBC # BLD: 5.03 E12/L (ref 3.5–5.5)
RBC UA: ABNORMAL /HPF (ref 0–2)
SODIUM BLD-SCNC: 137 MMOL/L (ref 132–146)
SPECIFIC GRAVITY UA: 1.02 (ref 1–1.03)
TOTAL PROTEIN: 7.5 G/DL (ref 6.4–8.3)
UROBILINOGEN, URINE: 0.2 E.U./DL
WBC # BLD: 8 E9/L (ref 4.5–11.5)
WBC UA: ABNORMAL /HPF (ref 0–5)

## 2021-08-22 PROCEDURE — 80053 COMPREHEN METABOLIC PANEL: CPT

## 2021-08-22 PROCEDURE — 2580000003 HC RX 258: Performed by: EMERGENCY MEDICINE

## 2021-08-22 PROCEDURE — 81001 URINALYSIS AUTO W/SCOPE: CPT

## 2021-08-22 PROCEDURE — 96374 THER/PROPH/DIAG INJ IV PUSH: CPT

## 2021-08-22 PROCEDURE — 99284 EMERGENCY DEPT VISIT MOD MDM: CPT

## 2021-08-22 PROCEDURE — 85025 COMPLETE CBC W/AUTO DIFF WBC: CPT

## 2021-08-22 PROCEDURE — 74176 CT ABD & PELVIS W/O CONTRAST: CPT

## 2021-08-22 PROCEDURE — 96375 TX/PRO/DX INJ NEW DRUG ADDON: CPT

## 2021-08-22 PROCEDURE — 6370000000 HC RX 637 (ALT 250 FOR IP): Performed by: EMERGENCY MEDICINE

## 2021-08-22 PROCEDURE — 83605 ASSAY OF LACTIC ACID: CPT

## 2021-08-22 PROCEDURE — 6360000002 HC RX W HCPCS: Performed by: EMERGENCY MEDICINE

## 2021-08-22 PROCEDURE — 83690 ASSAY OF LIPASE: CPT

## 2021-08-22 RX ORDER — MORPHINE SULFATE 2 MG/ML
2 INJECTION, SOLUTION INTRAMUSCULAR; INTRAVENOUS ONCE
Status: COMPLETED | OUTPATIENT
Start: 2021-08-22 | End: 2021-08-22

## 2021-08-22 RX ORDER — ONDANSETRON 2 MG/ML
4 INJECTION INTRAMUSCULAR; INTRAVENOUS EVERY 6 HOURS PRN
Status: DISCONTINUED | OUTPATIENT
Start: 2021-08-22 | End: 2021-08-23 | Stop reason: HOSPADM

## 2021-08-22 RX ORDER — 0.9 % SODIUM CHLORIDE 0.9 %
1000 INTRAVENOUS SOLUTION INTRAVENOUS ONCE
Status: COMPLETED | OUTPATIENT
Start: 2021-08-22 | End: 2021-08-22

## 2021-08-22 RX ORDER — HYDRALAZINE HYDROCHLORIDE 20 MG/ML
5 INJECTION INTRAMUSCULAR; INTRAVENOUS ONCE
Status: COMPLETED | OUTPATIENT
Start: 2021-08-22 | End: 2021-08-22

## 2021-08-22 RX ADMIN — ONDANSETRON 4 MG: 2 INJECTION INTRAMUSCULAR; INTRAVENOUS at 20:46

## 2021-08-22 RX ADMIN — MAGNESIUM CITRATE 296 ML: 1.75 LIQUID ORAL at 22:53

## 2021-08-22 RX ADMIN — SODIUM CHLORIDE 1000 ML: 9 INJECTION, SOLUTION INTRAVENOUS at 20:39

## 2021-08-22 RX ADMIN — MORPHINE SULFATE 2 MG: 2 INJECTION, SOLUTION INTRAMUSCULAR; INTRAVENOUS at 20:45

## 2021-08-22 RX ADMIN — HYDRALAZINE HYDROCHLORIDE 5 MG: 20 INJECTION INTRAMUSCULAR; INTRAVENOUS at 23:44

## 2021-08-22 ASSESSMENT — PAIN DESCRIPTION - LOCATION
LOCATION: ABDOMEN
LOCATION: ABDOMEN

## 2021-08-22 ASSESSMENT — PAIN SCALES - GENERAL
PAINLEVEL_OUTOF10: 10
PAINLEVEL_OUTOF10: 6
PAINLEVEL_OUTOF10: 10

## 2021-08-22 NOTE — ED NOTES
FIRST PROVIDER CONTACT ASSESSMENT NOTE                                                                                                Department of Emergency Medicine                                                      First Provider Note  21  11:58 AM EDT  NAME: Eliseo Mcgee  : 1980  MRN: [id number]    Chief Complaint: Abdominal Pain (Pain x 4 days. States no bowel movement x 2 weeks. Cannot eat or drink - throws everything back up )      History of Present Illness:   Eliseo Mcgee is a 37 y.o. female who presents to the ED for no bowel movement for 2 weeks. Patient states she has tried stool softeners and has been unable to still have a bowel movement. Patient states  that she is unable to eat or drink and when she does she throws it right back up. Patient denies any history of a bowel obstruction. Patient has a history of having her gallbladder taken out. Focused Physical Exam:  VS:    ED Triage Vitals [21 1120]   BP Temp Temp src Pulse Resp SpO2 Height Weight   -- 96 °F (35.6 °C) -- 102 -- 98 % -- --        General: Alert and in no apparent distress. Medical History:  has a past medical history of Asthma, Bronchitis, Movement disorder, Seizure disorder ([address] Seizure disorder ([address] [address] Tobacco abuse, and Tobacco abuse. Surgical History:  has a past surgical history that includes fracture surgery; Cholecystectomy; Rotator cuff repair; and Upper gastrointestinal endoscopy (N/A, 5/3/2021). Social History:  reports that she has been smoking cigarettes. She has a 0.30 pack-year smoking history. She has never used smokeless tobacco. She reports current alcohol use. She reports that she does not use drugs. Family History: family history includes Cancer in her mother.     Allergies: Tramadol, Darvocet [propoxyphene n-acetaminophen], Ibuprofen, Ketorolac, Meloxicam, and Naproxen     GI: Hypoactive bowel sounds with distention    Initial Plan of Care:  Initiate Treatment-Testing, Proceed toTreatment Area When Bed Available for ED Attending/MLP to Continue Care    -------------------------------------------------END OF FIRST PROVIDER CONTACT ASSESSMENT NOTE--------------------------------------------------------  Electronically signed by Brie Gtz PA-C   DD: 8/22/21       Brie Gtz PA-C  08/22/21 1159

## 2021-08-23 VITALS
OXYGEN SATURATION: 98 % | HEART RATE: 80 BPM | RESPIRATION RATE: 18 BRPM | SYSTOLIC BLOOD PRESSURE: 148 MMHG | BODY MASS INDEX: 35.35 KG/M2 | TEMPERATURE: 98.1 F | DIASTOLIC BLOOD PRESSURE: 95 MMHG | WEIGHT: 175 LBS

## 2021-08-23 VITALS
SYSTOLIC BLOOD PRESSURE: 144 MMHG | HEART RATE: 74 BPM | RESPIRATION RATE: 18 BRPM | TEMPERATURE: 97.9 F | OXYGEN SATURATION: 98 % | DIASTOLIC BLOOD PRESSURE: 74 MMHG

## 2021-08-23 DIAGNOSIS — K21.9 GASTROESOPHAGEAL REFLUX DISEASE, UNSPECIFIED WHETHER ESOPHAGITIS PRESENT: ICD-10-CM

## 2021-08-23 DIAGNOSIS — M79.605 CHRONIC PAIN OF BOTH LOWER EXTREMITIES: ICD-10-CM

## 2021-08-23 DIAGNOSIS — M79.604 CHRONIC PAIN OF BOTH LOWER EXTREMITIES: ICD-10-CM

## 2021-08-23 DIAGNOSIS — R10.13 ABDOMINAL PAIN, EPIGASTRIC: Primary | ICD-10-CM

## 2021-08-23 DIAGNOSIS — G89.29 CHRONIC PAIN OF BOTH LOWER EXTREMITIES: ICD-10-CM

## 2021-08-23 PROCEDURE — 99283 EMERGENCY DEPT VISIT LOW MDM: CPT

## 2021-08-23 PROCEDURE — 6370000000 HC RX 637 (ALT 250 FOR IP): Performed by: STUDENT IN AN ORGANIZED HEALTH CARE EDUCATION/TRAINING PROGRAM

## 2021-08-23 RX ORDER — HYDROCODONE BITARTRATE AND ACETAMINOPHEN 5; 325 MG/1; MG/1
1 TABLET ORAL ONCE
Status: DISCONTINUED | OUTPATIENT
Start: 2021-08-23 | End: 2021-08-23 | Stop reason: HOSPADM

## 2021-08-23 RX ORDER — PANTOPRAZOLE SODIUM 40 MG/1
40 TABLET, DELAYED RELEASE ORAL ONCE
Status: COMPLETED | OUTPATIENT
Start: 2021-08-23 | End: 2021-08-23

## 2021-08-23 RX ADMIN — PANTOPRAZOLE SODIUM 40 MG: 40 TABLET, DELAYED RELEASE ORAL at 09:30

## 2021-08-23 RX ADMIN — MAGNESIUM HYDROXIDE/ALUMINUM HYDROXICE/SIMETHICONE: 120; 1200; 1200 SUSPENSION ORAL at 09:30

## 2021-08-23 ASSESSMENT — ENCOUNTER SYMPTOMS
CONSTIPATION: 0
NAUSEA: 0
DIARRHEA: 0
COUGH: 1
SHORTNESS OF BREATH: 0
VOMITING: 0
RHINORRHEA: 0
ABDOMINAL PAIN: 1
ABDOMINAL DISTENTION: 0

## 2021-08-23 ASSESSMENT — PAIN SCALES - GENERAL: PAINLEVEL_OUTOF10: 10

## 2021-08-23 NOTE — ED PROVIDER NOTES
HPI:  8/22/21, Time: 8:02 PM EDT         Guzman Jacobs is a 37 y.o. female presenting to the ED for abdominal pain, beginning several weeks ago. The complaint has been persistent, moderate in severity, and worsened by nothing. Reporting abdominal pain and no bowel move for several weeks. Patient reporting no vomiting she reports no fever she does report she has been eating but very little. Patient reporting no fever chills she reports no chest pains or shortness of breath there is no headache. Patient reporting no hematemesis. ROS:   Pertinent positives and negatives are stated within HPI, all other systems reviewed and are negative.  --------------------------------------------- PAST HISTORY ---------------------------------------------  Past Medical History:  has a past medical history of Asthma, Bronchitis, Movement disorder, Seizure disorder ([facility] [address] Seizure disorder ([facility] [address] Suicidal overdose ([facility] Tobacco abuse, and Tobacco abuse. Past Surgical History:  has a past surgical history that includes fracture surgery; Cholecystectomy; Rotator cuff repair; and Upper gastrointestinal endoscopy (N/A, 5/3/2021). Social History:  reports that she has been smoking cigarettes. She has a 0.30 pack-year smoking history. She has never used smokeless tobacco. She reports current alcohol use. She reports that she does not use drugs. Family History: family history includes Cancer in her mother. The patients home medications have been reviewed.     Allergies: Tramadol, Darvocet [propoxyphene n-acetaminophen], Ibuprofen, Ketorolac, Meloxicam, and Naproxen    ---------------------------------------------------PHYSICAL EXAM--------------------------------------    Constitutional/General: Alert and oriented x3, well appearing, non toxic in NAD  Head: Normocephalic and atraumatic  Eyes: PERRL, EOMI  Mouth: Oropharynx clear, handling secretions, no trismus  Neck: Supple, full ROM, non tender to palpation in the midline, no stridor, no crepitus, no meningeal signs  Pulmonary: Lungs clear to auscultation bilaterally, no wheezes, rales, or rhonchi. Not in respiratory distress  Cardiovascular:  Regular rate. Regular rhythm. No murmurs, gallops, or rubs. 2+ distal pulses  Chest: no chest wall tenderness  Abdomen: Soft. Mild diffuse tenderness Non distended. +BS. No rebound, guarding, or rigidity. No pulsatile masses appreciated. Musculoskeletal: Moves all extremities x 4. Warm and well perfused, no clubbing, cyanosis, or edema. Capillary refill <3 seconds  Skin: warm and dry. No rashes. Neurologic: GCS 15, CN 2-12 grossly intact, no focal deficits, symmetric strength 5/5 in the upper and lower extremities bilaterally  Psych: Normal Affect    -------------------------------------------------- RESULTS -------------------------------------------------  I have personally reviewed all laboratory and imaging results for this patient. Results are listed below.      LABS:  Results for orders placed or performed during the hospital encounter of 08/22/21   CBC auto differential   Result Value Ref Range    WBC 8.0 4.5 - 11.5 E9/L    RBC 5.03 3.50 - 5.50 E12/L    Hemoglobin 13.1 11.5 - 15.5 g/dL    Hematocrit 41.0 34.0 - 48.0 %    MCV 81.5 80.0 - 99.9 fL    MCH 26.0 26.0 - 35.0 pg    MCHC 32.0 32.0 - 34.5 %    RDW 15.1 (H) 11.5 - 15.0 fL    Platelets 515 941 - 904 E9/L    MPV 9.7 7.0 - 12.0 fL    Neutrophils % 66.0 43.0 - 80.0 %    Immature Granulocytes % 0.4 0.0 - 5.0 %    Lymphocytes % 22.3 20.0 - 42.0 %    Monocytes % 6.9 2.0 - 12.0 %    Eosinophils % 4.0 0.0 - 6.0 %    Basophils % 0.4 0.0 - 2.0 %    Neutrophils Absolute 5.25 1.80 - 7.30 E9/L    Immature Granulocytes # 0.03 E9/L    Lymphocytes Absolute 1.77 1.50 - 4.00 E9/L    Monocytes Absolute 0.55 0.10 - 0.95 E9/L    Eosinophils Absolute 0.32 0.05 - 0.50 E9/L    Basophils Absolute 0.03 0.00 - 0.20 E9/L   Comprehensive Metabolic Panel   Result Value Ref Range    Sodium 137 132 - 146 mmol/L    Potassium 3.9 3.5 - 5.0 mmol/L    Chloride 103 98 - 107 mmol/L    CO2 24 22 - 29 mmol/L    Anion Gap 10 7 - 16 mmol/L    Glucose 97 74 - 99 mg/dL    BUN 17 6 - 20 mg/dL    CREATININE 0.9 0.5 - 1.0 mg/dL    GFR Non-African American >60 >=60 mL/min/1.73    GFR African American >60     Calcium 9.4 8.6 - 10.2 mg/dL    Total Protein 7.5 6.4 - 8.3 g/dL    Albumin 4.0 3.5 - 5.2 g/dL    Total Bilirubin <0.2 0.0 - 1.2 mg/dL    Alkaline Phosphatase 92 35 - 104 U/L    ALT 15 0 - 32 U/L    AST 16 0 - 31 U/L   Lactic Acid, Plasma   Result Value Ref Range    Lactic Acid 1.7 0.5 - 2.2 mmol/L   Lipase   Result Value Ref Range    Lipase 47 13 - 60 U/L   Urinalysis with Microscopic   Result Value Ref Range    Color, UA Yellow Straw/Yellow    Clarity, UA Clear Clear    Glucose, Ur Negative Negative mg/dL    Bilirubin Urine Negative Negative    Ketones, Urine Negative Negative mg/dL    Specific Gravity, UA 1.025 1.005 - 1.030    Blood, Urine Negative Negative    pH, UA 5.5 5.0 - 9.0    Protein, UA Negative Negative mg/dL    Urobilinogen, Urine 0.2 <2.0 E.U./dL    Nitrite, Urine Negative Negative    Leukocyte Esterase, Urine TRACE (A) Negative    WBC, UA 2-5 0 - 5 /HPF    RBC, UA NONE 0 - 2 /HPF    Epithelial Cells, UA MODERATE /HPF    Bacteria, UA RARE (A) None Seen /HPF   POC Pregnancy Urine Qual   Result Value Ref Range    HCG, Urine, POC Negative Negative    Lot Number [id number]     Positive QC Pass/Fail Pass     Negative QC Pass/Fail Pass        RADIOLOGY:  Interpreted by Radiologist.  CT ABDOMEN PELVIS WO CONTRAST Additional Contrast? None   Final Result   No indication for acute intraperitoneal retroperitoneal process in the   abdomen or in the pelvis. No indication for severe constipation.                EKG Interpretation      ------------------------- NURSING NOTES AND VITALS REVIEWED ---------------------------   The nursing notes within the ED encounter and vital signs as below have been reviewed by myself. BP (!) 144/74   Pulse 74   Temp 97.9 °F (36.6 °C) (Oral)   Resp 18   SpO2 98%   Oxygen Saturation Interpretation: Normal    The patients available past medical records and past encounters were reviewed. ------------------------------ ED COURSE/MEDICAL DECISION MAKING----------------------  Medications   ondansetron (ZOFRAN) injection 4 mg (4 mg Intravenous Given 8/22/21 2046)   0.9 % sodium chloride bolus (0 mLs Intravenous Stopped 8/22/21 2205)   morphine (PF) injection 2 mg (2 mg Intravenous Given 8/22/21 2045)   magnesium citrate solution 296 mL (296 mLs Oral Given 8/22/21 2253)   hydrALAZINE (APRESOLINE) injection 5 mg (5 mg Intravenous Given 8/22/21 2344)             Medical Decision Making:   Presenting here because of feeling constipated for last several weeks. Patient reporting abdominal pain she is tried enemas at home with no improvement. Patient reporting no chest pain no difficulty breathing no headache. Patient neurologically intact. Labs are reviewed as well as CT. Patient made aware of findings she declined enema she was given magnesium citrate. Patient blood pressure did improve here in the emergency department. Patient comfortable being discharged home she is to follow with her PCP     Re-Evaluations:             Re-evaluation. Patients symptoms show no change  Patient reevaluated unchanged. Patient reporting no chest pain no difficulty breathing. Patient's blood pressure noted be elevated. Patient reports no history of hypertension. She reports no head or neck pain though she reports no difficulty breathing. She reports no weakness. Patient declined soapsuds enema. She was given magnesium citrate. Patient comfortable being discharged home she is to follow-up with her PCP she is to return if symptoms worsen or persist.    Consultations:                 Critical Care:          This patient's ED course included: a personal history and physicial eaxmination    This

## 2021-08-23 NOTE — ED NOTES
Patient refusing enema. Patient states, \"I had 2 at home and they didn't work and my butt hole is sore. I feel like something is moving around in there now and I'm going to try and use the bathroom. \" Pt ambulatory to bathroom at this time to attempt BM.       Aura Best RN  08/22/21 1438

## 2021-08-23 NOTE — ED PROVIDER NOTES
Patient is a 60-year-old female with a history of GERD, asthma, seizures who presents to the emergency department with a complaint of abdominal pain. Patient states she has epigastric pain that was persistent through yesterday and last night, burning, radiating up into her chest, made worse by eating, made better by nothing, moderate intensity, intermittent. Patient with associated nighttime cough, sour taste in her mouth. Patient denies any chest pain or shortness of breath, nausea or vomiting, diarrhea. Patient denies any fever or chills. Patient denies any weight loss. Patient states she was seen last night in the emergency department for similar pain. Patient received a thorough work-up including CT scan, analgesics and lab work. Patient was found to have constipation and was given mag citrate and discharged home. Patient states that she took the mag citrate and had a small bowel movement, no black or bloody stools. Patient denies any hematemesis or hemoptysis. Patient states she does not take NSAIDs, is a current smoker, uses alcohol occasionally. Patient states that this does not feel like her GERD pain as she had \"gallbladder surgery\". Review of Systems   Constitutional: Negative for chills and fever. HENT: Negative for congestion and rhinorrhea. Eyes: Negative for visual disturbance. Respiratory: Positive for cough. Negative for shortness of breath. Cardiovascular: Negative for chest pain, palpitations and leg swelling. Gastrointestinal: Positive for abdominal pain. Negative for abdominal distention, constipation, diarrhea, nausea and vomiting. Genitourinary: Negative for dysuria and urgency. Musculoskeletal: Negative for arthralgias and myalgias. Skin: Negative for rash and wound. Neurological: Negative for dizziness, syncope, weakness, light-headedness, numbness and headaches. Psychiatric/Behavioral: Negative for confusion. The patient is not nervous/anxious. Physical Exam  Vitals and nursing note reviewed. Constitutional:       General: She is awake. She is not in acute distress. Appearance: She is obese. She is not ill-appearing or toxic-appearing. HENT:      Head: Normocephalic and atraumatic. Mouth/Throat:      Mouth: Mucous membranes are moist.      Pharynx: Oropharynx is clear. Eyes:      Extraocular Movements: Extraocular movements intact. Pupils: Pupils are equal, round, and reactive to light. Cardiovascular:      Rate and Rhythm: Normal rate and regular rhythm. Pulses: Normal pulses. Heart sounds: Normal heart sounds. Pulmonary:      Effort: Pulmonary effort is normal.      Breath sounds: Normal breath sounds. Abdominal:      General: Bowel sounds are decreased. There is no distension. Palpations: Abdomen is soft. Tenderness: There is abdominal tenderness in the epigastric area. Musculoskeletal:         General: Normal range of motion. Cervical back: Normal range of motion and neck supple. Skin:     General: Skin is warm and dry. Capillary Refill: Capillary refill takes less than 2 seconds. Neurological:      General: No focal deficit present. Mental Status: She is alert and oriented to person, place, and time. GCS: GCS eye subscore is 4. GCS verbal subscore is 5. GCS motor subscore is 6. Sensory: Sensation is intact. Motor: Motor function is intact. Psychiatric:         Behavior: Behavior is cooperative. MDM  Number of Diagnoses or Management Options  Abdominal pain, epigastric  Chronic pain of both lower extremities  Gastroesophageal reflux disease, unspecified whether esophagitis present  Diagnosis management comments: Patient is a 79-year-old female who presents to the emergency department with ongoing epigastric pain since last night. Patient was seen in the emergency department for similar pain and was diagnosed with constipation.   Patient now presents with ongoing epigastric pain that is consistent with gastritis/GERD. Patient does take a PPI however she takes it at night with food. This was explained to be improper and PPIs need to be taken on empty stomach. Patient understanding of instructions. Patient stating that her pain was increasing from last night. Patient without any alarm symptoms including weight loss, B symptoms including fever and chills. Patient vital signs reviewed, physical exam shows epigastric tenderness. Patient was given GI cocktail and Protonix, she does have empty stomach now she states she has not eaten all night. Patient states no change in her pain. Patient symptoms still consistent with GERD, chart from last night was extensively reviewed including all lab work and CT scan. During counseling patient states that she has had this pain in the past and she was treated with Vicodin by her physician. PDMP was reviewed and patient did receive a prescription for Norco at the beginning of the month for pain. Patient states that she normally takes analgesics for her chronic leg pain, but it also helps her GERD pain. Patient stating that it is the therapy that works. Patient was given 1 Norco and able to be discharged from the emergency department to follow-up with her primary care physician for continued testing. Patient given reassurance, strongly encouraged to follow-up as she has been on PPIs for a long time and she would likely require gastroenterology/general surgery follow-up for possible EGD. Patient was understanding. Patient was monitored in the emergency department discharged in stable condition.     --------------------------------------------- PAST HISTORY ---------------------------------------------  Past Medical History:  has a past medical history of Asthma, Bronchitis, Movement disorder, Seizure disorder ([facility] [address] [facility] [facility] Suicidal overdose ([facility] Tobacco abuse, and Tobacco abuse.     Past Surgical History:  has a past surgical history that includes fracture surgery; Cholecystectomy; Rotator cuff repair; and Upper gastrointestinal endoscopy (N/A, 5/3/2021). Social History:  reports that she has been smoking cigarettes. She has a 0.30 pack-year smoking history. She has never used smokeless tobacco. She reports current alcohol use. She reports that she does not use drugs. Family History: family history includes Cancer in her mother. The patients home medications have been reviewed. Allergies: Tramadol, Darvocet [propoxyphene n-acetaminophen], Ibuprofen, Ketorolac, Meloxicam, and Naproxen    -------------------------------------------------- RESULTS -------------------------------------------------  Labs:  No results found for this visit on 08/23/21. Radiology:  No orders to display     ------------------------- NURSING NOTES AND VITALS REVIEWED ---------------------------  Date / Time Roomed:  8/23/2021  7:57 AM  ED Bed Assignment:  [email or web address]    The nursing notes within the ED encounter and vital signs as below have been reviewed. BP (!) 148/95   Pulse 80   Temp 98.1 °F (36.7 °C)   Resp 18   Wt 175 lb (79.4 kg)   SpO2 98%   BMI 35.35 kg/m²   Oxygen Saturation Interpretation: Normal      ------------------------------------------ PROGRESS NOTES ------------------------------------------  6:36 PM EDT  I have spoken with the patient and discussed todays results, in addition to providing specific details for the plan of care and counseling regarding the diagnosis and prognosis. Their questions are answered at this time and they are agreeable with the plan. I discussed at length with them reasons for immediate return here for re evaluation. They will followup with their primary care physician by calling their office This week. Patient additionally referred to general surgery. Casey Lomas     --------------------------------- ADDITIONAL PROVIDER NOTES ---------------------------------  At this time the patient is without objective evidence of an acute process requiring hospitalization or inpatient management. They have remained hemodynamically stable throughout their entire ED visit and are stable for discharge with outpatient follow-up. The plan has been discussed in detail and they are aware of the specific conditions for emergent return, as well as the importance of follow-up. Discharge Medication List as of 8/23/2021 11:12 AM        Diagnosis:  1. Abdominal pain, epigastric    2. Gastroesophageal reflux disease, unspecified whether esophagitis present    3. Chronic pain of both lower extremities      Disposition:  Patient's disposition: Discharge to home  Patient's condition is stable. 8/23/21, 6:36 PM EDT.     This note is prepared by Rizwana Mauro MD -PGY-2           Rizwana Mauro MD  Resident  08/23/21 8784

## 2021-08-23 NOTE — ED NOTES
Report given to MYNOR Bass who is assuming care of patient at this time.       Nu Maurer RN  08/22/21 7314

## 2021-08-23 NOTE — ED NOTES
Patient stating she wants pain meds prior to enema. Dr. Isabella Awan aware and gave VO for 2mg IV morphine and 4mg IV zofran.       Belinda Quispe RN  08/22/21 2039

## 2021-08-23 NOTE — ED NOTES
Dr. Jae Salomon notified of patient's manual BP reading of 194/88. MD states to give him a minute and he will place orders for BP meds.       Lucrecia Barthel, RN  08/22/21 1065

## 2021-09-18 ENCOUNTER — HOSPITAL ENCOUNTER (EMERGENCY)
Age: 41
Discharge: LEFT BEFORE DISCHARGE | End: 2021-09-18
Payer: MEDICAID

## 2021-09-18 VITALS
WEIGHT: 170 LBS | OXYGEN SATURATION: 98 % | HEART RATE: 102 BPM | RESPIRATION RATE: 17 BRPM | HEIGHT: 59 IN | DIASTOLIC BLOOD PRESSURE: 111 MMHG | BODY MASS INDEX: 34.27 KG/M2 | TEMPERATURE: 98.6 F | SYSTOLIC BLOOD PRESSURE: 149 MMHG

## 2021-09-18 ASSESSMENT — PAIN DESCRIPTION - PAIN TYPE: TYPE: ACUTE PAIN

## 2021-09-18 ASSESSMENT — PAIN DESCRIPTION - ORIENTATION: ORIENTATION: RIGHT

## 2021-09-18 ASSESSMENT — PAIN DESCRIPTION - FREQUENCY: FREQUENCY: CONTINUOUS

## 2021-12-27 ENCOUNTER — HOSPITAL ENCOUNTER (EMERGENCY)
Age: 41
Discharge: LEFT BEFORE DISCHARGE | End: 2021-12-27

## 2021-12-27 VITALS — HEART RATE: 108 BPM | OXYGEN SATURATION: 99 % | RESPIRATION RATE: 16 BRPM

## 2021-12-27 ASSESSMENT — PAIN SCALES - GENERAL: PAINLEVEL_OUTOF10: 9

## 2021-12-27 ASSESSMENT — PAIN DESCRIPTION - LOCATION: LOCATION: CHEST

## 2021-12-27 ASSESSMENT — PAIN DESCRIPTION - PAIN TYPE: TYPE: ACUTE PAIN

## 2021-12-27 ASSESSMENT — PAIN DESCRIPTION - ORIENTATION: ORIENTATION: MID

## 2021-12-28 NOTE — ED NOTES
Patient presents to desk stating that she is leaving, patient instructed to return as needed, withdrawal of care paper signed.       Angelita Amos RN  12/27/21 1944

## 2022-03-14 ENCOUNTER — HOSPITAL ENCOUNTER (EMERGENCY)
Age: 42
Discharge: HOME | End: 2022-03-14
Attending: EMERGENCY MEDICINE
Payer: MEDICAID

## 2022-03-14 ENCOUNTER — APPOINTMENT (OUTPATIENT)
Dept: CT IMAGING | Age: 42
End: 2022-03-14
Payer: MEDICAID

## 2022-03-14 VITALS
HEART RATE: 88 BPM | HEIGHT: 59 IN | SYSTOLIC BLOOD PRESSURE: 163 MMHG | OXYGEN SATURATION: 100 % | WEIGHT: 169 LBS | BODY MASS INDEX: 34.07 KG/M2 | DIASTOLIC BLOOD PRESSURE: 105 MMHG | RESPIRATION RATE: 16 BRPM | TEMPERATURE: 98.4 F

## 2022-03-14 DIAGNOSIS — M25.451 EFFUSION OF BOTH HIP JOINTS: ICD-10-CM

## 2022-03-14 DIAGNOSIS — R10.13 ABDOMINAL PAIN, EPIGASTRIC: Primary | ICD-10-CM

## 2022-03-14 DIAGNOSIS — M25.452 EFFUSION OF BOTH HIP JOINTS: ICD-10-CM

## 2022-03-14 LAB
ALBUMIN SERPL-MCNC: 3.9 G/DL (ref 3.5–5.2)
ALP BLD-CCNC: 125 U/L (ref 35–104)
ALT SERPL-CCNC: 15 U/L (ref 0–32)
ANION GAP SERPL CALCULATED.3IONS-SCNC: 12 MMOL/L (ref 7–16)
AST SERPL-CCNC: 17 U/L (ref 0–31)
BACTERIA: ABNORMAL /HPF
BASOPHILS ABSOLUTE: 0.02 E9/L (ref 0–0.2)
BASOPHILS RELATIVE PERCENT: 0.3 % (ref 0–2)
BILIRUB SERPL-MCNC: <0.2 MG/DL (ref 0–1.2)
BILIRUBIN URINE: NEGATIVE
BLOOD, URINE: NEGATIVE
BUN BLDV-MCNC: 16 MG/DL (ref 6–20)
CALCIUM SERPL-MCNC: 9.1 MG/DL (ref 8.6–10.2)
CHLORIDE BLD-SCNC: 105 MMOL/L (ref 98–107)
CLARITY: CLEAR
CO2: 21 MMOL/L (ref 22–29)
COLOR: YELLOW
CREAT SERPL-MCNC: 0.9 MG/DL (ref 0.5–1)
EKG ATRIAL RATE: 95 BPM
EKG P AXIS: 49 DEGREES
EKG P-R INTERVAL: 136 MS
EKG Q-T INTERVAL: 356 MS
EKG QRS DURATION: 70 MS
EKG QTC CALCULATION (BAZETT): 447 MS
EKG R AXIS: 64 DEGREES
EKG T AXIS: 26 DEGREES
EKG VENTRICULAR RATE: 95 BPM
EOSINOPHILS ABSOLUTE: 0.09 E9/L (ref 0.05–0.5)
EOSINOPHILS RELATIVE PERCENT: 1.3 % (ref 0–6)
GFR AFRICAN AMERICAN: >60
GFR NON-AFRICAN AMERICAN: >60 ML/MIN/1.73
GLUCOSE BLD-MCNC: 107 MG/DL (ref 74–99)
GLUCOSE URINE: NEGATIVE MG/DL
HCG, URINE, POC: NEGATIVE
HCT VFR BLD CALC: 37.7 % (ref 34–48)
HEMOGLOBIN: 12.1 G/DL (ref 11.5–15.5)
IMMATURE GRANULOCYTES #: 0.01 E9/L
IMMATURE GRANULOCYTES %: 0.1 % (ref 0–5)
KETONES, URINE: NEGATIVE MG/DL
LEUKOCYTE ESTERASE, URINE: NEGATIVE
LIPASE: 50 U/L (ref 13–60)
LYMPHOCYTES ABSOLUTE: 1.91 E9/L (ref 1.5–4)
LYMPHOCYTES RELATIVE PERCENT: 27.7 % (ref 20–42)
Lab: NORMAL
MCH RBC QN AUTO: 26.6 PG (ref 26–35)
MCHC RBC AUTO-ENTMCNC: 32.1 % (ref 32–34.5)
MCV RBC AUTO: 82.9 FL (ref 80–99.9)
MONOCYTES ABSOLUTE: 0.61 E9/L (ref 0.1–0.95)
MONOCYTES RELATIVE PERCENT: 8.8 % (ref 2–12)
NEGATIVE QC PASS/FAIL: NORMAL
NEUTROPHILS ABSOLUTE: 4.26 E9/L (ref 1.8–7.3)
NEUTROPHILS RELATIVE PERCENT: 61.8 % (ref 43–80)
NITRITE, URINE: NEGATIVE
PDW BLD-RTO: 14.3 FL (ref 11.5–15)
PH UA: 6.5 (ref 5–9)
PLATELET # BLD: 228 E9/L (ref 130–450)
PMV BLD AUTO: 9.8 FL (ref 7–12)
POSITIVE QC PASS/FAIL: NORMAL
POTASSIUM REFLEX MAGNESIUM: 4.4 MMOL/L (ref 3.5–5)
PROTEIN UA: NEGATIVE MG/DL
RBC # BLD: 4.55 E12/L (ref 3.5–5.5)
RBC UA: ABNORMAL /HPF (ref 0–2)
SODIUM BLD-SCNC: 138 MMOL/L (ref 132–146)
SPECIFIC GRAVITY UA: 1.02 (ref 1–1.03)
TOTAL PROTEIN: 7.2 G/DL (ref 6.4–8.3)
TRICHOMONAS: PRESENT /HPF
TROPONIN, HIGH SENSITIVITY: <6 NG/L (ref 0–9)
UROBILINOGEN, URINE: 0.2 E.U./DL
WBC # BLD: 6.9 E9/L (ref 4.5–11.5)
WBC UA: ABNORMAL /HPF (ref 0–5)

## 2022-03-14 PROCEDURE — 6370000000 HC RX 637 (ALT 250 FOR IP): Performed by: EMERGENCY MEDICINE

## 2022-03-14 PROCEDURE — 96375 TX/PRO/DX INJ NEW DRUG ADDON: CPT

## 2022-03-14 PROCEDURE — 2500000003 HC RX 250 WO HCPCS: Performed by: EMERGENCY MEDICINE

## 2022-03-14 PROCEDURE — 85025 COMPLETE CBC W/AUTO DIFF WBC: CPT

## 2022-03-14 PROCEDURE — 99285 EMERGENCY DEPT VISIT HI MDM: CPT

## 2022-03-14 PROCEDURE — 93010 ELECTROCARDIOGRAM REPORT: CPT | Performed by: INTERNAL MEDICINE

## 2022-03-14 PROCEDURE — 83690 ASSAY OF LIPASE: CPT

## 2022-03-14 PROCEDURE — 93005 ELECTROCARDIOGRAM TRACING: CPT | Performed by: EMERGENCY MEDICINE

## 2022-03-14 PROCEDURE — 80053 COMPREHEN METABOLIC PANEL: CPT

## 2022-03-14 PROCEDURE — 6360000002 HC RX W HCPCS: Performed by: EMERGENCY MEDICINE

## 2022-03-14 PROCEDURE — 96374 THER/PROPH/DIAG INJ IV PUSH: CPT

## 2022-03-14 PROCEDURE — 6360000004 HC RX CONTRAST MEDICATION: Performed by: RADIOLOGY

## 2022-03-14 PROCEDURE — 2580000003 HC RX 258: Performed by: RADIOLOGY

## 2022-03-14 PROCEDURE — 87088 URINE BACTERIA CULTURE: CPT

## 2022-03-14 PROCEDURE — 84484 ASSAY OF TROPONIN QUANT: CPT

## 2022-03-14 PROCEDURE — 2580000003 HC RX 258: Performed by: EMERGENCY MEDICINE

## 2022-03-14 PROCEDURE — 81001 URINALYSIS AUTO W/SCOPE: CPT

## 2022-03-14 PROCEDURE — 74177 CT ABD & PELVIS W/CONTRAST: CPT

## 2022-03-14 RX ORDER — METRONIDAZOLE 500 MG/1
2000 TABLET ORAL ONCE
Status: COMPLETED | OUTPATIENT
Start: 2022-03-14 | End: 2022-03-14

## 2022-03-14 RX ORDER — SODIUM CHLORIDE, SODIUM LACTATE, POTASSIUM CHLORIDE, AND CALCIUM CHLORIDE .6; .31; .03; .02 G/100ML; G/100ML; G/100ML; G/100ML
1000 INJECTION, SOLUTION INTRAVENOUS ONCE
Status: COMPLETED | OUTPATIENT
Start: 2022-03-14 | End: 2022-03-14

## 2022-03-14 RX ORDER — PANTOPRAZOLE SODIUM 40 MG/1
40 TABLET, DELAYED RELEASE ORAL
Qty: 30 TABLET | Refills: 0 | Status: SHIPPED | OUTPATIENT
Start: 2022-03-14 | End: 2022-10-28 | Stop reason: ALTCHOICE

## 2022-03-14 RX ORDER — ONDANSETRON 2 MG/ML
4 INJECTION INTRAMUSCULAR; INTRAVENOUS ONCE
Status: COMPLETED | OUTPATIENT
Start: 2022-03-14 | End: 2022-03-14

## 2022-03-14 RX ORDER — SUCRALFATE 1 G/1
1 TABLET ORAL 4 TIMES DAILY
Qty: 56 TABLET | Refills: 0 | Status: SHIPPED | OUTPATIENT
Start: 2022-03-14 | End: 2022-10-28 | Stop reason: ALTCHOICE

## 2022-03-14 RX ORDER — MORPHINE SULFATE 4 MG/ML
4 INJECTION, SOLUTION INTRAMUSCULAR; INTRAVENOUS ONCE
Status: COMPLETED | OUTPATIENT
Start: 2022-03-14 | End: 2022-03-14

## 2022-03-14 RX ORDER — SODIUM CHLORIDE 0.9 % (FLUSH) 0.9 %
10 SYRINGE (ML) INJECTION PRN
Status: COMPLETED | OUTPATIENT
Start: 2022-03-14 | End: 2022-03-14

## 2022-03-14 RX ORDER — LIDOCAINE 50 MG/G
1 PATCH TOPICAL DAILY
Qty: 10 PATCH | Refills: 0 | Status: SHIPPED | OUTPATIENT
Start: 2022-03-14 | End: 2022-03-24

## 2022-03-14 RX ADMIN — IOPAMIDOL 75 ML: 755 INJECTION, SOLUTION INTRAVENOUS at 01:40

## 2022-03-14 RX ADMIN — METRONIDAZOLE 2000 MG: 500 TABLET ORAL at 02:26

## 2022-03-14 RX ADMIN — SODIUM CHLORIDE, PRESERVATIVE FREE 10 ML: 5 INJECTION INTRAVENOUS at 01:37

## 2022-03-14 RX ADMIN — ONDANSETRON 4 MG: 2 INJECTION INTRAMUSCULAR; INTRAVENOUS at 02:20

## 2022-03-14 RX ADMIN — LIDOCAINE HYDROCHLORIDE: 20 SOLUTION ORAL; TOPICAL at 00:28

## 2022-03-14 RX ADMIN — MORPHINE SULFATE 4 MG: 4 INJECTION, SOLUTION INTRAMUSCULAR; INTRAVENOUS at 02:20

## 2022-03-14 RX ADMIN — FAMOTIDINE 20 MG: 10 INJECTION, SOLUTION INTRAVENOUS at 02:20

## 2022-03-14 RX ADMIN — SODIUM CHLORIDE, POTASSIUM CHLORIDE, SODIUM LACTATE AND CALCIUM CHLORIDE 1000 ML: 600; 310; 30; 20 INJECTION, SOLUTION INTRAVENOUS at 00:28

## 2022-03-14 ASSESSMENT — PAIN SCALES - GENERAL: PAINLEVEL_OUTOF10: 8

## 2022-03-14 NOTE — ED PROVIDER NOTES
HPI:  3/14/22,   Time: 12:14 AM EDT       Domenica Lee is a 37 y.o. female presenting to the ED for abdominal pain and leg pain, beginning 1 day ago. The complaint has been persistent, mild in severity, and worsened by nothing. The patient states over the last day she has been having epigastric abdominal pain. She describes a sharp stabbing pain. She states her symptoms started after eating spaghetti. She states she has had some nausea and vomiting associated with it that is nonbilious and nonbloody. Denies any diarrhea. Normal bowel movement today. No chest pain or shortness of breath. No fevers or chills. No cough congestion. No numbness or tingling. The patient states that she has also had leg pain over the last 2 weeks. She states it has been persistent with sharp pain starting in her low back and shooting down both legs. No difficulty with ambulation. No falls or injuries. No trauma. No urinary symptoms. No saddle anesthesias urinary or fecal incontinence. No urinary retention. No fevers or chills. No recent illnesses. Review of Systems:   Pertinent positives and negatives are stated within HPI, all other systems reviewed and are negative.          --------------------------------------------- PAST HISTORY ---------------------------------------------  Past Medical History:  has a past medical history of Asthma, Bronchitis, Movement disorder, Seizure disorder ([facility] [address] [facility] Suicidal overdose ([facility] Tobacco abuse, and Tobacco abuse. Past Surgical History:  has a past surgical history that includes fracture surgery; Cholecystectomy; Rotator cuff repair; and Upper gastrointestinal endoscopy (N/A, 5/3/2021). Social History:  reports that she has been smoking cigarettes. She has a 0.30 pack-year smoking history. She has never used smokeless tobacco. She reports current alcohol use. She reports that she does not use drugs.     Family History: family history includes Cancer in her mother. The patients home medications have been reviewed. Allergies: Tramadol, Darvocet [propoxyphene n-acetaminophen], Ibuprofen, Ketorolac, Meloxicam, and Naproxen        ---------------------------------------------------PHYSICAL EXAM--------------------------------------    Constitutional/General: Alert and oriented x3, well appearing, non toxic in NAD  Head: Normocephalic and atraumatic  Eyes: PERRL, EOMI, conjunctive normal, sclera non icteric  Mouth: Oropharynx clear, handling secretions, no trismus, no asymmetry of the posterior oropharynx or uvular edema  Neck: Supple, full ROM, non tender to palpation in the midline, no stridor, no crepitus, no meningeal signs  Respiratory: Lungs clear to auscultation bilaterally, no wheezes, rales, or rhonchi. Not in respiratory distress  Cardiovascular:  Regular rate. Regular rhythm. No murmurs, gallops, or rubs. 2+ distal pulses  GI:  Abdomen Soft, tenderness palpation diffusely but worse in the epigastric region, Non distended. +BS. No organomegaly, no palpable masses,  No rebound, guarding, or rigidity. Musculoskeletal: Moves all extremities x 4. Warm and well perfused, no clubbing, cyanosis, or edema. Capillary refill <3 seconds. 2+ pulses to bilateral lower extremities and upper extremities. Sensation and muscle strength intact to bilateral lower extremities  Integument: skin warm and dry. No rashes. Neurologic: GCS 15, no focal deficits, symmetric strength 5/5 in the upper and lower extremities bilaterally, normal sensation all 4 extremities, cranial nerves II to XII intact  Psychiatric: Normal Affect    -------------------------------------------------- RESULTS -------------------------------------------------  I have personally reviewed all laboratory and imaging results for this patient. Results are listed below.      LABS:  Results for orders placed or performed during the hospital encounter of 03/14/22   CBC with Auto Differential Leukocyte Esterase, Urine Negative Negative    WBC, UA 0-1 0 - 5 /HPF    RBC, UA NONE 0 - 2 /HPF    Bacteria, UA RARE (A) None Seen /HPF    Trichomonas, UA Present (A) None Seen /HPF   Troponin   Result Value Ref Range    Troponin, High Sensitivity <6 0 - 9 ng/L   POC Pregnancy Urine   Result Value Ref Range    HCG, Urine, POC Negative Negative    Lot Number [id number]     Positive QC Pass/Fail Pass     Negative QC Pass/Fail Pass    EKG 12 Lead   Result Value Ref Range    Ventricular Rate 95 BPM    Atrial Rate 95 BPM    P-R Interval 136 ms    QRS Duration 70 ms    Q-T Interval 356 ms    QTc Calculation (Bazett) 447 ms    P Axis 49 degrees    R Axis 64 degrees    T Axis 26 degrees       RADIOLOGY:  Interpreted by Radiologist.  CT ABDOMEN PELVIS W IV CONTRAST Additional Contrast? None   Final Result   No definitive findings to explain the patient's abdominal pain. Bilateral hip joint effusions. Bilateral hip joint degenerative changes. Cholecystectomy. EKG:  This EKG is signed and interpreted by the EP. EKG shows normal sinus rhythm at 94 bpm.  Normal axis. Normal QRS. Nonspecific ST-T wave changes noted secondary to artifact in V6. No STEMI.    ------------------------- NURSING NOTES AND VITALS REVIEWED ---------------------------   The nursing notes within the ED encounter and vital signs as below have been reviewed by myself. BP (!) 163/105   Pulse 84   Temp 98.4 °F (36.9 °C) (Oral)   Resp 16   Ht 4' 11\" (1.499 m)   Wt 169 lb (76.7 kg)   SpO2 100%   BMI 34.13 kg/m²   Oxygen Saturation Interpretation: Normal    The patients available past medical records and past encounters were reviewed.         ------------------------------ ED COURSE/MEDICAL DECISION MAKING----------------------  Medications   famotidine (PEPCID) injection 20 mg (has no administration in time range)   morphine sulfate (PF) injection 4 mg (has no administration in time range)   ondansetron (ZOFRAN) injection 4 mg (has no administration in time range)   lactated ringers bolus (0 mLs IntraVENous Stopped 3/14/22 0128)   aluminum & magnesium hydroxide-simethicone (MAALOX) 30 mL, lidocaine viscous hcl (XYLOCAINE) 5 mL (GI COCKTAIL) ( Oral Given 3/14/22 0028)   iopamidol (ISOVUE-370) 76 % injection 75 mL (75 mLs IntraVENous Given 3/14/22 0140)   sodium chloride flush 0.9 % injection 10 mL (10 mLs IntraVENous Given 3/14/22 8377)         ED COURSE:       Medical Decision Making: This is a 56-year-old female presents to the ED for abdominal pain. Patient underwent laboratory work-up which showed a normal CBC. Normal chemistry except for bicarb of 21 and alkaline phosphatase of 125. Lipase normal urinalysis did show trichomonas. Otherwise negative. Troponin EKG showed no ischemic findings. Pregnancy test negative. CT of the pelvis showed no definitive findings to explain the patient's abdominal pain. Patient incidentally noted to have bilateral hip effusions and degenerative changes. On reevaluation after GI cocktail patient is feeling much better. Patient was given IV fluids. Counseled patient on CT findings as well as bilateral hip effusions. No recent illnesses. No fevers or chills. No difficulty with ambulation. Neurovascular intact to bilateral lower extremities. No overlying erythema or redness. Full range of motion intact. Therefore patient will have close monitoring on outpatient basis. Patient was started on Protonix and Carafate for likely gastritis versus optic ulcer disease. Trichomonas treated with Flagyl as a one-time dose here in the emergency department. Strict return precautions given. Patient agrees with plan.     I, Dr. Ladonna Valdes, am the primary provider for this encounter    This patient's ED course included: a personal history and physicial examination, re-evaluation prior to disposition, multiple bedside re-evaluations, IV medications, cardiac monitoring and continuous pulse oximetry    This patient has remained hemodynamically stable during their ED course. Re-Evaluations:             Re-evaluation. Patients symptoms are improving    Counseling: The emergency provider has spoken with the patient and discussed todays results, in addition to providing specific details for the plan of care and counseling regarding the diagnosis and prognosis. Questions are answered at this time and they are agreeable with the plan.       --------------------------------- IMPRESSION AND DISPOSITION ---------------------------------    IMPRESSION  1. Abdominal pain, epigastric    2. Effusion of both hip joints        DISPOSITION  Disposition: Discharge to home  Patient condition is stable    NOTE: This report was transcribed using voice recognition software.  Every effort was made to ensure accuracy; however, inadvertent computerized transcription errors may be present        Dylon Sy DO  03/14/22 1998

## 2022-03-16 LAB — URINE CULTURE, ROUTINE: NORMAL

## 2022-04-20 ENCOUNTER — HOSPITAL ENCOUNTER (EMERGENCY)
Age: 42
Discharge: HOME | End: 2022-04-20
Attending: STUDENT IN AN ORGANIZED HEALTH CARE EDUCATION/TRAINING PROGRAM
Payer: MEDICAID

## 2022-04-20 ENCOUNTER — APPOINTMENT (OUTPATIENT)
Dept: GENERAL RADIOLOGY | Age: 42
End: 2022-04-20
Payer: MEDICAID

## 2022-04-20 VITALS
TEMPERATURE: 98.9 F | RESPIRATION RATE: 18 BRPM | BODY MASS INDEX: 29.23 KG/M2 | HEART RATE: 97 BPM | DIASTOLIC BLOOD PRESSURE: 102 MMHG | SYSTOLIC BLOOD PRESSURE: 184 MMHG | WEIGHT: 145 LBS | HEIGHT: 59 IN | OXYGEN SATURATION: 97 %

## 2022-04-20 DIAGNOSIS — M25.559 HIP PAIN: Primary | ICD-10-CM

## 2022-04-20 LAB
ALBUMIN SERPL-MCNC: 4 G/DL (ref 3.5–5.2)
ALP BLD-CCNC: 108 U/L (ref 35–104)
ALT SERPL-CCNC: 15 U/L (ref 0–32)
ANION GAP SERPL CALCULATED.3IONS-SCNC: 8 MMOL/L (ref 7–16)
AST SERPL-CCNC: 19 U/L (ref 0–31)
BASOPHILS ABSOLUTE: 0.03 E9/L (ref 0–0.2)
BASOPHILS RELATIVE PERCENT: 0.4 % (ref 0–2)
BILIRUB SERPL-MCNC: 0.3 MG/DL (ref 0–1.2)
BUN BLDV-MCNC: 16 MG/DL (ref 6–20)
CALCIUM SERPL-MCNC: 9.4 MG/DL (ref 8.6–10.2)
CHLORIDE BLD-SCNC: 102 MMOL/L (ref 98–107)
CO2: 26 MMOL/L (ref 22–29)
CREAT SERPL-MCNC: 1 MG/DL (ref 0.5–1)
EKG ATRIAL RATE: 65 BPM
EKG P AXIS: 53 DEGREES
EKG P-R INTERVAL: 122 MS
EKG Q-T INTERVAL: 396 MS
EKG QRS DURATION: 86 MS
EKG QTC CALCULATION (BAZETT): 411 MS
EKG R AXIS: 84 DEGREES
EKG T AXIS: 59 DEGREES
EKG VENTRICULAR RATE: 65 BPM
EOSINOPHILS ABSOLUTE: 0.08 E9/L (ref 0.05–0.5)
EOSINOPHILS RELATIVE PERCENT: 1 % (ref 0–6)
GFR AFRICAN AMERICAN: >60
GFR NON-AFRICAN AMERICAN: >60 ML/MIN/1.73
GLUCOSE BLD-MCNC: 100 MG/DL (ref 74–99)
HCG, URINE, POC: NEGATIVE
HCT VFR BLD CALC: 41.9 % (ref 34–48)
HEMOGLOBIN: 13.2 G/DL (ref 11.5–15.5)
IMMATURE GRANULOCYTES #: 0.04 E9/L
IMMATURE GRANULOCYTES %: 0.5 % (ref 0–5)
INFLUENZA A BY PCR: NOT DETECTED
INFLUENZA B BY PCR: NOT DETECTED
LYMPHOCYTES ABSOLUTE: 1.72 E9/L (ref 1.5–4)
LYMPHOCYTES RELATIVE PERCENT: 20.9 % (ref 20–42)
Lab: NORMAL
MCH RBC QN AUTO: 25 PG (ref 26–35)
MCHC RBC AUTO-ENTMCNC: 31.5 % (ref 32–34.5)
MCV RBC AUTO: 79.5 FL (ref 80–99.9)
MONOCYTES ABSOLUTE: 0.81 E9/L (ref 0.1–0.95)
MONOCYTES RELATIVE PERCENT: 9.8 % (ref 2–12)
NEGATIVE QC PASS/FAIL: NORMAL
NEUTROPHILS ABSOLUTE: 5.55 E9/L (ref 1.8–7.3)
NEUTROPHILS RELATIVE PERCENT: 67.4 % (ref 43–80)
PDW BLD-RTO: 14.1 FL (ref 11.5–15)
PLATELET # BLD: 252 E9/L (ref 130–450)
PMV BLD AUTO: 9.6 FL (ref 7–12)
POSITIVE QC PASS/FAIL: NORMAL
POTASSIUM SERPL-SCNC: 4.7 MMOL/L (ref 3.5–5)
RBC # BLD: 5.27 E12/L (ref 3.5–5.5)
SARS-COV-2, NAAT: NOT DETECTED
SODIUM BLD-SCNC: 136 MMOL/L (ref 132–146)
TOTAL CK: 133 U/L (ref 20–180)
TOTAL PROTEIN: 7.8 G/DL (ref 6.4–8.3)
TROPONIN, HIGH SENSITIVITY: <6 NG/L (ref 0–9)
WBC # BLD: 8.2 E9/L (ref 4.5–11.5)

## 2022-04-20 PROCEDURE — 87635 SARS-COV-2 COVID-19 AMP PRB: CPT

## 2022-04-20 PROCEDURE — 90714 TD VACC NO PRESV 7 YRS+ IM: CPT | Performed by: STUDENT IN AN ORGANIZED HEALTH CARE EDUCATION/TRAINING PROGRAM

## 2022-04-20 PROCEDURE — 82550 ASSAY OF CK (CPK): CPT

## 2022-04-20 PROCEDURE — 36415 COLL VENOUS BLD VENIPUNCTURE: CPT

## 2022-04-20 PROCEDURE — 6360000002 HC RX W HCPCS: Performed by: STUDENT IN AN ORGANIZED HEALTH CARE EDUCATION/TRAINING PROGRAM

## 2022-04-20 PROCEDURE — 80053 COMPREHEN METABOLIC PANEL: CPT

## 2022-04-20 PROCEDURE — 90471 IMMUNIZATION ADMIN: CPT | Performed by: STUDENT IN AN ORGANIZED HEALTH CARE EDUCATION/TRAINING PROGRAM

## 2022-04-20 PROCEDURE — 93005 ELECTROCARDIOGRAM TRACING: CPT | Performed by: NURSE PRACTITIONER

## 2022-04-20 PROCEDURE — 71046 X-RAY EXAM CHEST 2 VIEWS: CPT

## 2022-04-20 PROCEDURE — 96372 THER/PROPH/DIAG INJ SC/IM: CPT

## 2022-04-20 PROCEDURE — 99285 EMERGENCY DEPT VISIT HI MDM: CPT

## 2022-04-20 PROCEDURE — 84484 ASSAY OF TROPONIN QUANT: CPT

## 2022-04-20 PROCEDURE — 87502 INFLUENZA DNA AMP PROBE: CPT

## 2022-04-20 PROCEDURE — 85025 COMPLETE CBC W/AUTO DIFF WBC: CPT

## 2022-04-20 PROCEDURE — 96375 TX/PRO/DX INJ NEW DRUG ADDON: CPT

## 2022-04-20 PROCEDURE — 96374 THER/PROPH/DIAG INJ IV PUSH: CPT

## 2022-04-20 RX ORDER — TETANUS AND DIPHTHERIA TOXOIDS ADSORBED 2; 2 [LF]/.5ML; [LF]/.5ML
0.5 INJECTION INTRAMUSCULAR ONCE
Status: COMPLETED | OUTPATIENT
Start: 2022-04-20 | End: 2022-04-20

## 2022-04-20 RX ORDER — DEXAMETHASONE SODIUM PHOSPHATE 10 MG/ML
6 INJECTION, SOLUTION INTRAMUSCULAR; INTRAVENOUS ONCE
Status: COMPLETED | OUTPATIENT
Start: 2022-04-20 | End: 2022-04-20

## 2022-04-20 RX ORDER — MORPHINE SULFATE 4 MG/ML
4 INJECTION, SOLUTION INTRAMUSCULAR; INTRAVENOUS ONCE
Status: COMPLETED | OUTPATIENT
Start: 2022-04-20 | End: 2022-04-20

## 2022-04-20 RX ORDER — GABAPENTIN 600 MG/1
600 TABLET ORAL DAILY
Status: ON HOLD | COMMUNITY
End: 2022-10-31 | Stop reason: HOSPADM

## 2022-04-20 RX ORDER — TIZANIDINE 4 MG/1
4 TABLET ORAL EVERY 8 HOURS PRN
COMMUNITY

## 2022-04-20 RX ADMIN — MORPHINE SULFATE 4 MG: 4 INJECTION, SOLUTION INTRAMUSCULAR; INTRAVENOUS at 18:33

## 2022-04-20 RX ADMIN — TETANUS AND DIPHTHERIA TOXOIDS ADSORBED 0.5 ML: 2; 2 INJECTION INTRAMUSCULAR at 18:37

## 2022-04-20 RX ADMIN — DEXAMETHASONE SODIUM PHOSPHATE 6 MG: 10 INJECTION, SOLUTION INTRAMUSCULAR; INTRAVENOUS at 18:35

## 2022-04-20 ASSESSMENT — PAIN DESCRIPTION - DESCRIPTORS
DESCRIPTORS_2: ACHING
DESCRIPTORS: SHARP
DESCRIPTORS: ACHING;BURNING;STABBING;TINGLING

## 2022-04-20 ASSESSMENT — PAIN DESCRIPTION - ORIENTATION
ORIENTATION: MID
ORIENTATION: RIGHT;LEFT
ORIENTATION_2: RIGHT;LEFT

## 2022-04-20 ASSESSMENT — PAIN DESCRIPTION - LOCATION
LOCATION_2: LEG
LOCATION: HIP;LEG
LOCATION: CHEST

## 2022-04-20 ASSESSMENT — PAIN DESCRIPTION - FREQUENCY: FREQUENCY: CONTINUOUS

## 2022-04-20 ASSESSMENT — PAIN DESCRIPTION - INTENSITY: RATING_2: 10

## 2022-04-20 ASSESSMENT — PAIN - FUNCTIONAL ASSESSMENT
PAIN_FUNCTIONAL_ASSESSMENT: 0-10
PAIN_FUNCTIONAL_ASSESSMENT: ACTIVITIES ARE NOT PREVENTED

## 2022-04-20 ASSESSMENT — PAIN SCALES - GENERAL
PAINLEVEL_OUTOF10: 10
PAINLEVEL_OUTOF10: 10

## 2022-04-20 ASSESSMENT — PAIN DESCRIPTION - PAIN TYPE: TYPE: ACUTE PAIN

## 2022-04-20 NOTE — ED NOTES
Department of Emergency Medicine  FIRST PROVIDER TRIAGE NOTE             Independent MLP           4/20/22  4:05 PM EDT    Date of Encounter: 4/20/22   MRN: [id number]      HPI: Rashad Heredia is a 37 y.o. female who presents to the ED for Chest Pain (mid sternal chest pain for 2 days, non radiating), Cough (Productive yellowish), and Leg Pain (Bilat pain for 1 month)       ROS: Negative for fever abd pain    PE: Gen Appearance/Constitutional: alert     Initial Plan of Care: All treatment areas with department are currently occupied. Plan to order/Initiate the following while awaiting opening in ED: labs, EKG and imaging studies.   Initiate Treatment-Testing, Proceed toTreatment Area When Bed Available for ED Attending/CEZAR to Continue Care    Electronically signed by MARK Pelayo CNP   DD: 4/20/22       MARK Ortiz CNP  04/20/22 0732

## 2022-04-20 NOTE — ED NOTES
Patient stated they already went to the bathroom unable to go      [place], [place]  04/20/22 2073

## 2022-04-20 NOTE — ED PROVIDER NOTES
ED  Provider Note  Admit Date/RoomTime: 4/20/2022  5:20 PM  ED Room: [address] [place] [address]      History of Present Illness:  4/20/22, Time: 5:21 PM EDT  Chief Complaint   Patient presents with    Chest Pain     mid sternal chest pain for 2 days, non radiating    Cough     Productive yellowish    Leg Pain     Bilat pain for 1 month         Anders Atkinson is a 37 y.o. female presenting to the ED for bilateral hip pain, months duration, severe pain, no relief with gabapentin or OTC meds, no weakness or numbness in legs, No weakness or numbness of lower extremities, no saddle anaesthesia, no bowel or bladder incontinence, no urinary retention. Patient has no personal history of DVT/PE, no unilateral leg swelling or edema/erythema, no recent surgeries or immobilizations, no active cancer. Denies CP, arm/jaw/back pain, no groin pain, no SOB, no c/c/r, no f/c  Onset: gradual   Timing: worsening    Duration: months  Associated symptoms: no radiation of pain, no dysuria or hematuria or dysuria   Severity: severe   Exacerbated by: movement   Relieved by: no relief with OTC meds    Requests tetanus shot for recent exposure     Review of Systems:   A complete review of systems was performed and pertinent positives and negatives are stated within HPI, all other systems reviewed and are negative.        --------------------------------------------- PATIENT HISTORY--------------------------------------------  Past Medical History:  has a past medical history of Asthma, Bronchitis, Movement disorder, Seizure disorder ([facility] [address] Seizure disorder ([facility] [address] [facility] Tobacco abuse, and Tobacco abuse. Past Surgical History:  has a past surgical history that includes fracture surgery; Cholecystectomy; Rotator cuff repair; and Upper gastrointestinal endoscopy (N/A, 5/3/2021). Family History: family history includes Cancer in her mother. . Unless otherwise noted, family history is non contributory    Social History:  reports that she has been smoking cigarettes. She has a 0.30 pack-year smoking history. She has never used smokeless tobacco. She reports current alcohol use. She reports that she does not use drugs. The patients home medications have been reviewed. Allergies: Tramadol, Darvocet [propoxyphene n-acetaminophen], Ibuprofen, Ketorolac, Meloxicam, and Naproxen    I have reviewed the past medical history, past surgical history, social history, and family history    ---------------------------------------------------PHYSICAL EXAM--------------------------------------    Constitutional/General: Alert and oriented x3  Head: Normocephalic and atraumatic  Eyes: PERRL, EOMI, sclera non icteric  ENT: Oropharynx clear, handling secretions, no trismus  Neck: Supple, full ROM, no stridor, no meningismus  Respiratory: no distress  Cardiovascular: RRR, no R/G/M, 2+ peripheral pulses  Chest: No chest wall tenderness, equal chest rise  Gastrointestinal:  sntnd  Musculoskeletal: Extremities warm and well perfused, moving all extremities  Skin: skin warm and dry. No rashes. Neurologic: No focal deficits, strength and sensation grossly intact   Psychiatric: Normal Affect, behavior normal           -------------------------------------------------- RESULTS -------------------------------------------------  I have personally reviewed all laboratory and imaging results for this patient. Results are listed below.      LABS: (Lab results interpreted by me)  Results for orders placed or performed during the hospital encounter of 04/20/22   COVID-19, Rapid    Specimen: Nasopharyngeal Swab   Result Value Ref Range    SARS-CoV-2, NAAT Not Detected Not Detected   Rapid influenza A/B antigens    Specimen: Nares   Result Value Ref Range    Influenza A by PCR Not Detected Not Detected    Influenza B by PCR Not Detected Not Detected   CBC with Auto Differential   Result Value Ref Range    WBC 8.2 4.5 - 11.5 E9/L    RBC 5.27 3.50 - 5.50 E12/L    Hemoglobin 13.2 11.5 - 15.5 g/dL    Hematocrit 41.9 34.0 - 48.0 %    MCV 79.5 (L) 80.0 - 99.9 fL    MCH 25.0 (L) 26.0 - 35.0 pg    MCHC 31.5 (L) 32.0 - 34.5 %    RDW 14.1 11.5 - 15.0 fL    Platelets 908 474 - 362 E9/L    MPV 9.6 7.0 - 12.0 fL    Neutrophils % 67.4 43.0 - 80.0 %    Immature Granulocytes % 0.5 0.0 - 5.0 %    Lymphocytes % 20.9 20.0 - 42.0 %    Monocytes % 9.8 2.0 - 12.0 %    Eosinophils % 1.0 0.0 - 6.0 %    Basophils % 0.4 0.0 - 2.0 %    Neutrophils Absolute 5.55 1.80 - 7.30 E9/L    Immature Granulocytes # 0.04 E9/L    Lymphocytes Absolute 1.72 1.50 - 4.00 E9/L    Monocytes Absolute 0.81 0.10 - 0.95 E9/L    Eosinophils Absolute 0.08 0.05 - 0.50 E9/L    Basophils Absolute 0.03 0.00 - 0.20 E9/L   Comprehensive Metabolic Panel   Result Value Ref Range    Sodium 136 132 - 146 mmol/L    Potassium 4.7 3.5 - 5.0 mmol/L    Chloride 102 98 - 107 mmol/L    CO2 26 22 - 29 mmol/L    Anion Gap 8 7 - 16 mmol/L    Glucose 100 (H) 74 - 99 mg/dL    BUN 16 6 - 20 mg/dL    CREATININE 1.0 0.5 - 1.0 mg/dL    GFR Non-African American >60 >=60 mL/min/1.73    GFR African American >60     Calcium 9.4 8.6 - 10.2 mg/dL    Total Protein 7.8 6.4 - 8.3 g/dL    Albumin 4.0 3.5 - 5.2 g/dL    Total Bilirubin 0.3 0.0 - 1.2 mg/dL    Alkaline Phosphatase 108 (H) 35 - 104 U/L    ALT 15 0 - 32 U/L    AST 19 0 - 31 U/L   Troponin   Result Value Ref Range    Troponin, High Sensitivity <6 0 - 9 ng/L   CK   Result Value Ref Range    Total  20 - 180 U/L   POC Pregnancy Urine Qual   Result Value Ref Range    HCG, Urine, POC Negative Negative    Lot Number [id number]     Positive QC Pass/Fail Pass     Negative QC Pass/Fail Pass    EKG 12 Lead   Result Value Ref Range    Ventricular Rate 65 BPM    Atrial Rate 65 BPM    P-R Interval 122 ms    QRS Duration 86 ms    Q-T Interval 396 ms    QTc Calculation (Bazett) 411 ms    P Axis 53 degrees    R Axis 84 degrees    T Axis 59 degrees   ,       RADIOLOGY:  Imaging interpreted by Radiologist unless otherwise specified  XR CHEST (2 VW)   Final Result   No acute process. ------------------------- NURSING NOTES AND VITALS REVIEWED ---------------------------  The nursing notes within the ED encounter and vital signs as below have been reviewed by myself  BP (!) 184/102   Pulse 97   Temp 98.9 °F (37.2 °C)   Resp 18   Ht 4' 11\" (1.499 m)   Wt 145 lb (65.8 kg)   LMP 03/12/2022   SpO2 97%   BMI 29.29 kg/m²      The patients available past medical records and past encounters were reviewed. ------------------------------ ED COURSE/MEDICAL DECISION MAKING----------------------  Medications   diptheria-tetanus toxoids [facility]) 2-2 LF/0.5ML injection 0.5 mL (0.5 mLs IntraMUSCular Given 4/20/22 1837)   morphine sulfate (PF) injection 4 mg (4 mg IntraVENous Given 4/20/22 1833)   dexamethasone (PF) (DECADRON) injection 6 mg (6 mg IntraVENous Given 4/20/22 1835)       I, Dr. Mable Gomez, am the primary provider of record    Medical Decision Making:   Hip pain, no signs of cauda equina or conus medullaris syndrome. Ambulated in ED WOD. Will attempt pain control here in the ED and discharge to already scheduled ortho follow-up. Patient reports she is already scheduled to see ortho for possible hip replacement 2/2 longstanding osteo. Patient declines urinalysis. Pain improved with morphine and steroids in ED           ED Counseling: This emergency provider has spoken with the patient and any family present to discuss clinical status, results, plan of care, diagnosis and prognosis as able to be determined at this time. Any questions were answered and patient and/or family/POA are agreeable with the plan.       --------------------------------- IMPRESSION AND DISPOSITION ---------------------------------    IMPRESSION  1. Hip pain        DISPOSITION  Disposition: Discharge to home  Patient condition is good      This report was transcribed using voice recognition software.  Every effort was made to ensure accuracy; however, transcription errors may be present.          Soila Ch MD  04/20/22 [id number]

## 2022-04-22 VITALS
RESPIRATION RATE: 18 BRPM | HEIGHT: 59 IN | DIASTOLIC BLOOD PRESSURE: 110 MMHG | WEIGHT: 145 LBS | HEART RATE: 92 BPM | BODY MASS INDEX: 29.23 KG/M2 | SYSTOLIC BLOOD PRESSURE: 168 MMHG | OXYGEN SATURATION: 100 % | TEMPERATURE: 97.5 F

## 2022-04-22 LAB
ALBUMIN SERPL-MCNC: 3.8 G/DL (ref 3.5–5.2)
ALP BLD-CCNC: 105 U/L (ref 35–104)
ALT SERPL-CCNC: 14 U/L (ref 0–32)
ANION GAP SERPL CALCULATED.3IONS-SCNC: 8 MMOL/L (ref 7–16)
AST SERPL-CCNC: 14 U/L (ref 0–31)
BACTERIA: ABNORMAL /HPF
BASOPHILS ABSOLUTE: 0.04 E9/L (ref 0–0.2)
BASOPHILS RELATIVE PERCENT: 0.4 % (ref 0–2)
BILIRUB SERPL-MCNC: <0.2 MG/DL (ref 0–1.2)
BILIRUBIN URINE: NEGATIVE
BLOOD, URINE: NEGATIVE
BUN BLDV-MCNC: 16 MG/DL (ref 6–20)
CALCIUM SERPL-MCNC: 9.4 MG/DL (ref 8.6–10.2)
CHLORIDE BLD-SCNC: 103 MMOL/L (ref 98–107)
CLARITY: CLEAR
CO2: 23 MMOL/L (ref 22–29)
COLOR: YELLOW
CREAT SERPL-MCNC: 0.8 MG/DL (ref 0.5–1)
EOSINOPHILS ABSOLUTE: 0.07 E9/L (ref 0.05–0.5)
EOSINOPHILS RELATIVE PERCENT: 0.7 % (ref 0–6)
GFR AFRICAN AMERICAN: >60
GFR NON-AFRICAN AMERICAN: >60 ML/MIN/1.73
GLUCOSE BLD-MCNC: 99 MG/DL (ref 74–99)
GLUCOSE URINE: NEGATIVE MG/DL
HCT VFR BLD CALC: 39.3 % (ref 34–48)
HEMOGLOBIN: 12.5 G/DL (ref 11.5–15.5)
IMMATURE GRANULOCYTES #: 0.06 E9/L
IMMATURE GRANULOCYTES %: 0.6 % (ref 0–5)
KETONES, URINE: NEGATIVE MG/DL
LEUKOCYTE ESTERASE, URINE: NEGATIVE
LYMPHOCYTES ABSOLUTE: 1.71 E9/L (ref 1.5–4)
LYMPHOCYTES RELATIVE PERCENT: 18.1 % (ref 20–42)
MCH RBC QN AUTO: 25.2 PG (ref 26–35)
MCHC RBC AUTO-ENTMCNC: 31.8 % (ref 32–34.5)
MCV RBC AUTO: 79.1 FL (ref 80–99.9)
MONOCYTES ABSOLUTE: 0.81 E9/L (ref 0.1–0.95)
MONOCYTES RELATIVE PERCENT: 8.6 % (ref 2–12)
NEUTROPHILS ABSOLUTE: 6.74 E9/L (ref 1.8–7.3)
NEUTROPHILS RELATIVE PERCENT: 71.6 % (ref 43–80)
NITRITE, URINE: NEGATIVE
PDW BLD-RTO: 14.3 FL (ref 11.5–15)
PH UA: 6 (ref 5–9)
PLATELET # BLD: 265 E9/L (ref 130–450)
PMV BLD AUTO: 9.7 FL (ref 7–12)
POTASSIUM SERPL-SCNC: 4.7 MMOL/L (ref 3.5–5)
PROTEIN UA: NEGATIVE MG/DL
RBC # BLD: 4.97 E12/L (ref 3.5–5.5)
RBC UA: ABNORMAL /HPF (ref 0–2)
SODIUM BLD-SCNC: 134 MMOL/L (ref 132–146)
SPECIFIC GRAVITY UA: 1.02 (ref 1–1.03)
TOTAL PROTEIN: 7.4 G/DL (ref 6.4–8.3)
UROBILINOGEN, URINE: 0.2 E.U./DL
WBC # BLD: 9.4 E9/L (ref 4.5–11.5)
WBC UA: ABNORMAL /HPF (ref 0–5)

## 2022-04-22 PROCEDURE — 81001 URINALYSIS AUTO W/SCOPE: CPT

## 2022-04-22 PROCEDURE — 80053 COMPREHEN METABOLIC PANEL: CPT

## 2022-04-22 PROCEDURE — 99283 EMERGENCY DEPT VISIT LOW MDM: CPT

## 2022-04-22 PROCEDURE — 85025 COMPLETE CBC W/AUTO DIFF WBC: CPT

## 2022-04-22 RX ORDER — ACETAMINOPHEN 325 MG/1
650 TABLET ORAL ONCE
Status: DISCONTINUED | OUTPATIENT
Start: 2022-04-22 | End: 2022-04-23 | Stop reason: HOSPADM

## 2022-04-22 ASSESSMENT — PAIN - FUNCTIONAL ASSESSMENT: PAIN_FUNCTIONAL_ASSESSMENT: 0-10

## 2022-04-22 ASSESSMENT — PAIN DESCRIPTION - FREQUENCY: FREQUENCY: CONTINUOUS

## 2022-04-22 ASSESSMENT — PAIN DESCRIPTION - DESCRIPTORS: DESCRIPTORS: CRAMPING

## 2022-04-22 ASSESSMENT — PAIN DESCRIPTION - LOCATION: LOCATION: LEG

## 2022-04-22 ASSESSMENT — PAIN SCALES - GENERAL: PAINLEVEL_OUTOF10: 10

## 2022-04-22 ASSESSMENT — PAIN DESCRIPTION - ORIENTATION: ORIENTATION: RIGHT;LEFT

## 2022-04-22 NOTE — ED NOTES
Department of Emergency Medicine  FIRST PROVIDER TRIAGE NOTE             Independent MLP           4/22/22  6:49 PM EDT    Date of Encounter: 4/22/22   MRN: [id number]      HPI: Michael Colin is a 37 y.o. female who presents to the ED for Leg Pain (patient c/o 10/10 leg pain in BLE. stated she was seen here a few days ago for the same thing. D/C'd home, pain getting worse making it difficult to ambulate, called her DrYudith and told to return to ER)       Patient is a 42-year-old is presenting with left hip pain. Patient is due for hip replacement and was seen 2 days ago was \"supposed to be admitted per her orthopedic. \"  Patient states she called her orthopedic again today who asked that she be admitted for pain control and possible early surgery    ROS: Negative for cp, sob, abd pain, back pain or fever. PE: Gen Appearance/Constitutional: alert  HEENT: NC/NT. PERRLA,  Airway patent. Neck: supple     Initial Plan of Care: All treatment areas with department are currently occupied. Plan to order/Initiate the following while awaiting opening in ED: labs.   Initiate Treatment-Testing, Proceed toTreatment Area When Bed Available for ED Attending/MLP to Continue Care    Electronically signed by Noralee Hodgkins, PA-C   DD: 4/22/22         Noralee Hodgkins, PA-C  04/22/22 [id number]

## 2022-04-23 ENCOUNTER — HOSPITAL ENCOUNTER (EMERGENCY)
Age: 42
Discharge: LEFT BEFORE DISCHARGE | End: 2022-04-23
Payer: MEDICAID

## 2022-04-24 ENCOUNTER — HOSPITAL ENCOUNTER (EMERGENCY)
Age: 42
Discharge: HOME | End: 2022-04-24
Payer: MEDICAID

## 2022-04-24 VITALS
HEART RATE: 77 BPM | WEIGHT: 145 LBS | SYSTOLIC BLOOD PRESSURE: 155 MMHG | OXYGEN SATURATION: 100 % | BODY MASS INDEX: 29.23 KG/M2 | RESPIRATION RATE: 16 BRPM | HEIGHT: 59 IN | DIASTOLIC BLOOD PRESSURE: 89 MMHG | TEMPERATURE: 98 F

## 2022-04-24 DIAGNOSIS — M25.552 CHRONIC PAIN OF BOTH HIPS: Primary | ICD-10-CM

## 2022-04-24 DIAGNOSIS — M25.551 CHRONIC PAIN OF BOTH HIPS: Primary | ICD-10-CM

## 2022-04-24 DIAGNOSIS — G89.29 CHRONIC PAIN OF BOTH HIPS: Primary | ICD-10-CM

## 2022-04-24 PROCEDURE — 6360000002 HC RX W HCPCS: Performed by: PHYSICIAN ASSISTANT

## 2022-04-24 PROCEDURE — 99284 EMERGENCY DEPT VISIT MOD MDM: CPT

## 2022-04-24 PROCEDURE — 96372 THER/PROPH/DIAG INJ SC/IM: CPT

## 2022-04-24 RX ORDER — HYDROCODONE BITARTRATE AND ACETAMINOPHEN 5; 325 MG/1; MG/1
1 TABLET ORAL EVERY 6 HOURS PRN
Qty: 12 TABLET | Refills: 0 | Status: SHIPPED | OUTPATIENT
Start: 2022-04-24 | End: 2022-04-27

## 2022-04-24 RX ORDER — MORPHINE SULFATE 4 MG/ML
8 INJECTION, SOLUTION INTRAMUSCULAR; INTRAVENOUS ONCE
Status: COMPLETED | OUTPATIENT
Start: 2022-04-24 | End: 2022-04-24

## 2022-04-24 RX ADMIN — MORPHINE SULFATE 8 MG: 4 INJECTION, SOLUTION INTRAMUSCULAR; INTRAVENOUS at 17:06

## 2022-04-24 ASSESSMENT — PAIN SCALES - GENERAL
PAINLEVEL_OUTOF10: 10
PAINLEVEL_OUTOF10: 10

## 2022-04-24 ASSESSMENT — PAIN DESCRIPTION - ORIENTATION
ORIENTATION: RIGHT;LEFT
ORIENTATION: RIGHT;LEFT

## 2022-04-24 ASSESSMENT — PAIN DESCRIPTION - LOCATION
LOCATION: HIP
LOCATION: HIP

## 2022-04-24 ASSESSMENT — PAIN - FUNCTIONAL ASSESSMENT: PAIN_FUNCTIONAL_ASSESSMENT: 0-10

## 2022-04-24 ASSESSMENT — PAIN DESCRIPTION - PAIN TYPE: TYPE: CHRONIC PAIN

## 2022-04-24 NOTE — ED NOTES
Pt to ed by ems for chronic hip pain. Pt has had recent xrays and ct scan. No new injury pt states pain is worse. Pt states was told to go to er to be admitted to have a  Hip replacement.      Matt Sanchez RN  04/24/22 [id number]

## 2022-04-24 NOTE — ED PROVIDER NOTES
repair; and Upper gastrointestinal endoscopy (N/A, 5/3/2021). Social History:  reports that she has been smoking cigarettes. She has a 0.30 pack-year smoking history. She has never used smokeless tobacco. She reports current alcohol use. She reports that she does not use drugs. Family History: family history includes Cancer in her mother. Allergies: Tramadol, Darvocet [propoxyphene n-acetaminophen], Ibuprofen, Ketorolac, Meloxicam, and Naproxen    PHYSICAL EXAM   Oxygen Saturation Interpretation: Normal on room air analysis. ED Triage Vitals   BP Temp Temp src Pulse Resp SpO2 Height Weight   -- -- -- -- -- -- -- --         Physical Exam  Constitutional/General: Alert and oriented x3, well appearing, non toxic  HEENT:  NC/NT. PERRLA,  Airway patent. Neck: Supple, full ROM, non tender to palpation in the midline, no stridor, no crepitus, no meningeal signs  Respiratory: Lungs clear to auscultation bilaterally, no wheezes, rales, or rhonchi. Not in respiratory distress  CV:  Regular rate. Regular rhythm. No murmurs, gallops, or rubs. 2+ distal pulses  Chest: No chest wall tenderness  GI:  Abdomen Soft, Non tender, Non distended. +BS. No rebound, guarding, or rigidity. No pulsatile masses. Musculoskeletal: Moves all extremities x 4. Warm and well perfused, no clubbing, cyanosis, or edema. Capillary refill <3 seconds  Neurologic: GCS 15, no focal deficits, symmetric strength 5/5 in the upper and lower extremities bilaterally    Lab / Imaging Results   (All laboratory and radiology results have been personally reviewed by myself)  Labs:  No results found for this visit on 04/24/22. Imaging: All Radiology results interpreted by Radiologist unless otherwise noted. No orders to display       ED Course / Medical Decision Making     Medications   morphine sulfate (PF) injection 8 mg (8 mg IntraMUSCular Given 4/24/22 1706)          MDM:   Patient presents with ongoing bilateral hip pain.   Recent images performed demonstrate bilateral degenerative changes somewhat greater on right than left. There are no objective findings warranting additional imaging or hospitalization at this time. Patient received pain medication in the ED. She was informed that due to the ongoing nature of her pains that she may not be admitted to the hospital for an initial orthopedic evaluation to discuss long-term treatment rather she will need to follow-up with her primary care doctor who ever has arranged or is arranging orthopedic evaluation as an outpatient. I will provide her a few days worth of pain medication but she was instructed to contact her primary care doctor for any additional medications that may be needed while she is waiting for orthopedic evaluation. Patient is able to ambulate but with some discomfort. Plan of Care/Counseling:  Juancarlos Lau reviewed today's visit with the patient in addition to providing specific details for the plan of care and counseling regarding the diagnosis and prognosis. Questions are answered at this time and are agreeable with the plan. ASSESSMENT     1. Chronic pain of both hips      PLAN   Discharged home. Patient condition is good     New Medications     New Prescriptions    HYDROCODONE-ACETAMINOPHEN (NORCO) 5-325 MG PER TABLET    Take 1 tablet by mouth every 6 hours as needed for Pain for up to 3 days. Electronically signed by MANUELA Lau   DD: 4/24/22  **This report was transcribed using voice recognition software. Every effort was made to ensure accuracy; however, inadvertent computerized transcription errors may be present.   END OF ED PROVIDER NOTE       Juancarlos Aquino  04/24/22 4805

## 2022-05-09 ENCOUNTER — HOSPITAL ENCOUNTER (EMERGENCY)
Age: 42
Discharge: HOME | End: 2022-05-09
Attending: EMERGENCY MEDICINE
Payer: MEDICAID

## 2022-05-09 VITALS
BODY MASS INDEX: 28.22 KG/M2 | OXYGEN SATURATION: 99 % | TEMPERATURE: 97.3 F | HEIGHT: 59 IN | SYSTOLIC BLOOD PRESSURE: 154 MMHG | WEIGHT: 140 LBS | HEART RATE: 99 BPM | DIASTOLIC BLOOD PRESSURE: 78 MMHG | RESPIRATION RATE: 14 BRPM

## 2022-05-09 DIAGNOSIS — M25.552 LEFT HIP PAIN: Primary | ICD-10-CM

## 2022-05-09 PROCEDURE — 99283 EMERGENCY DEPT VISIT LOW MDM: CPT

## 2022-05-09 PROCEDURE — 6370000000 HC RX 637 (ALT 250 FOR IP): Performed by: EMERGENCY MEDICINE

## 2022-05-09 RX ORDER — MORPHINE SULFATE 4 MG/ML
4 INJECTION, SOLUTION INTRAMUSCULAR; INTRAVENOUS ONCE
Status: DISCONTINUED | OUTPATIENT
Start: 2022-05-09 | End: 2022-05-09

## 2022-05-09 RX ORDER — ORPHENADRINE CITRATE 100 MG/1
100 TABLET, EXTENDED RELEASE ORAL 2 TIMES DAILY
Qty: 10 TABLET | Refills: 0 | Status: SHIPPED | OUTPATIENT
Start: 2022-05-09 | End: 2022-05-14

## 2022-05-09 RX ORDER — OXYCODONE HYDROCHLORIDE AND ACETAMINOPHEN 5; 325 MG/1; MG/1
1 TABLET ORAL ONCE
Status: COMPLETED | OUTPATIENT
Start: 2022-05-09 | End: 2022-05-09

## 2022-05-09 RX ORDER — OXYCODONE HYDROCHLORIDE AND ACETAMINOPHEN 5; 325 MG/1; MG/1
1 TABLET ORAL EVERY 6 HOURS PRN
Qty: 12 TABLET | Refills: 0 | Status: SHIPPED | OUTPATIENT
Start: 2022-05-09 | End: 2022-05-12

## 2022-05-09 RX ORDER — ORPHENADRINE CITRATE 30 MG/ML
60 INJECTION INTRAMUSCULAR; INTRAVENOUS ONCE
Status: DISCONTINUED | OUTPATIENT
Start: 2022-05-09 | End: 2022-05-09

## 2022-05-09 RX ADMIN — OXYCODONE AND ACETAMINOPHEN 1 TABLET: 5; 325 TABLET ORAL at 08:09

## 2022-05-09 ASSESSMENT — ENCOUNTER SYMPTOMS
DIARRHEA: 0
SORE THROAT: 0
NAUSEA: 0
VOMITING: 0
EYE PAIN: 0
SINUS PAIN: 0
BACK PAIN: 0
SHORTNESS OF BREATH: 0
ABDOMINAL PAIN: 0

## 2022-05-09 ASSESSMENT — PAIN SCALES - GENERAL
PAINLEVEL_OUTOF10: 10
PAINLEVEL_OUTOF10: 10

## 2022-05-09 ASSESSMENT — PAIN DESCRIPTION - DESCRIPTORS: DESCRIPTORS: DISCOMFORT

## 2022-05-09 ASSESSMENT — PAIN DESCRIPTION - LOCATION
LOCATION: HIP
LOCATION: HIP

## 2022-05-09 ASSESSMENT — PAIN DESCRIPTION - ORIENTATION
ORIENTATION: LEFT
ORIENTATION: LEFT

## 2022-05-09 ASSESSMENT — PAIN - FUNCTIONAL ASSESSMENT: PAIN_FUNCTIONAL_ASSESSMENT: PREVENTS OR INTERFERES SOME ACTIVE ACTIVITIES AND ADLS

## 2022-05-09 NOTE — Clinical Note
Ramon Jolly was seen and treated in our emergency department on 5/9/2022. She may return to work on 05/13/2022. If you have any questions or concerns, please don't hesitate to call.       Daniel Hernandez, DO

## 2022-05-09 NOTE — ED PROVIDER NOTES
Chief Complaint   Patient presents with    Leg Pain     L hip/leg pain for months, was told needs replacement, has appt. Tues. 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 She describes the pain as sharp and burning radiating from her left lateral hip to her knee anteriorly, movement and palpation make it worse, not associate with numbness, tingling, weakness. It has been ongoing for the past 4 months and she believes it is worsening recently, she tried the Percocet that helped and Tylenol and ibuprofen did not work. She has been using ice and heat as well. She is not complaining of any red flag symptoms such as low back pain, recent injury, saddle anesthesia or urinary retention. She is supposed to have an appointment with orthopedics tomorrow to discuss joint replacement. Prior CT scans that demonstrated bilateral joint effusions in addition to worsening degenerative changes. Review of Systems   Constitutional: Negative for chills and fever. HENT: Negative for ear pain, sinus pain and sore throat. Eyes: Negative for pain. Respiratory: Negative for shortness of breath. Cardiovascular: Negative for chest pain. Gastrointestinal: Negative for abdominal pain, diarrhea, nausea and vomiting. Genitourinary: Negative for flank pain and pelvic pain. Musculoskeletal: Negative for back pain and neck pain. Bilateral hip pain worse on the left   Skin: Negative for rash. Neurological: Negative for seizures and headaches. Hematological: Negative for adenopathy. All other systems reviewed and are negative. Physical Exam  Vitals and nursing note reviewed. Constitutional:       Appearance: She is well-developed. HENT:      Head: Normocephalic and atraumatic.       Right Ear: External ear normal.      Left Ear: External ear normal. Nose: Nose normal.      Mouth/Throat:      Mouth: Mucous membranes are moist.      Pharynx: Oropharynx is clear. Eyes:      Pupils: Pupils are equal, round, and reactive to light. Cardiovascular:      Rate and Rhythm: Normal rate and regular rhythm. Heart sounds: Normal heart sounds. No murmur heard. Pulmonary:      Effort: Pulmonary effort is normal.      Breath sounds: Normal breath sounds. Abdominal:      General: Bowel sounds are normal.      Palpations: Abdomen is soft. Tenderness: There is no abdominal tenderness. There is no guarding or rebound. Musculoskeletal:         General: Tenderness present. No swelling. Cervical back: Normal range of motion and neck supple. Skin:     General: Skin is warm and dry. Neurological:      Mental Status: She is alert and oriented to person, place, and time. Cranial Nerves: No cranial nerve deficit. Sensory: No sensory deficit. Motor: No weakness. Gait: Gait normal.          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 She is hemodynamically stable on arrival to emergency department. Physical exam is reassuring, she is neurovascularly intact. Red flag symptoms are absent. Patient does have follow-up with orthopedics tomorrow although she is unsure who the doctor is, prior CT scans demonstrated bilateral hip joint effusions and chronic degenerative changes. We will manage symptomatically and advised follow-up with primary care and to the Ortho appointment tomorrow. ED Course as of 05/09/22 Johnathon Frazier 85 May 09, 2022   1972 Discussed with Dr. Sanjuanita Hernandez, he states that he did not want the patient admitted for pain and that he has arranged follow-up for her for tomorrow.   He says he was very resistant to getting joint injections in the past and is finally agreeable to it. [MM]      ED Course User Index  [MM] Shusol Parsons DO        ED Course as of 05/09/22 1645   Mon May 09, 2022   8934 Discussed with Dr. Dat Huerta, he states that he did not want the patient admitted for pain and that he has arranged follow-up for her for tomorrow. He says he was very resistant to getting joint injections in the past and is finally agreeable to it. [MM]      ED Course User Index  [MM] Shu Fees        --------------------------------------------- PAST HISTORY ---------------------------------------------  Past Medical History:  has a past medical history of Asthma, Bronchitis, Movement disorder, Seizure disorder ([facility] [address] Seizure disorder ([facility] [address] Suicidal overdose ([facility] Tobacco abuse, and Tobacco abuse. Past Surgical History:  has a past surgical history that includes fracture surgery; Cholecystectomy; Rotator cuff repair; and Upper gastrointestinal endoscopy (N/A, 5/3/2021). Social History:  reports that she has been smoking cigarettes. She has a 0.30 pack-year smoking history. She has never used smokeless tobacco. She reports current alcohol use. She reports that she does not use drugs. Family History: family history includes Cancer in her mother. The patients home medications have been reviewed. Allergies: Tramadol, Darvocet [propoxyphene n-acetaminophen], Ibuprofen, Ketorolac, Meloxicam, and Naproxen    -------------------------------------------------- RESULTS -------------------------------------------------  Labs:  No results found for this visit on 05/09/22. Radiology:  No orders to display       ------------------------- NURSING NOTES AND VITALS REVIEWED ---------------------------  Date / Time Roomed:  5/9/2022  7:24 AM  ED Bed Assignment:  29/29    The nursing notes within the ED encounter and vital signs as below have been reviewed.    BP (!) 154/78   Pulse 99   Temp 97.3 °F (36.3 °C) (Temporal)   Resp 14   Ht 4' 11\" (1.499 m)   Wt 140 lb (63.5 kg)   SpO2 99%   BMI 28.28 kg/m²   Oxygen Saturation Interpretation: Normal      ------------------------------------------ PROGRESS NOTES ------------------------------------------  I have spoken with the patient and discussed todays results, in addition to providing specific details for the plan of care and counseling regarding the diagnosis and prognosis. Their questions are answered at this time and they are agreeable with the plan. I discussed at length with them reasons for immediate return here for re evaluation. They will followup with primary care by calling their office tomorrow. --------------------------------- ADDITIONAL PROVIDER NOTES ---------------------------------  At this time the patient is without objective evidence of an acute process requiring hospitalization or inpatient management. They have remained hemodynamically stable throughout their entire ED visit and are stable for discharge with outpatient follow-up. The plan has been discussed in detail and they are aware of the specific conditions for emergent return, as well as the importance of follow-up. Discharge Medication List as of 5/9/2022  7:59 AM      START taking these medications    Details   orphenadrine (NORFLEX) 100 MG extended release tablet Take 1 tablet by mouth 2 times daily for 5 days, Disp-10 tablet, R-0Print      oxyCODONE-acetaminophen (PERCOCET) 5-325 MG per tablet Take 1 tablet by mouth every 6 hours as needed for Pain for up to 3 days. Intended supply: 3 days. Take lowest dose possible to manage pain, Disp-12 tablet, R-0Print             Diagnosis:  1. Left hip pain        Disposition:  Patient's disposition: Discharge to home  Patient's condition is stable.          Nellene Bamberger, DO  Resident  05/09/22 4342

## 2022-07-15 ENCOUNTER — APPOINTMENT (OUTPATIENT)
Dept: GENERAL RADIOLOGY | Age: 42
End: 2022-07-15
Payer: MEDICAID

## 2022-07-15 ENCOUNTER — HOSPITAL ENCOUNTER (EMERGENCY)
Age: 42
Discharge: HOME | End: 2022-07-15
Attending: EMERGENCY MEDICINE
Payer: MEDICAID

## 2022-07-15 ENCOUNTER — APPOINTMENT (OUTPATIENT)
Dept: CT IMAGING | Age: 42
End: 2022-07-15
Payer: MEDICAID

## 2022-07-15 VITALS
OXYGEN SATURATION: 99 % | RESPIRATION RATE: 18 BRPM | TEMPERATURE: 98.2 F | HEART RATE: 102 BPM | DIASTOLIC BLOOD PRESSURE: 99 MMHG | BODY MASS INDEX: 35.14 KG/M2 | SYSTOLIC BLOOD PRESSURE: 155 MMHG | WEIGHT: 174 LBS

## 2022-07-15 DIAGNOSIS — S02.2XXA CLOSED FRACTURE OF NASAL BONE, INITIAL ENCOUNTER: ICD-10-CM

## 2022-07-15 DIAGNOSIS — S09.90XA INJURY OF HEAD, INITIAL ENCOUNTER: ICD-10-CM

## 2022-07-15 DIAGNOSIS — S00.83XA CONTUSION OF FACE, INITIAL ENCOUNTER: ICD-10-CM

## 2022-07-15 DIAGNOSIS — S43.402A SPRAIN OF LEFT SHOULDER, UNSPECIFIED SHOULDER SPRAIN TYPE, INITIAL ENCOUNTER: ICD-10-CM

## 2022-07-15 DIAGNOSIS — R56.9 SEIZURE (HCC): Primary | ICD-10-CM

## 2022-07-15 LAB
ACETAMINOPHEN LEVEL: <5 MCG/ML (ref 10–30)
ALBUMIN SERPL-MCNC: 4.2 G/DL (ref 3.5–5.2)
ALP BLD-CCNC: 106 U/L (ref 35–104)
ALT SERPL-CCNC: 15 U/L (ref 0–32)
AMPHETAMINE SCREEN, URINE: NOT DETECTED
ANION GAP SERPL CALCULATED.3IONS-SCNC: 15 MMOL/L (ref 7–16)
AST SERPL-CCNC: 20 U/L (ref 0–31)
BACTERIA: NORMAL /HPF
BARBITURATE SCREEN URINE: NOT DETECTED
BASOPHILS ABSOLUTE: 0.02 E9/L (ref 0–0.2)
BASOPHILS RELATIVE PERCENT: 0.2 % (ref 0–2)
BENZODIAZEPINE SCREEN, URINE: NOT DETECTED
BILIRUB SERPL-MCNC: 0.2 MG/DL (ref 0–1.2)
BILIRUBIN URINE: NEGATIVE
BLOOD, URINE: ABNORMAL
BUN BLDV-MCNC: 12 MG/DL (ref 6–20)
CALCIUM SERPL-MCNC: 8.7 MG/DL (ref 8.6–10.2)
CANNABINOID SCREEN URINE: NOT DETECTED
CARBAMAZEPINE DOSE: ABNORMAL
CARBAMAZEPINE LEVEL: <2 MCG/ML (ref 4–10)
CHLORIDE BLD-SCNC: 103 MMOL/L (ref 98–107)
CLARITY: ABNORMAL
CO2: 18 MMOL/L (ref 22–29)
COCAINE METABOLITE SCREEN URINE: POSITIVE
COLOR: ABNORMAL
CREAT SERPL-MCNC: 0.9 MG/DL (ref 0.5–1)
EKG ATRIAL RATE: 97 BPM
EKG P AXIS: 58 DEGREES
EKG P-R INTERVAL: 144 MS
EKG Q-T INTERVAL: 360 MS
EKG QRS DURATION: 84 MS
EKG QTC CALCULATION (BAZETT): 457 MS
EKG R AXIS: 78 DEGREES
EKG T AXIS: 46 DEGREES
EKG VENTRICULAR RATE: 97 BPM
EOSINOPHILS ABSOLUTE: 0.08 E9/L (ref 0.05–0.5)
EOSINOPHILS RELATIVE PERCENT: 0.9 % (ref 0–6)
ETHANOL: 114 MG/DL (ref 0–0.08)
FENTANYL SCREEN, URINE: NOT DETECTED
GFR AFRICAN AMERICAN: >60
GFR NON-AFRICAN AMERICAN: >60 ML/MIN/1.73
GLUCOSE BLD-MCNC: 84 MG/DL (ref 74–99)
GLUCOSE URINE: NEGATIVE MG/DL
HCT VFR BLD CALC: 39.3 % (ref 34–48)
HEMOGLOBIN: 12.6 G/DL (ref 11.5–15.5)
IMMATURE GRANULOCYTES #: 0.04 E9/L
IMMATURE GRANULOCYTES %: 0.5 % (ref 0–5)
KETONES, URINE: NEGATIVE MG/DL
LEUKOCYTE ESTERASE, URINE: NEGATIVE
LYMPHOCYTES ABSOLUTE: 1.85 E9/L (ref 1.5–4)
LYMPHOCYTES RELATIVE PERCENT: 21.8 % (ref 20–42)
Lab: ABNORMAL
MCH RBC QN AUTO: 24 PG (ref 26–35)
MCHC RBC AUTO-ENTMCNC: 32.1 % (ref 32–34.5)
MCV RBC AUTO: 75 FL (ref 80–99.9)
METHADONE SCREEN, URINE: NOT DETECTED
MONOCYTES ABSOLUTE: 0.69 E9/L (ref 0.1–0.95)
MONOCYTES RELATIVE PERCENT: 8.1 % (ref 2–12)
NEUTROPHILS ABSOLUTE: 5.82 E9/L (ref 1.8–7.3)
NEUTROPHILS RELATIVE PERCENT: 68.5 % (ref 43–80)
NITRITE, URINE: NEGATIVE
OPIATE SCREEN URINE: NOT DETECTED
OXYCODONE URINE: NOT DETECTED
PDW BLD-RTO: 17.8 FL (ref 11.5–15)
PH UA: 5.5 (ref 5–9)
PHENCYCLIDINE SCREEN URINE: NOT DETECTED
PLATELET # BLD: 243 E9/L (ref 130–450)
PMV BLD AUTO: 10.2 FL (ref 7–12)
POTASSIUM SERPL-SCNC: 4 MMOL/L (ref 3.5–5)
PROTEIN UA: 30 MG/DL
RBC # BLD: 5.24 E12/L (ref 3.5–5.5)
RBC UA: NORMAL /HPF (ref 0–2)
SALICYLATE, SERUM: <0.3 MG/DL (ref 0–30)
SODIUM BLD-SCNC: 136 MMOL/L (ref 132–146)
SPECIFIC GRAVITY UA: 1.01 (ref 1–1.03)
TOTAL PROTEIN: 8.1 G/DL (ref 6.4–8.3)
TRICYCLIC ANTIDEPRESSANTS SCREEN SERUM: NEGATIVE NG/ML
UROBILINOGEN, URINE: 0.2 E.U./DL
WBC # BLD: 8.5 E9/L (ref 4.5–11.5)
WBC UA: NORMAL /HPF (ref 0–5)

## 2022-07-15 PROCEDURE — 6360000004 HC RX CONTRAST MEDICATION: Performed by: STUDENT IN AN ORGANIZED HEALTH CARE EDUCATION/TRAINING PROGRAM

## 2022-07-15 PROCEDURE — 80307 DRUG TEST PRSMV CHEM ANLYZR: CPT

## 2022-07-15 PROCEDURE — 6370000000 HC RX 637 (ALT 250 FOR IP): Performed by: EMERGENCY MEDICINE

## 2022-07-15 PROCEDURE — 70450 CT HEAD/BRAIN W/O DYE: CPT

## 2022-07-15 PROCEDURE — 70486 CT MAXILLOFACIAL W/O DYE: CPT

## 2022-07-15 PROCEDURE — 82077 ASSAY SPEC XCP UR&BREATH IA: CPT

## 2022-07-15 PROCEDURE — 93005 ELECTROCARDIOGRAM TRACING: CPT | Performed by: EMERGENCY MEDICINE

## 2022-07-15 PROCEDURE — 71101 X-RAY EXAM UNILAT RIBS/CHEST: CPT

## 2022-07-15 PROCEDURE — 73030 X-RAY EXAM OF SHOULDER: CPT

## 2022-07-15 PROCEDURE — 99285 EMERGENCY DEPT VISIT HI MDM: CPT

## 2022-07-15 PROCEDURE — 80156 ASSAY CARBAMAZEPINE TOTAL: CPT

## 2022-07-15 PROCEDURE — 80143 DRUG ASSAY ACETAMINOPHEN: CPT

## 2022-07-15 PROCEDURE — 70498 CT ANGIOGRAPHY NECK: CPT

## 2022-07-15 PROCEDURE — 80053 COMPREHEN METABOLIC PANEL: CPT

## 2022-07-15 PROCEDURE — 81001 URINALYSIS AUTO W/SCOPE: CPT

## 2022-07-15 PROCEDURE — 85025 COMPLETE CBC W/AUTO DIFF WBC: CPT

## 2022-07-15 PROCEDURE — 72125 CT NECK SPINE W/O DYE: CPT

## 2022-07-15 PROCEDURE — 80179 DRUG ASSAY SALICYLATE: CPT

## 2022-07-15 RX ORDER — OXYCODONE HYDROCHLORIDE AND ACETAMINOPHEN 5; 325 MG/1; MG/1
1 TABLET ORAL ONCE
Status: COMPLETED | OUTPATIENT
Start: 2022-07-15 | End: 2022-07-15

## 2022-07-15 RX ORDER — PENICILLIN V POTASSIUM 500 MG/1
500 TABLET ORAL 4 TIMES DAILY
Qty: 40 TABLET | Refills: 0 | Status: SHIPPED | OUTPATIENT
Start: 2022-07-15 | End: 2022-07-25

## 2022-07-15 RX ORDER — CARBAMAZEPINE 100 MG/1
200 TABLET, CHEWABLE ORAL ONCE
Status: COMPLETED | OUTPATIENT
Start: 2022-07-15 | End: 2022-07-15

## 2022-07-15 RX ORDER — ACETAMINOPHEN 325 MG/1
650 TABLET ORAL ONCE
Status: COMPLETED | OUTPATIENT
Start: 2022-07-15 | End: 2022-07-15

## 2022-07-15 RX ORDER — PENICILLIN V POTASSIUM 250 MG/1
500 TABLET ORAL ONCE
Status: COMPLETED | OUTPATIENT
Start: 2022-07-15 | End: 2022-07-15

## 2022-07-15 RX ADMIN — CARBAMAZEPINE 200 MG: 100 TABLET, CHEWABLE ORAL at 06:51

## 2022-07-15 RX ADMIN — OXYCODONE AND ACETAMINOPHEN 1 TABLET: 5; 325 TABLET ORAL at 08:05

## 2022-07-15 RX ADMIN — IOPAMIDOL 60 ML: 755 INJECTION, SOLUTION INTRAVENOUS at 04:59

## 2022-07-15 RX ADMIN — ACETAMINOPHEN 650 MG: 325 TABLET ORAL at 13:04

## 2022-07-15 RX ADMIN — PENICILLIN V POTASSIUM 500 MG: 250 TABLET, FILM COATED ORAL at 08:05

## 2022-07-15 ASSESSMENT — PAIN DESCRIPTION - DESCRIPTORS: DESCRIPTORS: ACHING

## 2022-07-15 ASSESSMENT — PAIN DESCRIPTION - LOCATION: LOCATION: NECK;HEAD

## 2022-07-15 ASSESSMENT — PAIN SCALES - GENERAL
PAINLEVEL_OUTOF10: 10
PAINLEVEL_OUTOF10: 8

## 2022-07-15 ASSESSMENT — PAIN - FUNCTIONAL ASSESSMENT: PAIN_FUNCTIONAL_ASSESSMENT: 0-10

## 2022-07-15 ASSESSMENT — PAIN DESCRIPTION - ONSET: ONSET: SUDDEN

## 2022-07-15 ASSESSMENT — PAIN DESCRIPTION - PAIN TYPE: TYPE: ACUTE PAIN
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
## 2022-07-15 NOTE — ED NOTES
Ice pack to pt. Pt verbally aggressive at this time demanding to have pain medicine. When pt informed that we must get all her results back to be re-evaled for pain med pt shouted \"I'm not peeing if I can't have pain medicine\" Dr. Harmeet Nieto made aware.       Fang Christianson RN  07/15/22 6659

## 2022-07-15 NOTE — ED NOTES
Radiology Procedure Waiver   Name: Otis Fajardo  : 1980  MRN: [id number]    Date:  7/15/22    Time: 4:21 AM EDT    Benefits of immediately proceeding with Radiology exam(s) without pre-testing outweigh the risks or are not indicated as specified below and therefore the following is/are being waived:    [x] Pregnancy test   [] Patients LMP on-time and regular.   [] Patient had Tubal Ligation or has other Contraception Device. [] Patient  is Menopausal or Premenarcheal.    [] Patient had Full or Partial Hysterectomy. [] Protocol for Iodine allergy    [] MRI Questionnaire     [x] BUN/Creatinine   [] Patient age w/no hx of renal dysfunction. [] Patient on Dialysis. [] Recent Normal Labs.   Electronically signed by Teresa Gomez MD on 7/15/22 at 4:21 AM EDT               Teresa Gomez MD  07/15/22 8958
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
## 2022-07-15 NOTE — ED NOTES
Pt requesting for her sister to be notified that she is here and to see if either her sister or niece could come up. Received phone number of [phone] and names of Nisha Harris- sister and Matthew Maynard. Called number given and no answer and no vm set up. Pt made aware.      Neli Martin RN  07/15/22 6226

## 2022-08-15 ENCOUNTER — HOSPITAL ENCOUNTER (EMERGENCY)
Age: 42
Discharge: HOME | End: 2022-08-15
Attending: EMERGENCY MEDICINE
Payer: MEDICAID

## 2022-08-15 ENCOUNTER — APPOINTMENT (OUTPATIENT)
Dept: GENERAL RADIOLOGY | Age: 42
End: 2022-08-15
Payer: MEDICAID

## 2022-08-15 VITALS
WEIGHT: 138 LBS | BODY MASS INDEX: 27.87 KG/M2 | DIASTOLIC BLOOD PRESSURE: 82 MMHG | HEART RATE: 80 BPM | RESPIRATION RATE: 18 BRPM | TEMPERATURE: 96.5 F | SYSTOLIC BLOOD PRESSURE: 145 MMHG | OXYGEN SATURATION: 98 %

## 2022-08-15 DIAGNOSIS — R07.9 CHEST PAIN, UNSPECIFIED TYPE: Primary | ICD-10-CM

## 2022-08-15 LAB
ANION GAP SERPL CALCULATED.3IONS-SCNC: 14 MMOL/L (ref 7–16)
BASOPHILS ABSOLUTE: 0.04 E9/L (ref 0–0.2)
BASOPHILS RELATIVE PERCENT: 0.5 % (ref 0–2)
BUN BLDV-MCNC: 10 MG/DL (ref 6–20)
CALCIUM SERPL-MCNC: 9.6 MG/DL (ref 8.6–10.2)
CHLORIDE BLD-SCNC: 104 MMOL/L (ref 98–107)
CO2: 22 MMOL/L (ref 22–29)
CREAT SERPL-MCNC: 0.7 MG/DL (ref 0.5–1)
EKG ATRIAL RATE: 88 BPM
EKG P AXIS: 57 DEGREES
EKG P-R INTERVAL: 130 MS
EKG Q-T INTERVAL: 366 MS
EKG QRS DURATION: 82 MS
EKG QTC CALCULATION (BAZETT): 442 MS
EKG R AXIS: 70 DEGREES
EKG T AXIS: 31 DEGREES
EKG VENTRICULAR RATE: 88 BPM
EOSINOPHILS ABSOLUTE: 0.09 E9/L (ref 0.05–0.5)
EOSINOPHILS RELATIVE PERCENT: 1.2 % (ref 0–6)
GFR AFRICAN AMERICAN: >60
GFR NON-AFRICAN AMERICAN: >60 ML/MIN/1.73
GLUCOSE BLD-MCNC: 79 MG/DL (ref 74–99)
HCT VFR BLD CALC: 41.1 % (ref 34–48)
HEMOGLOBIN: 13 G/DL (ref 11.5–15.5)
IMMATURE GRANULOCYTES #: 0.04 E9/L
IMMATURE GRANULOCYTES %: 0.5 % (ref 0–5)
LYMPHOCYTES ABSOLUTE: 1.59 E9/L (ref 1.5–4)
LYMPHOCYTES RELATIVE PERCENT: 21.6 % (ref 20–42)
MCH RBC QN AUTO: 24.5 PG (ref 26–35)
MCHC RBC AUTO-ENTMCNC: 31.6 % (ref 32–34.5)
MCV RBC AUTO: 77.5 FL (ref 80–99.9)
MONOCYTES ABSOLUTE: 0.97 E9/L (ref 0.1–0.95)
MONOCYTES RELATIVE PERCENT: 13.2 % (ref 2–12)
NEUTROPHILS ABSOLUTE: 4.62 E9/L (ref 1.8–7.3)
NEUTROPHILS RELATIVE PERCENT: 63 % (ref 43–80)
PDW BLD-RTO: 18.6 FL (ref 11.5–15)
PLATELET # BLD: 241 E9/L (ref 130–450)
PMV BLD AUTO: 9.7 FL (ref 7–12)
POTASSIUM REFLEX MAGNESIUM: 4.3 MMOL/L (ref 3.5–5)
RBC # BLD: 5.3 E12/L (ref 3.5–5.5)
SODIUM BLD-SCNC: 140 MMOL/L (ref 132–146)
TROPONIN, HIGH SENSITIVITY: <6 NG/L (ref 0–9)
TROPONIN, HIGH SENSITIVITY: <6 NG/L (ref 0–9)
WBC # BLD: 7.4 E9/L (ref 4.5–11.5)

## 2022-08-15 PROCEDURE — 71045 X-RAY EXAM CHEST 1 VIEW: CPT

## 2022-08-15 PROCEDURE — 93005 ELECTROCARDIOGRAM TRACING: CPT | Performed by: EMERGENCY MEDICINE

## 2022-08-15 PROCEDURE — 80048 BASIC METABOLIC PNL TOTAL CA: CPT

## 2022-08-15 PROCEDURE — 99285 EMERGENCY DEPT VISIT HI MDM: CPT

## 2022-08-15 PROCEDURE — 84484 ASSAY OF TROPONIN QUANT: CPT

## 2022-08-15 PROCEDURE — 36415 COLL VENOUS BLD VENIPUNCTURE: CPT

## 2022-08-15 PROCEDURE — 85025 COMPLETE CBC W/AUTO DIFF WBC: CPT

## 2022-08-15 RX ORDER — ASPIRIN 81 MG/1
324 TABLET, CHEWABLE ORAL ONCE
Status: DISCONTINUED | OUTPATIENT
Start: 2022-08-15 | End: 2022-08-15 | Stop reason: HOSPADM

## 2022-08-15 RX ORDER — LABETALOL HYDROCHLORIDE 5 MG/ML
10 INJECTION, SOLUTION INTRAVENOUS ONCE
Status: DISCONTINUED | OUTPATIENT
Start: 2022-08-15 | End: 2022-08-15 | Stop reason: HOSPADM

## 2022-08-15 ASSESSMENT — PAIN - FUNCTIONAL ASSESSMENT: PAIN_FUNCTIONAL_ASSESSMENT: 0-10

## 2022-08-15 ASSESSMENT — PAIN DESCRIPTION - FREQUENCY: FREQUENCY: CONTINUOUS

## 2022-08-15 ASSESSMENT — PAIN DESCRIPTION - DESCRIPTORS: DESCRIPTORS: ACHING

## 2022-08-15 ASSESSMENT — PAIN DESCRIPTION - PAIN TYPE: TYPE: ACUTE PAIN

## 2022-08-15 ASSESSMENT — PAIN DESCRIPTION - LOCATION: LOCATION: CHEST;ABDOMEN

## 2022-08-15 ASSESSMENT — PAIN SCALES - GENERAL: PAINLEVEL_OUTOF10: 8

## 2022-08-15 NOTE — ED PROVIDER NOTES
HPI:  8/15/22,   Time: 11:04 AM EDT         Leopoldo Rams is a 40 y.o. female presenting to the ED for left chest discomfort radiating into her left arm that she describes as \"aggressive\", beginning 2 days ago. The complaint has been intermittent, severe in severity, and worsened by moderate exertion. Patient states she was pain-free and she began to dance and had increasing pain in her left chest that was relieved when she sat. Patient states that she had no nausea or vomiting he was mildly short of breath there is only exertional diaphoresis that resolved spontaneously when she no longer exerted herself and she has had occasional palpitations. ROS:   Pertinent positives and negatives are stated within HPI, all other systems reviewed and are negative.  --------------------------------------------- PAST HISTORY ---------------------------------------------  Past Medical History:  has a past medical history of Asthma, Bronchitis, Movement disorder, Seizure disorder ([facility] [address] Seizure disorder ([facility] [address] [facility] Tobacco abuse, and Tobacco abuse. Past Surgical History:  has a past surgical history that includes fracture surgery; Cholecystectomy; Rotator cuff repair; and Upper gastrointestinal endoscopy (N/A, 5/3/2021). Social History:  reports that she has been smoking cigarettes. She has a 0.30 pack-year smoking history. She has never used smokeless tobacco. She reports current alcohol use. She reports current drug use. Drug: Cocaine. Family History: family history includes Cancer in her mother. The patients home medications have been reviewed.     Allergies: Tramadol, Darvocet [propoxyphene n-acetaminophen], Ibuprofen, Ketorolac, Meloxicam, and Naproxen    -------------------------------------------------- RESULTS -------------------------------------------------  All laboratory and radiology results have been personally reviewed by myself   LABS:  Results for orders placed or performed during the hospital encounter of 08/15/22   CBC with Auto Differential   Result Value Ref Range    WBC 7.4 4.5 - 11.5 E9/L    RBC 5.30 3.50 - 5.50 E12/L    Hemoglobin 13.0 11.5 - 15.5 g/dL    Hematocrit 41.1 34.0 - 48.0 %    MCV 77.5 (L) 80.0 - 99.9 fL    MCH 24.5 (L) 26.0 - 35.0 pg    MCHC 31.6 (L) 32.0 - 34.5 %    RDW 18.6 (H) 11.5 - 15.0 fL    Platelets 023 230 - 067 E9/L    MPV 9.7 7.0 - 12.0 fL    Neutrophils % 63.0 43.0 - 80.0 %    Immature Granulocytes % 0.5 0.0 - 5.0 %    Lymphocytes % 21.6 20.0 - 42.0 %    Monocytes % 13.2 (H) 2.0 - 12.0 %    Eosinophils % 1.2 0.0 - 6.0 %    Basophils % 0.5 0.0 - 2.0 %    Neutrophils Absolute 4.62 1.80 - 7.30 E9/L    Immature Granulocytes # 0.04 E9/L    Lymphocytes Absolute 1.59 1.50 - 4.00 E9/L    Monocytes Absolute 0.97 (H) 0.10 - 0.95 E9/L    Eosinophils Absolute 0.09 0.05 - 0.50 E9/L    Basophils Absolute 0.04 0.00 - 0.20 O5/L   Basic Metabolic Panel w/ Reflex to MG   Result Value Ref Range    Sodium 140 132 - 146 mmol/L    Potassium reflex Magnesium 4.3 3.5 - 5.0 mmol/L    Chloride 104 98 - 107 mmol/L    CO2 22 22 - 29 mmol/L    Anion Gap 14 7 - 16 mmol/L    Glucose 79 74 - 99 mg/dL    BUN 10 6 - 20 mg/dL    Creatinine 0.7 0.5 - 1.0 mg/dL    GFR Non-African American >60 >=60 mL/min/1.73    GFR African American >60     Calcium 9.6 8.6 - 10.2 mg/dL   Troponin   Result Value Ref Range    Troponin, High Sensitivity <6 0 - 9 ng/L   Troponin   Result Value Ref Range    Troponin, High Sensitivity <6 0 - 9 ng/L   EKG 12 Lead   Result Value Ref Range    Ventricular Rate 88 BPM    Atrial Rate 88 BPM    P-R Interval 130 ms    QRS Duration 82 ms    Q-T Interval 366 ms    QTc Calculation (Bazett) 442 ms    P Axis 57 degrees    R Axis 70 degrees    T Axis 31 degrees     EKG: This EKG is signed and interpreted by me.     Rate: 88  Rhythm: Sinus  Interpretation: non-specific EKG  Comparison: Changes in the T waves across the precordium with flattening are new since her last electrocardiogram    RADIOLOGY:  Interpreted by Radiologist.  XR CHEST PORTABLE   Final Result   No acute process. ------------------------- NURSING NOTES AND VITALS REVIEWED ---------------------------   The nursing notes within the ED encounter and vital signs as below have been reviewed. BP (!) 145/82   Pulse 80   Temp (!) 96.5 °F (35.8 °C)   Resp 18   Wt 138 lb (62.6 kg)   SpO2 98%   BMI 27.87 kg/m²   Oxygen Saturation Interpretation: Normal      ---------------------------------------------------PHYSICAL EXAM--------------------------------------      Constitutional/General: Alert and oriented x3, well appearing, non toxic in NAD  Head: NC/AT  Eyes: PERRL, EOMI  Mouth: Oropharynx clear, handling secretions, no trismus  Neck: Supple, full ROM, no meningeal signs  Pulmonary: Lungs clear to auscultation bilaterally, no wheezes, rales, or rhonchi. Not in respiratory distress  Cardiovascular:  Regular rate and rhythm, no murmurs, gallops, or rubs. 2+ distal pulses slightly tender left chest wall  Abdomen: Soft, non tender, non distended,   Extremities: Moves all extremities x 4. Warm and well perfused  Skin: warm and dry without rash  Neurologic: GCS 15,  Psych: Normal Affect      ------------------------------ ED COURSE/MEDICAL DECISION MAKING----------------------  Medications   aspirin chewable tablet 324 mg (324 mg Oral Not Given 8/15/22 1118)   labetalol (NORMODYNE;TRANDATE) injection 10 mg (10 mg IntraVENous Not Given 8/15/22 1234)         Medical Decision Making:    Is observed in the emergency department on a monitor. Patient had no ectopy or increased discomfort. Patient's EKG and lab work was assuring. Patient was discharged to follow-up with her primary care doctor. Patient instructed to return if she had increased pain chest pain shortness of breath. Heart Score was 3 and patient will F/U with PCP        Counseling:    The emergency provider has spoken with the patient and discussed todays results, in addition to providing specific details for the plan of care and counseling regarding the diagnosis and prognosis. Questions are answered at this time and they are agreeable with the plan.      --------------------------------- IMPRESSION AND DISPOSITION ---------------------------------    IMPRESSION  1.  Chest pain, unspecified type        DISPOSITION  Disposition: Discharge to home  Patient condition is stable                  Ford Swanson MD  08/15/22 4045

## 2022-10-14 ENCOUNTER — APPOINTMENT (OUTPATIENT)
Dept: CT IMAGING | Age: 42
End: 2022-10-14
Payer: MEDICAID

## 2022-10-14 ENCOUNTER — APPOINTMENT (OUTPATIENT)
Dept: GENERAL RADIOLOGY | Age: 42
End: 2022-10-14
Payer: MEDICAID

## 2022-10-14 ENCOUNTER — HOSPITAL ENCOUNTER (EMERGENCY)
Age: 42
Discharge: HOME | End: 2022-10-14
Attending: EMERGENCY MEDICINE
Payer: MEDICAID

## 2022-10-14 VITALS
SYSTOLIC BLOOD PRESSURE: 197 MMHG | HEIGHT: 59 IN | DIASTOLIC BLOOD PRESSURE: 93 MMHG | WEIGHT: 136 LBS | BODY MASS INDEX: 27.42 KG/M2 | RESPIRATION RATE: 18 BRPM | HEART RATE: 87 BPM | TEMPERATURE: 98.2 F | OXYGEN SATURATION: 99 %

## 2022-10-14 DIAGNOSIS — R10.13 EPIGASTRIC PAIN: Primary | ICD-10-CM

## 2022-10-14 DIAGNOSIS — M79.605 LEFT LEG PAIN: ICD-10-CM

## 2022-10-14 DIAGNOSIS — I10 ESSENTIAL HYPERTENSION: ICD-10-CM

## 2022-10-14 DIAGNOSIS — M79.604 RIGHT LEG PAIN: ICD-10-CM

## 2022-10-14 LAB
ALBUMIN SERPL-MCNC: 4.5 G/DL (ref 3.5–5.2)
ALP BLD-CCNC: 117 U/L (ref 35–104)
ALT SERPL-CCNC: 27 U/L (ref 0–32)
ANION GAP SERPL CALCULATED.3IONS-SCNC: 14 MMOL/L (ref 7–16)
AST SERPL-CCNC: 34 U/L (ref 0–31)
BACTERIA: ABNORMAL /HPF
BASOPHILS ABSOLUTE: 0.04 E9/L (ref 0–0.2)
BASOPHILS RELATIVE PERCENT: 0.3 % (ref 0–2)
BILIRUB SERPL-MCNC: 0.2 MG/DL (ref 0–1.2)
BILIRUBIN URINE: NEGATIVE
BLOOD, URINE: NEGATIVE
BUN BLDV-MCNC: 13 MG/DL (ref 6–20)
CALCIUM SERPL-MCNC: 9.5 MG/DL (ref 8.6–10.2)
CHLORIDE BLD-SCNC: 102 MMOL/L (ref 98–107)
CLARITY: CLEAR
CO2: 23 MMOL/L (ref 22–29)
COLOR: YELLOW
CREAT SERPL-MCNC: 0.7 MG/DL (ref 0.5–1)
CRYSTALS, UA: ABNORMAL /HPF
EOSINOPHILS ABSOLUTE: 0.12 E9/L (ref 0.05–0.5)
EOSINOPHILS RELATIVE PERCENT: 1 % (ref 0–6)
EPITHELIAL CELLS, UA: ABNORMAL /HPF
GFR AFRICAN AMERICAN: >60
GFR NON-AFRICAN AMERICAN: >60 ML/MIN/1.73
GLUCOSE BLD-MCNC: 80 MG/DL (ref 74–99)
GLUCOSE URINE: NEGATIVE MG/DL
HCG, URINE, POC: NEGATIVE
HCT VFR BLD CALC: 43.9 % (ref 34–48)
HEMOGLOBIN: 14.2 G/DL (ref 11.5–15.5)
IMMATURE GRANULOCYTES #: 0.04 E9/L
IMMATURE GRANULOCYTES %: 0.3 % (ref 0–5)
INR BLD: 1
KETONES, URINE: 15 MG/DL
LEUKOCYTE ESTERASE, URINE: NEGATIVE
LIPASE: 27 U/L (ref 13–60)
LYMPHOCYTES ABSOLUTE: 2.97 E9/L (ref 1.5–4)
LYMPHOCYTES RELATIVE PERCENT: 25.6 % (ref 20–42)
Lab: NORMAL
MCH RBC QN AUTO: 25.5 PG (ref 26–35)
MCHC RBC AUTO-ENTMCNC: 32.3 % (ref 32–34.5)
MCV RBC AUTO: 79 FL (ref 80–99.9)
MONOCYTES ABSOLUTE: 0.83 E9/L (ref 0.1–0.95)
MONOCYTES RELATIVE PERCENT: 7.2 % (ref 2–12)
MUCUS: PRESENT /LPF
NEGATIVE QC PASS/FAIL: NORMAL
NEUTROPHILS ABSOLUTE: 7.59 E9/L (ref 1.8–7.3)
NEUTROPHILS RELATIVE PERCENT: 65.6 % (ref 43–80)
NITRITE, URINE: NEGATIVE
PDW BLD-RTO: 17.4 FL (ref 11.5–15)
PH UA: 6 (ref 5–9)
PLATELET # BLD: 290 E9/L (ref 130–450)
PMV BLD AUTO: 9.1 FL (ref 7–12)
POSITIVE QC PASS/FAIL: NORMAL
POTASSIUM REFLEX MAGNESIUM: 3.7 MMOL/L (ref 3.5–5)
PROTEIN UA: NEGATIVE MG/DL
PROTHROMBIN TIME: 11.9 SEC (ref 9.3–12.4)
RBC # BLD: 5.56 E12/L (ref 3.5–5.5)
RBC UA: ABNORMAL /HPF (ref 0–2)
SODIUM BLD-SCNC: 139 MMOL/L (ref 132–146)
SPECIFIC GRAVITY UA: >=1.03 (ref 1–1.03)
TOTAL PROTEIN: 7.9 G/DL (ref 6.4–8.3)
UROBILINOGEN, URINE: 0.2 E.U./DL
WBC # BLD: 11.6 E9/L (ref 4.5–11.5)
WBC UA: ABNORMAL /HPF (ref 0–5)

## 2022-10-14 PROCEDURE — 96375 TX/PRO/DX INJ NEW DRUG ADDON: CPT

## 2022-10-14 PROCEDURE — 6360000004 HC RX CONTRAST MEDICATION: Performed by: RADIOLOGY

## 2022-10-14 PROCEDURE — 73560 X-RAY EXAM OF KNEE 1 OR 2: CPT

## 2022-10-14 PROCEDURE — 85025 COMPLETE CBC W/AUTO DIFF WBC: CPT

## 2022-10-14 PROCEDURE — 96374 THER/PROPH/DIAG INJ IV PUSH: CPT

## 2022-10-14 PROCEDURE — 6360000002 HC RX W HCPCS: Performed by: EMERGENCY MEDICINE

## 2022-10-14 PROCEDURE — 85610 PROTHROMBIN TIME: CPT

## 2022-10-14 PROCEDURE — 99285 EMERGENCY DEPT VISIT HI MDM: CPT

## 2022-10-14 PROCEDURE — 6360000002 HC RX W HCPCS

## 2022-10-14 PROCEDURE — 83690 ASSAY OF LIPASE: CPT

## 2022-10-14 PROCEDURE — 81001 URINALYSIS AUTO W/SCOPE: CPT

## 2022-10-14 PROCEDURE — 71045 X-RAY EXAM CHEST 1 VIEW: CPT

## 2022-10-14 PROCEDURE — 80053 COMPREHEN METABOLIC PANEL: CPT

## 2022-10-14 PROCEDURE — 74177 CT ABD & PELVIS W/CONTRAST: CPT

## 2022-10-14 PROCEDURE — 36415 COLL VENOUS BLD VENIPUNCTURE: CPT

## 2022-10-14 RX ORDER — KETOROLAC TROMETHAMINE 30 MG/ML
15 INJECTION, SOLUTION INTRAMUSCULAR; INTRAVENOUS ONCE
Status: COMPLETED | OUTPATIENT
Start: 2022-10-14 | End: 2022-10-14

## 2022-10-14 RX ORDER — HYDRALAZINE HYDROCHLORIDE 20 MG/ML
10 INJECTION INTRAMUSCULAR; INTRAVENOUS ONCE
Status: COMPLETED | OUTPATIENT
Start: 2022-10-14 | End: 2022-10-14

## 2022-10-14 RX ORDER — MORPHINE SULFATE 4 MG/ML
4 INJECTION, SOLUTION INTRAMUSCULAR; INTRAVENOUS ONCE
Status: COMPLETED | OUTPATIENT
Start: 2022-10-14 | End: 2022-10-14

## 2022-10-14 RX ORDER — HYDROCHLOROTHIAZIDE 25 MG/1
25 TABLET ORAL DAILY
Qty: 30 TABLET | Refills: 0 | Status: SHIPPED | OUTPATIENT
Start: 2022-10-14 | End: 2022-10-28 | Stop reason: ALTCHOICE

## 2022-10-14 RX ORDER — ONDANSETRON 2 MG/ML
4 INJECTION INTRAMUSCULAR; INTRAVENOUS EVERY 6 HOURS PRN
Status: DISCONTINUED | OUTPATIENT
Start: 2022-10-14 | End: 2022-10-14 | Stop reason: HOSPADM

## 2022-10-14 RX ORDER — DIPHENHYDRAMINE HYDROCHLORIDE 50 MG/ML
25 INJECTION INTRAMUSCULAR; INTRAVENOUS ONCE
Status: COMPLETED | OUTPATIENT
Start: 2022-10-14 | End: 2022-10-14

## 2022-10-14 RX ADMIN — HYDRALAZINE HYDROCHLORIDE 10 MG: 20 INJECTION INTRAMUSCULAR; INTRAVENOUS at 10:35

## 2022-10-14 RX ADMIN — IOPAMIDOL 60 ML: 755 INJECTION, SOLUTION INTRAVENOUS at 09:07

## 2022-10-14 RX ADMIN — ONDANSETRON 4 MG: 2 INJECTION INTRAMUSCULAR; INTRAVENOUS at 07:30

## 2022-10-14 RX ADMIN — KETOROLAC TROMETHAMINE 15 MG: 30 INJECTION, SOLUTION INTRAMUSCULAR at 07:30

## 2022-10-14 RX ADMIN — DIPHENHYDRAMINE HYDROCHLORIDE 25 MG: 50 INJECTION, SOLUTION INTRAMUSCULAR; INTRAVENOUS at 08:55

## 2022-10-14 RX ADMIN — MORPHINE SULFATE 4 MG: 4 INJECTION, SOLUTION INTRAMUSCULAR; INTRAVENOUS at 08:56

## 2022-10-14 ASSESSMENT — ENCOUNTER SYMPTOMS
CHEST TIGHTNESS: 0
BLOOD IN STOOL: 0
NAUSEA: 1
CONSTIPATION: 0
DIARRHEA: 0
SINUS PRESSURE: 0
SHORTNESS OF BREATH: 1
BACK PAIN: 1
ABDOMINAL DISTENTION: 1
COUGH: 0
ABDOMINAL PAIN: 1
VOMITING: 1

## 2022-10-14 ASSESSMENT — PAIN DESCRIPTION - LOCATION: LOCATION: ABDOMEN

## 2022-10-14 ASSESSMENT — PAIN SCALES - GENERAL
PAINLEVEL_OUTOF10: 10
PAINLEVEL_OUTOF10: 10
PAINLEVEL_OUTOF10: 8

## 2022-10-14 ASSESSMENT — PAIN - FUNCTIONAL ASSESSMENT: PAIN_FUNCTIONAL_ASSESSMENT: 0-10
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
## 2022-10-14 NOTE — DISCHARGE INSTRUCTIONS
Please follow-up with your family physician over the next several days and return to this emergency department if her symptoms persist or worsen. Take hydrochlorothiazide for high blood pressure for the next month and follow-up with family physician for this condition. Take Tylenol as needed for persistent pain and drink plenty of fluids.

## 2022-10-14 NOTE — ED NOTES
Per resident Dr Suárez  ok to have meal tray, he will be discharging.      Luther Mallory RN  10/14/22 1018
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
## 2022-10-15 ENCOUNTER — HOSPITAL ENCOUNTER (EMERGENCY)
Age: 42
Discharge: HOME | End: 2022-10-15
Attending: EMERGENCY MEDICINE
Payer: MEDICAID

## 2022-10-15 VITALS
RESPIRATION RATE: 18 BRPM | OXYGEN SATURATION: 93 % | HEART RATE: 95 BPM | TEMPERATURE: 98.7 F | DIASTOLIC BLOOD PRESSURE: 98 MMHG | SYSTOLIC BLOOD PRESSURE: 194 MMHG | HEIGHT: 59 IN | WEIGHT: 135 LBS | BODY MASS INDEX: 27.21 KG/M2

## 2022-10-15 DIAGNOSIS — R11.2 NAUSEA AND VOMITING, UNSPECIFIED VOMITING TYPE: ICD-10-CM

## 2022-10-15 DIAGNOSIS — R10.13 ABDOMINAL PAIN, EPIGASTRIC: Primary | ICD-10-CM

## 2022-10-15 LAB
ALBUMIN SERPL-MCNC: 3.9 G/DL (ref 3.5–5.2)
ALP BLD-CCNC: 116 U/L (ref 35–104)
ALT SERPL-CCNC: 22 U/L (ref 0–32)
ANION GAP SERPL CALCULATED.3IONS-SCNC: 9 MMOL/L (ref 7–16)
AST SERPL-CCNC: 21 U/L (ref 0–31)
BASOPHILS ABSOLUTE: 0.02 E9/L (ref 0–0.2)
BASOPHILS RELATIVE PERCENT: 0.2 % (ref 0–2)
BILIRUB SERPL-MCNC: 0.2 MG/DL (ref 0–1.2)
BILIRUBIN DIRECT: <0.2 MG/DL (ref 0–0.3)
BILIRUBIN, INDIRECT: ABNORMAL MG/DL (ref 0–1)
BUN BLDV-MCNC: 19 MG/DL (ref 6–20)
CALCIUM SERPL-MCNC: 9 MG/DL (ref 8.6–10.2)
CHLORIDE BLD-SCNC: 103 MMOL/L (ref 98–107)
CO2: 25 MMOL/L (ref 22–29)
CREAT SERPL-MCNC: 0.8 MG/DL (ref 0.5–1)
EOSINOPHILS ABSOLUTE: 0.11 E9/L (ref 0.05–0.5)
EOSINOPHILS RELATIVE PERCENT: 1.2 % (ref 0–6)
GFR AFRICAN AMERICAN: >60
GFR NON-AFRICAN AMERICAN: >60 ML/MIN/1.73
GLUCOSE BLD-MCNC: 99 MG/DL (ref 74–99)
HCT VFR BLD CALC: 40.1 % (ref 34–48)
HEMOGLOBIN: 12.8 G/DL (ref 11.5–15.5)
IMMATURE GRANULOCYTES #: 0.03 E9/L
IMMATURE GRANULOCYTES %: 0.3 % (ref 0–5)
LIPASE: 35 U/L (ref 13–60)
LYMPHOCYTES ABSOLUTE: 1.64 E9/L (ref 1.5–4)
LYMPHOCYTES RELATIVE PERCENT: 18.4 % (ref 20–42)
MCH RBC QN AUTO: 25.4 PG (ref 26–35)
MCHC RBC AUTO-ENTMCNC: 31.9 % (ref 32–34.5)
MCV RBC AUTO: 79.7 FL (ref 80–99.9)
MONOCYTES ABSOLUTE: 0.79 E9/L (ref 0.1–0.95)
MONOCYTES RELATIVE PERCENT: 8.9 % (ref 2–12)
NEUTROPHILS ABSOLUTE: 6.31 E9/L (ref 1.8–7.3)
NEUTROPHILS RELATIVE PERCENT: 71 % (ref 43–80)
PDW BLD-RTO: 16.7 FL (ref 11.5–15)
PLATELET # BLD: 243 E9/L (ref 130–450)
PMV BLD AUTO: 9.3 FL (ref 7–12)
POTASSIUM REFLEX MAGNESIUM: 3.8 MMOL/L (ref 3.5–5)
RBC # BLD: 5.03 E12/L (ref 3.5–5.5)
SODIUM BLD-SCNC: 137 MMOL/L (ref 132–146)
TOTAL PROTEIN: 7.1 G/DL (ref 6.4–8.3)
WBC # BLD: 8.9 E9/L (ref 4.5–11.5)

## 2022-10-15 PROCEDURE — 6360000002 HC RX W HCPCS: Performed by: EMERGENCY MEDICINE

## 2022-10-15 PROCEDURE — 6370000000 HC RX 637 (ALT 250 FOR IP): Performed by: EMERGENCY MEDICINE

## 2022-10-15 PROCEDURE — 83690 ASSAY OF LIPASE: CPT

## 2022-10-15 PROCEDURE — 85025 COMPLETE CBC W/AUTO DIFF WBC: CPT

## 2022-10-15 PROCEDURE — 80076 HEPATIC FUNCTION PANEL: CPT

## 2022-10-15 PROCEDURE — 96375 TX/PRO/DX INJ NEW DRUG ADDON: CPT

## 2022-10-15 PROCEDURE — 80048 BASIC METABOLIC PNL TOTAL CA: CPT

## 2022-10-15 PROCEDURE — 96374 THER/PROPH/DIAG INJ IV PUSH: CPT

## 2022-10-15 PROCEDURE — C9113 INJ PANTOPRAZOLE SODIUM, VIA: HCPCS | Performed by: EMERGENCY MEDICINE

## 2022-10-15 PROCEDURE — 99284 EMERGENCY DEPT VISIT MOD MDM: CPT

## 2022-10-15 PROCEDURE — 2580000003 HC RX 258: Performed by: EMERGENCY MEDICINE

## 2022-10-15 RX ORDER — OMEPRAZOLE 40 MG/1
40 CAPSULE, DELAYED RELEASE ORAL
Qty: 30 CAPSULE | Refills: 0 | Status: SHIPPED | OUTPATIENT
Start: 2022-10-15 | End: 2022-10-28 | Stop reason: ALTCHOICE

## 2022-10-15 RX ORDER — 0.9 % SODIUM CHLORIDE 0.9 %
1000 INTRAVENOUS SOLUTION INTRAVENOUS ONCE
Status: COMPLETED | OUTPATIENT
Start: 2022-10-15 | End: 2022-10-15

## 2022-10-15 RX ORDER — SUCRALFATE 1 G/1
1 TABLET ORAL 4 TIMES DAILY
Qty: 40 TABLET | Refills: 0 | Status: SHIPPED | OUTPATIENT
Start: 2022-10-15

## 2022-10-15 RX ORDER — ONDANSETRON 2 MG/ML
4 INJECTION INTRAMUSCULAR; INTRAVENOUS ONCE
Status: COMPLETED | OUTPATIENT
Start: 2022-10-15 | End: 2022-10-15

## 2022-10-15 RX ORDER — MORPHINE SULFATE 4 MG/ML
4 INJECTION, SOLUTION INTRAMUSCULAR; INTRAVENOUS ONCE
Status: COMPLETED | OUTPATIENT
Start: 2022-10-15 | End: 2022-10-15

## 2022-10-15 RX ORDER — PANTOPRAZOLE SODIUM 40 MG/10ML
40 INJECTION, POWDER, LYOPHILIZED, FOR SOLUTION INTRAVENOUS ONCE
Status: COMPLETED | OUTPATIENT
Start: 2022-10-15 | End: 2022-10-15

## 2022-10-15 RX ADMIN — PANTOPRAZOLE SODIUM 40 MG: 40 INJECTION, POWDER, FOR SOLUTION INTRAVENOUS at 03:41

## 2022-10-15 RX ADMIN — LIDOCAINE HYDROCHLORIDE: 20 SOLUTION ORAL; TOPICAL at 03:41

## 2022-10-15 RX ADMIN — ONDANSETRON 4 MG: 2 INJECTION INTRAMUSCULAR; INTRAVENOUS at 03:41

## 2022-10-15 RX ADMIN — SODIUM CHLORIDE 1000 ML: 9 INJECTION, SOLUTION INTRAVENOUS at 03:41

## 2022-10-15 RX ADMIN — MORPHINE SULFATE 4 MG: 4 INJECTION, SOLUTION INTRAMUSCULAR; INTRAVENOUS at 03:41

## 2022-10-15 ASSESSMENT — PAIN SCALES - GENERAL: PAINLEVEL_OUTOF10: 10

## 2022-10-15 ASSESSMENT — PAIN - FUNCTIONAL ASSESSMENT: PAIN_FUNCTIONAL_ASSESSMENT: 0-10

## 2022-10-15 ASSESSMENT — PAIN DESCRIPTION - LOCATION: LOCATION: ABDOMEN

## 2022-10-15 NOTE — ED PROVIDER NOTES
Department of Emergency Medicine   ED  Provider Note  Admit Date/RoomTime: 10/15/2022  3:07 AM  ED Room: 20/20          History of Present Illness:  10/15/22, Time: 3:20 AM EDT  Chief Complaint   Patient presents with    Abdominal Pain     X started yesterday morning    Rehan Schofield is a 40 y.o. female presenting to the ED for ongoing epigastric abdominal pain and vomiting, beginning yesterday morning. The complaint has been persistent, moderate in severity, and worsened by nothing. Patient states that she was evaluated for an epigastric abdominal pain in the ED, states she was given no diagnosis. She states that she was given a refill of her blood pressure medication, no medication for nausea. He states that she continues to have epigastric pain described as sharp and burning, no radiation, no exacerbating or remitting factors. She states she is nauseous with small amounts of vomiting. She denies any fevers, no diarrhea or dysuria. She denies any chest pain or shortness of breath. Review of Systems:   Pertinent positives and negatives are stated within HPI, all other systems reviewed and are negative.        --------------------------------------------- PAST HISTORY ---------------------------------------------  Past Medical History:  has a past medical history of Asthma, Bronchitis, Movement disorder, [facility] Suicidal overdose ([facility] Tobacco abuse, and Tobacco abuse. Past Surgical History:  has a past surgical history that includes fracture surgery; Cholecystectomy; Rotator cuff repair; and Upper gastrointestinal endoscopy (N/A, 5/3/2021). Social History:  reports that she has been smoking cigarettes. She has a 0.30 pack-year smoking history. She has never used smokeless tobacco. She reports current alcohol use. She reports current drug use. Drug: Cocaine. Family History: family history includes Cancer in her mother. . Unless otherwise noted, family history is non contributory    The patients home medications have been reviewed. Allergies: Ketorolac, Tramadol, Darvocet [propoxyphene n-acetaminophen], Ibuprofen, Meloxicam, and Naproxen        ---------------------------------------------------PHYSICAL EXAM--------------------------------------    Constitutional/General: Alert and oriented x3  Head: Normocephalic and atraumatic  Eyes: PERRL, EOMI, sclera non icteric  Mouth: Oropharynx clear, handling secretions, no trismus, no asymmetry of the posterior oropharynx or uvular edema  Neck: Supple, full ROM, no stridor, no meningeal signs  Respiratory: Lungs clear to auscultation bilaterally, no wheezes, rales, or rhonchi. Not in respiratory distress  Cardiovascular:  Regular rate. Regular rhythm. No murmurs, no aortic murmurs, no gallops, or rubs. 2+ distal pulses. Equal extremity pulses. Chest: No chest wall tenderness  GI:  Abdomen Soft, tender epigastrium, Non distended. No rebound, guarding, or rigidity. No pulsatile masses. Musculoskeletal: Moves all extremities x 4. Warm and well perfused, no clubbing, cyanosis, or edema. Capillary refill <3 seconds  Integument: skin warm and dry. No rashes. Neurologic: GCS 15, no focal deficits, symmetric strength 5/5 in the upper and lower extremities bilaterally  Psychiatric: Normal Affect          -------------------------------------------------- RESULTS -------------------------------------------------  I have personally reviewed all laboratory and imaging results for this patient. Results are listed below.      LABS: (Lab results interpreted by me)  Results for orders placed or performed during the hospital encounter of 10/15/22   CBC with Auto Differential   Result Value Ref Range    WBC 8.9 4.5 - 11.5 E9/L    RBC 5.03 3.50 - 5.50 E12/L    Hemoglobin 12.8 11.5 - 15.5 g/dL    Hematocrit 40.1 34.0 - 48.0 %    MCV 79.7 (L) 80.0 - 99.9 fL    MCH 25.4 (L) 26.0 - 35.0 pg    MCHC 31.9 (L) 32.0 - 34.5 %    RDW 16.7 (H) 11.5 - 15.0 fL    Platelets 833 397 - 647 E9/L    MPV 9.3 7.0 - 12.0 fL    Neutrophils % 71.0 43.0 - 80.0 %    Immature Granulocytes % 0.3 0.0 - 5.0 %    Lymphocytes % 18.4 (L) 20.0 - 42.0 %    Monocytes % 8.9 2.0 - 12.0 %    Eosinophils % 1.2 0.0 - 6.0 %    Basophils % 0.2 0.0 - 2.0 %    Neutrophils Absolute 6.31 1.80 - 7.30 E9/L    Immature Granulocytes # 0.03 E9/L    Lymphocytes Absolute 1.64 1.50 - 4.00 E9/L    Monocytes Absolute 0.79 0.10 - 0.95 E9/L    Eosinophils Absolute 0.11 0.05 - 0.50 E9/L    Basophils Absolute 0.02 0.00 - 0.20 M6/F   Basic Metabolic Panel w/ Reflex to MG   Result Value Ref Range    Sodium 137 132 - 146 mmol/L    Potassium reflex Magnesium 3.8 3.5 - 5.0 mmol/L    Chloride 103 98 - 107 mmol/L    CO2 25 22 - 29 mmol/L    Anion Gap 9 7 - 16 mmol/L    Glucose 99 74 - 99 mg/dL    BUN 19 6 - 20 mg/dL    Creatinine 0.8 0.5 - 1.0 mg/dL    GFR Non-African American >60 >=60 mL/min/1.73    GFR African American >60     Calcium 9.0 8.6 - 10.2 mg/dL   Hepatic Function Panel   Result Value Ref Range    Total Protein 7.1 6.4 - 8.3 g/dL    Albumin 3.9 3.5 - 5.2 g/dL    Alkaline Phosphatase 116 (H) 35 - 104 U/L    ALT 22 0 - 32 U/L    AST 21 0 - 31 U/L    Total Bilirubin 0.2 0.0 - 1.2 mg/dL    Bilirubin, Direct <0.2 0.0 - 0.3 mg/dL    Bilirubin, Indirect see below 0.0 - 1.0 mg/dL   Lipase   Result Value Ref Range    Lipase 35 13 - 60 U/L   ,       RADIOLOGY:  Interpreted by Radiologist unless otherwise specified  No orders to display             ------------------------- NURSING NOTES AND VITALS REVIEWED ---------------------------   The nursing notes within the ED encounter and vital signs as below have been reviewed by myself  BP (!) 185/102   Pulse 95   Temp 98.7 °F (37.1 °C) (Oral)   Resp 18   Ht 4' 11\" (1.499 m)   Wt 135 lb (61.2 kg)   LMP 10/09/2022   SpO2 100%   BMI 27.27 kg/m²     Oxygen Saturation Interpretation: Normal    The patients available past medical records and past encounters were reviewed. ------------------------------ ED COURSE/MEDICAL DECISION MAKING----------------------  Medications   pantoprazole (PROTONIX) injection 40 mg (40 mg IntraVENous Given 10/15/22 0341)   aluminum & magnesium hydroxide-simethicone (MAALOX) 30 mL, lidocaine viscous hcl (XYLOCAINE) 5 mL (GI COCKTAIL) ( Oral Given 10/15/22 0341)   ondansetron (ZOFRAN) injection 4 mg (4 mg IntraVENous Given 10/15/22 0341)   morphine sulfate (PF) injection 4 mg (4 mg IntraVENous Given 10/15/22 0341)   0.9 % sodium chloride bolus (1,000 mLs IntraVENous New Bag 10/15/22 0341)           The cardiac monitor revealed sinus rhythm with a heart rate in the 90s as interpreted by me. The cardiac monitor was ordered secondary to the patient's chest pain and to monitor for patient for dysrhythmia. CPT [id number]           Medical Decision Making:     I, Dr. Michelle Royal, am the primary provider of record    71-year-old female presenting with ongoing epigastric abdominal pain. She arrives with elevated blood pressure, history of the same that has been uncontrolled. She is afebrile with no increased work of breathing or hypoxia. She is tender in the epigastrium on exam.  Review of previous visit shows metabolic panel within acceptable limits, LFTs unremarkable, lipase normal.  She did have leukocytosis 11.6, no anemia. Urinalysis was unremarkable. CT abdomen pelvis showed no significant abnormalities. With her discomfort, concern for gastric abnormality. Labs will be repeated, she will be given GI cocktail with Protonix and Zofran. Do not feel strongly that CT would need repeated as it was unremarkable at 9 AM yesterday, less than 24 hours ago. Should there be significant derangement in the blood work from yesterday compared to today, plan may change. No leukocytosis or anemia. Metabolic panel is within acceptable limits. LFTs unremarkable, lipase negative.   Patient is resting comfortably, significant improvement after GI cocktail and Protonix. Comfortable with discharge, given follow-up to gastroenterology for possible EGD. Given prescription for omeprazole and Carafate. Instruction to return for any changes in condition or new symptoms. Re-Evaluations: This patient's ED course included: a personal history and physicial examination and re-evaluation prior to disposition    This patient has remained hemodynamically stable, improved, and been closely monitored during their ED course. Counseling: The emergency provider has spoken with the patient and discussed todays results, in addition to providing specific details for the plan of care and counseling regarding the diagnosis and prognosis. Questions are answered at this time and they are agreeable with the plan.       --------------------------------- IMPRESSION AND DISPOSITION ---------------------------------    IMPRESSION  1. Abdominal pain, epigastric    2. Nausea and vomiting, unspecified vomiting type        DISPOSITION  Disposition: Discharge to home  Patient condition is stable        NOTE: This report was transcribed using voice recognition software.  Every effort was made to ensure accuracy; however, inadvertent computerized transcription errors may be present        Betsey Messina, DO  10/15/22 0578

## 2022-10-15 NOTE — ED NOTES
Pt given ginger ale and crackers, calling boyfriend for a ride home.       Ivy Demarco RN  10/15/22 2422

## 2022-10-24 ENCOUNTER — HOSPITAL ENCOUNTER (EMERGENCY)
Age: 42
Discharge: HOME | End: 2022-10-24
Attending: STUDENT IN AN ORGANIZED HEALTH CARE EDUCATION/TRAINING PROGRAM
Payer: MEDICAID

## 2022-10-24 ENCOUNTER — APPOINTMENT (OUTPATIENT)
Dept: GENERAL RADIOLOGY | Age: 42
End: 2022-10-24
Payer: MEDICAID

## 2022-10-24 VITALS
OXYGEN SATURATION: 99 % | WEIGHT: 150 LBS | HEIGHT: 59 IN | BODY MASS INDEX: 30.24 KG/M2 | SYSTOLIC BLOOD PRESSURE: 148 MMHG | HEART RATE: 82 BPM | TEMPERATURE: 98.4 F | DIASTOLIC BLOOD PRESSURE: 87 MMHG | RESPIRATION RATE: 14 BRPM

## 2022-10-24 DIAGNOSIS — M25.551 BILATERAL HIP PAIN: Primary | ICD-10-CM

## 2022-10-24 DIAGNOSIS — M25.552 BILATERAL HIP PAIN: Primary | ICD-10-CM

## 2022-10-24 LAB
HCG, URINE, POC: NEGATIVE
Lab: NORMAL
NEGATIVE QC PASS/FAIL: NORMAL
POSITIVE QC PASS/FAIL: NORMAL

## 2022-10-24 PROCEDURE — 6360000002 HC RX W HCPCS: Performed by: STUDENT IN AN ORGANIZED HEALTH CARE EDUCATION/TRAINING PROGRAM

## 2022-10-24 PROCEDURE — 6370000000 HC RX 637 (ALT 250 FOR IP): Performed by: STUDENT IN AN ORGANIZED HEALTH CARE EDUCATION/TRAINING PROGRAM

## 2022-10-24 PROCEDURE — 73521 X-RAY EXAM HIPS BI 2 VIEWS: CPT

## 2022-10-24 PROCEDURE — 96372 THER/PROPH/DIAG INJ SC/IM: CPT

## 2022-10-24 PROCEDURE — 99284 EMERGENCY DEPT VISIT MOD MDM: CPT

## 2022-10-24 RX ORDER — OXYCODONE HYDROCHLORIDE AND ACETAMINOPHEN 5; 325 MG/1; MG/1
1 TABLET ORAL EVERY 8 HOURS PRN
Qty: 9 TABLET | Refills: 0 | Status: SHIPPED | OUTPATIENT
Start: 2022-10-24 | End: 2022-10-27

## 2022-10-24 RX ORDER — ORPHENADRINE CITRATE 30 MG/ML
60 INJECTION INTRAMUSCULAR; INTRAVENOUS ONCE
Status: COMPLETED | OUTPATIENT
Start: 2022-10-24 | End: 2022-10-24

## 2022-10-24 RX ORDER — HYDROCODONE BITARTRATE AND ACETAMINOPHEN 5; 325 MG/1; MG/1
1 TABLET ORAL ONCE
Status: COMPLETED | OUTPATIENT
Start: 2022-10-24 | End: 2022-10-24

## 2022-10-24 RX ADMIN — ORPHENADRINE CITRATE 60 MG: 30 INJECTION INTRAMUSCULAR; INTRAVENOUS at 05:18

## 2022-10-24 RX ADMIN — HYDROCODONE BITARTRATE AND ACETAMINOPHEN 1 TABLET: 5; 325 TABLET ORAL at 03:34

## 2022-10-24 ASSESSMENT — ENCOUNTER SYMPTOMS
EYE DISCHARGE: 0
VOMITING: 0
COUGH: 0
WHEEZING: 0
EYE PAIN: 0
EYE REDNESS: 0
SINUS PRESSURE: 0
ABDOMINAL DISTENTION: 0
NAUSEA: 0
SORE THROAT: 0
DIARRHEA: 0
SHORTNESS OF BREATH: 0
BACK PAIN: 1

## 2022-10-24 NOTE — ED PROVIDER NOTES
Department of Emergency Medicine   ED  Provider Note  Admit Date/RoomTime: 10/24/2022  1:54 AM  ED Room: 56812 Grady Mann is a 40 y.o. female with a PMHx significant for epilepsy, asthma, tobacco use who presents for evaluation of hip pain, beginning prior to arrival.  The complaint has been persistent, moderate in severity, and worsened by movement. The patient states that she has history of hip problems and issues. Follows with Dr. Neftail Saenz orthopedic surgeon. Notes that she just saw them last week, is due to have a right-sided hip replacement followed by a left-sided hip replacement. Notes that this evening she started having worsening discomfort. She has been trying to take a muscle relaxant, naproxen and Tylenol arthritis without much improvement. Denies any numbness, tingling, saddle paresthesias, urinary or fecal incontinence. On exam she is moving all extremities. Denies any dizziness, headedness, chest pain or shortness of breath nausea, vomiting, diarrhea. The history is provided by the patient and medical records. Review of Systems   Constitutional:  Negative for chills and fever. HENT:  Negative for ear pain, sinus pressure and sore throat. Eyes:  Negative for pain, discharge and redness. Respiratory:  Negative for cough, shortness of breath and wheezing. Cardiovascular:  Negative for chest pain. Gastrointestinal:  Negative for abdominal distention, diarrhea, nausea and vomiting. Genitourinary:  Negative for dysuria and frequency. Musculoskeletal:  Positive for back pain. Negative for arthralgias. Hip pain     Skin:  Negative for rash and wound. Neurological:  Negative for weakness and headaches. Hematological:  Negative for adenopathy. All other systems reviewed and are negative. Physical Exam  Vitals and nursing note reviewed. Constitutional:       General: She is not in acute distress. Appearance: Normal appearance.  She is well-developed. She is not ill-appearing. HENT:      Head: Normocephalic and atraumatic. Right Ear: External ear normal.      Left Ear: External ear normal.   Eyes:      General:         Right eye: No discharge. Left eye: No discharge. Extraocular Movements: Extraocular movements intact. Conjunctiva/sclera: Conjunctivae normal.   Cardiovascular:      Rate and Rhythm: Normal rate and regular rhythm. Heart sounds: Normal heart sounds. No murmur heard. Pulmonary:      Effort: Pulmonary effort is normal. No respiratory distress. Breath sounds: Normal breath sounds. No wheezing or rales. Abdominal:      General: There is no distension. Palpations: Abdomen is soft. There is no mass. Tenderness: There is no abdominal tenderness. Hernia: No hernia is present. Musculoskeletal:      Cervical back: Normal range of motion and neck supple. Comments: Positive straight leg test bilateral lower extremities   Skin:     General: Skin is warm and dry. Coloration: Skin is not jaundiced or pale. Neurological:      General: No focal deficit present. Mental Status: She is alert and oriented to person, place, and time. Cranial Nerves: No cranial nerve deficit. Sensory: No sensory deficit. Procedures    MDM       ED Course as of 10/27/22 0732   Mon Oct 24, 2022   6648 Patient reevaluated, lying in bed no acute distress. States that she has had a little improvement, states Percocet works better for her and is asking for food at this time. [BB]   [id number] Patient reevaluated states she is feeling improved, she was seen getting up and ambulating in her room. Asking for food once again. Discussed these results and reasons to return. She will follow-up with her orthopedic surgeon.  [BB]   0517 PDMP reviewed, notes narcotic of 190, set at 191, respiratory 400, on further review the patient is getting gabapentin, was given a 6-day round of tramadol back on 06/14/2022, last time she had a neurostimulator narcotic was 05/09/20/2022 as a 3-day stent. She will get a prescription for Percocet to go home with. [BB]      ED Course User Index  [BB] DO Ifeanyi Barnes presents to the ED for evaluation of hip pain. Patient notes that she has hip problems of bilateral hips, follows with Dr. Teodora Odonnell and is due for hip replacement. Workup in the ED revealed patient without any saddle paresthesia, urine or fecal incontinence, no red flag symptoms. Imaging was negative for acute findings. She was given pain medications department good improvement of her symptoms, she was seen ambulating throughout her room without any difficulty. Patient continues to be non-toxic on re-evaluation. Findings were discussed with the patient and reasons to immediately return to the ED were articulated to them. They will follow-up with their orthopedic surgeon. --------------------------------------------- PAST HISTORY ---------------------------------------------  Past Medical History:  has a past medical history of Asthma, Bronchitis, Movement disorder, Seizure disorder ([facility] [address] Seizure disorder ([facility] [address] Suicidal overdose ([facility] Tobacco abuse, and Tobacco abuse. Past Surgical History:  has a past surgical history that includes fracture surgery; Cholecystectomy; Rotator cuff repair; and Upper gastrointestinal endoscopy (N/A, 5/3/2021). Social History:  reports that she has been smoking cigarettes. She has a 0.30 pack-year smoking history. She has never used smokeless tobacco. She reports current alcohol use. She reports current drug use. Drug: Cocaine. Family History: family history includes Cancer in her mother. The patients home medications have been reviewed.     Allergies: Ketorolac, Tramadol, Darvocet [propoxyphene n-acetaminophen], Ibuprofen, Meloxicam, and Naproxen    -------------------------------------------------- RESULTS -------------------------------------------------  Labs:  Results for orders placed or performed during the hospital encounter of 10/24/22   POC Pregnancy Urine Qual   Result Value Ref Range    HCG, Urine, POC Negative Negative    Lot Number [id number]     Positive QC Pass/Fail Acceptable     Negative QC Pass/Fail Acceptable        Radiology:  XR HIP BILATERAL W AP PELVIS (2 VIEWS)   Final Result   No acute osseous abnormality.             ------------------------- NURSING NOTES AND VITALS REVIEWED ---------------------------  Date / Time Roomed:  10/24/2022  1:54 AM  ED Bed Assignment:  28/28    The nursing notes within the ED encounter and vital signs as below have been reviewed. BP (!) 148/87   Pulse 82   Temp 98.4 °F (36.9 °C) (Oral)   Resp 14   Ht 4' 11\" (1.499 m)   Wt 150 lb (68 kg)   LMP 10/09/2022   SpO2 99%   BMI 30.30 kg/m²   Oxygen Saturation Interpretation: Normal      ------------------------------------------ PROGRESS NOTES ------------------------------------------  7:33 AM EDT  I have spoken with the patient and discussed todays results, in addition to providing specific details for the plan of care and counseling regarding the diagnosis and prognosis. Their questions are answered at this time and they are agreeable with the plan. I discussed at length with them reasons for immediate return here for re evaluation. They will followup with their orthopedic physician by calling their office tomorrow. --------------------------------- ADDITIONAL PROVIDER NOTES ---------------------------------  At this time the patient is without objective evidence of an acute process requiring hospitalization or inpatient management. They have remained hemodynamically stable throughout their entire ED visit and are stable for discharge with outpatient follow-up.      The plan has been discussed in detail and they are aware of the specific conditions for emergent return, as well as the importance of follow-up. Discharge Medication List as of 10/24/2022  5:21 AM        START taking these medications    Details   oxyCODONE-acetaminophen (PERCOCET) 5-325 MG per tablet Take 1 tablet by mouth every 8 hours as needed for Pain for up to 3 days. Intended supply: 3 days. Take lowest dose possible to manage pain, Disp-9 tablet, R-0Normal             Diagnosis:  1. Bilateral hip pain        Disposition:  Patient's disposition: Discharge to home  Patient's condition is stable.          [place], [place]  10/27/22 3394

## 2022-10-28 ENCOUNTER — ANESTHESIA EVENT (OUTPATIENT)
Dept: ENDOSCOPY | Age: 42
End: 2022-10-28

## 2022-10-28 ENCOUNTER — APPOINTMENT (OUTPATIENT)
Dept: GENERAL RADIOLOGY | Age: 42
End: 2022-10-28
Payer: MEDICAID

## 2022-10-28 ENCOUNTER — APPOINTMENT (OUTPATIENT)
Dept: CT IMAGING | Age: 42
End: 2022-10-28
Payer: MEDICAID

## 2022-10-28 ENCOUNTER — ANESTHESIA (OUTPATIENT)
Dept: OPERATING ROOM | Age: 42
End: 2022-10-28
Payer: MEDICAID

## 2022-10-28 ENCOUNTER — ANESTHESIA EVENT (OUTPATIENT)
Dept: OPERATING ROOM | Age: 42
End: 2022-10-28
Payer: MEDICAID

## 2022-10-28 ENCOUNTER — ANESTHESIA (OUTPATIENT)
Dept: ENDOSCOPY | Age: 42
End: 2022-10-28

## 2022-10-28 ENCOUNTER — HOSPITAL ENCOUNTER (OUTPATIENT)
Age: 42
Setting detail: OBSERVATION
Discharge: HOME | End: 2022-10-31
Attending: EMERGENCY MEDICINE | Admitting: INTERNAL MEDICINE
Payer: MEDICAID

## 2022-10-28 DIAGNOSIS — R10.9 ABDOMINAL PAIN, UNSPECIFIED ABDOMINAL LOCATION: ICD-10-CM

## 2022-10-28 DIAGNOSIS — R10.9 INTRACTABLE ABDOMINAL PAIN: Primary | ICD-10-CM

## 2022-10-28 PROBLEM — R10.84 GENERALIZED ABDOMINAL PAIN: Status: ACTIVE | Noted: 2022-10-28

## 2022-10-28 LAB
ADENOVIRUS BY PCR: NOT DETECTED
ALBUMIN SERPL-MCNC: 4.4 G/DL (ref 3.5–5.2)
ALP BLD-CCNC: 114 U/L (ref 35–104)
ALT SERPL-CCNC: 22 U/L (ref 0–32)
ANION GAP SERPL CALCULATED.3IONS-SCNC: 13 MMOL/L (ref 7–16)
AST SERPL-CCNC: 23 U/L (ref 0–31)
BACTERIA: ABNORMAL /HPF
BASOPHILS ABSOLUTE: 0.04 E9/L (ref 0–0.2)
BASOPHILS RELATIVE PERCENT: 0.3 % (ref 0–2)
BILIRUB SERPL-MCNC: 0.4 MG/DL (ref 0–1.2)
BILIRUBIN URINE: NEGATIVE
BLOOD, URINE: NEGATIVE
BORDETELLA PARAPERTUSSIS BY PCR: NOT DETECTED
BORDETELLA PERTUSSIS BY PCR: NOT DETECTED
BUN BLDV-MCNC: 12 MG/DL (ref 6–20)
C-REACTIVE PROTEIN: 1.1 MG/DL (ref 0–0.4)
CALCIUM SERPL-MCNC: 9.5 MG/DL (ref 8.6–10.2)
CHLAMYDOPHILIA PNEUMONIAE BY PCR: NOT DETECTED
CHLORIDE BLD-SCNC: 100 MMOL/L (ref 98–107)
CLARITY: CLEAR
CO2: 21 MMOL/L (ref 22–29)
COLOR: YELLOW
CORONAVIRUS 229E BY PCR: NOT DETECTED
CORONAVIRUS HKU1 BY PCR: NOT DETECTED
CORONAVIRUS NL63 BY PCR: NOT DETECTED
CORONAVIRUS OC43 BY PCR: NOT DETECTED
CREAT SERPL-MCNC: 0.6 MG/DL (ref 0.5–1)
EKG ATRIAL RATE: 87 BPM
EKG P AXIS: 52 DEGREES
EKG P-R INTERVAL: 136 MS
EKG Q-T INTERVAL: 382 MS
EKG QRS DURATION: 80 MS
EKG QTC CALCULATION (BAZETT): 459 MS
EKG R AXIS: 66 DEGREES
EKG T AXIS: 38 DEGREES
EKG VENTRICULAR RATE: 87 BPM
EOSINOPHILS ABSOLUTE: 0.1 E9/L (ref 0.05–0.5)
EOSINOPHILS RELATIVE PERCENT: 0.8 % (ref 0–6)
EPITHELIAL CELLS, UA: ABNORMAL /HPF
GFR SERPL CREATININE-BSD FRML MDRD: >60 ML/MIN/1.73
GLUCOSE BLD-MCNC: 111 MG/DL (ref 74–99)
GLUCOSE URINE: NEGATIVE MG/DL
HCG(URINE) PREGNANCY TEST: NEGATIVE
HCT VFR BLD CALC: 42.6 % (ref 34–48)
HEMOGLOBIN: 14 G/DL (ref 11.5–15.5)
HUMAN METAPNEUMOVIRUS BY PCR: NOT DETECTED
HUMAN RHINOVIRUS/ENTEROVIRUS BY PCR: NOT DETECTED
HYALINE CASTS: ABNORMAL /LPF (ref 0–2)
IMMATURE GRANULOCYTES #: 0.08 E9/L
IMMATURE GRANULOCYTES %: 0.6 % (ref 0–5)
INFLUENZA A BY PCR: NOT DETECTED
INFLUENZA A BY PCR: NOT DETECTED
INFLUENZA B BY PCR: NOT DETECTED
INFLUENZA B BY PCR: NOT DETECTED
KETONES, URINE: NEGATIVE MG/DL
LACTIC ACID: 1.2 MMOL/L (ref 0.5–2.2)
LACTIC ACID: 2.5 MMOL/L (ref 0.5–2.2)
LEUKOCYTE ESTERASE, URINE: NEGATIVE
LIPASE: 27 U/L (ref 13–60)
LYMPHOCYTES ABSOLUTE: 2.5 E9/L (ref 1.5–4)
LYMPHOCYTES RELATIVE PERCENT: 19.6 % (ref 20–42)
MCH RBC QN AUTO: 25.2 PG (ref 26–35)
MCHC RBC AUTO-ENTMCNC: 32.9 % (ref 32–34.5)
MCV RBC AUTO: 76.6 FL (ref 80–99.9)
MONOCYTES ABSOLUTE: 0.84 E9/L (ref 0.1–0.95)
MONOCYTES RELATIVE PERCENT: 6.6 % (ref 2–12)
MYCOPLASMA PNEUMONIAE BY PCR: NOT DETECTED
NEUTROPHILS ABSOLUTE: 9.19 E9/L (ref 1.8–7.3)
NEUTROPHILS RELATIVE PERCENT: 72.1 % (ref 43–80)
NITRITE, URINE: NEGATIVE
PARAINFLUENZA VIRUS 1 BY PCR: NOT DETECTED
PARAINFLUENZA VIRUS 2 BY PCR: NOT DETECTED
PARAINFLUENZA VIRUS 3 BY PCR: NOT DETECTED
PARAINFLUENZA VIRUS 4 BY PCR: NOT DETECTED
PDW BLD-RTO: 16.9 FL (ref 11.5–15)
PH UA: 6 (ref 5–9)
PLATELET # BLD: 322 E9/L (ref 130–450)
PMV BLD AUTO: 9.4 FL (ref 7–12)
POTASSIUM REFLEX MAGNESIUM: 4.1 MMOL/L (ref 3.5–5)
PROCALCITONIN: 0.04 NG/ML (ref 0–0.08)
PROTEIN UA: 30 MG/DL
RBC # BLD: 5.56 E12/L (ref 3.5–5.5)
RBC UA: ABNORMAL /HPF (ref 0–2)
RESPIRATORY SYNCYTIAL VIRUS BY PCR: NOT DETECTED
SARS-COV-2, NAAT: NOT DETECTED
SARS-COV-2, PCR: NOT DETECTED
SEDIMENTATION RATE, ERYTHROCYTE: 9 MM/HR (ref 0–20)
SODIUM BLD-SCNC: 134 MMOL/L (ref 132–146)
SPECIFIC GRAVITY UA: >=1.03 (ref 1–1.03)
STREP GRP A PCR: NEGATIVE
TOTAL CK: 190 U/L (ref 20–180)
TOTAL PROTEIN: 7.8 G/DL (ref 6.4–8.3)
TROPONIN, HIGH SENSITIVITY: <6 NG/L (ref 0–9)
UROBILINOGEN, URINE: 0.2 E.U./DL
WBC # BLD: 12.8 E9/L (ref 4.5–11.5)
WBC UA: ABNORMAL /HPF (ref 0–5)

## 2022-10-28 PROCEDURE — G0378 HOSPITAL OBSERVATION PER HR: HCPCS

## 2022-10-28 PROCEDURE — 87635 SARS-COV-2 COVID-19 AMP PRB: CPT

## 2022-10-28 PROCEDURE — 6370000000 HC RX 637 (ALT 250 FOR IP): Performed by: INTERNAL MEDICINE

## 2022-10-28 PROCEDURE — 82550 ASSAY OF CK (CPK): CPT

## 2022-10-28 PROCEDURE — 96375 TX/PRO/DX INJ NEW DRUG ADDON: CPT

## 2022-10-28 PROCEDURE — 83993 ASSAY FOR CALPROTECTIN FECAL: CPT

## 2022-10-28 PROCEDURE — 85025 COMPLETE CBC W/AUTO DIFF WBC: CPT

## 2022-10-28 PROCEDURE — 6360000002 HC RX W HCPCS: Performed by: INTERNAL MEDICINE

## 2022-10-28 PROCEDURE — 94664 DEMO&/EVAL PT USE INHALER: CPT

## 2022-10-28 PROCEDURE — 84484 ASSAY OF TROPONIN QUANT: CPT

## 2022-10-28 PROCEDURE — 71045 X-RAY EXAM CHEST 1 VIEW: CPT

## 2022-10-28 PROCEDURE — 6360000002 HC RX W HCPCS: Performed by: EMERGENCY MEDICINE

## 2022-10-28 PROCEDURE — 81025 URINE PREGNANCY TEST: CPT

## 2022-10-28 PROCEDURE — 96374 THER/PROPH/DIAG INJ IV PUSH: CPT

## 2022-10-28 PROCEDURE — 36415 COLL VENOUS BLD VENIPUNCTURE: CPT

## 2022-10-28 PROCEDURE — 89055 LEUKOCYTE ASSESSMENT FECAL: CPT

## 2022-10-28 PROCEDURE — 99285 EMERGENCY DEPT VISIT HI MDM: CPT

## 2022-10-28 PROCEDURE — 74177 CT ABD & PELVIS W/CONTRAST: CPT

## 2022-10-28 PROCEDURE — 84145 PROCALCITONIN (PCT): CPT

## 2022-10-28 PROCEDURE — 87425 ROTAVIRUS AG IA: CPT

## 2022-10-28 PROCEDURE — 81001 URINALYSIS AUTO W/SCOPE: CPT

## 2022-10-28 PROCEDURE — 96376 TX/PRO/DX INJ SAME DRUG ADON: CPT

## 2022-10-28 PROCEDURE — 6360000004 HC RX CONTRAST MEDICATION: Performed by: RADIOLOGY

## 2022-10-28 PROCEDURE — 96361 HYDRATE IV INFUSION ADD-ON: CPT

## 2022-10-28 PROCEDURE — 99222 1ST HOSP IP/OBS MODERATE 55: CPT | Performed by: SURGERY

## 2022-10-28 PROCEDURE — 87329 GIARDIA AG IA: CPT

## 2022-10-28 PROCEDURE — 6370000000 HC RX 637 (ALT 250 FOR IP): Performed by: NURSE PRACTITIONER

## 2022-10-28 PROCEDURE — 85651 RBC SED RATE NONAUTOMATED: CPT

## 2022-10-28 PROCEDURE — 6370000000 HC RX 637 (ALT 250 FOR IP): Performed by: STUDENT IN AN ORGANIZED HEALTH CARE EDUCATION/TRAINING PROGRAM

## 2022-10-28 PROCEDURE — 83605 ASSAY OF LACTIC ACID: CPT

## 2022-10-28 PROCEDURE — 80053 COMPREHEN METABOLIC PANEL: CPT

## 2022-10-28 PROCEDURE — 6360000002 HC RX W HCPCS: Performed by: STUDENT IN AN ORGANIZED HEALTH CARE EDUCATION/TRAINING PROGRAM

## 2022-10-28 PROCEDURE — 2580000003 HC RX 258: Performed by: INTERNAL MEDICINE

## 2022-10-28 PROCEDURE — 87502 INFLUENZA DNA AMP PROBE: CPT

## 2022-10-28 PROCEDURE — 83690 ASSAY OF LIPASE: CPT

## 2022-10-28 PROCEDURE — 94640 AIRWAY INHALATION TREATMENT: CPT

## 2022-10-28 PROCEDURE — 6360000002 HC RX W HCPCS: Performed by: NURSE PRACTITIONER

## 2022-10-28 PROCEDURE — 87880 STREP A ASSAY W/OPTIC: CPT

## 2022-10-28 PROCEDURE — 87328 CRYPTOSPORIDIUM AG IA: CPT

## 2022-10-28 PROCEDURE — 71275 CT ANGIOGRAPHY CHEST: CPT

## 2022-10-28 PROCEDURE — 0202U NFCT DS 22 TRGT SARS-COV-2: CPT

## 2022-10-28 PROCEDURE — 2580000003 HC RX 258: Performed by: STUDENT IN AN ORGANIZED HEALTH CARE EDUCATION/TRAINING PROGRAM

## 2022-10-28 PROCEDURE — 86140 C-REACTIVE PROTEIN: CPT

## 2022-10-28 PROCEDURE — 93005 ELECTROCARDIOGRAM TRACING: CPT | Performed by: STUDENT IN AN ORGANIZED HEALTH CARE EDUCATION/TRAINING PROGRAM

## 2022-10-28 PROCEDURE — 6360000002 HC RX W HCPCS

## 2022-10-28 RX ORDER — MORPHINE SULFATE 2 MG/ML
2 INJECTION, SOLUTION INTRAMUSCULAR; INTRAVENOUS EVERY 4 HOURS PRN
Status: DISCONTINUED | OUTPATIENT
Start: 2022-10-28 | End: 2022-10-31 | Stop reason: HOSPADM

## 2022-10-28 RX ORDER — OMEPRAZOLE 40 MG/1
40 CAPSULE, DELAYED RELEASE ORAL DAILY PRN
Status: ON HOLD | COMMUNITY
End: 2022-10-31 | Stop reason: HOSPADM

## 2022-10-28 RX ORDER — SODIUM CHLORIDE 9 MG/ML
INJECTION, SOLUTION INTRAVENOUS PRN
Status: DISCONTINUED | OUTPATIENT
Start: 2022-10-28 | End: 2022-10-31 | Stop reason: HOSPADM

## 2022-10-28 RX ORDER — PANTOPRAZOLE SODIUM 40 MG/1
40 TABLET, DELAYED RELEASE ORAL
Status: DISCONTINUED | OUTPATIENT
Start: 2022-10-29 | End: 2022-10-31 | Stop reason: HOSPADM

## 2022-10-28 RX ORDER — DIPHENHYDRAMINE HCL 25 MG
50 TABLET ORAL EVERY 6 HOURS PRN
Status: DISCONTINUED | OUTPATIENT
Start: 2022-10-28 | End: 2022-10-31 | Stop reason: HOSPADM

## 2022-10-28 RX ORDER — GABAPENTIN 100 MG/1
100 CAPSULE ORAL 3 TIMES DAILY
Status: DISCONTINUED | OUTPATIENT
Start: 2022-10-28 | End: 2022-10-31 | Stop reason: HOSPADM

## 2022-10-28 RX ORDER — ONDANSETRON 2 MG/ML
4 INJECTION INTRAMUSCULAR; INTRAVENOUS ONCE
Status: COMPLETED | OUTPATIENT
Start: 2022-10-28 | End: 2022-10-28

## 2022-10-28 RX ORDER — ALBUTEROL SULFATE 90 UG/1
2 AEROSOL, METERED RESPIRATORY (INHALATION) 4 TIMES DAILY PRN
COMMUNITY

## 2022-10-28 RX ORDER — SODIUM CHLORIDE 0.9 % (FLUSH) 0.9 %
5-40 SYRINGE (ML) INJECTION PRN
Status: DISCONTINUED | OUTPATIENT
Start: 2022-10-28 | End: 2022-10-31 | Stop reason: HOSPADM

## 2022-10-28 RX ORDER — POLYETHYLENE GLYCOL 3350 17 G/17G
17 POWDER, FOR SOLUTION ORAL DAILY PRN
Status: DISCONTINUED | OUTPATIENT
Start: 2022-10-28 | End: 2022-10-31 | Stop reason: HOSPADM

## 2022-10-28 RX ORDER — ONDANSETRON 2 MG/ML
4 INJECTION INTRAMUSCULAR; INTRAVENOUS EVERY 6 HOURS PRN
Status: DISCONTINUED | OUTPATIENT
Start: 2022-10-28 | End: 2022-10-31 | Stop reason: HOSPADM

## 2022-10-28 RX ORDER — ONDANSETRON 2 MG/ML
INJECTION INTRAMUSCULAR; INTRAVENOUS
Status: COMPLETED
Start: 2022-10-28 | End: 2022-10-28

## 2022-10-28 RX ORDER — SUCRALFATE 1 G/1
1 TABLET ORAL 4 TIMES DAILY
Status: DISCONTINUED | OUTPATIENT
Start: 2022-10-28 | End: 2022-10-31 | Stop reason: HOSPADM

## 2022-10-28 RX ORDER — HYDROCODONE BITARTRATE AND ACETAMINOPHEN 5; 325 MG/1; MG/1
1 TABLET ORAL EVERY 6 HOURS PRN
Status: DISCONTINUED | OUTPATIENT
Start: 2022-10-28 | End: 2022-10-31 | Stop reason: HOSPADM

## 2022-10-28 RX ORDER — ALBUTEROL SULFATE 2.5 MG/3ML
2.5 SOLUTION RESPIRATORY (INHALATION) 4 TIMES DAILY
Status: DISCONTINUED | OUTPATIENT
Start: 2022-10-28 | End: 2022-10-31 | Stop reason: HOSPADM

## 2022-10-28 RX ORDER — MORPHINE SULFATE 4 MG/ML
4 INJECTION, SOLUTION INTRAMUSCULAR; INTRAVENOUS ONCE
Status: COMPLETED | OUTPATIENT
Start: 2022-10-28 | End: 2022-10-28

## 2022-10-28 RX ORDER — DIPHENHYDRAMINE HCL 25 MG
25 TABLET ORAL EVERY 6 HOURS
COMMUNITY

## 2022-10-28 RX ORDER — TIZANIDINE 4 MG/1
4 TABLET ORAL EVERY 6 HOURS PRN
Status: DISCONTINUED | OUTPATIENT
Start: 2022-10-28 | End: 2022-10-31 | Stop reason: HOSPADM

## 2022-10-28 RX ORDER — HYDROCHLOROTHIAZIDE 25 MG/1
25 TABLET ORAL DAILY
Status: DISCONTINUED | OUTPATIENT
Start: 2022-10-28 | End: 2022-10-31 | Stop reason: HOSPADM

## 2022-10-28 RX ORDER — SODIUM CHLORIDE 0.9 % (FLUSH) 0.9 %
5-40 SYRINGE (ML) INJECTION EVERY 12 HOURS SCHEDULED
Status: DISCONTINUED | OUTPATIENT
Start: 2022-10-28 | End: 2022-10-31 | Stop reason: HOSPADM

## 2022-10-28 RX ORDER — LANSOPRAZOLE
30 KIT
Status: DISCONTINUED | OUTPATIENT
Start: 2022-10-28 | End: 2022-10-28 | Stop reason: SDUPTHER

## 2022-10-28 RX ORDER — AMLODIPINE BESYLATE 5 MG/1
5 TABLET ORAL EVERY MORNING
Status: ON HOLD | COMMUNITY
End: 2022-11-24 | Stop reason: SDUPTHER

## 2022-10-28 RX ORDER — NICOTINE 21 MG/24HR
1 PATCH, TRANSDERMAL 24 HOURS TRANSDERMAL DAILY
Status: DISCONTINUED | OUTPATIENT
Start: 2022-10-28 | End: 2022-10-31 | Stop reason: HOSPADM

## 2022-10-28 RX ORDER — LORATADINE 10 MG/1
10 TABLET ORAL EVERY MORNING
COMMUNITY

## 2022-10-28 RX ORDER — ALBUTEROL SULFATE 90 UG/1
2 AEROSOL, METERED RESPIRATORY (INHALATION) 4 TIMES DAILY
Status: DISCONTINUED | OUTPATIENT
Start: 2022-10-28 | End: 2022-10-28

## 2022-10-28 RX ORDER — 0.9 % SODIUM CHLORIDE 0.9 %
1000 INTRAVENOUS SOLUTION INTRAVENOUS ONCE
Status: COMPLETED | OUTPATIENT
Start: 2022-10-28 | End: 2022-10-28

## 2022-10-28 RX ORDER — ONDANSETRON 4 MG/1
4 TABLET, ORALLY DISINTEGRATING ORAL EVERY 8 HOURS PRN
Status: DISCONTINUED | OUTPATIENT
Start: 2022-10-28 | End: 2022-10-31 | Stop reason: HOSPADM

## 2022-10-28 RX ORDER — CARBAMAZEPINE 200 MG/1
200 TABLET ORAL 2 TIMES DAILY
Status: DISCONTINUED | OUTPATIENT
Start: 2022-10-28 | End: 2022-10-31 | Stop reason: HOSPADM

## 2022-10-28 RX ADMIN — IOPAMIDOL 75 ML: 755 INJECTION, SOLUTION INTRAVENOUS at 19:13

## 2022-10-28 RX ADMIN — ALBUTEROL SULFATE 2.5 MG: 2.5 SOLUTION RESPIRATORY (INHALATION) at 19:44

## 2022-10-28 RX ADMIN — MORPHINE SULFATE 2 MG: 2 INJECTION, SOLUTION INTRAMUSCULAR; INTRAVENOUS at 22:56

## 2022-10-28 RX ADMIN — LIDOCAINE HYDROCHLORIDE: 20 SOLUTION ORAL; TOPICAL at 06:29

## 2022-10-28 RX ADMIN — ONDANSETRON 4 MG: 2 INJECTION INTRAMUSCULAR; INTRAVENOUS at 14:16

## 2022-10-28 RX ADMIN — ONDANSETRON 4 MG: 2 INJECTION INTRAMUSCULAR; INTRAVENOUS at 22:56

## 2022-10-28 RX ADMIN — ONDANSETRON 4 MG: 2 INJECTION INTRAMUSCULAR; INTRAVENOUS at 06:25

## 2022-10-28 RX ADMIN — SODIUM CHLORIDE, PRESERVATIVE FREE 10 ML: 5 INJECTION INTRAVENOUS at 22:57

## 2022-10-28 RX ADMIN — HYDROMORPHONE HYDROCHLORIDE 0.5 MG: 0.5 INJECTION, SOLUTION INTRAMUSCULAR; INTRAVENOUS; SUBCUTANEOUS at 07:35

## 2022-10-28 RX ADMIN — HYDROCODONE BITARTRATE AND ACETAMINOPHEN 1 TABLET: 5; 325 TABLET ORAL at 18:45

## 2022-10-28 RX ADMIN — IOPAMIDOL 75 ML: 755 INJECTION, SOLUTION INTRAVENOUS at 08:12

## 2022-10-28 RX ADMIN — CARBAMAZEPINE 200 MG: 200 TABLET ORAL at 22:57

## 2022-10-28 RX ADMIN — HYDROMORPHONE HYDROCHLORIDE 0.5 MG: 0.5 INJECTION, SOLUTION INTRAMUSCULAR; INTRAVENOUS; SUBCUTANEOUS at 10:35

## 2022-10-28 RX ADMIN — DIPHENHYDRAMINE HCL 50 MG: 25 TABLET ORAL at 22:56

## 2022-10-28 RX ADMIN — SODIUM CHLORIDE 1000 ML: 9 INJECTION, SOLUTION INTRAVENOUS at 06:22

## 2022-10-28 RX ADMIN — IOPAMIDOL 18 ML: 755 INJECTION, SOLUTION INTRAVENOUS at 19:12

## 2022-10-28 RX ADMIN — HYDROMORPHONE HYDROCHLORIDE 0.5 MG: 0.5 INJECTION, SOLUTION INTRAMUSCULAR; INTRAVENOUS; SUBCUTANEOUS at 13:22

## 2022-10-28 RX ADMIN — ALBUTEROL SULFATE 2.5 MG: 2.5 SOLUTION RESPIRATORY (INHALATION) at 15:42

## 2022-10-28 RX ADMIN — MORPHINE SULFATE 4 MG: 4 INJECTION, SOLUTION INTRAMUSCULAR; INTRAVENOUS at 06:25

## 2022-10-28 ASSESSMENT — ENCOUNTER SYMPTOMS
COLOR CHANGE: 0
DIARRHEA: 1
NAUSEA: 1
SHORTNESS OF BREATH: 0
COUGH: 1
SHORTNESS OF BREATH: 0
BACK PAIN: 0
ABDOMINAL PAIN: 1
SHORTNESS OF BREATH: 0
VOMITING: 1

## 2022-10-28 ASSESSMENT — LIFESTYLE VARIABLES
SMOKING_STATUS: 1
SMOKING_STATUS: 1

## 2022-10-28 ASSESSMENT — PAIN SCALES - GENERAL
PAINLEVEL_OUTOF10: 9
PAINLEVEL_OUTOF10: 10
PAINLEVEL_OUTOF10: 2
PAINLEVEL_OUTOF10: 8

## 2022-10-28 ASSESSMENT — PAIN DESCRIPTION - LOCATION
LOCATION: ABDOMEN

## 2022-10-28 ASSESSMENT — PAIN DESCRIPTION - DESCRIPTORS
DESCRIPTORS: BURNING
DESCRIPTORS: ACHING;DISCOMFORT;SORE

## 2022-10-28 ASSESSMENT — PAIN DESCRIPTION - ORIENTATION
ORIENTATION: MID
ORIENTATION: MID;UPPER
ORIENTATION: MID
ORIENTATION: MID;UPPER

## 2022-10-28 ASSESSMENT — PAIN - FUNCTIONAL ASSESSMENT: PAIN_FUNCTIONAL_ASSESSMENT: PREVENTS OR INTERFERES SOME ACTIVE ACTIVITIES AND ADLS

## 2022-10-28 NOTE — CONSULTS
GENERAL SURGERY  CONSULT NOTE  10/28/2022    Physician Consulted: Dr. Felicitas Li  CC: Abdominal pain, persistent  Referring Physician: Dr. Toy Michel    HPI  Mitchell Eagle is a 40 y.o. female who presents for evaluation of persistent abdominal pain that has been ongoing for approximately a year. History includes seizure disorder, arthritis and hypertension. Patient states she takes daily Protonix. States within the past 2 days the pain has been increasingly worse. Additionally patient has had new onset diarrhea, nausea and emesis. Last episode of emesis was last night, diarrhea this morning. She denies any sick contacts or recent travel. Patient has a history of cholecystectomy 2001, no other abdominal surgeries. Last EGD 5/21 with evidence of mild gastritis. Patient denies any use of antiplatelet or anticoagulation. Last meal was 10/27 pm.     Is noted to be tachycardic to 105 in the ED this morning and hypertensive. Leukocytosis of 12.8, hemoglobin 14    CTA without evidence of PE  CT abdomen pelvis unremarkable    Past Medical History:   Diagnosis Date    Asthma     Bronchitis     Movement disorder     Seizure disorder ([address]     Seizure disorder ([address]     Suicidal overdose ([facility] [address] 6/27/2014    Tobacco abuse     Tobacco abuse        Past Surgical History:   Procedure Laterality Date    CHOLECYSTECTOMY      FRACTURE SURGERY      R ANKLE TORN LIGAMENTS    ROTATOR CUFF REPAIR      UPPER GASTROINTESTINAL ENDOSCOPY N/A 5/3/2021    EGD BIOPSY performed by Maria Elena Mcwilliams MD at [address] [place] [address]       Medications Prior to Admission    Prior to Admission medications    Medication Sig Start Date End Date Taking?  Authorizing Provider   omeprazole (PRILOSEC) 40 MG delayed release capsule Take 1 capsule by mouth every morning (before breakfast) 10/15/22   Harjinder Acuña DO   sucralfate (CARAFATE) 1 GM tablet Take 1 tablet by mouth 4 times daily 10/15/22   Harjinder Acuña DO   hydroCHLOROthiazide (HYDRODIURIL) 25 MG tablet Take 1 tablet by mouth daily 10/14/22   Jesse Stephenson,    gabapentin (NEURONTIN) 100 MG capsule Take 100 mg by mouth 3 times daily. Historical Provider, MD   tiZANidine (ZANAFLEX) 4 MG tablet Take 4 mg by mouth every 6 hours as needed    Historical Provider, MD   sucralfate (CARAFATE) 1 GM tablet Take 1 tablet by mouth 4 times daily for 14 days 3/14/22 3/28/22  Charlclesa Quant, DO   pantoprazole (PROTONIX) 40 MG tablet Take 1 tablet by mouth daily (with breakfast) 3/14/22   Charlcie Quant, DO   naproxen (NAPROSYN) 500 MG tablet Take 1 tablet by mouth 2 times daily for 7 days  Patient not taking: Reported on 7/7/2021 7/1/21 7/8/21  Lorelie Brittle Rothermel, PA-C   albuterol sulfate HFA (VENTOLIN HFA) 108 (90 Base) MCG/ACT inhaler Inhale 2 puffs into the lungs 4 times daily 6/19/21   Duane Reasons, DO   nicotine (NICODERM CQ) 21 MG/24HR Place 1 patch onto the skin daily 5/4/21   Johnny Alcantara MD   carBAMazepine (TEGRETOL) 200 MG tablet Take 1 tablet every 12 hours by oral route with meals for 30 days. 1/9/20   Historical Provider, MD       Allergies   Allergen Reactions    Ketorolac Itching and Nausea And Vomiting    Tramadol      Patient does not remember the reaction    Darvocet [Propoxyphene N-Acetaminophen] Other (See Comments)     Dizziness    Ibuprofen Nausea And Vomiting    Meloxicam Hives    Naproxen Nausea And Vomiting       Family History   Problem Relation Age of Onset    Cancer Mother         colon       Social History     Tobacco Use    Smoking status: Every Day     Packs/day: 0.02     Years: 15.00     Pack years: 0.30     Types: Cigarettes    Smokeless tobacco: Never    Tobacco comments:     SAYS CANNOT USE PATCHES OR GUM   Vaping Use    Vaping Use: Never used   Substance Use Topics    Alcohol use:  Yes     Alcohol/week: 0.0 standard drinks     Comment: occassional-WINE COOLERS    Drug use: Yes     Types: Cocaine     Comment: TONIGHT       REVIEW OF SYSTEMS:    Constitutional: negative  Eyes: negative  Ears, nose, mouth, throat, and face: negative  Respiratory: negative  Cardiovascular: negative  Gastrointestinal: as in hpi  Genitourinary:negative  Integument/breast: negative  Hematologic/lymphatic: negative  Musculoskeletal:negative  Neurological: negative  Allergic/Immunologic: negative      PHYSICAL EXAM:    Vitals:    10/28/22 0736   BP:    Pulse: 94   Resp:    Temp:    SpO2:        GENERAL: Uncomfortable, tearful  HEAD:  Normocephalic. Atraumatic. EYES:   No scleral icterus. PERRL. LUNGS:  No increased work of breathing on room air  CARDIOVASCULAR: Warm extremities  ABDOMEN: Soft, mildly distended, generalized tenderness, no rebound or rigidity  Musculoskeletal:   MAEx4. Atraumatic. No LE edema. SKIN:  Warm and dry    ASSESSMENT/PLAN:  40 y.o. female with persistent abdominal pain and new nausea vomiting and diarrhea. Plan will be  - Follow-up CT abdomen pelvis with oral contrast  - Scope timing to be determined. - Remain n.p.o. except for oral contrast     discussed with Dr. Rosa Hartley. Mario Robledo DO  Surgery Resident PGY-1  10/28/2022  12:59 PM    I saw and examined the patient. I reviewed the above resident's note. I reviewed all labs, radiology, and all test results listed above. I agree with the assessment and plan as outlined.     Rosa Hartley MD  General Surgery
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
## 2022-10-28 NOTE — PROGRESS NOTES
Admission database completed to best of this RN's ability. Pharmacy tech to review medication list. Care plan and education initiated. Pt independent from home with step father. Denies any use of DME or HHC services prior to admission.

## 2022-10-28 NOTE — ED PROVIDER NOTES
HPI   This is a 60-year-old female patient presented to emergency department for evaluation of nausea vomiting diarrhea. Patient reporting she has been having associated abdominal pain now on and off for the last month. She denies any fevers or chills. She was seen here about 2 weeks ago for similar complaint of abdominal pain nausea and vomiting. She had negative CT scan of her abdomen pelvis at that time. This morning she woke up she was feeling a throbbing sensation in the middle of her chest and in her left shoulder. She states she has been having associated cough and sore throat as well as body aches, though symptoms started this morning. This morning when she coughed she had some associated diarrhea. It is yellowish in color. She is not on any blood thinners denies any blood in the vomit or the diarrhea. She has no past history of blood clots. She has not been on any antibiotics recently she is not a diabetic. She denies any past family history of Crohn's disease or ulcerative colitis. She saw her primary care physician and she supposed be getting set up for GI follow-up possible scoping. She denies any urinary complaints. She denies any marijuana use or other drug use. Review of Systems   Constitutional:  Negative for chills and fever. HENT:  Negative for congestion. Respiratory:  Positive for cough. Negative for shortness of breath. Cardiovascular:  Positive for chest pain. Gastrointestinal:  Positive for abdominal pain, diarrhea, nausea and vomiting. Genitourinary:  Negative for difficulty urinating, dysuria and hematuria. Musculoskeletal:  Negative for back pain. Skin:  Negative for color change. All other systems reviewed and are negative. Physical Exam  Vitals and nursing note reviewed. Constitutional:       Appearance: Normal appearance. HENT:      Head: Normocephalic and atraumatic. Nose: Nose normal. No congestion.       Mouth/Throat:      Mouth: Mucous membranes are moist.      Pharynx: Oropharynx is clear. Eyes:      Conjunctiva/sclera: Conjunctivae normal.      Pupils: Pupils are equal, round, and reactive to light. Cardiovascular:      Rate and Rhythm: Normal rate and regular rhythm. Pulses: Normal pulses. Heart sounds: Normal heart sounds. Pulmonary:      Effort: Pulmonary effort is normal. No respiratory distress. Breath sounds: Normal breath sounds. Abdominal:      General: Bowel sounds are normal. There is no distension. Tenderness: There is abdominal tenderness. There is no right CVA tenderness, left CVA tenderness or rebound. Musculoskeletal:         General: Normal range of motion. Cervical back: Normal range of motion and neck supple. Right lower leg: No edema. Left lower leg: No edema. Skin:     General: Skin is warm and dry. Capillary Refill: Capillary refill takes less than 2 seconds. Neurological:      General: No focal deficit present. Mental Status: She is alert. Procedures     MDM  76-year-old female patient present emergency department for evaluation of abdominal pain. This patient's third visit for similar. Grossly normal work-up here. Initial lactic 2.5 repeat 1.2 after fluids mild leukocytosis of 12.8. CT imaging of her chest abdomen pelvis all negative. She does not have any pulmonary emboli or any other intra-abdominal abnormalities. I spoke with general surgery at this time they are requesting patient to be admitted they will likely scope her. Multiple dose of pain medicine here was required. EKG: This EKG is signed and interpreted by me.     Rate: 87  Rhythm: Sinus  Interpretation: no acute changes, no ST elevations or depressions  Comparison: stable as compared to patient's most recent EKG      ED Course as of 10/28/22 1333   Fri Oct 28, 2022   0709 ECG:  TIME: 0639  Hr 87  NORMAL SINUS RHYTHM  Intervals normal, no acute ischemic findings, normal ecg stable ecg [PETE]   [phone] with surgery, requested to admit to medicine for pain control they will likely or more imaging of patient [FG]   [phone] Spoke with Dr. Miriam Flood will admit   [FG]      ED Course User Index  [FG] Lin Reddy DO  [PETE] Genarosingh Paniagua DO     ED Course as of 10/28/22 1333   Fri Oct 28, 2022   0709 ECG:  TIME: 0639  Hr 87  NORMAL SINUS RHYTHM  Intervals normal, no acute ischemic findings, normal ecg stable ecg [PETE]   1238 Spoke with surgery, requested to admit to medicine for pain control they will likely or more imaging of patient [FG]   [phone] Spoke with Dr. Miriam Flood will admit   [FG]      ED Course User Index  [FG] Lin Reddy DO  [PETE] Michelle Paniagua DO       --------------------------------------------- PAST HISTORY ---------------------------------------------  Past Medical History:  has a past medical history of Asthma, Bronchitis, Movement disorder, Seizure disorder ([facility] [address] Seizure disorder ([facility] [address] [facility] Tobacco abuse, and Tobacco abuse. Past Surgical History:  has a past surgical history that includes fracture surgery; Cholecystectomy; Rotator cuff repair; and Upper gastrointestinal endoscopy (N/A, 5/3/2021). Social History:  reports that she has been smoking cigarettes. She has a 0.30 pack-year smoking history. She has never used smokeless tobacco. She reports current alcohol use. She reports current drug use. Drug: Cocaine. Family History: family history includes Cancer in her mother. The patients home medications have been reviewed.     Allergies: Ketorolac, Darvocet [propoxyphene n-acetaminophen], Ibuprofen, Meloxicam, Naproxen, and Tramadol    -------------------------------------------------- RESULTS -------------------------------------------------    LABS:  Results for orders placed or performed during the hospital encounter of 10/28/22   Strep Screen Group A Throat    Specimen: Throat   Result Value Ref Range    Strep Grp A PCR Negative Negative COVID-19, Rapid    Specimen: Nasopharyngeal Swab   Result Value Ref Range    SARS-CoV-2, NAAT Not Detected Not Detected   Rapid influenza A/B antigens    Specimen: Nasopharyngeal   Result Value Ref Range    Influenza A by PCR Not Detected Not Detected    Influenza B by PCR Not Detected Not Detected   CBC with Auto Differential   Result Value Ref Range    WBC 12.8 (H) 4.5 - 11.5 E9/L    RBC 5.56 (H) 3.50 - 5.50 E12/L    Hemoglobin 14.0 11.5 - 15.5 g/dL    Hematocrit 42.6 34.0 - 48.0 %    MCV 76.6 (L) 80.0 - 99.9 fL    MCH 25.2 (L) 26.0 - 35.0 pg    MCHC 32.9 32.0 - 34.5 %    RDW 16.9 (H) 11.5 - 15.0 fL    Platelets 553 360 - 497 E9/L    MPV 9.4 7.0 - 12.0 fL    Neutrophils % 72.1 43.0 - 80.0 %    Immature Granulocytes % 0.6 0.0 - 5.0 %    Lymphocytes % 19.6 (L) 20.0 - 42.0 %    Monocytes % 6.6 2.0 - 12.0 %    Eosinophils % 0.8 0.0 - 6.0 %    Basophils % 0.3 0.0 - 2.0 %    Neutrophils Absolute 9.19 (H) 1.80 - 7.30 E9/L    Immature Granulocytes # 0.08 E9/L    Lymphocytes Absolute 2.50 1.50 - 4.00 E9/L    Monocytes Absolute 0.84 0.10 - 0.95 E9/L    Eosinophils Absolute 0.10 0.05 - 0.50 E9/L    Basophils Absolute 0.04 0.00 - 0.20 E9/L   Comprehensive Metabolic Panel w/ Reflex to MG   Result Value Ref Range    Sodium 134 132 - 146 mmol/L    Potassium reflex Magnesium 4.1 3.5 - 5.0 mmol/L    Chloride 100 98 - 107 mmol/L    CO2 21 (L) 22 - 29 mmol/L    Anion Gap 13 7 - 16 mmol/L    Glucose 111 (H) 74 - 99 mg/dL    BUN 12 6 - 20 mg/dL    Creatinine 0.6 0.5 - 1.0 mg/dL    Est, Glom Filt Rate >60 >=60 mL/min/1.73    Calcium 9.5 8.6 - 10.2 mg/dL    Total Protein 7.8 6.4 - 8.3 g/dL    Albumin 4.4 3.5 - 5.2 g/dL    Total Bilirubin 0.4 0.0 - 1.2 mg/dL    Alkaline Phosphatase 114 (H) 35 - 104 U/L    ALT 22 0 - 32 U/L    AST 23 0 - 31 U/L   Lipase   Result Value Ref Range    Lipase 27 13 - 60 U/L   Troponin   Result Value Ref Range    Troponin, High Sensitivity <6 0 - 9 ng/L   Lactic Acid   Result Value Ref Range    Lactic Acid 2.5 (H) 0.5 - 2.2 mmol/L   Urinalysis with Microscopic   Result Value Ref Range    Color, UA Yellow Straw/Yellow    Clarity, UA Clear Clear    Glucose, Ur Negative Negative mg/dL    Bilirubin Urine Negative Negative    Ketones, Urine Negative Negative mg/dL    Specific Gravity, UA >=1.030 1.005 - 1.030    Blood, Urine Negative Negative    pH, UA 6.0 5.0 - 9.0    Protein, UA 30 (A) Negative mg/dL    Urobilinogen, Urine 0.2 <2.0 E.U./dL    Nitrite, Urine Negative Negative    Leukocyte Esterase, Urine Negative Negative    Hyaline Casts, UA 0-2 0 - 2 /LPF    WBC, UA 0-1 0 - 5 /HPF    RBC, UA NONE 0 - 2 /HPF    Epithelial Cells, UA FEW /HPF    Bacteria, UA FEW (A) None Seen /HPF   CK   Result Value Ref Range    Total  (H) 20 - 180 U/L   Pregnancy, urine   Result Value Ref Range    HCG(Urine) Pregnancy Test NEGATIVE NEGATIVE   Lactic Acid   Result Value Ref Range    Lactic Acid 1.2 0.5 - 2.2 mmol/L   Carbamazepine Level, Total   Result Value Ref Range    Carbamazepine Dose Unknown    EKG 12 Lead   Result Value Ref Range    Ventricular Rate 87 BPM    Atrial Rate 87 BPM    P-R Interval 136 ms    QRS Duration 80 ms    Q-T Interval 382 ms    QTc Calculation (Bazett) 459 ms    P Axis 52 degrees    R Axis 66 degrees    T Axis 38 degrees       RADIOLOGY:  CT ABDOMEN PELVIS W IV CONTRAST Additional Contrast? None   Final Result   No evidence of pulmonary embolism or acute pulmonary abnormality. No CT evidence of acute intra-abdominal or pelvic abnormality. CTA PULMONARY W CONTRAST   Final Result   No evidence of pulmonary embolism or acute pulmonary abnormality. No CT evidence of acute intra-abdominal or pelvic abnormality. XR CHEST PORTABLE   Final Result   No acute process.          CT ABDOMEN PELVIS W IV CONTRAST Additional Contrast? Oral    (Results Pending)         ------------------------- NURSING NOTES AND VITALS REVIEWED ---------------------------  Date / Time Roomed:  10/28/2022  5:47 AM  ED Bed Assignment:  26/26    The nursing notes within the ED encounter and vital signs as below have been reviewed. Patient Vitals for the past 24 hrs:   BP Temp Pulse Resp SpO2 Height Weight   10/28/22 0736 -- -- 94 -- -- -- --   10/28/22 0556 (!) 139/99 98.7 °F (37.1 °C) (!) 105 16 98 % 4' 11\" (1.499 m) 150 lb (68 kg)       Oxygen Saturation Interpretation: Normal  Counseling:  I have spoken with the patient and discussed todays results, in addition to providing specific details for the plan of care and counseling regarding the diagnosis and prognosis. Their questions are answered at this time and they are agreeable with the plan of admission.    --------------------------------- ADDITIONAL PROVIDER NOTES ---------------------------------   Spoke with Dr. Romelia Irwin Discussed case. They will admit the patient. This patient's ED course included: a personal history and physicial examination, re-evaluation prior to disposition, multiple bedside re-evaluations, and IV medications    This patient has remained hemodynamically stable during their ED course. Diagnosis:  1. Intractable abdominal pain        Disposition:  Patient's disposition: Admit to med/surg floor  Patient's condition is stable.          Rob Sandoval DO  Resident  10/28/22 7893

## 2022-10-28 NOTE — PROGRESS NOTES
Patient seen and examined. Full consult to follow. Repeat CT with PO contrast.  Will need scopes, timing to be determined.     Damion Niño MD

## 2022-10-28 NOTE — ANESTHESIA PRE PROCEDURE
Department of Anesthesiology  Preprocedure Note       Name:  Radha Lacey   Age:  40 y.o.  :  1980                                          MRN:  [id number]         Date:  10/28/2022      Surgeon: Aye Yun):  Leanna Gaffney MD    Procedure: Procedure(s):  EGD ESOPHAGOGASTRODUODENOSCOPY    Medications prior to admission:   Prior to Admission medications    Medication Sig Start Date End Date Taking? Authorizing Provider   omeprazole (PRILOSEC) 40 MG delayed release capsule Take 1 capsule by mouth every morning (before breakfast) 10/15/22   Sandria Lessen, DO   sucralfate (CARAFATE) 1 GM tablet Take 1 tablet by mouth 4 times daily 10/15/22   Sandria Lessen, DO   hydroCHLOROthiazide (HYDRODIURIL) 25 MG tablet Take 1 tablet by mouth daily 10/14/22   Hershall Cake, DO   gabapentin (NEURONTIN) 100 MG capsule Take 100 mg by mouth 3 times daily. Historical Provider, MD   tiZANidine (ZANAFLEX) 4 MG tablet Take 4 mg by mouth every 6 hours as needed    Historical Provider, MD   sucralfate (CARAFATE) 1 GM tablet Take 1 tablet by mouth 4 times daily for 14 days 3/14/22 3/28/22  Tony Perales DO   pantoprazole (PROTONIX) 40 MG tablet Take 1 tablet by mouth daily (with breakfast) 3/14/22   Tony Perales DO   naproxen (NAPROSYN) 500 MG tablet Take 1 tablet by mouth 2 times daily for 7 days  Patient not taking: Reported on 2021  Shae Flatness FAHAD Johnston   albuterol sulfate HFA (VENTOLIN HFA) 108 (90 Base) MCG/ACT inhaler Inhale 2 puffs into the lungs 4 times daily 21   Julieta Lerma DO   nicotine (NICODERM CQ) 21 MG/24HR Place 1 patch onto the skin daily 21   Alejandro Gr MD   carBAMazepine (TEGRETOL) 200 MG tablet Take 1 tablet every 12 hours by oral route with meals for 30 days.  20   Historical Provider, MD       Current medications:    Current Outpatient Medications   Medication Sig Dispense Refill    omeprazole (PRILOSEC) 40 MG delayed release capsule Take 1 capsule by mouth every morning (before breakfast) 30 capsule 0    sucralfate (CARAFATE) 1 GM tablet Take 1 tablet by mouth 4 times daily 40 tablet 0    hydroCHLOROthiazide (HYDRODIURIL) 25 MG tablet Take 1 tablet by mouth daily 30 tablet 0    gabapentin (NEURONTIN) 100 MG capsule Take 100 mg by mouth 3 times daily.  tiZANidine (ZANAFLEX) 4 MG tablet Take 4 mg by mouth every 6 hours as needed      sucralfate (CARAFATE) 1 GM tablet Take 1 tablet by mouth 4 times daily for 14 days 56 tablet 0    pantoprazole (PROTONIX) 40 MG tablet Take 1 tablet by mouth daily (with breakfast) 30 tablet 0    naproxen (NAPROSYN) 500 MG tablet Take 1 tablet by mouth 2 times daily for 7 days (Patient not taking: Reported on 7/7/2021) 14 tablet 0    albuterol sulfate HFA (VENTOLIN HFA) 108 (90 Base) MCG/ACT inhaler Inhale 2 puffs into the lungs 4 times daily 1 Inhaler 0    nicotine (NICODERM CQ) 21 MG/24HR Place 1 patch onto the skin daily 30 patch 0    carBAMazepine (TEGRETOL) 200 MG tablet Take 1 tablet every 12 hours by oral route with meals for 30 days. No current facility-administered medications for this visit. Allergies: Allergies   Allergen Reactions    Ketorolac Itching and Nausea And Vomiting    Darvocet [Propoxyphene N-Acetaminophen] Swelling and Dizziness or Vertigo     FACIAL SWELLING    Ibuprofen Nausea And Vomiting    Meloxicam Hives    Naproxen Nausea And Vomiting    Tramadol Other (See Comments)     Patient does not remember the reaction       Problem List:    Patient Active Problem List   Diagnosis Code    Tobacco abuse Z72.0    Seizure disorder ([facility] [address] G40.909    Asthma J45.909    Carbamazepine overdose with self-harm intent T42.1X4A    Depression F32. A    Suicidal overdose ([facility] [address] T50.902A    Pneumonia J18.9    Epigastric pain R10.13    Generalized abdominal pain R10.84    Intractable abdominal pain R10.9       Past Medical History:        Diagnosis Date    Asthma     Bronchitis     Movement disorder     Se[facility] disorder ([facility] [address]     [facility] disorder ([facility]     Suicidal overdose ([facility] 6/27/2014    Tobacco abuse     Tobacco abuse        Past Surgical History:        Procedure Laterality Date    CHOLECYSTECTOMY      FRACTURE SURGERY      R ANKLE TORN LIGAMENTS    ROTATOR CUFF REPAIR      UPPER GASTROINTESTINAL ENDOSCOPY N/A 5/3/2021    EGD BIOPSY performed by Allan Sterling MD at [address] [place] [address] Crossing History:    Social History     Tobacco Use    Smoking status: Every Day     Packs/day: 0.02     Years: 15.00     Pack years: 0.30     Types: Cigarettes    Smokeless tobacco: Never    Tobacco comments:     SAYS CANNOT USE PATCHES OR GUM   Substance Use Topics    Alcohol use: Yes     Alcohol/week: 0.0 standard drinks     Comment: occassional-WINE COOLERS                                Ready to quit: Not Answered  Counseling given: Not Answered  Tobacco comments: SAYS CANNOT USE PATCHES OR GUM      Vital Signs (Current): There were no vitals filed for this visit.                                            BP Readings from Last 3 Encounters:   10/28/22 (!) 139/99   10/24/22 (!) 148/87   10/15/22 (!) 194/98       NPO Status:                                                                                 BMI:   Wt Readings from Last 3 Encounters:   10/28/22 150 lb (68 kg)   10/24/22 150 lb (68 kg)   10/15/22 135 lb (61.2 kg)     There is no height or weight on file to calculate BMI.    CBC:   Lab Results   Component Value Date/Time    WBC 12.8 10/28/2022 06:22 AM    RBC 5.56 10/28/2022 06:22 AM    HGB 14.0 10/28/2022 06:22 AM    HCT 42.6 10/28/2022 06:22 AM    MCV 76.6 10/28/2022 06:22 AM    RDW 16.9 10/28/2022 06:22 AM     10/28/2022 06:22 AM       CMP:   Lab Results   Component Value Date/Time     10/28/2022 06:22 AM    K 4.1 10/28/2022 06:22 AM     10/28/2022 06:22 AM    CO2 21 10/28/2022 06:22 AM    BUN 12 10/28/2022 06:22 AM    CREATININE 0.6 10/28/2022 06:22 AM GFRAA >60 10/15/2022 03:31 AM    LABGLOM >60 10/28/2022 06:22 AM    GLUCOSE 111 10/28/2022 06:22 AM    GLUCOSE 90 07/24/2011 02:45 AM    PROT 7.8 10/28/2022 06:22 AM    CALCIUM 9.5 10/28/2022 06:22 AM    BILITOT 0.4 10/28/2022 06:22 AM    ALKPHOS 114 10/28/2022 06:22 AM    AST 23 10/28/2022 06:22 AM    ALT 22 10/28/2022 06:22 AM       POC Tests: No results for input(s): POCGLU, POCNA, POCK, POCCL, POCBUN, POCHEMO, POCHCT in the last 72 hours. Coags:   Lab Results   Component Value Date/Time    PROTIME 11.9 10/14/2022 07:26 AM    INR 1.0 10/14/2022 07:26 AM    APTT 24.9 06/30/2020 11:32 AM       HCG (If Applicable):   Lab Results   Component Value Date    PREGTESTUR NEGATIVE 10/28/2022    PREGSERUM NEGATIVE 06/15/2011    HCG <0.1 12/08/2013        ABGs: No results found for: PHART, PO2ART, MQT9TMU, NOM3IEQ, BEART, K1FJKKGS     Type & Screen (If Applicable):  No results found for: LABABO, LABRH    Drug/Infectious Status (If Applicable):  No results found for: HIV, HEPCAB    COVID-19 Screening (If Applicable):   Lab Results   Component Value Date/Time    COVID19 Not Detected 10/28/2022 06:22 AM           Anesthesia Evaluation  Patient summary reviewed  Airway: Mallampati: II  TM distance: >3 FB   Neck ROM: full  Mouth opening: > = 3 FB   Dental: normal exam         Pulmonary: breath sounds clear to auscultation  (+) pneumonia: resolved,  asthma: current smoker    (-) shortness of breath          Patient did not smoke on day of surgery. Cardiovascular:  Exercise tolerance: good (>4 METS),       (-) past MI and CAD    ECG reviewed  Rhythm: regular  Rate: normal                    Neuro/Psych:   (+) seizures:, psychiatric history: stable with treatmentdepression/anxiety             GI/Hepatic/Renal:        (-) liver disease and no renal disease       Endo/Other: Negative Endo/Other ROS                    Abdominal:         (-) obese       Vascular: negative vascular ROS.          Other Findings: Anesthesia Plan      MAC     ASA 3       Induction: intravenous. MIPS: Prophylactic antiemetics administered. Anesthetic plan and risks discussed with patient. Plan discussed with CRNA.                     Radha Bunch MD   10/28/2022

## 2022-10-29 LAB
ANION GAP SERPL CALCULATED.3IONS-SCNC: 13 MMOL/L (ref 7–16)
BUN BLDV-MCNC: 7 MG/DL (ref 6–20)
CALCIUM SERPL-MCNC: 9.4 MG/DL (ref 8.6–10.2)
CHLORIDE BLD-SCNC: 100 MMOL/L (ref 98–107)
CO2: 22 MMOL/L (ref 22–29)
CREAT SERPL-MCNC: 0.7 MG/DL (ref 0.5–1)
GFR SERPL CREATININE-BSD FRML MDRD: >60 ML/MIN/1.73
GLUCOSE BLD-MCNC: 80 MG/DL (ref 74–99)
HCT VFR BLD CALC: 41.1 % (ref 34–48)
HEMOGLOBIN: 13.3 G/DL (ref 11.5–15.5)
MCH RBC QN AUTO: 25.2 PG (ref 26–35)
MCHC RBC AUTO-ENTMCNC: 32.4 % (ref 32–34.5)
MCV RBC AUTO: 78 FL (ref 80–99.9)
PDW BLD-RTO: 16.4 FL (ref 11.5–15)
PLATELET # BLD: 258 E9/L (ref 130–450)
PMV BLD AUTO: 9 FL (ref 7–12)
POTASSIUM SERPL-SCNC: 4.4 MMOL/L (ref 3.5–5)
RBC # BLD: 5.27 E12/L (ref 3.5–5.5)
ROTAVIRUS ANTIGEN: NORMAL
SODIUM BLD-SCNC: 135 MMOL/L (ref 132–146)
WBC # BLD: 7.5 E9/L (ref 4.5–11.5)
WHITE BLOOD CELLS (WBC), STOOL: NORMAL

## 2022-10-29 PROCEDURE — 96376 TX/PRO/DX INJ SAME DRUG ADON: CPT

## 2022-10-29 PROCEDURE — 6370000000 HC RX 637 (ALT 250 FOR IP): Performed by: SURGERY

## 2022-10-29 PROCEDURE — G0378 HOSPITAL OBSERVATION PER HR: HCPCS

## 2022-10-29 PROCEDURE — 85027 COMPLETE CBC AUTOMATED: CPT

## 2022-10-29 PROCEDURE — 6360000002 HC RX W HCPCS: Performed by: NURSE PRACTITIONER

## 2022-10-29 PROCEDURE — 7100000010 HC PHASE II RECOVERY - FIRST 15 MIN: Performed by: SURGERY

## 2022-10-29 PROCEDURE — 3600007503: Performed by: SURGERY

## 2022-10-29 PROCEDURE — 94640 AIRWAY INHALATION TREATMENT: CPT

## 2022-10-29 PROCEDURE — 43239 EGD BIOPSY SINGLE/MULTIPLE: CPT | Performed by: SURGERY

## 2022-10-29 PROCEDURE — 3600007513: Performed by: SURGERY

## 2022-10-29 PROCEDURE — 3700000001 HC ADD 15 MINUTES (ANESTHESIA): Performed by: SURGERY

## 2022-10-29 PROCEDURE — 80048 BASIC METABOLIC PNL TOTAL CA: CPT

## 2022-10-29 PROCEDURE — 6370000000 HC RX 637 (ALT 250 FOR IP): Performed by: NURSE PRACTITIONER

## 2022-10-29 PROCEDURE — 36415 COLL VENOUS BLD VENIPUNCTURE: CPT

## 2022-10-29 PROCEDURE — 88305 TISSUE EXAM BY PATHOLOGIST: CPT

## 2022-10-29 PROCEDURE — 6360000002 HC RX W HCPCS: Performed by: SURGERY

## 2022-10-29 PROCEDURE — 2580000003 HC RX 258: Performed by: NURSE ANESTHETIST, CERTIFIED REGISTERED

## 2022-10-29 PROCEDURE — 6360000002 HC RX W HCPCS: Performed by: NURSE ANESTHETIST, CERTIFIED REGISTERED

## 2022-10-29 PROCEDURE — 3700000000 HC ANESTHESIA ATTENDED CARE: Performed by: SURGERY

## 2022-10-29 PROCEDURE — 2709999900 HC NON-CHARGEABLE SUPPLY: Performed by: SURGERY

## 2022-10-29 PROCEDURE — 2580000003 HC RX 258: Performed by: SURGERY

## 2022-10-29 PROCEDURE — 6360000002 HC RX W HCPCS: Performed by: INTERNAL MEDICINE

## 2022-10-29 PROCEDURE — 99225 PR SBSQ OBSERVATION CARE/DAY 25 MINUTES: CPT | Performed by: INTERNAL MEDICINE

## 2022-10-29 PROCEDURE — 7100000011 HC PHASE II RECOVERY - ADDTL 15 MIN: Performed by: SURGERY

## 2022-10-29 PROCEDURE — 2500000003 HC RX 250 WO HCPCS: Performed by: NURSE ANESTHETIST, CERTIFIED REGISTERED

## 2022-10-29 RX ORDER — SODIUM CHLORIDE 0.9 % (FLUSH) 0.9 %
5-40 SYRINGE (ML) INJECTION EVERY 12 HOURS SCHEDULED
Status: DISCONTINUED | OUTPATIENT
Start: 2022-10-29 | End: 2022-10-31 | Stop reason: HOSPADM

## 2022-10-29 RX ORDER — AMLODIPINE BESYLATE 5 MG/1
5 TABLET ORAL EVERY MORNING
Status: DISCONTINUED | OUTPATIENT
Start: 2022-10-29 | End: 2022-10-31 | Stop reason: HOSPADM

## 2022-10-29 RX ORDER — PROPOFOL 10 MG/ML
INJECTION, EMULSION INTRAVENOUS PRN
Status: DISCONTINUED | OUTPATIENT
Start: 2022-10-29 | End: 2022-10-29 | Stop reason: SDUPTHER

## 2022-10-29 RX ORDER — FENTANYL CITRATE 50 UG/ML
50 INJECTION, SOLUTION INTRAMUSCULAR; INTRAVENOUS EVERY 5 MIN PRN
Status: DISCONTINUED | OUTPATIENT
Start: 2022-10-29 | End: 2022-10-30

## 2022-10-29 RX ORDER — LIDOCAINE HYDROCHLORIDE 20 MG/ML
INJECTION, SOLUTION EPIDURAL; INFILTRATION; INTRACAUDAL; PERINEURAL PRN
Status: DISCONTINUED | OUTPATIENT
Start: 2022-10-29 | End: 2022-10-29 | Stop reason: SDUPTHER

## 2022-10-29 RX ORDER — FENTANYL CITRATE 50 UG/ML
25 INJECTION, SOLUTION INTRAMUSCULAR; INTRAVENOUS EVERY 5 MIN PRN
Status: DISCONTINUED | OUTPATIENT
Start: 2022-10-29 | End: 2022-10-30

## 2022-10-29 RX ORDER — SODIUM CHLORIDE 0.9 % (FLUSH) 0.9 %
5-40 SYRINGE (ML) INJECTION PRN
Status: DISCONTINUED | OUTPATIENT
Start: 2022-10-29 | End: 2022-10-31 | Stop reason: HOSPADM

## 2022-10-29 RX ORDER — SODIUM CHLORIDE, SODIUM LACTATE, POTASSIUM CHLORIDE, CALCIUM CHLORIDE 600; 310; 30; 20 MG/100ML; MG/100ML; MG/100ML; MG/100ML
INJECTION, SOLUTION INTRAVENOUS CONTINUOUS PRN
Status: DISCONTINUED | OUTPATIENT
Start: 2022-10-29 | End: 2022-10-29 | Stop reason: SDUPTHER

## 2022-10-29 RX ORDER — CETIRIZINE HYDROCHLORIDE 10 MG/1
10 TABLET ORAL DAILY
Status: DISCONTINUED | OUTPATIENT
Start: 2022-10-29 | End: 2022-10-31 | Stop reason: HOSPADM

## 2022-10-29 RX ORDER — SODIUM CHLORIDE 9 MG/ML
INJECTION, SOLUTION INTRAVENOUS PRN
Status: DISCONTINUED | OUTPATIENT
Start: 2022-10-29 | End: 2022-10-31 | Stop reason: HOSPADM

## 2022-10-29 RX ORDER — PROCHLORPERAZINE EDISYLATE 5 MG/ML
5 INJECTION INTRAMUSCULAR; INTRAVENOUS
Status: ACTIVE | OUTPATIENT
Start: 2022-10-29 | End: 2022-10-30

## 2022-10-29 RX ADMIN — SUCRALFATE 1 G: 1 TABLET ORAL at 13:30

## 2022-10-29 RX ADMIN — GABAPENTIN 100 MG: 100 CAPSULE ORAL at 09:03

## 2022-10-29 RX ADMIN — HYDROCODONE BITARTRATE AND ACETAMINOPHEN 1 TABLET: 5; 325 TABLET ORAL at 17:14

## 2022-10-29 RX ADMIN — SODIUM CHLORIDE, PRESERVATIVE FREE 10 ML: 5 INJECTION INTRAVENOUS at 09:04

## 2022-10-29 RX ADMIN — MORPHINE SULFATE 2 MG: 2 INJECTION, SOLUTION INTRAMUSCULAR; INTRAVENOUS at 20:32

## 2022-10-29 RX ADMIN — ONDANSETRON 4 MG: 2 INJECTION INTRAMUSCULAR; INTRAVENOUS at 06:47

## 2022-10-29 RX ADMIN — LIDOCAINE HYDROCHLORIDE: 20 SOLUTION ORAL; TOPICAL at 09:03

## 2022-10-29 RX ADMIN — PROPOFOL 40 MG: 10 INJECTION, EMULSION INTRAVENOUS at 07:50

## 2022-10-29 RX ADMIN — DIPHENHYDRAMINE HCL 50 MG: 25 TABLET ORAL at 09:10

## 2022-10-29 RX ADMIN — MORPHINE SULFATE 2 MG: 2 INJECTION, SOLUTION INTRAMUSCULAR; INTRAVENOUS at 06:46

## 2022-10-29 RX ADMIN — LIDOCAINE HYDROCHLORIDE: 20 SOLUTION ORAL; TOPICAL at 20:37

## 2022-10-29 RX ADMIN — SUCRALFATE 1 G: 1 TABLET ORAL at 17:14

## 2022-10-29 RX ADMIN — LIDOCAINE HYDROCHLORIDE: 20 SOLUTION ORAL; TOPICAL at 15:19

## 2022-10-29 RX ADMIN — SODIUM CHLORIDE, PRESERVATIVE FREE 10 ML: 5 INJECTION INTRAVENOUS at 20:32

## 2022-10-29 RX ADMIN — SUCRALFATE 1 G: 1 TABLET ORAL at 09:03

## 2022-10-29 RX ADMIN — CETIRIZINE HYDROCHLORIDE 10 MG: 10 TABLET, FILM COATED ORAL at 09:03

## 2022-10-29 RX ADMIN — PROPOFOL 100 MG: 10 INJECTION, EMULSION INTRAVENOUS at 07:38

## 2022-10-29 RX ADMIN — GABAPENTIN 100 MG: 100 CAPSULE ORAL at 20:32

## 2022-10-29 RX ADMIN — PANTOPRAZOLE SODIUM 40 MG: 40 TABLET, DELAYED RELEASE ORAL at 09:03

## 2022-10-29 RX ADMIN — LIDOCAINE HYDROCHLORIDE 40 MG: 20 INJECTION, SOLUTION EPIDURAL; INFILTRATION; INTRACAUDAL; PERINEURAL at 07:38

## 2022-10-29 RX ADMIN — SUCRALFATE 1 G: 1 TABLET ORAL at 20:32

## 2022-10-29 RX ADMIN — HYDROCODONE BITARTRATE AND ACETAMINOPHEN 1 TABLET: 5; 325 TABLET ORAL at 09:10

## 2022-10-29 RX ADMIN — CARBAMAZEPINE 200 MG: 200 TABLET ORAL at 09:02

## 2022-10-29 RX ADMIN — AMLODIPINE BESYLATE 5 MG: 5 TABLET ORAL at 09:03

## 2022-10-29 RX ADMIN — ALBUTEROL SULFATE 2.5 MG: 2.5 SOLUTION RESPIRATORY (INHALATION) at 11:16

## 2022-10-29 RX ADMIN — PROPOFOL 50 MG: 10 INJECTION, EMULSION INTRAVENOUS at 07:45

## 2022-10-29 RX ADMIN — ALBUTEROL SULFATE 2.5 MG: 2.5 SOLUTION RESPIRATORY (INHALATION) at 20:03

## 2022-10-29 RX ADMIN — GABAPENTIN 100 MG: 100 CAPSULE ORAL at 13:30

## 2022-10-29 RX ADMIN — HYDROCHLOROTHIAZIDE 25 MG: 25 TABLET ORAL at 09:02

## 2022-10-29 RX ADMIN — SODIUM CHLORIDE, POTASSIUM CHLORIDE, SODIUM LACTATE AND CALCIUM CHLORIDE: 600; 310; 30; 20 INJECTION, SOLUTION INTRAVENOUS at 07:20

## 2022-10-29 RX ADMIN — CARBAMAZEPINE 200 MG: 200 TABLET ORAL at 20:32

## 2022-10-29 RX ADMIN — ALBUTEROL SULFATE 2.5 MG: 2.5 SOLUTION RESPIRATORY (INHALATION) at 16:43

## 2022-10-29 RX ADMIN — PROPOFOL 30 MG: 10 INJECTION, EMULSION INTRAVENOUS at 07:47

## 2022-10-29 ASSESSMENT — PAIN DESCRIPTION - ONSET
ONSET: ON-GOING
ONSET: ON-GOING

## 2022-10-29 ASSESSMENT — PAIN DESCRIPTION - LOCATION
LOCATION: ABDOMEN

## 2022-10-29 ASSESSMENT — PAIN SCALES - GENERAL
PAINLEVEL_OUTOF10: 2
PAINLEVEL_OUTOF10: 7
PAINLEVEL_OUTOF10: 7
PAINLEVEL_OUTOF10: 0
PAINLEVEL_OUTOF10: 0
PAINLEVEL_OUTOF10: 7
PAINLEVEL_OUTOF10: 5

## 2022-10-29 ASSESSMENT — PAIN DESCRIPTION - DESCRIPTORS
DESCRIPTORS: ACHING;DISCOMFORT;SORE
DESCRIPTORS: ACHING;DISCOMFORT
DESCRIPTORS: ACHING;DISCOMFORT;STABBING
DESCRIPTORS: ACHING;DISCOMFORT

## 2022-10-29 ASSESSMENT — PAIN DESCRIPTION - FREQUENCY
FREQUENCY: INTERMITTENT
FREQUENCY: INTERMITTENT

## 2022-10-29 ASSESSMENT — PAIN DESCRIPTION - ORIENTATION
ORIENTATION: RIGHT;MID;UPPER
ORIENTATION: LOWER
ORIENTATION: RIGHT;MID;UPPER
ORIENTATION: RIGHT;LEFT;LOWER

## 2022-10-29 ASSESSMENT — PAIN DESCRIPTION - PAIN TYPE
TYPE: ACUTE PAIN
TYPE: ACUTE PAIN

## 2022-10-29 ASSESSMENT — PAIN - FUNCTIONAL ASSESSMENT
PAIN_FUNCTIONAL_ASSESSMENT: ACTIVITIES ARE NOT PREVENTED

## 2022-10-29 NOTE — PROGRESS NOTES
[facility] Progress Note    Admitting Date and Time: 10/28/2022  5:47 AM  Admit Dx: Intractable abdominal pain [R10.9]    Subjective:  Patient is being followed for Intractable abdominal pain [R10.9]     Patient seen laying in bed. She is alert in no apparent distress. Patient currently denying abdominal pain nausea or vomiting. She denies fever or chills. Patient states she has had 1 bout of diarrhea since last night but is passing lots of gas. She remains afebrile. No acute issues at this time.     ROS: denies fever, chills, cp, sob, n/v, HA unless stated above.      sodium chloride flush  5-40 mL IntraVENous 2 times per day    GI cocktail   Oral TID    amLODIPine  5 mg Oral QAM    cetirizine  10 mg Oral Daily    carBAMazepine  200 mg Oral BID    nicotine  1 patch TransDERmal Daily    pantoprazole  40 mg Oral Daily with breakfast    gabapentin  100 mg Oral TID    hydroCHLOROthiazide  25 mg Oral Daily    sucralfate  1 g Oral 4x Daily    sodium chloride flush  5-40 mL IntraVENous 2 times per day    albuterol  2.5 mg Nebulization 4x daily     sodium chloride flush, 5-40 mL, PRN  sodium chloride, , PRN  fentanNYL, 25 mcg, Q5 Min PRN  fentanNYL, 50 mcg, Q5 Min PRN  prochlorperazine, 5 mg, Once PRN  tiZANidine, 4 mg, Q6H PRN  sodium chloride flush, 5-40 mL, PRN  sodium chloride, , PRN  ondansetron, 4 mg, Q8H PRN   Or  ondansetron, 4 mg, Q6H PRN  polyethylene glycol, 17 g, Daily PRN  HYDROcodone 5 mg - acetaminophen, 1 tablet, Q6H PRN  morphine, 2 mg, Q4H PRN  diphenhydrAMINE, 50 mg, Q6H PRN         Objective:    BP (!) 142/73   Pulse 94   Temp 99.1 °F (37.3 °C) (Oral)   Resp 16   Ht 4' 11\" (1.499 m)   Wt 150 lb (68 kg)   LMP 10/09/2022   SpO2 97%   BMI 30.30 kg/m²     General Appearance: alert and oriented to person, place and time and in no acute distress  Skin: warm and dry  Head: normocephalic and atraumatic  Eyes: pupils equal, round, and reactive to light, extraocular eye movements intact, conjunctivae normal  Neck: neck supple and non tender without mass   Pulmonary/Chest: clear to auscultation bilaterally- no wheezes, rales or rhonchi, normal air movement, no respiratory distress  Cardiovascular: normal rate, normal S1 and S2 and no carotid bruits  Abdomen: soft, non-tender, non-distended, normal bowel sounds, no masses or organomegaly  Extremities: no cyanosis, no clubbing and no edema  Neurologic: no cranial nerve deficit and speech normal        Recent Labs     10/28/22  0622 10/29/22  0351    135   K 4.1 4.4    100   CO2 21* 22   BUN 12 7   CREATININE 0.6 0.7   GLUCOSE 111* 80   CALCIUM 9.5 9.4       Recent Labs     10/28/22  0622 10/29/22  0351   WBC 12.8* 7.5   RBC 5.56* 5.27   HGB 14.0 13.3   HCT 42.6 41.1   MCV 76.6* 78.0*   MCH 25.2* 25.2*   MCHC 32.9 32.4   RDW 16.9* 16.4*    258   MPV 9.4 9.0           Assessment:    Principal Problem:    Intractable abdominal pain  Active Problems:    Generalized abdominal pain  Resolved Problems:    * No resolved hospital problems. *    Plan:  Generalized abdominal pain: Presented to the ED with abdominal pain, nausea, vomiting, diarrhea. CT 10/28 in the AM abdomen/pelvis on admission shows no acute intra-abdominal or pelvic abnormality. Repeat CT the evening shows questionable wall thickening versus underdistention of ascending colon through the transverse colon could possibly represent colitis or mural edema, no perforation or abscess was noted. Lab work is unremarkable. Stool studies resulting negative. EGD 10/29 showed gastritis, few superficial antral erosions, small segment Levy's esophagus. CLD colon prep tomorrow for colonoscopy on Monday. Continue antiemetics and pain medication as needed  Leukocytosis: WBCs 12.8 on admission now 7.5. Continue antibiotics. Afebrile. monitor CBC. HTN: Continue Norvasc and HCTZ. Seizure disorder: Continue Tegretol and Neurontin. HUSAM/depression: Not on medication.   Asthma: Continue Proventil    In review of the EMR, evaluation, management, and diagnosis. Care plan has been discussed with attending. Time spent 25 minutes    NOTE: This report was transcribed using voice recognition software. Every effort was made to ensure accuracy; however, inadvertent computerized transcription errors may be present.   Electronically signed by MARK Hinson CNP on 10/29/2022 at 3:08 PM

## 2022-10-29 NOTE — ANESTHESIA PRE PROCEDURE
Department of Anesthesiology  Preprocedure Note       Name:  Vilma Hernández   Age:  40 y.o.  :  1980                                          MRN:  [id number]         Date:  10/28/2022      Surgeon: Kerwin Calle    Procedure: EGD    Medications prior to admission:   Prior to Admission medications    Medication Sig Start Date End Date Taking? Authorizing Provider   albuterol sulfate HFA (VENTOLIN HFA) 108 (90 Base) MCG/ACT inhaler Inhale 2 puffs into the lungs 4 times daily as needed for Wheezing or Shortness of Breath    Historical Provider, MD   omeprazole (PRILOSEC) 40 MG delayed release capsule Take 40 mg by mouth daily as needed (EPIGASTRIC DISCOMFORT)    Historical Provider, MD   loratadine (CLARITIN) 10 MG tablet Take 10 mg by mouth every morning    Historical Provider, MD   amLODIPine (NORVASC) 5 MG tablet Take 5 mg by mouth every morning    Historical Provider, MD   diphenhydrAMINE (BENADRYL) 25 MG tablet Take 25 mg by mouth in the morning and 25 mg at noon and 25 mg in the evening and 25 mg before bedtime. Historical Provider, MD   sucralfate (CARAFATE) 1 GM tablet Take 1 tablet by mouth 4 times daily 10/15/22   Cecily Agosto DO   gabapentin (NEURONTIN) 600 MG tablet Take 600 mg by mouth daily. Historical Provider, MD   tiZANidine (ZANAFLEX) 4 MG tablet Take 4 mg by mouth every 8 hours as needed (MUSCLE SPASMS)    Historical Provider, MD   carBAMazepine (TEGRETOL) 200 MG tablet Take 200 mg by mouth 2 times daily (with meals) 20   Historical Provider, MD       Current medications:    No current facility-administered medications for this visit. No current outpatient medications on file.      Facility-Administered Medications Ordered in Other Visits   Medication Dose Route Frequency Provider Last Rate Last Admin    carBAMazepine (TEGRETOL) tablet 200 mg  200 mg Oral BID Pedro Luis Zamora DO        nicotine (NICODERM CQ) 21 MG/24HR 1 patch  1 patch TransDERmal Daily Pedro Luis Zamora DO  [START ON 10/29/2022] pantoprazole (PROTONIX) tablet 40 mg  40 mg Oral Daily with breakfast Pedro Luis R Lockso, DO        gabapentin (NEURONTIN) capsule 100 mg  100 mg Oral TID Gwendel Null Lockso, DO        tiZANidine (ZANAFLEX) tablet 4 mg  4 mg Oral Q6H PRN Pedro Luis R Lockso, DO        hydroCHLOROthiazide (HYDRODIURIL) tablet 25 mg  25 mg Oral Daily Pedro Luis R Lockso, DO        sucralfate (CARAFATE) tablet 1 g  1 g Oral 4x Daily Pedro Luis R Lockso, DO        sodium chloride flush 0.9 % injection 5-40 mL  5-40 mL IntraVENous 2 times per day [place] Plants, DO        sodium chloride flush 0.9 % injection 5-40 mL  5-40 mL IntraVENous PRN Pedro Luis R Lockso, DO        0.9 % sodium chloride infusion   IntraVENous PRN Pedro Luis R Lockso, DO        ondansetron (ZOFRAN-ODT) disintegrating tablet 4 mg  4 mg Oral Q8H PRN Pedro Luis R Lockso, DO        Or    ondansetron (ZOFRAN) injection 4 mg  4 mg IntraVENous Q6H PRN Gwendel Null Lockso, DO   4 mg at 10/28/22 1416    polyethylene glycol (GLYCOLAX) packet 17 g  17 g Oral Daily PRN Gwendel Null Lockso, DO        albuterol (PROVENTIL) nebulizer solution 2.5 mg  2.5 mg Nebulization 4x daily Pedro Luis R Lockso, DO   2.5 mg at 10/28/22 1944    HYDROcodone-acetaminophen (NORCO) 5-325 MG per tablet 1 tablet  1 tablet Oral Q6H PRN Ceci Plants, DO   1 tablet at 10/28/22 1845       Allergies: Allergies   Allergen Reactions    Ketorolac Itching and Nausea And Vomiting    Darvocet [Propoxyphene N-Acetaminophen] Swelling and Dizziness or Vertigo     FACIAL SWELLING    Ibuprofen Nausea And Vomiting    Meloxicam Hives    Naproxen Nausea And Vomiting    Tramadol Other (See Comments)     Patient does not remember the reaction       Problem List:    Patient Active Problem List   Diagnosis Code    Tobacco abuse Z72.0    Seizure disorder ([facility] G40.909    Asthma J45.909    Carbamazepine overdose with self-harm intent T42.1X4A    Depression F32. A    Suicidal overdose ([facility] T50.902A    Pneumonia J18.9    Epigastric pain R10.13    Generalized abdominal pain R10.84    Intractable abdominal pain R10.9       Past Medical History:        Diagnosis Date    Asthma     Bronchitis     Movement disorder     Seizure disorder (HCC)     Seizure disorder ([facility]     Suicidal overdose ([facility] 6/27/2014    Tobacco abuse     Tobacco abuse        Past Surgical History:        Procedure Laterality Date    CHOLECYSTECTOMY      FRACTURE SURGERY      R ANKLE TORN LIGAMENTS    ROTATOR CUFF REPAIR      UPPER GASTROINTESTINAL ENDOSCOPY N/A 5/3/2021    EGD BIOPSY performed by Izzy Singer MD at [address] History:    Social History     Tobacco Use    Smoking status: Every Day     Packs/day: 0.02     Years: 15.00     Pack years: 0.30     Types: Cigarettes    Smokeless tobacco: Never    Tobacco comments:     SAYS CANNOT USE PATCHES OR GUM   Substance Use Topics    Alcohol use: Yes     Alcohol/week: 0.0 standard drinks     Comment: occassional-WINE COOLERS                                Ready to quit: Not Answered  Counseling given: Not Answered  Tobacco comments: SAYS CANNOT USE PATCHES OR GUM      Vital Signs (Current): There were no vitals filed for this visit.                                            BP Readings from Last 3 Encounters:   10/28/22 (!) 184/98   10/24/22 (!) 148/87   10/15/22 (!) 194/98       NPO Status:                                                                                 BMI:   Wt Readings from Last 3 Encounters:   10/28/22 150 lb (68 kg)   10/24/22 150 lb (68 kg)   10/15/22 135 lb (61.2 kg)     There is no height or weight on file to calculate BMI.    CBC:   Lab Results   Component Value Date/Time    WBC 12.8 10/28/2022 06:22 AM    RBC 5.56 10/28/2022 06:22 AM    HGB 14.0 10/28/2022 06:22 AM    HCT 42.6 10/28/2022 06:22 AM    MCV 76.6 10/28/2022 06:22 AM    RDW 16.9 10/28/2022 06:22 AM     10/28/2022 06:22 AM       CMP:   Lab Results Component Value Date/Time     10/28/2022 06:22 AM    K 4.1 10/28/2022 06:22 AM     10/28/2022 06:22 AM    CO2 21 10/28/2022 06:22 AM    BUN 12 10/28/2022 06:22 AM    CREATININE 0.6 10/28/2022 06:22 AM    GFRAA >60 10/15/2022 03:31 AM    LABGLOM >60 10/28/2022 06:22 AM    GLUCOSE 111 10/28/2022 06:22 AM    GLUCOSE 90 07/24/2011 02:45 AM    PROT 7.8 10/28/2022 06:22 AM    CALCIUM 9.5 10/28/2022 06:22 AM    BILITOT 0.4 10/28/2022 06:22 AM    ALKPHOS 114 10/28/2022 06:22 AM    AST 23 10/28/2022 06:22 AM    ALT 22 10/28/2022 06:22 AM       POC Tests: No results for input(s): POCGLU, POCNA, POCK, POCCL, POCBUN, POCHEMO, POCHCT in the last 72 hours. Coags:   Lab Results   Component Value Date/Time    PROTIME 11.9 10/14/2022 07:26 AM    INR 1.0 10/14/2022 07:26 AM    APTT 24.9 06/30/2020 11:32 AM       HCG (If Applicable):   Lab Results   Component Value Date    PREGTESTUR NEGATIVE 10/28/2022    PREGSERUM NEGATIVE 06/15/2011    HCG <0.1 12/08/2013        ABGs: No results found for: PHART, PO2ART, CRJ2RBW, JWR1XNV, BEART, C0BCZCRB     Type & Screen (If Applicable):  No results found for: LABABO, LABRH    Drug/Infectious Status (If Applicable):  No results found for: HIV, HEPCAB    COVID-19 Screening (If Applicable):   Lab Results   Component Value Date/Time    COVID19 Not Detected 10/28/2022 06:22 AM           Anesthesia Evaluation  Patient summary reviewed and Nursing notes reviewed no history of anesthetic complications:   Airway: Mallampati: II  TM distance: >3 FB   Neck ROM: full  Mouth opening: > = 3 FB   Dental: normal exam         Pulmonary: breath sounds clear to auscultation  (+) pneumonia: resolved,  asthma: current smoker    (-) shortness of breath          Patient did not smoke on day of surgery.                  Cardiovascular:  Exercise tolerance: good (>4 METS),       (-) past MI and CAD    ECG reviewed  Rhythm: regular  Rate: normal                    Neuro/Psych:   (+) seizures:, psychiatric history: stable with treatmentdepression/anxiety             GI/Hepatic/Renal:        (-) liver disease and no renal disease       Endo/Other: Negative Endo/Other ROS                    Abdominal:         (-) obese       Vascular: negative vascular ROS. Other Findings:             Anesthesia Plan      MAC     ASA 3       Induction: intravenous. Anesthetic plan and risks discussed with patient. Plan discussed with CRNA. Attending anesthesiologist reviewed and agrees with Tyree Pedro MD   10/28/2022      DOS STAFF ADDENDUM:    Pt seen and examined, physical exam updated, chart reviewed including anesthesia, drug and allergy history. H&P reviewed. No interval changes to history or physical examination (unless noted above). NPO status confirmed. Anesthetic plan, risks, benefits, alternatives discussed with patient. Patient verbalized an understanding and agrees to proceed.        Patient seen and evaluated MARK Diaz - CRNA    Noé Foster MD  Anesthesiologist

## 2022-10-29 NOTE — PROGRESS NOTES
Patient is requesting to eat. Explained to patient she has an NPO order and not to have food. She asked to call doctor and ask. Called medical said no and to ask surgery. Paged surgery and waiting for call back.

## 2022-10-29 NOTE — OP NOTE
EGD Op Note    DATE OF PROCEDURE: 10/29/2022     SURGEON: Lucia Ashley MD    PREOPERATIVE DIAGNOSIS: Abdominal pain x1 year    POSTOPERATIVE DIAGNOSIS: Same, mild gastritis with a few superficial antral erosions, small segment Levy esophagus    OPERATION: Procedure(s):  EGD ESOPHAGOGASTRODUODENOSCOPY    ANESTHESIA: Local monitored anesthesia. ESTIMATED BLOOD LOSS: minimal    COMPLICATIONS: None. SPECIMENS:    ID Type Source Tests Collected by Time Destination   A : ANTRAL BIOPSY Tissue Tissue SURGICAL PATHOLOGY Lucia Ashley MD 10/29/2022 0230    B : DUODENAL BIOPSY Tissue Tissue SURGICAL PATHOLOGY Lucia Ashley MD 10/29/2022 2225    C : DISTAL ESOPHAGUS BIOPSY Tissue Tissue SURGICAL PATHOLOGY Lucia Ashley MD 10/29/2022 8810        HISTORY: The patient is a 40y.o. year old female with history of above preop diagnosis. I recommended esophagogastroduodenoscopy with possible biopsy and I explained the risk, benefits, expected outcome, and alternatives to the procedure. Risks included but are not limited to bleeding, infection, respiratory distress, hypotension, and perforation of the esophagus, stomach, or duodenum. Patient understands and is in agreement. PROCEDURE: The patient was given IV conscious sedation per anesthesia. The patient was given supplemental oxygen by nasal cannula. The gastroscope was inserted orally and advanced under direct vision through the esophagus, through the stomach, through the pylorus, and into the duodenum. Findings:  Duodenum: Normal status post biopsies rule out sprue    Stomach: Mild antral gastritis with a few superficial erosions. Biopsies were taken. Esophagus: Normal, no hiatal hernia, just above the GE junction there is an area of possible Levy esophagus status post biopsies    Larynx: not examined    The scope was removed and the patient tolerated the procedure well. IMPRESSION/PLAN:   Continue PPI, GI cocktail.   She does states she is feeling little bit better since admission. Abdominal exam benign. Says she has had epigastric abdominal pain for the course of 1 year. She has had cholecystectomy in the past.  Await biopsies. Repeat CT last evening shows questionable mural thickening of the colon. Stomach also mildly distended. EGD overall as above.   Colonoscopy on Monday with Dr. Adrianne Carrillo MD  10/29/22  7:55 AM

## 2022-10-29 NOTE — ANESTHESIA POSTPROCEDURE EVALUATION
Department of Anesthesiology  Postprocedure Note    Patient: Kiana Christian  MRN: [id number]  YOB: 1980  Date of evaluation: 10/29/2022      Procedure Summary     Date: 10/29/22 Room / Location: [place] [address] / [facility]    Anesthesia Start: 0730 Anesthesia Stop:     Procedures:       EGD ESOPHAGOGASTRODUODENOSCOPY      Procedure Not Yet Scheduled Diagnosis:       Abdominal pain, unspecified abdominal location      (Abdominal pain, unspecified abdominal location [R10.9])    Surgeons: Klaudia Talley MD Responsible Provider: Nelida Rome MD    Anesthesia Type: MAC ASA Status: 3          Anesthesia Type: No value filed.     Aye Phase I:      Aye Phase II:        Anesthesia Post Evaluation    Patient location during evaluation: PACU  Patient participation: complete - patient participated  Level of consciousness: awake  Airway patency: patent  Nausea & Vomiting: no nausea and no vomiting  Complications: no  Cardiovascular status: hemodynamically stable  Respiratory status: acceptable  Hydration status: euvolemic

## 2022-10-30 LAB
ANION GAP SERPL CALCULATED.3IONS-SCNC: 12 MMOL/L (ref 7–16)
BUN BLDV-MCNC: 14 MG/DL (ref 6–20)
CALCIUM SERPL-MCNC: 8.7 MG/DL (ref 8.6–10.2)
CHLORIDE BLD-SCNC: 102 MMOL/L (ref 98–107)
CO2: 25 MMOL/L (ref 22–29)
CREAT SERPL-MCNC: 0.8 MG/DL (ref 0.5–1)
CRYPTOSPORIDIUM ANTIGEN STOOL: NORMAL
GFR SERPL CREATININE-BSD FRML MDRD: >60 ML/MIN/1.73
GIARDIA ANTIGEN STOOL: NORMAL
GLUCOSE BLD-MCNC: 129 MG/DL (ref 74–99)
HCT VFR BLD CALC: 35.8 % (ref 34–48)
HEMOGLOBIN: 11.5 G/DL (ref 11.5–15.5)
MCH RBC QN AUTO: 25.6 PG (ref 26–35)
MCHC RBC AUTO-ENTMCNC: 32.1 % (ref 32–34.5)
MCV RBC AUTO: 79.7 FL (ref 80–99.9)
PDW BLD-RTO: 16.2 FL (ref 11.5–15)
PLATELET # BLD: 236 E9/L (ref 130–450)
PMV BLD AUTO: 9.7 FL (ref 7–12)
POTASSIUM SERPL-SCNC: 4.1 MMOL/L (ref 3.5–5)
RBC # BLD: 4.49 E12/L (ref 3.5–5.5)
SODIUM BLD-SCNC: 139 MMOL/L (ref 132–146)
WBC # BLD: 6.9 E9/L (ref 4.5–11.5)

## 2022-10-30 PROCEDURE — 2580000003 HC RX 258: Performed by: ANESTHESIOLOGY

## 2022-10-30 PROCEDURE — 85027 COMPLETE CBC AUTOMATED: CPT

## 2022-10-30 PROCEDURE — 94640 AIRWAY INHALATION TREATMENT: CPT

## 2022-10-30 PROCEDURE — 6360000002 HC RX W HCPCS: Performed by: SURGERY

## 2022-10-30 PROCEDURE — 6370000000 HC RX 637 (ALT 250 FOR IP): Performed by: NURSE PRACTITIONER

## 2022-10-30 PROCEDURE — G0378 HOSPITAL OBSERVATION PER HR: HCPCS

## 2022-10-30 PROCEDURE — 96376 TX/PRO/DX INJ SAME DRUG ADON: CPT

## 2022-10-30 PROCEDURE — 99232 SBSQ HOSP IP/OBS MODERATE 35: CPT | Performed by: SURGERY

## 2022-10-30 PROCEDURE — 6370000000 HC RX 637 (ALT 250 FOR IP): Performed by: SURGERY

## 2022-10-30 PROCEDURE — 2580000003 HC RX 258: Performed by: SURGERY

## 2022-10-30 PROCEDURE — 80048 BASIC METABOLIC PNL TOTAL CA: CPT

## 2022-10-30 PROCEDURE — 99226 PR SBSQ OBSERVATION CARE/DAY 35 MINUTES: CPT | Performed by: INTERNAL MEDICINE

## 2022-10-30 PROCEDURE — 36415 COLL VENOUS BLD VENIPUNCTURE: CPT

## 2022-10-30 RX ORDER — HYDRALAZINE HYDROCHLORIDE 20 MG/ML
10 INJECTION INTRAMUSCULAR; INTRAVENOUS EVERY 6 HOURS PRN
Status: DISCONTINUED | OUTPATIENT
Start: 2022-10-30 | End: 2022-10-31 | Stop reason: HOSPADM

## 2022-10-30 RX ADMIN — CARBAMAZEPINE 200 MG: 200 TABLET ORAL at 22:29

## 2022-10-30 RX ADMIN — ONDANSETRON 4 MG: 2 INJECTION INTRAMUSCULAR; INTRAVENOUS at 19:14

## 2022-10-30 RX ADMIN — DIPHENHYDRAMINE HCL 50 MG: 25 TABLET ORAL at 08:14

## 2022-10-30 RX ADMIN — ALBUTEROL SULFATE 2.5 MG: 2.5 SOLUTION RESPIRATORY (INHALATION) at 10:06

## 2022-10-30 RX ADMIN — HYDROCODONE BITARTRATE AND ACETAMINOPHEN 1 TABLET: 5; 325 TABLET ORAL at 01:27

## 2022-10-30 RX ADMIN — SUCRALFATE 1 G: 1 TABLET ORAL at 08:14

## 2022-10-30 RX ADMIN — GABAPENTIN 100 MG: 100 CAPSULE ORAL at 13:32

## 2022-10-30 RX ADMIN — PANTOPRAZOLE SODIUM 40 MG: 40 TABLET, DELAYED RELEASE ORAL at 08:21

## 2022-10-30 RX ADMIN — LIDOCAINE HYDROCHLORIDE: 20 SOLUTION ORAL; TOPICAL at 22:30

## 2022-10-30 RX ADMIN — LIDOCAINE HYDROCHLORIDE: 20 SOLUTION ORAL; TOPICAL at 13:32

## 2022-10-30 RX ADMIN — CETIRIZINE HYDROCHLORIDE 10 MG: 10 TABLET, FILM COATED ORAL at 08:14

## 2022-10-30 RX ADMIN — MORPHINE SULFATE 2 MG: 2 INJECTION, SOLUTION INTRAMUSCULAR; INTRAVENOUS at 03:51

## 2022-10-30 RX ADMIN — AMLODIPINE BESYLATE 5 MG: 5 TABLET ORAL at 08:14

## 2022-10-30 RX ADMIN — HYDROCODONE BITARTRATE AND ACETAMINOPHEN 1 TABLET: 5; 325 TABLET ORAL at 08:14

## 2022-10-30 RX ADMIN — BISACODYL 20 MG: 5 TABLET, COATED ORAL at 11:56

## 2022-10-30 RX ADMIN — SUCRALFATE 1 G: 1 TABLET ORAL at 22:30

## 2022-10-30 RX ADMIN — HYDROCODONE BITARTRATE AND ACETAMINOPHEN 1 TABLET: 5; 325 TABLET ORAL at 16:57

## 2022-10-30 RX ADMIN — GABAPENTIN 100 MG: 100 CAPSULE ORAL at 08:14

## 2022-10-30 RX ADMIN — GABAPENTIN 100 MG: 100 CAPSULE ORAL at 22:30

## 2022-10-30 RX ADMIN — LIDOCAINE HYDROCHLORIDE: 20 SOLUTION ORAL; TOPICAL at 08:14

## 2022-10-30 RX ADMIN — MORPHINE SULFATE 2 MG: 2 INJECTION, SOLUTION INTRAMUSCULAR; INTRAVENOUS at 19:14

## 2022-10-30 RX ADMIN — HYDROCHLOROTHIAZIDE 25 MG: 25 TABLET ORAL at 08:14

## 2022-10-30 RX ADMIN — SUCRALFATE 1 G: 1 TABLET ORAL at 11:56

## 2022-10-30 RX ADMIN — CARBAMAZEPINE 200 MG: 200 TABLET ORAL at 08:14

## 2022-10-30 RX ADMIN — HYDROCODONE BITARTRATE AND ACETAMINOPHEN 1 TABLET: 5; 325 TABLET ORAL at 23:28

## 2022-10-30 RX ADMIN — SODIUM CHLORIDE, PRESERVATIVE FREE 10 ML: 5 INJECTION INTRAVENOUS at 08:15

## 2022-10-30 RX ADMIN — SODIUM CHLORIDE, PRESERVATIVE FREE 10 ML: 5 INJECTION INTRAVENOUS at 22:30

## 2022-10-30 RX ADMIN — SUCRALFATE 1 G: 1 TABLET ORAL at 16:53

## 2022-10-30 RX ADMIN — POLYETHYLENE GLYCOL 3350, SODIUM SULFATE ANHYDROUS, SODIUM BICARBONATE, SODIUM CHLORIDE, POTASSIUM CHLORIDE 4000 ML: 236; 22.74; 6.74; 5.86; 2.97 POWDER, FOR SOLUTION ORAL at 16:53

## 2022-10-30 RX ADMIN — SODIUM CHLORIDE, PRESERVATIVE FREE 10 ML: 5 INJECTION INTRAVENOUS at 19:14

## 2022-10-30 RX ADMIN — ALBUTEROL SULFATE 2.5 MG: 2.5 SOLUTION RESPIRATORY (INHALATION) at 13:22

## 2022-10-30 ASSESSMENT — PAIN SCALES - GENERAL
PAINLEVEL_OUTOF10: 7
PAINLEVEL_OUTOF10: 6
PAINLEVEL_OUTOF10: 7
PAINLEVEL_OUTOF10: 8
PAINLEVEL_OUTOF10: 7
PAINLEVEL_OUTOF10: 8

## 2022-10-30 ASSESSMENT — PAIN DESCRIPTION - DESCRIPTORS
DESCRIPTORS: ACHING;DISCOMFORT
DESCRIPTORS: ACHING;CRAMPING;DISCOMFORT
DESCRIPTORS: ACHING;DISCOMFORT
DESCRIPTORS: ACHING;DISCOMFORT

## 2022-10-30 ASSESSMENT — PAIN DESCRIPTION - ORIENTATION
ORIENTATION: LOWER
ORIENTATION: RIGHT;LEFT;LOWER
ORIENTATION: RIGHT;LEFT;LOWER
ORIENTATION: LOWER

## 2022-10-30 ASSESSMENT — PAIN DESCRIPTION - PAIN TYPE: TYPE: ACUTE PAIN

## 2022-10-30 ASSESSMENT — PAIN - FUNCTIONAL ASSESSMENT
PAIN_FUNCTIONAL_ASSESSMENT: ACTIVITIES ARE NOT PREVENTED

## 2022-10-30 ASSESSMENT — PAIN DESCRIPTION - LOCATION
LOCATION: ABDOMEN
LOCATION: BACK
LOCATION: FLANK;BACK
LOCATION: FLANK;BACK

## 2022-10-30 NOTE — PROGRESS NOTES
[facility] Progress Note    Admitting Date and Time: 10/28/2022  5:47 AM  Admit Dx: Intractable abdominal pain [R10.9]    Subjective:  Patient is being followed for Intractable abdominal pain [R10.9]     Patient seen laying in bed in no distress. She denies nausea vomiting diarrhea. Patient states her last stool was slightly formed. Patient currently denying abdominal pain. No issues at this time. ROS: denies fever, chills, cp, sob, n/v, HA unless stated above.       polyethylene glycol  4,000 mL Oral Once    sodium chloride flush  5-40 mL IntraVENous 2 times per day    GI cocktail   Oral TID    amLODIPine  5 mg Oral QAM    cetirizine  10 mg Oral Daily    carBAMazepine  200 mg Oral BID    nicotine  1 patch TransDERmal Daily    pantoprazole  40 mg Oral Daily with breakfast    gabapentin  100 mg Oral TID    hydroCHLOROthiazide  25 mg Oral Daily    sucralfate  1 g Oral 4x Daily    sodium chloride flush  5-40 mL IntraVENous 2 times per day    albuterol  2.5 mg Nebulization 4x daily     hydrALAZINE, 10 mg, Q6H PRN  sodium chloride flush, 5-40 mL, PRN  sodium chloride, , PRN  tiZANidine, 4 mg, Q6H PRN  sodium chloride flush, 5-40 mL, PRN  sodium chloride, , PRN  ondansetron, 4 mg, Q8H PRN   Or  ondansetron, 4 mg, Q6H PRN  polyethylene glycol, 17 g, Daily PRN  HYDROcodone 5 mg - acetaminophen, 1 tablet, Q6H PRN  morphine, 2 mg, Q4H PRN  diphenhydrAMINE, 50 mg, Q6H PRN       Objective:    BP (!) 156/84   Pulse 79   Temp 97.9 °F (36.6 °C) (Oral)   Resp 18   Ht 4' 11\" (1.499 m)   Wt 150 lb (68 kg)   LMP 10/09/2022   SpO2 97%   BMI 30.30 kg/m²     General Appearance: alert and oriented to person, place and time and in no acute distress  Skin: warm and dry  Head: normocephalic and atraumatic  Eyes: pupils equal, round, and reactive to light, extraocular eye movements intact, conjunctivae normal  Neck: neck supple and non tender without mass   Pulmonary/Chest: clear to auscultation bilaterally- no wheezes, rales or rhonchi, normal air movement, no respiratory distress  Cardiovascular: normal rate, normal S1 and S2 and no carotid bruits  Abdomen: soft, non-tender, non-distended, normal bowel sounds, no masses or organomegaly  Extremities: no cyanosis, no clubbing and no edema  Neurologic: no cranial nerve deficit and speech normal        Recent Labs     10/28/22  0622 10/29/22  0351 10/30/22  0342    135 139   K 4.1 4.4 4.1    100 102   CO2 21* 22 25   BUN 12 7 14   CREATININE 0.6 0.7 0.8   GLUCOSE 111* 80 129*   CALCIUM 9.5 9.4 8.7         Recent Labs     10/28/22  0622 10/29/22  0351 10/30/22  0342   WBC 12.8* 7.5 6.9   RBC 5.56* 5.27 4.49   HGB 14.0 13.3 11.5   HCT 42.6 41.1 35.8   MCV 76.6* 78.0* 79.7*   MCH 25.2* 25.2* 25.6*   MCHC 32.9 32.4 32.1   RDW 16.9* 16.4* 16.2*    258 236   MPV 9.4 9.0 9.7             Assessment:    Principal Problem:    Intractable abdominal pain  Active Problems:    Generalized abdominal pain  Resolved Problems:    * No resolved hospital problems. *    Plan:  Generalized abdominal pain: Presented to the ED with abdominal pain, nausea, vomiting, diarrhea. CT 10/28 in the AM abdomen/pelvis on admission shows no acute intra-abdominal or pelvic abnormality. Repeat CT the evening shows questionable wall thickening versus underdistention of ascending colon through the transverse colon could possibly represent colitis or mural edema, no perforation or abscess was noted. Lab work is unremarkable. Stool studies negative. EGD 10/29 showed gastritis, few superficial antral erosions, small segment Levy's esophagus. CLD  and colon prep today for colonoscopy on tomorrow. NPO after MN. Continue antiemetics and pain medication as needed- General surgery input appreciated. Leukocytosis: Resolved. Continue antibiotics. Afebrile. Monitor CBC. HTN: Continue Norvasc and HCTZ. Seizure disorder: Continue Tegretol and Neurontin.   HUSAM/depression: Not on medication. Asthma: Continue Proventil    In review of the EMR, evaluation, management, and diagnosis. Care plan has been discussed with attending. Time spent 25 minutes    NOTE: This report was transcribed using voice recognition software. Every effort was made to ensure accuracy; however, inadvertent computerized transcription errors may be present.   Electronically signed by MARK Dupont CNP on 10/30/2022 at 2:25 PM

## 2022-10-30 NOTE — PROGRESS NOTES
Chief Complaint   Patient presents with    Nausea    Emesis    Diarrhea      I have examined the patient, performed key aspects of physical exam, and reviewed the record (including all pertinent and new radiology images and laboratory findings). GENERAL SURGERY PROGRESS NOTE    1 Day Post-Op    Subjective    Says she is feeling better. Pain is improved. Bowels are moving well. Scheduled medications:   bisacodyl  20 mg Oral Once    Followed by    polyethylene glycol  4,000 mL Oral Once    sodium chloride flush  5-40 mL IntraVENous 2 times per day    GI cocktail   Oral TID    amLODIPine  5 mg Oral QAM    cetirizine  10 mg Oral Daily    carBAMazepine  200 mg Oral BID    nicotine  1 patch TransDERmal Daily    pantoprazole  40 mg Oral Daily with breakfast    gabapentin  100 mg Oral TID    hydroCHLOROthiazide  25 mg Oral Daily    sucralfate  1 g Oral 4x Daily    sodium chloride flush  5-40 mL IntraVENous 2 times per day    albuterol  2.5 mg Nebulization 4x daily       PRN medications:   sodium chloride flush, sodium chloride, fentanNYL, fentanNYL, tiZANidine, sodium chloride flush, sodium chloride, ondansetron **OR** ondansetron, polyethylene glycol, HYDROcodone 5 mg - acetaminophen, morphine, diphenhydrAMINE      Objective    Vitals:    10/30/22 0336 10/30/22 0421 10/30/22 0749 10/30/22 0844   BP: 131/66  (!) 186/93    Pulse: 91  76    Resp: 16 16 17 16   Temp: 98.3 °F (36.8 °C)  97.9 °F (36.6 °C)    TempSrc: Oral  Oral    SpO2:   99%    Weight:       Height:           I/O last 3 completed shifts:   In: 56 [P.O.:180; I.V.:310]  Out: 900 [Urine:900]     Gen: NAD  Resp: Breathing Comfortably  CV: Reg Rate  Abd: Soft, no TTP, ND, No R/G     Labs:    CBC  Recent Labs     10/30/22  0342   WBC 6.9   HGB 11.5   HCT 35.8        BMP  Recent Labs     10/30/22  0342      K 4.1      CO2 25   BUN 14   CREATININE 0.8   CALCIUM 8.7     Liver Function  Recent Labs     10/28/22  0622   LIPASE 27   BILITOT 0.4 AST 23   ALT 22   ALKPHOS 114*   PROT 7.8   LABALBU 4.4     No results for input(s): LACTATE in the last 72 hours. No results for input(s): INR, PTT in the last 72 hours. Invalid input(s): PT    Imaging:    CT ABDOMEN PELVIS W IV CONTRAST Additional Contrast? Oral    Result Date: 10/28/2022  EXAMINATION: CT OF THE ABDOMEN AND PELVIS WITH CONTRAST 10/28/2022 7:03 pm TECHNIQUE: CT of the abdomen and pelvis was performed with the administration of intravenous contrast. Multiplanar reformatted images are provided for review. Automated exposure control, iterative reconstruction, and/or weight based adjustment of the mA/kV was utilized to reduce the radiation dose to as low as reasonably achievable. COMPARISON: CT abdomen and pelvis dated 10/28/2022 HISTORY: ORDERING SYSTEM PROVIDED HISTORY: abdominal pain TECHNOLOGIST PROVIDED HISTORY: Reason for exam:->abdominal pain Additional Contrast?->Oral FINDINGS: Lower Chest: Lung bases are clear. Organs: Liver without focal lesion. Gallbladder surgically absent. Pancreas and spleen unremarkable. Adrenals without nodule. Kidneys without suspicious renal lesion and no hydronephrosis. GI/Bowel: Small bowel is nondilated without evidence for small bowel obstructive process. Appendix visualized and unremarkable. Colonic segments under distended with questionable wall thickening versus under distension of the ascending colon through transverse colon could represent colitis or mural edema without perforation or abscess. Enteral or intraluminal contrast extends through the rectum without obstructive elements. Pelvis: No suspicious pelvic lesion or bulky pelvic adenopathy/free fluid. Peritoneum/Retroperitoneum: No bulky retroperitoneal adenopathy. No suspicious peritoneal or mesenteric process Vasculature: Grossly normal caliber of abdominal aorta and vasculature Bones/Soft Tissues: No acute osseous or soft tissue findings.      Colonic segments under distended with questionable wall thickening versus under distension of the ascending colon through transverse colon could represent colitis or mural edema without perforation or abscess. Enteral or intraluminal contrast extends through the rectum without obstructive elements. CT ABDOMEN PELVIS W IV CONTRAST Additional Contrast? None    Result Date: 10/28/2022  EXAMINATION: CTA OF THE CHEST; CT OF THE ABDOMEN AND PELVIS WITH CONTRAST 10/28/2022 8:14 am TECHNIQUE: CTA of the chest was performed after the administration of intravenous contrast.  Multiplanar reformatted images are provided for review. MIP images are provided for review. Automated exposure control, iterative reconstruction, and/or weight based adjustment of the mA/kV was utilized to reduce the radiation dose to as low as reasonably achievable.; CT of the abdomen and pelvis was performed with the administration of intravenous contrast. Multiplanar reformatted images are provided for review. Automated exposure control, iterative reconstruction, and/or weight based adjustment of the mA/kV was utilized to reduce the radiation dose to as low as reasonably achievable. COMPARISON: None. HISTORY: ORDERING SYSTEM PROVIDED HISTORY: r/o pe TECHNOLOGIST PROVIDED HISTORY: Reason for exam:->r/o pe Decision Support Exception - unselect if not a suspected or confirmed emergency medical condition->Emergency Medical Condition (MA); ORDERING SYSTEM PROVIDED HISTORY: abdominal pain TECHNOLOGIST PROVIDED HISTORY: Additional Contrast?->None Reason for exam:->abdominal pain Decision Support Exception - unselect if not a suspected or confirmed emergency medical condition->Emergency Medical Condition (MA) FINDINGS: Pulmonary Arteries: Pulmonary arteries are adequately opacified for evaluation. No evidence of intraluminal filling defect to suggest pulmonary embolism. Main pulmonary artery is normal in caliber. Mediastinum: No evidence of mediastinal lymphadenopathy.   The heart and pericardium demonstrate no acute abnormality. There is no acute abnormality of the thoracic aorta. Thyroid is homogeneous in appearance. Lungs/pleura: The lungs are without acute process. No focal consolidation or pulmonary edema. No evidence of pleural effusion or pneumothorax. Soft Tissues/Bones: No acute bone or soft tissue abnormality. Abdomen/pelvis: Organs: Liver is homogeneous in appearance. Gallbladder is been surgically removed. Spleen is unremarkable. Pancreas is unremarkable. Both adrenal glands are within normal limits. Symmetric enhancement of the renal parenchyma. No stones or distension seen in the renal collecting system. GI/Bowel: Stomach is unremarkable in appearance. No wall thickening. The small bowel is within normal limits. No mucosal abnormality. No distension. The appendix is normal.  Stool seen scattered diffusely throughout the colon. No signs of obvious obstruction or obstructing lesion. No wall thickening or inflammatory changes present. Pelvis: The bladder is mildly distended with no wall thickening. The uterus is unremarkable. Peritoneum/Retroperitoneum: No abdominal or retroperitoneal lymphadenopathy. No free fluid or free air. Vascular calcifications seen within the abdominal aorta and iliac vessels. There is no pelvic adenopathy. Bones/Soft Tissues: The bony structures reveal degenerative changes identified within the spine and pelvis. Degenerative changes seen within the hips bilaterally. No evidence of pulmonary embolism or acute pulmonary abnormality. No CT evidence of acute intra-abdominal or pelvic abnormality. XR CHEST PORTABLE    Result Date: 10/28/2022  EXAMINATION: ONE XRAY VIEW OF THE CHEST 10/28/2022 6:24 am COMPARISON: 14 October 2022 HISTORY: ORDERING SYSTEM PROVIDED HISTORY: eval chest pain TECHNOLOGIST PROVIDED HISTORY: Reason for exam:->eval chest pain FINDINGS: The lungs are without acute focal process.   There is no effusion or pneumothorax. The cardiomediastinal silhouette is without acute process. The osseous structures are without acute process. No acute process. CTA PULMONARY W CONTRAST    Result Date: 10/28/2022  EXAMINATION: CTA OF THE CHEST; CT OF THE ABDOMEN AND PELVIS WITH CONTRAST 10/28/2022 8:14 am TECHNIQUE: CTA of the chest was performed after the administration of intravenous contrast.  Multiplanar reformatted images are provided for review. MIP images are provided for review. Automated exposure control, iterative reconstruction, and/or weight based adjustment of the mA/kV was utilized to reduce the radiation dose to as low as reasonably achievable.; CT of the abdomen and pelvis was performed with the administration of intravenous contrast. Multiplanar reformatted images are provided for review. Automated exposure control, iterative reconstruction, and/or weight based adjustment of the mA/kV was utilized to reduce the radiation dose to as low as reasonably achievable. COMPARISON: None. HISTORY: ORDERING SYSTEM PROVIDED HISTORY: r/o pe TECHNOLOGIST PROVIDED HISTORY: Reason for exam:->r/o pe Decision Support Exception - unselect if not a suspected or confirmed emergency medical condition->Emergency Medical Condition (MA); ORDERING SYSTEM PROVIDED HISTORY: abdominal pain TECHNOLOGIST PROVIDED HISTORY: Additional Contrast?->None Reason for exam:->abdominal pain Decision Support Exception - unselect if not a suspected or confirmed emergency medical condition->Emergency Medical Condition (MA) FINDINGS: Pulmonary Arteries: Pulmonary arteries are adequately opacified for evaluation. No evidence of intraluminal filling defect to suggest pulmonary embolism. Main pulmonary artery is normal in caliber. Mediastinum: No evidence of mediastinal lymphadenopathy. The heart and pericardium demonstrate no acute abnormality. There is no acute abnormality of the thoracic aorta. Thyroid is homogeneous in appearance. Lungs/pleura:  The lungs are without acute process. No focal consolidation or pulmonary edema. No evidence of pleural effusion or pneumothorax. Soft Tissues/Bones: No acute bone or soft tissue abnormality. Abdomen/pelvis: Organs: Liver is homogeneous in appearance. Gallbladder is been surgically removed. Spleen is unremarkable. Pancreas is unremarkable. Both adrenal glands are within normal limits. Symmetric enhancement of the renal parenchyma. No stones or distension seen in the renal collecting system. GI/Bowel: Stomach is unremarkable in appearance. No wall thickening. The small bowel is within normal limits. No mucosal abnormality. No distension. The appendix is normal.  Stool seen scattered diffusely throughout the colon. No signs of obvious obstruction or obstructing lesion. No wall thickening or inflammatory changes present. Pelvis: The bladder is mildly distended with no wall thickening. The uterus is unremarkable. Peritoneum/Retroperitoneum: No abdominal or retroperitoneal lymphadenopathy. No free fluid or free air. Vascular calcifications seen within the abdominal aorta and iliac vessels. There is no pelvic adenopathy. Bones/Soft Tissues: The bony structures reveal degenerative changes identified within the spine and pelvis. Degenerative changes seen within the hips bilaterally. No evidence of pulmonary embolism or acute pulmonary abnormality. No CT evidence of acute intra-abdominal or pelvic abnormality. Assessment/Plan  40 y.o. 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 Continue PPI, GI cocktail. Says she has had epigastric abdominal pain for the course of 1 year. She has had cholecystectomy in the past.  Await biopsies. Repeat CT Friday evening shows questionable mural thickening of the colon. Stomach also mildly distended. EGD overall as above.   Colonoscopy on Monday with Dr. Tiesha Mock, prep today      Laveta Barthel, MD  10/30/22  10:20 AM    NOTE: This report, in part or full, may have been transcribed using voice recognition software. Every effort was made to ensure accuracy; however, inadvertent computerized transcription errors may be present. Please excuse any transcriptional grammatical or spelling errors that may have escaped my editorial review.

## 2022-10-31 ENCOUNTER — ANESTHESIA (OUTPATIENT)
Dept: ENDOSCOPY | Age: 42
End: 2022-10-31
Payer: MEDICAID

## 2022-10-31 ENCOUNTER — ANESTHESIA EVENT (OUTPATIENT)
Dept: ENDOSCOPY | Age: 42
End: 2022-10-31
Payer: MEDICAID

## 2022-10-31 VITALS
SYSTOLIC BLOOD PRESSURE: 171 MMHG | BODY MASS INDEX: 31.25 KG/M2 | TEMPERATURE: 98.2 F | HEIGHT: 59 IN | RESPIRATION RATE: 16 BRPM | OXYGEN SATURATION: 99 % | WEIGHT: 155 LBS | HEART RATE: 89 BPM | DIASTOLIC BLOOD PRESSURE: 85 MMHG

## 2022-10-31 LAB
ANION GAP SERPL CALCULATED.3IONS-SCNC: 10 MMOL/L (ref 7–16)
BUN BLDV-MCNC: 9 MG/DL (ref 6–20)
CALCIUM SERPL-MCNC: 8.8 MG/DL (ref 8.6–10.2)
CHLORIDE BLD-SCNC: 100 MMOL/L (ref 98–107)
CO2: 27 MMOL/L (ref 22–29)
CREAT SERPL-MCNC: 0.7 MG/DL (ref 0.5–1)
GFR SERPL CREATININE-BSD FRML MDRD: >60 ML/MIN/1.73
GLUCOSE BLD-MCNC: 85 MG/DL (ref 74–99)
HCT VFR BLD CALC: 37.1 % (ref 34–48)
HEMOGLOBIN: 11.7 G/DL (ref 11.5–15.5)
MCH RBC QN AUTO: 24.8 PG (ref 26–35)
MCHC RBC AUTO-ENTMCNC: 31.5 % (ref 32–34.5)
MCV RBC AUTO: 78.6 FL (ref 80–99.9)
PDW BLD-RTO: 16.2 FL (ref 11.5–15)
PLATELET # BLD: 232 E9/L (ref 130–450)
PMV BLD AUTO: 9.2 FL (ref 7–12)
POTASSIUM SERPL-SCNC: 4.1 MMOL/L (ref 3.5–5)
RBC # BLD: 4.72 E12/L (ref 3.5–5.5)
SODIUM BLD-SCNC: 137 MMOL/L (ref 132–146)
WBC # BLD: 7.3 E9/L (ref 4.5–11.5)

## 2022-10-31 PROCEDURE — 3609010300 HC COLONOSCOPY W/BIOPSY SINGLE/MULTIPLE: Performed by: SURGERY

## 2022-10-31 PROCEDURE — 2580000003 HC RX 258: Performed by: SURGERY

## 2022-10-31 PROCEDURE — 6370000000 HC RX 637 (ALT 250 FOR IP): Performed by: SURGERY

## 2022-10-31 PROCEDURE — G0378 HOSPITAL OBSERVATION PER HR: HCPCS

## 2022-10-31 PROCEDURE — 80048 BASIC METABOLIC PNL TOTAL CA: CPT

## 2022-10-31 PROCEDURE — 96376 TX/PRO/DX INJ SAME DRUG ADON: CPT

## 2022-10-31 PROCEDURE — 7100000011 HC PHASE II RECOVERY - ADDTL 15 MIN: Performed by: SURGERY

## 2022-10-31 PROCEDURE — 2709999900 HC NON-CHARGEABLE SUPPLY: Performed by: SURGERY

## 2022-10-31 PROCEDURE — 85027 COMPLETE CBC AUTOMATED: CPT

## 2022-10-31 PROCEDURE — 88305 TISSUE EXAM BY PATHOLOGIST: CPT

## 2022-10-31 PROCEDURE — 2580000003 HC RX 258: Performed by: ANESTHESIOLOGY

## 2022-10-31 PROCEDURE — 3700000000 HC ANESTHESIA ATTENDED CARE: Performed by: SURGERY

## 2022-10-31 PROCEDURE — 3700000001 HC ADD 15 MINUTES (ANESTHESIA): Performed by: SURGERY

## 2022-10-31 PROCEDURE — 6360000002 HC RX W HCPCS: Performed by: ANESTHESIOLOGIST ASSISTANT

## 2022-10-31 PROCEDURE — 36415 COLL VENOUS BLD VENIPUNCTURE: CPT

## 2022-10-31 PROCEDURE — 45380 COLONOSCOPY AND BIOPSY: CPT | Performed by: SURGERY

## 2022-10-31 PROCEDURE — APPSS45 APP SPLIT SHARED TIME 31-45 MINUTES: Performed by: NURSE PRACTITIONER

## 2022-10-31 PROCEDURE — 99217 PR OBSERVATION CARE DISCHARGE MANAGEMENT: CPT | Performed by: INTERNAL MEDICINE

## 2022-10-31 PROCEDURE — 6360000002 HC RX W HCPCS: Performed by: SURGERY

## 2022-10-31 PROCEDURE — 6370000000 HC RX 637 (ALT 250 FOR IP): Performed by: NURSE PRACTITIONER

## 2022-10-31 PROCEDURE — 7100000010 HC PHASE II RECOVERY - FIRST 15 MIN: Performed by: SURGERY

## 2022-10-31 RX ORDER — MIDAZOLAM HYDROCHLORIDE 1 MG/ML
INJECTION INTRAMUSCULAR; INTRAVENOUS PRN
Status: DISCONTINUED | OUTPATIENT
Start: 2022-10-31 | End: 2022-10-31 | Stop reason: SDUPTHER

## 2022-10-31 RX ORDER — PROPOFOL 10 MG/ML
INJECTION, EMULSION INTRAVENOUS PRN
Status: DISCONTINUED | OUTPATIENT
Start: 2022-10-31 | End: 2022-10-31 | Stop reason: SDUPTHER

## 2022-10-31 RX ORDER — GABAPENTIN 100 MG/1
100 CAPSULE ORAL 3 TIMES DAILY
Qty: 90 CAPSULE | Refills: 0 | Status: SHIPPED | OUTPATIENT
Start: 2022-10-31 | End: 2022-11-30

## 2022-10-31 RX ORDER — PANTOPRAZOLE SODIUM 40 MG/1
40 TABLET, DELAYED RELEASE ORAL
Qty: 30 TABLET | Refills: 0 | Status: SHIPPED | OUTPATIENT
Start: 2022-11-01 | End: 2022-12-01

## 2022-10-31 RX ADMIN — GABAPENTIN 100 MG: 100 CAPSULE ORAL at 13:30

## 2022-10-31 RX ADMIN — CARBAMAZEPINE 200 MG: 200 TABLET ORAL at 09:05

## 2022-10-31 RX ADMIN — HYDROCHLOROTHIAZIDE 25 MG: 25 TABLET ORAL at 09:05

## 2022-10-31 RX ADMIN — MIDAZOLAM 2 MG: 1 INJECTION INTRAMUSCULAR; INTRAVENOUS at 11:57

## 2022-10-31 RX ADMIN — SODIUM CHLORIDE: 9 INJECTION, SOLUTION INTRAVENOUS at 11:57

## 2022-10-31 RX ADMIN — MORPHINE SULFATE 2 MG: 2 INJECTION, SOLUTION INTRAMUSCULAR; INTRAVENOUS at 01:31

## 2022-10-31 RX ADMIN — PROPOFOL 60 MG: 10 INJECTION, EMULSION INTRAVENOUS at 12:02

## 2022-10-31 RX ADMIN — MORPHINE SULFATE 2 MG: 2 INJECTION, SOLUTION INTRAMUSCULAR; INTRAVENOUS at 07:22

## 2022-10-31 RX ADMIN — GABAPENTIN 100 MG: 100 CAPSULE ORAL at 09:05

## 2022-10-31 RX ADMIN — SUCRALFATE 1 G: 1 TABLET ORAL at 13:12

## 2022-10-31 RX ADMIN — AMLODIPINE BESYLATE 5 MG: 5 TABLET ORAL at 09:05

## 2022-10-31 RX ADMIN — HYDROCODONE BITARTRATE AND ACETAMINOPHEN 1 TABLET: 5; 325 TABLET ORAL at 13:15

## 2022-10-31 RX ADMIN — SODIUM CHLORIDE, PRESERVATIVE FREE 10 ML: 5 INJECTION INTRAVENOUS at 09:05

## 2022-10-31 RX ADMIN — PROPOFOL 80 MG: 10 INJECTION, EMULSION INTRAVENOUS at 12:00

## 2022-10-31 RX ADMIN — PROPOFOL 80 MG: 10 INJECTION, EMULSION INTRAVENOUS at 12:05

## 2022-10-31 ASSESSMENT — PAIN DESCRIPTION - DESCRIPTORS
DESCRIPTORS: ACHING;DISCOMFORT;SHARP
DESCRIPTORS: ACHING;DISCOMFORT;TENDER
DESCRIPTORS: ACHING;DISCOMFORT

## 2022-10-31 ASSESSMENT — PAIN DESCRIPTION - LOCATION
LOCATION: BACK
LOCATION: FLANK
LOCATION: FLANK

## 2022-10-31 ASSESSMENT — PAIN SCALES - GENERAL
PAINLEVEL_OUTOF10: 9
PAINLEVEL_OUTOF10: 9
PAINLEVEL_OUTOF10: 8

## 2022-10-31 ASSESSMENT — PAIN DESCRIPTION - ORIENTATION
ORIENTATION: LOWER
ORIENTATION: RIGHT

## 2022-10-31 ASSESSMENT — PAIN - FUNCTIONAL ASSESSMENT
PAIN_FUNCTIONAL_ASSESSMENT: ACTIVITIES ARE NOT PREVENTED
PAIN_FUNCTIONAL_ASSESSMENT: ACTIVITIES ARE NOT PREVENTED

## 2022-10-31 ASSESSMENT — LIFESTYLE VARIABLES: SMOKING_STATUS: 1

## 2022-10-31 ASSESSMENT — ENCOUNTER SYMPTOMS: SHORTNESS OF BREATH: 0

## 2022-10-31 NOTE — PROGRESS NOTES
Normal appearing colonoscopy. EGD with erosions. Ok for dc on ppi, peptic ulcer diet  Follow up in 2 weeks for biopsy results.     Jacki Raya MD

## 2022-10-31 NOTE — ANESTHESIA PRE PROCEDURE
Department of Anesthesiology  Preprocedure Note       Name:  Alex Mullen   Age:  40 y.o.  :  1980                                          MRN:  [id number]         Date:  10/31/2022      Surgeon: Nikolay Stephen):  Debbie Alex MD    Procedure: Procedure(s):  COLONOSCOPY DIAGNOSTIC    Medications prior to admission:   Prior to Admission medications    Medication Sig Start Date End Date Taking? Authorizing Provider   albuterol sulfate HFA (VENTOLIN HFA) 108 (90 Base) MCG/ACT inhaler Inhale 2 puffs into the lungs 4 times daily as needed for Wheezing or Shortness of Breath   Yes Historical Provider, MD   omeprazole (PRILOSEC) 40 MG delayed release capsule Take 40 mg by mouth daily as needed (EPIGASTRIC DISCOMFORT)   Yes Historical Provider, MD   loratadine (CLARITIN) 10 MG tablet Take 10 mg by mouth every morning   Yes Historical Provider, MD   amLODIPine (NORVASC) 5 MG tablet Take 5 mg by mouth every morning   Yes Historical Provider, MD   diphenhydrAMINE (BENADRYL) 25 MG tablet Take 25 mg by mouth in the morning and 25 mg at noon and 25 mg in the evening and 25 mg before bedtime. Yes Historical Provider, MD   sucralfate (CARAFATE) 1 GM tablet Take 1 tablet by mouth 4 times daily 10/15/22   Darlene Maza DO   gabapentin (NEURONTIN) 600 MG tablet Take 600 mg by mouth daily.     Historical Provider, MD   tiZANidine (ZANAFLEX) 4 MG tablet Take 4 mg by mouth every 8 hours as needed (MUSCLE SPASMS)    Historical Provider, MD   carBAMazepine (TEGRETOL) 200 MG tablet Take 200 mg by mouth 2 times daily (with meals) 20   Historical Provider, MD       Current medications:    Current Facility-Administered Medications   Medication Dose Route Frequency Provider Last Rate Last Admin    hydrALAZINE (APRESOLINE) injection 10 mg  10 mg IntraVENous Q6H PRN MARK Morse - CNP        sodium chloride flush 0.9 % injection 5-40 mL  5-40 mL IntraVENous 2 times per day Jacki Christy MD   10 mL at 10/30/22 0815    sodium chloride flush 0.9 % injection 5-40 mL  5-40 mL IntraVENous PRN Gretchen Javier MD   10 mL at 10/30/22 1914    0.9 % sodium chloride infusion   IntraVENous PRN Gretchen Javier MD        aluminum & magnesium hydroxide-simethicone (MAALOX) 30 mL, lidocaine viscous hcl (XYLOCAINE) 5 mL (GI COCKTAIL)   Oral TID Darwin Mdaera MD   Given at 10/30/22 2230    amLODIPine (NORVASC) tablet 5 mg  5 mg Oral QAM MARK Whiteside - CNP   5 mg at 10/31/22 6911    cetirizine (ZYRTEC) tablet 10 mg  10 mg Oral Daily MARK Whiteside - CNP   10 mg at 10/30/22 0230    carBAMazepine (TEGRETOL) tablet 200 mg  200 mg Oral BID Darwin Madera MD   200 mg at 10/31/22 0905    nicotine (NICODERM CQ) 21 MG/24HR 1 patch  1 patch TransDERmal Daily Darwin Madera MD        pantoprazole (PROTONIX) tablet 40 mg  40 mg Oral Daily with breakfast Darwin Madera MD   40 mg at 10/30/22 3511    gabapentin (NEURONTIN) capsule 100 mg  100 mg Oral TID Darwin Madera MD   100 mg at 10/31/22 0905    tiZANidine (ZANAFLEX) tablet 4 mg  4 mg Oral Q6H PRN Darwin Madera MD        hydroCHLOROthiazide (HYDRODIURIL) tablet 25 mg  25 mg Oral Daily Darwin Madera MD   25 mg at 10/31/22 0043    sucralfate (CARAFATE) tablet 1 g  1 g Oral 4x Daily Darwin Madera MD   1 g at 10/30/22 2230    sodium chloride flush 0.9 % injection 5-40 mL  5-40 mL IntraVENous 2 times per day Darwin Madera MD   10 mL at 10/31/22 0905    sodium chloride flush 0.9 % injection 5-40 mL  5-40 mL IntraVENous PRN Darwin Madera MD        0.9 % sodium chloride infusion   IntraVENous PRN Darwin Madera MD        ondansetron (ZOFRAN-ODT) disintegrating tablet 4 mg  4 mg Oral Q8H PRN Darwin Madera MD        Or    ondansetron [facility]) injection 4 mg  4 mg IntraVENous Q6H PRN Darwin Madera MD   4 mg at 10/30/22 1914    polyethylene glycol (GLYCOLAX) packet 17 g  17 g Oral Daily PRN Darwin Madera MD        albuterol (PROVENTIL) nebulizer solution 2.5 mg  2.5 mg Nebulization 4x daily Scotty Abreu MD   2.5 mg at 10/30/22 1322    HYDROcodone-acetaminophen (NORCO) 5-325 MG per tablet 1 tablet  1 tablet Oral Q6H PRN Scotty Abreu MD   1 tablet at 10/30/22 2328    morphine (PF) injection 2 mg  2 mg IntraVENous Q4H PRN Scotty Abreu MD   2 mg at 10/31/22 0722    diphenhydrAMINE (BENADRYL) tablet 50 mg  50 mg Oral Q6H PRN Scotty Abreu MD   50 mg at 10/30/22 8867       Allergies: Allergies   Allergen Reactions    Ketorolac Itching and Nausea And Vomiting    Darvocet [Propoxyphene N-Acetaminophen] Swelling and Dizziness or Vertigo     FACIAL SWELLING    Ibuprofen Nausea And Vomiting    Meloxicam Hives    Naproxen Nausea And Vomiting    Tramadol Other (See Comments)     Patient does not remember the reaction       Problem List:    Patient Active Problem List   Diagnosis Code    Tobacco abuse Z72.0    Seizure disorder ([facility] [address] G40.909    Asthma J45.909    Carbamazepine overdose with self-harm intent T42.1X4A    Depression F32. A    Suicidal overdose ([facility] [address] T50.902A    Pneumonia J18.9    Epigastric pain R10.13    Generalized abdominal pain R10.84    Intractable abdominal pain R10.9       Past Medical History:        Diagnosis Date    Asthma     Bronchitis     Movement disorder     Seizure disorder (HCC)     Seizure disorder ([facility] [address]     Suicidal overdose ([facility] [address] 6/27/2014    Tobacco abuse     Tobacco abuse        Past Surgical History:        Procedure Laterality Date    CHOLECYSTECTOMY      FRACTURE SURGERY      R ANKLE TORN LIGAMENTS    ROTATOR CUFF REPAIR      UPPER GASTROINTESTINAL ENDOSCOPY N/A 5/3/2021    EGD BIOPSY performed by Maria Elena Mcwilliams MD at [facility] N/A 10/29/2022    EGD ESOPHAGOGASTRODUODENOSCOPY performed by Scotty Abreu MD at [address] [place] [address] History:    Social History     Tobacco Use    Smoking status: Every Day     Packs/day: 0.02     Years: 15.00     Pack years: 0.30 Types: Cigarettes    Smokeless tobacco: Never    Tobacco comments:     SAYS CANNOT USE PATCHES OR GUM   Substance Use Topics    Alcohol use: Yes     Alcohol/week: 0.0 standard drinks     Comment: occassional-WINE COOLERS                                Ready to quit: Not Answered  Counseling given: Not Answered  Tobacco comments: SAYS CANNOT USE PATCHES OR GUM      Vital Signs (Current):   Vitals:    10/31/22 0035 10/31/22 0201 10/31/22 0722 10/31/22 0845   BP:    134/79   Pulse:    82   Resp:  16 16 16   Temp:    97.9 °F (36.6 °C)   TempSrc:    Oral   SpO2:    99%   Weight: 155 lb (70.3 kg)      Height:                                                  BP Readings from Last 3 Encounters:   10/31/22 134/79   10/24/22 (!) 148/87   10/15/22 (!) 194/98       NPO Status: Time of last liquid consumption: 2000                        Time of last solid consumption: 2300                        Date of last liquid consumption: 10/30/22                        Date of last solid food consumption: 10/29/22    BMI:   Wt Readings from Last 3 Encounters:   10/31/22 155 lb (70.3 kg)   10/24/22 150 lb (68 kg)   10/15/22 135 lb (61.2 kg)     Body mass index is 31.31 kg/m².     CBC:   Lab Results   Component Value Date/Time    WBC 7.3 10/31/2022 02:37 AM    RBC 4.72 10/31/2022 02:37 AM    HGB 11.7 10/31/2022 02:37 AM    HCT 37.1 10/31/2022 02:37 AM    MCV 78.6 10/31/2022 02:37 AM    RDW 16.2 10/31/2022 02:37 AM     10/31/2022 02:37 AM       CMP:   Lab Results   Component Value Date/Time     10/31/2022 02:37 AM    K 4.1 10/31/2022 02:37 AM    K 4.1 10/28/2022 06:22 AM     10/31/2022 02:37 AM    CO2 27 10/31/2022 02:37 AM    BUN 9 10/31/2022 02:37 AM    CREATININE 0.7 10/31/2022 02:37 AM    GFRAA >60 10/15/2022 03:31 AM    LABGLOM >60 10/31/2022 02:37 AM    GLUCOSE 85 10/31/2022 02:37 AM    GLUCOSE 90 07/24/2011 02:45 AM    PROT 7.8 10/28/2022 06:22 AM    CALCIUM 8.8 10/31/2022 02:37 AM    BILITOT 0.4 10/28/2022 06:22 AM    ALKPHOS 114 10/28/2022 06:22 AM    AST 23 10/28/2022 06:22 AM    ALT 22 10/28/2022 06:22 AM       POC Tests: No results for input(s): POCGLU, POCNA, POCK, POCCL, POCBUN, POCHEMO, POCHCT in the last 72 hours. Coags:   Lab Results   Component Value Date/Time    PROTIME 11.9 10/14/2022 07:26 AM    INR 1.0 10/14/2022 07:26 AM    APTT 24.9 06/30/2020 11:32 AM       HCG (If Applicable):   Lab Results   Component Value Date    PREGTESTUR NEGATIVE 10/28/2022    PREGSERUM NEGATIVE 06/15/2011    HCG <0.1 12/08/2013        ABGs: No results found for: PHART, PO2ART, MUU9ELF, PYA8DKW, BEART, N7JLSZWT     Type & Screen (If Applicable):  No results found for: LABABO, LABRH    Drug/Infectious Status (If Applicable):  No results found for: HIV, HEPCAB    COVID-19 Screening (If Applicable):   Lab Results   Component Value Date/Time    COVID19 Not Detected 10/28/2022 06:30 PM           Anesthesia Evaluation  Patient summary reviewed and Nursing notes reviewed no history of anesthetic complications:   Airway: Mallampati: II  TM distance: >3 FB   Neck ROM: full  Mouth opening: > = 3 FB   Dental:    (+) poor dentition      Pulmonary: breath sounds clear to auscultation  (+) asthma: current smoker    (-) shortness of breath          Patient did not smoke on day of surgery. Cardiovascular:Negative CV ROS  Exercise tolerance: good (>4 METS),           Rhythm: regular  Rate: normal           Beta Blocker:  Not on Beta Blocker         Neuro/Psych:   (+) seizures: well controlled, CVA:, psychiatric history:depression/anxiety             GI/Hepatic/Renal:            ROS comment: abd pain  . Endo/Other:    (+) : arthritis:., .                 Abdominal:             Vascular: negative vascular ROS. Other Findings:           Anesthesia Plan      MAC     ASA 3       Induction: intravenous. Anesthetic plan and risks discussed with patient.                         Bj Disla MD 10/31/2022

## 2022-10-31 NOTE — DISCHARGE SUMMARY
[facility] Physician Discharge Summary       Rylee Teague MD  [address] [facility] [address]  [phone]    Schedule an appointment as soon as possible for a visit  Make an appointment in 1 week to go over hospitalization, medications and follow up. Mayo Shearer MD  [facility]  [address] [facility]  [address] [facility] [address] [phone]    Follow up  Follow up colonoscopy    Activity level: As tolerated     Dispo: Home    Condition on discharge: Stable     Patient ID:  Marielena Calloway  [id number]  89 y.o.  1980    Admit date: 10/28/2022    Discharge date and time:  10/31/2022  2:21 PM    Admission Diagnoses:   Principal Problem:    Intractable abdominal pain  Active Problems:    Generalized abdominal pain  Resolved Problems:    * No resolved hospital problems. *      Discharge Diagnoses:   Principal Problem:    Intractable abdominal pain  Active Problems:    Generalized abdominal pain  Resolved Problems:    * No resolved hospital problems. *      Consults:  IP CONSULT TO GENERAL SURGERY    Hospital Course:   Patient Marielena Calloway is a 40 y.o. presented with Intractable abdominal pain [R10.9]   Patient presented to the ER with abdominal pain, nausea,emesis and diarrhea. General surgery was consulted during admission. Patient was followed and treated for;      1. Intractable Abdominal pain- resolved. : pt presented to the ER with abdominal pain / nausea/ emesis/ diarrhea. Reporting long standing history of abdominal pain ~ 1 year . H/o cholecystectomy. Multiple ER visits in the last year with scrips for narcotics given. General surgery consulted. 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 PPI/ GI cocktail. CT abd showing questionable mural thickening of the colon. Colonoscopy today- normal appearing ofe and terminal ileum. Random colon biopsies taken. Pt to follow up outpt with surgery.  Continue PPI/ peptic ulcer diet at discharge. 2. Leukocytosis: resolved     3. Chest/ shoulder pain: EKG no ischemia. Troponin neg. CTA unremarkable. 4. Sore throat/ body aches/ cough: viral panel neg. CTA no pneumonia     5. HTN; continue meds     6. Seizure disorder: continue tegretol and neurontin     7. HUSAM/ depression: not on medication     8. Asthma; not in exacerbation          Patient feeling much better and ready to go home. Patient discharged in stable condition with the following medications, instructions and follow up. Discharge Exam:  General Appearance: alert and oriented to person, place and time and in no acute distress  Skin: warm and dry  Head: normocephalic and atraumatic  Neck: neck supple and non tender without mass   Pulmonary/Chest: clear to auscultation bilaterally  Cardiovascular: normal rate, normal S1 and S2 and no carotid bruits  Abdomen: soft, non-tender, non-distended, normal bowel sounds  Extremities: no cyanosis, no clubbing and no edema  Neurologic: speech normal     I/O last 3 completed shifts:  In: -   Out: 400 [Urine:400]  I/O this shift:  In: 200 [I.V.:200]  Out: -       LABS:  Recent Labs     10/29/22  0351 10/30/22  0342 10/31/22  0237    139 137   K 4.4 4.1 4.1    102 100   CO2 22 25 27   BUN 7 14 9   CREATININE 0.7 0.8 0.7   GLUCOSE 80 129* 85   CALCIUM 9.4 8.7 8.8       Recent Labs     10/29/22  0351 10/30/22  0342 10/31/22  0237   WBC 7.5 6.9 7.3   RBC 5.27 4.49 4.72   HGB 13.3 11.5 11.7   HCT 41.1 35.8 37.1   MCV 78.0* 79.7* 78.6*   MCH 25.2* 25.6* 24.8*   MCHC 32.4 32.1 31.5*   RDW 16.4* 16.2* 16.2*    236 232   MPV 9.0 9.7 9.2       No results for input(s): POCGLU in the last 72 hours.     Imaging:  CT ABDOMEN PELVIS W IV CONTRAST Additional Contrast? Oral    Result Date: 10/28/2022  EXAMINATION: CT OF THE ABDOMEN AND PELVIS WITH CONTRAST 10/28/2022 7:03 pm TECHNIQUE: CT of the abdomen and pelvis was performed with the administration of intravenous contrast. Multiplanar reformatted images are provided for review. Automated exposure control, iterative reconstruction, and/or weight based adjustment of the mA/kV was utilized to reduce the radiation dose to as low as reasonably achievable. COMPARISON: CT abdomen and pelvis dated 10/28/2022 HISTORY: ORDERING SYSTEM PROVIDED HISTORY: abdominal pain TECHNOLOGIST PROVIDED HISTORY: Reason for exam:->abdominal pain Additional Contrast?->Oral FINDINGS: Lower Chest: Lung bases are clear. Organs: Liver without focal lesion. Gallbladder surgically absent. Pancreas and spleen unremarkable. Adrenals without nodule. Kidneys without suspicious renal lesion and no hydronephrosis. GI/Bowel: Small bowel is nondilated without evidence for small bowel obstructive process. Appendix visualized and unremarkable. Colonic segments under distended with questionable wall thickening versus under distension of the ascending colon through transverse colon could represent colitis or mural edema without perforation or abscess. Enteral or intraluminal contrast extends through the rectum without obstructive elements. Pelvis: No suspicious pelvic lesion or bulky pelvic adenopathy/free fluid. Peritoneum/Retroperitoneum: No bulky retroperitoneal adenopathy. No suspicious peritoneal or mesenteric process Vasculature: Grossly normal caliber of abdominal aorta and vasculature Bones/Soft Tissues: No acute osseous or soft tissue findings. Colonic segments under distended with questionable wall thickening versus under distension of the ascending colon through transverse colon could represent colitis or mural edema without perforation or abscess. Enteral or intraluminal contrast extends through the rectum without obstructive elements.      CT ABDOMEN PELVIS W IV CONTRAST Additional Contrast? None    Result Date: 10/28/2022  EXAMINATION: CTA OF THE CHEST; CT OF THE ABDOMEN AND PELVIS WITH CONTRAST 10/28/2022 8:14 am TECHNIQUE: CTA of the chest was performed after the administration of intravenous contrast.  Multiplanar reformatted images are provided for review. MIP images are provided for review. Automated exposure control, iterative reconstruction, and/or weight based adjustment of the mA/kV was utilized to reduce the radiation dose to as low as reasonably achievable.; CT of the abdomen and pelvis was performed with the administration of intravenous contrast. Multiplanar reformatted images are provided for review. Automated exposure control, iterative reconstruction, and/or weight based adjustment of the mA/kV was utilized to reduce the radiation dose to as low as reasonably achievable. COMPARISON: None. HISTORY: ORDERING SYSTEM PROVIDED HISTORY: r/o pe TECHNOLOGIST PROVIDED HISTORY: Reason for exam:->r/o pe Decision Support Exception - unselect if not a suspected or confirmed emergency medical condition->Emergency Medical Condition (MA); ORDERING SYSTEM PROVIDED HISTORY: abdominal pain TECHNOLOGIST PROVIDED HISTORY: Additional Contrast?->None Reason for exam:->abdominal pain Decision Support Exception - unselect if not a suspected or confirmed emergency medical condition->Emergency Medical Condition (MA) FINDINGS: Pulmonary Arteries: Pulmonary arteries are adequately opacified for evaluation. No evidence of intraluminal filling defect to suggest pulmonary embolism. Main pulmonary artery is normal in caliber. Mediastinum: No evidence of mediastinal lymphadenopathy. The heart and pericardium demonstrate no acute abnormality. There is no acute abnormality of the thoracic aorta. Thyroid is homogeneous in appearance. Lungs/pleura: The lungs are without acute process. No focal consolidation or pulmonary edema. No evidence of pleural effusion or pneumothorax. Soft Tissues/Bones: No acute bone or soft tissue abnormality. Abdomen/pelvis: Organs: Liver is homogeneous in appearance. Gallbladder is been surgically removed. Spleen is unremarkable.   Pancreas is unremarkable. Both adrenal glands are within normal limits. Symmetric enhancement of the renal parenchyma. No stones or distension seen in the renal collecting system. GI/Bowel: Stomach is unremarkable in appearance. No wall thickening. The small bowel is within normal limits. No mucosal abnormality. No distension. The appendix is normal.  Stool seen scattered diffusely throughout the colon. No signs of obvious obstruction or obstructing lesion. No wall thickening or inflammatory changes present. Pelvis: The bladder is mildly distended with no wall thickening. The uterus is unremarkable. Peritoneum/Retroperitoneum: No abdominal or retroperitoneal lymphadenopathy. No free fluid or free air. Vascular calcifications seen within the abdominal aorta and iliac vessels. There is no pelvic adenopathy. Bones/Soft Tissues: The bony structures reveal degenerative changes identified within the spine and pelvis. Degenerative changes seen within the hips bilaterally. No evidence of pulmonary embolism or acute pulmonary abnormality. No CT evidence of acute intra-abdominal or pelvic abnormality. XR CHEST PORTABLE    Result Date: 10/28/2022  EXAMINATION: ONE XRAY VIEW OF THE CHEST 10/28/2022 6:24 am COMPARISON: 14 October 2022 HISTORY: ORDERING SYSTEM PROVIDED HISTORY: eval chest pain TECHNOLOGIST PROVIDED HISTORY: Reason for exam:->eval chest pain FINDINGS: The lungs are without acute focal process. There is no effusion or pneumothorax. The cardiomediastinal silhouette is without acute process. The osseous structures are without acute process. No acute process. CTA PULMONARY W CONTRAST    Result Date: 10/28/2022  EXAMINATION: CTA OF THE CHEST; CT OF THE ABDOMEN AND PELVIS WITH CONTRAST 10/28/2022 8:14 am TECHNIQUE: CTA of the chest was performed after the administration of intravenous contrast.  Multiplanar reformatted images are provided for review. MIP images are provided for review.  Automated system. GI/Bowel: Stomach is unremarkable in appearance. No wall thickening. The small bowel is within normal limits. No mucosal abnormality. No distension. The appendix is normal.  Stool seen scattered diffusely throughout the colon. No signs of obvious obstruction or obstructing lesion. No wall thickening or inflammatory changes present. Pelvis: The bladder is mildly distended with no wall thickening. The uterus is unremarkable. Peritoneum/Retroperitoneum: No abdominal or retroperitoneal lymphadenopathy. No free fluid or free air. Vascular calcifications seen within the abdominal aorta and iliac vessels. There is no pelvic adenopathy. Bones/Soft Tissues: The bony structures reveal degenerative changes identified within the spine and pelvis. Degenerative changes seen within the hips bilaterally. No evidence of pulmonary embolism or acute pulmonary abnormality. No CT evidence of acute intra-abdominal or pelvic abnormality. Patient Instructions:      Medication List        START taking these medications      gabapentin 100 MG capsule  Commonly known as: NEURONTIN  Take 1 capsule by mouth 3 times daily for 30 days.   Replaces: gabapentin 600 MG tablet            CONTINUE taking these medications      amLODIPine 5 MG tablet  Commonly known as: NORVASC     carBAMazepine 200 MG tablet  Commonly known as: TEGRETOL     Claritin 10 MG tablet  Generic drug: loratadine     diphenhydrAMINE 25 MG tablet  Commonly known as: BENADRYL     pantoprazole 40 MG tablet  Commonly known as: Protonix  Take 1 tablet by mouth daily (with breakfast)  Start taking on: November 1, 2022     sucralfate 1 GM tablet  Commonly known as: Carafate  Take 1 tablet by mouth 4 times daily     tiZANidine 4 MG tablet  Commonly known as: ZANAFLEX     Ventolin  (90 Base) MCG/ACT inhaler  Generic drug: albuterol sulfate HFA            STOP taking these medications      gabapentin 600 MG tablet  Commonly known as: NEURONTIN  Replaced by: gabapentin 100 MG capsule     omeprazole 40 MG delayed release capsule  Commonly known as: PRILOSEC               Where to Get Your Medications        These medications were sent to [address] [place] [address] [phone]  [address] [place], [place] [address]inez Nguyen, [address] [place] [address]      Phone: [phone]   gabapentin 100 MG capsule  pantoprazole 40 MG tablet           Note that more than 30 minutes was spent in preparing discharge papers, discussing discharge with patient, medication review, etc.    Signed:  Electronically signed by JACQUELINE Lugo on 10/31/2022 at 2:21 PM

## 2022-10-31 NOTE — PATIENT CARE CONFERENCE
P Quality Flow/Interdisciplinary Rounds Progress Note        Quality Flow Rounds held on October 31, 2022    Disciplines Attending:  Bedside Nurse, , , and Nursing Unit Leadership    Star Osler was admitted on 10/28/2022  5:47 AM    Anticipated Discharge Date:       Disposition:    Familia Score:  Familia Scale Score: 22    Readmission Risk              Risk of Unplanned Readmission:  0           Discussed patient goal for the day, patient clinical progression, and barriers to discharge.   The following Goal(s) of the Day/Commitment(s) have been identified:  Labs - Report Results      Edwina Mitchell RN  October 31, 2022

## 2022-10-31 NOTE — ANESTHESIA POSTPROCEDURE EVALUATION
Department of Anesthesiology  Postprocedure Note    Patient: Verónica Croft  MRN: [id number]  YOB: 1980  Date of evaluation: 10/31/2022      Procedure Summary     Date: 10/31/22 Room / Location: [address] / [facility]    Anesthesia Start: 1157 Anesthesia Stop: 1213    Procedure: COLONOSCOPY WITH BIOPSY Diagnosis:       Abdominal pain, unspecified abdominal location      (Abdominal pain, unspecified abdominal location [R10.9])    Surgeons: Jeni Pizarro MD Responsible Provider: Bria Hartman MD    Anesthesia Type: MAC ASA Status: 3          Anesthesia Type: MAC    Aye Phase I: Aye Score: 10    Aye Phase II: Aye Score: 10      Anesthesia Post Evaluation    Patient location during evaluation: PACU  Patient participation: complete - patient participated  Level of consciousness: awake  Airway patency: patent  Nausea & Vomiting: no nausea and no vomiting  Complications: no  Cardiovascular status: hemodynamically stable  Respiratory status: acceptable  Hydration status: euvolemic

## 2022-10-31 NOTE — OP NOTE
Operative Note: Colonoscopy    Alex Sebas     DATE OF PROCEDURE: 10/31/2022  SURGEON: Dr. Jose Cruz Bingham MD, M.D. PREOPERATIVE DIAGNOSES:    Abdominal pain, diffuse    POSTOPERATIVE DIAGNOSES:  Normal appearing colon and terminal ileum    SPECIMENS:  ID Type Source Tests Collected by Time Destination   A : random colon biopsies Tissue Colon SURGICAL PATHOLOGY Emy Christopher MD 10/31/2022 1206    B : T I Biopsy Tissue Colon SURGICAL PATHOLOGY Emy Christopher MD 10/31/2022 1207         OPERATION:   Colonoscopy to the terminal ileum with TI and random colon biopsies      ANESTHESIA: LMAC    COMPLICATIONS: None. BLOOD LOSS: Minimal    Procedure Note:    CONSENT AND INDICATIONS:  This is a 40y.o. year old female who is having the above issues. I have discussed with the patient and/or the patient representative the indication, alternatives, and the possible risks and/or complications of the planned procedure and the anesthesia methods. The patient and/or patient representative appear to understand and agree to proceed. OPERATIONS: Bowel prep was done yesterday until the bowels were clear. The patient was placed on the table and sedated by anesthesia. A rectal exam was performed and no mass was felt. A lubricated scope was passed into the rectum which looked normal.  The scope was passed all the way around through the sigmoid, descending, transverse and ascending colon to the cecum. The bowel prep was clear. No abnormalities were seen. The cecum was identified by the appendiceal orifice, ileocecal valve, and light reflex in the RLQ. The TI was intubated and appeared normal. Biopsies were taken. The scope was then slowly withdrawn, each area was examined again on the way out. Random biopsies were taken on the way out of the colon. The scope was retroflexed in the rectum and it was normal. The patient tolerated the procedure well. PLAN: Follow up biopsies.     Follow up colonoscopy in 10 years. Physician Signature: Electronically signed by Dr. Ian Marino MD M.D. FACS    Send copy of H&P to PCP, Marjorie Bradley MD and referring physician, No ref.  provider found

## 2022-11-01 LAB — CALPROTECTIN, FECAL: 23 UG/G

## 2022-11-01 NOTE — PROGRESS NOTES
CLINICAL PHARMACY NOTE: MEDS TO BEDS    Total # of Prescriptions Filled: 2   The following medications were delivered to the patient:  NEURONTIN 100 MG  PROTONIX 40 MG    Additional Documentation:

## 2022-11-04 ENCOUNTER — HOSPITAL ENCOUNTER (EMERGENCY)
Age: 42
Discharge: HOME | End: 2022-11-04
Attending: EMERGENCY MEDICINE
Payer: MEDICAID

## 2022-11-04 ENCOUNTER — APPOINTMENT (OUTPATIENT)
Dept: CT IMAGING | Age: 42
End: 2022-11-04
Payer: MEDICAID

## 2022-11-04 VITALS
BODY MASS INDEX: 28.22 KG/M2 | WEIGHT: 140 LBS | SYSTOLIC BLOOD PRESSURE: 187 MMHG | OXYGEN SATURATION: 99 % | DIASTOLIC BLOOD PRESSURE: 94 MMHG | TEMPERATURE: 98.6 F | HEIGHT: 59 IN | HEART RATE: 90 BPM | RESPIRATION RATE: 15 BRPM

## 2022-11-04 DIAGNOSIS — R10.9 ABDOMINAL PAIN, UNSPECIFIED ABDOMINAL LOCATION: Primary | ICD-10-CM

## 2022-11-04 LAB
ALBUMIN SERPL-MCNC: 3.9 G/DL (ref 3.5–5.2)
ALP BLD-CCNC: 125 U/L (ref 35–104)
ALT SERPL-CCNC: 16 U/L (ref 0–32)
ANION GAP SERPL CALCULATED.3IONS-SCNC: 13 MMOL/L (ref 7–16)
AST SERPL-CCNC: 20 U/L (ref 0–31)
BACTERIA: ABNORMAL /HPF
BASOPHILS ABSOLUTE: 0.02 E9/L (ref 0–0.2)
BASOPHILS RELATIVE PERCENT: 0.3 % (ref 0–2)
BILIRUB SERPL-MCNC: <0.2 MG/DL (ref 0–1.2)
BILIRUBIN URINE: NEGATIVE
BLOOD, URINE: ABNORMAL
BUN BLDV-MCNC: 16 MG/DL (ref 6–20)
CALCIUM SERPL-MCNC: 8.9 MG/DL (ref 8.6–10.2)
CHLORIDE BLD-SCNC: 109 MMOL/L (ref 98–107)
CLARITY: CLEAR
CO2: 21 MMOL/L (ref 22–29)
COLOR: YELLOW
CREAT SERPL-MCNC: 0.9 MG/DL (ref 0.5–1)
EOSINOPHILS ABSOLUTE: 0.15 E9/L (ref 0.05–0.5)
EOSINOPHILS RELATIVE PERCENT: 1.9 % (ref 0–6)
EPITHELIAL CELLS, UA: ABNORMAL /HPF
GFR SERPL CREATININE-BSD FRML MDRD: >60 ML/MIN/1.73
GLUCOSE BLD-MCNC: 102 MG/DL (ref 74–99)
GLUCOSE URINE: NEGATIVE MG/DL
HCG, URINE, POC: NEGATIVE
HCT VFR BLD CALC: 38.2 % (ref 34–48)
HEMOGLOBIN: 12.2 G/DL (ref 11.5–15.5)
IMMATURE GRANULOCYTES #: 0.03 E9/L
IMMATURE GRANULOCYTES %: 0.4 % (ref 0–5)
KETONES, URINE: NEGATIVE MG/DL
LACTIC ACID: 1.9 MMOL/L (ref 0.5–2.2)
LEUKOCYTE ESTERASE, URINE: NEGATIVE
LIPASE: 51 U/L (ref 13–60)
LYMPHOCYTES ABSOLUTE: 1.51 E9/L (ref 1.5–4)
LYMPHOCYTES RELATIVE PERCENT: 19.2 % (ref 20–42)
Lab: NORMAL
MCH RBC QN AUTO: 25.7 PG (ref 26–35)
MCHC RBC AUTO-ENTMCNC: 31.9 % (ref 32–34.5)
MCV RBC AUTO: 80.4 FL (ref 80–99.9)
MONOCYTES ABSOLUTE: 0.81 E9/L (ref 0.1–0.95)
MONOCYTES RELATIVE PERCENT: 10.3 % (ref 2–12)
NEGATIVE QC PASS/FAIL: NORMAL
NEUTROPHILS ABSOLUTE: 5.33 E9/L (ref 1.8–7.3)
NEUTROPHILS RELATIVE PERCENT: 67.9 % (ref 43–80)
NITRITE, URINE: NEGATIVE
PDW BLD-RTO: 16.1 FL (ref 11.5–15)
PH UA: 6 (ref 5–9)
PLATELET # BLD: 231 E9/L (ref 130–450)
PMV BLD AUTO: 10 FL (ref 7–12)
POSITIVE QC PASS/FAIL: NORMAL
POTASSIUM SERPL-SCNC: 4.4 MMOL/L (ref 3.5–5)
PROTEIN UA: NEGATIVE MG/DL
RBC # BLD: 4.75 E12/L (ref 3.5–5.5)
RBC UA: ABNORMAL /HPF (ref 0–2)
SODIUM BLD-SCNC: 143 MMOL/L (ref 132–146)
SPECIFIC GRAVITY UA: 1.02 (ref 1–1.03)
TOTAL PROTEIN: 7.2 G/DL (ref 6.4–8.3)
UROBILINOGEN, URINE: 0.2 E.U./DL
WBC # BLD: 7.9 E9/L (ref 4.5–11.5)
WBC UA: ABNORMAL /HPF (ref 0–5)

## 2022-11-04 PROCEDURE — 87088 URINE BACTERIA CULTURE: CPT

## 2022-11-04 PROCEDURE — 6360000004 HC RX CONTRAST MEDICATION: Performed by: RADIOLOGY

## 2022-11-04 PROCEDURE — 87077 CULTURE AEROBIC IDENTIFY: CPT

## 2022-11-04 PROCEDURE — 85025 COMPLETE CBC W/AUTO DIFF WBC: CPT

## 2022-11-04 PROCEDURE — 2580000003 HC RX 258: Performed by: EMERGENCY MEDICINE

## 2022-11-04 PROCEDURE — 83690 ASSAY OF LIPASE: CPT

## 2022-11-04 PROCEDURE — 81001 URINALYSIS AUTO W/SCOPE: CPT

## 2022-11-04 PROCEDURE — 99285 EMERGENCY DEPT VISIT HI MDM: CPT

## 2022-11-04 PROCEDURE — 83605 ASSAY OF LACTIC ACID: CPT

## 2022-11-04 PROCEDURE — 74177 CT ABD & PELVIS W/CONTRAST: CPT

## 2022-11-04 PROCEDURE — 87186 SC STD MICRODIL/AGAR DIL: CPT

## 2022-11-04 PROCEDURE — 6360000002 HC RX W HCPCS: Performed by: EMERGENCY MEDICINE

## 2022-11-04 PROCEDURE — 96374 THER/PROPH/DIAG INJ IV PUSH: CPT

## 2022-11-04 PROCEDURE — 96375 TX/PRO/DX INJ NEW DRUG ADDON: CPT

## 2022-11-04 PROCEDURE — 80053 COMPREHEN METABOLIC PANEL: CPT

## 2022-11-04 PROCEDURE — C9113 INJ PANTOPRAZOLE SODIUM, VIA: HCPCS | Performed by: EMERGENCY MEDICINE

## 2022-11-04 RX ORDER — 0.9 % SODIUM CHLORIDE 0.9 %
1000 INTRAVENOUS SOLUTION INTRAVENOUS ONCE
Status: COMPLETED | OUTPATIENT
Start: 2022-11-04 | End: 2022-11-04

## 2022-11-04 RX ORDER — PANTOPRAZOLE SODIUM 40 MG/10ML
40 INJECTION, POWDER, LYOPHILIZED, FOR SOLUTION INTRAVENOUS ONCE
Status: COMPLETED | OUTPATIENT
Start: 2022-11-04 | End: 2022-11-04

## 2022-11-04 RX ORDER — FENTANYL CITRATE 50 UG/ML
50 INJECTION, SOLUTION INTRAMUSCULAR; INTRAVENOUS ONCE
Status: COMPLETED | OUTPATIENT
Start: 2022-11-04 | End: 2022-11-04

## 2022-11-04 RX ORDER — SODIUM CHLORIDE 9 MG/ML
INJECTION, SOLUTION INTRAVENOUS CONTINUOUS
Status: DISCONTINUED | OUTPATIENT
Start: 2022-11-04 | End: 2022-11-05 | Stop reason: HOSPADM

## 2022-11-04 RX ORDER — HYDROCODONE BITARTRATE AND ACETAMINOPHEN 5; 325 MG/1; MG/1
1 TABLET ORAL EVERY 4 HOURS PRN
Qty: 6 TABLET | Refills: 0 | Status: SHIPPED | OUTPATIENT
Start: 2022-11-04 | End: 2022-11-07

## 2022-11-04 RX ADMIN — IOPAMIDOL 75 ML: 755 INJECTION, SOLUTION INTRAVENOUS at 22:08

## 2022-11-04 RX ADMIN — SODIUM CHLORIDE 1000 ML: 9 INJECTION, SOLUTION INTRAVENOUS at 20:50

## 2022-11-04 RX ADMIN — PANTOPRAZOLE SODIUM 40 MG: 40 INJECTION, POWDER, FOR SOLUTION INTRAVENOUS at 21:09

## 2022-11-04 RX ADMIN — SODIUM CHLORIDE: 9 INJECTION, SOLUTION INTRAVENOUS at 21:14

## 2022-11-04 RX ADMIN — FENTANYL CITRATE 50 MCG: 0.05 INJECTION, SOLUTION INTRAMUSCULAR; INTRAVENOUS at 21:09

## 2022-11-04 ASSESSMENT — PAIN DESCRIPTION - LOCATION: LOCATION: ABDOMEN

## 2022-11-04 ASSESSMENT — PAIN SCALES - GENERAL
PAINLEVEL_OUTOF10: 10
PAINLEVEL_OUTOF10: 5

## 2022-11-05 NOTE — ED NOTES
41 y/o f to the ed with a c/c of increasing abdominal pain and \"black\" stools since her colonoscopy Thursday and endoscopy this past Monday. Patient denies chest pain, sob or difficulty breathing. Patient c/o ABD pain as well as n/v/d the past few days. Patient noted with + msp x 4, pupils PERRLA @ 3 and lung sounds that are clear and equil bilaterally. Patient placed on telemetry, BP and pulse ox. Vitals noted as recorded. PIV placed with labs drawn and sent. Call light placed within patient's reach, and bed in lowest position with side rails up x 2 for safety.         Efren Serrano RN  11/04/22 2007

## 2022-11-05 NOTE — ED NOTES
Pt resting in position of comfort on cot presenting in no acute/ apparent distress (NAD). Respirations are noted even and unlabored with good rise and fall of the chest observed. Pt updated with current plan of care (POC) and all questions/ concerns addressed. Patient voices no needs at this time. Cot noted in lowest position, locked, with side rails X 2 up for patient safety. Will continue to monitor patient for acute changes.        Vlad Monique RN  11/04/22 [id number]

## 2022-11-05 NOTE — ED PROVIDER NOTES
Department of Emergency Medicine   ED  Provider Note  Admit Date/RoomTime: 11/4/2022  7:45 PM  ED Room: [facility]          History of Present Illness:  11/5/22, Time: 1:11 AM EDT  Chief Complaint   Patient presents with    Abdominal Pain     Pt to ED with c/o abdominal pain since d/c Monday. Pt had endoscopy and colonoscopy with +ulcer. Pt c/o nausea, black stools. Pt A&Ox4. -thinners                Nasra Nazario is a 40 y.o. female presenting to the ED for abdominal pain. Patient's had a left-sided abdominal pain since Monday. Came on gradually, nothing makes it better or worse, aching sensation that does not rate anywhere. She says she is status post colonoscopy endoscopy, colonoscopy was done on Monday, endoscopy was done on Friday. She had biopsies were taken. She is she had black stools couple days ago, but this has since cleared. She is on no anticoagulation. She denies any fever, chills, nausea, vomiting, neck pain or stiffness, urinary symptoms, lethargy, paresthesias, back pain, or any other symptoms or complaints. Review of Systems:   Pertinent positives and negatives are stated within HPI, all other systems reviewed and are negative.        --------------------------------------------- PAST HISTORY ---------------------------------------------  Past Medical History:  has a past medical history of Asthma, Bronchitis, Movement disorder, Seizure disorder ([facility] Seizure disorder ([facility] [address] Suicidal overdose ([facility] [address] Tobacco abuse, and Tobacco abuse. Past Surgical History:  has a past surgical history that includes fracture surgery; Cholecystectomy; Rotator cuff repair; Upper gastrointestinal endoscopy (N/A, 5/3/2021); Upper gastrointestinal endoscopy (N/A, 10/29/2022); and Colonoscopy (N/A, 10/31/2022). Social History:  reports that she has been smoking cigarettes. She has a 0.30 pack-year smoking history. She has never used smokeless tobacco. She reports current alcohol use.  She reports current drug use. Drug: Cocaine. Family History: family history includes Cancer in her mother. . Unless otherwise noted, family history is non contributory    The patients home medications have been reviewed. Allergies: Ketorolac, Darvocet [propoxyphene n-acetaminophen], Ibuprofen, Meloxicam, Naproxen, and Tramadol        ---------------------------------------------------PHYSICAL EXAM--------------------------------------    Constitutional/General: Alert and oriented x3  Head: Normocephalic and atraumatic  Eyes: PERRL, EOMI, sclera non icteric  Mouth: Oropharynx clear, handling secretions, no trismus, no asymmetry of the posterior oropharynx or uvular edema  Neck: Supple, full ROM, no stridor, no meningeal signs  Respiratory: Lungs clear to auscultation bilaterally, no wheezes, rales, or rhonchi. Not in respiratory distress  Cardiovascular:  Regular rate. Regular rhythm. 2+ distal pulses. Equal extremity pulses. Chest: No chest wall tenderness  GI:  Abdomen Soft, mild left-sided tenderness, no guarding or rebound, nondistended  Musculoskeletal: Moves all extremities x 4. Warm and well perfused, no clubbing, cyanosis, or edema. Capillary refill <3 seconds  Integument: skin warm and dry. No rashes. Neurologic: GCS 15, no focal deficits, symmetric strength 5/5 in the upper and lower extremities bilaterally  Psychiatric: Normal Affect          -------------------------------------------------- RESULTS -------------------------------------------------  I have personally reviewed all laboratory and imaging results for this patient. Results are listed below.      LABS: (Lab results interpreted by me)  Results for orders placed or performed during the hospital encounter of 11/04/22   CBC with Auto Differential   Result Value Ref Range    WBC 7.9 4.5 - 11.5 E9/L    RBC 4.75 3.50 - 5.50 E12/L    Hemoglobin 12.2 11.5 - 15.5 g/dL    Hematocrit 38.2 34.0 - 48.0 %    MCV 80.4 80.0 - 99.9 fL    MCH 25.7 (L) 26.0 - 35.0 pg    MCHC 31.9 (L) 32.0 - 34.5 %    RDW 16.1 (H) 11.5 - 15.0 fL    Platelets 548 185 - 166 E9/L    MPV 10.0 7.0 - 12.0 fL    Neutrophils % 67.9 43.0 - 80.0 %    Immature Granulocytes % 0.4 0.0 - 5.0 %    Lymphocytes % 19.2 (L) 20.0 - 42.0 %    Monocytes % 10.3 2.0 - 12.0 %    Eosinophils % 1.9 0.0 - 6.0 %    Basophils % 0.3 0.0 - 2.0 %    Neutrophils Absolute 5.33 1.80 - 7.30 E9/L    Immature Granulocytes # 0.03 E9/L    Lymphocytes Absolute 1.51 1.50 - 4.00 E9/L    Monocytes Absolute 0.81 0.10 - 0.95 E9/L    Eosinophils Absolute 0.15 0.05 - 0.50 E9/L    Basophils Absolute 0.02 0.00 - 0.20 E9/L   Comprehensive Metabolic Panel   Result Value Ref Range    Sodium 143 132 - 146 mmol/L    Potassium 4.4 3.5 - 5.0 mmol/L    Chloride 109 (H) 98 - 107 mmol/L    CO2 21 (L) 22 - 29 mmol/L    Anion Gap 13 7 - 16 mmol/L    Glucose 102 (H) 74 - 99 mg/dL    BUN 16 6 - 20 mg/dL    Creatinine 0.9 0.5 - 1.0 mg/dL    Est, Glom Filt Rate >60 >=60 mL/min/1.73    Calcium 8.9 8.6 - 10.2 mg/dL    Total Protein 7.2 6.4 - 8.3 g/dL    Albumin 3.9 3.5 - 5.2 g/dL    Total Bilirubin <0.2 0.0 - 1.2 mg/dL    Alkaline Phosphatase 125 (H) 35 - 104 U/L    ALT 16 0 - 32 U/L    AST 20 0 - 31 U/L   Lipase   Result Value Ref Range    Lipase 51 13 - 60 U/L   Lactic Acid   Result Value Ref Range    Lactic Acid 1.9 0.5 - 2.2 mmol/L   Urinalysis   Result Value Ref Range    Color, UA Yellow Straw/Yellow    Clarity, UA Clear Clear    Glucose, Ur Negative Negative mg/dL    Bilirubin Urine Negative Negative    Ketones, Urine Negative Negative mg/dL    Specific Gravity, UA 1.025 1.005 - 1.030    Blood, Urine LARGE (A) Negative    pH, UA 6.0 5.0 - 9.0    Protein, UA Negative Negative mg/dL    Urobilinogen, Urine 0.2 <2.0 E.U./dL    Nitrite, Urine Negative Negative    Leukocyte Esterase, Urine Negative Negative   Microscopic Urinalysis   Result Value Ref Range    WBC, UA 1-3 0 - 5 /HPF    RBC, UA 1-3 0 - 2 /HPF    Epithelial Cells, UA FEW /HPF    Bacteria, UA RARE (A) None Seen /HPF   POC Pregnancy Urine Qual   Result Value Ref Range    HCG, Urine, POC Negative Negative    Lot Number [id number]     Positive QC Pass/Fail Pass     Negative QC Pass/Fail Pass    ,       RADIOLOGY:  Interpreted by Radiologist unless otherwise specified  CT ABDOMEN PELVIS W IV CONTRAST Additional Contrast? None   Final Result   1. No acute disease. 2. Normal appendix right lower quadrant. 3. No evidence for an inflammatory bowel process. RECOMMENDATIONS:   Careful clinical correlation and follow up recommended. EKG Interpretation  Interpreted by emergency department physician, Dr. Lionel Lara         ------------------------- NURSING NOTES AND VITALS REVIEWED ---------------------------   The nursing notes within the ED encounter and vital signs as below have been reviewed by myself  BP (!) 187/94   Pulse 90   Temp 98.6 °F (37 °C) (Oral)   Resp 15   Ht 4' 11\" (1.499 m)   Wt 140 lb (63.5 kg)   LMP 10/31/2022 (Exact Date)   SpO2 99%   BMI 28.28 kg/m²     Oxygen Saturation Interpretation: Normal    The patients available past medical records and past encounters were reviewed. ------------------------------ ED COURSE/MEDICAL DECISION MAKING----------------------  Medications   0.9 % sodium chloride infusion ( IntraVENous Stopped 11/4/22 2357)   0.9 % sodium chloride bolus (0 mLs IntraVENous Stopped 11/4/22 2341)   pantoprazole (PROTONIX) injection 40 mg (40 mg IntraVENous Given 11/4/22 2109)   fentaNYL (SUBLIMAZE) injection 50 mcg (50 mcg IntraVENous Given 11/4/22 2109)   iopamidol (ISOVUE-370) 76 % injection 75 mL (75 mLs IntraVENous Given 11/4/22 2208)           The cardiac monitor revealed sinus with a heart rate in the 80s as interpreted by me. The cardiac monitor was ordered secondary to the patient's ab pain and to monitor the patient for dysrhythmia.    CPT 00600         Medical Decision Making:    Patient did not have an acute abdomen on arrival.  Labs and imaging reviewed. On reevaluation, patient's resting comfortably. Tolerating p.o. Once again, nonacute abdomen. She feels improved. Hemoglobin stable, lactic acid negative, patient overall well-appearing. No signs of free air on the CT per radiology. Given this, along with his findings, did not feel that further emergent evaluation was indicated. Patient was discharged. Patient is to follow-up with the PCP and her surgeon in 1 to 2 days. Patient is to have a repeat abdominal examination on the emergent basis if new or worsening symptoms arise. Counseling: The emergency provider has spoken with the patient and discussed todays results, in addition to providing specific details for the plan of care and counseling regarding the diagnosis and prognosis. Questions are answered at this time and they are agreeable with the plan.       --------------------------------- IMPRESSION AND DISPOSITION ---------------------------------    IMPRESSION  1. Abdominal pain, unspecified abdominal location        DISPOSITION  Disposition: Discharge to home  Patient condition is stable        NOTE: This report was transcribed using voice recognition software.  Every effort was made to ensure accuracy; however, inadvertent computerized transcription errors may be present       Sherry Castillo MD  11/05/22 6726

## 2022-11-07 LAB
ORGANISM: ABNORMAL
URINE CULTURE, ROUTINE: ABNORMAL

## 2022-11-08 NOTE — ED NOTES
Reviewed patients after hours culture results. Urine culture growing Proteus mirabilis >100k, patient discharged without antibiotic therapy. Discussed with Dr. Kelsey Copeland, will call patient and prescribe cephalexin if symptomatic. I called the patient on 11/8, she reports no urinary symptoms. No treatment needed at this time.       Thiago Duque, PharmD, [facility] 11/8/2022 6:56 PM  Clinical Pharmacy Specialist, Emergency Medicine  [phone]

## 2022-11-19 ENCOUNTER — HOSPITAL ENCOUNTER (INPATIENT)
Age: 42
LOS: 4 days | Discharge: HOME | DRG: 385 | End: 2022-11-24
Attending: EMERGENCY MEDICINE | Admitting: FAMILY MEDICINE
Payer: MEDICAID

## 2022-11-19 DIAGNOSIS — D72.829 LEUKOCYTOSIS, UNSPECIFIED TYPE: ICD-10-CM

## 2022-11-19 DIAGNOSIS — R91.8 LUNG INFILTRATE ON CT: ICD-10-CM

## 2022-11-19 DIAGNOSIS — N61.1 BREAST ABSCESS: Primary | ICD-10-CM

## 2022-11-19 LAB
ANION GAP SERPL CALCULATED.3IONS-SCNC: 15 MMOL/L (ref 7–16)
ANISOCYTOSIS: ABNORMAL
BASOPHILS ABSOLUTE: 0 E9/L (ref 0–0.2)
BASOPHILS RELATIVE PERCENT: 0.3 % (ref 0–2)
BUN BLDV-MCNC: 8 MG/DL (ref 6–20)
CALCIUM SERPL-MCNC: 9.2 MG/DL (ref 8.6–10.2)
CHLORIDE BLD-SCNC: 101 MMOL/L (ref 98–107)
CO2: 21 MMOL/L (ref 22–29)
CREAT SERPL-MCNC: 0.6 MG/DL (ref 0.5–1)
EOSINOPHILS ABSOLUTE: 0 E9/L (ref 0.05–0.5)
EOSINOPHILS RELATIVE PERCENT: 0.1 % (ref 0–6)
GFR SERPL CREATININE-BSD FRML MDRD: >60 ML/MIN/1.73
GLUCOSE BLD-MCNC: 87 MG/DL (ref 74–99)
HCG, URINE, POC: NEGATIVE
HCT VFR BLD CALC: 44.1 % (ref 34–48)
HEMOGLOBIN: 14.1 G/DL (ref 11.5–15.5)
LACTIC ACID: 3.5 MMOL/L (ref 0.5–2.2)
LYMPHOCYTES ABSOLUTE: 2.7 E9/L (ref 1.5–4)
LYMPHOCYTES RELATIVE PERCENT: 10.3 % (ref 20–42)
Lab: NORMAL
MCH RBC QN AUTO: 25.1 PG (ref 26–35)
MCHC RBC AUTO-ENTMCNC: 32 % (ref 32–34.5)
MCV RBC AUTO: 78.6 FL (ref 80–99.9)
MONOCYTES ABSOLUTE: 1.08 E9/L (ref 0.1–0.95)
MONOCYTES RELATIVE PERCENT: 3.5 % (ref 2–12)
NEGATIVE QC PASS/FAIL: NORMAL
NEUTROPHILS ABSOLUTE: 23.22 E9/L (ref 1.8–7.3)
NEUTROPHILS RELATIVE PERCENT: 86.2 % (ref 43–80)
OVALOCYTES: ABNORMAL
PDW BLD-RTO: 15.9 FL (ref 11.5–15)
PLATELET # BLD: 265 E9/L (ref 130–450)
PMV BLD AUTO: 9 FL (ref 7–12)
POIKILOCYTES: ABNORMAL
POLYCHROMASIA: ABNORMAL
POSITIVE QC PASS/FAIL: NORMAL
POTASSIUM SERPL-SCNC: 4.2 MMOL/L (ref 3.5–5)
RBC # BLD: 5.61 E12/L (ref 3.5–5.5)
SODIUM BLD-SCNC: 137 MMOL/L (ref 132–146)
TEAR DROP CELLS: ABNORMAL
VACUOLATED NEUTROPHILS: ABNORMAL
WBC # BLD: 27 E9/L (ref 4.5–11.5)

## 2022-11-19 PROCEDURE — 84145 PROCALCITONIN (PCT): CPT

## 2022-11-19 PROCEDURE — 99285 EMERGENCY DEPT VISIT HI MDM: CPT

## 2022-11-19 PROCEDURE — 36415 COLL VENOUS BLD VENIPUNCTURE: CPT

## 2022-11-19 PROCEDURE — 80048 BASIC METABOLIC PNL TOTAL CA: CPT

## 2022-11-19 PROCEDURE — 85025 COMPLETE CBC W/AUTO DIFF WBC: CPT

## 2022-11-19 PROCEDURE — 83605 ASSAY OF LACTIC ACID: CPT

## 2022-11-19 ASSESSMENT — ENCOUNTER SYMPTOMS
FACIAL SWELLING: 0
SINUS PRESSURE: 0
CHEST TIGHTNESS: 0
TROUBLE SWALLOWING: 0
CONSTIPATION: 0
SHORTNESS OF BREATH: 0
DIARRHEA: 0
ABDOMINAL PAIN: 0
SORE THROAT: 0
COUGH: 0
VOMITING: 0
BACK PAIN: 0
SINUS PAIN: 0
NAUSEA: 0
COLOR CHANGE: 0

## 2022-11-20 ENCOUNTER — APPOINTMENT (OUTPATIENT)
Dept: ULTRASOUND IMAGING | Age: 42
DRG: 385 | End: 2022-11-20
Payer: MEDICAID

## 2022-11-20 ENCOUNTER — APPOINTMENT (OUTPATIENT)
Dept: CT IMAGING | Age: 42
DRG: 385 | End: 2022-11-20
Payer: MEDICAID

## 2022-11-20 PROBLEM — A41.9 SEPSIS (HCC): Status: ACTIVE | Noted: 2022-11-20

## 2022-11-20 LAB
ALBUMIN SERPL-MCNC: 3.5 G/DL (ref 3.5–5.2)
ALP BLD-CCNC: 89 U/L (ref 35–104)
ALT SERPL-CCNC: 14 U/L (ref 0–32)
ANION GAP SERPL CALCULATED.3IONS-SCNC: 14 MMOL/L (ref 7–16)
AST SERPL-CCNC: 16 U/L (ref 0–31)
BACTERIA: ABNORMAL /HPF
BASOPHILS ABSOLUTE: 0.03 E9/L (ref 0–0.2)
BASOPHILS RELATIVE PERCENT: 0.2 % (ref 0–2)
BILIRUB SERPL-MCNC: 0.5 MG/DL (ref 0–1.2)
BILIRUBIN URINE: NEGATIVE
BLOOD, URINE: NEGATIVE
BUN BLDV-MCNC: 11 MG/DL (ref 6–20)
CALCIUM SERPL-MCNC: 8.9 MG/DL (ref 8.6–10.2)
CHLORIDE BLD-SCNC: 102 MMOL/L (ref 98–107)
CLARITY: NORMAL
CO2: 20 MMOL/L (ref 22–29)
COLOR: YELLOW
CREAT SERPL-MCNC: 0.8 MG/DL (ref 0.5–1)
EOSINOPHILS ABSOLUTE: 0.04 E9/L (ref 0.05–0.5)
EOSINOPHILS RELATIVE PERCENT: 0.2 % (ref 0–6)
EPITHELIAL CELLS, UA: ABNORMAL /HPF
GFR SERPL CREATININE-BSD FRML MDRD: >60 ML/MIN/1.73
GLUCOSE BLD-MCNC: 135 MG/DL (ref 74–99)
GLUCOSE URINE: NEGATIVE MG/DL
HCT VFR BLD CALC: 36.8 % (ref 34–48)
HEMOGLOBIN: 12 G/DL (ref 11.5–15.5)
IMMATURE GRANULOCYTES #: 0.11 E9/L
IMMATURE GRANULOCYTES %: 0.6 % (ref 0–5)
INFLUENZA A BY PCR: NOT DETECTED
INFLUENZA B BY PCR: NOT DETECTED
KETONES, URINE: NEGATIVE MG/DL
LACTIC ACID: 1.8 MMOL/L (ref 0.5–2.2)
LACTIC ACID: 2.7 MMOL/L (ref 0.5–2.2)
LEUKOCYTE ESTERASE, URINE: NEGATIVE
LYMPHOCYTES ABSOLUTE: 2.18 E9/L (ref 1.5–4)
LYMPHOCYTES RELATIVE PERCENT: 11.5 % (ref 20–42)
MAGNESIUM: 2 MG/DL (ref 1.6–2.6)
MCH RBC QN AUTO: 25.3 PG (ref 26–35)
MCHC RBC AUTO-ENTMCNC: 32.6 % (ref 32–34.5)
MCV RBC AUTO: 77.6 FL (ref 80–99.9)
MONOCYTES ABSOLUTE: 1.09 E9/L (ref 0.1–0.95)
MONOCYTES RELATIVE PERCENT: 5.8 % (ref 2–12)
NEUTROPHILS ABSOLUTE: 15.43 E9/L (ref 1.8–7.3)
NEUTROPHILS RELATIVE PERCENT: 81.7 % (ref 43–80)
NITRITE, URINE: NEGATIVE
PDW BLD-RTO: 15.8 FL (ref 11.5–15)
PH UA: 6 (ref 5–9)
PLATELET # BLD: 213 E9/L (ref 130–450)
PMV BLD AUTO: 9.6 FL (ref 7–12)
POTASSIUM REFLEX MAGNESIUM: 3.5 MMOL/L (ref 3.5–5)
PROCALCITONIN: 0.03 NG/ML (ref 0–0.08)
PROTEIN UA: NEGATIVE MG/DL
RBC # BLD: 4.74 E12/L (ref 3.5–5.5)
RBC UA: ABNORMAL /HPF (ref 0–2)
SARS-COV-2, NAAT: NOT DETECTED
SODIUM BLD-SCNC: 136 MMOL/L (ref 132–146)
SPECIFIC GRAVITY UA: 1.02 (ref 1–1.03)
TOTAL PROTEIN: 7.1 G/DL (ref 6.4–8.3)
UROBILINOGEN, URINE: 0.2 E.U./DL
WBC # BLD: 18.9 E9/L (ref 4.5–11.5)
WBC UA: ABNORMAL /HPF (ref 0–5)

## 2022-11-20 PROCEDURE — 1200000000 HC SEMI PRIVATE

## 2022-11-20 PROCEDURE — 81001 URINALYSIS AUTO W/SCOPE: CPT

## 2022-11-20 PROCEDURE — 6370000000 HC RX 637 (ALT 250 FOR IP): Performed by: FAMILY MEDICINE

## 2022-11-20 PROCEDURE — 6360000002 HC RX W HCPCS: Performed by: PHYSICIAN ASSISTANT

## 2022-11-20 PROCEDURE — 6370000000 HC RX 637 (ALT 250 FOR IP): Performed by: EMERGENCY MEDICINE

## 2022-11-20 PROCEDURE — 83605 ASSAY OF LACTIC ACID: CPT

## 2022-11-20 PROCEDURE — 6360000002 HC RX W HCPCS: Performed by: FAMILY MEDICINE

## 2022-11-20 PROCEDURE — 2580000003 HC RX 258: Performed by: PHYSICIAN ASSISTANT

## 2022-11-20 PROCEDURE — 6360000002 HC RX W HCPCS: Performed by: EMERGENCY MEDICINE

## 2022-11-20 PROCEDURE — 2580000003 HC RX 258: Performed by: FAMILY MEDICINE

## 2022-11-20 PROCEDURE — 76642 ULTRASOUND BREAST LIMITED: CPT

## 2022-11-20 PROCEDURE — 96374 THER/PROPH/DIAG INJ IV PUSH: CPT

## 2022-11-20 PROCEDURE — 80053 COMPREHEN METABOLIC PANEL: CPT

## 2022-11-20 PROCEDURE — 87040 BLOOD CULTURE FOR BACTERIA: CPT

## 2022-11-20 PROCEDURE — 6370000000 HC RX 637 (ALT 250 FOR IP): Performed by: STUDENT IN AN ORGANIZED HEALTH CARE EDUCATION/TRAINING PROGRAM

## 2022-11-20 PROCEDURE — 87502 INFLUENZA DNA AMP PROBE: CPT

## 2022-11-20 PROCEDURE — 6360000004 HC RX CONTRAST MEDICATION: Performed by: RADIOLOGY

## 2022-11-20 PROCEDURE — 36415 COLL VENOUS BLD VENIPUNCTURE: CPT

## 2022-11-20 PROCEDURE — 71260 CT THORAX DX C+: CPT

## 2022-11-20 PROCEDURE — 83735 ASSAY OF MAGNESIUM: CPT

## 2022-11-20 PROCEDURE — 85025 COMPLETE CBC W/AUTO DIFF WBC: CPT

## 2022-11-20 PROCEDURE — 6360000002 HC RX W HCPCS

## 2022-11-20 PROCEDURE — 87635 SARS-COV-2 COVID-19 AMP PRB: CPT

## 2022-11-20 PROCEDURE — 2580000003 HC RX 258

## 2022-11-20 RX ORDER — POLYETHYLENE GLYCOL 3350 17 G/17G
17 POWDER, FOR SOLUTION ORAL DAILY PRN
Status: DISCONTINUED | OUTPATIENT
Start: 2022-11-20 | End: 2022-11-24 | Stop reason: HOSPADM

## 2022-11-20 RX ORDER — IBUPROFEN 400 MG/1
400 TABLET ORAL ONCE
Status: COMPLETED | OUTPATIENT
Start: 2022-11-20 | End: 2022-11-20

## 2022-11-20 RX ORDER — GABAPENTIN 100 MG/1
100 CAPSULE ORAL 3 TIMES DAILY
Status: DISCONTINUED | OUTPATIENT
Start: 2022-11-20 | End: 2022-11-24 | Stop reason: HOSPADM

## 2022-11-20 RX ORDER — AMLODIPINE BESYLATE 5 MG/1
5 TABLET ORAL EVERY MORNING
Status: DISCONTINUED | OUTPATIENT
Start: 2022-11-20 | End: 2022-11-22

## 2022-11-20 RX ORDER — 0.9 % SODIUM CHLORIDE 0.9 %
1000 INTRAVENOUS SOLUTION INTRAVENOUS ONCE
Status: COMPLETED | OUTPATIENT
Start: 2022-11-20 | End: 2022-11-20

## 2022-11-20 RX ORDER — SODIUM CHLORIDE 0.9 % (FLUSH) 0.9 %
10 SYRINGE (ML) INJECTION PRN
Status: DISCONTINUED | OUTPATIENT
Start: 2022-11-20 | End: 2022-11-24 | Stop reason: HOSPADM

## 2022-11-20 RX ORDER — PROMETHAZINE HYDROCHLORIDE 12.5 MG/1
12.5 TABLET ORAL EVERY 6 HOURS PRN
Status: DISCONTINUED | OUTPATIENT
Start: 2022-11-20 | End: 2022-11-24 | Stop reason: HOSPADM

## 2022-11-20 RX ORDER — FENTANYL CITRATE 50 UG/ML
25 INJECTION, SOLUTION INTRAMUSCULAR; INTRAVENOUS ONCE
Status: COMPLETED | OUTPATIENT
Start: 2022-11-20 | End: 2022-11-20

## 2022-11-20 RX ORDER — PANTOPRAZOLE SODIUM 40 MG/1
40 TABLET, DELAYED RELEASE ORAL
Status: DISCONTINUED | OUTPATIENT
Start: 2022-11-20 | End: 2022-11-24 | Stop reason: HOSPADM

## 2022-11-20 RX ORDER — CETIRIZINE HYDROCHLORIDE 10 MG/1
10 TABLET ORAL DAILY
Status: DISCONTINUED | OUTPATIENT
Start: 2022-11-20 | End: 2022-11-24 | Stop reason: HOSPADM

## 2022-11-20 RX ORDER — SODIUM CHLORIDE 9 MG/ML
INJECTION, SOLUTION INTRAVENOUS PRN
Status: DISCONTINUED | OUTPATIENT
Start: 2022-11-20 | End: 2022-11-24 | Stop reason: HOSPADM

## 2022-11-20 RX ORDER — CARBAMAZEPINE 200 MG/1
200 TABLET ORAL 2 TIMES DAILY WITH MEALS
Status: DISCONTINUED | OUTPATIENT
Start: 2022-11-20 | End: 2022-11-24 | Stop reason: HOSPADM

## 2022-11-20 RX ORDER — ENOXAPARIN SODIUM 100 MG/ML
40 INJECTION SUBCUTANEOUS DAILY
Status: DISCONTINUED | OUTPATIENT
Start: 2022-11-20 | End: 2022-11-24 | Stop reason: HOSPADM

## 2022-11-20 RX ORDER — CLINDAMYCIN PHOSPHATE 600 MG/50ML
600 INJECTION INTRAVENOUS ONCE
Status: DISCONTINUED | OUTPATIENT
Start: 2022-11-20 | End: 2022-11-20

## 2022-11-20 RX ORDER — DIPHENHYDRAMINE HCL 25 MG
25 TABLET ORAL EVERY 6 HOURS
Status: DISCONTINUED | OUTPATIENT
Start: 2022-11-20 | End: 2022-11-24 | Stop reason: HOSPADM

## 2022-11-20 RX ORDER — SODIUM CHLORIDE 0.9 % (FLUSH) 0.9 %
10 SYRINGE (ML) INJECTION EVERY 12 HOURS SCHEDULED
Status: DISCONTINUED | OUTPATIENT
Start: 2022-11-20 | End: 2022-11-24 | Stop reason: HOSPADM

## 2022-11-20 RX ORDER — SODIUM CHLORIDE 9 MG/ML
INJECTION, SOLUTION INTRAVENOUS CONTINUOUS
Status: DISCONTINUED | OUTPATIENT
Start: 2022-11-20 | End: 2022-11-24 | Stop reason: HOSPADM

## 2022-11-20 RX ORDER — ONDANSETRON 2 MG/ML
4 INJECTION INTRAMUSCULAR; INTRAVENOUS EVERY 6 HOURS PRN
Status: DISCONTINUED | OUTPATIENT
Start: 2022-11-20 | End: 2022-11-24 | Stop reason: HOSPADM

## 2022-11-20 RX ORDER — ACETAMINOPHEN 325 MG/1
650 TABLET ORAL ONCE
Status: DISCONTINUED | OUTPATIENT
Start: 2022-11-20 | End: 2022-11-20

## 2022-11-20 RX ORDER — MORPHINE SULFATE 2 MG/ML
2 INJECTION, SOLUTION INTRAMUSCULAR; INTRAVENOUS EVERY 4 HOURS PRN
Status: DISCONTINUED | OUTPATIENT
Start: 2022-11-20 | End: 2022-11-20

## 2022-11-20 RX ORDER — OXYCODONE HYDROCHLORIDE 5 MG/1
5 TABLET ORAL EVERY 4 HOURS PRN
Status: DISCONTINUED | OUTPATIENT
Start: 2022-11-20 | End: 2022-11-21

## 2022-11-20 RX ORDER — SUCRALFATE 1 G/1
1 TABLET ORAL 4 TIMES DAILY
Status: DISCONTINUED | OUTPATIENT
Start: 2022-11-20 | End: 2022-11-24 | Stop reason: HOSPADM

## 2022-11-20 RX ADMIN — AMLODIPINE BESYLATE 5 MG: 5 TABLET ORAL at 11:25

## 2022-11-20 RX ADMIN — MORPHINE SULFATE 2 MG: 2 INJECTION, SOLUTION INTRAMUSCULAR; INTRAVENOUS at 04:07

## 2022-11-20 RX ADMIN — OXYCODONE 5 MG: 5 TABLET ORAL at 23:23

## 2022-11-20 RX ADMIN — ENOXAPARIN SODIUM 40 MG: 100 INJECTION SUBCUTANEOUS at 11:25

## 2022-11-20 RX ADMIN — PIPERACILLIN AND TAZOBACTAM 3375 MG: 3; .375 INJECTION, POWDER, FOR SOLUTION INTRAVENOUS at 04:50

## 2022-11-20 RX ADMIN — SODIUM CHLORIDE, PRESERVATIVE FREE 10 ML: 5 INJECTION INTRAVENOUS at 11:25

## 2022-11-20 RX ADMIN — DIPHENHYDRAMINE HYDROCHLORIDE 25 MG: 25 TABLET ORAL at 13:53

## 2022-11-20 RX ADMIN — SUCRALFATE 1 G: 1 TABLET ORAL at 18:17

## 2022-11-20 RX ADMIN — CETIRIZINE HYDROCHLORIDE 10 MG: 10 TABLET, FILM COATED ORAL at 11:25

## 2022-11-20 RX ADMIN — GABAPENTIN 100 MG: 100 CAPSULE ORAL at 18:17

## 2022-11-20 RX ADMIN — DIPHENHYDRAMINE HYDROCHLORIDE 25 MG: 25 TABLET ORAL at 22:31

## 2022-11-20 RX ADMIN — VANCOMYCIN HYDROCHLORIDE 1000 MG: 1 INJECTION, POWDER, LYOPHILIZED, FOR SOLUTION INTRAVENOUS at 22:32

## 2022-11-20 RX ADMIN — GABAPENTIN 100 MG: 100 CAPSULE ORAL at 22:32

## 2022-11-20 RX ADMIN — PANTOPRAZOLE SODIUM 40 MG: 40 TABLET, DELAYED RELEASE ORAL at 11:25

## 2022-11-20 RX ADMIN — VANCOMYCIN HYDROCHLORIDE 1500 MG: 10 INJECTION, POWDER, LYOPHILIZED, FOR SOLUTION INTRAVENOUS at 14:04

## 2022-11-20 RX ADMIN — IBUPROFEN 400 MG: 400 TABLET, FILM COATED ORAL at 04:07

## 2022-11-20 RX ADMIN — FENTANYL CITRATE 25 MCG: 50 INJECTION, SOLUTION INTRAMUSCULAR; INTRAVENOUS at 01:28

## 2022-11-20 RX ADMIN — PIPERACILLIN AND TAZOBACTAM 3375 MG: 3; .375 INJECTION, POWDER, FOR SOLUTION INTRAVENOUS at 11:31

## 2022-11-20 RX ADMIN — SUCRALFATE 1 G: 1 TABLET ORAL at 11:24

## 2022-11-20 RX ADMIN — MORPHINE SULFATE 2 MG: 2 INJECTION, SOLUTION INTRAMUSCULAR; INTRAVENOUS at 11:24

## 2022-11-20 RX ADMIN — SODIUM CHLORIDE 1000 ML: 9 INJECTION, SOLUTION INTRAVENOUS at 01:31

## 2022-11-20 RX ADMIN — SODIUM CHLORIDE: 9 INJECTION, SOLUTION INTRAVENOUS at 06:37

## 2022-11-20 RX ADMIN — IOPAMIDOL 90 ML: 755 INJECTION, SOLUTION INTRAVENOUS at 01:08

## 2022-11-20 RX ADMIN — GABAPENTIN 100 MG: 100 CAPSULE ORAL at 11:25

## 2022-11-20 RX ADMIN — PIPERACILLIN AND TAZOBACTAM 3375 MG: 3; .375 INJECTION, POWDER, FOR SOLUTION INTRAVENOUS at 23:27

## 2022-11-20 RX ADMIN — CARBAMAZEPINE 200 MG: 200 TABLET ORAL at 18:17

## 2022-11-20 RX ADMIN — SUCRALFATE 1 G: 1 TABLET ORAL at 22:32

## 2022-11-20 RX ADMIN — OXYCODONE 5 MG: 5 TABLET ORAL at 18:21

## 2022-11-20 RX ADMIN — DIPHENHYDRAMINE HYDROCHLORIDE 25 MG: 25 TABLET ORAL at 06:31

## 2022-11-20 ASSESSMENT — PAIN SCALES - GENERAL
PAINLEVEL_OUTOF10: 10
PAINLEVEL_OUTOF10: 7
PAINLEVEL_OUTOF10: 10
PAINLEVEL_OUTOF10: 10

## 2022-11-20 ASSESSMENT — PAIN DESCRIPTION - ORIENTATION
ORIENTATION: RIGHT
ORIENTATION: RIGHT

## 2022-11-20 ASSESSMENT — PAIN DESCRIPTION - LOCATION
LOCATION: BREAST

## 2022-11-20 ASSESSMENT — PAIN DESCRIPTION - PAIN TYPE: TYPE: ACUTE PAIN

## 2022-11-20 ASSESSMENT — PAIN DESCRIPTION - DESCRIPTORS
DESCRIPTORS: ACHING;DISCOMFORT
DESCRIPTORS: SHARP;THROBBING

## 2022-11-20 NOTE — PROGRESS NOTES
Pharmacy Consultation Note  (Antibiotic Dosing and Monitoring)    Initial consult date: 11/20/22  Consulting physician/provider: Percy Quintana  Drug: Vancomycin  Indication: Sepsis of Unknown Etiology; 7 days     Age/  Gender Height Weight IBW  Allergy Information   42 y.o./female 4' 11\" (149.9 cm) 150 lb (68 kg)     Ideal body weight: 48.8 kg (107 lb 8.4 oz)  Adjusted ideal body weight: 56.5 kg (124 lb 8.2 oz)   Ketorolac, Darvocet [propoxyphene n-acetaminophen], Ibuprofen, Meloxicam, Naproxen, and Tramadol      Renal Function:  Recent Labs     11/19/22  2234 11/20/22  0639   BUN 8 11   CREATININE 0.6 0.8     No intake or output data in the 24 hours ending 11/20/22 0752    Vancomycin Monitoring:  Trough:  No results for input(s): VANCOTROUGH in the last 72 hours. Random:  No results for input(s): VANCORANDOM in the last 72 hours. No results for input(s): Letta Banco in the last 72 hours. Historical Cultures:  Organism   Date Value Ref Range Status   11/04/2022 Proteus mirabilis (A)  Final     No results for input(s): BC in the last 72 hours. Vancomycin Administration Times:  Recent vancomycin administrations                     vancomycin 1500 mg in dextrose 5% 300 mL IVPB (mg) 1,500 mg New Bag 11/20/22 1404             Assessment:  Patient is a 40 y.o. female who has been initiated on vancomycin  Estimated Creatinine Clearance: 83 mL/min (based on SCr of 0.8 mg/dL).   To dose vancomycin, pharmacy will be utilizing [employer] calculation software for goal AUC/LISA 400-600 mg/L-hr  11/20: day #1 vanco; SCr: 0.8    Plan:  Initiate continue vancomycin 1000 mg IV every 12 hours at 2200  Will check vancomycin levels when appropriate  Will continue to monitor renal function   Pharmacy to follow    Greg Ganser, PharmD  PGY1 Pharmacy Resident  11/20/2022 7:54 AM

## 2022-11-20 NOTE — CONSULTS
[facility] CONSULT NOTE    Reason for Consult: Sepsis   Requested by: Esha Hobson DO    Chief complaint: Right breast pain    History Obtained From: Patient and EMR     HISTORY [place]              The patient is a 40 y.o. female with history of seizure disorder, presented on 11/19 with right breast pain accompanied by bloody and purulent nipple discharge for 4 days. On admission, she had T-max of 100.2 °F with leukocytosis of 27,000. CT chest showed small 1.1 cm fluid collection involving areola/nipple of the right breast, patchy groundglass opacification throughout both lungs. Right breast US showed cellulitis in the right breast retroareolar region with no drainable fluid collection identified. Piperacillin-tazobactam and vancomycin were started on admission. ID service was subsequently consulted for further recommendations.     Past Medical History  Past Medical History:   Diagnosis Date    Asthma     Bronchitis     Movement disorder     Seizure disorder ([facility] [address]     Seizure disorder ([facility] [address]     [facility]    Tobacco abuse     Tobacco abuse        Current Facility-Administered Medications   Medication Dose Route Frequency Provider Last Rate Last Admin    piperacillin-tazobactam (ZOSYN) 3,375 mg in sodium chloride 0.9 % 50 mL IVPB ([id number])  3,375 mg IntraVENous Q8H Elane Loosen, DO   Stopped at 11/20/22 1051    amLODIPine (NORVASC) tablet 5 mg  5 mg Oral QAM Elane Loosen, DO        carBAMazepine (TEGRETOL) tablet 200 mg  200 mg Oral BID WC Elane Loosen, DO        diphenhydrAMINE (BENADRYL) tablet 25 mg  25 mg Oral Q6H Elane Loosen, DO   25 mg at 11/20/22 0631    gabapentin (NEURONTIN) capsule 100 mg  100 mg Oral TID Elane Loosen, DO        cetirizine (ZYRTEC) tablet 10 mg  10 mg Oral Daily Elane Loosen, DO        pantoprazole (PROTONIX) tablet 40 mg  40 mg Oral Daily with breakfast Elane Loosen, DO        sucralfate (CARAFATE) tablet 1 g  1 g Oral 4x Daily Elane Loosen, DO sodium chloride flush 0.9 % injection 10 mL  10 mL IntraVENous 2 times per day Lestine Egan, DO        sodium chloride flush 0.9 % injection 10 mL  10 mL IntraVENous PRN Lestine Egan, DO        0.9 % sodium chloride infusion   IntraVENous PRN Lestine Egan, DO        enoxaparin (LOVENOX) injection 40 mg  40 mg SubCUTAneous Daily Lestine Egan, DO        promethazine (PHENERGAN) tablet 12.5 mg  12.5 mg Oral Q6H PRN Lestine Marilee, DO        Or    ondansetron [facility]) injection 4 mg  4 mg IntraVENous Q6H PRN Lestine Egan, DO        polyethylene glycol (GLYCOLAX) packet 17 g  17 g Oral Daily PRN Lestine Marilee, DO        0.9 % sodium chloride infusion   IntraVENous Continuous Lestine Marilee, DO 75 mL/hr at 11/20/22 0637 New Bag at 11/20/22 0637    morphine (PF) injection 2 mg  2 mg IntraVENous Q4H PRN Marni Guardado MD   2 mg at 11/20/22 0407    vancomycin 1500 mg in dextrose 5% 300 mL IVPB  1,500 mg IntraVENous Once Elisa Kinsey [facility]           Allergies   Allergen Reactions    Ketorolac Itching and Nausea And Vomiting    Darvocet [Propoxyphene N-Acetaminophen] Swelling and Dizziness or Vertigo     FACIAL SWELLING    Ibuprofen Nausea And Vomiting    Meloxicam Hives    Naproxen Nausea And Vomiting    Tramadol Other (See Comments)     Patient does not remember the reaction       Surgical History  Past Surgical History:   Procedure Laterality Date    CHOLECYSTECTOMY      COLONOSCOPY N/A 10/31/2022    COLONOSCOPY WITH BIOPSY performed by Marilin Mtz MD at [address] [place] [address] GASTROINTESTINAL ENDOSCOPY N/A 5/3/2021    EGD BIOPSY performed by Carmelita Partida MD at [address] [place] [address] ENDOSCOPY N/A 10/29/2022    EGD ESOPHAGOGASTRODUODENOSCOPY performed by Boni Ruiz MD at [address] [place] [address] History     Socioeconomic History    Marital status:     Years of education: 12 Highest education level: Not on file   Occupational History     Comment: SSDI   Tobacco Use    Smoking status: Every Day     Packs/day: 0.02     Years: 15.00     Pack years: 0.30     Types: Cigarettes    Smokeless tobacco: Never    Tobacco comments:     SAYS CANNOT USE PATCHES OR GUM   Vaping Use    Vaping Use: Never used   Substance and Sexual Activity    Alcohol use: Yes     Alcohol/week: 0.0 standard drinks     Comment: occassional-WINE COOLERS    Drug use: Yes     Types: Cocaine    Sexual activity: Yes     Partners: Male       Family Medical History  Family History   Problem Relation Age of Onset    Cancer Mother         colon       Review of Systems:  Constitutional: Had fever, no chills  Eyes: No vision changes, no retroorbital pain  ENT: No hearing changes, no ear pain  Respiratory: Has cough, has dyspnea  Cardiovascular: No chest pain, no palpitations  Gastrointestinal: No abdominal pain, no diarrhea  Genitourinary: No dysuria, no hematuria  Integumentary: No rash, no itching  Musculoskeletal: Has leg pain, no joint pain  Neurologic: No headache, no numbness in extremities    Physical Examination:  Vitals:    11/20/22 0230 11/20/22 0245 11/20/22 0700 11/20/22 1045   BP: (!) 138/101  (!) 156/82 (!) 180/104   Pulse: (!) 105  95 81   Resp: (!) 31  21 20   Temp:  100.2 °F (37.9 °C) 98.4 °F (36.9 °C) 97.8 °F (36.6 °C)   TempSrc:  Oral Oral Temporal   SpO2:   99% 100%   Weight:       Height:         Constitutional: Alert, not in distress  Eyes: Sclerae anicteric, no conjunctival erythema  ENT: No buccal lesion, no pharyngeal exudates  Neck: No nuchal rigidity, no cervical adenopathy  Lungs: Clear breath sounds, no crackles, no wheezes  Heart: Regular rate and rhythm, no murmurs  Abdomen: Bowel sounds present, soft, nontender  Skin: Warm and dry, no active dermatoses.  Right breast tender  Musculoskeletal: No joint erythema, no joint swelling    Labs, imaging, and medical records/notes were personally reviewed. Assessment:  Mastitis with possible abscess, right breast    Plan:  Continue piperacillin-tazobactam 3.375 g every 8 hours and vancomycin dosed according to level per pharmacy. Follow-up Surgery recommendations. Continue supportive care. Thank you for involving me in the care of Tiffany Dooley. I will continue to follow. Please do not hesitate to call for any questions or concerns.     Electronically signed by Lucy Olson MD on 11/20/2022 at 11:16 AM

## 2022-11-20 NOTE — ED NOTES
Pt placed on 1L NC for comfort. Saturating 100% on RA but pt states she feels SOB with pain and the O2 makes her feel better. Educated pt that she does not require O2 but pt still requesting O2 for comfort measures.         Myrle Apley, RN  11/20/22 7432

## 2022-11-20 NOTE — CONSULTS
General Surgery   Consultation        PATIENT NAME: Kiana Christian   YOB: 1980    ADMISSION DATE: 11/19/2022 10:12 PM     Admitting Provider: Dr. Marv Escobedo Physician: Dr. Darwin Cooper DATE: 11/20/2022    Consult Regarding:  R breast cyst    HISTORY OF PRESENT ILLNESS:  The patient is a 40 y.o. female  who presents with R breast pain that started 4 days ago and acutely worsened yesterday with a burning sensation throughout the whole breast. States she had some white discharge from the nipple yesterday but nothing today. Causing severe pain. No similar prior hx. Denies chest pain, SOB, abdominal pain, or hematuria. States she had her last mammogram around 1 year ago. CT chest showing 1 cm cyst in R breast at nipple. US of the R breast shows no drainable fluid collection. Mammogram 7/8/21 showed benign thickening of  L areolar area with no suspicious abnormality of R breast. However fibroglandular densities could obscure a lesion and US was recommended. US 7/8/21 with 1.2cm area under L nipple of probably focal mastitis, recommended antibiotics and follow up. FMHx of breast cancer in mother.      Past Medical History:        Diagnosis Date    Asthma     Bronchitis     Movement disorder     Seizure disorder ([address]     Seizure disorder ([address]     Suicidal overdose ([address] 6/27/2014    Tobacco abuse     Tobacco abuse        Past Surgical History:        Procedure Laterality Date    CHOLECYSTECTOMY      COLONOSCOPY N/A 10/31/2022    COLONOSCOPY WITH BIOPSY performed by Pina Jimenez MD at [address] [place] [address] GASTROINTESTINAL ENDOSCOPY N/A 5/3/2021    EGD BIOPSY performed by Danika Cantrell MD at [place] [address] N/A 10/29/2022    EGD ESOPHAGOGASTRODUODENOSCOPY performed by Klaudia Talley MD at [facility] OR       Medications Prior to Admission:   Medications Prior to Admission: pantoprazole (PROTONIX) 40 MG tablet, Take 1 tablet by mouth daily (with breakfast)  gabapentin (NEURONTIN) 100 MG capsule, Take 1 capsule by mouth 3 times daily for 30 days. albuterol sulfate HFA (PROVENTIL;VENTOLIN;PROAIR) 108 (90 Base) MCG/ACT inhaler, Inhale 2 puffs into the lungs 4 times daily as needed for Wheezing or Shortness of Breath  loratadine (CLARITIN) 10 MG tablet, Take 10 mg by mouth every morning  amLODIPine (NORVASC) 5 MG tablet, Take 5 mg by mouth every morning  diphenhydrAMINE (BENADRYL) 25 MG tablet, Take 25 mg by mouth in the morning and 25 mg at noon and 25 mg in the evening and 25 mg before bedtime. sucralfate (CARAFATE) 1 GM tablet, Take 1 tablet by mouth 4 times daily  tiZANidine (ZANAFLEX) 4 MG tablet, Take 4 mg by mouth every 8 hours as needed (MUSCLE SPASMS)  carBAMazepine (TEGRETOL) 200 MG tablet, Take 200 mg by mouth 2 times daily (with meals)    Allergies:  Ketorolac, Darvocet [propoxyphene n-acetaminophen], Ibuprofen, Meloxicam, Naproxen, and Tramadol    Social History:   Social History     Socioeconomic History    Marital status:      Spouse name: Not on file    Number of children: 0    Years of education: 12    Highest education level: Not on file   Occupational History     Comment: [employer]   Tobacco Use    Smoking status: Every Day     Packs/day: 0.02     Years: 15.00     Pack years: 0.30     Types: Cigarettes    Smokeless tobacco: Never    Tobacco comments:     SAYS CANNOT USE PATCHES OR GUM   Vaping Use    Vaping Use: Never used   Substance and Sexual Activity    Alcohol use:  Yes     Alcohol/week: 0.0 standard drinks     Comment: occassional-WINE COOLERS    Drug use: Yes     Types: Cocaine    Sexual activity: Yes     Partners: Male   Other Topics Concern    Not on file   Social History Narrative    Not on file     Social Determinants of Health     Financial Resource Strain: Not on file   Food Insecurity: Not on file   Transportation Needs: Not on file   Physical Activity: Not on file   Stress: Not on file   Social Connections: Not on file   Intimate Partner Violence: Not on file   Housing Stability: Not on file       Family History:       Problem Relation Age of Onset    Cancer Mother         colon       REVIEW OF SYSTEMS:    CONSTITUTIONAL:  No recent weight gain/loss. Energy level normal for pt. HEENT:  negative  CARDIOVASCULAR:  No chest pain  BREAST: R breast tenderness of areola and 8 oclock position  GASTROINTESTINAL:  No abdominal pain, nausea, or vomiting. No constipation/diarrhea. No rectal bleeding  GENITOURINARY:  No dysuria  HEMATOLOGIC/LYMPHATIC:  No easy bruising. No history of cancer  ENDOCRINE: negative  Review of systems negative unless above. PHYSICAL EXAM:    VITALS:  BP (!) 180/104   Pulse 81   Temp 97.8 °F (36.6 °C) (Temporal)   Resp 20   Ht 4' 11\" (1.499 m)   Wt 150 lb (68 kg)   LMP 10/31/2022 (Exact Date)   SpO2 100%   BMI 30.30 kg/m²   INTAKE/OUTPUT:   No intake or output data in the 24 hours ending 11/20/22 1614    CONSTITUTIONAL:  awake, alert, not distressed and mildly obese  ENT:  normocephalic/atraumatic, without obvious abnormality  NECK:  supple, symmetrical, trachea midline   LUNGS:  non-labored breathing on room air  CARDIOVASCULAR:  regular rate   BREAST: palpable nodule under R nipple/areola.  Tenderness to palpation in 8 oclock position of R breast.   ABDOMEN: soft,  non distended, non-tender, no guarding, no masses and no hernias  MUSCULOSKELETAL:  negative, there is not obvious somatic dysfunction  NEUROLOGIC:  Mental Status Exam:  Level of Alertness:   alert  Orientation:   oriented to person, place, and time    CBC with Differential:    Lab Results   Component Value Date/Time    WBC 18.9 11/20/2022 06:39 AM    RBC 4.74 11/20/2022 06:39 AM    HGB 12.0 11/20/2022 06:39 AM    HCT 36.8 11/20/2022 06:39 AM     11/20/2022 06:39 AM    MCV 77.6 11/20/2022 06:39 AM    MCH 25.3 11/20/2022 06:39 AM    MCHC 32.6 11/20/2022 06:39 AM    RDW 15.8 11/20/2022 06:39 AM    SEGSPCT 66 01/02/2014 12:20 AM    LYMPHOPCT 11.5 11/20/2022 06:39 AM    MONOPCT 5.8 11/20/2022 06:39 AM    EOSPCT 1 10/08/2010 12:05 PM    BASOPCT 0.2 11/20/2022 06:39 AM    MONOSABS 1.09 11/20/2022 06:39 AM    LYMPHSABS 2.18 11/20/2022 06:39 AM    EOSABS 0.04 11/20/2022 06:39 AM    BASOSABS 0.03 11/20/2022 06:39 AM     CMP:    Lab Results   Component Value Date/Time     11/20/2022 06:39 AM    K 3.5 11/20/2022 06:39 AM     11/20/2022 06:39 AM    CO2 20 11/20/2022 06:39 AM    BUN 11 11/20/2022 06:39 AM    CREATININE 0.8 11/20/2022 06:39 AM    GFRAA >60 10/15/2022 03:31 AM    LABGLOM >60 11/20/2022 06:39 AM    GLUCOSE 135 11/20/2022 06:39 AM    GLUCOSE 90 07/24/2011 02:45 AM    PROT 7.1 11/20/2022 06:39 AM    LABALBU 3.5 11/20/2022 06:39 AM    LABALBU 4.5 07/24/2011 02:45 AM    CALCIUM 8.9 11/20/2022 06:39 AM    BILITOT 0.5 11/20/2022 06:39 AM    ALKPHOS 89 11/20/2022 06:39 AM    AST 16 11/20/2022 06:39 AM    ALT 14 11/20/2022 06:39 AM       Pertinent Radiology: ***      ASSESSMENT   Principal Problem:    Sepsis ([address]  Resolved Problems:    * No resolved hospital problems.  *      PLAN    ***        Electronically signed by Beverley Vargas  on 11/20/2022 at 4:14 PM

## 2022-11-20 NOTE — H&P
Hospitalist History & Physical      PCP: Lexus Sharp MD    Date of Service: Pt seen/examined on 11/20/2022    Chief Complaint:  had concerns including Breast Problem (Pt to ED with c/o R-breast pain x4 days. Pt states she has had \"puss and blood\" draining.). History Of Present Illness:    Ms. Star Osler, a 40y.o. year old female  who  has a past medical history of Asthma, Bronchitis, Movement disorder, Seizure disorder ([address] Seizure disorder ([address] Suicidal overdose ([address] Tobacco abuse, and Tobacco abuse. The patient is a 55-year-old female presenting the emergency department with severe right breast pain. She states she has been having pain for the last 4 days but today got significantly worse. She describes it as burning in the entire breast.  She also reports that she started having some blood and pus draining from her right nipple. Patient has never had this happen before. She is not pregnant and not breast-feeding. She did have some chills yesterday but otherwise denies any fevers. She also denies any chest pain, shortness of breath, nausea/vomiting, dysuria, hematuria, abd pain. Vital signs show the patient to be tachycardic with a rate of 101. Hypertensive with a pressure 179/104. Patient is afebrile. Laboratory studies notable for a lactic acid of 3.5 and a white count of 27. CT of the chest shows a small 1.1 cm fluid-filled collection involving the areola/nipple of the right breast abscess cannot be excluded. Patchy groundglass opacification throughout both lungs. Patient started on empiric antibiotics.       Past Medical History:   Diagnosis Date    Asthma     Bronchitis     Movement disorder     Seizure disorder ([address]     Seizure disorder ([address]     Suicidal overdose ([address] 6/27/2014    Tobacco abuse     Tobacco abuse        Past Surgical History:   Procedure Laterality Date    CHOLECYSTECTOMY      COLONOSCOPY N/A 10/31/2022    COLONOSCOPY WITH BIOPSY performed by Davie Pinedo Shamar Caruso MD at [address]      UPPER GASTROINTESTINAL ENDOSCOPY N/A 5/3/2021    EGD BIOPSY performed by Genoveva Rosario MD at [address] [place] [address] N/A 10/29/2022    EGD ESOPHAGOGASTRODUODENOSCOPY performed by Laveta Barthel, MD at [facility]       Prior to Admission medications    Medication Sig Start Date End Date Taking? Authorizing Provider   pantoprazole (PROTONIX) 40 MG tablet Take 1 tablet by mouth daily (with breakfast) 11/1/22 12/1/22  JACQUELINE Fajardo   gabapentin (NEURONTIN) 100 MG capsule Take 1 capsule by mouth 3 times daily for 30 days. 10/31/22 11/30/22  JACQUELINE Fajardo   albuterol sulfate HFA (VENTOLIN HFA) 108 (90 Base) MCG/ACT inhaler Inhale 2 puffs into the lungs 4 times daily as needed for Wheezing or Shortness of Breath    Historical Provider, MD   loratadine (CLARITIN) 10 MG tablet Take 10 mg by mouth every morning    Historical Provider, MD   amLODIPine (NORVASC) 5 MG tablet Take 5 mg by mouth every morning    Historical Provider, MD   diphenhydrAMINE (BENADRYL) 25 MG tablet Take 25 mg by mouth in the morning and 25 mg at noon and 25 mg in the evening and 25 mg before bedtime. Historical Provider, MD   sucralfate (CARAFATE) 1 GM tablet Take 1 tablet by mouth 4 times daily 10/15/22   Brenda Hodges DO   tiZANidine (ZANAFLEX) 4 MG tablet Take 4 mg by mouth every 8 hours as needed (MUSCLE SPASMS)    Historical Provider, MD   carBAMazepine (TEGRETOL) 200 MG tablet Take 200 mg by mouth 2 times daily (with meals) 1/9/20   Historical Provider, MD         Allergies:  Ketorolac, Darvocet [propoxyphene n-acetaminophen], Ibuprofen, Meloxicam, Naproxen, and Tramadol    Social History:    TOBACCO:   reports that she has been smoking cigarettes. She has a 0.30 pack-year smoking history. She has never used smokeless tobacco.  ETOH:   reports current alcohol use.     Family History: Reviewed in detail and negative for DM, CAD, Cancer, CVA. Positive as follows\"      Problem Relation Age of Onset    Cancer Mother         colon       REVIEW OF SYSTEMS:   Pertinent positives as noted in the HPI. All other systems reviewed and negative. PHYSICAL EXAM:  BP (!) 179/104   Pulse 92   Temp 98.6 °F (37 °C) (Oral)   Resp 19   Ht 4' 11\" (1.499 m)   Wt 150 lb (68 kg)   LMP 10/31/2022 (Exact Date)   SpO2 100%   BMI 30.30 kg/m²   General appearance: No apparent distress, appears stated age and cooperative. HEENT: Normal cephalic, atraumatic without obvious deformity. Pupils equal, round, and reactive to light. Extra ocular muscles intact. Conjunctivae/corneas clear. Neck: Supple, with full range of motion. No jugular venous distention. Trachea midline. Respiratory: CTA  Cardiovascular: Tachycardic  Abdomen: S/NT/ND  Musculoskeletal: No clubbing, cyanosis, edema of bilateral lower extremities. Brisk capillary refill. Skin: Normal skin color. No rashes or lesions. Neurologic:  Neurovascularly intact without any focal sensory/motor deficits. Cranial nerves: II-XII intact, grossly non-focal.      Reviewed EKG and CXR personally      CBC:   Recent Labs     11/19/22  2234   WBC 27.0*   RBC 5.61*   HGB 14.1   HCT 44.1   MCV 78.6*   RDW 15.9*        BMP:   Recent Labs     11/19/22  2234      K 4.2      CO2 21*   BUN 8   CREATININE 0.6     LFT:  No results for input(s): PROT, ALB, ALKPHOS, ALT, AST, BILITOT, AMYLASE, LIPASE in the last 72 hours. CE:  No results for input(s): Normajean [place] in the last 72 hours. PT/INR: No results for input(s): INR, APTT in the last 72 hours. BNP: No results for input(s): BNP in the last 72 hours.   ESR:   Lab Results   Component Value Date    SEDRATE 9 10/28/2022     CRP:   Lab Results   Component Value Date    CRP 1.1 (H) 10/28/2022     D Dimer:   Lab Results   Component Value Date    DDIMER <200 05/12/2015      Folate and B12: No results found for: Avelina Rodriguez, No results found for: FOLATE  Lactic Acid:   Lab Results   Component Value Date    LACTA 3.5 ([place]) 11/19/2022     Thyroid Studies:   Lab Results   Component Value Date    TSH 0.433 11/11/2016    M3GZHJX 8.6 11/11/2016       Oupatient labs:  Lab Results   Component Value Date    TSH 0.433 11/11/2016    INR 1.0 10/14/2022       Urinalysis:    Lab Results   Component Value Date/Time    NITRU Negative 11/20/2022 12:32 AM    WBCUA 1-3 11/20/2022 12:32 AM    WBCUA NONE 06/15/2011 05:10 AM    BACTERIA MANY 11/20/2022 12:32 AM    RBCUA 0-1 11/20/2022 12:32 AM    RBCUA NONE 01/02/2014 12:56 AM    BLOODU Negative 11/20/2022 12:32 AM    SPECGRAV 1.020 11/20/2022 12:32 AM    GLUCOSEU Negative 11/20/2022 12:32 AM    GLUCOSEU NEGATIVE 06/15/2011 05:10 AM       Imaging:  XR HIP BILATERAL W AP PELVIS (2 VIEWS)    Result Date: 10/24/2022  EXAMINATION: ONE XRAY VIEW OF THE PELVIS AND TWO XRAY VIEWS OF EACH OF THE BILATERAL HIPS 10/24/2022 3:22 am COMPARISON: 09/23/2009 HISTORY: ORDERING SYSTEM PROVIDED HISTORY: pain TECHNOLOGIST PROVIDED HISTORY: Reason for exam:->pain FINDINGS: No acute fracture or subluxation is identified. There is advanced bilateral hip arthrosis with bone on bone articulation. No focal soft tissue abnormality. No acute osseous abnormality. CT CHEST W CONTRAST    Result Date: 11/20/2022  EXAMINATION: CT OF THE CHEST WITH CONTRAST 11/20/2022 1:00 am TECHNIQUE: CT of the chest was performed with the administration of intravenous contrast. Multiplanar reformatted images are provided for review. Automated exposure control, iterative reconstruction, and/or weight based adjustment of the mA/kV was utilized to reduce the radiation dose to as low as reasonably achievable. COMPARISON: None.  HISTORY: ORDERING SYSTEM PROVIDED HISTORY: concern for rt breast abscess, purulent drainage from rt nipple, no palpable mass or cellulitis TECHNOLOGIST PROVIDED HISTORY: Reason for exam:->concern for rt breast abscess, purulent drainage from rt nipple, no palpable mass or cellulitis Decision Support Exception - unselect if not a suspected or confirmed emergency medical condition->Emergency Medical Condition ([place]) What reading provider will be dictating this exam?->CRC FINDINGS: Aorta: No evidence of thoracic aortic aneurysm or dissection. No acute abnormality of the aorta. Pulmonary arteries: The pulmonary arteries are unremarkable. No evidence for pulmonary embolism. Mediastinum: No evidence of mediastinal lymphadenopathy. The heart and pericardium demonstrate no acute abnormality. Lungs/Pleura: The lungs demonstrate scattered peripheral ground-glass opacification throughout the left lung and within the right lower lobe. No focal consolidation or pulmonary edema. No evidence of pleural effusion or pneumothorax. Upper Abdomen: Limited images of the upper abdomen are unremarkable. Soft Tissues/Bones: There is a 1.1 cm a collection involving the areola/nipple of the right breast.     A small 1.1 cm fluid fluid collection involving the Eitan ower/nipple of the right breast, abscess cannot be excluded. Patchy ground-glass opacification throughout both lungs as described above, atypical infection cannot be excluded     CT ABDOMEN PELVIS W IV CONTRAST Additional Contrast? None    Result Date: 11/4/2022  EXAMINATION: CT OF THE ABDOMEN AND PELVIS WITH CONTRAST 11/4/2022 9:52 pm TECHNIQUE: CT of the abdomen and pelvis was performed with the administration of intravenous contrast. Multiplanar reformatted images are provided for review. Automated exposure control, iterative reconstruction, and/or weight based adjustment of the mA/kV was utilized to reduce the radiation dose to as low as reasonably achievable.  COMPARISON: 10/28/2022 HISTORY: ORDERING SYSTEM PROVIDED HISTORY: ab pain TECHNOLOGIST PROVIDED HISTORY: Reason for exam:->ab pain Additional Contrast?->None Decision Support Exception - unselect if not a suspected or confirmed emergency medical condition->Emergency Medical Condition ([place]) What reading provider will be dictating this exam?->CRC FINDINGS: Lower Chest: Negative. Organs: Cholecystectomy. Liver, biliary tree, bilateral adrenal glands, bilateral kidneys, spleen and pancreas are normal. GI/Bowel: No ileus or obstruction. Normal appendix right lower quadrant. Mild descending and sigmoid colonic diverticulosis. No acute diverticulitis. No inflammatory bowel process detected. Pelvis: No adnexal mass or significant free fluid. Peritoneum/Retroperitoneum: Small fat containing and uncomplicated umbilical hernia. Bones/Soft Tissues: No osseous lesions. 1. No acute disease. 2. Normal appendix right lower quadrant. 3. No evidence for an inflammatory bowel process. RECOMMENDATIONS: Careful clinical correlation and follow up recommended. CT ABDOMEN PELVIS W IV CONTRAST Additional Contrast? Oral    Result Date: 10/28/2022  EXAMINATION: CT OF THE ABDOMEN AND PELVIS WITH CONTRAST 10/28/2022 7:03 pm TECHNIQUE: CT of the abdomen and pelvis was performed with the administration of intravenous contrast. Multiplanar reformatted images are provided for review. Automated exposure control, iterative reconstruction, and/or weight based adjustment of the mA/kV was utilized to reduce the radiation dose to as low as reasonably achievable. COMPARISON: CT abdomen and pelvis dated 10/28/2022 HISTORY: ORDERING SYSTEM PROVIDED HISTORY: abdominal pain TECHNOLOGIST PROVIDED HISTORY: Reason for exam:->abdominal pain Additional Contrast?->Oral FINDINGS: Lower Chest: Lung bases are clear. Organs: Liver without focal lesion. Gallbladder surgically absent. Pancreas and spleen unremarkable. Adrenals without nodule. Kidneys without suspicious renal lesion and no hydronephrosis. GI/Bowel: Small bowel is nondilated without evidence for small bowel obstructive process. Appendix visualized and unremarkable.   Colonic segments under distended with questionable wall thickening versus under distension of the ascending colon through transverse colon could represent colitis or mural edema without perforation or abscess. Enteral or intraluminal contrast extends through the rectum without obstructive elements. Pelvis: No suspicious pelvic lesion or bulky pelvic adenopathy/free fluid. Peritoneum/Retroperitoneum: No bulky retroperitoneal adenopathy. No suspicious peritoneal or mesenteric process Vasculature: Grossly normal caliber of abdominal aorta and vasculature Bones/Soft Tissues: No acute osseous or soft tissue findings. Colonic segments under distended with questionable wall thickening versus under distension of the ascending colon through transverse colon could represent colitis or mural edema without perforation or abscess. Enteral or intraluminal contrast extends through the rectum without obstructive elements. CT ABDOMEN PELVIS W IV CONTRAST Additional Contrast? None    Result Date: 10/28/2022  EXAMINATION: CTA OF THE CHEST; CT OF THE ABDOMEN AND PELVIS WITH CONTRAST 10/28/2022 8:14 am TECHNIQUE: CTA of the chest was performed after the administration of intravenous contrast.  Multiplanar reformatted images are provided for review. MIP images are provided for review. Automated exposure control, iterative reconstruction, and/or weight based adjustment of the mA/kV was utilized to reduce the radiation dose to as low as reasonably achievable.; CT of the abdomen and pelvis was performed with the administration of intravenous contrast. Multiplanar reformatted images are provided for review. Automated exposure control, iterative reconstruction, and/or weight based adjustment of the mA/kV was utilized to reduce the radiation dose to as low as reasonably achievable. COMPARISON: None.  HISTORY: ORDERING SYSTEM PROVIDED HISTORY: r/o pe TECHNOLOGIST PROVIDED HISTORY: Reason for exam:->r/o pe Decision Support Exception - unselect if not a suspected or confirmed emergency medical condition->Emergency Medical Condition ([place]); ORDERING SYSTEM PROVIDED HISTORY: abdominal pain TECHNOLOGIST PROVIDED HISTORY: Additional Contrast?->None Reason for exam:->abdominal pain Decision Support Exception - unselect if not a suspected or confirmed emergency medical condition->Emergency Medical Condition ([place]) FINDINGS: Pulmonary Arteries: Pulmonary arteries are adequately opacified for evaluation. No evidence of intraluminal filling defect to suggest pulmonary embolism. Main pulmonary artery is normal in caliber. Mediastinum: No evidence of mediastinal lymphadenopathy. The heart and pericardium demonstrate no acute abnormality. There is no acute abnormality of the thoracic aorta. Thyroid is homogeneous in appearance. Lungs/pleura: The lungs are without acute process. No focal consolidation or pulmonary edema. No evidence of pleural effusion or pneumothorax. Soft Tissues/Bones: No acute bone or soft tissue abnormality. Abdomen/pelvis: Organs: Liver is homogeneous in appearance. Gallbladder is been surgically removed. Spleen is unremarkable. Pancreas is unremarkable. Both adrenal glands are within normal limits. Symmetric enhancement of the renal parenchyma. No stones or distension seen in the renal collecting system. GI/Bowel: Stomach is unremarkable in appearance. No wall thickening. The small bowel is within normal limits. No mucosal abnormality. No distension. The appendix is normal.  Stool seen scattered diffusely throughout the colon. No signs of obvious obstruction or obstructing lesion. No wall thickening or inflammatory changes present. Pelvis: The bladder is mildly distended with no wall thickening. The uterus is unremarkable. Peritoneum/Retroperitoneum: No abdominal or retroperitoneal lymphadenopathy. No free fluid or free air. Vascular calcifications seen within the abdominal aorta and iliac vessels. There is no pelvic adenopathy.  Bones/Soft Tissues: The bony structures reveal degenerative changes identified within the spine and pelvis. Degenerative changes seen within the hips bilaterally. No evidence of pulmonary embolism or acute pulmonary abnormality. No CT evidence of acute intra-abdominal or pelvic abnormality. XR CHEST PORTABLE    Result Date: 10/28/2022  EXAMINATION: ONE XRAY VIEW OF THE CHEST 10/28/2022 6:24 am COMPARISON: 14 October 2022 HISTORY: ORDERING SYSTEM PROVIDED HISTORY: eval chest pain TECHNOLOGIST PROVIDED HISTORY: Reason for exam:->eval chest pain FINDINGS: The lungs are without acute focal process. There is no effusion or pneumothorax. The cardiomediastinal silhouette is without acute process. The osseous structures are without acute process. No acute process. CTA PULMONARY W CONTRAST    Result Date: 10/28/2022  EXAMINATION: CTA OF THE CHEST; CT OF THE ABDOMEN AND PELVIS WITH CONTRAST 10/28/2022 8:14 am TECHNIQUE: CTA of the chest was performed after the administration of intravenous contrast.  Multiplanar reformatted images are provided for review. MIP images are provided for review. Automated exposure control, iterative reconstruction, and/or weight based adjustment of the mA/kV was utilized to reduce the radiation dose to as low as reasonably achievable.; CT of the abdomen and pelvis was performed with the administration of intravenous contrast. Multiplanar reformatted images are provided for review. Automated exposure control, iterative reconstruction, and/or weight based adjustment of the mA/kV was utilized to reduce the radiation dose to as low as reasonably achievable. COMPARISON: None.  HISTORY: ORDERING SYSTEM PROVIDED HISTORY: r/o pe TECHNOLOGIST PROVIDED HISTORY: Reason for exam:->r/o pe Decision Support Exception - unselect if not a suspected or confirmed emergency medical condition->Emergency Medical Condition ([place]); ORDERING SYSTEM PROVIDED HISTORY: abdominal pain TECHNOLOGIST PROVIDED HISTORY: Additional Contrast?->None Reason for exam:->abdominal pain Decision Support Exception - unselect if not a suspected or confirmed emergency medical condition->Emergency Medical Condition ([place]) FINDINGS: Pulmonary Arteries: Pulmonary arteries are adequately opacified for evaluation. No evidence of intraluminal filling defect to suggest pulmonary embolism. Main pulmonary artery is normal in caliber. Mediastinum: No evidence of mediastinal lymphadenopathy. The heart and pericardium demonstrate no acute abnormality. There is no acute abnormality of the thoracic aorta. Thyroid is homogeneous in appearance. Lungs/pleura: The lungs are without acute process. No focal consolidation or pulmonary edema. No evidence of pleural effusion or pneumothorax. Soft Tissues/Bones: No acute bone or soft tissue abnormality. Abdomen/pelvis: Organs: Liver is homogeneous in appearance. Gallbladder is been surgically removed. Spleen is unremarkable. Pancreas is unremarkable. Both adrenal glands are within normal limits. Symmetric enhancement of the renal parenchyma. No stones or distension seen in the renal collecting system. GI/Bowel: Stomach is unremarkable in appearance. No wall thickening. The small bowel is within normal limits. No mucosal abnormality. No distension. The appendix is normal.  Stool seen scattered diffusely throughout the colon. No signs of obvious obstruction or obstructing lesion. No wall thickening or inflammatory changes present. Pelvis: The bladder is mildly distended with no wall thickening. The uterus is unremarkable. Peritoneum/Retroperitoneum: No abdominal or retroperitoneal lymphadenopathy. No free fluid or free air. Vascular calcifications seen within the abdominal aorta and iliac vessels. There is no pelvic adenopathy. Bones/Soft Tissues: The bony structures reveal degenerative changes identified within the spine and pelvis. Degenerative changes seen within the hips bilaterally.      No evidence of pulmonary embolism or acute pulmonary abnormality. No CT evidence of acute intra-abdominal or pelvic abnormality. ASSESSMENT:  -Sepsis  -Breast abscess  -Lactic acidosis  -Leukocytosis  -Hypertension      PLAN:  -Admit to medicine  -Consult infectious disease  -Pharmacy to dose vancomycin  -Zosyn 3.375 mg every 8 hours  -Blood cultures x2  -Normal saline 75 mL/h  -Recheck lactic acid level  -Continue home medications        Diet: No diet orders on file  Code Status: Prior  Surrogate decision maker confirmed with patient:   Extended Emergency Contact Information  Primary Emergency Contact: [address] Phone: [phone]  Mobile Phone: [phone]  Relation: Brother/Sister   needed? No  Secondary Emergency Contact: [address] [facility] Phone: [phone]  Mobile Phone: [phone]  Relation: Niece/Nephew   needed? No    DVT Prophylaxis: []Lovenox []Heparin []PCD [] [address] []Encouraged ambulation  Disposition: []Med/Surg [] Intermediate [] ICU/CCU  Admit status: [] Observation [] Inpatient     +++++++++++++++++++++++++++++++++++++++++++++++++  Dinorah Tong DO  +++++++++++++++++++++++++++++++++++++++++++++++++  NOTE: This report was transcribed using voice recognition software. Every effort was made to ensure accuracy; however, inadvertent computerized transcription errors may be present.

## 2022-11-20 NOTE — PROGRESS NOTES
Patient has been admitted earlier by a colleague. She complains of right breast pain and she had 5 sanguinolent discharge from the nipple. On CT scan 1.1 cm possible abscess of right breast. Obtain ultrasound of the right breast.  General surgery consulted  Strong family history of cancer, mother breast cancer, sister ovarian cancer  Poor dentition.   A dentist has been consulted  ID consulted for antibiotic management

## 2022-11-20 NOTE — CONSULTS
GENERAL SURGERY  CONSULT NOTE  11/20/2022    Physician Consulted: Dr. Marco A Romero  Reason for Consult: Breast abscess  Referring Physician: Dr. Jarod HAGER  Mitchell Eagle is a 40 y.o. female who presents for evaluation of right breast pain and nipple discharge. Patient states for the past 4 days she has noticed off-white nipple discharge and tenderness around her areola. Denies any chills or fevers. Its the pain has been approximately the same for the past 3 days. Surgery consulted for right breast abscess. Patient states she has had previous breast abscess in the past.  The last episode was on her left side and resolved with antibiotics alone. Patient states that there is no discharge or nipple today. Patient states that her last mammogram was approximately a year ago. Patient has a significant family history of breast cancer, mother with breast cancer and sister with ovarian cancer. Patient presented regular rate and hypertensive on room air, been intermittently tachycardic and T-max 100.2. Patient presents with a leukocytosis of 27K, slowly downtrending to 19 K Vanco and Zosyn. CT chest tensive small 1.1 cm fluid collection at the areola. Ultrasound was performed and no drainable fluid collection was identified.       Past Medical History:   Diagnosis Date    Asthma     Bronchitis     Movement disorder     Seizure disorder ([address]     Seizure disorder ([address]     Suicidal overdose ([address] 6/27/2014    Tobacco abuse     Tobacco abuse        Past Surgical History:   Procedure Laterality Date    CHOLECYSTECTOMY      COLONOSCOPY N/A 10/31/2022    COLONOSCOPY WITH BIOPSY performed by Larry White MD at [address] [place] [address] GASTROINTESTINAL ENDOSCOPY N/A 5/3/2021    EGD BIOPSY performed by Maria Elena Mcwilliams MD at [place] [address] N/A 10/29/2022    EGD ESOPHAGOGASTRODUODENOSCOPY performed by Martínez Ahumada MD at [facility] OR       Medications Prior to Admission    Prior to Admission medications    Medication Sig Start Date End Date Taking? Authorizing Provider   pantoprazole (PROTONIX) 40 MG tablet Take 1 tablet by mouth daily (with breakfast) 11/1/22 12/1/22  Denny Sample APKIANNA   gabapentin (NEURONTIN) 100 MG capsule Take 1 capsule by mouth 3 times daily for 30 days. 10/31/22 11/30/22  Denny Sample, APN   albuterol sulfate HFA (PROVENTIL;VENTOLIN;PROAIR) 108 (90 Base) MCG/ACT inhaler Inhale 2 puffs into the lungs 4 times daily as needed for Wheezing or Shortness of Breath    Historical Provider, MD   loratadine (CLARITIN) 10 MG tablet Take 10 mg by mouth every morning    Historical Provider, MD   amLODIPine (NORVASC) 5 MG tablet Take 5 mg by mouth every morning    Historical Provider, MD   diphenhydrAMINE (BENADRYL) 25 MG tablet Take 25 mg by mouth in the morning and 25 mg at noon and 25 mg in the evening and 25 mg before bedtime.     Historical Provider, MD   sucralfate (CARAFATE) 1 GM tablet Take 1 tablet by mouth 4 times daily 10/15/22   Jose Estrada DO   tiZANidine (ZANAFLEX) 4 MG tablet Take 4 mg by mouth every 8 hours as needed (MUSCLE SPASMS)    Historical Provider, MD   carBAMazepine (TEGRETOL) 200 MG tablet Take 200 mg by mouth 2 times daily (with meals) 1/9/20   Historical Provider, MD       Allergies   Allergen Reactions    Ketorolac Itching and Nausea And Vomiting    Darvocet [Propoxyphene N-Acetaminophen] Swelling and Dizziness or Vertigo     FACIAL SWELLING    Ibuprofen Nausea And Vomiting    Meloxicam Hives    Naproxen Nausea And Vomiting    Tramadol Other (See Comments)     Patient does not remember the reaction       Family History   Problem Relation Age of Onset    Cancer Mother         colon       Social History     Tobacco Use    Smoking status: Every Day     Packs/day: 0.02     Years: 15.00     Pack years: 0.30     Types: Cigarettes    Smokeless tobacco: Never    Tobacco comments:     SAYS CANNOT USE PATCHES OR GUM   Vaping Use    Vaping Use: Never used   Substance Use Topics    Alcohol use: Yes     Alcohol/week: 0.0 standard drinks     Comment: occassional-WINE COOLERS    Drug use: Yes     Types: Cocaine         Review of Systems: Review of Systems - General ROS: negative for - chills, fatigue, or fever  Psychological ROS: positive for - anxiety  negative for - suicidal ideation  Endocrine ROS: negative for - polydipsia/polyuria or temperature intolerance  Respiratory ROS: no cough, shortness of breath, or wheezing  Cardiovascular ROS: no chest pain or dyspnea on exertion  Gastrointestinal ROS: no abdominal pain, change in bowel habits, or black or bloody stools  Genito-Urinary ROS: no dysuria, trouble voiding, or hematuria  Musculoskeletal ROS: negative for - joint swelling    PHYSICAL EXAM:    Vitals:    11/20/22 1045   BP: (!) 180/104   Pulse: 81   Resp: 20   Temp: 97.8 °F (36.6 °C)   SpO2: 100%       GENERAL: Cute distress, resting comfortably  HEAD:  Normocephalic. Atraumatic. EYES:   No scleral icterus. PERRL. LUNGS:  No increased work of breathing on room air  CHEST: No palpable lymphadenopathy in bilateral axilla. Centimeter mobile mass in right outer quadrant of breast.  Subareolar tenderness 1 x 1 cm firm tissue beneath the nipple. No discharge was visible with gentle pressure. CARDIOVASCULAR: RR, per tensive  ABDOMEN:  Soft, non-distended, non-tender. No guarding, rigidity, rebound. EXTREMITIES:   MAEx4. Atraumatic. No LE edema. SKIN:  Warm and dry  NEUROLOGIC:  GCS 15  Document Information    Wound Care Image:  Wound   Right breast   11/20/2022 16:11   Attached To: Hospital Encounter on 11/19/22     Source Information    DO Ciar[facility]     ASSESSMENT/PLAN:  40 y.o. female with history of breast abscess presents with tender subareolar mass.   Ultrasound and exam consistent with cellulitis, rule out malignancy    Plan will be  - Incision and drainage unlikely to be of benefit as no drainable fluid collection is identified  - Recommend outpatient imaging and further work up with breast clinic to rule out malignancy   - May benefit from genetic testing with outpatient workup  - Recommend continue antibiotics per ID recommendations    - discussed with Dr. Steffen Thomas.     Herbie Guzmán, DO  Surgery Resident PGY-1  11/20/2022  6:59 PM

## 2022-11-20 NOTE — ED PROVIDER NOTES
ED Attending shared visit  CC: No        Department of Emergency Medicine   ED  Provider Note  Admit Date/RoomTime: 11/19/2022 10:12 PM  ED Room: 17B/17B-17  HPI:  11/19/22, Time: 11:44 PM EST      The patient is a 78-year-old female presenting the emergency department with severe right breast pain. She states she has been having pain for the last 4 days but today got significantly worse. She describes it as burning in the entire breast.  She also reports that she started having some blood and pus draining from her right nipple. Patient has never had this happen before. She is not pregnant and not breast-feeding. She did have some chills yesterday but otherwise denies any fevers. She also denies any chest pain, shortness of breath, nausea/vomiting, dysuria, hematuria, abd pain. The history is provided by the patient. No  was used. REVIEW OF SYSTEMS:  Review of Systems   Constitutional:  Negative for activity change, chills, fatigue and fever. HENT:  Negative for congestion, ear pain, facial swelling, sinus pressure, sinus pain, sore throat and trouble swallowing. Respiratory:  Negative for cough, chest tightness and shortness of breath. Cardiovascular:  Negative for chest pain, palpitations and leg swelling. Gastrointestinal:  Negative for abdominal pain, constipation, diarrhea, nausea and vomiting. Genitourinary:  Negative for dysuria, flank pain, frequency and hematuria. Musculoskeletal:  Negative for back pain, neck pain and neck stiffness. RT breast pain and nipple discharge. Skin:  Negative for color change, pallor and rash. Neurological:  Negative for dizziness, weakness, light-headedness and headaches. Psychiatric/Behavioral:  Negative for agitation, behavioral problems and confusion.      Pertinent positives and negatives are stated within HPI, all other systems reviewed and are negative.      --------------------------------------------- PAST HISTORY ---------------------------------------------  Past Medical History:  has a past medical history of Asthma, Bronchitis, Movement disorder, Seizure disorder ([facility] Seizure disorder ([facility] Suicidal overdose ([facility] Tobacco abuse, and Tobacco abuse. Past Surgical History:  has a past surgical history that includes fracture surgery; Cholecystectomy; Rotator cuff repair; Upper gastrointestinal endoscopy (N/A, 5/3/2021); Upper gastrointestinal endoscopy (N/A, 10/29/2022); and Colonoscopy (N/A, 10/31/2022). Social History:  reports that she has been smoking cigarettes. She has a 0.30 pack-year smoking history. She has never used smokeless tobacco. She reports current alcohol use. She reports current drug use. Drug: Cocaine. Family History: family history includes Cancer in her mother. The patients home medications have been reviewed.     Allergies: Ketorolac, Darvocet [propoxyphene n-acetaminophen], Ibuprofen, Meloxicam, Naproxen, and Tramadol    -------------------------------------------------- RESULTS -------------------------------------------------  All laboratory and radiology results have been personally reviewed by myself   LABS:  Results for orders placed or performed during the hospital encounter of 11/19/22   COVID-19, Rapid    Specimen: Nasopharyngeal Swab   Result Value Ref Range    SARS-CoV-2, NAAT Not Detected Not Detected   Rapid influenza A/B antigens    Specimen: Nasopharyngeal   Result Value Ref Range    Influenza A by PCR Not Detected Not Detected    Influenza B by PCR Not Detected Not Detected   CBC with Auto Differential   Result Value Ref Range    WBC 27.0 (H) 4.5 - 11.5 E9/L    RBC 5.61 (H) 3.50 - 5.50 E12/L    Hemoglobin 14.1 11.5 - 15.5 g/dL    Hematocrit 44.1 34.0 - 48.0 %    MCV 78.6 (L) 80.0 - 99.9 fL    MCH 25.1 (L) 26.0 - 35.0 pg    MCHC 32.0 32.0 - 34.5 %    RDW 15.9 (H) 11.5 - 15.0 fL    Platelets 006 098 - 871 E9/L    MPV 9.0 7.0 - 12.0 fL    Neutrophils % 86.2 (H) 43.0 - 80.0 %    Lymphocytes % 10.3 (L) 20.0 - 42.0 %    Monocytes % 3.5 2.0 - 12.0 %    Eosinophils % 0.1 0.0 - 6.0 %    Basophils % 0.3 0.0 - 2.0 %    Neutrophils Absolute 23.22 (H) 1.80 - 7.30 E9/L    Lymphocytes Absolute 2.70 1.50 - 4.00 E9/L    Monocytes Absolute 1.08 (H) 0.10 - 0.95 E9/L    Eosinophils Absolute 0.00 (L) 0.05 - 0.50 E9/L    Basophils Absolute 0.00 0.00 - 0.20 E9/L    Vacuolated Neutrophils 1+     Anisocytosis 2+     Polychromasia 2+     Poikilocytes 1+     Ovalocytes 1+     Tear Drop Cells 1+    BMP   Result Value Ref Range    Sodium 137 132 - 146 mmol/L    Potassium 4.2 3.5 - 5.0 mmol/L    Chloride 101 98 - 107 mmol/L    CO2 21 (L) 22 - 29 mmol/L    Anion Gap 15 7 - 16 mmol/L    Glucose 87 74 - 99 mg/dL    BUN 8 6 - 20 mg/dL    Creatinine 0.6 0.5 - 1.0 mg/dL    Est, Glom Filt Rate >60 >=60 mL/min/1.73    Calcium 9.2 8.6 - 10.2 mg/dL   Lactic Acid   Result Value Ref Range    Lactic Acid 3.5 (HH) 0.5 - 2.2 mmol/L   Urinalysis   Result Value Ref Range    Color, UA Yellow Straw/Yellow    Clarity, UA SL CLOUDY Clear    Glucose, Ur Negative Negative mg/dL    Bilirubin Urine Negative Negative    Ketones, Urine Negative Negative mg/dL    Specific Gravity, UA 1.020 1.005 - 1.030    Blood, Urine Negative Negative    pH, UA 6.0 5.0 - 9.0    Protein, UA Negative Negative mg/dL    Urobilinogen, Urine 0.2 <2.0 E.U./dL    Nitrite, Urine Negative Negative    Leukocyte Esterase, Urine Negative Negative   Microscopic Urinalysis   Result Value Ref Range    WBC, UA 1-3 0 - 5 /HPF    RBC, UA 0-1 0 - 2 /HPF    Epithelial Cells, UA FEW /HPF    Bacteria, UA MANY (A) None Seen /HPF   Lactic Acid   Result Value Ref Range    Lactic Acid 1.8 0.5 - 2.2 mmol/L   POC Pregnancy Urine Qual   Result Value Ref Range    HCG, Urine, POC Negative Negative    Lot Number [id number]     Positive QC Pass/Fail Pass     Negative QC Pass/Fail Pass        RADIOLOGY:  Interpreted by Radiologist.  CT CHEST W CONTRAST   Final Result   A small 1.1 cm fluid fluid collection involving the Eitan ower/nipple of the   right breast, abscess cannot be excluded. Patchy ground-glass opacification throughout both lungs as described above,   atypical infection cannot be excluded             ------------------------- NURSING NOTES AND VITALS REVIEWED ---------------------------   The nursing notes within the ED encounter and vital signs as below have been reviewed. BP (!) 138/101   Pulse (!) 105   Temp 100.2 °F (37.9 °C) (Oral)   Resp (!) 31   Ht 4' 11\" (1.499 m)   Wt 150 lb (68 kg)   LMP 10/31/2022 (Exact Date)   SpO2 100%   BMI 30.30 kg/m²   Oxygen Saturation Interpretation: Normal      ---------------------------------------------------PHYSICAL EXAM--------------------------------------    Physical Exam  Vitals and nursing note reviewed. Constitutional:       General: She is not in acute distress. Appearance: Normal appearance. She is well-developed. She is not ill-appearing. HENT:      Head: Normocephalic and atraumatic. Mouth/Throat:      Mouth: Mucous membranes are moist.      Pharynx: Oropharynx is clear. Neck:      Vascular: No JVD. Trachea: No tracheal deviation. Cardiovascular:      Rate and Rhythm: Normal rate and regular rhythm. Heart sounds: Normal heart sounds. No murmur heard. Pulmonary:      Effort: Pulmonary effort is normal. No respiratory distress. Breath sounds: Normal breath sounds. Musculoskeletal:         General: No swelling or deformity. Normal range of motion. Cervical back: Normal range of motion and neck supple. No rigidity. Comments: Significant tenderness to palpation to entire right breast.  No erythema, induration or edema. No palpable mass. No drainage able to be expelled from the right nipple. Skin:     General: Skin is warm and dry. Capillary Refill: Capillary refill takes less than 2 seconds. Coloration: Skin is not pale. Findings: No erythema. Neurological:      Mental Status: She is alert and oriented to person, place, and time. Mental status is at baseline. Motor: No weakness. Psychiatric:         Mood and Affect: Mood normal.         Behavior: Behavior normal.         Thought Content: Thought content normal.          ------------------------------ ED COURSE/MEDICAL DECISION MAKING----------------------  Medications   piperacillin-tazobactam (ZOSYN) 3,375 mg in sodium chloride 0.9 % 50 mL IVPB ([id number]) (has no administration in time range)   vancomycin 1500 mg in dextrose 5% 300 mL IVPB (has no administration in time range)   morphine (PF) injection 2 mg (2 mg IntraVENous Given 11/20/22 0407)   fentaNYL (SUBLIMAZE) injection 25 mcg (25 mcg IntraVENous Given 11/20/22 0128)   0.9 % sodium chloride bolus (1,000 mLs IntraVENous New Bag 11/20/22 0131)   iopamidol (ISOVUE-370) 76 % injection 90 mL (90 mLs IntraVENous Given 11/20/22 0108)   ibuprofen (ADVIL;MOTRIN) tablet 400 mg (400 mg Oral Given 11/20/22 0407)         ED COURSE:  ED Course as of 11/20/22 0411   Sun Nov 20, 2022   0216 CT CHEST W CONTRAST [BJ]      ED Course User Index  [BJ] Maria C Mendez PA-C     CT CHEST W CONTRAST   Final Result   A small 1.1 cm fluid fluid collection involving the Eitan ower/nipple of the   right breast, abscess cannot be excluded. Patchy ground-glass opacification throughout both lungs as described above,   atypical infection cannot be excluded             Procedures:  Procedures     Medical Decision Making:   MDM  60-year-old presenting the ED with right breast pain and purulent/bloody drainage from the right nipple for last 4 days. Patient afebrile hemodynamically stable on arrival.  She does have a leukocytosis of 27,000 her lactic acid elevated at 3.5. There is no appreciable abscess or cellulitis on physical exam.  Abscess may have already drained given the patient had discharge from the nipple earlier.  Chest does show a 1.1 cm fluid collection involving the right nipple. Patient has no signs of any cellulitis on exam and the fluid collection is not able to be palpated to drain. CT did also show groundglass opacities. She is negative for COVID and flu and this is of unclear etiology. No other source of infection identified. Patient did develop a fever in the emergency department of 100.2 and became slightly tachycardic. She was given fluids and started on vancomycin and Zosyn. Repeat lactic acid is 1.8. Patient will be admitted for breast abscess/possible sepsis pending blood cultures and for further IV antibiotics. Pt to bbe admitted under the care of S[facility]. Counseling: The emergency provider has spoken with the patient and discussed todays results, in addition to providing specific details for the plan of care and counseling regarding the diagnosis and prognosis. Questions are answered at this time and they are agreeable with the plan.      --------------------------------- IMPRESSION AND DISPOSITION ---------------------------------    IMPRESSION  1. Breast abscess    2. Leukocytosis, unspecified type    3. Lung infiltrate on CT        DISPOSITION  Disposition: Admit to med/surg floor  Patient condition is good      Electronically signed by Anupam Jacinto MD   DD: 11/19/22  **This report was transcribed using voice recognition software. Every effort was made to ensure accuracy; however, inadvertent computerized transcription errors may be present. END OF ED PROVIDER NOTE         Jennifer Bean PA-C  11/20/22 1154  ATTENDING PROVIDER ATTESTATION:     I have personally performed and/or participated in the history, exam, medical decision making, and procedures and agree with all pertinent clinical information. I have also reviewed and agree with the past medical, family and social history unless otherwise noted.     My findings/Plan: Presenting here because of breast pain she has been having 4 days noted drainage from the right breast.  Patient reporting pain to the breast she reports no chest pain she reports no productive cough there is no abdominal pain or vomiting. Patient reporting no leg pain there is no headache or sore throat. Patient here is awake alert. Patient on exam with physician assistant present with me as noted tenderness to breast there is no obvious abscess there is no redness to the breast.  Patient has no noted drainage from the site. Patient lungs are clear abdomen soft nontender. Patient neurologically intact. Patient labs noted reviewed as well as CT was ordered due to her elevated white count complaint of pain and CT was done to rule out any secondary to abscess that was noted small abscess on the right side of her chest wall. Patient was given antibiotics here CT also did show infiltrates. She was ordered IV antibiotics due to those findings. Patient will be admitted we did speak to on-call internal medicine. Patient will be admitted to general floor.   She was medicated here for pain as well as did develop fever here she was given Motrin due to her allergy to Tylenol       Candy Robins MD  11/20/22 8563       Candy Robins MD  11/20/22 3169

## 2022-11-21 LAB
CREAT SERPL-MCNC: 0.8 MG/DL (ref 0.5–1)
GFR SERPL CREATININE-BSD FRML MDRD: >60 ML/MIN/1.73
HCT VFR BLD CALC: 37.5 % (ref 34–48)
HEMOGLOBIN: 12 G/DL (ref 11.5–15.5)
LACTIC ACID: 1.7 MMOL/L (ref 0.5–2.2)
MCH RBC QN AUTO: 25 PG (ref 26–35)
MCHC RBC AUTO-ENTMCNC: 32 % (ref 32–34.5)
MCV RBC AUTO: 78.1 FL (ref 80–99.9)
PDW BLD-RTO: 15.8 FL (ref 11.5–15)
PLATELET # BLD: 201 E9/L (ref 130–450)
PMV BLD AUTO: 9.8 FL (ref 7–12)
RBC # BLD: 4.8 E12/L (ref 3.5–5.5)
WBC # BLD: 9.6 E9/L (ref 4.5–11.5)

## 2022-11-21 PROCEDURE — 83605 ASSAY OF LACTIC ACID: CPT

## 2022-11-21 PROCEDURE — 36415 COLL VENOUS BLD VENIPUNCTURE: CPT

## 2022-11-21 PROCEDURE — 2580000003 HC RX 258

## 2022-11-21 PROCEDURE — 6370000000 HC RX 637 (ALT 250 FOR IP): Performed by: STUDENT IN AN ORGANIZED HEALTH CARE EDUCATION/TRAINING PROGRAM

## 2022-11-21 PROCEDURE — 2580000003 HC RX 258: Performed by: FAMILY MEDICINE

## 2022-11-21 PROCEDURE — 99232 SBSQ HOSP IP/OBS MODERATE 35: CPT | Performed by: SURGERY

## 2022-11-21 PROCEDURE — 80202 ASSAY OF VANCOMYCIN: CPT

## 2022-11-21 PROCEDURE — 6360000002 HC RX W HCPCS

## 2022-11-21 PROCEDURE — 82565 ASSAY OF CREATININE: CPT

## 2022-11-21 PROCEDURE — 6370000000 HC RX 637 (ALT 250 FOR IP): Performed by: FAMILY MEDICINE

## 2022-11-21 PROCEDURE — 6360000002 HC RX W HCPCS: Performed by: FAMILY MEDICINE

## 2022-11-21 PROCEDURE — 85027 COMPLETE CBC AUTOMATED: CPT

## 2022-11-21 PROCEDURE — 1200000000 HC SEMI PRIVATE

## 2022-11-21 RX ORDER — FENTANYL CITRATE 50 UG/ML
50 INJECTION, SOLUTION INTRAMUSCULAR; INTRAVENOUS
Status: DISCONTINUED | OUTPATIENT
Start: 2022-11-21 | End: 2022-11-21

## 2022-11-21 RX ORDER — OXYCODONE HYDROCHLORIDE 10 MG/1
10 TABLET ORAL EVERY 4 HOURS PRN
Status: DISCONTINUED | OUTPATIENT
Start: 2022-11-21 | End: 2022-11-23

## 2022-11-21 RX ADMIN — FENTANYL CITRATE 50 MCG: 50 INJECTION INTRAMUSCULAR; INTRAVENOUS at 02:42

## 2022-11-21 RX ADMIN — CETIRIZINE HYDROCHLORIDE 10 MG: 10 TABLET, FILM COATED ORAL at 09:08

## 2022-11-21 RX ADMIN — SUCRALFATE 1 G: 1 TABLET ORAL at 18:03

## 2022-11-21 RX ADMIN — OXYCODONE HYDROCHLORIDE 10 MG: 10 TABLET ORAL at 18:01

## 2022-11-21 RX ADMIN — PIPERACILLIN AND TAZOBACTAM 3375 MG: 3; .375 INJECTION, POWDER, FOR SOLUTION INTRAVENOUS at 17:28

## 2022-11-21 RX ADMIN — AMLODIPINE BESYLATE 5 MG: 5 TABLET ORAL at 09:10

## 2022-11-21 RX ADMIN — VANCOMYCIN HYDROCHLORIDE 1000 MG: 1 INJECTION, POWDER, LYOPHILIZED, FOR SOLUTION INTRAVENOUS at 12:21

## 2022-11-21 RX ADMIN — OXYCODONE HYDROCHLORIDE 10 MG: 10 TABLET ORAL at 10:55

## 2022-11-21 RX ADMIN — DIPHENHYDRAMINE HYDROCHLORIDE 25 MG: 25 TABLET ORAL at 06:38

## 2022-11-21 RX ADMIN — CARBAMAZEPINE 200 MG: 200 TABLET ORAL at 17:21

## 2022-11-21 RX ADMIN — GABAPENTIN 100 MG: 100 CAPSULE ORAL at 17:20

## 2022-11-21 RX ADMIN — ENOXAPARIN SODIUM 40 MG: 100 INJECTION SUBCUTANEOUS at 09:23

## 2022-11-21 RX ADMIN — SUCRALFATE 1 G: 1 TABLET ORAL at 21:48

## 2022-11-21 RX ADMIN — CARBAMAZEPINE 200 MG: 200 TABLET ORAL at 09:08

## 2022-11-21 RX ADMIN — SUCRALFATE 1 G: 1 TABLET ORAL at 09:09

## 2022-11-21 RX ADMIN — OXYCODONE HYDROCHLORIDE 10 MG: 10 TABLET ORAL at 21:48

## 2022-11-21 RX ADMIN — OXYCODONE 5 MG: 5 TABLET ORAL at 06:38

## 2022-11-21 RX ADMIN — PANTOPRAZOLE SODIUM 40 MG: 40 TABLET, DELAYED RELEASE ORAL at 09:08

## 2022-11-21 RX ADMIN — GABAPENTIN 100 MG: 100 CAPSULE ORAL at 09:23

## 2022-11-21 RX ADMIN — DIPHENHYDRAMINE HYDROCHLORIDE 25 MG: 25 TABLET ORAL at 17:20

## 2022-11-21 RX ADMIN — GABAPENTIN 100 MG: 100 CAPSULE ORAL at 21:48

## 2022-11-21 ASSESSMENT — PAIN SCALES - GENERAL
PAINLEVEL_OUTOF10: 9
PAINLEVEL_OUTOF10: 9
PAINLEVEL_OUTOF10: 10

## 2022-11-21 ASSESSMENT — PAIN DESCRIPTION - LOCATION
LOCATION: MOUTH
LOCATION: BREAST

## 2022-11-21 ASSESSMENT — PAIN DESCRIPTION - DESCRIPTORS: DESCRIPTORS: STABBING

## 2022-11-21 NOTE — CARE COORDINATION
Patient presented to Ed with c/o severe right breast pain. Surgery consulted and ID consulted. IV piperacillin-tazobactam 3.375 g every 8 hours and vancomycin . Will switch to oral at discharge per ID. No surgical intervention at this time. . Attempted to meet with patient at bedside and she is off unit for dental clinic appointment. CM/SW will assess transition of care at a later time.

## 2022-11-21 NOTE — PROGRESS NOTES
Patient was off the floor for dental procedure at the time of my visit. I will see her again tomorrow. [facility] PROGRESS NOTE      Chief complaint: Follow-up of right mastitis    The patient is a 40 y.o. female with history of seizure disorder, presented on 11/19 with right breast pain accompanied by bloody and purulent nipple discharge for 4 days. On admission, she had T-max of 100.2 °F with leukocytosis of 27,000. CT chest showed small 1.1 cm fluid collection involving areola/nipple of the right breast, patchy groundglass opacification throughout both lungs. Right breast US showed cellulitis in the right breast retroareolar region with no drainable fluid collection identified. Piperacillin-tazobactam and vancomycin were started on admission. Labs, imaging, and medical records/notes were personally reviewed. Assessment:  Mastitis with questionable abscess, right breast. CT chest showed small 1.1 cm fluid collection involving areola/nipple of the right breast, patchy groundglass opacification throughout both lungs. Right breast US showed cellulitis in the right breast retroareolar region with no drainable fluid collection identified. Recommendations:  Continue piperacillin-tazobactam 3.375 g every 8 hours and vancomycin dosed according to level per pharmacy. Anticipate switch to oral antibiotic course on discharge. Continue supportive care. Thank you for involving me in the care of Ryan Corley. I will continue to follow. Please do not hesitate to call for any questions or concerns.     Electronically signed by Ankit Kim MD on 11/21/2022 at 9:44 AM

## 2022-11-21 NOTE — PROGRESS NOTES
Pharmacy Consultation Note  (Antibiotic Dosing and Monitoring)    Initial consult date: 11/20/22  Consulting physician/provider: Jolie Shaw  Drug: Vancomycin  Indication: Sepsis of Unknown Etiology; 7 days     Age/  Gender Height Weight IBW  Allergy Information   42 y.o./female 4' 11\" (149.9 cm) 150 lb (68 kg)     Ideal body weight: 48.8 kg (107 lb 8.4 oz)  Adjusted ideal body weight: 56.5 kg (124 lb 8.2 oz)   Ketorolac, Darvocet [propoxyphene n-acetaminophen], Ibuprofen, Meloxicam, Naproxen, and Tramadol      Renal Function:  Recent Labs     11/19/22 2234 11/20/22  0639   BUN 8 11   CREATININE 0.6 0.8         Intake/Output Summary (Last 24 hours) at 11/21/2022 0931  Last data filed at 11/20/2022 1300  Gross per 24 hour   Intake 420 ml   Output --   Net 420 ml       Vancomycin Monitoring:  Trough:  No results for input(s): VANCOTROUGH in the last 72 hours. Random:  No results for input(s): VANCORANDOM in the last 72 hours. No results for input(s): Darío Consuelo in the last 72 hours. Historical Cultures:  Organism   Date Value Ref Range Status   11/04/2022 Proteus mirabilis (A)  Final     Recent Labs     11/20/22  0030   BC 24 Hours no growth       Vancomycin Administration Times:  Recent vancomycin administrations                     vancomycin (VANCOCIN) 1,000 mg in dextrose 5 % 250 mL IVPB ([id number]) (mg) 1,000 mg New Bag 11/20/22 2232    vancomycin 1500 mg in dextrose 5% 300 mL IVPB (mg) 1,500 mg New Bag 11/20/22 1404               Assessment:  Patient is a 40 y.o. female who has been initiated on vancomycin  Estimated Creatinine Clearance: 83 mL/min (based on SCr of 0.8 mg/dL). To dose vancomycin, pharmacy will be utilizing the traditional dosing method targeting a trough of 15-20 mcg/mL. 11/20: day #1 vanco; SCr: 0.8  11/21: Day # 2 of vancomycin. Scr: 0.8 mg/dL. WBC WNL = 9.6 (leukocytosis yesterday = 18.9). Preliminary 2/2 blood cultures = no growth to date.      Plan:  Continue vancomycin 1000 mg IV every 12 hours. Will order vancomycin trough prior to 11/21 2200 dose (HOLD vancomycin dose if Trough level [>20 mCg/mL].   Will continue to monitor renal function   Pharmacy to follow    Thank you for the consult,     Alise Stack, PharmD, [address]  PGY1 Pharmacy Resident     11/21/2022 9:33 AM

## 2022-11-21 NOTE — PROGRESS NOTES
Patient stated oxycodone not effective in pain relief. Would like something else. Doctor notified.  Awaiting response

## 2022-11-21 NOTE — PROGRESS NOTES
[facility]   ATTENDING PHYSICIAN PROGRESS NOTE     I have examined the patient, reviewed the record, and discussed the case with the APN/ Resident. I have reviewed all relevant labs and imaging data. Please refer to the APN/ resident's note. I agree with the assessment and plan with the following corrections/ additions. The following summarizes my clinical findings and independent assessment. CC: breast cellulitis    Patient reports ongoing pain in the right breast.    Awake and alert  Follows commands  Heart: Regular rate/rhythm; no murmur  Lungs: Fairly clear bilaterally  Abdomen: Soft; bowel sounds active; nontender/nondistended  Skin: Warm/dry; right breast with some induration but no fluctuance; tender to palpation; minimal erythema  Extremities: Moves all 4 extremities    Labs personally reviewed      Patient Active Problem List    Diagnosis Date Noted    Carbamazepine overdose with self-harm intent 06/26/2014    Sepsis ([facility] [address] 11/20/2022    Generalized abdominal pain 10/28/2022    Intractable abdominal pain 10/28/2022    Epigastric pain 05/01/2021    Pneumonia 03/03/2019    Suicidal overdose ([facility] [address] 06/27/2014    Depression 06/26/2014    Tobacco abuse     Seizure disorder ([facility] [address]     Asthma        Right breast cellulitis--no identifiable abscess to drain on ultrasound  Continue antibiotics  Dispo per primary service  Please call if needed    Julio Paul MD, FACS  11/21/2022  3:53 PM    NOTE: This report was transcribed using voice recognition software. Every effort was made to ensure accuracy; however, inadvertent computerized transcription errors may be present.

## 2022-11-21 NOTE — PROGRESS NOTES
Hospitalist Progress Note      SYNOPSIS: Patient admitted on 2022       SUBJECTIVE:    Patient was seen at bedside today. She endorses severe pain on the abscess overnight. She appeared to be eating her breakfast comfortably  when I entered the room but she endorsed having severe pain at the time   She endorses allergy to timolol, NSAIDs, tramadol  Records reviewed. Stable overnight. No overnight issues reported    Temp (24hrs), Av.6 °F (36.4 °C), Min:97.4 °F (36.3 °C), Max:97.8 °F (36.6 °C)    DIET: ADULT DIET; Regular  CODE: Full Code    Intake/Output Summary (Last 24 hours) at 2022 0755  Last data filed at 2022 1300  Gross per 24 hour   Intake 420 ml   Output --   Net 420 ml       OBJECTIVE:    BP (!) 158/93   Pulse 92   Temp 97.4 °F (36.3 °C) (Temporal)   Resp 22   Ht 4' 11\" (1.499 m)   Wt 150 lb (68 kg)   LMP 10/31/2022 (Exact Date)   SpO2 100%   BMI 30.30 kg/m²     General appearance: No apparent distress, appears stated age and cooperative. HEENT:  Normocephalic. Conjunctivae/corneas clear. Moist mucosa   Neck: Supple. No jugular venous distention. Thyroid not visible, non tender   Respiratory: Normal respiratory effort. Clear to auscultation bilaterally. No stridor/wheezing/rhonchi/crackles   Cardiovascular: Regular heart beats, normal S1-S2. No M/G/R  Abdomen: Non distended, soft, non tender, no visceromegaly, no mass, normal bowel sounds   Musculoskeletal: No clubbing, cyanosis, no bilateral lower extremity edema. Brisk capillary refill. Skin:  No rashes  on visible skin  Neurologic: awake, alert and oriented x 3. Following commands. No focal neurological deficit. Cranial nerves 2-12 grossly normal      ASSESSMENT:    Right breast mastitis/abscess.   Concern for inflammatory breast cancer as there is a significant family history  Sepsis has been ruled out  Significant family history of cancer, breast cancer in her mother, ovarian cancer in a sister  History of tobacco abuse  History of seizure disorder  History of suicidal overdose in 2014  Multiple visit to the ER hospital admissions in recent years     PLAN:    -And evaluated per infectious disease. On Zosyn and Vanco plan to switch over to oral discharge  -Seen by general surgery with no plan for intervention as abscess is too small to drain  -Pain management with oxycodone 10 mg every 4 hours for moderate/severe pain    DVT prophylaxis lovenox    DISPOSITION:     Medications:  REVIEWED DAILY    Infusion Medications    sodium chloride      sodium chloride 75 mL/hr at 11/20/22 5408     Scheduled Medications    piperacillin-tazobactam  3,375 mg IntraVENous Q8H    amLODIPine  5 mg Oral QAM    carBAMazepine  200 mg Oral BID WC    diphenhydrAMINE  25 mg Oral Q6H    gabapentin  100 mg Oral TID    cetirizine  10 mg Oral Daily    pantoprazole  40 mg Oral Daily with breakfast    sucralfate  1 g Oral 4x Daily    sodium chloride flush  10 mL IntraVENous 2 times per day    enoxaparin  40 mg SubCUTAneous Daily    vancomycin  1,000 mg IntraVENous Q12H     PRN Meds: fentanNYL, sodium chloride flush, sodium chloride, promethazine **OR** ondansetron, polyethylene glycol, oxyCODONE    Labs:     Recent Labs     11/19/22 2234 11/20/22  0639   WBC 27.0* 18.9*   HGB 14.1 12.0   HCT 44.1 36.8    213       Recent Labs     11/19/22 2234 11/20/22  0639    136   K 4.2 3.5    102   CO2 21* 20*   BUN 8 11   CREATININE 0.6 0.8   CALCIUM 9.2 8.9       Recent Labs     11/20/22  0639   PROT 7.1   ALKPHOS 89   ALT 14   AST 16   BILITOT 0.5       No results for input(s): INR in the last 72 hours. No results for input(s): Pramod Pander in the last 72 hours.     Chronic labs:    Lab Results   Component Value Date    TSH 0.433 11/11/2016    INR 1.0 10/14/2022       Radiology: REVIEWED DAILY    +++++++++++++++++++++++++++++++++++++++++++++++++  Linda German MD  [facility] Physician - [address], [place] [id number] [address]  +++++++++++++++++++++++++++++++++++++++++++++++++  NOTE: This report was transcribed using voice recognition software. Every effort was made to ensure accuracy; however, inadvertent computerized transcription errors may be present.

## 2022-11-22 LAB
HCT VFR BLD CALC: 37 % (ref 34–48)
HEMOGLOBIN: 12.1 G/DL (ref 11.5–15.5)
MCH RBC QN AUTO: 25.5 PG (ref 26–35)
MCHC RBC AUTO-ENTMCNC: 32.7 % (ref 32–34.5)
MCV RBC AUTO: 77.9 FL (ref 80–99.9)
PDW BLD-RTO: 15.7 FL (ref 11.5–15)
PLATELET # BLD: 206 E9/L (ref 130–450)
PMV BLD AUTO: 9.4 FL (ref 7–12)
RBC # BLD: 4.75 E12/L (ref 3.5–5.5)
VANCOMYCIN TROUGH: 8.6 MCG/ML (ref 5–16)
WBC # BLD: 8.6 E9/L (ref 4.5–11.5)

## 2022-11-22 PROCEDURE — 2580000003 HC RX 258

## 2022-11-22 PROCEDURE — 1200000000 HC SEMI PRIVATE

## 2022-11-22 PROCEDURE — 6360000002 HC RX W HCPCS

## 2022-11-22 PROCEDURE — 2580000003 HC RX 258: Performed by: FAMILY MEDICINE

## 2022-11-22 PROCEDURE — 6360000002 HC RX W HCPCS: Performed by: FAMILY MEDICINE

## 2022-11-22 PROCEDURE — 6370000000 HC RX 637 (ALT 250 FOR IP): Performed by: FAMILY MEDICINE

## 2022-11-22 PROCEDURE — 85027 COMPLETE CBC AUTOMATED: CPT

## 2022-11-22 PROCEDURE — 6370000000 HC RX 637 (ALT 250 FOR IP): Performed by: STUDENT IN AN ORGANIZED HEALTH CARE EDUCATION/TRAINING PROGRAM

## 2022-11-22 PROCEDURE — 36415 COLL VENOUS BLD VENIPUNCTURE: CPT

## 2022-11-22 RX ORDER — MECOBALAMIN 5000 MCG
5 TABLET,DISINTEGRATING ORAL NIGHTLY PRN
Status: DISCONTINUED | OUTPATIENT
Start: 2022-11-23 | End: 2022-11-24 | Stop reason: HOSPADM

## 2022-11-22 RX ORDER — AMLODIPINE BESYLATE 10 MG/1
10 TABLET ORAL EVERY MORNING
Status: DISCONTINUED | OUTPATIENT
Start: 2022-11-22 | End: 2022-11-24 | Stop reason: HOSPADM

## 2022-11-22 RX ADMIN — GABAPENTIN 100 MG: 100 CAPSULE ORAL at 21:07

## 2022-11-22 RX ADMIN — OXYCODONE HYDROCHLORIDE 10 MG: 10 TABLET ORAL at 11:00

## 2022-11-22 RX ADMIN — CARBAMAZEPINE 200 MG: 200 TABLET ORAL at 10:05

## 2022-11-22 RX ADMIN — OXYCODONE HYDROCHLORIDE 10 MG: 10 TABLET ORAL at 21:07

## 2022-11-22 RX ADMIN — OXYCODONE HYDROCHLORIDE 10 MG: 10 TABLET ORAL at 02:24

## 2022-11-22 RX ADMIN — OXYCODONE HYDROCHLORIDE 10 MG: 10 TABLET ORAL at 06:35

## 2022-11-22 RX ADMIN — DIPHENHYDRAMINE HYDROCHLORIDE 25 MG: 25 TABLET ORAL at 00:16

## 2022-11-22 RX ADMIN — VANCOMYCIN HYDROCHLORIDE 1000 MG: 1 INJECTION, POWDER, LYOPHILIZED, FOR SOLUTION INTRAVENOUS at 16:35

## 2022-11-22 RX ADMIN — SUCRALFATE 1 G: 1 TABLET ORAL at 21:07

## 2022-11-22 RX ADMIN — VANCOMYCIN HYDROCHLORIDE 1000 MG: 1 INJECTION, POWDER, LYOPHILIZED, FOR SOLUTION INTRAVENOUS at 00:40

## 2022-11-22 RX ADMIN — PANTOPRAZOLE SODIUM 40 MG: 40 TABLET, DELAYED RELEASE ORAL at 10:06

## 2022-11-22 RX ADMIN — AMLODIPINE BESYLATE 10 MG: 10 TABLET ORAL at 10:06

## 2022-11-22 RX ADMIN — SUCRALFATE 1 G: 1 TABLET ORAL at 15:21

## 2022-11-22 RX ADMIN — PIPERACILLIN AND TAZOBACTAM 3375 MG: 3; .375 INJECTION, POWDER, FOR SOLUTION INTRAVENOUS at 23:56

## 2022-11-22 RX ADMIN — SODIUM CHLORIDE, PRESERVATIVE FREE 10 ML: 5 INJECTION INTRAVENOUS at 10:06

## 2022-11-22 RX ADMIN — GABAPENTIN 100 MG: 100 CAPSULE ORAL at 10:05

## 2022-11-22 RX ADMIN — DIPHENHYDRAMINE HYDROCHLORIDE 25 MG: 25 TABLET ORAL at 18:36

## 2022-11-22 RX ADMIN — DIPHENHYDRAMINE HYDROCHLORIDE 25 MG: 25 TABLET ORAL at 23:56

## 2022-11-22 RX ADMIN — GABAPENTIN 100 MG: 100 CAPSULE ORAL at 15:21

## 2022-11-22 RX ADMIN — PIPERACILLIN AND TAZOBACTAM 3375 MG: 3; .375 INJECTION, POWDER, FOR SOLUTION INTRAVENOUS at 10:07

## 2022-11-22 RX ADMIN — OXYCODONE HYDROCHLORIDE 10 MG: 10 TABLET ORAL at 15:21

## 2022-11-22 RX ADMIN — PIPERACILLIN AND TAZOBACTAM 3375 MG: 3; .375 INJECTION, POWDER, FOR SOLUTION INTRAVENOUS at 00:16

## 2022-11-22 RX ADMIN — ENOXAPARIN SODIUM 40 MG: 100 INJECTION SUBCUTANEOUS at 10:05

## 2022-11-22 RX ADMIN — DIPHENHYDRAMINE HYDROCHLORIDE 25 MG: 25 TABLET ORAL at 15:21

## 2022-11-22 RX ADMIN — SUCRALFATE 1 G: 1 TABLET ORAL at 10:05

## 2022-11-22 RX ADMIN — DIPHENHYDRAMINE HYDROCHLORIDE 25 MG: 25 TABLET ORAL at 06:35

## 2022-11-22 RX ADMIN — SODIUM CHLORIDE, PRESERVATIVE FREE 10 ML: 5 INJECTION INTRAVENOUS at 21:08

## 2022-11-22 RX ADMIN — SUCRALFATE 1 G: 1 TABLET ORAL at 18:36

## 2022-11-22 RX ADMIN — CETIRIZINE HYDROCHLORIDE 10 MG: 10 TABLET, FILM COATED ORAL at 10:06

## 2022-11-22 ASSESSMENT — PAIN DESCRIPTION - ONSET
ONSET: GRADUAL
ONSET: GRADUAL

## 2022-11-22 ASSESSMENT — PAIN DESCRIPTION - DESCRIPTORS
DESCRIPTORS: ACHING
DESCRIPTORS: ACHING

## 2022-11-22 ASSESSMENT — PAIN DESCRIPTION - PAIN TYPE
TYPE: ACUTE PAIN
TYPE: ACUTE PAIN

## 2022-11-22 ASSESSMENT — PAIN DESCRIPTION - FREQUENCY
FREQUENCY: INTERMITTENT
FREQUENCY: INTERMITTENT

## 2022-11-22 NOTE — PLAN OF CARE
Problem: Discharge Planning  Goal: Discharge to home or other facility with appropriate resources  Outcome: Progressing  Flowsheets (Taken 11/22/2022 0900)  Discharge to home or other facility with appropriate resources: Identify barriers to discharge with patient and caregiver     Problem: Pain  Goal: Verbalizes/displays adequate comfort level or baseline comfort level  Outcome: Progressing  Flowsheets (Taken 11/22/2022 0755)  Verbalizes/displays adequate comfort level or baseline comfort level: Encourage patient to monitor pain and request assistance

## 2022-11-22 NOTE — PROGRESS NOTES
GENERAL SURGERY  DAILY PROGRESS NOTE  11/22/2022    Chief Complaint   Patient presents with    Breast Problem     Pt to ED with c/o R-breast pain x4 days. Pt states she has had \"puss and blood\" draining. Subjective:  Surgery notified of worsening breast pain and asked to re-evaluate. Some induration and erythema over right breast. No palpable fluctuance. WBC improved from 8.6 from 9.6 yesterday. No nausea or vomiting. Objective:  BP (!) 156/82   Pulse 77   Temp 97.7 °F (36.5 °C) (Temporal)   Resp 19   Ht 4' 11\" (1.499 m)   Wt 150 lb (68 kg)   LMP 10/31/2022 (Exact Date)   SpO2 100%   BMI 30.30 kg/m²     GENERAL:  Laying in bed, awake, alert, cooperative, no apparent distress  HEAD: Normocephalic, atraumatic  EYES: No sclera icterus, pupils equal  LUNGS:  No increased work of breathing  CARDIOVASCULAR:  regular rate   BREAST: right breast with tenderness over R nipple. Some erythema. Induration to palpation. No fluctuance.    ABDOMEN:  Soft, non-tender, non-distended  EXTREMITIES: No edema or swelling  SKIN: Warm and dry    Assessment/Plan:  40 y.o. female right breast cellulitis     - continue antibiotics   - no acute surgical intervention at this time   - outpatient follow up in breast clinic     Electronically signed by Janice Harp MD on 11/22/2022 at 12:46 PM

## 2022-11-22 NOTE — PROGRESS NOTES
Pharmacy Consultation Note  (Antibiotic Dosing and Monitoring)    Initial consult date: 11/20/22  Consulting physician/provider: Esha Hobson  Drug: Vancomycin  Indication: Sepsis of Unknown Etiology; 7 days     Age/  Gender Height Weight IBW  Allergy Information   42 y.o./female 4' 11\" (149.9 cm) 150 lb (68 kg)     Ideal body weight: 48.8 kg (107 lb 8.4 oz)  Adjusted ideal body weight: 56.5 kg (124 lb 8.2 oz)   Ketorolac, Darvocet [propoxyphene n-acetaminophen], Ibuprofen, Meloxicam, Naproxen, and Tramadol      Renal Function:  Recent Labs     11/19/22  2234 11/20/22  0639 11/21/22  1025   BUN 8 11  --    CREATININE 0.6 0.8 0.8         Intake/Output Summary (Last 24 hours) at 11/22/2022 0958  Last data filed at 11/21/2022 2030  Gross per 24 hour   Intake 420 ml   Output --   Net 420 ml         Vancomycin Monitoring:  Trough:    Recent Labs     11/21/22  2145   [address] 8.6     Random:  No results for input(s): VANCORANDOM in the last 72 hours. No results for input(s): [address] [place] in the last 72 hours. Historical Cultures:  Organism   Date Value Ref Range Status   11/04/2022 Proteus mirabilis (A)  Final     Recent Labs     11/20/22  0030   BC 24 Hours no growth         Vancomycin Administration Times:  Recent vancomycin administrations                     vancomycin (VANCOCIN) 1,000 mg in dextrose 5 % 250 mL IVPB ([id number]) (mg) 1,000 mg New Bag 11/22/22 0040     1,000 mg New Bag 11/21/22 1221     1,000 mg New Bag 11/20/22 2232    vancomycin 1500 mg in dextrose 5% 300 mL IVPB (mg) 1,500 mg New Bag 11/20/22 1404             Assessment:  Patient is a 40 y.o. female who has been initiated on vancomycin  Estimated Creatinine Clearance: 83 mL/min (based on SCr of 0.8 mg/dL). To dose vancomycin, pharmacy will be utilizing the traditional dosing method targeting a trough of 15-20 mcg/mL. 11/20: day #1 vanco; SCr: 0.8  11/21: Day # 2 of vancomycin. Scr: 0.8 mg/dL. WBC WNL = 9.6 (leukocytosis yesterday = 18.9). Preliminary 2/2 blood cultures = no growth to date. 11/22: Day # 3 of vancomycin. No Scr today. Vancomycin trough = 8.6 mcg/mL (~9 hours post-dose). Preliminary 2/2 Blood cultures = no growth to date. Plan:  Increase vancomycin dose to 1,250 mg IV every 12 hours. Will order vancomycin levels when appropriate. Will continue to monitor renal function; Ordered Scr with 11/23 AM labs.    Pharmacy to follow    Thank you for the consult,     Devang Barraza, PharmD, [address]  PGY1 Pharmacy Resident     11/22/2022 9:58 AM

## 2022-11-22 NOTE — PROGRESS NOTES
Patient was off the floor for dental procedure at the time of my visit. I will see her again tomorrow. [facility] PROGRESS NOTE      Chief complaint: Follow-up of right mastitis    The patient is a 40 y.o. female with history of seizure disorder, presented on 11/19 with right breast pain accompanied by bloody and purulent nipple discharge for 4 days. On admission, she had T-max of 100.2 °F with leukocytosis of 27,000. CT chest showed small 1.1 cm fluid collection involving areola/nipple of the right breast, patchy groundglass opacification throughout both lungs. Right breast US showed cellulitis in the right breast retroareolar region with no drainable fluid collection identified. Piperacillin-tazobactam and vancomycin were started on admission. Labs, imaging, and medical records/notes were personally reviewed. Assessment:  Mastitis with questionable abscess, right breast. CT chest showed small 1.1 cm fluid collection involving areola/nipple of the right breast, patchy groundglass opacification throughout both lungs. Right breast US showed cellulitis in the right breast retroareolar region with no drainable fluid collection identified. Recommendations:  Continue piperacillin-tazobactam 3.375 g every 8 hours and vancomycin dosed according to level per pharmacy. Anticipate switch to oral antibiotic course on discharge. Continue supportive care. Covering for Dr Shannon Velez  Right breast looks much better  Anticipate po meds tomorrow     Thank you for involving me in the care of Hima Sandoval. I will continue to follow. Please do not hesitate to call for any questions or concerns.     Electronically signed by Luís Tsang MD on 11/22/2022 at 2:25 PM

## 2022-11-22 NOTE — PROGRESS NOTES
Dr. Radha tarango served regarding Dr. Shakeel Grewal would like patient abscess to be assessed again. Awaiting reply.

## 2022-11-22 NOTE — PROGRESS NOTES
Hospitalist Progress Note      SYNOPSIS: Patient admitted on 2022       SUBJECTIVE:    Patient was seen at bedside today. She endorses severe pain on the abscess overnight. She appeared to be eating her breakfast comfortably  when I entered the room but she endorsed having severe pain at the time   She endorses allergy to timolol, NSAIDs, tramadol  Records reviewed. Stable overnight. No overnight issues reported    Temp (24hrs), Av.1 °F (36.2 °C), Min:96.5 °F (35.8 °C), Max:97.7 °F (36.5 °C)    DIET: ADULT DIET; Regular  CODE: Full Code    Intake/Output Summary (Last 24 hours) at 2022 1358  Last data filed at 2022 1055  Gross per 24 hour   Intake 660 ml   Output --   Net 660 ml       OBJECTIVE:    BP (!) 156/82   Pulse 77   Temp 97.7 °F (36.5 °C) (Temporal)   Resp 19   Ht 4' 11\" (1.499 m)   Wt 150 lb (68 kg)   LMP 10/31/2022 (Exact Date)   SpO2 100%   BMI 30.30 kg/m²     General appearance: No apparent distress, appears stated age and cooperative. HEENT:  Normocephalic. Conjunctivae/corneas clear. Moist mucosa   Neck: Supple. No jugular venous distention. Thyroid not visible, non tender   Respiratory: Normal respiratory effort. Clear to auscultation bilaterally. No stridor/wheezing/rhonchi/crackles   Right breast: Retroareolar tenderness, hard lump about 2 cm   Cardiovascular: Regular heart beats, normal S1-S2. No M/G/R  Abdomen: Non distended, soft, non tender, no visceromegaly, no mass, normal bowel sounds   Musculoskeletal: No clubbing, cyanosis, no bilateral lower extremity edema. Brisk capillary refill. Skin:  No rashes  on visible skin  Neurologic: awake, alert and oriented x 3. Following commands. No focal neurological deficit. Cranial nerves 2-12 grossly normal      ASSESSMENT:    Right breast mastitis/abscess.   Concern for inflammatory breast cancer as there is a significant family history  Sepsis has been ruled out  Significant family history of cancer, breast cancer in her mother, ovarian cancer in a sister  History of tobacco abuse  History of seizure disorder  History of suicidal overdose in 2014  Multiple visit to the ER hospital admissions in recent years     PLAN:    -Evaluated per infectious disease. On Zosyn and Vanco plan to switch over to oral discharge  -Pain management with oxycodone 10 mg every 4 hours for moderate/severe pain  -Physical exam the abscess on the right breast appears to be greater than 1.0 cm. Surgery stated that no surgical intervention as of today 11/22    DVT prophylaxis lovenox    DISPOSITION:     Medications:  REVIEWED DAILY    Infusion Medications    sodium chloride      sodium chloride 75 mL/hr at 11/20/22 8468     Scheduled Medications    amLODIPine  10 mg Oral QAM    piperacillin-tazobactam  3,375 mg IntraVENous Q8H    carBAMazepine  200 mg Oral BID WC    diphenhydrAMINE  25 mg Oral Q6H    gabapentin  100 mg Oral TID    cetirizine  10 mg Oral Daily    pantoprazole  40 mg Oral Daily with breakfast    sucralfate  1 g Oral 4x Daily    sodium chloride flush  10 mL IntraVENous 2 times per day    enoxaparin  40 mg SubCUTAneous Daily    vancomycin  1,000 mg IntraVENous Q12H     PRN Meds: oxyCODONE, sodium chloride flush, sodium chloride, promethazine **OR** ondansetron, polyethylene glycol    Labs:     Recent Labs     11/20/22  0639 11/21/22  0810 11/22/22  0522   WBC 18.9* 9.6 8.6   HGB 12.0 12.0 12.1   HCT 36.8 37.5 37.0    201 206       Recent Labs     11/19/22  2234 11/20/22  0639 11/21/22  1025    136  --    K 4.2 3.5  --     102  --    CO2 21* 20*  --    BUN 8 11  --    CREATININE 0.6 0.8 0.8   CALCIUM 9.2 8.9  --        Recent Labs     11/20/22  0639   PROT 7.1   ALKPHOS 89   ALT 14   AST 16   BILITOT 0.5       No results for input(s): INR in the last 72 hours. No results for input(s): Jessica Ospina in the last 72 hours.     Chronic labs:    Lab Results   Component Value Date    TSH 0.433 11/11/2016    INR 1.0 10/14/2022       Radiology: REVIEWED DAILY    +++++++++++++++++++++++++++++++++++++++++++++++++  Alayna Denise MD  [facility] Physician - [address] [place] [address]  +++++++++++++++++++++++++++++++++++++++++++++++++  NOTE: This report was transcribed using voice recognition software. Every effort was made to ensure accuracy; however, inadvertent computerized transcription errors may be present.

## 2022-11-23 LAB
CREAT SERPL-MCNC: 0.9 MG/DL (ref 0.5–1)
GFR SERPL CREATININE-BSD FRML MDRD: >60 ML/MIN/1.73
HCT VFR BLD CALC: 38.8 % (ref 34–48)
HEMOGLOBIN: 12.3 G/DL (ref 11.5–15.5)
MCH RBC QN AUTO: 24.6 PG (ref 26–35)
MCHC RBC AUTO-ENTMCNC: 31.7 % (ref 32–34.5)
MCV RBC AUTO: 77.8 FL (ref 80–99.9)
PDW BLD-RTO: 15.7 FL (ref 11.5–15)
PLATELET # BLD: 210 E9/L (ref 130–450)
PMV BLD AUTO: 9.7 FL (ref 7–12)
RBC # BLD: 4.99 E12/L (ref 3.5–5.5)
WBC # BLD: 7.3 E9/L (ref 4.5–11.5)

## 2022-11-23 PROCEDURE — 6360000002 HC RX W HCPCS

## 2022-11-23 PROCEDURE — 82565 ASSAY OF CREATININE: CPT

## 2022-11-23 PROCEDURE — 6370000000 HC RX 637 (ALT 250 FOR IP): Performed by: FAMILY MEDICINE

## 2022-11-23 PROCEDURE — 85027 COMPLETE CBC AUTOMATED: CPT

## 2022-11-23 PROCEDURE — 2580000003 HC RX 258

## 2022-11-23 PROCEDURE — 6370000000 HC RX 637 (ALT 250 FOR IP): Performed by: STUDENT IN AN ORGANIZED HEALTH CARE EDUCATION/TRAINING PROGRAM

## 2022-11-23 PROCEDURE — 1200000000 HC SEMI PRIVATE

## 2022-11-23 PROCEDURE — 99232 SBSQ HOSP IP/OBS MODERATE 35: CPT | Performed by: SURGERY

## 2022-11-23 PROCEDURE — 6370000000 HC RX 637 (ALT 250 FOR IP): Performed by: INTERNAL MEDICINE

## 2022-11-23 PROCEDURE — 6360000002 HC RX W HCPCS: Performed by: FAMILY MEDICINE

## 2022-11-23 PROCEDURE — 2580000003 HC RX 258: Performed by: FAMILY MEDICINE

## 2022-11-23 PROCEDURE — 36415 COLL VENOUS BLD VENIPUNCTURE: CPT

## 2022-11-23 RX ORDER — OXYCODONE HYDROCHLORIDE 10 MG/1
10 TABLET ORAL EVERY 6 HOURS PRN
Status: DISCONTINUED | OUTPATIENT
Start: 2022-11-23 | End: 2022-11-24

## 2022-11-23 RX ADMIN — DIPHENHYDRAMINE HYDROCHLORIDE 25 MG: 25 TABLET ORAL at 12:09

## 2022-11-23 RX ADMIN — CETIRIZINE HYDROCHLORIDE 10 MG: 10 TABLET, FILM COATED ORAL at 08:27

## 2022-11-23 RX ADMIN — CARBAMAZEPINE 200 MG: 200 TABLET ORAL at 17:27

## 2022-11-23 RX ADMIN — PIPERACILLIN AND TAZOBACTAM 3375 MG: 3; .375 INJECTION, POWDER, FOR SOLUTION INTRAVENOUS at 17:33

## 2022-11-23 RX ADMIN — Medication 5 MG: at 20:27

## 2022-11-23 RX ADMIN — SUCRALFATE 1 G: 1 TABLET ORAL at 12:32

## 2022-11-23 RX ADMIN — OXYCODONE HYDROCHLORIDE 10 MG: 10 TABLET ORAL at 04:07

## 2022-11-23 RX ADMIN — SUCRALFATE 1 G: 1 TABLET ORAL at 17:27

## 2022-11-23 RX ADMIN — PIPERACILLIN AND TAZOBACTAM 3375 MG: 3; .375 INJECTION, POWDER, FOR SOLUTION INTRAVENOUS at 08:34

## 2022-11-23 RX ADMIN — ENOXAPARIN SODIUM 40 MG: 100 INJECTION SUBCUTANEOUS at 08:27

## 2022-11-23 RX ADMIN — PANTOPRAZOLE SODIUM 40 MG: 40 TABLET, DELAYED RELEASE ORAL at 08:26

## 2022-11-23 RX ADMIN — CARBAMAZEPINE 200 MG: 200 TABLET ORAL at 08:27

## 2022-11-23 RX ADMIN — VANCOMYCIN HYDROCHLORIDE 1250 MG: 10 INJECTION, POWDER, LYOPHILIZED, FOR SOLUTION INTRAVENOUS at 15:47

## 2022-11-23 RX ADMIN — AMLODIPINE BESYLATE 10 MG: 10 TABLET ORAL at 08:26

## 2022-11-23 RX ADMIN — VANCOMYCIN HYDROCHLORIDE 1250 MG: 10 INJECTION, POWDER, LYOPHILIZED, FOR SOLUTION INTRAVENOUS at 04:07

## 2022-11-23 RX ADMIN — GABAPENTIN 100 MG: 100 CAPSULE ORAL at 20:27

## 2022-11-23 RX ADMIN — GABAPENTIN 100 MG: 100 CAPSULE ORAL at 08:26

## 2022-11-23 RX ADMIN — SUCRALFATE 1 G: 1 TABLET ORAL at 20:27

## 2022-11-23 RX ADMIN — OXYCODONE HYDROCHLORIDE 10 MG: 10 TABLET ORAL at 12:32

## 2022-11-23 RX ADMIN — DIPHENHYDRAMINE HYDROCHLORIDE 25 MG: 25 TABLET ORAL at 17:27

## 2022-11-23 RX ADMIN — GABAPENTIN 100 MG: 100 CAPSULE ORAL at 13:38

## 2022-11-23 RX ADMIN — OXYCODONE HYDROCHLORIDE 10 MG: 10 TABLET ORAL at 18:31

## 2022-11-23 RX ADMIN — OXYCODONE HYDROCHLORIDE 10 MG: 10 TABLET ORAL at 08:26

## 2022-11-23 RX ADMIN — SODIUM CHLORIDE, PRESERVATIVE FREE 10 ML: 5 INJECTION INTRAVENOUS at 08:31

## 2022-11-23 RX ADMIN — DIPHENHYDRAMINE HYDROCHLORIDE 25 MG: 25 TABLET ORAL at 06:11

## 2022-11-23 RX ADMIN — SUCRALFATE 1 G: 1 TABLET ORAL at 08:26

## 2022-11-23 ASSESSMENT — PAIN SCALES - GENERAL
PAINLEVEL_OUTOF10: 9
PAINLEVEL_OUTOF10: 10
PAINLEVEL_OUTOF10: 8
PAINLEVEL_OUTOF10: 9
PAINLEVEL_OUTOF10: 9
PAINLEVEL_OUTOF10: 10

## 2022-11-23 ASSESSMENT — PAIN DESCRIPTION - LOCATION
LOCATION: BREAST

## 2022-11-23 ASSESSMENT — PAIN DESCRIPTION - FREQUENCY: FREQUENCY: CONTINUOUS

## 2022-11-23 ASSESSMENT — PAIN SCALES - WONG BAKER
WONGBAKER_NUMERICALRESPONSE: 0

## 2022-11-23 ASSESSMENT — PAIN DESCRIPTION - ORIENTATION
ORIENTATION: RIGHT

## 2022-11-23 ASSESSMENT — PAIN DESCRIPTION - DESCRIPTORS
DESCRIPTORS: ACHING

## 2022-11-23 ASSESSMENT — PAIN DESCRIPTION - ONSET: ONSET: ON-GOING

## 2022-11-23 ASSESSMENT — PAIN DESCRIPTION - PAIN TYPE: TYPE: ACUTE PAIN

## 2022-11-23 NOTE — PROGRESS NOTES
Hospitalist Progress Note      SYNOPSIS: Patient admitted on 2022       SUBJECTIVE:    Patient was seen at bedside today. Continues to endorse right breast pain    Stable overnight. No overnight issues reported    Temp (24hrs), Av.2 °F (36.2 °C), Min:97.1 °F (36.2 °C), Max:97.2 °F (36.2 °C)    DIET: ADULT DIET; Regular  CODE: Full Code    Intake/Output Summary (Last 24 hours) at 2022 1521  Last data filed at 2022 1917  Gross per 24 hour   Intake 240 ml   Output --   Net 240 ml       OBJECTIVE:    BP (!) 142/83   Pulse 85   Temp 97.2 °F (36.2 °C) (Temporal)   Resp 18   Ht 4' 11\" (1.499 m)   Wt 150 lb (68 kg)   LMP 10/31/2022 (Exact Date)   SpO2 100%   BMI 30.30 kg/m²     General appearance: No apparent distress, appears stated age and cooperative. HEENT:  Normocephalic. Conjunctivae/corneas clear. Moist mucosa   Neck: Supple. No jugular venous distention. Thyroid not visible, non tender   Respiratory: Normal respiratory effort. Clear to auscultation bilaterally. No stridor/wheezing/rhonchi/crackles   Right breast: Retroareolar tenderness, hard lump about 2 cm   Cardiovascular: Regular heart beats, normal S1-S2. No M/G/R  Abdomen: Non distended, soft, non tender, no visceromegaly, no mass, normal bowel sounds   Musculoskeletal: No clubbing, cyanosis, no bilateral lower extremity edema. Brisk capillary refill. Skin:  No rashes  on visible skin  Neurologic: awake, alert and oriented x 3. Following commands. No focal neurological deficit. Cranial nerves 2-12 grossly normal      ASSESSMENT:    Right breast mastitis/abscess.   Concern for inflammatory breast cancer as there is a significant family history  Sepsis has been ruled out  Significant family history of cancer, breast cancer in her mother, ovarian cancer in a sister  History of tobacco abuse  History of seizure disorder  History of suicidal overdose in   Multiple visit to the ER hospital admissions in recent years PLAN:    -Evaluated per infectious disease. On Zosyn and Vanco   -Pain management with oxycodone 10 mg every 4 hours for moderate/severe pain  -Reassess later today. Stated there is no indication for drainage as it is just induration  -Infectious disease anticipated switching to oral upon discharge. DVT prophylaxis lovenox    DISPOSITION: Home when cleared by infectious disease on oral antibiotics    Medications:  REVIEWED DAILY    Infusion Medications    sodium chloride      sodium chloride 75 mL/hr at 11/20/22 0239     Scheduled Medications    amLODIPine  10 mg Oral QAM    vancomycin  1,250 mg IntraVENous Q12H    piperacillin-tazobactam  3,375 mg IntraVENous Q8H    carBAMazepine  200 mg Oral BID WC    diphenhydrAMINE  25 mg Oral Q6H    gabapentin  100 mg Oral TID    cetirizine  10 mg Oral Daily    pantoprazole  40 mg Oral Daily with breakfast    sucralfate  1 g Oral 4x Daily    sodium chloride flush  10 mL IntraVENous 2 times per day    enoxaparin  40 mg SubCUTAneous Daily     PRN Meds: melatonin, oxyCODONE, sodium chloride flush, sodium chloride, promethazine **OR** ondansetron, polyethylene glycol    Labs:     Recent Labs     11/21/22  0810 11/22/22  0522 11/23/22  0454   WBC 9.6 8.6 7.3   HGB 12.0 12.1 12.3   HCT 37.5 37.0 38.8    206 210       Recent Labs     11/21/22  1025 11/23/22  0454   CREATININE 0.8 0.9       No results for input(s): PROT, ALB, ALKPHOS, ALT, AST, BILITOT, AMYLASE, LIPASE in the last 72 hours. No results for input(s): INR in the last 72 hours. No results for input(s): Rissa Maritas in the last 72 hours.     Chronic labs:    Lab Results   Component Value Date    TSH 0.433 11/11/2016    INR 1.0 10/14/2022       Radiology: REVIEWED DAILY    +++++++++++++++++++++++++++++++++++++++++++++++++  Solange Holland MD  [facility] Physician - [address] [facility], [place]  +++++++++++++++++++++++++++++++++++++++++++++++++  NOTE: This report was transcribed using voice recognition software. Every effort was made to ensure accuracy; however, inadvertent computerized transcription errors may be present.

## 2022-11-23 NOTE — PROGRESS NOTES
Pharmacy Consultation Note  (Antibiotic Dosing and Monitoring)    Initial consult date: 11/20/22  Consulting physician/provider: Guero Coburn  Drug: Vancomycin  Indication: Sepsis of Unknown Etiology; 7 days     Age/  Gender Height Weight IBW  Allergy Information   42 y.o./female 4' 11\" (149.9 cm) 150 lb (68 kg)     Ideal body weight: 48.8 kg (107 lb 8.4 oz)  Adjusted ideal body weight: 56.5 kg (124 lb 8.2 oz)   Ketorolac, Darvocet [propoxyphene n-acetaminophen], Ibuprofen, Meloxicam, Naproxen, and Tramadol      Renal Function:  Recent Labs     11/21/22  1025 11/23/22  0454   CREATININE 0.8 0.9         Intake/Output Summary (Last 24 hours) at 11/23/2022 0915  Last data filed at 11/22/2022 1917  Gross per 24 hour   Intake 720 ml   Output --   Net 720 ml         Vancomycin Monitoring:  Trough:    Recent Labs     11/21/22  2145   [address] 8.6       Random:  No results for input(s): VANCORANDOM in the last 72 hours. No results for input(s): Emelina Nettle in the last 72 hours. Historical Cultures:  Organism   Date Value Ref Range Status   11/04/2022 Proteus mirabilis (A)  Final     No results for input(s): BC in the last 72 hours. Vancomycin Administration Times:  Recent vancomycin administrations                     vancomycin (VANCOCIN) 1,250 mg in dextrose 5 % 250 mL IVPB (mg) 1,250 mg New Bag 11/23/22 0407    vancomycin (VANCOCIN) 1,000 mg in dextrose 5 % 250 mL IVPB ([id number]) (mg) 1,000 mg New Bag 11/22/22 1635     1,000 mg New Bag  0040     1,000 mg New Bag 11/21/22 1221     1,000 mg New Bag 11/20/22 2232    vancomycin 1500 mg in dextrose 5% 300 mL IVPB (mg) 1,500 mg New Bag 11/20/22 1404               Assessment:  Patient is a 40 y.o. female who has been initiated on vancomycin  Estimated Creatinine Clearance: 73 mL/min (based on SCr of 0.9 mg/dL). To dose vancomycin, pharmacy will be utilizing the traditional dosing method targeting a trough of 15-20 mcg/mL.    11/20: day #1 vanco; SCr: 0.8  11/21: Day # 2 of vancomycin. Scr: 0.8 mg/dL. WBC WNL = 9.6 (leukocytosis yesterday = 18.9). Preliminary 2/2 blood cultures = no growth to date. 11/22: Day # 3 of vancomycin. No Scr today. Vancomycin trough = 8.6 mcg/mL (~9 hours post-dose). Preliminary 2/2 Blood cultures = no growth to date. 11/23: Day # 4 of vancomycin. Scr: 0.9 mg/dL. No UOP documented. WBC WNL and afebrile. Preliminary 2/2 Blood cultures = no growth to date. Plan:  Continue vancomycin 1,250 mg IV every 12 hours. Will order vancomycin levels when appropriate. Will continue to monitor renal function. Per ID, possible transition to PO antibiotics today, will follow their recommendations regarding continuation of vancomycin. Pharmacy to follow.      Thank you for the consult,     Bobby Romberg, PharmD, [address]  PGY1 Pharmacy Resident     11/23/2022 9:15 AM

## 2022-11-23 NOTE — PROGRESS NOTES
Patient was off the floor for dental procedure at the time of my visit. I will see her again tomorrow. [facility] PROGRESS NOTE      Chief complaint: Follow-up of right mastitis    The patient is a 40 y.o. female with history of seizure disorder, presented on 11/19 with right breast pain accompanied by bloody and purulent nipple discharge for 4 days. On admission, she had T-max of 100.2 °F with leukocytosis of 27,000. CT chest showed small 1.1 cm fluid collection involving areola/nipple of the right breast, patchy groundglass opacification throughout both lungs. Right breast US showed cellulitis in the right breast retroareolar region with no drainable fluid collection identified. Piperacillin-tazobactam and vancomycin were started on admission. Labs, imaging, and medical records/notes were personally reviewed. Assessment:  Mastitis with questionable abscess, right breast. CT chest showed small 1.1 cm fluid collection involving areola/nipple of the right breast, patchy groundglass opacification throughout both lungs. Right breast US showed cellulitis in the right breast retroareolar region with no drainable fluid collection identified. Recommendations:  Continue piperacillin-tazobactam 3.375 g every 8 hours and vancomycin dosed according to level per pharmacy. Anticipate switch to oral antibiotic course on discharge. Continue supportive care. Covering for Dr Fernando Verma  She now complains of pain different from yesterday and there is an indurated area underneath nipple  Will ask surgery to reasses     Thank you for involving me in the care of [facility]. I will continue to follow. Please do not hesitate to call for any questions or concerns.     Electronically signed by Dinah Cabot, MD on 11/23/2022 at 10:45 AM

## 2022-11-24 VITALS
TEMPERATURE: 98 F | WEIGHT: 150 LBS | RESPIRATION RATE: 16 BRPM | DIASTOLIC BLOOD PRESSURE: 89 MMHG | SYSTOLIC BLOOD PRESSURE: 148 MMHG | BODY MASS INDEX: 30.24 KG/M2 | HEIGHT: 59 IN | OXYGEN SATURATION: 100 % | HEART RATE: 78 BPM

## 2022-11-24 LAB
ANION GAP SERPL CALCULATED.3IONS-SCNC: 13 MMOL/L (ref 7–16)
BUN BLDV-MCNC: 26 MG/DL (ref 6–20)
CALCIUM SERPL-MCNC: 9.2 MG/DL (ref 8.6–10.2)
CHLORIDE BLD-SCNC: 104 MMOL/L (ref 98–107)
CO2: 21 MMOL/L (ref 22–29)
CREAT SERPL-MCNC: 0.9 MG/DL (ref 0.5–1)
GFR SERPL CREATININE-BSD FRML MDRD: >60 ML/MIN/1.73
GLUCOSE BLD-MCNC: 88 MG/DL (ref 74–99)
POTASSIUM SERPL-SCNC: 4.3 MMOL/L (ref 3.5–5)
SODIUM BLD-SCNC: 138 MMOL/L (ref 132–146)
VANCOMYCIN RANDOM: 7 MCG/ML (ref 5–40)

## 2022-11-24 PROCEDURE — 2580000003 HC RX 258: Performed by: FAMILY MEDICINE

## 2022-11-24 PROCEDURE — 6360000002 HC RX W HCPCS: Performed by: STUDENT IN AN ORGANIZED HEALTH CARE EDUCATION/TRAINING PROGRAM

## 2022-11-24 PROCEDURE — 36415 COLL VENOUS BLD VENIPUNCTURE: CPT

## 2022-11-24 PROCEDURE — 6360000002 HC RX W HCPCS

## 2022-11-24 PROCEDURE — 6360000002 HC RX W HCPCS: Performed by: FAMILY MEDICINE

## 2022-11-24 PROCEDURE — 80048 BASIC METABOLIC PNL TOTAL CA: CPT

## 2022-11-24 PROCEDURE — 6370000000 HC RX 637 (ALT 250 FOR IP): Performed by: STUDENT IN AN ORGANIZED HEALTH CARE EDUCATION/TRAINING PROGRAM

## 2022-11-24 PROCEDURE — 2580000003 HC RX 258

## 2022-11-24 PROCEDURE — 6370000000 HC RX 637 (ALT 250 FOR IP): Performed by: FAMILY MEDICINE

## 2022-11-24 PROCEDURE — 80202 ASSAY OF VANCOMYCIN: CPT

## 2022-11-24 RX ORDER — AMOXICILLIN AND CLAVULANATE POTASSIUM 875; 125 MG/1; MG/1
1 TABLET, FILM COATED ORAL 2 TIMES DAILY
Qty: 10 TABLET | Refills: 0 | Status: SHIPPED | OUTPATIENT
Start: 2022-11-24 | End: 2022-11-24 | Stop reason: SDUPTHER

## 2022-11-24 RX ORDER — AMOXICILLIN AND CLAVULANATE POTASSIUM 875; 125 MG/1; MG/1
1 TABLET, FILM COATED ORAL EVERY 12 HOURS SCHEDULED
Status: DISCONTINUED | OUTPATIENT
Start: 2022-11-24 | End: 2022-11-24 | Stop reason: HOSPADM

## 2022-11-24 RX ORDER — DOXYCYCLINE HYCLATE 100 MG
100 TABLET ORAL 2 TIMES DAILY
Qty: 10 TABLET | Refills: 0 | Status: SHIPPED | OUTPATIENT
Start: 2022-11-24 | End: 2022-11-29

## 2022-11-24 RX ORDER — DOXYCYCLINE HYCLATE 100 MG/1
100 CAPSULE ORAL EVERY 12 HOURS SCHEDULED
Status: DISCONTINUED | OUTPATIENT
Start: 2022-11-24 | End: 2022-11-24 | Stop reason: HOSPADM

## 2022-11-24 RX ORDER — AMLODIPINE BESYLATE 5 MG/1
10 TABLET ORAL EVERY MORNING
Qty: 30 TABLET | Refills: 0 | Status: SHIPPED | OUTPATIENT
Start: 2022-11-24

## 2022-11-24 RX ORDER — MORPHINE SULFATE 2 MG/ML
2 INJECTION, SOLUTION INTRAMUSCULAR; INTRAVENOUS ONCE
Status: COMPLETED | OUTPATIENT
Start: 2022-11-24 | End: 2022-11-24

## 2022-11-24 RX ORDER — OXYCODONE HYDROCHLORIDE 10 MG/1
10 TABLET ORAL EVERY 4 HOURS PRN
Qty: 15 TABLET | Refills: 0 | Status: SHIPPED | OUTPATIENT
Start: 2022-11-24 | End: 2022-11-24 | Stop reason: SDUPTHER

## 2022-11-24 RX ORDER — DOXYCYCLINE HYCLATE 100 MG
100 TABLET ORAL 2 TIMES DAILY
Qty: 10 TABLET | Refills: 0 | Status: SHIPPED | OUTPATIENT
Start: 2022-11-24 | End: 2022-11-24 | Stop reason: SDUPTHER

## 2022-11-24 RX ORDER — AMOXICILLIN AND CLAVULANATE POTASSIUM 875; 125 MG/1; MG/1
1 TABLET, FILM COATED ORAL 2 TIMES DAILY
Qty: 10 TABLET | Refills: 0 | Status: SHIPPED | OUTPATIENT
Start: 2022-11-24 | End: 2022-11-29

## 2022-11-24 RX ORDER — OXYCODONE HYDROCHLORIDE 10 MG/1
10 TABLET ORAL EVERY 4 HOURS PRN
Qty: 15 TABLET | Refills: 0 | Status: SHIPPED | OUTPATIENT
Start: 2022-11-24 | End: 2022-11-27

## 2022-11-24 RX ORDER — OXYCODONE HYDROCHLORIDE 10 MG/1
10 TABLET ORAL EVERY 4 HOURS PRN
Status: DISCONTINUED | OUTPATIENT
Start: 2022-11-24 | End: 2022-11-24 | Stop reason: HOSPADM

## 2022-11-24 RX ORDER — FENTANYL CITRATE 50 UG/ML
50 INJECTION, SOLUTION INTRAMUSCULAR; INTRAVENOUS
Status: DISCONTINUED | OUTPATIENT
Start: 2022-11-24 | End: 2022-11-24

## 2022-11-24 RX ADMIN — PANTOPRAZOLE SODIUM 40 MG: 40 TABLET, DELAYED RELEASE ORAL at 08:35

## 2022-11-24 RX ADMIN — GABAPENTIN 100 MG: 100 CAPSULE ORAL at 08:35

## 2022-11-24 RX ADMIN — SUCRALFATE 1 G: 1 TABLET ORAL at 08:35

## 2022-11-24 RX ADMIN — MORPHINE SULFATE 2 MG: 2 INJECTION, SOLUTION INTRAMUSCULAR; INTRAVENOUS at 14:34

## 2022-11-24 RX ADMIN — ENOXAPARIN SODIUM 40 MG: 100 INJECTION SUBCUTANEOUS at 08:34

## 2022-11-24 RX ADMIN — DIPHENHYDRAMINE HYDROCHLORIDE 25 MG: 25 TABLET ORAL at 00:05

## 2022-11-24 RX ADMIN — SUCRALFATE 1 G: 1 TABLET ORAL at 12:33

## 2022-11-24 RX ADMIN — CETIRIZINE HYDROCHLORIDE 10 MG: 10 TABLET, FILM COATED ORAL at 08:35

## 2022-11-24 RX ADMIN — AMLODIPINE BESYLATE 10 MG: 10 TABLET ORAL at 08:35

## 2022-11-24 RX ADMIN — OXYCODONE HYDROCHLORIDE 10 MG: 10 TABLET ORAL at 00:17

## 2022-11-24 RX ADMIN — CARBAMAZEPINE 200 MG: 200 TABLET ORAL at 08:35

## 2022-11-24 RX ADMIN — DIPHENHYDRAMINE HYDROCHLORIDE 25 MG: 25 TABLET ORAL at 06:14

## 2022-11-24 RX ADMIN — DIPHENHYDRAMINE HYDROCHLORIDE 25 MG: 25 TABLET ORAL at 12:33

## 2022-11-24 RX ADMIN — PIPERACILLIN AND TAZOBACTAM 3375 MG: 3; .375 INJECTION, POWDER, FOR SOLUTION INTRAVENOUS at 08:25

## 2022-11-24 RX ADMIN — OXYCODONE HYDROCHLORIDE 10 MG: 10 TABLET ORAL at 10:29

## 2022-11-24 RX ADMIN — GABAPENTIN 100 MG: 100 CAPSULE ORAL at 13:34

## 2022-11-24 RX ADMIN — VANCOMYCIN HYDROCHLORIDE 1250 MG: 10 INJECTION, POWDER, LYOPHILIZED, FOR SOLUTION INTRAVENOUS at 05:49

## 2022-11-24 RX ADMIN — OXYCODONE HYDROCHLORIDE 10 MG: 10 TABLET ORAL at 06:14

## 2022-11-24 RX ADMIN — PIPERACILLIN AND TAZOBACTAM 3375 MG: 3; .375 INJECTION, POWDER, FOR SOLUTION INTRAVENOUS at 00:06

## 2022-11-24 ASSESSMENT — PAIN DESCRIPTION - DESCRIPTORS
DESCRIPTORS: ACHING

## 2022-11-24 ASSESSMENT — PAIN DESCRIPTION - ONSET
ONSET: ON-GOING

## 2022-11-24 ASSESSMENT — PAIN DESCRIPTION - ORIENTATION
ORIENTATION: RIGHT

## 2022-11-24 ASSESSMENT — PAIN SCALES - GENERAL
PAINLEVEL_OUTOF10: 9
PAINLEVEL_OUTOF10: 10
PAINLEVEL_OUTOF10: 10
PAINLEVEL_OUTOF10: 8
PAINLEVEL_OUTOF10: 10
PAINLEVEL_OUTOF10: 10

## 2022-11-24 ASSESSMENT — PAIN DESCRIPTION - PAIN TYPE
TYPE: ACUTE PAIN

## 2022-11-24 ASSESSMENT — PAIN DESCRIPTION - LOCATION
LOCATION: BREAST

## 2022-11-24 ASSESSMENT — PAIN DESCRIPTION - FREQUENCY
FREQUENCY: CONTINUOUS

## 2022-11-24 ASSESSMENT — PAIN SCALES - WONG BAKER
WONGBAKER_NUMERICALRESPONSE: 0

## 2022-11-24 NOTE — DISCHARGE INSTRUCTIONS
Follow-up with primary care physician within a week of hospital discharge  Follow-up with the breast center with Dr. Choco Jones for further evaluation and recommendations of the right breast lump  Take medications as prescribed

## 2022-11-24 NOTE — PROGRESS NOTES
[facility] PROGRESS NOTE      Chief complaint: Follow-up of right mastitis    The patient is a 40 y.o. female with history of seizure disorder, presented on 11/19 with right breast pain accompanied by bloody and purulent nipple discharge for 4 days. On admission, she had T-max of 100.2 °F with leukocytosis of 27,000. CT chest showed small 1.1 cm fluid collection involving areola/nipple of the right breast, patchy groundglass opacification throughout both lungs. Right breast US showed cellulitis in the right breast retroareolar region with no drainable fluid collection identified. Piperacillin-tazobactam and vancomycin were started on admission. Subjective: Patient was seen and examined. No chills, no abdominal pain, no diarrhea, no rash, no itching. She reports pain around nipple. Objective:  BP (!) 148/89   Pulse 78   Temp 98 °F (36.7 °C) (Temporal)   Resp 18   Ht 4' 11\" (1.499 m)   Wt 150 lb (68 kg)   LMP 10/31/2022 (Exact Date)   SpO2 100%   BMI 30.30 kg/m²   Constitutional: Alert, not in distress  Respiratory: Clear breath sounds, no crackles, no wheezes  Cardiovascular: Regular rate and rhythm, no murmurs  Gastrointestinal: Bowel sounds present, soft, nontender  Skin: Warm and dry, no active dermatoses  Musculoskeletal: No joint swelling, no joint erythema    Labs, imaging, and medical records/notes were personally reviewed. Assessment:  Mastitis with questionable abscess, right breast. CT chest showed small 1.1 cm fluid collection involving areola/nipple of the right breast, patchy groundglass opacification throughout both lungs. Right breast US showed cellulitis in the right breast retroareolar region with no drainable fluid collection identified. Recommendations:  Change piperacillin-tazobactam and vancomycin to amoxicillin-clavulanate 875-125 mg BID and doxycycline 100 mg BID for 5 more days to finish 10 days of total antibiotic therapy from 11/20-11/29.   Follow up outpatient with Surgery for breast CA work-up. Continue supportive care. Thank you for involving me in the care of Heike Veronica. I will sign off for now. Please do not hesitate to call for any questions, concerns, or new findings.       Electronically signed by Harriett Barragan MD on 11/24/2022 at 10:34 AM

## 2022-11-24 NOTE — PROGRESS NOTES
[facility]   ATTENDING PHYSICIAN PROGRESS NOTE     I have examined the patient, reviewed the record, and discussed the case with the APN/ Resident. I have reviewed all relevant labs and imaging data. Please refer to the APN/ resident's note. I agree with the assessment and plan with the following corrections/ additions. The following summarizes my clinical findings and independent assessment. CC: breast cellulitis    We were asked to [id number] pt for possible I&D. Patient states pain seems worse. Awake and alert  Follows commands  Heart: Regular rate/rhythm; no murmur  Lungs: Fairly clear bilaterally  Abdomen: Soft; bowel sounds active; nontender/nondistended  Skin: Warm/dry; right breast with some induration but no fluctuance; tender to palpation; minimal erythema; no nipple discharge  Extremities: Moves all 4 extremities    Labs personally reviewed      Patient Active Problem List    Diagnosis Date Noted    Carbamazepine overdose with self-harm intent 06/26/2014    Sepsis ([facility] [address] 11/20/2022    Generalized abdominal pain 10/28/2022    Intractable abdominal pain 10/28/2022    Epigastric pain 05/01/2021    Pneumonia 03/03/2019    Suicidal overdose ([facility] [address] 06/27/2014    Depression 06/26/2014    Tobacco abuse     Seizure disorder ([facility]CC)     Asthma        Right breast cellulitis--no identifiable abscess to drain on ultrasound and still not fluctuant on exam  Continue antibiotics  Dispo per primary service    Darrel Spain MD, FACS  11/23/2022  9:25 PM    NOTE: This report was transcribed using voice recognition software. Every effort was made to ensure accuracy; however, inadvertent computerized transcription errors may be present.

## 2022-11-24 NOTE — PROGRESS NOTES
Pharmacy Consultation Note  (Antibiotic Dosing and Monitoring)    ADDENDUM:    Vancomycin has been discontinued   Clinical Pharmacy to sign-off  Physician to re-consult pharmacy if future dosing is needed    Thank you for the consult,     Lori Cleveland, PharmD  PGY1 Pharmacy Resident 11/24/2022 3:28 PM [phone]     ---------------------------------------------------------------------------------------------      Initial consult date: 11/20/22  Consulting physician/provider: Brennan Hammer  Drug: Vancomycin  Indication: Sepsis of Unknown Etiology; 7 days     Age/  Gender Height Weight IBW  Allergy Information   42 y.o./female 4' 11\" (149.9 cm) 150 lb (68 kg)     Ideal body weight: 48.8 kg (107 lb 8.4 oz)  Adjusted ideal body weight: 56.5 kg (124 lb 8.2 oz)   Ketorolac, Darvocet [propoxyphene n-acetaminophen], Ibuprofen, Meloxicam, Naproxen, and Tramadol      Renal Function:  Recent Labs     11/21/22  1025 11/23/22  0454 11/24/22  0455   BUN  --   --  26*   CREATININE 0.8 0.9 0.9       No intake or output data in the 24 hours ending 11/24/22 0901      Vancomycin Monitoring:  Trough:    Recent Labs     11/21/22  2145   [address] 8.6       Random:  No results for input(s): VANCORANDOM in the last 72 hours. No results for input(s): Gayland Mussel in the last 72 hours. Historical Cultures:  Organism   Date Value Ref Range Status   11/04/2022 Proteus mirabilis (A)  Final     No results for input(s): BC in the last 72 hours.       Vancomycin Administration Times:  Recent vancomycin administrations                     vancomycin (VANCOCIN) 1,250 mg in dextrose 5 % 250 mL IVPB (mg) 1,250 mg New Bag 11/24/22 0549     1,250 mg New Bag 11/23/22 1547     1,250 mg New Bag  0407    vancomycin (VANCOCIN) 1,000 mg in dextrose 5 % 250 mL IVPB ([id number]) (mg) 1,000 mg New Bag 11/22/22 1635     1,000 mg New Bag  0040     1,000 mg New Bag 11/21/22 1221                      Assessment:  Patient is a 40 y.o. female who has been initiated on vancomycin  Estimated Creatinine Clearance: 73 mL/min (based on SCr of 0.9 mg/dL). To dose vancomycin, pharmacy will be utilizing the traditional dosing method targeting a trough of 15-20 mcg/mL. 11/20: day #1 vanco; SCr: 0.8  11/21: Day # 2 of vancomycin. Scr: 0.8 mg/dL. WBC WNL = 9.6 (leukocytosis yesterday = 18.9). Preliminary 2/2 blood cultures = no growth to date. 11/22: Day # 3 of vancomycin. No Scr today. Vancomycin trough = 8.6 mcg/mL (~9 hours post-dose). Preliminary 2/2 Blood cultures = no growth to date. 11/23: Day # 4 of vancomycin. Scr: 0.9 mg/dL. No UOP documented. WBC WNL and afebrile. Preliminary 2/2 Blood cultures = no growth to date. 11/24: Renal Fx stable    Plan:  Continue vancomycin 1,250 mg IV every 12 hours  Will add on a vancomycin level to the AM labs drawn this AM which were collected prior to vancomycin being infused to monitor clearance  Will continue to monitor renal function. Pharmacy to follow.      Thank you for the consult,     Shirley Mckenzie, PharmD, BCCCP 11/24/2022 9:02 AM  Pharmacy Clinical Specialist, Emergency Medicine  [phone]

## 2022-11-24 NOTE — DISCHARGE SUMMARY
Hospitalist Discharge Summary    Patient ID: Mitchell Eagle   Patient : 1980  Patient's PCP: Isa Daniel MD    Admit Date: 2022   Admitting Physician: Leda Pike DO    Discharge Date:  2022   Discharge Physician: Agustin Echeverria MD   Discharge Condition: Stable  Discharge Disposition: [facility] course in brief:  (Please refer to daily progress notes for a comprehensive review of the hospitalization by requesting medical records)     45-year-old female presenting the emergency department with severe right breast pain. She states she has been having pain for the last 4 days but today got significantly worse. She describes it as burning in the entire breast.  She also reports that she started having some blood and pus draining from her right nipple. Patient has never had this happen before. She is not pregnant and not breast-feeding. She did have some chills yesterday but otherwise denies any fevers. She also denies any chest pain, shortness of breath, nausea/vomiting, dysuria, hematuria, abd pain. Vital signs show the patient to be tachycardic with a rate of 101. Hypertensive with a pressure 179/104. Patient is afebrile. Laboratory studies notable for a lactic acid of 3.5 and a white count of 27. CT of the chest shows a small 1.1 cm fluid-filled collection involving the areola/nipple of the right breast abscess cannot be excluded. Patchy groundglass opacification throughout both lungs. Patient started on empiric antibiotics. Initiated antibiotic treatment with vancomycin and Zosyn IV for right breast mastitis/abscess. Concern for inflammatory breast cancer as there is a significant family history  of cancer, breast cancer in her mother, ovarian cancer in a sister. Right breast ultrasound showed on imaging concerning for abscess of 1.0 cm. Evaluated by general surgery and the state is a hard lump and there was no indication for drainage.   She was reevaluated by general surgery on daily basis. Infectious disease has changed antibiotics to oral Augmentin and doxycycline and she will complete oral antibiotics for 5 days that will be a total of 10 days antibiotic course. Sepsis has been ruled out. I discussed with patient multiple times at bedside that she needs to follow-up with general surgery appointment as an outpatient for further management given her family history. Patient states that she will be compliant and will follow up. Patient continued to have right breast pain and oxycodone prescription was sent to the pharmacy. Patient has endorsed allergy to Tylenol and NSAIDs as well as tramadol. Encourage smoking cessation. Patient was seen and examined bedside today. She is a stable for discharge. Up pharmacy will close her 3 PM and if she can go and  her medication before that time she will leave earlier otherwise and is sustained on  PN to get 5 doses of oral antibiotics and then can discharge home,  her medications next day in the morning and to continue the antibiotics as recommended by infectious disease. This was discussed at length with patient and nurse    Exam:      General appearance: No apparent distress, appears stated age and cooperative. HEENT:  Normocephalic. Conjunctivae/corneas clear. Moist mucosa   Neck: Supple. No jugular venous distention. Thyroid not visible, non tender   Respiratory: Normal respiratory effort. Clear to auscultation bilaterally. No stridor/wheezing/rhonchi/crackles   Right breast: Retroareolar tenderness, hard lump about 2 cm   Cardiovascular: Regular heart beats, normal S1-S2. No M/G/R  Abdomen: Non distended, soft, non tender, no visceromegaly, no mass, normal bowel sounds   Musculoskeletal: No clubbing, cyanosis, no bilateral lower extremity edema. Brisk capillary refill. Skin:  No rashes  on visible skin  Neurologic: awake, alert and oriented x 3. Following commands.  No focal neurological deficit. Cranial nerves 2-12 grossly normal        Consults:   IP CONSULT TO INFECTIOUS DISEASES  IP CONSULT TO DENTIST  IP CONSULT TO GENERAL SURGERY  IP CONSULT TO GENERAL SURGERY    Discharge Diagnoses:       Right breast mastitis/abscess. Concern for inflammatory breast cancer as there is a significant family history  Sepsis has been ruled out  Significant family history of cancer, breast cancer in her mother, ovarian cancer in a sister  History of tobacco abuse  History of seizure disorder  History of suicidal overdose in 2014  Poor dentition   Multiple visit to the ER hospital admissions in recent years       Discharge Instructions / Follow up:    No future appointments. Continued appropriate risk factor modification of blood pressure, diabetes and serum lipids will remain essential to reducing risk of future atherosclerotic development    Activity: activity as tolerated    Significant labs:  CBC:   Recent Labs     11/22/22 0522 11/23/22 0454   WBC 8.6 7.3   RBC 4.75 4.99   HGB 12.1 12.3   HCT 37.0 38.8   MCV 77.9* 77.8*   RDW 15.7* 15.7*    210     BMP:   Recent Labs     11/23/22 0454 11/24/22 0455   NA  --  138   K  --  4.3   CL  --  104   CO2  --  21*   BUN  --  26*   CREATININE 0.9 0.9     LFT:  No results for input(s): PROT, ALB, ALKPHOS, ALT, AST, BILITOT, AMYLASE, LIPASE in the last 72 hours. PT/INR: No results for input(s): INR, APTT in the last 72 hours. BNP: No results for input(s): BNP in the last 72 hours.   Hgb A1C: No results found for: LABA1C  Folate and B12: No results found for: [id number], No results found for: FOLATE  Thyroid Studies:   Lab Results   Component Value Date    TSH 0.433 11/11/2016    O9POMVL 8.6 11/11/2016       Urinalysis:    Lab Results   Component Value Date/Time    NITRU Negative 11/20/2022 12:32 AM    WBCUA 1-3 11/20/2022 12:32 AM    WBCUA NONE 06/15/2011 05:10 AM    BACTERIA MANY 11/20/2022 12:32 AM    RBCUA 0-1 11/20/2022 12:32 AM    RBCUA NONE 01/02/2014 12:56 AM    BLOODU Negative 11/20/2022 12:32 AM    SPECGRAV 1.020 11/20/2022 12:32 AM    GLUCOSEU Negative 11/20/2022 12:32 AM    GLUCOSEU NEGATIVE 06/15/2011 05:10 AM       Imaging:  CT CHEST W CONTRAST    Result Date: 11/20/2022  EXAMINATION: CT OF THE CHEST WITH CONTRAST 11/20/2022 1:00 am TECHNIQUE: CT of the chest was performed with the administration of intravenous contrast. Multiplanar reformatted images are provided for review. Automated exposure control, iterative reconstruction, and/or weight based adjustment of the mA/kV was utilized to reduce the radiation dose to as low as reasonably achievable. COMPARISON: None. HISTORY: ORDERING SYSTEM PROVIDED HISTORY: concern for rt breast abscess, purulent drainage from rt nipple, no palpable mass or cellulitis TECHNOLOGIST PROVIDED HISTORY: Reason for exam:->concern for rt breast abscess, purulent drainage from rt nipple, no palpable mass or cellulitis Decision Support Exception - unselect if not a suspected or confirmed emergency medical condition->Emergency Medical Condition (MA) What reading provider will be dictating this exam?->CRC FINDINGS: Aorta: No evidence of thoracic aortic aneurysm or dissection. No acute abnormality of the aorta. Pulmonary arteries: The pulmonary arteries are unremarkable. No evidence for pulmonary embolism. Mediastinum: No evidence of mediastinal lymphadenopathy. The heart and pericardium demonstrate no acute abnormality. Lungs/Pleura: The lungs demonstrate scattered peripheral ground-glass opacification throughout the left lung and within the right lower lobe. No focal consolidation or pulmonary edema. No evidence of pleural effusion or pneumothorax. Upper Abdomen: Limited images of the upper abdomen are unremarkable. Soft Tissues/Bones:  There is a 1.1 cm a collection involving the areola/nipple of the right breast.     A small 1.1 cm fluid fluid collection involving the Eitan ower/nipple of the right breast, abscess cannot be excluded. Patchy ground-glass opacification throughout both lungs as described above, atypical infection cannot be excluded     CT ABDOMEN PELVIS W IV CONTRAST Additional Contrast? None    Result Date: 11/4/2022  EXAMINATION: CT OF THE ABDOMEN AND PELVIS WITH CONTRAST 11/4/2022 9:52 pm TECHNIQUE: CT of the abdomen and pelvis was performed with the administration of intravenous contrast. Multiplanar reformatted images are provided for review. Automated exposure control, iterative reconstruction, and/or weight based adjustment of the mA/kV was utilized to reduce the radiation dose to as low as reasonably achievable. COMPARISON: 10/28/2022 HISTORY: ORDERING SYSTEM PROVIDED HISTORY: ab pain TECHNOLOGIST PROVIDED HISTORY: Reason for exam:->ab pain Additional Contrast?->None Decision Support Exception - unselect if not a suspected or confirmed emergency medical condition->Emergency Medical Condition (MA) What reading provider will be dictating this exam?->CRC FINDINGS: Lower Chest: Negative. Organs: Cholecystectomy. Liver, biliary tree, bilateral adrenal glands, bilateral kidneys, spleen and pancreas are normal. GI/Bowel: No ileus or obstruction. Normal appendix right lower quadrant. Mild descending and sigmoid colonic diverticulosis. No acute diverticulitis. No inflammatory bowel process detected. Pelvis: No adnexal mass or significant free fluid. Peritoneum/Retroperitoneum: Small fat containing and uncomplicated umbilical hernia. Bones/Soft Tissues: No osseous lesions. 1. No acute disease. 2. Normal appendix right lower quadrant. 3. No evidence for an inflammatory bowel process. RECOMMENDATIONS: Careful clinical correlation and follow up recommended.      CT ABDOMEN PELVIS W IV CONTRAST Additional Contrast? Oral    Result Date: 10/28/2022  EXAMINATION: CT OF THE ABDOMEN AND PELVIS WITH CONTRAST 10/28/2022 7:03 pm TECHNIQUE: CT of the abdomen and pelvis was performed with the administration of intravenous contrast. Multiplanar reformatted images are provided for review. Automated exposure control, iterative reconstruction, and/or weight based adjustment of the mA/kV was utilized to reduce the radiation dose to as low as reasonably achievable. COMPARISON: CT abdomen and pelvis dated 10/28/2022 HISTORY: ORDERING SYSTEM PROVIDED HISTORY: abdominal pain TECHNOLOGIST PROVIDED HISTORY: Reason for exam:->abdominal pain Additional Contrast?->Oral FINDINGS: Lower Chest: Lung bases are clear. Organs: Liver without focal lesion. Gallbladder surgically absent. Pancreas and spleen unremarkable. Adrenals without nodule. Kidneys without suspicious renal lesion and no hydronephrosis. GI/Bowel: Small bowel is nondilated without evidence for small bowel obstructive process. Appendix visualized and unremarkable. Colonic segments under distended with questionable wall thickening versus under distension of the ascending colon through transverse colon could represent colitis or mural edema without perforation or abscess. Enteral or intraluminal contrast extends through the rectum without obstructive elements. Pelvis: No suspicious pelvic lesion or bulky pelvic adenopathy/free fluid. Peritoneum/Retroperitoneum: No bulky retroperitoneal adenopathy. No suspicious peritoneal or mesenteric process Vasculature: Grossly normal caliber of abdominal aorta and vasculature Bones/Soft Tissues: No acute osseous or soft tissue findings. Colonic segments under distended with questionable wall thickening versus under distension of the ascending colon through transverse colon could represent colitis or mural edema without perforation or abscess. Enteral or intraluminal contrast extends through the rectum without obstructive elements.      CT ABDOMEN PELVIS W IV CONTRAST Additional Contrast? None    Result Date: 10/28/2022  EXAMINATION: CTA OF THE CHEST; CT OF THE ABDOMEN AND PELVIS WITH CONTRAST 10/28/2022 8:14 am TECHNIQUE: CTA of the chest was performed after the administration of intravenous contrast.  Multiplanar reformatted images are provided for review. MIP images are provided for review. Automated exposure control, iterative reconstruction, and/or weight based adjustment of the mA/kV was utilized to reduce the radiation dose to as low as reasonably achievable.; CT of the abdomen and pelvis was performed with the administration of intravenous contrast. Multiplanar reformatted images are provided for review. Automated exposure control, iterative reconstruction, and/or weight based adjustment of the mA/kV was utilized to reduce the radiation dose to as low as reasonably achievable. COMPARISON: None. HISTORY: ORDERING SYSTEM PROVIDED HISTORY: r/o pe TECHNOLOGIST PROVIDED HISTORY: Reason for exam:->r/o pe Decision Support Exception - unselect if not a suspected or confirmed emergency medical condition->Emergency Medical Condition (MA); ORDERING SYSTEM PROVIDED HISTORY: abdominal pain TECHNOLOGIST PROVIDED HISTORY: Additional Contrast?->None Reason for exam:->abdominal pain Decision Support Exception - unselect if not a suspected or confirmed emergency medical condition->Emergency Medical Condition (MA) FINDINGS: Pulmonary Arteries: Pulmonary arteries are adequately opacified for evaluation. No evidence of intraluminal filling defect to suggest pulmonary embolism. Main pulmonary artery is normal in caliber. Mediastinum: No evidence of mediastinal lymphadenopathy. The heart and pericardium demonstrate no acute abnormality. There is no acute abnormality of the thoracic aorta. Thyroid is homogeneous in appearance. Lungs/pleura: The lungs are without acute process. No focal consolidation or pulmonary edema. No evidence of pleural effusion or pneumothorax. Soft Tissues/Bones: No acute bone or soft tissue abnormality. Abdomen/pelvis: Organs: Liver is homogeneous in appearance. Gallbladder is been surgically removed. Spleen is unremarkable.   Pancreas is unremarkable. Both adrenal glands are within normal limits. Symmetric enhancement of the renal parenchyma. No stones or distension seen in the renal collecting system. GI/Bowel: Stomach is unremarkable in appearance. No wall thickening. The small bowel is within normal limits. No mucosal abnormality. No distension. The appendix is normal.  Stool seen scattered diffusely throughout the colon. No signs of obvious obstruction or obstructing lesion. No wall thickening or inflammatory changes present. Pelvis: The bladder is mildly distended with no wall thickening. The uterus is unremarkable. Peritoneum/Retroperitoneum: No abdominal or retroperitoneal lymphadenopathy. No free fluid or free air. Vascular calcifications seen within the abdominal aorta and iliac vessels. There is no pelvic adenopathy. Bones/Soft Tissues: The bony structures reveal degenerative changes identified within the spine and pelvis. Degenerative changes seen within the hips bilaterally. No evidence of pulmonary embolism or acute pulmonary abnormality. No CT evidence of acute intra-abdominal or pelvic abnormality. XR CHEST PORTABLE    Result Date: 10/28/2022  EXAMINATION: ONE XRAY VIEW OF THE CHEST 10/28/2022 6:24 am COMPARISON: 14 October 2022 HISTORY: ORDERING SYSTEM PROVIDED HISTORY: eval chest pain TECHNOLOGIST PROVIDED HISTORY: Reason for exam:->eval chest pain FINDINGS: The lungs are without acute focal process. There is no effusion or pneumothorax. The cardiomediastinal silhouette is without acute process. The osseous structures are without acute process. No acute process. CTA PULMONARY W CONTRAST    Result Date: 10/28/2022  EXAMINATION: CTA OF THE CHEST; CT OF THE ABDOMEN AND PELVIS WITH CONTRAST 10/28/2022 8:14 am TECHNIQUE: CTA of the chest was performed after the administration of intravenous contrast.  Multiplanar reformatted images are provided for review. MIP images are provided for review.  Automated exposure control, iterative reconstruction, and/or weight based adjustment of the mA/kV was utilized to reduce the radiation dose to as low as reasonably achievable.; CT of the abdomen and pelvis was performed with the administration of intravenous contrast. Multiplanar reformatted images are provided for review. Automated exposure control, iterative reconstruction, and/or weight based adjustment of the mA/kV was utilized to reduce the radiation dose to as low as reasonably achievable. COMPARISON: None. HISTORY: ORDERING SYSTEM PROVIDED HISTORY: r/o pe TECHNOLOGIST PROVIDED HISTORY: Reason for exam:->r/o pe Decision Support Exception - unselect if not a suspected or confirmed emergency medical condition->Emergency Medical Condition (MA); ORDERING SYSTEM PROVIDED HISTORY: abdominal pain TECHNOLOGIST PROVIDED HISTORY: Additional Contrast?->None Reason for exam:->abdominal pain Decision Support Exception - unselect if not a suspected or confirmed emergency medical condition->Emergency Medical Condition (MA) FINDINGS: Pulmonary Arteries: Pulmonary arteries are adequately opacified for evaluation. No evidence of intraluminal filling defect to suggest pulmonary embolism. Main pulmonary artery is normal in caliber. Mediastinum: No evidence of mediastinal lymphadenopathy. The heart and pericardium demonstrate no acute abnormality. There is no acute abnormality of the thoracic aorta. Thyroid is homogeneous in appearance. Lungs/pleura: The lungs are without acute process. No focal consolidation or pulmonary edema. No evidence of pleural effusion or pneumothorax. Soft Tissues/Bones: No acute bone or soft tissue abnormality. Abdomen/pelvis: Organs: Liver is homogeneous in appearance. Gallbladder is been surgically removed. Spleen is unremarkable. Pancreas is unremarkable. Both adrenal glands are within normal limits. Symmetric enhancement of the renal parenchyma.   No stones or distension seen in the renal collecting system. GI/Bowel: Stomach is unremarkable in appearance. No wall thickening. The small bowel is within normal limits. No mucosal abnormality. No distension. The appendix is normal.  Stool seen scattered diffusely throughout the colon. No signs of obvious obstruction or obstructing lesion. No wall thickening or inflammatory changes present. Pelvis: The bladder is mildly distended with no wall thickening. The uterus is unremarkable. Peritoneum/Retroperitoneum: No abdominal or retroperitoneal lymphadenopathy. No free fluid or free air. Vascular calcifications seen within the abdominal aorta and iliac vessels. There is no pelvic adenopathy. Bones/Soft Tissues: The bony structures reveal degenerative changes identified within the spine and pelvis. Degenerative changes seen within the hips bilaterally. No evidence of pulmonary embolism or acute pulmonary abnormality. No CT evidence of acute intra-abdominal or pelvic abnormality. US BREAST LIMITED RIGHT    Result Date: 11/20/2022  EXAMINATION: TARGETED ULTRASOUND OF THE RIGHT BREAST 11/20/2022 COMPARISON: None. HISTORY: ORDERING SYSTEM PROVIDED HISTORY: Rt breast absccess desribed on CT chest. Pain, nipple bloody discharge TECHNOLOGIST PROVIDED HISTORY: Reason for exam:->Rt breast absccess desribed on CT chest. Pain, nipple bloody discharge What reading provider will be dictating this exam?->CRC FINDINGS: Targeted right breast ultrasound was performed utilizing high-resolution, real-time scanning. Subcutaneous edema and skin thickening noted in the right breast retroareolar region. No drainable fluid collection identified. Findings suggestive of cellulitis in the right breast retroareolar region with no drainable fluid collection identified. Recommendation: Bilateral diagnostic mammogram and right diagnostic ultrasound are advised in 3 months following appropriate therapy. BIRADS: BIRADS - CATEGORY 3 Probably Benign Findings.  A short interval follow-up is recommended in 3 months. OVERALL ASSESSMENT - PROBABLY BENIGN. A letter of notification will be sent to the patient regarding the results. Discharge Medications:      Medication List        START taking these medications      amoxicillin-clavulanate 875-125 MG per tablet  Commonly known as: Augmentin  Take 1 tablet by mouth 2 times daily for 5 days     doxycycline hyclate 100 MG tablet  Commonly known as: VIBRA-TABS  Take 1 tablet by mouth 2 times daily for 5 days Take doxycycline with full glass of water and to avoid laying supine for at least 2 hours after taking doxycycline. Wear sunscreen before sun exposure while on doxycycline. oxyCODONE HCl 10 MG immediate release tablet  Commonly known as: OXY-IR  Take 1 tablet by mouth every 4 hours as needed for Pain for up to 3 days. CHANGE how you take these medications      amLODIPine 5 MG tablet  Commonly known as: NORVASC  Take 2 tablets by mouth every morning  What changed: how much to take            CONTINUE taking these medications      albuterol sulfate  (90 Base) MCG/ACT inhaler  Commonly known as: PROVENTIL;VENTOLIN;PROAIR     carBAMazepine 200 MG tablet  Commonly known as: TEGRETOL     diphenhydrAMINE 25 MG tablet  Commonly known as: BENADRYL     gabapentin 100 MG capsule  Commonly known as: NEURONTIN  Take 1 capsule by mouth 3 times daily for 30 days.      loratadine 10 MG tablet  Commonly known as: CLARITIN     pantoprazole 40 MG tablet  Commonly known as: Protonix  Take 1 tablet by mouth daily (with breakfast)     sucralfate 1 GM tablet  Commonly known as: Carafate  Take 1 tablet by mouth 4 times daily     tiZANidine 4 MG tablet  Commonly known as: Heriberto Appiah               Where to Get Your Medications        These medications were sent to [address] [place] [address] [id number] [place] [address] [address] [facility] [phone] - [address]  [facility] [facility] [address]      Phone: [phone] amoxicillin-clavulanate 875-125 MG per tablet  doxycycline hyclate 100 MG tablet  oxyCODONE HCl 10 MG immediate release tablet       These medications were sent to [address] [place] [address]      Phone: [phone]   amLODIPine 5 MG tablet         Time Spent on discharge is more than 45 minutes in the examination, evaluation, counseling and review of medications and discharge plan.    +++++++++++++++++++++++++++++++++++++++++++++++++  Caitlyn Bernal MD  [facility] [place] Physician - [address] [place] [address]  +++++++++++++++++++++++++++++++++++++++++++++++++  NOTE: This report was transcribed using voice recognition software. Every effort was made to ensure accuracy; however, inadvertent computerized transcription errors may be present.

## 2022-11-25 LAB
BLOOD CULTURE, ROUTINE: NORMAL
BLOOD CULTURE, ROUTINE: NORMAL

## 2022-11-29 ENCOUNTER — TELEPHONE (OUTPATIENT)
Dept: BREAST CENTER | Age: 42
End: 2022-11-29

## 2022-11-29 NOTE — TELEPHONE ENCOUNTER
RN received call from patient in regards to being referred to office after recent hospital stay for right breast abscess. Patient was admitted from 11/19/2022-11/24/2022. Patient is currently on 2 antibiotics at home. Patient states the right area is increasing in size again and painful. Patient states that she feels has same thing developing on left breast.  RN advised message would be sent to  to review patient chart to see if any additional imaging is needed and when to schedule patient as clinic is full. RN verified patient number in chart is correct and advised office would be in contact to discuss  response. Patient verbalized understanding.        Electronically signed by Derek Cordero RN on 11/29/22 at 3:46 PM EST

## 2022-11-30 NOTE — TELEPHONE ENCOUNTER
Advised  the center wouldn't be able to do B/L breast US with aspirations this week. Per  to have patient come in to office to be evaluated this afternoon if possible.        Electronically signed by Cady Ruggiero RN on 11/30/22 at 11:15 AM EST

## 2022-11-30 NOTE — TELEPHONE ENCOUNTER
RN attempt to contact patient to schedule office visit with Dr. Adrienne Torres for breast abscess evaluation today (11/30/2022) in the afternoon. RN reached patient VM and left detailed message of why was calling and office number for patient to call office back and set up appt.         Electronically signed by Rob Ramirez RN on 11/30/22 at 11:16 AM EST

## 2023-03-09 ENCOUNTER — HOSPITAL ENCOUNTER (EMERGENCY)
Age: 43
Discharge: HOME | End: 2023-03-09
Attending: EMERGENCY MEDICINE
Payer: MEDICAID

## 2023-03-09 ENCOUNTER — APPOINTMENT (OUTPATIENT)
Dept: GENERAL RADIOLOGY | Age: 43
End: 2023-03-09
Payer: MEDICAID

## 2023-03-09 VITALS
SYSTOLIC BLOOD PRESSURE: 159 MMHG | HEART RATE: 83 BPM | BODY MASS INDEX: 30.24 KG/M2 | WEIGHT: 150 LBS | DIASTOLIC BLOOD PRESSURE: 98 MMHG | TEMPERATURE: 99.2 F | RESPIRATION RATE: 19 BRPM | HEIGHT: 59 IN | OXYGEN SATURATION: 100 %

## 2023-03-09 DIAGNOSIS — M16.0 OSTEOARTHRITIS OF BOTH HIPS, UNSPECIFIED OSTEOARTHRITIS TYPE: ICD-10-CM

## 2023-03-09 DIAGNOSIS — K08.89 PAIN, DENTAL: Primary | ICD-10-CM

## 2023-03-09 DIAGNOSIS — K02.9 DENTAL DECAY: ICD-10-CM

## 2023-03-09 PROCEDURE — 6360000002 HC RX W HCPCS: Performed by: PHYSICIAN ASSISTANT

## 2023-03-09 PROCEDURE — 6370000000 HC RX 637 (ALT 250 FOR IP): Performed by: PHYSICIAN ASSISTANT

## 2023-03-09 PROCEDURE — 73521 X-RAY EXAM HIPS BI 2 VIEWS: CPT

## 2023-03-09 PROCEDURE — 99284 EMERGENCY DEPT VISIT MOD MDM: CPT

## 2023-03-09 PROCEDURE — 96372 THER/PROPH/DIAG INJ SC/IM: CPT

## 2023-03-09 RX ORDER — CYCLOBENZAPRINE HCL 10 MG
10 TABLET ORAL 2 TIMES DAILY PRN
Qty: 30 TABLET | Refills: 0 | Status: SHIPPED | OUTPATIENT
Start: 2023-03-09

## 2023-03-09 RX ORDER — CHLORHEXIDINE GLUCONATE 0.12 MG/ML
15 RINSE ORAL 2 TIMES DAILY
Qty: 420 ML | Refills: 0 | Status: SHIPPED | OUTPATIENT
Start: 2023-03-09 | End: 2023-03-23

## 2023-03-09 RX ORDER — OXYCODONE HYDROCHLORIDE AND ACETAMINOPHEN 5; 325 MG/1; MG/1
1 TABLET ORAL ONCE
Status: COMPLETED | OUTPATIENT
Start: 2023-03-09 | End: 2023-03-09

## 2023-03-09 RX ORDER — AMOXICILLIN AND CLAVULANATE POTASSIUM 875; 125 MG/1; MG/1
1 TABLET, FILM COATED ORAL 2 TIMES DAILY
Qty: 14 TABLET | Refills: 0 | Status: SHIPPED | OUTPATIENT
Start: 2023-03-09 | End: 2023-03-16

## 2023-03-09 RX ORDER — ORPHENADRINE CITRATE 30 MG/ML
60 INJECTION INTRAMUSCULAR; INTRAVENOUS ONCE
Status: COMPLETED | OUTPATIENT
Start: 2023-03-09 | End: 2023-03-09

## 2023-03-09 RX ORDER — MORPHINE SULFATE 2 MG/ML
2 INJECTION, SOLUTION INTRAMUSCULAR; INTRAVENOUS ONCE
Status: COMPLETED | OUTPATIENT
Start: 2023-03-09 | End: 2023-03-09

## 2023-03-09 RX ORDER — HYDROCODONE BITARTRATE AND ACETAMINOPHEN 5; 325 MG/1; MG/1
1 TABLET ORAL EVERY 6 HOURS PRN
Qty: 8 TABLET | Refills: 0 | Status: SHIPPED | OUTPATIENT
Start: 2023-03-09 | End: 2023-03-11

## 2023-03-09 RX ORDER — AMOXICILLIN AND CLAVULANATE POTASSIUM 875; 125 MG/1; MG/1
1 TABLET, FILM COATED ORAL ONCE
Status: COMPLETED | OUTPATIENT
Start: 2023-03-09 | End: 2023-03-09

## 2023-03-09 RX ADMIN — OXYCODONE AND ACETAMINOPHEN 1 TABLET: 5; 325 TABLET ORAL at 15:12

## 2023-03-09 RX ADMIN — ORPHENADRINE CITRATE 60 MG: 30 INJECTION INTRAMUSCULAR; INTRAVENOUS at 15:14

## 2023-03-09 RX ADMIN — MORPHINE SULFATE 2 MG: 2 INJECTION, SOLUTION INTRAMUSCULAR; INTRAVENOUS at 15:47

## 2023-03-09 RX ADMIN — AMOXICILLIN AND CLAVULANATE POTASSIUM 1 TABLET: 875; 125 TABLET, FILM COATED ORAL at 15:14

## 2023-03-09 ASSESSMENT — PAIN DESCRIPTION - LOCATION
LOCATION: FACE;HIP
LOCATION: FACE
LOCATION: FACE;LEG

## 2023-03-09 ASSESSMENT — PAIN DESCRIPTION - ORIENTATION
ORIENTATION: RIGHT;LEFT
ORIENTATION: RIGHT;LEFT

## 2023-03-09 ASSESSMENT — PAIN DESCRIPTION - PAIN TYPE: TYPE: ACUTE PAIN;CHRONIC PAIN

## 2023-03-09 ASSESSMENT — PAIN SCALES - GENERAL
PAINLEVEL_OUTOF10: 10

## 2023-03-09 ASSESSMENT — PAIN - FUNCTIONAL ASSESSMENT
PAIN_FUNCTIONAL_ASSESSMENT: 0-10
PAIN_FUNCTIONAL_ASSESSMENT: ACTIVITIES ARE NOT PREVENTED

## 2023-03-09 NOTE — ED NOTES
Radiology Procedure Waiver   Name: Erendira Alexander  : 1980  MRN: [id number]    Date:  3/9/23    Time: 2:42 PM EST    Benefits of immediately proceeding with Radiology exam(s) without pre-testing outweigh the risks or are not indicated as specified below and therefore the following is/are being waived:    [x] Pregnancy test   [x] Patients LMP on-time and regular or recent negative pregnancy test   [] Patient had Tubal Ligation or has other Contraception Device. [] Patient  is Menopausal or Premenarcheal.    [] Patient had Full or Partial Hysterectomy. [] Protocol for Iodine allergy   [] Patient states they can tolerate IV dye    [] BUN/Creatinine   [] Patient age w/no hx of renal dysfunction. [] Patient on Dialysis. [] Recent Normal Labs.     Electronically signed by Rosie Saunders PA-C on 3/9/23 at 2:42 PM EST               Rosie Saunders PA-C  [phone]

## 2023-03-09 NOTE — ED PROVIDER NOTES
Independent [facility] Visit. Department of Emergency Medicine   ED  Provider Note  Admit Date/RoomTime: 3/9/2023  2:22 PM  ED Room: 20/20    CHIEF COMPLAINT:   Chief Complaint   Patient presents with    Facial Swelling     Right side facial swelling since last night. Leg Pain     Bilateral hips       ---------------------------------HISTORY OF PRESENT ILLNESS-----------------------------------     Delia Wood is a 40 y.o. female presenting to the ED for dental pain that has been ongoing since yesterday. She states she woke up this morning with right-sided facial swelling. She denies any difficulty with breathing, swallowing, or handling her secretions. Patient has no chest pain, shortness of breath, pain with breathing, headache, dizziness, neck pain, or recent trauma/injury. She is does have very poor dentition. Patient also reports bilateral hip pain that is worse since she had a fall last week. She denies any head injury. She has no loss of bowel or bladder or paresthesias. Patient is alert and oriented x3 and in no apparent distress at this exam.  She is nontoxic-appearing. Patient does not have a dentist    PCP: Joshua Loving MD  Dentist: None    Review of Systems:   Pertinent positives and negatives are stated within HPI, all other systems reviewed and are negative.    --------------------------------------------- PAST HISTORY ---------------------------------------------    Past Medical History:  has a past medical history of Asthma, Bronchitis, Movement disorder, Seizure disorder ([facility] [address] Seizure disorder ([facility] [address] [facility] Tobacco abuse, and Tobacco abuse. Past Surgical History:  has a past surgical history that includes fracture surgery; Cholecystectomy; Rotator cuff repair; Upper gastrointestinal endoscopy (N/A, 5/3/2021); Upper gastrointestinal endoscopy (N/A, 10/29/2022); and Colonoscopy (N/A, 10/31/2022). Social History:  reports that she has been smoking cigarettes. She has a 0.30 pack-year smoking history. She has never used smokeless tobacco. She reports current alcohol use. She reports current drug use. Drug: Cocaine. Family History: family history includes Cancer in her mother. The patients home medications have been reviewed. Allergies: Ketorolac, Darvocet [propoxyphene n-acetaminophen], Ibuprofen, Meloxicam, Naproxen, and Tramadol  Allergies have been reviewed with patient.     -------------------------------------------------- RESULTS -------------------------------------------------  All laboratory and radiology results have been personally reviewed by myself   LABS:  No results found for this visit on 03/09/23. RADIOLOGY:  Interpreted by Radiologist.  XR HIP BILATERAL W AP PELVIS (2 VIEWS)   Final Result   No acute abnormality of the pelvis and bilateral hips. Moderate bilateral hip osteoarthritis.           ------------------------- NURSING NOTES AND VITALS REVIEWED ---------------------------  The nursing notes within the ED encounter and vital signs as below have been reviewed. BP (!) 147/100   Pulse 85   Temp 99.2 °F (37.3 °C)   Resp 16   Ht 4' 11\" (1.499 m)   Wt 150 lb (68 kg)   SpO2 99%   BMI 30.30 kg/m²   Oxygen Saturation Interpretation: Normal    ---------------------------------------------------PHYSICAL EXAM--------------------------------------    Constitutional/General: Alert and oriented x3, well appearing, NAD  HEENT: NC/AT, EOMI, Airway patent  Mouth: The oropharynx is normal. No pharyngeal erythema, no trismus, uvula midline, floor of the mouth is soft. No tenderness in the submental or submandibular space. No tongue elevation. Pain with percussion to upper right molars, very poor dentition throughout, dental decay, no drainable abscess  Neck: Supple.  Non-tender with no lymphadenopathy   CVS: Regular rate and rhythm  Resp: Clear and equal bilaterally with good airflow, no distress  Musculo: Bilateral hip tenderness, warm and well perfused, No edema   Integument:  Normal turgor. Warm, dry, without visible rash, unless noted elsewhere. Neurological:  Motor functions intact. GCS 15, CN II-XII grossly intact     ------------------------------ ED COURSE/MEDICAL DECISION MAKING----------------------  ED Medications:  Medications   morphine injection 2 mg (has no administration in time range)   oxyCODONE-acetaminophen (PERCOCET) 5-325 MG per tablet 1 tablet (1 tablet Oral Given 3/9/23 1512)   orphenadrine (NORFLEX) injection 60 mg (60 mg IntraMUSCular Given 3/9/23 1514)   amoxicillin-clavulanate (AUGMENTIN) 875-125 MG per tablet 1 tablet (1 tablet Oral Given 3/9/23 1514)     Procedures:  None    -- MEDICAL DECISION MAKING --   History From: History from : Patient  Limitations to history : None    Record Review:  Outpatient Notes reviewed  Other Records Reviewed : None    CC/HPI Summary, DDx, ED Course, and Reassessment:   Patient presents to the ED for Facial Swelling (Right side facial swelling since last night. ) and Leg Pain (Bilateral hips/)    This is a 80-year-old female who presents with dental pain that has been ongoing for the past couple days. She has not tried any over-the-counter medication. She also reports chronic bilateral hip pain that worsened after a fall last week. She follows with orthopedic next week. X-rays reveal no acute fracture. Bilateral hip osteoarthritis patient advised to go to already scheduled orthopedics appointment and will be given dental clinic information for follow-up. Patient is asking for stronger pain medication before she is discharged. She will be given 2 mg IM morphine before discharge. She does have a ride home.     Patient was given the following medications:  Medications   morphine injection 2 mg (has no administration in time range)   oxyCODONE-acetaminophen (PERCOCET) 5-325 MG per tablet 1 tablet (1 tablet Oral Given 3/9/23 1512)   orphenadrine (NORFLEX) injection 60 mg (60 mg IntraMUSCular Given 3/9/23 1514)   amoxicillin-clavulanate (AUGMENTIN) 875-125 MG per tablet 1 tablet (1 tablet Oral Given 3/9/23 1514)      Differential diagnoses included but not limited to dental abscess, dental infection, hip fracture     Reassessment:  Patient was given Percocet, Norflex for their symptoms with good improvement. Patient continues to be non-toxic on re-evaluation. Chronic Conditions:   Past Medical History:   Diagnosis Date    Asthma     Bronchitis     Movement disorder     Seizure disorder ([facility] [address]     Seizure disorder ([facility] [address]     [facility] 6/27/2014    Tobacco abuse     Tobacco abuse      ED COURSE:      Any labs and imaging were reviewed by myself. Consultations:  None  Discussion with Other Profesionals : None    COUNSELING:   I have spoken with the patient/caregiver and discussed todays results, in addition to providing specific details for the plan of care and counseling regarding the diagnosis and prognosis and are agreeable with the plan. All results reviewed with pt and all questions answered. I discussed the differential, results and discharge plan with the patient and/or family/friend/caregiver if present. I emphasized the importance of follow-up with the physician I referred them to in the timeframe recommended. I explained reasons for the patient to return to the Emergency Department. Additional verbal discharge instructions were also given and discussed with the patient to supplement those generated by the EMR. We also discussed medications that were prescribed (if any) including common side effects and interactions. The patient was advised to abstain from driving, operating heavy machinery or making significant decisions while taking medications such as opiates and muscle relaxers that may impair this. All questions were addressed. They understand return precautions and discharge instructions.  The patient and/or family/friend/caregiver expressed understanding. Vitals were stable and they were in no distress at discharge. Findings were discussed with the patient and reasons to immediately return to the ED were articulated to them. Patient will follow-up with their PMD and dentist  DISPOSITION CONSIDERATIONS:    Disposition Considerations:  (include Tests not done, Shared Decision Making, Pt Expectation of Test or Tx.):   Appropriate for outpatient management        Social Determinants:  Social Determinants : None    MEDICATIONS:   DISCHARGE MEDICATIONS:  New Prescriptions    AMOXICILLIN-CLAVULANATE (AUGMENTIN) 875-125 MG PER TABLET    Take 1 tablet by mouth 2 times daily for 7 days    CHLORHEXIDINE (PERIDEX) 0.12 % SOLUTION    Swish and spit 15 mLs 2 times daily for 14 days    CYCLOBENZAPRINE (FLEXERIL) 10 MG TABLET    Take 1 tablet by mouth 2 times daily as needed for Muscle spasms    HYDROCODONE-ACETAMINOPHEN (NORCO) 5-325 MG PER TABLET    Take 1 tablet by mouth every 6 hours as needed for Pain for up to 2 days. Max Daily Amount: 4 tablets     DISCONTINUED MEDICATIONS:  Discontinued Medications    TIZANIDINE (ZANAFLEX) 4 MG TABLET    Take 4 mg by mouth every 8 hours as needed (MUSCLE SPASMS)     DIAGNOSIS:     1. Pain, dental    2. Osteoarthritis of both hips, unspecified osteoarthritis type    3. Dental decay      This patient's ED course included: a personal history and physicial examination  This patient has remained hemodynamically stable during their ED course. DISPOSITION:   Discharge to home. Patient condition is good. Discharge Instructions:   Patient referred to  Orthopedics    Go to   already scheduled appointment next week    [address] [place] [id number] [phone]  Go to   tomorrow AM WALK IN TIMES      730-8AM or 12PM    Electronically signed by Tejinder Poole PA-C   DD: 3/9/23  I am the Primary Clinician of Record. **This report was transcribed using voice recognition software. Every effort was made to ensure accuracy; however, inadvertent computerized transcription errors may be present.     END OF PROVIDER NOTE        Priti Scherer PA-C  03/09/23 1905

## 2023-04-04 ENCOUNTER — APPOINTMENT (OUTPATIENT)
Dept: GENERAL RADIOLOGY | Age: 43
End: 2023-04-04
Payer: MEDICAID

## 2023-04-04 ENCOUNTER — HOSPITAL ENCOUNTER (EMERGENCY)
Age: 43
Discharge: HOME | End: 2023-04-05
Attending: EMERGENCY MEDICINE
Payer: MEDICAID

## 2023-04-04 DIAGNOSIS — Z91.148 NONCOMPLIANCE WITH MEDICATION REGIMEN: ICD-10-CM

## 2023-04-04 DIAGNOSIS — S70.00XA CONTUSION OF HIP, UNSPECIFIED LATERALITY, INITIAL ENCOUNTER: ICD-10-CM

## 2023-04-04 DIAGNOSIS — F19.10 SUBSTANCE ABUSE (HCC): ICD-10-CM

## 2023-04-04 DIAGNOSIS — M54.50 LUMBAR BACK PAIN: ICD-10-CM

## 2023-04-04 DIAGNOSIS — G40.919 BREAKTHROUGH SEIZURE (HCC): Primary | ICD-10-CM

## 2023-04-04 LAB
ALBUMIN SERPL-MCNC: 3.8 G/DL (ref 3.5–5.2)
ALP SERPL-CCNC: 104 U/L (ref 35–104)
ALT SERPL-CCNC: 20 U/L (ref 0–32)
ANION GAP SERPL CALCULATED.3IONS-SCNC: 16 MMOL/L (ref 7–16)
APAP SERPL-MCNC: <5 MCG/ML (ref 10–30)
AST SERPL-CCNC: 23 U/L (ref 0–31)
BASOPHILS # BLD: 0.03 E9/L (ref 0–0.2)
BASOPHILS NFR BLD: 0.3 % (ref 0–2)
BILIRUB SERPL-MCNC: 0.2 MG/DL (ref 0–1.2)
BUN SERPL-MCNC: 11 MG/DL (ref 6–20)
CALCIUM SERPL-MCNC: 9.1 MG/DL (ref 8.6–10.2)
CARBAMAZEPINE DOSE: ABNORMAL
CARBAMAZEPINE SERPL-MCNC: <2 MCG/ML (ref 4–10)
CHLORIDE SERPL-SCNC: 101 MMOL/L (ref 98–107)
CO2 SERPL-SCNC: 20 MMOL/L (ref 22–29)
CREAT SERPL-MCNC: 0.9 MG/DL (ref 0.5–1)
EOSINOPHIL # BLD: 0.05 E9/L (ref 0.05–0.5)
EOSINOPHIL NFR BLD: 0.5 % (ref 0–6)
ERYTHROCYTE [DISTWIDTH] IN BLOOD BY AUTOMATED COUNT: 18.4 FL (ref 11.5–15)
ETHANOLAMINE SERPL-MCNC: <10 MG/DL (ref 0–0.08)
GLUCOSE SERPL-MCNC: 99 MG/DL (ref 74–99)
HCT VFR BLD AUTO: 37.2 % (ref 34–48)
HGB BLD-MCNC: 11.8 G/DL (ref 11.5–15.5)
IMM GRANULOCYTES # BLD: 0.04 E9/L
IMM GRANULOCYTES NFR BLD: 0.4 % (ref 0–5)
LYMPHOCYTES # BLD: 1.7 E9/L (ref 1.5–4)
LYMPHOCYTES NFR BLD: 16.8 % (ref 20–42)
MCH RBC QN AUTO: 23.5 PG (ref 26–35)
MCHC RBC AUTO-ENTMCNC: 31.7 % (ref 32–34.5)
MCV RBC AUTO: 74 FL (ref 80–99.9)
MONOCYTES # BLD: 0.66 E9/L (ref 0.1–0.95)
MONOCYTES NFR BLD: 6.5 % (ref 2–12)
NEUTROPHILS # BLD: 7.64 E9/L (ref 1.8–7.3)
NEUTS SEG NFR BLD: 75.5 % (ref 43–80)
PLATELET # BLD AUTO: 246 E9/L (ref 130–450)
PMV BLD AUTO: 10 FL (ref 7–12)
POTASSIUM SERPL-SCNC: 3.7 MMOL/L (ref 3.5–5)
PROT SERPL-MCNC: 6.9 G/DL (ref 6.4–8.3)
RBC # BLD AUTO: 5.03 E12/L (ref 3.5–5.5)
SALICYLATES SERPL-MCNC: <0.3 MG/DL (ref 0–30)
SODIUM SERPL-SCNC: 137 MMOL/L (ref 132–146)
TRICYCLIC ANTIDEPRESSANTS SCREEN SERUM: NEGATIVE NG/ML
TROPONIN, HIGH SENSITIVITY: <6 NG/L (ref 0–9)
WBC # BLD: 10.1 E9/L (ref 4.5–11.5)

## 2023-04-04 PROCEDURE — 80307 DRUG TEST PRSMV CHEM ANLYZR: CPT

## 2023-04-04 PROCEDURE — 85025 COMPLETE CBC W/AUTO DIFF WBC: CPT

## 2023-04-04 PROCEDURE — 81001 URINALYSIS AUTO W/SCOPE: CPT

## 2023-04-04 PROCEDURE — 81025 URINE PREGNANCY TEST: CPT

## 2023-04-04 PROCEDURE — 80179 DRUG ASSAY SALICYLATE: CPT

## 2023-04-04 PROCEDURE — 80156 ASSAY CARBAMAZEPINE TOTAL: CPT

## 2023-04-04 PROCEDURE — 80143 DRUG ASSAY ACETAMINOPHEN: CPT

## 2023-04-04 PROCEDURE — 82077 ASSAY SPEC XCP UR&BREATH IA: CPT

## 2023-04-04 PROCEDURE — 84484 ASSAY OF TROPONIN QUANT: CPT

## 2023-04-04 PROCEDURE — 93005 ELECTROCARDIOGRAM TRACING: CPT | Performed by: EMERGENCY MEDICINE

## 2023-04-04 PROCEDURE — 71045 X-RAY EXAM CHEST 1 VIEW: CPT

## 2023-04-04 PROCEDURE — 80053 COMPREHEN METABOLIC PANEL: CPT

## 2023-04-04 ASSESSMENT — PAIN - FUNCTIONAL ASSESSMENT: PAIN_FUNCTIONAL_ASSESSMENT: NONE - DENIES PAIN

## 2023-04-04 ASSESSMENT — LIFESTYLE VARIABLES
HOW MANY STANDARD DRINKS CONTAINING ALCOHOL DO YOU HAVE ON A TYPICAL DAY: 5 OR 6
HOW OFTEN DO YOU HAVE A DRINK CONTAINING ALCOHOL: 2-3 TIMES A WEEK

## 2023-04-05 ENCOUNTER — APPOINTMENT (OUTPATIENT)
Dept: GENERAL RADIOLOGY | Age: 43
End: 2023-04-05
Payer: MEDICAID

## 2023-04-05 ENCOUNTER — APPOINTMENT (OUTPATIENT)
Dept: CT IMAGING | Age: 43
End: 2023-04-05
Payer: MEDICAID

## 2023-04-05 VITALS
HEART RATE: 82 BPM | WEIGHT: 165 LBS | OXYGEN SATURATION: 100 % | HEIGHT: 64 IN | BODY MASS INDEX: 28.17 KG/M2 | DIASTOLIC BLOOD PRESSURE: 86 MMHG | RESPIRATION RATE: 14 BRPM | TEMPERATURE: 97.6 F | SYSTOLIC BLOOD PRESSURE: 147 MMHG

## 2023-04-05 LAB
AMORPH SED URNS QL MICRO: ABNORMAL
AMPHET UR QL SCN: NOT DETECTED
BACTERIA URNS QL MICRO: ABNORMAL /HPF
BARBITURATES UR QL SCN: NOT DETECTED
BENZODIAZ UR QL SCN: POSITIVE
BILIRUB UR QL STRIP: NEGATIVE
CANNABINOIDS UR QL SCN: NOT DETECTED
CLARITY UR: CLEAR
COARSE GRAN CASTS #/AREA URNS LPF: ABNORMAL /LPF (ref 0–2)
COCAINE UR QL SCN: POSITIVE
COLOR UR: YELLOW
DRUG SCREEN COMMENT UR-IMP: ABNORMAL
EKG ATRIAL RATE: 109 BPM
EKG P AXIS: 51 DEGREES
EKG P-R INTERVAL: 136 MS
EKG Q-T INTERVAL: 358 MS
EKG QRS DURATION: 80 MS
EKG QTC CALCULATION (BAZETT): 482 MS
EKG R AXIS: 70 DEGREES
EKG T AXIS: 29 DEGREES
EKG VENTRICULAR RATE: 109 BPM
EPI CELLS #/AREA URNS HPF: ABNORMAL /HPF
FENTANYL SCREEN, URINE: NOT DETECTED
FINE GRAN CASTS #/AREA URNS HPF: ABNORMAL /LPF (ref 0–2)
GLUCOSE UR STRIP-MCNC: NEGATIVE MG/DL
HCG UR QL: NEGATIVE
HGB UR QL STRIP: NEGATIVE
HYALINE CASTS #/AREA URNS LPF: ABNORMAL /LPF (ref 0–2)
KETONES UR STRIP-MCNC: NEGATIVE MG/DL
LEUKOCYTE ESTERASE UR QL STRIP: NEGATIVE
METHADONE UR QL SCN: NOT DETECTED
MUCOUS THREADS URNS QL MICRO: PRESENT /LPF
NITRITE UR QL STRIP: NEGATIVE
OPIATES UR QL SCN: NOT DETECTED
OXYCODONE URINE: NOT DETECTED
PCP UR QL SCN: NOT DETECTED
PH UR STRIP: 5.5 [PH] (ref 5–9)
PROT UR STRIP-MCNC: 100 MG/DL
RBC #/AREA URNS HPF: ABNORMAL /HPF (ref 0–2)
SP GR UR STRIP: >=1.03 (ref 1–1.03)
UROBILINOGEN UR STRIP-ACNC: 0.2 E.U./DL
WBC #/AREA URNS HPF: ABNORMAL /HPF (ref 0–5)

## 2023-04-05 PROCEDURE — 74176 CT ABD & PELVIS W/O CONTRAST: CPT

## 2023-04-05 PROCEDURE — 73502 X-RAY EXAM HIP UNI 2-3 VIEWS: CPT

## 2023-04-05 PROCEDURE — 70450 CT HEAD/BRAIN W/O DYE: CPT

## 2023-04-05 PROCEDURE — 6360000002 HC RX W HCPCS: Performed by: EMERGENCY MEDICINE

## 2023-04-05 PROCEDURE — 6370000000 HC RX 637 (ALT 250 FOR IP): Performed by: NURSE PRACTITIONER

## 2023-04-05 PROCEDURE — 72100 X-RAY EXAM L-S SPINE 2/3 VWS: CPT

## 2023-04-05 PROCEDURE — 93010 ELECTROCARDIOGRAM REPORT: CPT | Performed by: INTERNAL MEDICINE

## 2023-04-05 RX ORDER — CARBAMAZEPINE 100 MG/1
200 TABLET, CHEWABLE ORAL ONCE
Status: COMPLETED | OUTPATIENT
Start: 2023-04-05 | End: 2023-04-05

## 2023-04-05 RX ORDER — MORPHINE SULFATE 4 MG/ML
4 INJECTION, SOLUTION INTRAMUSCULAR; INTRAVENOUS ONCE
Status: COMPLETED | OUTPATIENT
Start: 2023-04-05 | End: 2023-04-05

## 2023-04-05 RX ADMIN — CARBAMAZEPINE 200 MG: 100 TABLET, CHEWABLE ORAL at 04:06

## 2023-04-05 RX ADMIN — MORPHINE SULFATE 4 MG: 4 INJECTION, SOLUTION INTRAMUSCULAR; INTRAVENOUS at 06:10

## 2023-04-05 ASSESSMENT — PAIN DESCRIPTION - LOCATION
LOCATION: ABDOMEN
LOCATION: ABDOMEN

## 2023-04-05 ASSESSMENT — PAIN DESCRIPTION - ORIENTATION
ORIENTATION: LEFT;UPPER
ORIENTATION: LEFT;UPPER

## 2023-04-05 ASSESSMENT — PAIN SCALES - GENERAL
PAINLEVEL_OUTOF10: 7
PAINLEVEL_OUTOF10: 10
PAINLEVEL_OUTOF10: 10

## 2023-04-05 NOTE — ED NOTES
Patient ambulated with no issues. Provided a sandwich and beverage for patient.       Ryder Gary RN  04/05/23 2750

## 2023-04-05 NOTE — ED NOTES
Patient continues to be restless and tossing about in the bed. Patient was able to ask for something to help her relax.  Will notify MD Lary Olivia RN  04/05/23 4205

## 2023-04-05 NOTE — ED NOTES
Patient insistent that she needs food and pain medication. RN assures patient that MD is aware of her pain, and that imaging needs to be resulted before she can have food. Patient previously asked transport for pain medication as well, before RN walked into room.      Jesus Alberto Love RN  04/05/23 2116

## 2023-04-05 NOTE — ED NOTES
Patient laying in bed quietly with eyes closed prior to RN entering room.  Patient requesting medication to \"calm her down\"      Jesus Alberto Love RN  04/05/23 0714

## 2023-04-05 NOTE — ED NOTES
Patient made aware of need for urine sample. Patient refuses to try to provide sample. Patient is also restless, tossing and turning in bed.       Michelle Wilhelm RN  04/04/23 1173
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
## 2023-04-05 NOTE — ED PROVIDER NOTES
Versed prior to arrival.  Patient arrived here she was awake and able to answer questions oriented she did admit to using cocaine. Patient reports she is on seizure medication but has not had them for some time. Patient reporting no chest pain she reports though having some mild left-sided rib pain as well as abdominal pain. Patient also was complaining of hip pain as well as lower back pain. Patient denying any homicidal suicidal thoughts. Patient oriented x3 on examination. Patient heart exam normal lungs are clear abdomen soft she has a mild tenderness to her left lower rib region. Patient does have tenderness to lower lumbar spine as well as right hip as well. Pulses are intact distally. Patient is able to move all extremities. Patient differential includes noncompliance with medication regimen as well as breakthrough seizure as well as electrolyte imbalance as well as hip contusion/hip dislocation as well as lumbar spine fracture. Patient while here in the emergency department was ordered IV placed on monitor. Labs were obtained white count was 10.1 hemoglobin was 11.8 sodium is 137 potassium is 3.7 troponin less than 6 drug screen was positive for benzos as well as cocaine serum drug screen was negative Tegretol level is less than 2 pregnancy test was negative. Patient did undergo x-ray of her hip which showed no fracture or dislocation did show moderate to severe hip degenerative changes CT of the head showed no acute intracranial pathology such as stroke and I will hemorrhage chest x-ray showed no infiltrate or pneumothorax. Patient did undergo CT abdomen pelvis due to the fact she was complaining of some left lower rib pain and abdominal pain. Patient CT without contrast was within normal limits. Patient medicated here in the emergency department with her dose of Tegretol here in the emergency department she was also given morphine 4 mg IV for her pain.   Patient reassessed here in the
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
## 2023-04-05 NOTE — ED NOTES
RN enters room with medication vial. Patient looks at vial and says, Piper Loretta is that morphine?!\" \"I know morphine when I see it. \"     RN affirms the medication order.       Silverio Lambert RN  04/05/23 0161

## 2023-04-18 ENCOUNTER — HOSPITAL ENCOUNTER (EMERGENCY)
Age: 43
Discharge: HOME | End: 2023-04-18
Attending: EMERGENCY MEDICINE
Payer: MEDICAID

## 2023-04-18 ENCOUNTER — APPOINTMENT (OUTPATIENT)
Dept: ULTRASOUND IMAGING | Age: 43
End: 2023-04-18
Payer: MEDICAID

## 2023-04-18 ENCOUNTER — APPOINTMENT (OUTPATIENT)
Dept: CT IMAGING | Age: 43
End: 2023-04-18
Payer: MEDICAID

## 2023-04-18 VITALS
HEIGHT: 59 IN | SYSTOLIC BLOOD PRESSURE: 192 MMHG | RESPIRATION RATE: 18 BRPM | WEIGHT: 155 LBS | OXYGEN SATURATION: 99 % | BODY MASS INDEX: 31.25 KG/M2 | DIASTOLIC BLOOD PRESSURE: 88 MMHG | HEART RATE: 94 BPM | TEMPERATURE: 97.5 F

## 2023-04-18 DIAGNOSIS — R03.0 ELEVATED BLOOD PRESSURE READING: ICD-10-CM

## 2023-04-18 DIAGNOSIS — M16.0 PRIMARY OSTEOARTHRITIS OF BOTH HIPS: ICD-10-CM

## 2023-04-18 DIAGNOSIS — R79.89 ELEVATED LFTS: ICD-10-CM

## 2023-04-18 DIAGNOSIS — R93.89 ABNORMAL CT SCAN: ICD-10-CM

## 2023-04-18 DIAGNOSIS — R10.84 GENERALIZED ABDOMINAL PAIN: Primary | ICD-10-CM

## 2023-04-18 LAB
ALBUMIN SERPL-MCNC: 4.3 G/DL (ref 3.5–5.2)
ALP SERPL-CCNC: 110 U/L (ref 35–104)
ALT SERPL-CCNC: 16 U/L (ref 0–32)
ANION GAP SERPL CALCULATED.3IONS-SCNC: 11 MMOL/L (ref 7–16)
ANISOCYTOSIS: ABNORMAL
AST SERPL-CCNC: 38 U/L (ref 0–31)
BACTERIA URNS QL MICRO: NORMAL /HPF
BASOPHILS # BLD: 0.03 E9/L (ref 0–0.2)
BASOPHILS NFR BLD: 0.3 % (ref 0–2)
BILIRUB SERPL-MCNC: 0.3 MG/DL (ref 0–1.2)
BILIRUB UR QL STRIP: NEGATIVE
BUN SERPL-MCNC: 9 MG/DL (ref 6–20)
BURR CELLS: ABNORMAL
CALCIUM SERPL-MCNC: 9 MG/DL (ref 8.6–10.2)
CHLORIDE SERPL-SCNC: 101 MMOL/L (ref 98–107)
CLARITY UR: CLEAR
CO2 SERPL-SCNC: 20 MMOL/L (ref 22–29)
COLOR UR: YELLOW
CREAT SERPL-MCNC: 0.6 MG/DL (ref 0.5–1)
EOSINOPHIL # BLD: 0.08 E9/L (ref 0.05–0.5)
EOSINOPHIL NFR BLD: 0.9 % (ref 0–6)
EPI CELLS #/AREA URNS HPF: NORMAL /HPF
ERYTHROCYTE [DISTWIDTH] IN BLOOD BY AUTOMATED COUNT: 19.1 FL (ref 11.5–15)
GLUCOSE SERPL-MCNC: 69 MG/DL (ref 74–99)
GLUCOSE UR STRIP-MCNC: NEGATIVE MG/DL
HCG, URINE, POC: NEGATIVE
HCT VFR BLD AUTO: 42.2 % (ref 34–48)
HGB BLD-MCNC: 13.2 G/DL (ref 11.5–15.5)
HGB UR QL STRIP: NEGATIVE
HYPOCHROMIA: ABNORMAL
IMM GRANULOCYTES # BLD: 0.02 E9/L
IMM GRANULOCYTES NFR BLD: 0.2 % (ref 0–5)
KETONES UR STRIP-MCNC: 15 MG/DL
LEUKOCYTE ESTERASE UR QL STRIP: ABNORMAL
LIPASE: 25 U/L (ref 13–60)
LYMPHOCYTES # BLD: 2.3 E9/L (ref 1.5–4)
LYMPHOCYTES NFR BLD: 24.9 % (ref 20–42)
Lab: NORMAL
MCH RBC QN AUTO: 23 PG (ref 26–35)
MCHC RBC AUTO-ENTMCNC: 31.3 % (ref 32–34.5)
MCV RBC AUTO: 73.6 FL (ref 80–99.9)
MONOCYTES # BLD: 0.63 E9/L (ref 0.1–0.95)
MONOCYTES NFR BLD: 6.8 % (ref 2–12)
NEGATIVE QC PASS/FAIL: NORMAL
NEUTROPHILS # BLD: 6.16 E9/L (ref 1.8–7.3)
NEUTS SEG NFR BLD: 66.9 % (ref 43–80)
NITRITE UR QL STRIP: NEGATIVE
PH UR STRIP: 5 [PH] (ref 5–9)
PLATELET # BLD AUTO: 221 E9/L (ref 130–450)
PMV BLD AUTO: 9.3 FL (ref 7–12)
POSITIVE QC PASS/FAIL: NORMAL
POTASSIUM SERPL-SCNC: 4.2 MMOL/L (ref 3.5–5)
POTASSIUM SERPL-SCNC: 6.4 MMOL/L (ref 3.5–5)
PROT SERPL-MCNC: 8.1 G/DL (ref 6.4–8.3)
PROT UR STRIP-MCNC: NEGATIVE MG/DL
RBC # BLD AUTO: 5.73 E12/L (ref 3.5–5.5)
RBC #/AREA URNS HPF: NORMAL /HPF (ref 0–2)
REASON FOR REJECTION: NORMAL
REJECTED TEST: NORMAL
SODIUM SERPL-SCNC: 132 MMOL/L (ref 132–146)
SP GR UR STRIP: 1.02 (ref 1–1.03)
UROBILINOGEN UR STRIP-ACNC: 0.2 E.U./DL
WBC # BLD: 9.2 E9/L (ref 4.5–11.5)
WBC #/AREA URNS HPF: NORMAL /HPF (ref 0–5)

## 2023-04-18 PROCEDURE — 84132 ASSAY OF SERUM POTASSIUM: CPT

## 2023-04-18 PROCEDURE — 96372 THER/PROPH/DIAG INJ SC/IM: CPT

## 2023-04-18 PROCEDURE — 6360000002 HC RX W HCPCS: Performed by: EMERGENCY MEDICINE

## 2023-04-18 PROCEDURE — 76830 TRANSVAGINAL US NON-OB: CPT

## 2023-04-18 PROCEDURE — 81001 URINALYSIS AUTO W/SCOPE: CPT

## 2023-04-18 PROCEDURE — 80053 COMPREHEN METABOLIC PANEL: CPT

## 2023-04-18 PROCEDURE — 74177 CT ABD & PELVIS W/CONTRAST: CPT

## 2023-04-18 PROCEDURE — 6360000002 HC RX W HCPCS

## 2023-04-18 PROCEDURE — 2580000003 HC RX 258: Performed by: RADIOLOGY

## 2023-04-18 PROCEDURE — 36415 COLL VENOUS BLD VENIPUNCTURE: CPT

## 2023-04-18 PROCEDURE — 85025 COMPLETE CBC W/AUTO DIFF WBC: CPT

## 2023-04-18 PROCEDURE — 93975 VASCULAR STUDY: CPT

## 2023-04-18 PROCEDURE — 83690 ASSAY OF LIPASE: CPT

## 2023-04-18 PROCEDURE — 6360000004 HC RX CONTRAST MEDICATION: Performed by: RADIOLOGY

## 2023-04-18 PROCEDURE — 96374 THER/PROPH/DIAG INJ IV PUSH: CPT

## 2023-04-18 PROCEDURE — 99285 EMERGENCY DEPT VISIT HI MDM: CPT

## 2023-04-18 RX ORDER — SODIUM CHLORIDE 0.9 % (FLUSH) 0.9 %
10 SYRINGE (ML) INJECTION PRN
Status: COMPLETED | OUTPATIENT
Start: 2023-04-18 | End: 2023-04-18

## 2023-04-18 RX ORDER — MORPHINE SULFATE 4 MG/ML
4 INJECTION, SOLUTION INTRAMUSCULAR; INTRAVENOUS ONCE
Status: COMPLETED | OUTPATIENT
Start: 2023-04-18 | End: 2023-04-18

## 2023-04-18 RX ORDER — ONDANSETRON 2 MG/ML
4 INJECTION INTRAMUSCULAR; INTRAVENOUS ONCE
Status: COMPLETED | OUTPATIENT
Start: 2023-04-18 | End: 2023-04-18

## 2023-04-18 RX ORDER — DICYCLOMINE HYDROCHLORIDE 10 MG/1
10 CAPSULE ORAL 4 TIMES DAILY PRN
Qty: 20 CAPSULE | Refills: 0 | Status: SHIPPED | OUTPATIENT
Start: 2023-04-18

## 2023-04-18 RX ORDER — PANTOPRAZOLE SODIUM 40 MG/1
40 TABLET, DELAYED RELEASE ORAL
Qty: 30 TABLET | Refills: 0 | Status: SHIPPED | OUTPATIENT
Start: 2023-04-18 | End: 2023-05-18

## 2023-04-18 RX ORDER — AMLODIPINE BESYLATE 10 MG/1
10 TABLET ORAL EVERY MORNING
Qty: 30 TABLET | Refills: 0 | Status: SHIPPED | OUTPATIENT
Start: 2023-04-18

## 2023-04-18 RX ORDER — SUCRALFATE 1 G/1
1 TABLET ORAL 4 TIMES DAILY
Qty: 40 TABLET | Refills: 0 | Status: SHIPPED | OUTPATIENT
Start: 2023-04-18

## 2023-04-18 RX ORDER — ONDANSETRON 4 MG/1
4 TABLET, FILM COATED ORAL EVERY 8 HOURS PRN
Qty: 20 TABLET | Refills: 0 | Status: SHIPPED | OUTPATIENT
Start: 2023-04-18

## 2023-04-18 RX ADMIN — MORPHINE SULFATE 4 MG: 4 INJECTION, SOLUTION INTRAMUSCULAR; INTRAVENOUS at 05:04

## 2023-04-18 RX ADMIN — ONDANSETRON 4 MG: 2 INJECTION INTRAMUSCULAR; INTRAVENOUS at 04:15

## 2023-04-18 RX ADMIN — IOPAMIDOL 75 ML: 755 INJECTION, SOLUTION INTRAVENOUS at 06:18

## 2023-04-18 RX ADMIN — SODIUM CHLORIDE, PRESERVATIVE FREE 10 ML: 5 INJECTION INTRAVENOUS at 06:15

## 2023-04-18 RX ADMIN — MORPHINE SULFATE 4 MG: 4 INJECTION, SOLUTION INTRAMUSCULAR; INTRAVENOUS at 06:35

## 2023-04-18 ASSESSMENT — PAIN DESCRIPTION - LOCATION
LOCATION: ABDOMEN

## 2023-04-18 ASSESSMENT — PAIN SCALES - GENERAL
PAINLEVEL_OUTOF10: 10

## 2023-04-18 ASSESSMENT — PAIN - FUNCTIONAL ASSESSMENT
PAIN_FUNCTIONAL_ASSESSMENT: 0-10

## 2023-04-18 ASSESSMENT — PAIN DESCRIPTION - ORIENTATION
ORIENTATION: MID

## 2023-04-18 ASSESSMENT — PAIN DESCRIPTION - FREQUENCY
FREQUENCY: INTERMITTENT
FREQUENCY: INTERMITTENT

## 2023-04-18 NOTE — DISCHARGE INSTRUCTIONS
Call family doctor tomorrow and schedule a followup appointment to be seen in 2 days    Recheck blood pressure with your doctor in the office    Have your doctor obtain  finalized report of CT scan and ALL TESTING

## 2023-04-18 NOTE — ED PROVIDER NOTES
or more Elements     ED Triage Vitals [04/18/23 0329]   BP Temp Temp Source Heart Rate Resp SpO2 Height Weight   (!) 202/102 98.5 °F (36.9 °C) Oral 93 20 98 % 4' 11\" (1.499 m) 155 lb (70.3 kg)       Constitutional/General: Alert and oriented x3  Head: Normocephalic and atraumatic  Eyes: PERRL, EOMI, conjunctiva normal, sclera non icteric  Respiratory: Lungs clear to auscultation bilaterally, no wheezes, rales, or rhonchi. Not in respiratory distress  Cardiovascular:  Regular rate. Regular rhythm. No murmurs, no gallops, no rubs. 2+ distal pulses. Equal extremity pulses  Chest: No chest wall tenderness  GI:  Abdomen Soft, diffusely tender, Non distended. Voluntary guarding diffusely noted. No rebound or rigidity  Musculoskeletal: Moves all extremities x 4. Warm and well perfused, no clubbing, no cyanosis, no edema. Capillary refill <3 seconds  Integument: skin warm and dry.  No rashes   Neurologic: GCS 15, no focal deficits  Psychiatric: Normal Affect    DIAGNOSTIC RESULTS   LABS:    Labs Reviewed   COMPREHENSIVE METABOLIC PANEL - Abnormal; Notable for the following components:       Result Value    Potassium 6.4 (*)     CO2 20 (*)     Glucose 69 (*)     Alkaline Phosphatase 110 (*)     AST 38 (*)     All other components within normal limits    Narrative:     CALL  [facility] [employer] tel. [phone],  Rejected Test Name/Called to:aicha/ Pari Gil RN, 04/18/2023  04:37, by Sheyla Allen  Rejected Test Name/Called to: aicha/ Pari Gil,, 04/18/2023 04:37,  by Krystian Morales - Abnormal; Notable for the following components:    Ketones, Urine 15 (*)     Leukocyte Esterase, Urine TRACE (*)     All other components within normal limits   CBC WITH AUTO DIFFERENTIAL - Abnormal; Notable for the following components:    RBC 5.73 (*)     MCV 73.6 (*)     MCH 23.0 (*)     MCHC 31.3 (*)     RDW 19.1 (*)     All other components within normal limits   LIPASE    Narrative:     CALL  [facility] [employer] [employer] tel. [phone],  Rejected

## 2023-04-21 ENCOUNTER — TELEPHONE (OUTPATIENT)
Dept: GENERAL RADIOLOGY | Age: 43
End: 2023-04-21

## 2023-04-21 NOTE — TELEPHONE ENCOUNTER
Left generalized voicemail stating that patient is overdue for breast imaging and to please call [facility] to schedule.

## 2023-05-01 ENCOUNTER — TELEPHONE (OUTPATIENT)
Dept: GENERAL RADIOLOGY | Age: 43
End: 2023-05-01

## 2023-05-01 NOTE — TELEPHONE ENCOUNTER
[facility] summation:     11/20/22 - Breast imaging done and recommended follow up imaging again in 3 months. Letter sent. Never scheduled. 2/13/23 - Voicemail left for patient. 4/21/23 - Voicemail left for patient. 5/1/23 - Left voice mail advising patient that she is overdue for recommended breast imaging. Provided call back number asking that she call back to schedule, advise if she has gone elsewhere, or chooses not to reschedule.

## 2023-05-28 ENCOUNTER — HOSPITAL ENCOUNTER (EMERGENCY)
Age: 43
Discharge: HOME | End: 2023-05-28
Payer: MEDICAID

## 2023-05-28 VITALS
WEIGHT: 160 LBS | DIASTOLIC BLOOD PRESSURE: 92 MMHG | RESPIRATION RATE: 16 BRPM | SYSTOLIC BLOOD PRESSURE: 153 MMHG | HEIGHT: 59 IN | HEART RATE: 83 BPM | BODY MASS INDEX: 32.25 KG/M2 | OXYGEN SATURATION: 100 % | TEMPERATURE: 97.2 F

## 2023-05-28 DIAGNOSIS — M79.604 CHRONIC PAIN OF LOWER EXTREMITY, BILATERAL: Primary | ICD-10-CM

## 2023-05-28 DIAGNOSIS — G89.29 CHRONIC PAIN OF LOWER EXTREMITY, BILATERAL: Primary | ICD-10-CM

## 2023-05-28 DIAGNOSIS — M79.605 CHRONIC PAIN OF LOWER EXTREMITY, BILATERAL: Primary | ICD-10-CM

## 2023-05-28 PROCEDURE — 6370000000 HC RX 637 (ALT 250 FOR IP): Performed by: PHYSICIAN ASSISTANT

## 2023-05-28 PROCEDURE — 99283 EMERGENCY DEPT VISIT LOW MDM: CPT

## 2023-05-28 PROCEDURE — 6360000002 HC RX W HCPCS: Performed by: PHYSICIAN ASSISTANT

## 2023-05-28 RX ORDER — OXYCODONE HYDROCHLORIDE AND ACETAMINOPHEN 5; 325 MG/1; MG/1
1 TABLET ORAL ONCE
Status: COMPLETED | OUTPATIENT
Start: 2023-05-28 | End: 2023-05-28

## 2023-05-28 RX ORDER — DEXAMETHASONE SODIUM PHOSPHATE 10 MG/ML
10 INJECTION INTRAMUSCULAR; INTRAVENOUS ONCE
Status: COMPLETED | OUTPATIENT
Start: 2023-05-28 | End: 2023-05-28

## 2023-05-28 RX ORDER — METHYLPREDNISOLONE 4 MG/1
TABLET ORAL
Qty: 21 TABLET | Status: SHIPPED | OUTPATIENT
Start: 2023-05-28 | End: 2023-06-03

## 2023-05-28 RX ORDER — BACLOFEN 10 MG/1
10 TABLET ORAL 3 TIMES DAILY
Qty: 21 TABLET | Refills: 0 | Status: SHIPPED | OUTPATIENT
Start: 2023-05-28 | End: 2023-06-04

## 2023-05-28 RX ORDER — BACLOFEN 10 MG/1
10 TABLET ORAL ONCE
Status: COMPLETED | OUTPATIENT
Start: 2023-05-28 | End: 2023-05-28

## 2023-05-28 RX ORDER — BACLOFEN 10 MG/1
10 TABLET ORAL 3 TIMES DAILY
Status: DISCONTINUED | OUTPATIENT
Start: 2023-05-28 | End: 2023-05-28

## 2023-05-28 RX ORDER — ACETAMINOPHEN 500 MG
1000 TABLET ORAL EVERY 6 HOURS PRN
Qty: 56 TABLET | Refills: 0 | Status: SHIPPED | OUTPATIENT
Start: 2023-05-28 | End: 2023-06-04

## 2023-05-28 RX ADMIN — DEXAMETHASONE SODIUM PHOSPHATE 10 MG: 10 INJECTION INTRAMUSCULAR; INTRAVENOUS at 12:34

## 2023-05-28 RX ADMIN — BACLOFEN 10 MG: 10 TABLET ORAL at 12:32

## 2023-05-28 RX ADMIN — OXYCODONE HYDROCHLORIDE AND ACETAMINOPHEN 1 TABLET: 5; 325 TABLET ORAL at 12:32

## 2023-05-28 ASSESSMENT — PAIN DESCRIPTION - DESCRIPTORS: DESCRIPTORS: ACHING;DISCOMFORT

## 2023-05-28 ASSESSMENT — PAIN DESCRIPTION - ORIENTATION
ORIENTATION: RIGHT;LEFT
ORIENTATION: RIGHT

## 2023-05-28 ASSESSMENT — PAIN SCALES - GENERAL
PAINLEVEL_OUTOF10: 10
PAINLEVEL_OUTOF10: 10

## 2023-05-28 ASSESSMENT — PAIN DESCRIPTION - LOCATION
LOCATION: LEG
LOCATION: LEG

## 2023-05-28 ASSESSMENT — PAIN DESCRIPTION - FREQUENCY: FREQUENCY: CONTINUOUS

## 2023-05-28 ASSESSMENT — PAIN DESCRIPTION - PAIN TYPE: TYPE: CHRONIC PAIN

## 2023-05-28 ASSESSMENT — PAIN - FUNCTIONAL ASSESSMENT: PAIN_FUNCTIONAL_ASSESSMENT: 0-10

## 2023-06-09 ENCOUNTER — HOSPITAL ENCOUNTER (EMERGENCY)
Age: 43
Discharge: HOME | End: 2023-06-09
Attending: EMERGENCY MEDICINE
Payer: MEDICAID

## 2023-06-09 ENCOUNTER — APPOINTMENT (OUTPATIENT)
Dept: CT IMAGING | Age: 43
End: 2023-06-09
Payer: MEDICAID

## 2023-06-09 VITALS
HEART RATE: 92 BPM | SYSTOLIC BLOOD PRESSURE: 130 MMHG | HEIGHT: 59 IN | WEIGHT: 150 LBS | RESPIRATION RATE: 20 BRPM | DIASTOLIC BLOOD PRESSURE: 86 MMHG | TEMPERATURE: 98.7 F | BODY MASS INDEX: 30.24 KG/M2 | OXYGEN SATURATION: 96 %

## 2023-06-09 DIAGNOSIS — R10.84 GENERALIZED ABDOMINAL PAIN: Primary | ICD-10-CM

## 2023-06-09 DIAGNOSIS — R11.2 NAUSEA AND VOMITING, UNSPECIFIED VOMITING TYPE: ICD-10-CM

## 2023-06-09 DIAGNOSIS — R19.7 DIARRHEA, UNSPECIFIED TYPE: ICD-10-CM

## 2023-06-09 LAB
ALBUMIN SERPL-MCNC: 4.1 G/DL (ref 3.5–5.2)
ALP SERPL-CCNC: 105 U/L (ref 35–104)
ALT SERPL-CCNC: 15 U/L (ref 0–32)
ANION GAP SERPL CALCULATED.3IONS-SCNC: 12 MMOL/L (ref 7–16)
AST SERPL-CCNC: 21 U/L (ref 0–31)
BACTERIA URNS QL MICRO: ABNORMAL /HPF
BASOPHILS # BLD: 0.02 E9/L (ref 0–0.2)
BASOPHILS NFR BLD: 0.2 % (ref 0–2)
BILIRUB SERPL-MCNC: <0.2 MG/DL (ref 0–1.2)
BILIRUB UR QL STRIP: NEGATIVE
BUN SERPL-MCNC: 14 MG/DL (ref 6–20)
CALCIUM SERPL-MCNC: 9.1 MG/DL (ref 8.6–10.2)
CHLORIDE SERPL-SCNC: 103 MMOL/L (ref 98–107)
CLARITY UR: CLEAR
CO2 SERPL-SCNC: 22 MMOL/L (ref 22–29)
COLOR UR: YELLOW
CREAT SERPL-MCNC: 0.6 MG/DL (ref 0.5–1)
EOSINOPHIL # BLD: 0.04 E9/L (ref 0.05–0.5)
EOSINOPHIL NFR BLD: 0.4 % (ref 0–6)
ERYTHROCYTE [DISTWIDTH] IN BLOOD BY AUTOMATED COUNT: 18.4 FL (ref 11.5–15)
GLUCOSE SERPL-MCNC: 92 MG/DL (ref 74–99)
GLUCOSE UR STRIP-MCNC: NEGATIVE MG/DL
HCG, URINE, POC: NEGATIVE
HCT VFR BLD AUTO: 44.3 % (ref 34–48)
HGB BLD-MCNC: 14.5 G/DL (ref 11.5–15.5)
HGB UR QL STRIP: NEGATIVE
IMM GRANULOCYTES # BLD: 0.04 E9/L
IMM GRANULOCYTES NFR BLD: 0.4 % (ref 0–5)
KETONES UR STRIP-MCNC: ABNORMAL MG/DL
LACTATE BLDV-SCNC: 1.1 MMOL/L (ref 0.5–2.2)
LEUKOCYTE ESTERASE UR QL STRIP: NEGATIVE
LIPASE: 30 U/L (ref 13–60)
LYMPHOCYTES # BLD: 1.95 E9/L (ref 1.5–4)
LYMPHOCYTES NFR BLD: 20.3 % (ref 20–42)
Lab: NORMAL
MCH RBC QN AUTO: 24.7 PG (ref 26–35)
MCHC RBC AUTO-ENTMCNC: 32.7 % (ref 32–34.5)
MCV RBC AUTO: 75.6 FL (ref 80–99.9)
MONOCYTES # BLD: 0.71 E9/L (ref 0.1–0.95)
MONOCYTES NFR BLD: 7.4 % (ref 2–12)
NEGATIVE QC PASS/FAIL: NORMAL
NEUTROPHILS # BLD: 6.84 E9/L (ref 1.8–7.3)
NEUTS SEG NFR BLD: 71.3 % (ref 43–80)
NITRITE UR QL STRIP: NEGATIVE
PH UR STRIP: 5.5 [PH] (ref 5–9)
PLATELET # BLD AUTO: 226 E9/L (ref 130–450)
PMV BLD AUTO: 9.5 FL (ref 7–12)
POSITIVE QC PASS/FAIL: NORMAL
POTASSIUM SERPL-SCNC: 3.7 MMOL/L (ref 3.5–5)
PROT SERPL-MCNC: 7.5 G/DL (ref 6.4–8.3)
PROT UR STRIP-MCNC: NEGATIVE MG/DL
RBC # BLD AUTO: 5.86 E12/L (ref 3.5–5.5)
RBC #/AREA URNS HPF: ABNORMAL /HPF (ref 0–2)
SODIUM SERPL-SCNC: 137 MMOL/L (ref 132–146)
SP GR UR STRIP: 1.01 (ref 1–1.03)
UROBILINOGEN UR STRIP-ACNC: 0.2 E.U./DL
WBC # BLD: 9.6 E9/L (ref 4.5–11.5)
WBC #/AREA URNS HPF: ABNORMAL /HPF (ref 0–5)

## 2023-06-09 PROCEDURE — 2580000003 HC RX 258: Performed by: RADIOLOGY

## 2023-06-09 PROCEDURE — 85025 COMPLETE CBC W/AUTO DIFF WBC: CPT

## 2023-06-09 PROCEDURE — 81001 URINALYSIS AUTO W/SCOPE: CPT

## 2023-06-09 PROCEDURE — 6360000004 HC RX CONTRAST MEDICATION: Performed by: RADIOLOGY

## 2023-06-09 PROCEDURE — A4216 STERILE WATER/SALINE, 10 ML: HCPCS | Performed by: STUDENT IN AN ORGANIZED HEALTH CARE EDUCATION/TRAINING PROGRAM

## 2023-06-09 PROCEDURE — 6360000002 HC RX W HCPCS: Performed by: STUDENT IN AN ORGANIZED HEALTH CARE EDUCATION/TRAINING PROGRAM

## 2023-06-09 PROCEDURE — 80053 COMPREHEN METABOLIC PANEL: CPT

## 2023-06-09 PROCEDURE — 2580000003 HC RX 258: Performed by: STUDENT IN AN ORGANIZED HEALTH CARE EDUCATION/TRAINING PROGRAM

## 2023-06-09 PROCEDURE — 74177 CT ABD & PELVIS W/CONTRAST: CPT

## 2023-06-09 PROCEDURE — 83690 ASSAY OF LIPASE: CPT

## 2023-06-09 PROCEDURE — 83605 ASSAY OF LACTIC ACID: CPT

## 2023-06-09 PROCEDURE — 2500000003 HC RX 250 WO HCPCS: Performed by: STUDENT IN AN ORGANIZED HEALTH CARE EDUCATION/TRAINING PROGRAM

## 2023-06-09 RX ORDER — FENTANYL CITRATE 50 UG/ML
25 INJECTION, SOLUTION INTRAMUSCULAR; INTRAVENOUS ONCE
Status: COMPLETED | OUTPATIENT
Start: 2023-06-09 | End: 2023-06-09

## 2023-06-09 RX ORDER — 0.9 % SODIUM CHLORIDE 0.9 %
1000 INTRAVENOUS SOLUTION INTRAVENOUS ONCE
Status: COMPLETED | OUTPATIENT
Start: 2023-06-09 | End: 2023-06-09

## 2023-06-09 RX ORDER — ONDANSETRON 2 MG/ML
4 INJECTION INTRAMUSCULAR; INTRAVENOUS ONCE
Status: COMPLETED | OUTPATIENT
Start: 2023-06-09 | End: 2023-06-09

## 2023-06-09 RX ORDER — DICYCLOMINE HCL 20 MG
20 TABLET ORAL 4 TIMES DAILY
Qty: 30 TABLET | Refills: 0 | Status: SHIPPED | OUTPATIENT
Start: 2023-06-09

## 2023-06-09 RX ORDER — ONDANSETRON 4 MG/1
4 TABLET, ORALLY DISINTEGRATING ORAL 3 TIMES DAILY PRN
Qty: 21 TABLET | Refills: 0 | Status: SHIPPED | OUTPATIENT
Start: 2023-06-09

## 2023-06-09 RX ORDER — DIPHENHYDRAMINE HYDROCHLORIDE 50 MG/ML
25 INJECTION INTRAMUSCULAR; INTRAVENOUS ONCE
Status: DISCONTINUED | OUTPATIENT
Start: 2023-06-09 | End: 2023-06-09

## 2023-06-09 RX ORDER — KETOROLAC TROMETHAMINE 30 MG/ML
15 INJECTION, SOLUTION INTRAMUSCULAR; INTRAVENOUS ONCE
Status: DISCONTINUED | OUTPATIENT
Start: 2023-06-09 | End: 2023-06-09

## 2023-06-09 RX ORDER — FENTANYL CITRATE 50 UG/ML
50 INJECTION, SOLUTION INTRAMUSCULAR; INTRAVENOUS ONCE
Status: COMPLETED | OUTPATIENT
Start: 2023-06-09 | End: 2023-06-09

## 2023-06-09 RX ORDER — SODIUM CHLORIDE 0.9 % (FLUSH) 0.9 %
10 SYRINGE (ML) INJECTION PRN
Status: COMPLETED | OUTPATIENT
Start: 2023-06-09 | End: 2023-06-09

## 2023-06-09 RX ADMIN — ONDANSETRON 4 MG: 2 INJECTION INTRAMUSCULAR; INTRAVENOUS at 01:53

## 2023-06-09 RX ADMIN — IOPAMIDOL 75 ML: 755 INJECTION, SOLUTION INTRAVENOUS at 03:25

## 2023-06-09 RX ADMIN — SODIUM CHLORIDE 1000 ML: 9 INJECTION, SOLUTION INTRAVENOUS at 01:50

## 2023-06-09 RX ADMIN — FAMOTIDINE 20 MG: 10 INJECTION, SOLUTION INTRAVENOUS at 01:54

## 2023-06-09 RX ADMIN — FENTANYL CITRATE 25 MCG: 0.05 INJECTION, SOLUTION INTRAMUSCULAR; INTRAVENOUS at 01:53

## 2023-06-09 RX ADMIN — SODIUM CHLORIDE, PRESERVATIVE FREE 10 ML: 5 INJECTION INTRAVENOUS at 03:20

## 2023-06-09 RX ADMIN — FENTANYL CITRATE 50 MCG: 0.05 INJECTION, SOLUTION INTRAMUSCULAR; INTRAVENOUS at 03:34

## 2023-06-09 ASSESSMENT — PAIN DESCRIPTION - LOCATION
LOCATION: ABDOMEN
LOCATION: ABDOMEN

## 2023-06-09 ASSESSMENT — PAIN SCALES - GENERAL
PAINLEVEL_OUTOF10: 10

## 2023-06-09 ASSESSMENT — LIFESTYLE VARIABLES
HOW OFTEN DO YOU HAVE A DRINK CONTAINING ALCOHOL: 2-4 TIMES A MONTH
HOW MANY STANDARD DRINKS CONTAINING ALCOHOL DO YOU HAVE ON A TYPICAL DAY: 3 OR 4

## 2023-06-09 ASSESSMENT — PAIN DESCRIPTION - ORIENTATION: ORIENTATION: LEFT;UPPER

## 2023-06-09 ASSESSMENT — PAIN - FUNCTIONAL ASSESSMENT
PAIN_FUNCTIONAL_ASSESSMENT: 0-10
PAIN_FUNCTIONAL_ASSESSMENT: 0-10

## 2023-06-09 NOTE — ED PROVIDER NOTES
condition->Emergency Medical Condition (MA) FINDINGS: Lower Chest: No infiltrate or effusion. Organs: Cholecystectomy. Liver, biliary tree, bilateral adrenal glands, bilateral kidneys, spleen and pancreas are normal. GI/Bowel: No ileus or obstruction. No inflammatory bowel process. Normal appendix right lower quadrant. Pelvis: No adnexal mass. Trace intrapelvic free fluid, physiological amount. No adenopathy. Peritoneum/Retroperitoneum: Moderate fatty umbilical hernia. Normal caliber and contour of the aorta. No adenopathy or fluid. Bones/Soft Tissues: Negative. 1. No acute disease. 2. Normal appendix. RECOMMENDATIONS: Careful clinical correlation and follow up recommended. No results found.      ------------------------- NURSING NOTES AND VITALS REVIEWED ---------------------------   The nursing notes within the ED encounter and vital signs as below have been reviewed by myself. /86   Pulse 92   Temp 98.7 °F (37.1 °C) (Oral)   Resp 20   Ht 4' 11\" (1.499 m)   Wt 150 lb (68 kg)   SpO2 96%   BMI 30.30 kg/m²   Oxygen Saturation Interpretation: Normal    The patients available past medical records and past encounters were reviewed.         ------------------------------ ED COURSE/MEDICAL DECISION MAKING----------------------  Medications   0.9 % sodium chloride bolus (0 mLs IntraVENous Stopped 6/9/23 0330)   fentaNYL (SUBLIMAZE) injection 25 mcg (25 mcg IntraVENous Given 6/9/23 0153)   ondansetron (ZOFRAN) injection 4 mg (4 mg IntraVENous Given 6/9/23 0153)   famotidine (PEPCID) 20 mg in sodium chloride (PF) 0.9 % 10 mL injection (20 mg IntraVENous Given 6/9/23 0154)   iopamidol (ISOVUE-370) 76 % injection 75 mL (75 mLs IntraVENous Given 6/9/23 0325)   sodium chloride flush 0.9 % injection 10 mL (10 mLs IntraVENous Given 6/9/23 0320)   fentaNYL (SUBLIMAZE) injection 50 mcg (50 mcg IntraVENous Given 6/9/23 4961)       Medical Decision Making/Differential Diagnosis:    CC/HPI Summary,

## 2023-07-06 ENCOUNTER — HOSPITAL ENCOUNTER (EMERGENCY)
Age: 43
Discharge: HOME | End: 2023-07-06
Payer: MEDICAID

## 2023-07-06 VITALS
WEIGHT: 160 LBS | HEART RATE: 92 BPM | BODY MASS INDEX: 32.25 KG/M2 | SYSTOLIC BLOOD PRESSURE: 176 MMHG | HEIGHT: 59 IN | RESPIRATION RATE: 18 BRPM | DIASTOLIC BLOOD PRESSURE: 93 MMHG | OXYGEN SATURATION: 100 % | TEMPERATURE: 99.3 F

## 2023-07-06 DIAGNOSIS — M79.604 CHRONIC PAIN OF LOWER EXTREMITY, BILATERAL: Primary | ICD-10-CM

## 2023-07-06 DIAGNOSIS — M79.605 CHRONIC PAIN OF LOWER EXTREMITY, BILATERAL: Primary | ICD-10-CM

## 2023-07-06 DIAGNOSIS — R03.0 ELEVATED BLOOD-PRESSURE READING WITHOUT DIAGNOSIS OF HYPERTENSION: ICD-10-CM

## 2023-07-06 DIAGNOSIS — L55.0 1ST DEGREE SUNBURN: ICD-10-CM

## 2023-07-06 DIAGNOSIS — G89.29 CHRONIC PAIN OF LOWER EXTREMITY, BILATERAL: Primary | ICD-10-CM

## 2023-07-06 DIAGNOSIS — Z76.5 DRUG-SEEKING BEHAVIOR: ICD-10-CM

## 2023-07-06 PROCEDURE — 99284 EMERGENCY DEPT VISIT MOD MDM: CPT

## 2023-07-06 PROCEDURE — 96372 THER/PROPH/DIAG INJ SC/IM: CPT

## 2023-07-06 PROCEDURE — 6360000002 HC RX W HCPCS

## 2023-07-06 PROCEDURE — 6370000000 HC RX 637 (ALT 250 FOR IP)

## 2023-07-06 RX ORDER — METHYLPREDNISOLONE 4 MG/1
TABLET ORAL
Qty: 1 KIT | Refills: 0 | Status: SHIPPED | OUTPATIENT
Start: 2023-07-06 | End: 2023-07-12

## 2023-07-06 RX ORDER — ACETAMINOPHEN 500 MG
500 TABLET ORAL 4 TIMES DAILY PRN
Qty: 40 TABLET | Refills: 0 | Status: SHIPPED | OUTPATIENT
Start: 2023-07-06

## 2023-07-06 RX ORDER — BACLOFEN 10 MG/1
10 TABLET ORAL 3 TIMES DAILY
Qty: 21 TABLET | Refills: 0 | Status: SHIPPED | OUTPATIENT
Start: 2023-07-06

## 2023-07-06 RX ORDER — DEXAMETHASONE SODIUM PHOSPHATE 10 MG/ML
8 INJECTION INTRAMUSCULAR; INTRAVENOUS ONCE
Status: COMPLETED | OUTPATIENT
Start: 2023-07-06 | End: 2023-07-06

## 2023-07-06 RX ORDER — OXYCODONE HYDROCHLORIDE AND ACETAMINOPHEN 5; 325 MG/1; MG/1
1 TABLET ORAL ONCE
Status: COMPLETED | OUTPATIENT
Start: 2023-07-06 | End: 2023-07-06

## 2023-07-06 RX ORDER — BACLOFEN 10 MG/1
10 TABLET ORAL ONCE
Status: COMPLETED | OUTPATIENT
Start: 2023-07-06 | End: 2023-07-06

## 2023-07-06 RX ADMIN — BACLOFEN 10 MG: 10 TABLET ORAL at 18:20

## 2023-07-06 RX ADMIN — DEXAMETHASONE SODIUM PHOSPHATE 8 MG: 10 INJECTION INTRAMUSCULAR; INTRAVENOUS at 18:01

## 2023-07-06 RX ADMIN — OXYCODONE HYDROCHLORIDE AND ACETAMINOPHEN 1 TABLET: 5; 325 TABLET ORAL at 18:00

## 2023-07-06 ASSESSMENT — PAIN SCALES - GENERAL
PAINLEVEL_OUTOF10: 10

## 2023-07-06 ASSESSMENT — LIFESTYLE VARIABLES
HOW OFTEN DO YOU HAVE A DRINK CONTAINING ALCOHOL: 2-4 TIMES A MONTH
HOW MANY STANDARD DRINKS CONTAINING ALCOHOL DO YOU HAVE ON A TYPICAL DAY: 1 OR 2

## 2023-07-06 ASSESSMENT — PAIN - FUNCTIONAL ASSESSMENT: PAIN_FUNCTIONAL_ASSESSMENT: 0-10

## 2023-07-06 NOTE — ED PROVIDER NOTES
Chronic pain of lower extremity, bilateral New Problem   2. 1st degree sunburn New Problem   3. Elevated blood-pressure reading without diagnosis of hypertension    4. Drug-seeking behavior          DISPOSITION/PLAN     DISPOSITION Decision To Discharge 07/06/2023 05:47:10 PM  Discharge to home. Patient condition is good. PATIENT REFERRED TO:  [facility]  [phone]  Schedule an appointment as soon as possible for a visit in 1 day        DISCHARGE MEDICATIONS:  Discharge Medication List as of 7/6/2023  6:11 PM        START taking these medications    Details   baclofen (LIORESAL) 10 MG tablet Take 1 tablet by mouth 3 times daily, Disp-21 tablet, R-0Normal      methylPREDNISolone (MEDROL, AMANDA,) 4 MG tablet Take by mouth., Disp-1 kit, R-0Normal             DISCONTINUED MEDICATIONS:  Discharge Medication List as of 7/6/2023  6:11 PM          END OF PROVIDER NOTE    I am the primary clinician of record.      Electronically signed by MARK Manzo CNP   DD: 7/6/23    (Please note that portions of this note were completed with a voice recognition program.  Efforts were made to edit the dictations but occasionally words are mis-transcribed.)         MARK Manzo CNP  07/06/23 9425 3+/5 ue's except fingers w/ defomity from Arthritis ; can PROM wfl's , pt can make fist aarom ; LE's 3/5

## 2023-07-10 ENCOUNTER — APPOINTMENT (OUTPATIENT)
Dept: GENERAL RADIOLOGY | Age: 43
End: 2023-07-10
Payer: MEDICAID

## 2023-07-10 ENCOUNTER — APPOINTMENT (OUTPATIENT)
Dept: CT IMAGING | Age: 43
End: 2023-07-10
Payer: MEDICAID

## 2023-07-10 ENCOUNTER — HOSPITAL ENCOUNTER (EMERGENCY)
Age: 43
Discharge: HOME | End: 2023-07-10
Attending: EMERGENCY MEDICINE
Payer: MEDICAID

## 2023-07-10 VITALS
BODY MASS INDEX: 32.25 KG/M2 | WEIGHT: 160 LBS | DIASTOLIC BLOOD PRESSURE: 74 MMHG | TEMPERATURE: 98.4 F | OXYGEN SATURATION: 98 % | HEART RATE: 84 BPM | SYSTOLIC BLOOD PRESSURE: 134 MMHG | HEIGHT: 59 IN | RESPIRATION RATE: 16 BRPM

## 2023-07-10 DIAGNOSIS — G89.29 CHRONIC HIP PAIN, BILATERAL: Primary | ICD-10-CM

## 2023-07-10 DIAGNOSIS — M54.50 CHRONIC LOW BACK PAIN, UNSPECIFIED BACK PAIN LATERALITY, UNSPECIFIED WHETHER SCIATICA PRESENT: ICD-10-CM

## 2023-07-10 DIAGNOSIS — M25.511 CHRONIC RIGHT SHOULDER PAIN: ICD-10-CM

## 2023-07-10 DIAGNOSIS — G89.29 CHRONIC RIGHT SHOULDER PAIN: ICD-10-CM

## 2023-07-10 DIAGNOSIS — G89.29 CHRONIC LOW BACK PAIN, UNSPECIFIED BACK PAIN LATERALITY, UNSPECIFIED WHETHER SCIATICA PRESENT: ICD-10-CM

## 2023-07-10 DIAGNOSIS — M25.552 CHRONIC HIP PAIN, BILATERAL: Primary | ICD-10-CM

## 2023-07-10 DIAGNOSIS — M25.551 CHRONIC HIP PAIN, BILATERAL: Primary | ICD-10-CM

## 2023-07-10 LAB
HCG, URINE, POC: NEGATIVE
Lab: NORMAL
NEGATIVE QC PASS/FAIL: NORMAL
POSITIVE QC PASS/FAIL: NORMAL

## 2023-07-10 PROCEDURE — 6370000000 HC RX 637 (ALT 250 FOR IP)

## 2023-07-10 PROCEDURE — 99284 EMERGENCY DEPT VISIT MOD MDM: CPT

## 2023-07-10 PROCEDURE — 72131 CT LUMBAR SPINE W/O DYE: CPT

## 2023-07-10 PROCEDURE — 73502 X-RAY EXAM HIP UNI 2-3 VIEWS: CPT

## 2023-07-10 PROCEDURE — 73030 X-RAY EXAM OF SHOULDER: CPT

## 2023-07-10 RX ORDER — OXYCODONE HYDROCHLORIDE AND ACETAMINOPHEN 5; 325 MG/1; MG/1
1 TABLET ORAL ONCE
Status: COMPLETED | OUTPATIENT
Start: 2023-07-10 | End: 2023-07-10

## 2023-07-10 RX ORDER — MENTHOL 40 MG/ML
1 GEL TOPICAL 4 TIMES DAILY PRN
Qty: 118 ML | Refills: 0 | Status: SHIPPED | OUTPATIENT
Start: 2023-07-10

## 2023-07-10 RX ORDER — HYDROCODONE BITARTRATE AND ACETAMINOPHEN 5; 325 MG/1; MG/1
1 TABLET ORAL ONCE
Status: COMPLETED | OUTPATIENT
Start: 2023-07-10 | End: 2023-07-10

## 2023-07-10 RX ADMIN — OXYCODONE HYDROCHLORIDE AND ACETAMINOPHEN 1 TABLET: 5; 325 TABLET ORAL at 06:49

## 2023-07-10 RX ADMIN — HYDROCODONE BITARTRATE AND ACETAMINOPHEN 1 TABLET: 5; 325 TABLET ORAL at 09:33

## 2023-07-10 ASSESSMENT — PAIN DESCRIPTION - LOCATION
LOCATION: LEG
LOCATION: HIP;ARM
LOCATION: HIP

## 2023-07-10 ASSESSMENT — PAIN SCALES - GENERAL
PAINLEVEL_OUTOF10: 8
PAINLEVEL_OUTOF10: 10
PAINLEVEL_OUTOF10: 10

## 2023-07-10 ASSESSMENT — PAIN - FUNCTIONAL ASSESSMENT: PAIN_FUNCTIONAL_ASSESSMENT: 0-10

## 2023-07-10 ASSESSMENT — PAIN DESCRIPTION - PAIN TYPE: TYPE: CHRONIC PAIN

## 2023-07-10 ASSESSMENT — PAIN DESCRIPTION - ORIENTATION: ORIENTATION: RIGHT;LEFT

## 2023-07-10 ASSESSMENT — PAIN DESCRIPTION - DESCRIPTORS
DESCRIPTORS: ACHING;BURNING
DESCRIPTORS: ACHING;SORE;THROBBING

## 2023-07-10 ASSESSMENT — LIFESTYLE VARIABLES: HOW OFTEN DO YOU HAVE A DRINK CONTAINING ALCOHOL: NEVER

## 2023-07-10 NOTE — DISCHARGE INSTRUCTIONS
Call your primary care physician who prescribes you Percocet for pain control and keep appointment with orthopedic surgery next week.

## 2023-07-10 NOTE — ED PROVIDER NOTES
Brittany        Pt Name: Justine Ley  MRN: [id number]  [address] [facility] [place] 1980  Date of evaluation: 7/10/2023  Provider: Javi Carrillo DO  PCP: No primary care provider on file. Note Started: 6:55 AM EDT 7/10/23    CHIEF COMPLAINT       Chief Complaint   Patient presents with    Hip Pain     PT reports BL hip pain uncontrolled w/home meds. PT reports needing BL hip replacement with surgery planned for next week. Pt reports recently being seen in [facility] ER for pain control    Arm Pain     PT reports BL arm pain x 24h       HISTORY OF PRESENT ILLNESS: 1 or more Elements   History From: patient    Limitations to history : None    Justine Ley is a 40 y.o. female who presents to the emergency department for bilateral hip pain that started years ago. Patient reports it is constant, moderate severity, worsens with ambulating. She reports she has a history of osteoarthritis of both hips and has an appointment with orthopedic surgery for evaluation next week. She reports she was also prescribed Percocet by her primary care physician but ran out so she came to the emergency department for evaluation. She also complains of lower back pain and right shoulder pain. She reports she had surgery on her right shoulder before. Patient denies fever, chills, headache, shortness of breath, chest pain, abdominal pain, nausea, vomiting, diarrhea, lightheadedness, dysuria, hematuria, hematochezia, and melena. She also denies fall or trauma, no saddle paresthesias or changes to bowel or urinary function. Denies any history of IV drug use. Nursing Notes were all reviewed and agreed with or any disagreements were addressed in the HPI. REVIEW OF SYSTEMS :           Positives and Pertinent negatives as per HPI.      SURGICAL HISTORY     Past Surgical History:   Procedure Laterality Date    CHOLECYSTECTOMY      COLONOSCOPY

## 2023-07-27 ENCOUNTER — APPOINTMENT (OUTPATIENT)
Dept: GENERAL RADIOLOGY | Age: 43
End: 2023-07-27
Payer: MEDICAID

## 2023-07-27 ENCOUNTER — HOSPITAL ENCOUNTER (EMERGENCY)
Age: 43
Discharge: HOME | End: 2023-07-27
Attending: EMERGENCY MEDICINE
Payer: MEDICAID

## 2023-07-27 ENCOUNTER — APPOINTMENT (OUTPATIENT)
Dept: CT IMAGING | Age: 43
End: 2023-07-27
Payer: MEDICAID

## 2023-07-27 VITALS
HEIGHT: 59 IN | RESPIRATION RATE: 16 BRPM | TEMPERATURE: 98.2 F | BODY MASS INDEX: 30.24 KG/M2 | OXYGEN SATURATION: 100 % | HEART RATE: 82 BPM | SYSTOLIC BLOOD PRESSURE: 163 MMHG | DIASTOLIC BLOOD PRESSURE: 99 MMHG | WEIGHT: 150 LBS

## 2023-07-27 DIAGNOSIS — M25.552 CHRONIC PAIN OF BOTH HIPS: Primary | ICD-10-CM

## 2023-07-27 DIAGNOSIS — M25.551 CHRONIC PAIN OF BOTH HIPS: Primary | ICD-10-CM

## 2023-07-27 DIAGNOSIS — G89.29 CHRONIC PAIN OF BOTH HIPS: Primary | ICD-10-CM

## 2023-07-27 DIAGNOSIS — M25.512 ACUTE PAIN OF LEFT SHOULDER: ICD-10-CM
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
## 2023-07-27 PROCEDURE — 72192 CT PELVIS W/O DYE: CPT

## 2023-07-27 PROCEDURE — 99284 EMERGENCY DEPT VISIT MOD MDM: CPT

## 2023-07-27 PROCEDURE — 96372 THER/PROPH/DIAG INJ SC/IM: CPT

## 2023-07-27 PROCEDURE — 72131 CT LUMBAR SPINE W/O DYE: CPT

## 2023-07-27 PROCEDURE — 6360000002 HC RX W HCPCS: Performed by: STUDENT IN AN ORGANIZED HEALTH CARE EDUCATION/TRAINING PROGRAM

## 2023-07-27 PROCEDURE — 73030 X-RAY EXAM OF SHOULDER: CPT

## 2023-07-27 PROCEDURE — 6370000000 HC RX 637 (ALT 250 FOR IP): Performed by: STUDENT IN AN ORGANIZED HEALTH CARE EDUCATION/TRAINING PROGRAM

## 2023-07-27 RX ORDER — ORPHENADRINE CITRATE 30 MG/ML
60 INJECTION INTRAMUSCULAR; INTRAVENOUS ONCE
Status: COMPLETED | OUTPATIENT
Start: 2023-07-27 | End: 2023-07-27

## 2023-07-27 RX ORDER — OXYCODONE HYDROCHLORIDE AND ACETAMINOPHEN 5; 325 MG/1; MG/1
1 TABLET ORAL ONCE
Status: COMPLETED | OUTPATIENT
Start: 2023-07-27 | End: 2023-07-27

## 2023-07-27 RX ORDER — ORPHENADRINE CITRATE 100 MG/1
100 TABLET, EXTENDED RELEASE ORAL 2 TIMES DAILY
Qty: 20 TABLET | Refills: 0 | Status: SHIPPED | OUTPATIENT
Start: 2023-07-27 | End: 2023-08-06

## 2023-07-27 RX ADMIN — ORPHENADRINE CITRATE 60 MG: 60 INJECTION INTRAMUSCULAR; INTRAVENOUS at 11:01

## 2023-07-27 RX ADMIN — OXYCODONE HYDROCHLORIDE AND ACETAMINOPHEN 1 TABLET: 5; 325 TABLET ORAL at 12:47

## 2023-07-27 RX ADMIN — OXYCODONE HYDROCHLORIDE AND ACETAMINOPHEN 1 TABLET: 5; 325 TABLET ORAL at 08:21

## 2023-07-27 ASSESSMENT — PAIN SCALES - GENERAL
PAINLEVEL_OUTOF10: 10
PAINLEVEL_OUTOF10: 8
PAINLEVEL_OUTOF10: 10
PAINLEVEL_OUTOF10: 10

## 2023-07-27 NOTE — ED PROVIDER NOTES
CONSULTS: (Who and What was discussed)  None        I am the Primary Clinician of Record. FINAL IMPRESSION      1. Chronic pain of both hips    2. Fall, initial encounter    3.  Acute pain of left shoulder          DISPOSITION/PLAN     DISPOSITION Decision To Discharge 07/27/2023 12:37:11 PM      PATIENT REFERRED TO:  Martha Leal DO  [address] [address]  [phone]    Call in 2 days  for follow-up appointment      DISCHARGE MEDICATIONS:  Discharge Medication List as of 7/27/2023 12:39 PM        START taking these medications    Details   orphenadrine (NORFLEX) 100 MG extended release tablet Take 1 tablet by mouth 2 times daily for 10 days, Disp-20 tablet, R-0Print             DISCONTINUED MEDICATIONS:  Discharge Medication List as of 7/27/2023 12:39 PM                 (Please note that portions of this note were completed with a voice recognition program.  Efforts were made to edit the dictations but occasionally words are mis-transcribed.)    Jonathan Guadalupe MD (electronically signed)            Jonathan Guadalupe MD  07/30/23 1084

## 2023-08-03 ENCOUNTER — HOSPITAL ENCOUNTER (EMERGENCY)
Age: 43
Discharge: HOME | End: 2023-08-03
Attending: STUDENT IN AN ORGANIZED HEALTH CARE EDUCATION/TRAINING PROGRAM
Payer: MEDICAID

## 2023-08-03 ENCOUNTER — APPOINTMENT (OUTPATIENT)
Dept: GENERAL RADIOLOGY | Age: 43
End: 2023-08-03
Payer: MEDICAID

## 2023-08-03 VITALS
RESPIRATION RATE: 18 BRPM | DIASTOLIC BLOOD PRESSURE: 85 MMHG | HEIGHT: 59 IN | BODY MASS INDEX: 28.22 KG/M2 | SYSTOLIC BLOOD PRESSURE: 156 MMHG | OXYGEN SATURATION: 98 % | HEART RATE: 74 BPM | WEIGHT: 140 LBS | TEMPERATURE: 98.1 F

## 2023-08-03 DIAGNOSIS — T40.604A OPIATE OVERDOSE, UNDETERMINED INTENT, INITIAL ENCOUNTER (HCC): Primary | ICD-10-CM

## 2023-08-03 DIAGNOSIS — R07.9 CHEST PAIN, UNSPECIFIED TYPE: ICD-10-CM

## 2023-08-03 LAB
ALBUMIN SERPL-MCNC: 4.2 G/DL (ref 3.5–5.2)
ALP SERPL-CCNC: 108 U/L (ref 35–104)
ALT SERPL-CCNC: 30 U/L (ref 0–32)
ANION GAP SERPL CALCULATED.3IONS-SCNC: 13 MMOL/L (ref 7–16)
AST SERPL-CCNC: 32 U/L (ref 0–31)
BASOPHILS # BLD: 0.03 K/UL (ref 0–0.2)
BASOPHILS NFR BLD: 0 % (ref 0–2)
BILIRUB SERPL-MCNC: 0.2 MG/DL (ref 0–1.2)
BUN SERPL-MCNC: 11 MG/DL (ref 6–20)
CALCIUM SERPL-MCNC: 9.1 MG/DL (ref 8.6–10.2)
CHLORIDE SERPL-SCNC: 104 MMOL/L (ref 98–107)
CO2 SERPL-SCNC: 25 MMOL/L (ref 22–29)
CREAT SERPL-MCNC: 0.7 MG/DL (ref 0.5–1)
EOSINOPHIL # BLD: 0.07 K/UL (ref 0.05–0.5)
EOSINOPHILS RELATIVE PERCENT: 1 % (ref 0–6)
ERYTHROCYTE [DISTWIDTH] IN BLOOD BY AUTOMATED COUNT: 16.1 % (ref 11.5–15)
GFR SERPL CREATININE-BSD FRML MDRD: >60 ML/MIN/1.73M2
GLUCOSE SERPL-MCNC: 81 MG/DL (ref 74–99)
HCT VFR BLD AUTO: 42.5 % (ref 34–48)
HGB BLD-MCNC: 13.3 G/DL (ref 11.5–15.5)
IMM GRANULOCYTES # BLD AUTO: 0.06 K/UL (ref 0–0.58)
IMM GRANULOCYTES NFR BLD: 1 % (ref 0–5)
LYMPHOCYTES NFR BLD: 1.45 K/UL (ref 1.5–4)
LYMPHOCYTES RELATIVE PERCENT: 13 % (ref 20–42)
MCH RBC QN AUTO: 24.4 PG (ref 26–35)
MCHC RBC AUTO-ENTMCNC: 31.3 G/DL (ref 32–34.5)
MCV RBC AUTO: 78.1 FL (ref 80–99.9)
MONOCYTES NFR BLD: 0.86 K/UL (ref 0.1–0.95)
MONOCYTES NFR BLD: 8 % (ref 2–12)
NEUTROPHILS NFR BLD: 78 % (ref 43–80)
NEUTS SEG NFR BLD: 8.67 K/UL (ref 1.8–7.3)
PLATELET # BLD AUTO: 214 K/UL (ref 130–450)
PMV BLD AUTO: 10 FL (ref 7–12)
POTASSIUM SERPL-SCNC: 4 MMOL/L (ref 3.5–5)
PROT SERPL-MCNC: 7.6 G/DL (ref 6.4–8.3)
RBC # BLD AUTO: 5.44 M/UL (ref 3.5–5.5)
SODIUM SERPL-SCNC: 142 MMOL/L (ref 132–146)
TROPONIN I SERPL HS-MCNC: 7 NG/L (ref 0–9)
TROPONIN I SERPL HS-MCNC: 8 NG/L (ref 0–9)
WBC OTHER # BLD: 11.1 K/UL (ref 4.5–11.5)

## 2023-08-03 PROCEDURE — 85025 COMPLETE CBC W/AUTO DIFF WBC: CPT

## 2023-08-03 PROCEDURE — 93005 ELECTROCARDIOGRAM TRACING: CPT | Performed by: STUDENT IN AN ORGANIZED HEALTH CARE EDUCATION/TRAINING PROGRAM

## 2023-08-03 PROCEDURE — 71046 X-RAY EXAM CHEST 2 VIEWS: CPT

## 2023-08-03 PROCEDURE — 84484 ASSAY OF TROPONIN QUANT: CPT

## 2023-08-03 PROCEDURE — 99285 EMERGENCY DEPT VISIT HI MDM: CPT

## 2023-08-03 PROCEDURE — 80053 COMPREHEN METABOLIC PANEL: CPT

## 2023-08-03 ASSESSMENT — PAIN - FUNCTIONAL ASSESSMENT: PAIN_FUNCTIONAL_ASSESSMENT: NONE - DENIES PAIN
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
## 2023-08-03 NOTE — CARE COORDINATION
Peer Recovery Support Note    Name: Aneudy Manzo  Date: 8/3/2023    Chief Complaint   Patient presents with    Drug Overdose     Snorted cocaine with fentanyl, alert and oriented x 4, denies SI/HI, 2 mg narcan IV given PTA       Peer Support met with patient. [x] Support and education provided  [x] Resources provided   [] Treatment referral:   [] Other:   [] Patient declined peer recovery services     Referred By: Tracker board    Notes: Talk to pt in ED. Overdose from cocaine laced with fentanyl. Gave pt some papers with outpatient resources.     Signed: Rafal Jones, 8/3/2023

## 2023-08-04 LAB
EKG ATRIAL RATE: 68 BPM
EKG P AXIS: 44 DEGREES
EKG P-R INTERVAL: 124 MS
EKG Q-T INTERVAL: 446 MS
EKG QRS DURATION: 88 MS
EKG QTC CALCULATION (BAZETT): 474 MS
EKG R AXIS: 65 DEGREES
EKG T AXIS: 50 DEGREES
EKG VENTRICULAR RATE: 68 BPM

## 2023-08-04 PROCEDURE — 93010 ELECTROCARDIOGRAM REPORT: CPT | Performed by: INTERNAL MEDICINE

## 2023-08-04 NOTE — ED NOTES
Meal tray given, Pt resting with eyes open, alert and able to freely move in bed, vss on monitor      Sravani Patrick RN  08/03/23 2014

## 2023-08-09 ENCOUNTER — APPOINTMENT (OUTPATIENT)
Dept: GENERAL RADIOLOGY | Age: 43
End: 2023-08-09
Payer: MEDICAID

## 2023-08-09 ENCOUNTER — HOSPITAL ENCOUNTER (EMERGENCY)
Age: 43
Discharge: HOME HEALTH | End: 2023-08-09
Payer: MEDICAID

## 2023-08-09 VITALS
SYSTOLIC BLOOD PRESSURE: 163 MMHG | WEIGHT: 150 LBS | RESPIRATION RATE: 14 BRPM | HEIGHT: 59 IN | HEART RATE: 75 BPM | DIASTOLIC BLOOD PRESSURE: 85 MMHG | OXYGEN SATURATION: 95 % | BODY MASS INDEX: 30.24 KG/M2 | TEMPERATURE: 98 F

## 2023-08-09 DIAGNOSIS — M25.552 CHRONIC PAIN OF BOTH HIPS: ICD-10-CM

## 2023-08-09 DIAGNOSIS — M25.561 ARTHRALGIA OF BOTH KNEES: ICD-10-CM

## 2023-08-09 DIAGNOSIS — R07.9 CHEST PAIN, UNSPECIFIED TYPE: Primary | ICD-10-CM

## 2023-08-09 DIAGNOSIS — G89.29 CHRONIC PAIN OF BOTH HIPS: ICD-10-CM

## 2023-08-09 DIAGNOSIS — A59.9 TRICHIMONIASIS: ICD-10-CM

## 2023-08-09 DIAGNOSIS — R05.9 COUGH, UNSPECIFIED TYPE: ICD-10-CM

## 2023-08-09 DIAGNOSIS — M25.562 ARTHRALGIA OF BOTH KNEES: ICD-10-CM

## 2023-08-09 DIAGNOSIS — M25.551 CHRONIC PAIN OF BOTH HIPS: ICD-10-CM

## 2023-08-09 LAB
ALBUMIN SERPL-MCNC: 3.8 G/DL (ref 3.5–5.2)
ALP SERPL-CCNC: 95 U/L (ref 35–104)
ALT SERPL-CCNC: 12 U/L (ref 0–32)
AMPHET UR QL SCN: NEGATIVE
ANION GAP SERPL CALCULATED.3IONS-SCNC: 14 MMOL/L (ref 7–16)
AST SERPL-CCNC: 15 U/L (ref 0–31)
BACTERIA URNS QL MICRO: ABNORMAL
BARBITURATES UR QL SCN: NEGATIVE
BASOPHILS # BLD: 0.02 K/UL (ref 0–0.2)
BASOPHILS NFR BLD: 0 % (ref 0–2)
BENZODIAZ UR QL: NEGATIVE
BILIRUB SERPL-MCNC: 0.4 MG/DL (ref 0–1.2)
BILIRUB UR QL STRIP: NEGATIVE
BUN SERPL-MCNC: 9 MG/DL (ref 6–20)
BUPRENORPHINE UR QL: NEGATIVE
CALCIUM SERPL-MCNC: 8.8 MG/DL (ref 8.6–10.2)
CANNABINOIDS UR QL SCN: NEGATIVE
CHLORIDE SERPL-SCNC: 102 MMOL/L (ref 98–107)
CLARITY UR: ABNORMAL
CO2 SERPL-SCNC: 19 MMOL/L (ref 22–29)
COCAINE UR QL SCN: POSITIVE
COLOR UR: YELLOW
CREAT SERPL-MCNC: 0.7 MG/DL (ref 0.5–1)
EKG ATRIAL RATE: 91 BPM
EKG P AXIS: 52 DEGREES
EKG P-R INTERVAL: 136 MS
EKG Q-T INTERVAL: 370 MS
EKG QRS DURATION: 78 MS
EKG QTC CALCULATION (BAZETT): 455 MS
EKG R AXIS: 62 DEGREES
EKG T AXIS: 32 DEGREES
EKG VENTRICULAR RATE: 91 BPM
EOSINOPHIL # BLD: 0.05 K/UL (ref 0.05–0.5)
EOSINOPHILS RELATIVE PERCENT: 0 % (ref 0–6)
ERYTHROCYTE [DISTWIDTH] IN BLOOD BY AUTOMATED COUNT: 16.3 % (ref 11.5–15)
FENTANYL UR QL: POSITIVE
GFR SERPL CREATININE-BSD FRML MDRD: >60 ML/MIN/1.73M2
GLUCOSE SERPL-MCNC: 87 MG/DL (ref 74–99)
GLUCOSE UR STRIP-MCNC: NEGATIVE MG/DL
HCG, URINE, POC: NEGATIVE
HCT VFR BLD AUTO: 41.2 % (ref 34–48)
HGB BLD-MCNC: 12.9 G/DL (ref 11.5–15.5)
HGB UR QL STRIP.AUTO: NEGATIVE
IMM GRANULOCYTES # BLD AUTO: 0.06 K/UL (ref 0–0.58)
IMM GRANULOCYTES NFR BLD: 1 % (ref 0–5)
KETONES UR STRIP-MCNC: 15 MG/DL
LACTATE BLDV-SCNC: 1 MMOL/L (ref 0.5–2.2)
LEUKOCYTE ESTERASE UR QL STRIP: ABNORMAL
LIPASE SERPL-CCNC: 25 U/L (ref 13–60)
LYMPHOCYTES NFR BLD: 1.63 K/UL (ref 1.5–4)
LYMPHOCYTES RELATIVE PERCENT: 12 % (ref 20–42)
Lab: NORMAL
MCH RBC QN AUTO: 24.4 PG (ref 26–35)
MCHC RBC AUTO-ENTMCNC: 31.3 G/DL (ref 32–34.5)
MCV RBC AUTO: 78 FL (ref 80–99.9)
METHADONE UR QL: NEGATIVE
MONOCYTES NFR BLD: 1.05 K/UL (ref 0.1–0.95)
MONOCYTES NFR BLD: 8 % (ref 2–12)
NEGATIVE QC PASS/FAIL: NORMAL
NEUTROPHILS NFR BLD: 79 % (ref 43–80)
NEUTS SEG NFR BLD: 10.43 K/UL (ref 1.8–7.3)
NITRITE UR QL STRIP: NEGATIVE
OPIATES UR QL SCN: NEGATIVE
OXYCODONE UR QL SCN: NEGATIVE
PCP UR QL SCN: NEGATIVE
PH UR STRIP: 5.5 [PH] (ref 5–9)
PLATELET # BLD AUTO: 208 K/UL (ref 130–450)
PMV BLD AUTO: 9.5 FL (ref 7–12)
POSITIVE QC PASS/FAIL: NORMAL
POTASSIUM SERPL-SCNC: 4.1 MMOL/L (ref 3.5–5)
PROT SERPL-MCNC: 7.3 G/DL (ref 6.4–8.3)
PROT UR STRIP-MCNC: NEGATIVE MG/DL
RBC # BLD AUTO: 5.28 M/UL (ref 3.5–5.5)
RBC #/AREA URNS HPF: ABNORMAL /HPF
SODIUM SERPL-SCNC: 135 MMOL/L (ref 132–146)
SP GR UR STRIP: >1.03 (ref 1–1.03)
TEST INFORMATION: ABNORMAL
TRICHOMONAS #/AREA URNS HPF: PRESENT /[HPF]
TROPONIN I SERPL HS-MCNC: <6 NG/L (ref 0–9)
UROBILINOGEN UR STRIP-ACNC: 0.2 EU/DL (ref 0–1)
WBC #/AREA URNS HPF: ABNORMAL /HPF
WBC OTHER # BLD: 13.2 K/UL (ref 4.5–11.5)

## 2023-08-09 PROCEDURE — 81001 URINALYSIS AUTO W/SCOPE: CPT

## 2023-08-09 PROCEDURE — 73560 X-RAY EXAM OF KNEE 1 OR 2: CPT

## 2023-08-09 PROCEDURE — 71045 X-RAY EXAM CHEST 1 VIEW: CPT

## 2023-08-09 PROCEDURE — 6370000000 HC RX 637 (ALT 250 FOR IP): Performed by: NURSE PRACTITIONER

## 2023-08-09 PROCEDURE — 84484 ASSAY OF TROPONIN QUANT: CPT

## 2023-08-09 PROCEDURE — 99285 EMERGENCY DEPT VISIT HI MDM: CPT

## 2023-08-09 PROCEDURE — 93005 ELECTROCARDIOGRAM TRACING: CPT | Performed by: NURSE PRACTITIONER

## 2023-08-09 PROCEDURE — 2580000003 HC RX 258: Performed by: NURSE PRACTITIONER

## 2023-08-09 PROCEDURE — 83690 ASSAY OF LIPASE: CPT

## 2023-08-09 PROCEDURE — 80053 COMPREHEN METABOLIC PANEL: CPT

## 2023-08-09 PROCEDURE — 80307 DRUG TEST PRSMV CHEM ANLYZR: CPT

## 2023-08-09 PROCEDURE — 93010 ELECTROCARDIOGRAM REPORT: CPT | Performed by: INTERNAL MEDICINE

## 2023-08-09 PROCEDURE — 83605 ASSAY OF LACTIC ACID: CPT

## 2023-08-09 PROCEDURE — 85025 COMPLETE CBC W/AUTO DIFF WBC: CPT

## 2023-08-09 RX ORDER — MENTHOL 40 MG/ML
1 GEL TOPICAL 4 TIMES DAILY PRN
Qty: 118 ML | Refills: 0 | Status: SHIPPED | OUTPATIENT
Start: 2023-08-09

## 2023-08-09 RX ORDER — BROMPHENIRAMINE MALEATE, PSEUDOEPHEDRINE HYDROCHLORIDE, AND DEXTROMETHORPHAN HYDROBROMIDE 2; 30; 10 MG/5ML; MG/5ML; MG/5ML
5 SYRUP ORAL 4 TIMES DAILY PRN
Qty: 120 ML | Refills: 0 | Status: SHIPPED | OUTPATIENT
Start: 2023-08-09 | End: 2023-08-14

## 2023-08-09 RX ORDER — OXYCODONE HYDROCHLORIDE AND ACETAMINOPHEN 5; 325 MG/1; MG/1
1 TABLET ORAL ONCE
Status: COMPLETED | OUTPATIENT
Start: 2023-08-09 | End: 2023-08-09

## 2023-08-09 RX ORDER — 0.9 % SODIUM CHLORIDE 0.9 %
1000 INTRAVENOUS SOLUTION INTRAVENOUS ONCE
Status: COMPLETED | OUTPATIENT
Start: 2023-08-09 | End: 2023-08-09

## 2023-08-09 RX ORDER — METRONIDAZOLE 500 MG/1
500 TABLET ORAL 2 TIMES DAILY
Qty: 14 TABLET | Refills: 0 | Status: SHIPPED | OUTPATIENT
Start: 2023-08-09 | End: 2023-08-16

## 2023-08-09 RX ORDER — SENNOSIDES 8.6 MG
650 CAPSULE ORAL EVERY 8 HOURS PRN
Qty: 20 TABLET | Refills: 0 | Status: SHIPPED | OUTPATIENT
Start: 2023-08-09

## 2023-08-09 RX ADMIN — OXYCODONE AND ACETAMINOPHEN 1 TABLET: 5; 325 TABLET ORAL at 02:42

## 2023-08-09 RX ADMIN — SODIUM CHLORIDE 1000 ML: 9 INJECTION, SOLUTION INTRAVENOUS at 02:43

## 2023-08-09 ASSESSMENT — HEART SCORE: ECG: 0

## 2023-08-09 ASSESSMENT — PAIN DESCRIPTION - LOCATION
LOCATION: LEG
LOCATION: CHEST;LEG

## 2023-08-09 ASSESSMENT — PAIN DESCRIPTION - ORIENTATION
ORIENTATION: LEFT
ORIENTATION: RIGHT;LEFT

## 2023-08-09 ASSESSMENT — PAIN SCALES - GENERAL
PAINLEVEL_OUTOF10: 10
PAINLEVEL_OUTOF10: 10

## 2023-08-09 ASSESSMENT — PAIN - FUNCTIONAL ASSESSMENT
PAIN_FUNCTIONAL_ASSESSMENT: NONE - DENIES PAIN
PAIN_FUNCTIONAL_ASSESSMENT: 0-10

## 2023-08-09 ASSESSMENT — PAIN DESCRIPTION - DESCRIPTORS
DESCRIPTORS: THROBBING
DESCRIPTORS: ACHING

## 2023-08-09 ASSESSMENT — LIFESTYLE VARIABLES: HOW OFTEN DO YOU HAVE A DRINK CONTAINING ALCOHOL: NEVER

## 2023-08-09 NOTE — DISCHARGE INSTRUCTIONS
Also today the Urine showed Trichomonas,  Please take Flagyl as Prescribed,   NO Alcohol with this Med,   Inform Sexual partner so they can get treatment

## 2023-08-09 NOTE — ED PROVIDER NOTES
Independent  HPI:  8/9/23, Time: 1:48 AM EDT         Alli Blair is a 44 y.o. female presenting to the ED for chest pain that started Sunday after a drug overdose as well as continued bilateral hip and knee pain. Patient presents to the emergency department states that she was giving cocaine that had fentanyl in it. States that she overdosed, she was actually seen here in the emergency department and subsequently discharged home. Per medical records patient was actually seen here in the emergency department on August 3, 2023 for the drug overdose. Patient reports that since then she has been having left-sided chest pain that will radiate into her left upper extremity. She denies shortness of breath. She denies any cough or congestion. Patient also reporting chronic hip and knee pain. States that she does follow with orthopedics, Dr. Omero Duong. States that they are supposed to do surgery but nothing has been set up. Patient denies any new fall or injury or trauma. Patient without any unusual paresthesia. Patient reports pain is worsening with any types of ambulation and that she feels weak from all of her pain. Patient does not have any unusual joint swelling or erythema. States this chronic pain has been ongoing now for 2 years. Patient also reporting left lower quadrant abdominal pain, reports that it started a day ago, denies difficulty with urination. Denies any blood noted in her urine or stool. She did report having an episode of diarrhea.     Review of Systems:   A complete review of systems was performed and pertinent positives and negatives are stated within HPI, all other systems reviewed and are negative.          --------------------------------------------- PAST HISTORY ---------------------------------------------  Past Medical History:  has a past medical history of Asthma, Bronchitis, Movement disorder, Seizure disorder ([address]), Seizure disorder ([address]), Suicidal overdose ([address]),

## 2023-08-13 LAB
BASOPHILS # BLD: 0.03 K/UL (ref 0–0.2)
BASOPHILS NFR BLD: 0 % (ref 0–2)
EOSINOPHIL # BLD: 0.04 K/UL (ref 0.05–0.5)
EOSINOPHILS RELATIVE PERCENT: 1 % (ref 0–6)
ERYTHROCYTE [DISTWIDTH] IN BLOOD BY AUTOMATED COUNT: 16 % (ref 11.5–15)
HCG, URINE, POC: NEGATIVE
HCT VFR BLD AUTO: 39.1 % (ref 34–48)
HGB BLD-MCNC: 12.7 G/DL (ref 11.5–15.5)
IMM GRANULOCYTES # BLD AUTO: <0.03 K/UL (ref 0–0.58)
IMM GRANULOCYTES NFR BLD: 0 % (ref 0–5)
LYMPHOCYTES NFR BLD: 1.43 K/UL (ref 1.5–4)
LYMPHOCYTES RELATIVE PERCENT: 17 % (ref 20–42)
Lab: NORMAL
MCH RBC QN AUTO: 24.8 PG (ref 26–35)
MCHC RBC AUTO-ENTMCNC: 32.5 G/DL (ref 32–34.5)
MCV RBC AUTO: 76.2 FL (ref 80–99.9)
MONOCYTES NFR BLD: 0.69 K/UL (ref 0.1–0.95)
MONOCYTES NFR BLD: 8 % (ref 2–12)
NEGATIVE QC PASS/FAIL: NORMAL
NEUTROPHILS NFR BLD: 74 % (ref 43–80)
NEUTS SEG NFR BLD: 6.32 K/UL (ref 1.8–7.3)
PLATELET # BLD AUTO: 261 K/UL (ref 130–450)
PMV BLD AUTO: 9.4 FL (ref 7–12)
POSITIVE QC PASS/FAIL: NORMAL
RBC # BLD AUTO: 5.13 M/UL (ref 3.5–5.5)
WBC OTHER # BLD: 8.5 K/UL (ref 4.5–11.5)

## 2023-08-13 PROCEDURE — 80053 COMPREHEN METABOLIC PANEL: CPT

## 2023-08-13 PROCEDURE — 85379 FIBRIN DEGRADATION QUANT: CPT

## 2023-08-13 PROCEDURE — 80307 DRUG TEST PRSMV CHEM ANLYZR: CPT

## 2023-08-13 PROCEDURE — 93005 ELECTROCARDIOGRAM TRACING: CPT | Performed by: EMERGENCY MEDICINE

## 2023-08-13 PROCEDURE — 84484 ASSAY OF TROPONIN QUANT: CPT

## 2023-08-13 PROCEDURE — 96374 THER/PROPH/DIAG INJ IV PUSH: CPT

## 2023-08-13 PROCEDURE — 6360000002 HC RX W HCPCS: Performed by: EMERGENCY MEDICINE

## 2023-08-13 PROCEDURE — C9113 INJ PANTOPRAZOLE SODIUM, VIA: HCPCS | Performed by: EMERGENCY MEDICINE

## 2023-08-13 PROCEDURE — 99285 EMERGENCY DEPT VISIT HI MDM: CPT

## 2023-08-13 PROCEDURE — 85025 COMPLETE CBC W/AUTO DIFF WBC: CPT

## 2023-08-13 PROCEDURE — 96375 TX/PRO/DX INJ NEW DRUG ADDON: CPT

## 2023-08-13 PROCEDURE — 83605 ASSAY OF LACTIC ACID: CPT

## 2023-08-13 PROCEDURE — 6370000000 HC RX 637 (ALT 250 FOR IP): Performed by: EMERGENCY MEDICINE

## 2023-08-13 PROCEDURE — 83690 ASSAY OF LIPASE: CPT

## 2023-08-13 RX ORDER — PANTOPRAZOLE SODIUM 40 MG/10ML
40 INJECTION, POWDER, LYOPHILIZED, FOR SOLUTION INTRAVENOUS ONCE
Status: COMPLETED | OUTPATIENT
Start: 2023-08-13 | End: 2023-08-13

## 2023-08-13 RX ORDER — ONDANSETRON 2 MG/ML
4 INJECTION INTRAMUSCULAR; INTRAVENOUS EVERY 6 HOURS PRN
Status: DISCONTINUED | OUTPATIENT
Start: 2023-08-13 | End: 2023-08-14 | Stop reason: HOSPADM

## 2023-08-13 RX ADMIN — PANTOPRAZOLE SODIUM 40 MG: 40 INJECTION, POWDER, LYOPHILIZED, FOR SOLUTION INTRAVENOUS at 23:31

## 2023-08-13 RX ADMIN — Medication: at 23:26

## 2023-08-13 ASSESSMENT — PAIN SCALES - GENERAL: PAINLEVEL_OUTOF10: 10

## 2023-08-13 ASSESSMENT — PAIN - FUNCTIONAL ASSESSMENT: PAIN_FUNCTIONAL_ASSESSMENT: 0-10

## 2023-08-14 ENCOUNTER — HOSPITAL ENCOUNTER (EMERGENCY)
Age: 43
Discharge: HOME | End: 2023-08-14
Attending: EMERGENCY MEDICINE
Payer: MEDICAID

## 2023-08-14 ENCOUNTER — APPOINTMENT (OUTPATIENT)
Dept: GENERAL RADIOLOGY | Age: 43
End: 2023-08-14
Payer: MEDICAID

## 2023-08-14 ENCOUNTER — APPOINTMENT (OUTPATIENT)
Dept: CT IMAGING | Age: 43
End: 2023-08-14
Payer: MEDICAID

## 2023-08-14 VITALS
TEMPERATURE: 98 F | RESPIRATION RATE: 17 BRPM | SYSTOLIC BLOOD PRESSURE: 142 MMHG | DIASTOLIC BLOOD PRESSURE: 87 MMHG | OXYGEN SATURATION: 98 % | HEART RATE: 82 BPM

## 2023-08-14 DIAGNOSIS — I10 HYPERTENSION, UNSPECIFIED TYPE: ICD-10-CM

## 2023-08-14 DIAGNOSIS — R11.2 NAUSEA AND VOMITING, UNSPECIFIED VOMITING TYPE: ICD-10-CM

## 2023-08-14 DIAGNOSIS — R07.81 PLEURITIC CHEST PAIN: ICD-10-CM

## 2023-08-14 DIAGNOSIS — R10.10 PAIN OF UPPER ABDOMEN: Primary | ICD-10-CM

## 2023-08-14 DIAGNOSIS — F19.10 SUBSTANCE ABUSE (HCC): ICD-10-CM

## 2023-08-14 LAB
ALBUMIN SERPL-MCNC: 3.9 G/DL (ref 3.5–5.2)
ALP SERPL-CCNC: 91 U/L (ref 35–104)
ALT SERPL-CCNC: 10 U/L (ref 0–32)
AMPHET UR QL SCN: NEGATIVE
ANION GAP SERPL CALCULATED.3IONS-SCNC: 12 MMOL/L (ref 7–16)
AST SERPL-CCNC: 15 U/L (ref 0–31)
BARBITURATES UR QL SCN: NEGATIVE
BENZODIAZ UR QL: NEGATIVE
BILIRUB SERPL-MCNC: 0.2 MG/DL (ref 0–1.2)
BUN SERPL-MCNC: 7 MG/DL (ref 6–20)
BUPRENORPHINE UR QL: NEGATIVE
CALCIUM SERPL-MCNC: 8.9 MG/DL (ref 8.6–10.2)
CANNABINOIDS UR QL SCN: POSITIVE
CHLORIDE SERPL-SCNC: 102 MMOL/L (ref 98–107)
CO2 SERPL-SCNC: 23 MMOL/L (ref 22–29)
COCAINE UR QL SCN: POSITIVE
CREAT SERPL-MCNC: 0.8 MG/DL (ref 0.5–1)
D DIMER: 4192 NG/ML DDU (ref 0–232)
EKG ATRIAL RATE: 98 BPM
EKG P AXIS: 63 DEGREES
EKG P-R INTERVAL: 130 MS
EKG Q-T INTERVAL: 378 MS
EKG QRS DURATION: 76 MS
EKG QTC CALCULATION (BAZETT): 482 MS
EKG R AXIS: 82 DEGREES
EKG T AXIS: 48 DEGREES
EKG VENTRICULAR RATE: 98 BPM
FENTANYL UR QL: POSITIVE
GFR SERPL CREATININE-BSD FRML MDRD: >60 ML/MIN/1.73M2
GLUCOSE SERPL-MCNC: 93 MG/DL (ref 74–99)
LACTATE BLDV-SCNC: 1.6 MMOL/L (ref 0.5–2.2)
LIPASE SERPL-CCNC: 16 U/L (ref 13–60)
METHADONE UR QL: NEGATIVE
OPIATES UR QL SCN: NEGATIVE
OXYCODONE UR QL SCN: NEGATIVE
PCP UR QL SCN: NEGATIVE
POTASSIUM SERPL-SCNC: 4 MMOL/L (ref 3.5–5)
PROT SERPL-MCNC: 7.1 G/DL (ref 6.4–8.3)
SODIUM SERPL-SCNC: 137 MMOL/L (ref 132–146)
TEST INFORMATION: ABNORMAL
TROPONIN I SERPL HS-MCNC: <6 NG/L (ref 0–9)
TROPONIN I SERPL HS-MCNC: <6 NG/L (ref 0–9)

## 2023-08-14 PROCEDURE — 71045 X-RAY EXAM CHEST 1 VIEW: CPT

## 2023-08-14 PROCEDURE — 71275 CT ANGIOGRAPHY CHEST: CPT

## 2023-08-14 PROCEDURE — 74177 CT ABD & PELVIS W/CONTRAST: CPT

## 2023-08-14 PROCEDURE — 84484 ASSAY OF TROPONIN QUANT: CPT

## 2023-08-14 PROCEDURE — 96375 TX/PRO/DX INJ NEW DRUG ADDON: CPT

## 2023-08-14 PROCEDURE — 6360000004 HC RX CONTRAST MEDICATION: Performed by: RADIOLOGY

## 2023-08-14 PROCEDURE — 6360000002 HC RX W HCPCS: Performed by: EMERGENCY MEDICINE

## 2023-08-14 PROCEDURE — 93010 ELECTROCARDIOGRAM REPORT: CPT | Performed by: INTERNAL MEDICINE

## 2023-08-14 RX ORDER — MORPHINE SULFATE 4 MG/ML
4 INJECTION, SOLUTION INTRAMUSCULAR; INTRAVENOUS ONCE
Status: COMPLETED | OUTPATIENT
Start: 2023-08-14 | End: 2023-08-14

## 2023-08-14 RX ORDER — HYDRALAZINE HYDROCHLORIDE 20 MG/ML
10 INJECTION INTRAMUSCULAR; INTRAVENOUS ONCE
Status: COMPLETED | OUTPATIENT
Start: 2023-08-14 | End: 2023-08-14

## 2023-08-14 RX ORDER — ONDANSETRON 4 MG/1
4 TABLET, FILM COATED ORAL EVERY 8 HOURS PRN
Qty: 9 TABLET | Refills: 0 | Status: SHIPPED | OUTPATIENT
Start: 2023-08-14 | End: 2023-08-17

## 2023-08-14 RX ORDER — PANTOPRAZOLE SODIUM 40 MG/1
40 TABLET, DELAYED RELEASE ORAL DAILY
Qty: 30 TABLET | Refills: 0 | Status: SHIPPED | OUTPATIENT
Start: 2023-08-14 | End: 2023-09-13

## 2023-08-14 RX ORDER — ONDANSETRON 2 MG/ML
4 INJECTION INTRAMUSCULAR; INTRAVENOUS ONCE
Status: COMPLETED | OUTPATIENT
Start: 2023-08-14 | End: 2023-08-14

## 2023-08-14 RX ADMIN — HYDRALAZINE HYDROCHLORIDE 10 MG: 20 INJECTION, SOLUTION INTRAMUSCULAR; INTRAVENOUS at 03:28

## 2023-08-14 RX ADMIN — IOPAMIDOL 75 ML: 755 INJECTION, SOLUTION INTRAVENOUS at 02:14

## 2023-08-14 RX ADMIN — ONDANSETRON 4 MG: 2 INJECTION INTRAMUSCULAR; INTRAVENOUS at 00:32

## 2023-08-14 RX ADMIN — MORPHINE SULFATE 4 MG: 4 INJECTION, SOLUTION INTRAMUSCULAR; INTRAVENOUS at 00:32

## 2023-08-14 ASSESSMENT — PAIN SCALES - GENERAL: PAINLEVEL_OUTOF10: 10

## 2023-08-14 ASSESSMENT — PAIN DESCRIPTION - LOCATION: LOCATION: CHEST

## 2023-09-13 ENCOUNTER — APPOINTMENT (OUTPATIENT)
Dept: GENERAL RADIOLOGY | Age: 43
End: 2023-09-13
Payer: MEDICAID

## 2023-09-13 ENCOUNTER — HOSPITAL ENCOUNTER (EMERGENCY)
Age: 43
Discharge: LEFT BEFORE DISCHARGE | End: 2023-09-13
Attending: STUDENT IN AN ORGANIZED HEALTH CARE EDUCATION/TRAINING PROGRAM
Payer: MEDICAID

## 2023-09-13 ENCOUNTER — APPOINTMENT (OUTPATIENT)
Dept: CT IMAGING | Age: 43
End: 2023-09-13
Attending: STUDENT IN AN ORGANIZED HEALTH CARE EDUCATION/TRAINING PROGRAM
Payer: MEDICAID

## 2023-09-13 VITALS
OXYGEN SATURATION: 100 % | HEART RATE: 102 BPM | DIASTOLIC BLOOD PRESSURE: 147 MMHG | BODY MASS INDEX: 28.28 KG/M2 | SYSTOLIC BLOOD PRESSURE: 208 MMHG | TEMPERATURE: 97.9 F | WEIGHT: 140 LBS | RESPIRATION RATE: 18 BRPM

## 2023-09-13 DIAGNOSIS — R07.9 CHEST PAIN, UNSPECIFIED TYPE: Primary | ICD-10-CM

## 2023-09-13 DIAGNOSIS — I10 HYPERTENSION, UNSPECIFIED TYPE: ICD-10-CM

## 2023-09-13 LAB
ANION GAP SERPL CALCULATED.3IONS-SCNC: 12 MMOL/L (ref 7–16)
BASOPHILS # BLD: 0.03 K/UL (ref 0–0.2)
BASOPHILS NFR BLD: 0 % (ref 0–2)
BUN SERPL-MCNC: 10 MG/DL (ref 6–20)
CALCIUM SERPL-MCNC: 8.9 MG/DL (ref 8.6–10.2)
CHLORIDE SERPL-SCNC: 97 MMOL/L (ref 98–107)
CO2 SERPL-SCNC: 22 MMOL/L (ref 22–29)
CREAT SERPL-MCNC: 0.7 MG/DL (ref 0.5–1)
D DIMER: 4232 NG/ML DDU (ref 0–232)
EOSINOPHIL # BLD: 0.09 K/UL (ref 0.05–0.5)
EOSINOPHILS RELATIVE PERCENT: 1 % (ref 0–6)
ERYTHROCYTE [DISTWIDTH] IN BLOOD BY AUTOMATED COUNT: 17.7 % (ref 11.5–15)
GFR SERPL CREATININE-BSD FRML MDRD: >60 ML/MIN/1.73M2
GLUCOSE SERPL-MCNC: 88 MG/DL (ref 74–99)
HCT VFR BLD AUTO: 40.8 % (ref 34–48)
HGB BLD-MCNC: 13.3 G/DL (ref 11.5–15.5)
IMM GRANULOCYTES # BLD AUTO: 0.03 K/UL (ref 0–0.58)
IMM GRANULOCYTES NFR BLD: 0 % (ref 0–5)
LYMPHOCYTES NFR BLD: 2.19 K/UL (ref 1.5–4)
LYMPHOCYTES RELATIVE PERCENT: 26 % (ref 20–42)
MAGNESIUM SERPL-MCNC: 2.3 MG/DL (ref 1.6–2.6)
MCH RBC QN AUTO: 25.1 PG (ref 26–35)
MCHC RBC AUTO-ENTMCNC: 32.6 G/DL (ref 32–34.5)
MCV RBC AUTO: 77 FL (ref 80–99.9)
MONOCYTES NFR BLD: 0.75 K/UL (ref 0.1–0.95)
MONOCYTES NFR BLD: 9 % (ref 2–12)
NEUTROPHILS NFR BLD: 64 % (ref 43–80)
NEUTS SEG NFR BLD: 5.51 K/UL (ref 1.8–7.3)
PLATELET # BLD AUTO: 217 K/UL (ref 130–450)
PMV BLD AUTO: 9.4 FL (ref 7–12)
POTASSIUM SERPL-SCNC: 3.9 MMOL/L (ref 3.5–5)
RBC # BLD AUTO: 5.3 M/UL (ref 3.5–5.5)
SODIUM SERPL-SCNC: 131 MMOL/L (ref 132–146)
TROPONIN I SERPL HS-MCNC: <6 NG/L (ref 0–9)
WBC OTHER # BLD: 8.6 K/UL (ref 4.5–11.5)

## 2023-09-13 PROCEDURE — 84484 ASSAY OF TROPONIN QUANT: CPT

## 2023-09-13 PROCEDURE — 71045 X-RAY EXAM CHEST 1 VIEW: CPT

## 2023-09-13 PROCEDURE — 99285 EMERGENCY DEPT VISIT HI MDM: CPT

## 2023-09-13 PROCEDURE — 6370000000 HC RX 637 (ALT 250 FOR IP): Performed by: STUDENT IN AN ORGANIZED HEALTH CARE EDUCATION/TRAINING PROGRAM

## 2023-09-13 PROCEDURE — 85379 FIBRIN DEGRADATION QUANT: CPT

## 2023-09-13 PROCEDURE — 80048 BASIC METABOLIC PNL TOTAL CA: CPT

## 2023-09-13 PROCEDURE — 71275 CT ANGIOGRAPHY CHEST: CPT

## 2023-09-13 PROCEDURE — 2580000003 HC RX 258: Performed by: RADIOLOGY

## 2023-09-13 PROCEDURE — 85025 COMPLETE CBC W/AUTO DIFF WBC: CPT

## 2023-09-13 PROCEDURE — 83735 ASSAY OF MAGNESIUM: CPT

## 2023-09-13 PROCEDURE — 93005 ELECTROCARDIOGRAM TRACING: CPT | Performed by: STUDENT IN AN ORGANIZED HEALTH CARE EDUCATION/TRAINING PROGRAM

## 2023-09-13 PROCEDURE — 6360000004 HC RX CONTRAST MEDICATION: Performed by: RADIOLOGY

## 2023-09-13 RX ORDER — OXYCODONE HYDROCHLORIDE AND ACETAMINOPHEN 5; 325 MG/1; MG/1
1 TABLET ORAL ONCE
Status: COMPLETED | OUTPATIENT
Start: 2023-09-13 | End: 2023-09-13

## 2023-09-13 RX ORDER — SODIUM CHLORIDE 0.9 % (FLUSH) 0.9 %
10 SYRINGE (ML) INJECTION PRN
Status: COMPLETED | OUTPATIENT
Start: 2023-09-13 | End: 2023-09-13

## 2023-09-13 RX ADMIN — IOPAMIDOL 75 ML: 755 INJECTION, SOLUTION INTRAVENOUS at 20:11

## 2023-09-13 RX ADMIN — Medication 10 ML: at 20:11

## 2023-09-13 RX ADMIN — OXYCODONE AND ACETAMINOPHEN 1 TABLET: 325; 5 TABLET ORAL at 17:40

## 2023-09-13 ASSESSMENT — PAIN - FUNCTIONAL ASSESSMENT: PAIN_FUNCTIONAL_ASSESSMENT: 0-10

## 2023-09-13 ASSESSMENT — ENCOUNTER SYMPTOMS
DIARRHEA: 0
ABDOMINAL PAIN: 0
SORE THROAT: 0
PHOTOPHOBIA: 0
BACK PAIN: 0
SHORTNESS OF BREATH: 1
NAUSEA: 0
COUGH: 0

## 2023-09-13 ASSESSMENT — LIFESTYLE VARIABLES
HOW MANY STANDARD DRINKS CONTAINING ALCOHOL DO YOU HAVE ON A TYPICAL DAY: PATIENT DOES NOT DRINK
HOW OFTEN DO YOU HAVE A DRINK CONTAINING ALCOHOL: NEVER

## 2023-09-13 ASSESSMENT — PAIN SCALES - GENERAL: PAINLEVEL_OUTOF10: 10

## 2023-09-14 NOTE — ED NOTES
Pt spoke to Dr. Linda Shirley about risks of signing out [facility]. Pt signed AMA form stating she still wanted to leave.  Signed AMA paper placed in pt's folder     Mere Barnes RN  09/13/23 2008

## 2023-09-15 LAB
EKG ATRIAL RATE: 101 BPM
EKG P AXIS: 66 DEGREES
EKG P-R INTERVAL: 162 MS
EKG Q-T INTERVAL: 348 MS
EKG QRS DURATION: 76 MS
EKG QTC CALCULATION (BAZETT): 451 MS
EKG R AXIS: 82 DEGREES
EKG T AXIS: 39 DEGREES
EKG VENTRICULAR RATE: 101 BPM

## 2023-09-15 PROCEDURE — 93010 ELECTROCARDIOGRAM REPORT: CPT | Performed by: INTERNAL MEDICINE

## 2023-09-26 ENCOUNTER — HOSPITAL ENCOUNTER (EMERGENCY)
Age: 43
Discharge: HOME | End: 2023-09-26
Attending: EMERGENCY MEDICINE
Payer: MEDICAID

## 2023-09-26 ENCOUNTER — APPOINTMENT (OUTPATIENT)
Dept: GENERAL RADIOLOGY | Age: 43
End: 2023-09-26
Payer: MEDICAID

## 2023-09-26 ENCOUNTER — APPOINTMENT (OUTPATIENT)
Dept: CT IMAGING | Age: 43
End: 2023-09-26
Attending: EMERGENCY MEDICINE
Payer: MEDICAID

## 2023-09-26 VITALS
RESPIRATION RATE: 18 BRPM | SYSTOLIC BLOOD PRESSURE: 197 MMHG | TEMPERATURE: 98.6 F | DIASTOLIC BLOOD PRESSURE: 101 MMHG | OXYGEN SATURATION: 93 % | WEIGHT: 140 LBS | BODY MASS INDEX: 28.22 KG/M2 | HEIGHT: 59 IN | HEART RATE: 65 BPM

## 2023-09-26 DIAGNOSIS — R07.9 CHEST PAIN, UNSPECIFIED TYPE: Primary | ICD-10-CM

## 2023-09-26 LAB
ALBUMIN SERPL-MCNC: 4.1 G/DL (ref 3.5–5.2)
ALP SERPL-CCNC: 90 U/L (ref 35–104)
ALT SERPL-CCNC: 11 U/L (ref 0–32)
ANION GAP SERPL CALCULATED.3IONS-SCNC: 14 MMOL/L (ref 7–16)
AST SERPL-CCNC: 23 U/L (ref 0–31)
BASOPHILS # BLD: 0.02 K/UL (ref 0–0.2)
BASOPHILS NFR BLD: 0 % (ref 0–2)
BILIRUB SERPL-MCNC: <0.2 MG/DL (ref 0–1.2)
BNP SERPL-MCNC: 105 PG/ML (ref 0–125)
BUN SERPL-MCNC: 16 MG/DL (ref 6–20)
CALCIUM SERPL-MCNC: 8.8 MG/DL (ref 8.6–10.2)
CHLORIDE SERPL-SCNC: 104 MMOL/L (ref 98–107)
CO2 SERPL-SCNC: 20 MMOL/L (ref 22–29)
CREAT SERPL-MCNC: 0.7 MG/DL (ref 0.5–1)
EKG ATRIAL RATE: 80 BPM
EKG P AXIS: 54 DEGREES
EKG P-R INTERVAL: 150 MS
EKG Q-T INTERVAL: 384 MS
EKG QRS DURATION: 80 MS
EKG QTC CALCULATION (BAZETT): 442 MS
EKG R AXIS: 78 DEGREES
EKG T AXIS: 40 DEGREES
EKG VENTRICULAR RATE: 80 BPM
EOSINOPHIL # BLD: 0.12 K/UL (ref 0.05–0.5)
EOSINOPHILS RELATIVE PERCENT: 1 % (ref 0–6)
ERYTHROCYTE [DISTWIDTH] IN BLOOD BY AUTOMATED COUNT: 17.8 % (ref 11.5–15)
GFR SERPL CREATININE-BSD FRML MDRD: >60 ML/MIN/1.73M2
GLUCOSE SERPL-MCNC: 88 MG/DL (ref 74–99)
HCG, URINE, POC: NEGATIVE
HCT VFR BLD AUTO: 39.8 % (ref 34–48)
HGB BLD-MCNC: 12.9 G/DL (ref 11.5–15.5)
IMM GRANULOCYTES # BLD AUTO: 0.03 K/UL (ref 0–0.58)
IMM GRANULOCYTES NFR BLD: 0 % (ref 0–5)
LYMPHOCYTES NFR BLD: 2.21 K/UL (ref 1.5–4)
LYMPHOCYTES RELATIVE PERCENT: 22 % (ref 20–42)
Lab: NORMAL
MCH RBC QN AUTO: 25.1 PG (ref 26–35)
MCHC RBC AUTO-ENTMCNC: 32.4 G/DL (ref 32–34.5)
MCV RBC AUTO: 77.6 FL (ref 80–99.9)
MONOCYTES NFR BLD: 0.79 K/UL (ref 0.1–0.95)
MONOCYTES NFR BLD: 8 % (ref 2–12)
NEGATIVE QC PASS/FAIL: NORMAL
NEUTROPHILS NFR BLD: 68 % (ref 43–80)
NEUTS SEG NFR BLD: 6.85 K/UL (ref 1.8–7.3)
PLATELET # BLD AUTO: 226 K/UL (ref 130–450)
PMV BLD AUTO: 9.6 FL (ref 7–12)
POSITIVE QC PASS/FAIL: NORMAL
POTASSIUM SERPL-SCNC: 4.6 MMOL/L (ref 3.5–5)
PROT SERPL-MCNC: 7.2 G/DL (ref 6.4–8.3)
RBC # BLD AUTO: 5.13 M/UL (ref 3.5–5.5)
SODIUM SERPL-SCNC: 138 MMOL/L (ref 132–146)
TROPONIN I SERPL HS-MCNC: <6 NG/L (ref 0–9)
WBC OTHER # BLD: 10 K/UL (ref 4.5–11.5)

## 2023-09-26 PROCEDURE — 99285 EMERGENCY DEPT VISIT HI MDM: CPT

## 2023-09-26 PROCEDURE — 6370000000 HC RX 637 (ALT 250 FOR IP): Performed by: EMERGENCY MEDICINE

## 2023-09-26 PROCEDURE — 93010 ELECTROCARDIOGRAM REPORT: CPT | Performed by: INTERNAL MEDICINE

## 2023-09-26 PROCEDURE — 71275 CT ANGIOGRAPHY CHEST: CPT

## 2023-09-26 PROCEDURE — 80053 COMPREHEN METABOLIC PANEL: CPT

## 2023-09-26 PROCEDURE — 96375 TX/PRO/DX INJ NEW DRUG ADDON: CPT

## 2023-09-26 PROCEDURE — 96374 THER/PROPH/DIAG INJ IV PUSH: CPT

## 2023-09-26 PROCEDURE — 84484 ASSAY OF TROPONIN QUANT: CPT

## 2023-09-26 PROCEDURE — 6360000004 HC RX CONTRAST MEDICATION: Performed by: RADIOLOGY

## 2023-09-26 PROCEDURE — 6360000002 HC RX W HCPCS: Performed by: EMERGENCY MEDICINE

## 2023-09-26 PROCEDURE — 93005 ELECTROCARDIOGRAM TRACING: CPT | Performed by: EMERGENCY MEDICINE

## 2023-09-26 PROCEDURE — 83880 ASSAY OF NATRIURETIC PEPTIDE: CPT

## 2023-09-26 PROCEDURE — 71045 X-RAY EXAM CHEST 1 VIEW: CPT

## 2023-09-26 PROCEDURE — 85025 COMPLETE CBC W/AUTO DIFF WBC: CPT

## 2023-09-26 RX ORDER — MORPHINE SULFATE 4 MG/ML
4 INJECTION, SOLUTION INTRAMUSCULAR; INTRAVENOUS ONCE
Status: COMPLETED | OUTPATIENT
Start: 2023-09-26 | End: 2023-09-26

## 2023-09-26 RX ORDER — OXYCODONE HYDROCHLORIDE AND ACETAMINOPHEN 5; 325 MG/1; MG/1
2 TABLET ORAL ONCE
Status: COMPLETED | OUTPATIENT
Start: 2023-09-26 | End: 2023-09-26

## 2023-09-26 RX ORDER — LABETALOL HYDROCHLORIDE 5 MG/ML
20 INJECTION, SOLUTION INTRAVENOUS ONCE
Status: COMPLETED | OUTPATIENT
Start: 2023-09-26 | End: 2023-09-26

## 2023-09-26 RX ORDER — FENTANYL CITRATE 50 UG/ML
50 INJECTION, SOLUTION INTRAMUSCULAR; INTRAVENOUS ONCE
Status: COMPLETED | OUTPATIENT
Start: 2023-09-26 | End: 2023-09-26

## 2023-09-26 RX ADMIN — IOPAMIDOL 75 ML: 755 INJECTION, SOLUTION INTRAVENOUS at 07:37

## 2023-09-26 RX ADMIN — FENTANYL CITRATE 50 MCG: 50 INJECTION INTRAMUSCULAR; INTRAVENOUS at 03:13

## 2023-09-26 RX ADMIN — OXYCODONE AND ACETAMINOPHEN 2 TABLET: 5; 325 TABLET ORAL at 09:57

## 2023-09-26 RX ADMIN — MORPHINE SULFATE 4 MG: 4 INJECTION, SOLUTION INTRAMUSCULAR; INTRAVENOUS at 05:21

## 2023-09-26 RX ADMIN — LABETALOL HYDROCHLORIDE 20 MG: 5 INJECTION INTRAVENOUS at 09:28

## 2023-09-26 ASSESSMENT — PAIN DESCRIPTION - LOCATION
LOCATION: CHEST
LOCATION_2: LEG
LOCATION: CHEST

## 2023-09-26 ASSESSMENT — PAIN DESCRIPTION - PAIN TYPE: TYPE: ACUTE PAIN

## 2023-09-26 ASSESSMENT — PAIN - FUNCTIONAL ASSESSMENT: PAIN_FUNCTIONAL_ASSESSMENT: 0-10

## 2023-09-26 ASSESSMENT — PAIN DESCRIPTION - ORIENTATION
ORIENTATION: RIGHT
ORIENTATION_2: RIGHT;LEFT

## 2023-09-26 ASSESSMENT — PAIN DESCRIPTION - DESCRIPTORS: DESCRIPTORS: SHARP;SHOOTING;STABBING

## 2023-09-26 ASSESSMENT — PAIN SCALES - GENERAL
PAINLEVEL_OUTOF10: 10
PAINLEVEL_OUTOF10: 10

## 2023-09-26 ASSESSMENT — PAIN DESCRIPTION - INTENSITY: RATING_2: 10

## 2023-09-26 ASSESSMENT — PAIN DESCRIPTION - FREQUENCY: FREQUENCY: CONTINUOUS

## 2023-09-26 NOTE — ED NOTES
Dr. Leobardo Cao updated that the Pt continues to remain hypertensive. No further orders provided at this time.       Rosanna Del Cid RN  09/26/23 [id number]

## 2023-09-26 NOTE — ED PROVIDER NOTES
Department of Emergency Medicine   ED  Provider Note  Admit Date/RoomTime: 9/26/2023  2:25 AM  ED Room: [facility]          History of Present Illness:  9/26/23, Time: 5:05 AM EDT  Chief Complaint   Patient presents with    Chest Pain     Right upper chest x 1 hr. Worse when patient sits up. Pt given x 1 nitro and 324 mg Asa by ems. Pt states that she is to have open heart surgery in 2 wks                 Vu Seaman is a 44 y.o. female presenting to the ED for chest pain. Patient has right-sided chest pain for the past couple weeks currently. Became worse this evening. She has had this multiple times in the past.  She says it is worse when she touches over that area. She says she is scheduled to have surgery on her shoulder by orthopedic surgery. Initial report was she was post heart surgery, she now clarifies it is an orthopedic surgeon. No fevers or chills. No cough or sputum. No change in bowel or bladder. No lethargy. No other symptoms or complaints. Review of Systems:   Pertinent positives and negatives are stated within HPI, all other systems reviewed and are negative.        --------------------------------------------- PAST HISTORY ---------------------------------------------  Past Medical History:  has a past medical history of Asthma, Bronchitis, Movement disorder, Seizure disorder ([address]), Seizure disorder ([address]), Suicidal overdose ([address]), Tobacco abuse, and Tobacco abuse. Past Surgical History:  has a past surgical history that includes fracture surgery; Cholecystectomy; Rotator cuff repair; Upper gastrointestinal endoscopy (N/A, 5/3/2021); Upper gastrointestinal endoscopy (N/A, 10/29/2022); and Colonoscopy (N/A, 10/31/2022). Social History:  reports that she has been smoking cigarettes. She has a 15.00 pack-year smoking history. She has never used smokeless tobacco. She reports current alcohol use. She reports current drug use. Drug: Cocaine.     Family History: family history 32.4 32.0 - 34.5 g/dL    RDW 17.8 (H) 11.5 - 15.0 %    Platelets 882 275 - 109 k/uL    MPV 9.6 7.0 - 12.0 fL    Neutrophils % 68 43.0 - 80.0 %    Lymphocytes % 22 20.0 - 42.0 %    Monocytes % 8 2.0 - 12.0 %    Eosinophils % 1 0 - 6 %    Basophils % 0 0.0 - 2.0 %    Immature Granulocytes 0 0.0 - 5.0 %    Neutrophils Absolute 6.85 1.80 - 7.30 k/uL    Lymphocytes Absolute 2.21 1.50 - 4.00 k/uL    Monocytes Absolute 0.79 0.10 - 0.95 k/uL    Eosinophils Absolute 0.12 0.05 - 0.50 k/uL    Basophils Absolute 0.02 0.00 - 0.20 k/uL    Absolute Immature Granulocyte 0.03 0.00 - 0.58 k/uL   Comprehensive Metabolic Panel   Result Value Ref Range    Sodium 138 132 - 146 mmol/L    Potassium 4.6 3.5 - 5.0 mmol/L    Chloride 104 98 - 107 mmol/L    CO2 20 (L) 22 - 29 mmol/L    Anion Gap 14 7 - 16 mmol/L    Glucose 88 74 - 99 mg/dL    BUN 16 6 - 20 mg/dL    Creatinine 0.7 0.50 - 1.00 mg/dL    Est, Glom Filt Rate >60 >60 mL/min/1.73m2    Calcium 8.8 8.6 - 10.2 mg/dL    Total Protein 7.2 6.4 - 8.3 g/dL    Albumin 4.1 3.5 - 5.2 g/dL    Total Bilirubin <0.2 0.0 - 1.2 mg/dL    Alkaline Phosphatase 90 35 - 104 U/L    ALT 11 0 - 32 U/L    AST 23 0 - 31 U/L   Troponin   Result Value Ref Range    Troponin, High Sensitivity <6 0 - 9 ng/L   Brain Natriuretic Peptide   Result Value Ref Range    Pro- 0 - 125 pg/mL   POC Pregnancy Urine Qual   Result Value Ref Range    HCG, Urine, POC Negative Negative    Lot Number [id number]     Positive QC Pass/Fail Pass     Negative QC Pass/Fail Pass    EKG 12 Lead   Result Value Ref Range    Ventricular Rate 80 BPM    Atrial Rate 80 BPM    P-R Interval 150 ms    QRS Duration 80 ms    Q-T Interval 384 ms    QTc Calculation (Bazett) 442 ms    P Axis 54 degrees    R Axis 78 degrees    T Axis 40 degrees   ,       RADIOLOGY:  Interpreted by Radiologist unless otherwise specified  XR CHEST PORTABLE   Final Result   No acute process.          CTA PULMONARY W CONTRAST    (Results Pending) Skyrizi Counseling: I discussed with the patient the risks of risankizumab-rzaa including but not limited to immunosuppression, and serious infections.  The patient understands that monitoring is required including a PPD at baseline and must alert us or the primary physician if symptoms of infection or other concerning signs are noted.

## 2023-10-07 ENCOUNTER — APPOINTMENT (OUTPATIENT)
Dept: GENERAL RADIOLOGY | Age: 43
End: 2023-10-07
Payer: MEDICAID

## 2023-10-07 ENCOUNTER — HOSPITAL ENCOUNTER (EMERGENCY)
Age: 43
Discharge: HOME | End: 2023-10-07
Attending: EMERGENCY MEDICINE
Payer: MEDICAID

## 2023-10-07 VITALS
HEART RATE: 78 BPM | TEMPERATURE: 98.7 F | OXYGEN SATURATION: 98 % | SYSTOLIC BLOOD PRESSURE: 193 MMHG | DIASTOLIC BLOOD PRESSURE: 108 MMHG | RESPIRATION RATE: 19 BRPM

## 2023-10-07 DIAGNOSIS — R07.89 ATYPICAL CHEST PAIN: Primary | ICD-10-CM

## 2023-10-07 LAB
ALBUMIN SERPL-MCNC: 3.6 G/DL (ref 3.5–5.2)
ALP SERPL-CCNC: 94 U/L (ref 35–104)
ALT SERPL-CCNC: 11 U/L (ref 0–32)
ANION GAP SERPL CALCULATED.3IONS-SCNC: 14 MMOL/L (ref 7–16)
AST SERPL-CCNC: 17 U/L (ref 0–31)
BASOPHILS # BLD: 0.02 K/UL (ref 0–0.2)
BASOPHILS NFR BLD: 0 % (ref 0–2)
BILIRUB SERPL-MCNC: 0.2 MG/DL (ref 0–1.2)
BUN SERPL-MCNC: 12 MG/DL (ref 6–20)
CALCIUM SERPL-MCNC: 8.4 MG/DL (ref 8.6–10.2)
CHLORIDE SERPL-SCNC: 103 MMOL/L (ref 98–107)
CO2 SERPL-SCNC: 20 MMOL/L (ref 22–29)
CREAT SERPL-MCNC: 0.7 MG/DL (ref 0.5–1)
EKG ATRIAL RATE: 86 BPM
EKG P AXIS: 66 DEGREES
EKG P-R INTERVAL: 132 MS
EKG Q-T INTERVAL: 388 MS
EKG QRS DURATION: 82 MS
EKG QTC CALCULATION (BAZETT): 464 MS
EKG R AXIS: 74 DEGREES
EKG T AXIS: 57 DEGREES
EKG VENTRICULAR RATE: 86 BPM
EOSINOPHIL # BLD: 0.12 K/UL (ref 0.05–0.5)
EOSINOPHILS RELATIVE PERCENT: 1 % (ref 0–6)
ERYTHROCYTE [DISTWIDTH] IN BLOOD BY AUTOMATED COUNT: 17.1 % (ref 11.5–15)
GFR SERPL CREATININE-BSD FRML MDRD: >60 ML/MIN/1.73M2
GLUCOSE SERPL-MCNC: 83 MG/DL (ref 74–99)
HCT VFR BLD AUTO: 37.4 % (ref 34–48)
HGB BLD-MCNC: 12.1 G/DL (ref 11.5–15.5)
IMM GRANULOCYTES # BLD AUTO: 0.03 K/UL (ref 0–0.58)
IMM GRANULOCYTES NFR BLD: 0 % (ref 0–5)
LYMPHOCYTES NFR BLD: 1.81 K/UL (ref 1.5–4)
LYMPHOCYTES RELATIVE PERCENT: 19 % (ref 20–42)
MAGNESIUM SERPL-MCNC: 1.9 MG/DL (ref 1.6–2.6)
MCH RBC QN AUTO: 25.2 PG (ref 26–35)
MCHC RBC AUTO-ENTMCNC: 32.4 G/DL (ref 32–34.5)
MCV RBC AUTO: 77.9 FL (ref 80–99.9)
MONOCYTES NFR BLD: 0.68 K/UL (ref 0.1–0.95)
MONOCYTES NFR BLD: 7 % (ref 2–12)
NEUTROPHILS NFR BLD: 72 % (ref 43–80)
NEUTS SEG NFR BLD: 6.66 K/UL (ref 1.8–7.3)
PLATELET # BLD AUTO: 225 K/UL (ref 130–450)
PMV BLD AUTO: 10.1 FL (ref 7–12)
POTASSIUM SERPL-SCNC: 4.2 MMOL/L (ref 3.5–5)
PROT SERPL-MCNC: 6.9 G/DL (ref 6.4–8.3)
RBC # BLD AUTO: 4.8 M/UL (ref 3.5–5.5)
SARS-COV-2 RDRP RESP QL NAA+PROBE: NOT DETECTED
SODIUM SERPL-SCNC: 137 MMOL/L (ref 132–146)
SPECIMEN DESCRIPTION: NORMAL
TROPONIN I SERPL HS-MCNC: <6 NG/L (ref 0–9)
WBC OTHER # BLD: 9.3 K/UL (ref 4.5–11.5)

## 2023-10-07 PROCEDURE — 99285 EMERGENCY DEPT VISIT HI MDM: CPT

## 2023-10-07 PROCEDURE — 93005 ELECTROCARDIOGRAM TRACING: CPT

## 2023-10-07 PROCEDURE — 83735 ASSAY OF MAGNESIUM: CPT

## 2023-10-07 PROCEDURE — 80053 COMPREHEN METABOLIC PANEL: CPT

## 2023-10-07 PROCEDURE — 87635 SARS-COV-2 COVID-19 AMP PRB: CPT

## 2023-10-07 PROCEDURE — 84484 ASSAY OF TROPONIN QUANT: CPT

## 2023-10-07 PROCEDURE — 71045 X-RAY EXAM CHEST 1 VIEW: CPT

## 2023-10-07 PROCEDURE — 73502 X-RAY EXAM HIP UNI 2-3 VIEWS: CPT

## 2023-10-07 PROCEDURE — 6370000000 HC RX 637 (ALT 250 FOR IP)

## 2023-10-07 PROCEDURE — 73562 X-RAY EXAM OF KNEE 3: CPT

## 2023-10-07 PROCEDURE — 85025 COMPLETE CBC W/AUTO DIFF WBC: CPT

## 2023-10-07 RX ORDER — ACETAMINOPHEN 325 MG/1
650 TABLET ORAL ONCE
Status: COMPLETED | OUTPATIENT
Start: 2023-10-07 | End: 2023-10-07

## 2023-10-07 RX ORDER — AMLODIPINE BESYLATE 5 MG/1
10 TABLET ORAL ONCE
Status: COMPLETED | OUTPATIENT
Start: 2023-10-07 | End: 2023-10-07

## 2023-10-07 RX ORDER — HYDROCODONE BITARTRATE AND ACETAMINOPHEN 5; 325 MG/1; MG/1
1 TABLET ORAL ONCE
Status: COMPLETED | OUTPATIENT
Start: 2023-10-07 | End: 2023-10-07

## 2023-10-07 RX ADMIN — HYDROCODONE BITARTRATE AND ACETAMINOPHEN 1 TABLET: 5; 325 TABLET ORAL at 06:15

## 2023-10-07 RX ADMIN — AMLODIPINE BESYLATE 10 MG: 5 TABLET ORAL at 08:50

## 2023-10-07 RX ADMIN — ACETAMINOPHEN 650 MG: 325 TABLET ORAL at 05:27

## 2023-10-07 ASSESSMENT — PAIN DESCRIPTION - ORIENTATION
ORIENTATION: RIGHT
ORIENTATION: RIGHT

## 2023-10-07 ASSESSMENT — PAIN DESCRIPTION - LOCATION
LOCATION: CHEST
LOCATION: CHEST

## 2023-10-07 ASSESSMENT — PAIN SCALES - GENERAL
PAINLEVEL_OUTOF10: 10
PAINLEVEL_OUTOF10: 10

## 2023-10-07 ASSESSMENT — PAIN DESCRIPTION - DESCRIPTORS
DESCRIPTORS: SHARP
DESCRIPTORS: SHARP

## 2023-10-07 NOTE — ED PROVIDER NOTES
[facility]        Pt Name: Analia Schmidt  MRN: [id number]  [address] [facility] [place] 1980  Date of evaluation: 10/7/2023  Provider: Aylin Marcos DO  PCP: No primary care provider on file. Note Started: 6:20 AM EDT 10/7/23    CHIEF COMPLAINT       Chief Complaint   Patient presents with    Chest Pain     Patient c/o mid sternal chest pain that she states she has had for the past month. States it radiates to her right arm. Also c/o bilateral leg pain. A&Ox4 during triage. HISTORY OF PRESENT ILLNESS: 1 or more Elements   History From: Patient    Limitations to history : None  Social Determinants : None    Analia Schmidt is a 44 y.o. female who presents for chest pain. Patient states that she has had chest pain for the past month or so. .  After she overdosed on she states the pain is in the right upper chest and radiates across the rest of her chest. she states it is a stabbing pain. It is constant. It is worse if she tries to get up and move around. She states it radiates to the right arm. She does have some shortness of breath. Patient also states she has pain in her legs bilaterally because she is in need of bilateral hip replacements for osteoporosis. Denies any fever, chills, n/v, headache, palpitations, leg swelling/tenderness, abd pain    Nursing Notes were all reviewed and agreed with or any disagreements were addressed in the HPI.    ROS:   Pertinent positives and negatives are stated within HPI, all other systems reviewed and are negative.      --------------------------------------------- PAST HISTORY ---------------------------------------------  Past Medical History:  has a past medical history of Asthma, Bronchitis, Movement disorder, Seizure disorder ([address]), Seizure disorder ([address]), Suicidal overdose ([address]), Tobacco abuse, and Tobacco abuse.     Past Surgical History:  has a past surgical history

## 2023-10-09 LAB
EKG ATRIAL RATE: 86 BPM
EKG P AXIS: 66 DEGREES
EKG P-R INTERVAL: 132 MS
EKG Q-T INTERVAL: 388 MS
EKG QRS DURATION: 82 MS
EKG QTC CALCULATION (BAZETT): 464 MS
EKG R AXIS: 74 DEGREES
EKG T AXIS: 57 DEGREES
EKG VENTRICULAR RATE: 86 BPM

## 2023-10-09 PROCEDURE — 93010 ELECTROCARDIOGRAM REPORT: CPT | Performed by: INTERNAL MEDICINE

## 2023-11-07 ENCOUNTER — HOSPITAL ENCOUNTER (EMERGENCY)
Age: 43
Discharge: HOME | End: 2023-11-07
Payer: MEDICAID

## 2023-11-07 ENCOUNTER — APPOINTMENT (OUTPATIENT)
Dept: CT IMAGING | Age: 43
End: 2023-11-07
Payer: MEDICAID

## 2023-11-07 ENCOUNTER — APPOINTMENT (OUTPATIENT)
Dept: GENERAL RADIOLOGY | Age: 43
End: 2023-11-07
Payer: MEDICAID

## 2023-11-07 VITALS
DIASTOLIC BLOOD PRESSURE: 88 MMHG | OXYGEN SATURATION: 100 % | RESPIRATION RATE: 18 BRPM | HEIGHT: 59 IN | HEART RATE: 94 BPM | SYSTOLIC BLOOD PRESSURE: 148 MMHG | TEMPERATURE: 97.8 F | BODY MASS INDEX: 28.22 KG/M2 | WEIGHT: 140 LBS

## 2023-11-07 DIAGNOSIS — G89.29 CHRONIC HIP PAIN, BILATERAL: ICD-10-CM

## 2023-11-07 DIAGNOSIS — M25.552 CHRONIC HIP PAIN, BILATERAL: ICD-10-CM

## 2023-11-07 DIAGNOSIS — G89.29 ACUTE EXACERBATION OF CHRONIC LOW BACK PAIN: ICD-10-CM

## 2023-11-07 DIAGNOSIS — M25.551 CHRONIC HIP PAIN, BILATERAL: ICD-10-CM

## 2023-11-07 DIAGNOSIS — M54.50 ACUTE EXACERBATION OF CHRONIC LOW BACK PAIN: ICD-10-CM

## 2023-11-07 DIAGNOSIS — S09.90XA CLOSED HEAD INJURY, INITIAL ENCOUNTER: ICD-10-CM
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
## 2023-11-07 DIAGNOSIS — M54.2 MUSCLE PAIN, CERVICAL: ICD-10-CM

## 2023-11-07 PROCEDURE — 72125 CT NECK SPINE W/O DYE: CPT

## 2023-11-07 PROCEDURE — 73521 X-RAY EXAM HIPS BI 2 VIEWS: CPT

## 2023-11-07 PROCEDURE — 99284 EMERGENCY DEPT VISIT MOD MDM: CPT

## 2023-11-07 PROCEDURE — 70450 CT HEAD/BRAIN W/O DYE: CPT

## 2023-11-07 PROCEDURE — 6370000000 HC RX 637 (ALT 250 FOR IP): Performed by: PHYSICIAN ASSISTANT

## 2023-11-07 PROCEDURE — 72100 X-RAY EXAM L-S SPINE 2/3 VWS: CPT

## 2023-11-07 RX ORDER — ONDANSETRON 4 MG/1
4 TABLET, ORALLY DISINTEGRATING ORAL ONCE
Status: COMPLETED | OUTPATIENT
Start: 2023-11-07 | End: 2023-11-07

## 2023-11-07 RX ORDER — CYCLOBENZAPRINE HCL 10 MG
10 TABLET ORAL 2 TIMES DAILY PRN
Qty: 30 TABLET | Refills: 0 | Status: SHIPPED | OUTPATIENT
Start: 2023-11-07

## 2023-11-07 RX ORDER — HYDROCODONE BITARTRATE AND ACETAMINOPHEN 5; 325 MG/1; MG/1
1 TABLET ORAL EVERY 6 HOURS PRN
Qty: 8 TABLET | Refills: 0 | Status: SHIPPED | OUTPATIENT
Start: 2023-11-07 | End: 2023-11-09

## 2023-11-07 RX ORDER — OXYCODONE HYDROCHLORIDE AND ACETAMINOPHEN 5; 325 MG/1; MG/1
1 TABLET ORAL ONCE
Status: COMPLETED | OUTPATIENT
Start: 2023-11-07 | End: 2023-11-07

## 2023-11-07 RX ADMIN — ONDANSETRON 4 MG: 4 TABLET, ORALLY DISINTEGRATING ORAL at 16:28

## 2023-11-07 RX ADMIN — OXYCODONE AND ACETAMINOPHEN 1 TABLET: 5; 325 TABLET ORAL at 16:28

## 2023-11-07 ASSESSMENT — PAIN DESCRIPTION - ORIENTATION: ORIENTATION: RIGHT;LEFT

## 2023-11-07 ASSESSMENT — LIFESTYLE VARIABLES
HOW MANY STANDARD DRINKS CONTAINING ALCOHOL DO YOU HAVE ON A TYPICAL DAY: 1 OR 2
HOW OFTEN DO YOU HAVE A DRINK CONTAINING ALCOHOL: MONTHLY OR LESS

## 2023-11-07 ASSESSMENT — PAIN DESCRIPTION - LOCATION
LOCATION: BACK
LOCATION: LEG;BACK

## 2023-11-07 ASSESSMENT — PAIN SCALES - GENERAL
PAINLEVEL_OUTOF10: 10
PAINLEVEL_OUTOF10: 10

## 2023-11-07 ASSESSMENT — PAIN DESCRIPTION - DESCRIPTORS: DESCRIPTORS: SHARP

## 2023-11-07 ASSESSMENT — PAIN - FUNCTIONAL ASSESSMENT: PAIN_FUNCTIONAL_ASSESSMENT: 0-10
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
## 2023-11-07 NOTE — ED NOTES
Radiology Procedure Waiver   Name: Yessenia Russo  : 1980  MRN: [id number]    Date:  23    Time: 4:21 PM EST    Benefits of immediately proceeding with Radiology exam(s) without pre-testing outweigh the risks or are not indicated as specified below and therefore the following is/are being waived:    [x] Pregnancy test   [x] Patients LMP on-time and regular or recent negative pregnancy test   [] Patient had Tubal Ligation or has other Contraception Device. [] Patient  is Menopausal or Premenarcheal.    [] Patient had Full or Partial Hysterectomy. [] Protocol for Iodine allergy   [] Patient states they can tolerate IV dye    [] BUN/Creatinine   [] Patient age w/no hx of renal dysfunction. [] Patient on Dialysis. [] Recent Normal Labs.     Electronically signed by Estiven Dorantes PA-C on 23 at 4:21 PM EST               Estiven Dorantes PA-C  [phone]

## 2023-11-14 ENCOUNTER — APPOINTMENT (OUTPATIENT)
Dept: GENERAL RADIOLOGY | Age: 43
End: 2023-11-14
Payer: MEDICAID

## 2023-11-14 ENCOUNTER — HOSPITAL ENCOUNTER (EMERGENCY)
Age: 43
Discharge: HOME | End: 2023-11-14
Payer: MEDICAID

## 2023-11-14 VITALS
HEART RATE: 91 BPM | HEIGHT: 59 IN | TEMPERATURE: 98.4 F | BODY MASS INDEX: 28.22 KG/M2 | RESPIRATION RATE: 16 BRPM | DIASTOLIC BLOOD PRESSURE: 83 MMHG | SYSTOLIC BLOOD PRESSURE: 174 MMHG | WEIGHT: 140 LBS | OXYGEN SATURATION: 100 %

## 2023-11-14 DIAGNOSIS — J06.9 VIRAL URI WITH COUGH: ICD-10-CM

## 2023-11-14 DIAGNOSIS — N30.01 ACUTE CYSTITIS WITH HEMATURIA: Primary | ICD-10-CM

## 2023-11-14 LAB
ALBUMIN SERPL-MCNC: 4.2 G/DL (ref 3.5–5.2)
ALP SERPL-CCNC: 98 U/L (ref 35–104)
ALT SERPL-CCNC: 16 U/L (ref 0–32)
AMORPH SED URNS QL MICRO: PRESENT
ANION GAP SERPL CALCULATED.3IONS-SCNC: 16 MMOL/L (ref 7–16)
AST SERPL-CCNC: 21 U/L (ref 0–31)
BACTERIA URNS QL MICRO: ABNORMAL
BASOPHILS # BLD: 0.04 K/UL (ref 0–0.2)
BASOPHILS NFR BLD: 0 % (ref 0–2)
BILIRUB SERPL-MCNC: 0.2 MG/DL (ref 0–1.2)
BILIRUB UR QL STRIP: NEGATIVE
BUN SERPL-MCNC: 20 MG/DL (ref 6–20)
CALCIUM SERPL-MCNC: 8.7 MG/DL (ref 8.6–10.2)
CHLORIDE SERPL-SCNC: 104 MMOL/L (ref 98–107)
CLARITY UR: ABNORMAL
CO2 SERPL-SCNC: 20 MMOL/L (ref 22–29)
COLOR UR: YELLOW
CREAT SERPL-MCNC: 0.8 MG/DL (ref 0.5–1)
EOSINOPHIL # BLD: 0.1 K/UL (ref 0.05–0.5)
EOSINOPHILS RELATIVE PERCENT: 1 % (ref 0–6)
EPI CELLS #/AREA URNS HPF: ABNORMAL /HPF
ERYTHROCYTE [DISTWIDTH] IN BLOOD BY AUTOMATED COUNT: 16.7 % (ref 11.5–15)
FLUAV RNA RESP QL NAA+PROBE: NOT DETECTED
FLUBV RNA RESP QL NAA+PROBE: NOT DETECTED
GFR SERPL CREATININE-BSD FRML MDRD: >60 ML/MIN/1.73M2
GLUCOSE SERPL-MCNC: 80 MG/DL (ref 74–99)
GLUCOSE UR STRIP-MCNC: NEGATIVE MG/DL
HCT VFR BLD AUTO: 40.8 % (ref 34–48)
HGB BLD-MCNC: 13.2 G/DL (ref 11.5–15.5)
HGB UR QL STRIP.AUTO: ABNORMAL
IMM GRANULOCYTES # BLD AUTO: 0.05 K/UL (ref 0–0.58)
IMM GRANULOCYTES NFR BLD: 1 % (ref 0–5)
KETONES UR STRIP-MCNC: NEGATIVE MG/DL
LEUKOCYTE ESTERASE UR QL STRIP: ABNORMAL
LYMPHOCYTES NFR BLD: 2.25 K/UL (ref 1.5–4)
LYMPHOCYTES RELATIVE PERCENT: 21 % (ref 20–42)
MCH RBC QN AUTO: 26 PG (ref 26–35)
MCHC RBC AUTO-ENTMCNC: 32.4 G/DL (ref 32–34.5)
MCV RBC AUTO: 80.3 FL (ref 80–99.9)
MONOCYTES NFR BLD: 0.86 K/UL (ref 0.1–0.95)
MONOCYTES NFR BLD: 8 % (ref 2–12)
NEUTROPHILS NFR BLD: 69 % (ref 43–80)
NEUTS SEG NFR BLD: 7.23 K/UL (ref 1.8–7.3)
NITRITE UR QL STRIP: NEGATIVE
PH UR STRIP: 7.5 [PH] (ref 5–9)
PLATELET # BLD AUTO: 253 K/UL (ref 130–450)
PMV BLD AUTO: 9.7 FL (ref 7–12)
POTASSIUM SERPL-SCNC: 4.3 MMOL/L (ref 3.5–5)
PROT SERPL-MCNC: 7.2 G/DL (ref 6.4–8.3)
PROT UR STRIP-MCNC: NEGATIVE MG/DL
RBC # BLD AUTO: 5.08 M/UL (ref 3.5–5.5)
RBC #/AREA URNS HPF: ABNORMAL /HPF
SARS-COV-2 RNA RESP QL NAA+PROBE: NOT DETECTED
SODIUM SERPL-SCNC: 140 MMOL/L (ref 132–146)
SOURCE: NORMAL
SP GR UR STRIP: 1.01 (ref 1–1.03)
SPECIMEN DESCRIPTION: NORMAL
TROPONIN I SERPL HS-MCNC: <6 NG/L (ref 0–9)
UROBILINOGEN UR STRIP-ACNC: 0.2 EU/DL (ref 0–1)
WBC #/AREA URNS HPF: ABNORMAL /HPF
WBC OTHER # BLD: 10.5 K/UL (ref 4.5–11.5)

## 2023-11-14 PROCEDURE — 2580000003 HC RX 258: Performed by: PHYSICIAN ASSISTANT

## 2023-11-14 PROCEDURE — 85025 COMPLETE CBC W/AUTO DIFF WBC: CPT

## 2023-11-14 PROCEDURE — 96374 THER/PROPH/DIAG INJ IV PUSH: CPT

## 2023-11-14 PROCEDURE — 84484 ASSAY OF TROPONIN QUANT: CPT

## 2023-11-14 PROCEDURE — 99285 EMERGENCY DEPT VISIT HI MDM: CPT

## 2023-11-14 PROCEDURE — 87086 URINE CULTURE/COLONY COUNT: CPT

## 2023-11-14 PROCEDURE — 93005 ELECTROCARDIOGRAM TRACING: CPT | Performed by: PHYSICIAN ASSISTANT

## 2023-11-14 PROCEDURE — 80053 COMPREHEN METABOLIC PANEL: CPT

## 2023-11-14 PROCEDURE — 81001 URINALYSIS AUTO W/SCOPE: CPT

## 2023-11-14 PROCEDURE — 87636 SARSCOV2 & INF A&B AMP PRB: CPT

## 2023-11-14 PROCEDURE — 6370000000 HC RX 637 (ALT 250 FOR IP): Performed by: PHYSICIAN ASSISTANT

## 2023-11-14 PROCEDURE — 6360000002 HC RX W HCPCS: Performed by: PHYSICIAN ASSISTANT

## 2023-11-14 PROCEDURE — 71046 X-RAY EXAM CHEST 2 VIEWS: CPT

## 2023-11-14 RX ORDER — ONDANSETRON 2 MG/ML
4 INJECTION INTRAMUSCULAR; INTRAVENOUS ONCE
Status: COMPLETED | OUTPATIENT
Start: 2023-11-14 | End: 2023-11-14

## 2023-11-14 RX ORDER — ACETAMINOPHEN 325 MG/1
650 TABLET ORAL ONCE
Status: COMPLETED | OUTPATIENT
Start: 2023-11-14 | End: 2023-11-14

## 2023-11-14 RX ORDER — 0.9 % SODIUM CHLORIDE 0.9 %
1000 INTRAVENOUS SOLUTION INTRAVENOUS ONCE
Status: COMPLETED | OUTPATIENT
Start: 2023-11-14 | End: 2023-11-14

## 2023-11-14 RX ORDER — GUAIFENESIN AND DEXTROMETHORPHAN HYDROBROMIDE 600; 30 MG/1; MG/1
1 TABLET, EXTENDED RELEASE ORAL EVERY 12 HOURS
Qty: 10 TABLET | Refills: 0 | Status: SHIPPED | OUTPATIENT
Start: 2023-11-14 | End: 2023-11-19

## 2023-11-14 RX ORDER — CEPHALEXIN 500 MG/1
500 CAPSULE ORAL 4 TIMES DAILY
Qty: 40 CAPSULE | Refills: 0 | Status: SHIPPED | OUTPATIENT
Start: 2023-11-14 | End: 2023-11-24

## 2023-11-14 RX ADMIN — SODIUM CHLORIDE 1000 ML: 9 INJECTION, SOLUTION INTRAVENOUS at 13:15

## 2023-11-14 RX ADMIN — ONDANSETRON 4 MG: 2 INJECTION INTRAMUSCULAR; INTRAVENOUS at 13:04

## 2023-11-14 RX ADMIN — ACETAMINOPHEN 650 MG: 325 TABLET ORAL at 13:13

## 2023-11-14 ASSESSMENT — PAIN DESCRIPTION - PAIN TYPE: TYPE: ACUTE PAIN

## 2023-11-14 ASSESSMENT — PATIENT HEALTH QUESTIONNAIRE - PHQ9
SUM OF ALL RESPONSES TO PHQ QUESTIONS 1-9: 0
1. LITTLE INTEREST OR PLEASURE IN DOING THINGS: 0
SUM OF ALL RESPONSES TO PHQ9 QUESTIONS 1 & 2: 0
SUM OF ALL RESPONSES TO PHQ QUESTIONS 1-9: 0
2. FEELING DOWN, DEPRESSED OR HOPELESS: 0

## 2023-11-14 ASSESSMENT — LIFESTYLE VARIABLES: HOW OFTEN DO YOU HAVE A DRINK CONTAINING ALCOHOL: NEVER

## 2023-11-14 ASSESSMENT — PAIN SCALES - GENERAL: PAINLEVEL_OUTOF10: 4

## 2023-11-14 ASSESSMENT — PAIN - FUNCTIONAL ASSESSMENT: PAIN_FUNCTIONAL_ASSESSMENT: 0-10

## 2023-11-14 ASSESSMENT — PAIN DESCRIPTION - LOCATION: LOCATION: CHEST

## 2023-11-14 NOTE — ED PROVIDER NOTES
Independent DANISHA Visit. [address] [place] [address]  Department of Emergency Medicine   ED  Encounter Note  Admit Date/RoomTime: 2023 12:26 PM  ED Room: [address] [address]  NAME: Angie Zuñiga  : 1980  MRN: [id number]     Chief Complaint:  Cough and Emesis (Cough X 3 days, emesis & diarrhea started yesterday)    HISTORY OF PRESENT ILLNESS        Angie Zuñiga is a 44 y.o. female who presents to the ED with a complaint of not feeling well for the past 3 to 4 days. Patient complains of all over body aches. States her whole body hurts. States that she has a headache-entire head. States that her chest aches on both sides. She has been coughing and her chest feels congested. Her back aches. Nausea with vomiting and diarrhea. Her legs ache. Patient denies any specific fevers or chills. She denies sore throat or ear pain. Denies shortness of breath. Denies any specific or localized abdominal pain. Denies any urinary symptoms. Symptoms are moderate in severity. She has not taken any medication for this complaint. ROS   Pertinent positives and negatives are stated within HPI, all other systems reviewed and are negative. Past Medical History:  has a past medical history of Asthma, Bronchitis, Movement disorder, Seizure disorder ([address]), Seizure disorder ([address]), Suicidal overdose ([address]), Tobacco abuse, and Tobacco abuse. Surgical History:  has a past surgical history that includes fracture surgery; Cholecystectomy; Rotator cuff repair; Upper gastrointestinal endoscopy (N/A, 5/3/2021); Upper gastrointestinal endoscopy (N/A, 10/29/2022); and Colonoscopy (N/A, 10/31/2022). Social History:  reports that she has been smoking cigarettes. She has a 15.00 pack-year smoking history. She has never used smokeless tobacco. She reports current alcohol use. She reports current drug use. Drug: Cocaine. Family History: family history includes Cancer in her mother.      Allergies: Ketorolac,

## 2023-11-15 LAB
EKG ATRIAL RATE: 89 BPM
EKG P AXIS: 49 DEGREES
EKG P-R INTERVAL: 134 MS
EKG Q-T INTERVAL: 390 MS
EKG QRS DURATION: 84 MS
EKG QTC CALCULATION (BAZETT): 474 MS
EKG R AXIS: 91 DEGREES
EKG T AXIS: 29 DEGREES
EKG VENTRICULAR RATE: 89 BPM
MICROORGANISM SPEC CULT: ABNORMAL
MICROORGANISM SPEC CULT: ABNORMAL
SPECIMEN DESCRIPTION: ABNORMAL

## 2023-11-15 PROCEDURE — 93010 ELECTROCARDIOGRAM REPORT: CPT | Performed by: INTERNAL MEDICINE

## 2023-12-28 ENCOUNTER — APPOINTMENT (OUTPATIENT)
Dept: CT IMAGING | Age: 43
End: 2023-12-28
Payer: MEDICAID

## 2023-12-28 ENCOUNTER — HOSPITAL ENCOUNTER (EMERGENCY)
Age: 43
Discharge: HOME | End: 2023-12-28
Attending: STUDENT IN AN ORGANIZED HEALTH CARE EDUCATION/TRAINING PROGRAM
Payer: MEDICAID

## 2023-12-28 VITALS
RESPIRATION RATE: 18 BRPM | HEART RATE: 84 BPM | SYSTOLIC BLOOD PRESSURE: 143 MMHG | BODY MASS INDEX: 27.21 KG/M2 | HEIGHT: 59 IN | TEMPERATURE: 98.1 F | DIASTOLIC BLOOD PRESSURE: 90 MMHG | WEIGHT: 135 LBS | OXYGEN SATURATION: 98 %

## 2023-12-28 DIAGNOSIS — K59.00 CONSTIPATION, UNSPECIFIED CONSTIPATION TYPE: Primary | ICD-10-CM

## 2023-12-28 LAB
ALBUMIN SERPL-MCNC: 4.3 G/DL (ref 3.5–5.2)
ALP SERPL-CCNC: 93 U/L (ref 35–104)
ALT SERPL-CCNC: 18 U/L (ref 0–32)
ANION GAP SERPL CALCULATED.3IONS-SCNC: 13 MMOL/L (ref 7–16)
AST SERPL-CCNC: 22 U/L (ref 0–31)
BASOPHILS # BLD: 0.03 K/UL (ref 0–0.2)
BASOPHILS NFR BLD: 0 % (ref 0–2)
BILIRUB SERPL-MCNC: 0.3 MG/DL (ref 0–1.2)
BILIRUB UR QL STRIP: NEGATIVE
BUN SERPL-MCNC: 15 MG/DL (ref 6–20)
CALCIUM SERPL-MCNC: 9 MG/DL (ref 8.6–10.2)
CHLORIDE SERPL-SCNC: 103 MMOL/L (ref 98–107)
CLARITY UR: CLEAR
CO2 SERPL-SCNC: 21 MMOL/L (ref 22–29)
COLOR UR: YELLOW
CREAT SERPL-MCNC: 0.7 MG/DL (ref 0.5–1)
EOSINOPHIL # BLD: 0.05 K/UL (ref 0.05–0.5)
EOSINOPHILS RELATIVE PERCENT: 1 % (ref 0–6)
ERYTHROCYTE [DISTWIDTH] IN BLOOD BY AUTOMATED COUNT: 15 % (ref 11.5–15)
GFR SERPL CREATININE-BSD FRML MDRD: >60 ML/MIN/1.73M2
GLUCOSE SERPL-MCNC: 73 MG/DL (ref 74–99)
GLUCOSE UR STRIP-MCNC: NEGATIVE MG/DL
HCG UR QL: NEGATIVE
HCT VFR BLD AUTO: 41.3 % (ref 34–48)
HGB BLD-MCNC: 13.6 G/DL (ref 11.5–15.5)
HGB UR QL STRIP.AUTO: NEGATIVE
IMM GRANULOCYTES # BLD AUTO: 0.03 K/UL (ref 0–0.58)
IMM GRANULOCYTES NFR BLD: 0 % (ref 0–5)
KETONES UR STRIP-MCNC: NEGATIVE MG/DL
LACTATE BLDV-SCNC: 0.9 MMOL/L (ref 0.5–2.2)
LEUKOCYTE ESTERASE UR QL STRIP: ABNORMAL
LIPASE SERPL-CCNC: 24 U/L (ref 13–60)
LYMPHOCYTES NFR BLD: 2.32 K/UL (ref 1.5–4)
LYMPHOCYTES RELATIVE PERCENT: 24 % (ref 20–42)
MCH RBC QN AUTO: 26.4 PG (ref 26–35)
MCHC RBC AUTO-ENTMCNC: 32.9 G/DL (ref 32–34.5)
MCV RBC AUTO: 80.2 FL (ref 80–99.9)
MONOCYTES NFR BLD: 0.78 K/UL (ref 0.1–0.95)
MONOCYTES NFR BLD: 8 % (ref 2–12)
NEUTROPHILS NFR BLD: 67 % (ref 43–80)
NEUTS SEG NFR BLD: 6.56 K/UL (ref 1.8–7.3)
NITRITE UR QL STRIP: NEGATIVE
PH UR STRIP: 5.5 [PH] (ref 5–9)
PLATELET # BLD AUTO: 234 K/UL (ref 130–450)
PMV BLD AUTO: 9.1 FL (ref 7–12)
POTASSIUM SERPL-SCNC: 4.1 MMOL/L (ref 3.5–5)
PROT SERPL-MCNC: 7.7 G/DL (ref 6.4–8.3)
PROT UR STRIP-MCNC: NEGATIVE MG/DL
RBC # BLD AUTO: 5.15 M/UL (ref 3.5–5.5)
SODIUM SERPL-SCNC: 137 MMOL/L (ref 132–146)
SP GR UR STRIP: 1.01 (ref 1–1.03)
UROBILINOGEN UR STRIP-ACNC: 0.2 EU/DL (ref 0–1)
WBC OTHER # BLD: 9.8 K/UL (ref 4.5–11.5)

## 2023-12-28 PROCEDURE — 6360000004 HC RX CONTRAST MEDICATION: Performed by: RADIOLOGY

## 2023-12-28 PROCEDURE — 83690 ASSAY OF LIPASE: CPT

## 2023-12-28 PROCEDURE — 74177 CT ABD & PELVIS W/CONTRAST: CPT

## 2023-12-28 PROCEDURE — 83605 ASSAY OF LACTIC ACID: CPT

## 2023-12-28 PROCEDURE — 80053 COMPREHEN METABOLIC PANEL: CPT

## 2023-12-28 PROCEDURE — 87086 URINE CULTURE/COLONY COUNT: CPT

## 2023-12-28 PROCEDURE — 74160 CT ABDOMEN W/CONTRAST: CPT

## 2023-12-28 PROCEDURE — 99285 EMERGENCY DEPT VISIT HI MDM: CPT

## 2023-12-28 PROCEDURE — 6370000000 HC RX 637 (ALT 250 FOR IP)

## 2023-12-28 PROCEDURE — 84703 CHORIONIC GONADOTROPIN ASSAY: CPT

## 2023-12-28 PROCEDURE — 81003 URINALYSIS AUTO W/O SCOPE: CPT

## 2023-12-28 PROCEDURE — 85025 COMPLETE CBC W/AUTO DIFF WBC: CPT

## 2023-12-28 RX ORDER — DICYCLOMINE HYDROCHLORIDE 10 MG/1
10 CAPSULE ORAL ONCE
Status: COMPLETED | OUTPATIENT
Start: 2023-12-28 | End: 2023-12-28

## 2023-12-28 RX ORDER — DICYCLOMINE HYDROCHLORIDE 10 MG/1
10 CAPSULE ORAL 4 TIMES DAILY
Qty: 120 CAPSULE | Refills: 0 | Status: SHIPPED | OUTPATIENT
Start: 2023-12-28

## 2023-12-28 RX ORDER — POLYETHYLENE GLYCOL 3350 17 G/17G
17 POWDER, FOR SOLUTION ORAL ONCE
Status: COMPLETED | OUTPATIENT
Start: 2023-12-28 | End: 2023-12-28

## 2023-12-28 RX ORDER — BISACODYL 5 MG/1
10 TABLET, DELAYED RELEASE ORAL ONCE
Status: COMPLETED | OUTPATIENT
Start: 2023-12-28 | End: 2023-12-28

## 2023-12-28 RX ORDER — DICYCLOMINE HYDROCHLORIDE 10 MG/ML
20 INJECTION INTRAMUSCULAR ONCE
Status: DISCONTINUED | OUTPATIENT
Start: 2023-12-28 | End: 2023-12-28 | Stop reason: HOSPADM

## 2023-12-28 RX ORDER — DOCUSATE SODIUM 100 MG/1
100 CAPSULE, LIQUID FILLED ORAL 2 TIMES DAILY
Qty: 60 CAPSULE | Refills: 0 | Status: SHIPPED | OUTPATIENT
Start: 2023-12-28 | End: 2024-01-27

## 2023-12-28 RX ORDER — POLYETHYLENE GLYCOL 3350 17 G/17G
17 POWDER, FOR SOLUTION ORAL DAILY PRN
Qty: 1530 G | Refills: 1 | Status: SHIPPED | OUTPATIENT
Start: 2023-12-28 | End: 2024-01-27

## 2023-12-28 RX ADMIN — IOPAMIDOL 75 ML: 755 INJECTION, SOLUTION INTRAVENOUS at 12:50

## 2023-12-28 RX ADMIN — POLYETHYLENE GLYCOL 3350 17 G: 17 POWDER, FOR SOLUTION ORAL at 16:29

## 2023-12-28 RX ADMIN — DICYCLOMINE HYDROCHLORIDE 10 MG: 10 CAPSULE ORAL at 16:29

## 2023-12-28 RX ADMIN — BISACODYL 10 MG: 5 TABLET, COATED ORAL at 16:29

## 2023-12-28 NOTE — DISCHARGE INSTRUCTIONS
- Follow up with PCP  - Alarm signs and symptoms discussed with the patient  - Return to ED if the symptoms persist or worsen  - Patient understood and agreed with the plan

## 2023-12-29 LAB
MICROORGANISM SPEC CULT: ABNORMAL
SPECIMEN DESCRIPTION: ABNORMAL

## 2024-01-23 ENCOUNTER — HOSPITAL ENCOUNTER (EMERGENCY)
Age: 44
Discharge: HOME | End: 2024-01-23
Payer: MEDICAID

## 2024-01-23 VITALS
TEMPERATURE: 98.2 F | OXYGEN SATURATION: 99 % | RESPIRATION RATE: 18 BRPM | DIASTOLIC BLOOD PRESSURE: 96 MMHG | SYSTOLIC BLOOD PRESSURE: 137 MMHG | HEART RATE: 99 BPM

## 2024-01-23 DIAGNOSIS — M25.551 RIGHT HIP PAIN: Primary | ICD-10-CM

## 2024-01-23 DIAGNOSIS — G89.29 OTHER CHRONIC PAIN: ICD-10-CM

## 2024-01-23 PROCEDURE — 99283 EMERGENCY DEPT VISIT LOW MDM: CPT

## 2024-01-23 RX ORDER — TRAMADOL HYDROCHLORIDE 50 MG/1
50 TABLET ORAL EVERY 6 HOURS PRN
Qty: 10 TABLET | Refills: 0 | Status: SHIPPED | OUTPATIENT
Start: 2024-01-23 | End: 2024-01-28

## 2024-01-23 NOTE — PROGRESS NOTES
CLINICAL PHARMACY NOTE: MEDS TO BEDS    Total # of Prescriptions Filled: 1   The following medications were delivered to the patient:  Tramadol 50    Additional Documentation:  Pt picked up in pharmacy

## 2024-01-24 NOTE — ED PROVIDER NOTES
[facility] EMERGENCY DEPARTMENT  ED  Encounter Note  Admit Date/RoomTime: 1/23/2024 12:58 PM  ED Room: CHAIR01/   Independent DANISHA Visit.  HPI:  1/24/24,   Time: 4:01 PM RICKY Lomas is a 43 y.o. female presenting to the ED for hip pain, beginning chronic in nature ago.  The complaint has been persistent, moderate in severity, and worsened by nothing.  Presents for complaints of a flareup of chronic hip pain she denies any new falls or new injuries.  She is awaiting for the orthopedic doctor to schedule for replacement of the right hip.  She is in pain management states he is out of her pain medication has an appointment tomorrow for medication refill and just needs a dose of pain meds.    ROS:   Pertinent positives and negatives are stated within HPI, all other systems reviewed and are negative.  --------------------------------------------- PAST HISTORY ---------------------------------------------  Past Medical History:  has a past medical history of Asthma, Bronchitis, Movement disorder, Seizure disorder (HCC), Seizure disorder (HCC), Suicidal overdose (HCC), Tobacco abuse, and Tobacco abuse.    Past Surgical History:  has a past surgical history that includes fracture surgery; Cholecystectomy; Rotator cuff repair; Upper gastrointestinal endoscopy (N/A, 5/3/2021); Upper gastrointestinal endoscopy (N/A, 10/29/2022); and Colonoscopy (N/A, 10/31/2022).    Social History:  reports that she has been smoking cigarettes. She has a 15.0 pack-year smoking history. She has never used smokeless tobacco. She reports current alcohol use. She reports current drug use. Drug: Cocaine.    Family History: family history includes Cancer in her mother.     The patient’s home medications have been reviewed.    Allergies: Ketorolac, Toradol [ketorolac tromethamine], Darvocet [propoxyphene n-acetaminophen], Ibuprofen, Meloxicam, and

## 2024-02-16 ENCOUNTER — APPOINTMENT (OUTPATIENT)
Dept: GENERAL RADIOLOGY | Age: 44
DRG: 776 | End: 2024-02-16
Payer: MEDICAID

## 2024-02-16 ENCOUNTER — APPOINTMENT (OUTPATIENT)
Dept: CT IMAGING | Age: 44
DRG: 776 | End: 2024-02-16
Payer: MEDICAID

## 2024-02-16 ENCOUNTER — HOSPITAL ENCOUNTER (EMERGENCY)
Age: 44
Discharge: HOME | DRG: 776 | End: 2024-02-16
Attending: EMERGENCY MEDICINE
Payer: MEDICAID

## 2024-02-16 VITALS
DIASTOLIC BLOOD PRESSURE: 69 MMHG | HEIGHT: 59 IN | HEART RATE: 94 BPM | WEIGHT: 140 LBS | RESPIRATION RATE: 19 BRPM | TEMPERATURE: 97.3 F | BODY MASS INDEX: 28.22 KG/M2 | SYSTOLIC BLOOD PRESSURE: 107 MMHG | OXYGEN SATURATION: 99 %

## 2024-02-16 DIAGNOSIS — F14.90 COCAINE USE: ICD-10-CM

## 2024-02-16 DIAGNOSIS — F10.920 ACUTE ALCOHOLIC INTOXICATION WITHOUT COMPLICATION (HCC): Primary | ICD-10-CM

## 2024-02-16 LAB
ALBUMIN SERPL-MCNC: 5.1 G/DL (ref 3.5–5.2)
ALP SERPL-CCNC: 124 U/L (ref 35–104)
ALT SERPL-CCNC: 31 U/L (ref 0–32)
AMPHET UR QL SCN: NEGATIVE
ANION GAP SERPL CALCULATED.3IONS-SCNC: 15 MMOL/L (ref 7–16)
APAP SERPL-MCNC: <5 UG/ML (ref 10–30)
AST SERPL-CCNC: 23 U/L (ref 0–31)
BARBITURATES UR QL SCN: NEGATIVE
BASOPHILS # BLD: 0.04 K/UL (ref 0–0.2)
BASOPHILS NFR BLD: 1 % (ref 0–2)
BENZODIAZ UR QL: NEGATIVE
BILIRUB SERPL-MCNC: 0.4 MG/DL (ref 0–1.2)
BUN SERPL-MCNC: 14 MG/DL (ref 6–20)
BUPRENORPHINE UR QL: NEGATIVE
CALCIUM SERPL-MCNC: 9.5 MG/DL (ref 8.6–10.2)
CANNABINOIDS UR QL SCN: NEGATIVE
CHLORIDE SERPL-SCNC: 104 MMOL/L (ref 98–107)
CO2 SERPL-SCNC: 23 MMOL/L (ref 22–29)
COCAINE UR QL SCN: POSITIVE
CREAT SERPL-MCNC: 0.7 MG/DL (ref 0.5–1)
EOSINOPHIL # BLD: 0.23 K/UL (ref 0.05–0.5)
EOSINOPHILS RELATIVE PERCENT: 3 % (ref 0–6)
ERYTHROCYTE [DISTWIDTH] IN BLOOD BY AUTOMATED COUNT: 15.3 % (ref 11.5–15)
ETHANOLAMINE SERPL-MCNC: 195 MG/DL
FENTANYL UR QL: NEGATIVE
GFR SERPL CREATININE-BSD FRML MDRD: >60 ML/MIN/1.73M2
GLUCOSE SERPL-MCNC: 94 MG/DL (ref 74–99)
HCG, URINE, POC: NEGATIVE
HCT VFR BLD AUTO: 49 % (ref 34–48)
HGB BLD-MCNC: 15.9 G/DL (ref 11.5–15.5)
IMM GRANULOCYTES # BLD AUTO: 0.09 K/UL (ref 0–0.58)
IMM GRANULOCYTES NFR BLD: 1 % (ref 0–5)
LYMPHOCYTES NFR BLD: 2.97 K/UL (ref 1.5–4)
LYMPHOCYTES RELATIVE PERCENT: 34 % (ref 20–42)
Lab: NORMAL
MCH RBC QN AUTO: 26.3 PG (ref 26–35)
MCHC RBC AUTO-ENTMCNC: 32.4 G/DL (ref 32–34.5)
MCV RBC AUTO: 81 FL (ref 80–99.9)
METHADONE UR QL: NEGATIVE
MONOCYTES NFR BLD: 0.65 K/UL (ref 0.1–0.95)
MONOCYTES NFR BLD: 7 % (ref 2–12)
NEGATIVE QC PASS/FAIL: NORMAL
NEUTROPHILS NFR BLD: 55 % (ref 43–80)
NEUTS SEG NFR BLD: 4.85 K/UL (ref 1.8–7.3)
OPIATES UR QL SCN: NEGATIVE
OXYCODONE UR QL SCN: NEGATIVE
PCP UR QL SCN: NEGATIVE
PLATELET # BLD AUTO: 314 K/UL (ref 130–450)
PMV BLD AUTO: 9 FL (ref 7–12)
POSITIVE QC PASS/FAIL: NORMAL
POTASSIUM SERPL-SCNC: 4.5 MMOL/L (ref 3.5–5)
PROT SERPL-MCNC: 9.9 G/DL (ref 6.4–8.3)
RBC # BLD AUTO: 6.05 M/UL (ref 3.5–5.5)
SALICYLATES SERPL-MCNC: <0.3 MG/DL (ref 0–30)
SODIUM SERPL-SCNC: 142 MMOL/L (ref 132–146)
TEST INFORMATION: ABNORMAL
TOXIC TRICYCLIC SC,BLOOD: NEGATIVE
WBC OTHER # BLD: 8.8 K/UL (ref 4.5–11.5)

## 2024-02-16 PROCEDURE — 71045 X-RAY EXAM CHEST 1 VIEW: CPT

## 2024-02-16 PROCEDURE — 85025 COMPLETE CBC W/AUTO DIFF WBC: CPT

## 2024-02-16 PROCEDURE — 80053 COMPREHEN METABOLIC PANEL: CPT

## 2024-02-16 PROCEDURE — 80307 DRUG TEST PRSMV CHEM ANLYZR: CPT

## 2024-02-16 PROCEDURE — 80143 DRUG ASSAY ACETAMINOPHEN: CPT

## 2024-02-16 PROCEDURE — 80179 DRUG ASSAY SALICYLATE: CPT

## 2024-02-16 PROCEDURE — 72125 CT NECK SPINE W/O DYE: CPT

## 2024-02-16 PROCEDURE — G0480 DRUG TEST DEF 1-7 CLASSES: HCPCS

## 2024-02-16 PROCEDURE — 99284 EMERGENCY DEPT VISIT MOD MDM: CPT

## 2024-02-16 PROCEDURE — 70450 CT HEAD/BRAIN W/O DYE: CPT

## 2024-02-16 PROCEDURE — 6370000000 HC RX 637 (ALT 250 FOR IP): Performed by: EMERGENCY MEDICINE

## 2024-02-16 PROCEDURE — 73502 X-RAY EXAM HIP UNI 2-3 VIEWS: CPT

## 2024-02-16 RX ORDER — LORAZEPAM 1 MG/1
2 TABLET ORAL ONCE
Status: COMPLETED | OUTPATIENT
Start: 2024-02-16 | End: 2024-02-16

## 2024-02-16 RX ADMIN — LORAZEPAM 2 MG: 1 TABLET ORAL at 03:02

## 2024-02-16 ASSESSMENT — PAIN - FUNCTIONAL ASSESSMENT
PAIN_FUNCTIONAL_ASSESSMENT: ACTIVITIES ARE NOT PREVENTED
PAIN_FUNCTIONAL_ASSESSMENT: 0-10

## 2024-02-16 ASSESSMENT — PAIN DESCRIPTION - FREQUENCY: FREQUENCY: CONTINUOUS

## 2024-02-16 ASSESSMENT — PAIN DESCRIPTION - DESCRIPTORS: DESCRIPTORS: SHARP

## 2024-02-16 ASSESSMENT — PAIN SCALES - GENERAL: PAINLEVEL_OUTOF10: 10

## 2024-02-16 ASSESSMENT — PAIN DESCRIPTION - PAIN TYPE: TYPE: ACUTE PAIN

## 2024-02-16 ASSESSMENT — PAIN DESCRIPTION - ORIENTATION: ORIENTATION: MID

## 2024-02-16 ASSESSMENT — PAIN DESCRIPTION - ONSET: ONSET: SUDDEN

## 2024-02-16 ASSESSMENT — PAIN DESCRIPTION - LOCATION: LOCATION: NECK

## 2024-02-16 NOTE — ED PROVIDER NOTES
[facility] EMERGENCY DEPARTMENT  EMERGENCY DEPARTMENT ENCOUNTER        Pt Name: Rachele Lomas  MRN: [id number]  Birthdate 1980  Date of evaluation: 2/16/2024  Provider: Dustin Osborn DO  PCP: No primary care provider on file.  Note Started: 3:56 AM EST 2/16/24    CHIEF COMPLAINT       Chief Complaint   Patient presents with    Drug Overdose     Pt found outside gas station laying on ground. On ground approx 30 min. Pt states she thought she was smoking weed, but may have been crack. Pt given Narcan IN by cousin     Seizures     Possible seizure activity, states she \"needed seizure meds\" Pt c/o neck pain, cervical        HISTORY OF PRESENT ILLNESS: 1 or more Elements   History From: Patient and EMS    Limitations to history : None    Racheel Lomas is a 43 y.o. female who presents to the emergency department for possible drug overdose.  The patient was found outside of a gas station laying on the ground.  She states that she was outside for approximately 30 minutes.  She states that she thought she was smoking weed but feels as if she smoked crack.  She states that her cousin gave her Narcan.  She states that she was trying to go home to get her seizure meds.  She is unsure if she had a seizure at this time.  She states she did fall.  She had complaints of a headache as well as neck pain.    Nursing Notes were all reviewed and agreed with or any disagreements were addressed in the HPI.      REVIEW OF EXTERNAL NOTE :       PDMP Monitoring:    Last PDMP Mor as Reviewed:  Review User Review Instant Review Result   KRYSTIAN MUNOZ 1/23/2024 12:56 PM Reviewed PDMP [1]     Last Controlled Substance Monitoring Documentation      Flowsheet Row ED from 8/23/2021 in [facility] Emergency Department   Comments discharged by prior shift filed at 08/23/2021 1151          Urine Drug Screenings (1 yr)       URINE DRUG SCREEN

## 2024-02-16 NOTE — ED NOTES
This RN attempted to call emergency contacts for sober ride. Voicemail/busy line for all contacts.

## 2024-02-16 NOTE — ED NOTES
This RN and charge RN entered room to have patient make phone call. RN witnessed call. Pt step dad does not have a car to come get her but will try to call boyfriend. Pt cell phone  mid call

## 2024-02-16 NOTE — ED NOTES
This RN attempted to call pt emergency contacts x3 with no answer. Pt a/o x 4 and is trying to get a ride to pick her up. Ok for pt to make phone calls from waiting room and finish discharge per ER

## 2024-02-17 ENCOUNTER — HOSPITAL ENCOUNTER (INPATIENT)
Age: 44
LOS: 3 days | Discharge: HOME | DRG: 776 | End: 2024-02-20
Attending: EMERGENCY MEDICINE | Admitting: PSYCHIATRY & NEUROLOGY
Payer: MEDICAID

## 2024-02-17 ENCOUNTER — APPOINTMENT (OUTPATIENT)
Dept: CT IMAGING | Age: 44
DRG: 776 | End: 2024-02-17
Payer: MEDICAID

## 2024-02-17 ENCOUNTER — APPOINTMENT (OUTPATIENT)
Dept: GENERAL RADIOLOGY | Age: 44
DRG: 776 | End: 2024-02-17
Payer: MEDICAID

## 2024-02-17 DIAGNOSIS — T50.904A OVERDOSE OF UNDETERMINED INTENT, INITIAL ENCOUNTER: Primary | ICD-10-CM

## 2024-02-17 DIAGNOSIS — F10.920 ACUTE ALCOHOLIC INTOXICATION WITHOUT COMPLICATION (HCC): ICD-10-CM

## 2024-02-17 PROBLEM — F32.9 MAJOR DEPRESSION, SINGLE EPISODE: Status: ACTIVE | Noted: 2024-02-17

## 2024-02-17 LAB
ALBUMIN SERPL-MCNC: 4.4 G/DL (ref 3.5–5.2)
ALP SERPL-CCNC: 101 U/L (ref 35–104)
ALT SERPL-CCNC: 32 U/L (ref 0–32)
AMMONIA PLAS-SCNC: 18 UMOL/L (ref 11–51)
AMPHET UR QL SCN: NEGATIVE
ANION GAP SERPL CALCULATED.3IONS-SCNC: 15 MMOL/L (ref 7–16)
APAP SERPL-MCNC: <5 UG/ML (ref 10–30)
APAP SERPL-MCNC: <5 UG/ML (ref 10–30)
AST SERPL-CCNC: 33 U/L (ref 0–31)
BARBITURATES UR QL SCN: NEGATIVE
BASOPHILS # BLD: 0.06 K/UL (ref 0–0.2)
BASOPHILS NFR BLD: 1 % (ref 0–2)
BENZODIAZ UR QL: POSITIVE
BILIRUB SERPL-MCNC: 0.2 MG/DL (ref 0–1.2)
BILIRUB UR QL STRIP: NEGATIVE
BUN SERPL-MCNC: 18 MG/DL (ref 6–20)
BUPRENORPHINE UR QL: NEGATIVE
CALCIUM SERPL-MCNC: 8.4 MG/DL (ref 8.6–10.2)
CANNABINOIDS UR QL SCN: NEGATIVE
CHLORIDE SERPL-SCNC: 103 MMOL/L (ref 98–107)
CK SERPL-CCNC: 63 U/L (ref 20–180)
CLARITY UR: CLEAR
CO2 SERPL-SCNC: 21 MMOL/L (ref 22–29)
COCAINE UR QL SCN: POSITIVE
COLOR UR: YELLOW
COMMENT: ABNORMAL
CREAT SERPL-MCNC: 0.8 MG/DL (ref 0.5–1)
EOSINOPHIL # BLD: 0.2 K/UL (ref 0.05–0.5)
EOSINOPHILS RELATIVE PERCENT: 2 % (ref 0–6)
ERYTHROCYTE [DISTWIDTH] IN BLOOD BY AUTOMATED COUNT: 15.2 % (ref 11.5–15)
ETHANOLAMINE SERPL-MCNC: 231 MG/DL
ETHANOLAMINE SERPL-MCNC: <10 MG/DL
FENTANYL UR QL: NEGATIVE
GFR SERPL CREATININE-BSD FRML MDRD: >60 ML/MIN/1.73M2
GLUCOSE SERPL-MCNC: 81 MG/DL (ref 74–99)
GLUCOSE UR STRIP-MCNC: NEGATIVE MG/DL
HCG, URINE, POC: NEGATIVE
HCT VFR BLD AUTO: 44.5 % (ref 34–48)
HGB BLD-MCNC: 14.3 G/DL (ref 11.5–15.5)
HGB UR QL STRIP.AUTO: NEGATIVE
IMM GRANULOCYTES # BLD AUTO: 0.05 K/UL (ref 0–0.58)
IMM GRANULOCYTES NFR BLD: 1 % (ref 0–5)
KETONES UR STRIP-MCNC: NEGATIVE MG/DL
LACTATE BLDV-SCNC: 2.1 MMOL/L (ref 0.5–1.9)
LEUKOCYTE ESTERASE UR QL STRIP: NEGATIVE
LYMPHOCYTES NFR BLD: 3.63 K/UL (ref 1.5–4)
LYMPHOCYTES RELATIVE PERCENT: 36 % (ref 20–42)
Lab: NORMAL
MCH RBC QN AUTO: 26 PG (ref 26–35)
MCHC RBC AUTO-ENTMCNC: 32.1 G/DL (ref 32–34.5)
MCV RBC AUTO: 80.9 FL (ref 80–99.9)
METHADONE UR QL: NEGATIVE
MONOCYTES NFR BLD: 0.76 K/UL (ref 0.1–0.95)
MONOCYTES NFR BLD: 8 % (ref 2–12)
NEGATIVE QC PASS/FAIL: NORMAL
NEUTROPHILS NFR BLD: 54 % (ref 43–80)
NEUTS SEG NFR BLD: 5.45 K/UL (ref 1.8–7.3)
NITRITE UR QL STRIP: NEGATIVE
OPIATES UR QL SCN: NEGATIVE
OXYCODONE UR QL SCN: NEGATIVE
PCP UR QL SCN: NEGATIVE
PH UR STRIP: 5.5 [PH] (ref 5–9)
PLATELET # BLD AUTO: 321 K/UL (ref 130–450)
PMV BLD AUTO: 9.2 FL (ref 7–12)
POSITIVE QC PASS/FAIL: NORMAL
POTASSIUM SERPL-SCNC: 3.7 MMOL/L (ref 3.5–5)
PROT SERPL-MCNC: 8.2 G/DL (ref 6.4–8.3)
PROT UR STRIP-MCNC: NEGATIVE MG/DL
RBC # BLD AUTO: 5.5 M/UL (ref 3.5–5.5)
SALICYLATES SERPL-MCNC: <0.3 MG/DL (ref 0–30)
SODIUM SERPL-SCNC: 139 MMOL/L (ref 132–146)
SP GR UR STRIP: >1.03 (ref 1–1.03)
TEST INFORMATION: ABNORMAL
TOXIC TRICYCLIC SC,BLOOD: NEGATIVE
TROPONIN I SERPL HS-MCNC: <6 NG/L (ref 0–9)
TROPONIN I SERPL HS-MCNC: <6 NG/L (ref 0–9)
UROBILINOGEN UR STRIP-ACNC: 0.2 EU/DL (ref 0–1)
WBC OTHER # BLD: 10.2 K/UL (ref 4.5–11.5)

## 2024-02-17 PROCEDURE — 99285 EMERGENCY DEPT VISIT HI MDM: CPT

## 2024-02-17 PROCEDURE — 80179 DRUG ASSAY SALICYLATE: CPT

## 2024-02-17 PROCEDURE — 96360 HYDRATION IV INFUSION INIT: CPT

## 2024-02-17 PROCEDURE — G0480 DRUG TEST DEF 1-7 CLASSES: HCPCS

## 2024-02-17 PROCEDURE — 81003 URINALYSIS AUTO W/O SCOPE: CPT

## 2024-02-17 PROCEDURE — 2580000003 HC RX 258: Performed by: EMERGENCY MEDICINE

## 2024-02-17 PROCEDURE — 6370000000 HC RX 637 (ALT 250 FOR IP): Performed by: PSYCHIATRY & NEUROLOGY

## 2024-02-17 PROCEDURE — 80143 DRUG ASSAY ACETAMINOPHEN: CPT

## 2024-02-17 PROCEDURE — 96361 HYDRATE IV INFUSION ADD-ON: CPT

## 2024-02-17 PROCEDURE — 80053 COMPREHEN METABOLIC PANEL: CPT

## 2024-02-17 PROCEDURE — 1240000000 HC EMOTIONAL WELLNESS R&B

## 2024-02-17 PROCEDURE — 85025 COMPLETE CBC W/AUTO DIFF WBC: CPT

## 2024-02-17 PROCEDURE — 82550 ASSAY OF CK (CPK): CPT

## 2024-02-17 PROCEDURE — 82140 ASSAY OF AMMONIA: CPT

## 2024-02-17 PROCEDURE — 70450 CT HEAD/BRAIN W/O DYE: CPT

## 2024-02-17 PROCEDURE — 83605 ASSAY OF LACTIC ACID: CPT

## 2024-02-17 PROCEDURE — 93005 ELECTROCARDIOGRAM TRACING: CPT | Performed by: EMERGENCY MEDICINE

## 2024-02-17 PROCEDURE — 84484 ASSAY OF TROPONIN QUANT: CPT

## 2024-02-17 PROCEDURE — 71045 X-RAY EXAM CHEST 1 VIEW: CPT

## 2024-02-17 PROCEDURE — 36415 COLL VENOUS BLD VENIPUNCTURE: CPT

## 2024-02-17 PROCEDURE — 80307 DRUG TEST PRSMV CHEM ANLYZR: CPT

## 2024-02-17 RX ORDER — LANOLIN ALCOHOL/MO/W.PET/CERES
100 CREAM (GRAM) TOPICAL DAILY
Status: DISCONTINUED | OUTPATIENT
Start: 2024-02-17 | End: 2024-02-20 | Stop reason: HOSPADM

## 2024-02-17 RX ORDER — LORAZEPAM 1 MG/1
2 TABLET ORAL
Status: DISCONTINUED | OUTPATIENT
Start: 2024-02-17 | End: 2024-02-17

## 2024-02-17 RX ORDER — SODIUM CHLORIDE 0.9 % (FLUSH) 0.9 %
5-40 SYRINGE (ML) INJECTION EVERY 12 HOURS SCHEDULED
Status: DISCONTINUED | OUTPATIENT
Start: 2024-02-17 | End: 2024-02-20 | Stop reason: HOSPADM

## 2024-02-17 RX ORDER — LORAZEPAM 2 MG/ML
4 INJECTION INTRAMUSCULAR
Status: DISCONTINUED | OUTPATIENT
Start: 2024-02-17 | End: 2024-02-17

## 2024-02-17 RX ORDER — HYDROXYZINE PAMOATE 50 MG/1
50 CAPSULE ORAL 3 TIMES DAILY PRN
Status: DISCONTINUED | OUTPATIENT
Start: 2024-02-17 | End: 2024-02-20 | Stop reason: HOSPADM

## 2024-02-17 RX ORDER — LORAZEPAM 1 MG/1
3 TABLET ORAL
Status: DISCONTINUED | OUTPATIENT
Start: 2024-02-17 | End: 2024-02-17

## 2024-02-17 RX ORDER — LORAZEPAM 1 MG/1
1 TABLET ORAL
Status: DISCONTINUED | OUTPATIENT
Start: 2024-02-17 | End: 2024-02-17

## 2024-02-17 RX ORDER — LORAZEPAM 2 MG/ML
3 INJECTION INTRAMUSCULAR
Status: DISCONTINUED | OUTPATIENT
Start: 2024-02-17 | End: 2024-02-17

## 2024-02-17 RX ORDER — CHLORDIAZEPOXIDE HYDROCHLORIDE 5 MG/1
5 CAPSULE, GELATIN COATED ORAL EVERY 6 HOURS PRN
Status: DISCONTINUED | OUTPATIENT
Start: 2024-02-17 | End: 2024-02-17

## 2024-02-17 RX ORDER — 0.9 % SODIUM CHLORIDE 0.9 %
1000 INTRAVENOUS SOLUTION INTRAVENOUS ONCE
Status: COMPLETED | OUTPATIENT
Start: 2024-02-17 | End: 2024-02-17

## 2024-02-17 RX ORDER — LANOLIN ALCOHOL/MO/W.PET/CERES
3 CREAM (GRAM) TOPICAL NIGHTLY PRN
Status: DISCONTINUED | OUTPATIENT
Start: 2024-02-17 | End: 2024-02-20 | Stop reason: HOSPADM

## 2024-02-17 RX ORDER — HALOPERIDOL 5 MG/ML
5 INJECTION INTRAMUSCULAR EVERY 6 HOURS PRN
Status: DISCONTINUED | OUTPATIENT
Start: 2024-02-17 | End: 2024-02-20 | Stop reason: HOSPADM

## 2024-02-17 RX ORDER — LORAZEPAM 1 MG/1
4 TABLET ORAL
Status: DISCONTINUED | OUTPATIENT
Start: 2024-02-17 | End: 2024-02-17

## 2024-02-17 RX ORDER — MULTIVITAMIN WITH IRON
1 TABLET ORAL DAILY
Status: DISCONTINUED | OUTPATIENT
Start: 2024-02-17 | End: 2024-02-20 | Stop reason: HOSPADM

## 2024-02-17 RX ORDER — NICOTINE 21 MG/24HR
1 PATCH, TRANSDERMAL 24 HOURS TRANSDERMAL DAILY
Status: DISCONTINUED | OUTPATIENT
Start: 2024-02-17 | End: 2024-02-20 | Stop reason: HOSPADM

## 2024-02-17 RX ORDER — MAGNESIUM HYDROXIDE/ALUMINUM HYDROXICE/SIMETHICONE 120; 1200; 1200 MG/30ML; MG/30ML; MG/30ML
30 SUSPENSION ORAL PRN
Status: DISCONTINUED | OUTPATIENT
Start: 2024-02-17 | End: 2024-02-20 | Stop reason: HOSPADM

## 2024-02-17 RX ORDER — SODIUM CHLORIDE 0.9 % (FLUSH) 0.9 %
5-40 SYRINGE (ML) INJECTION PRN
Status: DISCONTINUED | OUTPATIENT
Start: 2024-02-17 | End: 2024-02-20 | Stop reason: HOSPADM

## 2024-02-17 RX ORDER — SODIUM CHLORIDE 9 MG/ML
INJECTION, SOLUTION INTRAVENOUS PRN
Status: DISCONTINUED | OUTPATIENT
Start: 2024-02-17 | End: 2024-02-20 | Stop reason: HOSPADM

## 2024-02-17 RX ORDER — LORAZEPAM 2 MG/ML
2 INJECTION INTRAMUSCULAR
Status: DISCONTINUED | OUTPATIENT
Start: 2024-02-17 | End: 2024-02-17

## 2024-02-17 RX ORDER — CHLORDIAZEPOXIDE HYDROCHLORIDE 25 MG/1
25 CAPSULE, GELATIN COATED ORAL EVERY 6 HOURS PRN
Status: DISCONTINUED | OUTPATIENT
Start: 2024-02-17 | End: 2024-02-20 | Stop reason: HOSPADM

## 2024-02-17 RX ORDER — HALOPERIDOL 5 MG/1
5 TABLET ORAL EVERY 6 HOURS PRN
Status: DISCONTINUED | OUTPATIENT
Start: 2024-02-17 | End: 2024-02-20 | Stop reason: HOSPADM

## 2024-02-17 RX ORDER — LORAZEPAM 2 MG/ML
1 INJECTION INTRAMUSCULAR
Status: DISCONTINUED | OUTPATIENT
Start: 2024-02-17 | End: 2024-02-17

## 2024-02-17 RX ADMIN — SODIUM CHLORIDE 1000 ML: 9 INJECTION, SOLUTION INTRAVENOUS at 08:29

## 2024-02-17 RX ADMIN — HYDROXYZINE PAMOATE 50 MG: 50 CAPSULE ORAL at 22:01

## 2024-02-17 RX ADMIN — CHLORDIAZEPOXIDE HYDROCHLORIDE 25 MG: 25 CAPSULE ORAL at 22:02

## 2024-02-17 RX ADMIN — MELATONIN 3 MG ORAL TABLET 3 MG: 3 TABLET ORAL at 22:01

## 2024-02-17 ASSESSMENT — PATIENT HEALTH QUESTIONNAIRE - PHQ9
4. FEELING TIRED OR HAVING LITTLE ENERGY: 2
1. LITTLE INTEREST OR PLEASURE IN DOING THINGS: 2
7. TROUBLE CONCENTRATING ON THINGS, SUCH AS READING THE NEWSPAPER OR WATCHING TELEVISION: 1
8. MOVING OR SPEAKING SO SLOWLY THAT OTHER PEOPLE COULD HAVE NOTICED. OR THE OPPOSITE, BEING SO FIGETY OR RESTLESS THAT YOU HAVE BEEN MOVING AROUND A LOT MORE THAN USUAL: 1
2. FEELING DOWN, DEPRESSED OR HOPELESS: 1
6. FEELING BAD ABOUT YOURSELF - OR THAT YOU ARE A FAILURE OR HAVE LET YOURSELF OR YOUR FAMILY DOWN: 1
SUM OF ALL RESPONSES TO PHQ QUESTIONS 1-9: 12
SUM OF ALL RESPONSES TO PHQ QUESTIONS 1-9: 12
SUM OF ALL RESPONSES TO PHQ9 QUESTIONS 1 & 2: 3
SUM OF ALL RESPONSES TO PHQ QUESTIONS 1-9: 12
5. POOR APPETITE OR OVEREATING: 1
SUM OF ALL RESPONSES TO PHQ QUESTIONS 1-9: 12
9. THOUGHTS THAT YOU WOULD BE BETTER OFF DEAD, OR OF HURTING YOURSELF: 0
3. TROUBLE FALLING OR STAYING ASLEEP: 3
10. IF YOU CHECKED OFF ANY PROBLEMS, HOW DIFFICULT HAVE THESE PROBLEMS MADE IT FOR YOU TO DO YOUR WORK, TAKE CARE OF THINGS AT HOME, OR GET ALONG WITH OTHER PEOPLE: 2

## 2024-02-17 ASSESSMENT — SLEEP AND FATIGUE QUESTIONNAIRES
DO YOU USE A SLEEP AID: YES
SLEEP PATTERN: DIFFICULTY FALLING ASLEEP;DISTURBED/INTERRUPTED SLEEP;RESTLESSNESS
DO YOU HAVE DIFFICULTY SLEEPING: YES
AVERAGE NUMBER OF SLEEP HOURS: 0

## 2024-02-17 ASSESSMENT — LIFESTYLE VARIABLES
HOW OFTEN DO YOU HAVE A DRINK CONTAINING ALCOHOL: 2-4 TIMES A MONTH
HOW OFTEN DO YOU HAVE A DRINK CONTAINING ALCOHOL: PATIENT UNABLE TO ANSWER
HOW MANY STANDARD DRINKS CONTAINING ALCOHOL DO YOU HAVE ON A TYPICAL DAY: 7 TO 9
HOW MANY STANDARD DRINKS CONTAINING ALCOHOL DO YOU HAVE ON A TYPICAL DAY: PATIENT UNABLE TO ANSWER
HOW OFTEN DO YOU HAVE A DRINK CONTAINING ALCOHOL: 2-4 TIMES A MONTH
HOW MANY STANDARD DRINKS CONTAINING ALCOHOL DO YOU HAVE ON A TYPICAL DAY: 7 TO 9
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
## 2024-02-17 NOTE — ED NOTES
Pt states she took 30 benadryl pills this morning because she was angry. Pt states she was not trying to hurt herself. Pt also states she \"may have done some crack.\"

## 2024-02-17 NOTE — ED PROVIDER NOTES
ATTENDING PROVIDER ATTESTATION:     Rachele Lomas presented to the emergency department for evaluation of Drug Overdose (Patient found unresponsive given 2mg narcan per EMS with no response. )   and was initially evaluated by the Medical Resident. See Original ED Note for H&P and ED course above.     I have reviewed and discussed the case, including pertinent history (medical, surgical, family and social) and exam findings with the Medical Resident assigned to Rachele Lomas.  I have personally performed and/or participated in the history, exam, medical decision making, and procedures and agree with all pertinent clinical information and any additional changes or corrections are noted below in my assessment and plan. I have discussed this patient in detail with the resident, and provided the instruction and education,       I have reviewed my findings and recommendations with the assigned Medical Resident, Rachele Lomas and members of family present at the time of disposition.      I have performed a history and physical examination of this patient and directly supervised the resident caring for this patient              [facility] EMERGENCY DEPARTMENT  EMERGENCY DEPARTMENT ENCOUNTER        Pt Name: Rachele Lomas  MRN: [id number]  Birthdate 1980  Date of evaluation: 2/17/2024  Provider: Baljinder Aiken MD  PCP: No primary care provider on file.  Note Started: 8:08 AM EST 2/17/24    CHIEF COMPLAINT       Chief Complaint   Patient presents with    Drug Overdose     Patient found unresponsive given 2mg narcan per EMS with no response.        HISTORY OF PRESENT ILLNESS: 1 or more Elements     Limitations to history : Altered Mental Status    Rachele Lomas is a 43 y.o. female who presents for overdose.  EMS and patient report overdose.  This that she took 30, 20 mg Benadryl tablets.  She said that she was not trying to harm herself but intentionally took the tablets. 
  Chloride 103   CO2 21(!)   Anion Gap 15   Glucose, Random 81   BUN,BUNPL 18   Creatinine 0.8   Est, Glom Filt Rate >60   CALCIUM, SERUM, 613958 8.4(!)   Total Protein 8.2   Albumin 4.4   BILIRUBIN TOTAL 0.2   Alk Phos 101   ALT 32   AST 33(!) [CD]   1203 Lactate, Sepsis(!):    Lactic Acid, Sepsis 2.1(!) [CD]   1203 Serum Drug Screen(!):    Acetaminophen Level <5(!)   ETHANOL,ETHA 231(!)   Salicylate Lvl <0.3   Toxic Tricyclic Sc,Blood NEGATIVE [CD]   1203 The following tests were interpreted by me: CXR - no focal consolidation or pneumothorax     [CD]   1233 Patient much more awake and alert. [MM]   1406 Medically cleared for psych evaluation. [MM]      ED Course User Index  [CD] Baljinder Aiken MD  [MM] Opal Lind DO      CONSULTS: (Who and What was discussed)  IP CONSULT TO SOCIAL WORK    FINAL IMPRESSION      1. Overdose of undetermined intent, initial encounter    2. Acute alcoholic intoxication without complication (HCC)          DISPOSITION/PLAN     DISPOSITION [facility] 02/17/2024 02:07:37 PM    (Please note that portions of this note were completed with a voice recognition program.  Efforts were made to edit the dictations but occasionally words are mis-transcribed.)    Opal Lind DO (electronically signed)

## 2024-02-18 PROBLEM — F32.9 MAJOR DEPRESSION, SINGLE EPISODE: Status: RESOLVED | Noted: 2024-02-17 | Resolved: 2024-02-18

## 2024-02-18 PROBLEM — F19.10 POLYSUBSTANCE ABUSE (HCC): Status: ACTIVE | Noted: 2024-02-18

## 2024-02-18 PROBLEM — F19.94 SUBSTANCE INDUCED MOOD DISORDER (HCC): Status: ACTIVE | Noted: 2024-02-18

## 2024-02-18 PROBLEM — Z86.59 HISTORY OF POSTTRAUMATIC STRESS DISORDER (PTSD): Status: ACTIVE | Noted: 2024-02-18

## 2024-02-18 PROBLEM — T50.904A OVERDOSE OF UNDETERMINED INTENT: Status: ACTIVE | Noted: 2024-02-18

## 2024-02-18 LAB
AMMONIA PLAS-SCNC: 29 UMOL/L (ref 11–51)
AMYLASE SERPL-CCNC: 55 U/L (ref 20–100)
EKG ATRIAL RATE: 90 BPM
EKG P AXIS: 51 DEGREES
EKG P-R INTERVAL: 146 MS
EKG Q-T INTERVAL: 366 MS
EKG QRS DURATION: 84 MS
EKG QTC CALCULATION (BAZETT): 447 MS
EKG R AXIS: 65 DEGREES
EKG T AXIS: 20 DEGREES
EKG VENTRICULAR RATE: 90 BPM
LIPASE SERPL-CCNC: 27 U/L (ref 13–60)
MAGNESIUM SERPL-MCNC: 2 MG/DL (ref 1.6–2.6)
PARTIAL THROMBOPLASTIN TIME: 28.7 SEC (ref 24.5–35.1)
TSH SERPL DL<=0.05 MIU/L-ACNC: 1.97 UIU/ML (ref 0.27–4.2)
VIT B12 SERPL-MCNC: 757 PG/ML (ref 211–946)

## 2024-02-18 PROCEDURE — 6370000000 HC RX 637 (ALT 250 FOR IP): Performed by: INTERNAL MEDICINE

## 2024-02-18 PROCEDURE — 1240000000 HC EMOTIONAL WELLNESS R&B

## 2024-02-18 PROCEDURE — 82150 ASSAY OF AMYLASE: CPT

## 2024-02-18 PROCEDURE — 83735 ASSAY OF MAGNESIUM: CPT

## 2024-02-18 PROCEDURE — 99221 1ST HOSP IP/OBS SF/LOW 40: CPT | Performed by: INTERNAL MEDICINE

## 2024-02-18 PROCEDURE — 6370000000 HC RX 637 (ALT 250 FOR IP): Performed by: PSYCHIATRY & NEUROLOGY

## 2024-02-18 PROCEDURE — 84443 ASSAY THYROID STIM HORMONE: CPT

## 2024-02-18 PROCEDURE — 82140 ASSAY OF AMMONIA: CPT

## 2024-02-18 PROCEDURE — 82607 VITAMIN B-12: CPT

## 2024-02-18 PROCEDURE — 36415 COLL VENOUS BLD VENIPUNCTURE: CPT

## 2024-02-18 PROCEDURE — 6370000000 HC RX 637 (ALT 250 FOR IP): Performed by: NURSE PRACTITIONER

## 2024-02-18 PROCEDURE — 83690 ASSAY OF LIPASE: CPT

## 2024-02-18 PROCEDURE — 85730 THROMBOPLASTIN TIME PARTIAL: CPT

## 2024-02-18 PROCEDURE — 90792 PSYCH DIAG EVAL W/MED SRVCS: CPT | Performed by: NURSE PRACTITIONER

## 2024-02-18 PROCEDURE — 93010 ELECTROCARDIOGRAM REPORT: CPT | Performed by: INTERNAL MEDICINE

## 2024-02-18 RX ORDER — ACETAMINOPHEN 325 MG/1
650 TABLET ORAL EVERY 6 HOURS PRN
Status: DISCONTINUED | OUTPATIENT
Start: 2024-02-18 | End: 2024-02-20 | Stop reason: HOSPADM

## 2024-02-18 RX ORDER — AMLODIPINE BESYLATE 10 MG/1
10 TABLET ORAL DAILY
Status: DISCONTINUED | OUTPATIENT
Start: 2024-02-18 | End: 2024-02-20 | Stop reason: HOSPADM

## 2024-02-18 RX ORDER — LIDOCAINE 4 G/G
1 PATCH TOPICAL DAILY
Status: DISCONTINUED | OUTPATIENT
Start: 2024-02-18 | End: 2024-02-20 | Stop reason: HOSPADM

## 2024-02-18 RX ORDER — CARBAMAZEPINE 200 MG/1
200 TABLET ORAL 2 TIMES DAILY WITH MEALS
Status: DISCONTINUED | OUTPATIENT
Start: 2024-02-18 | End: 2024-02-20 | Stop reason: HOSPADM

## 2024-02-18 RX ORDER — TIZANIDINE 4 MG/1
4 TABLET ORAL EVERY 8 HOURS PRN
COMMUNITY

## 2024-02-18 RX ADMIN — AMLODIPINE BESYLATE 10 MG: 10 TABLET ORAL at 16:42

## 2024-02-18 RX ADMIN — MELATONIN 3 MG ORAL TABLET 3 MG: 3 TABLET ORAL at 21:03

## 2024-02-18 RX ADMIN — CARBAMAZEPINE 200 MG: 200 TABLET ORAL at 16:42

## 2024-02-18 RX ADMIN — ACETAMINOPHEN 650 MG: 325 TABLET ORAL at 06:45

## 2024-02-18 RX ADMIN — ACETAMINOPHEN 650 MG: 325 TABLET ORAL at 21:25

## 2024-02-18 RX ADMIN — HYDROXYZINE PAMOATE 50 MG: 50 CAPSULE ORAL at 21:03

## 2024-02-18 RX ADMIN — MULTIVITAMIN TABLET 1 TABLET: TABLET at 08:42

## 2024-02-18 RX ADMIN — Medication 100 MG: at 08:42

## 2024-02-18 ASSESSMENT — PATIENT HEALTH QUESTIONNAIRE - PHQ9
SUM OF ALL RESPONSES TO PHQ QUESTIONS 1-9: 12
SUM OF ALL RESPONSES TO PHQ9 QUESTIONS 1 & 2: 3
SUM OF ALL RESPONSES TO PHQ QUESTIONS 1-9: 12
4. FEELING TIRED OR HAVING LITTLE ENERGY: 2
9. THOUGHTS THAT YOU WOULD BE BETTER OFF DEAD, OR OF HURTING YOURSELF: 0
10. IF YOU CHECKED OFF ANY PROBLEMS, HOW DIFFICULT HAVE THESE PROBLEMS MADE IT FOR YOU TO DO YOUR WORK, TAKE CARE OF THINGS AT HOME, OR GET ALONG WITH OTHER PEOPLE: 2
3. TROUBLE FALLING OR STAYING ASLEEP: 3
2. FEELING DOWN, DEPRESSED OR HOPELESS: 1
SUM OF ALL RESPONSES TO PHQ QUESTIONS 1-9: 12
7. TROUBLE CONCENTRATING ON THINGS, SUCH AS READING THE NEWSPAPER OR WATCHING TELEVISION: 1
8. MOVING OR SPEAKING SO SLOWLY THAT OTHER PEOPLE COULD HAVE NOTICED. OR THE OPPOSITE, BEING SO FIGETY OR RESTLESS THAT YOU HAVE BEEN MOVING AROUND A LOT MORE THAN USUAL: 1
SUM OF ALL RESPONSES TO PHQ QUESTIONS 1-9: 12
5. POOR APPETITE OR OVEREATING: 1
1. LITTLE INTEREST OR PLEASURE IN DOING THINGS: 2
6. FEELING BAD ABOUT YOURSELF - OR THAT YOU ARE A FAILURE OR HAVE LET YOURSELF OR YOUR FAMILY DOWN: 1

## 2024-02-18 ASSESSMENT — SLEEP AND FATIGUE QUESTIONNAIRES
DO YOU USE A SLEEP AID: YES
AVERAGE NUMBER OF SLEEP HOURS: 8
DO YOU HAVE DIFFICULTY SLEEPING: YES
SLEEP PATTERN: DIFFICULTY FALLING ASLEEP;DISTURBED/INTERRUPTED SLEEP;RESTLESSNESS

## 2024-02-18 ASSESSMENT — PAIN DESCRIPTION - DESCRIPTORS
DESCRIPTORS: DISCOMFORT
DESCRIPTORS: NAGGING;THROBBING

## 2024-02-18 ASSESSMENT — PAIN DESCRIPTION - ORIENTATION
ORIENTATION: RIGHT
ORIENTATION: RIGHT

## 2024-02-18 ASSESSMENT — PAIN - FUNCTIONAL ASSESSMENT: PAIN_FUNCTIONAL_ASSESSMENT: ACTIVITIES ARE NOT PREVENTED

## 2024-02-18 ASSESSMENT — PAIN SCALES - GENERAL
PAINLEVEL_OUTOF10: 10
PAINLEVEL_OUTOF10: 0
PAINLEVEL_OUTOF10: 8
PAINLEVEL_OUTOF10: 0

## 2024-02-18 ASSESSMENT — PAIN DESCRIPTION - LOCATION
LOCATION: HIP
LOCATION: HIP

## 2024-02-18 NOTE — H&P
Multiplanar reformatted images are provided for review. Automated exposure control, iterative reconstruction, and/or weight based adjustment of the mA/kV was utilized to reduce the radiation dose to as low as reasonably achievable. COMPARISON: November 7, 2023 HISTORY: ORDERING SYSTEM PROVIDED HISTORY: fall TECHNOLOGIST PROVIDED HISTORY: Reason for exam:->fall Decision Support Exception - unselect if not a suspected or confirmed emergency medical condition->Emergency Medical Condition (MA) What reading provider will be dictating this exam?->CRC FINDINGS: BONES/ALIGNMENT: There is no acute fracture or traumatic malalignment. DEGENERATIVE CHANGES: No significant degenerative changes. SOFT TISSUES: There is no prevertebral soft tissue swelling.     No acute abnormality of the cervical spine. RECOMMENDATIONS: Careful clinical correlation and follow up recommended.     XR HIP 2-3 VW W PELVIS RIGHT    Result Date: 2/16/2024  EXAMINATION: ONE XRAY VIEW OF THE PELVIS AND TWO XRAY VIEWS RIGHT HIP 2/16/2024 2:20 am COMPARISON: October 7, 2023 HISTORY: ORDERING SYSTEM PROVIDED HISTORY: fall pain TECHNOLOGIST PROVIDED HISTORY: Reason for exam:->fall pain What reading provider will be dictating this exam?->CRC FINDINGS: Osteopenia.  No fracture, dislocation or osseous lesion.  Moderate bilateral hip joint degenerative change.  Soft tissues unremarkable.     1. No acute fracture or dislocation of the right hip. 2. Osteopenia. RECOMMENDATION: Careful clinical correlation and follow up recommended.     XR CHEST PORTABLE    Result Date: 2/16/2024  EXAMINATION: ONE XRAY VIEW OF THE CHEST 2/16/2024 2:20 am COMPARISON: November 14, 2023 HISTORY: ORDERING SYSTEM PROVIDED HISTORY: fall TECHNOLOGIST PROVIDED HISTORY: Reason for exam:->fall What reading provider will be dictating this exam?->CRC FINDINGS: Normal cardiomediastinal silhouette.  Lungs clear.  No pneumothorax or effusion. Body wall soft tissues unremarkable. Osseous thorax

## 2024-02-18 NOTE — PLAN OF CARE
Patient denies SI/HI/Hallucinations. Patient complaining of right hip pain and stating that her hip replacement recently got rescheduled and it sent her into a great depression. Patient requesting lidocaine patch for right hip. No active distress noted, respirations even and unlabored. Will continue to monitor and will intervene as needed with close 15 minute rounds.       Problem: Risk for Elopement  Goal: Patient will not exit the unit/facility without proper excort  2/18/2024 0849 by Gia Gardner, RN  Outcome: Progressing     Problem: Self Harm/Suicidality  Goal: Will have no self-injury during hospital stay  Description: INTERVENTIONS:  1.  Ensure constant observer at bedside with Q15M safety checks  2.  Maintain a safe environment  3.  Secure patient belongings  4.  Ensure family/visitors adhere to safety recommendations  5.  Ensure safety tray has been added to patient's diet order  6.  Every shift and PRN: Re-assess suicidal risk via Frequent Screener    2/18/2024 0849 by Gia Gardner, RN  Outcome: Progressing

## 2024-02-18 NOTE — ED NOTES
The pt was accepted to [address] [place] [address].  Disposition called to [place] in admitting.                                                                                                                        yes...

## 2024-02-18 NOTE — GROUP NOTE
Group Therapy Note    Date: 2/18/2024    Group Start Time: 1415  Group End Time: 1445  Group Topic: Cognitive Skills    [facility]    Sunshine Shine MSW, QUYEN        Group Therapy Note    Attendees: 11       Patient's Goal:  Pt will be able to verbalize understanding regarding the importance of self-care.    Notes:  Pt made connections and participated in group.    Status After Intervention:  Improved    Participation Level: Active Listener and Interactive    Participation Quality: Appropriate, Attentive, Sharing, and Supportive      Speech:  normal      Thought Process/Content: Logical  Linear      Affective Functioning: Congruent      Mood: euthymic      Level of consciousness:  Alert, Oriented x4, and Attentive      Response to Learning: Able to verbalize current knowledge/experience, Able to verbalize/acknowledge new learning, Able to retain information, and Capable of insight      Endings: None Reported    Modes of Intervention: Education, Support, Socialization, Exploration, and Clarifying      Discipline Responsible: /Counselor      Signature:  CLARE Lazar, QUYEN

## 2024-02-18 NOTE — PLAN OF CARE
Patient is resting quietly in bed with eyes closed at this time.  No signs of distress or discomfort noted.  No PRN medications given thus far.  Safety needs met.  No unit problems reported.  Purposeful rounding continued.    Problem: Risk for Elopement  Goal: Patient will not exit the unit/facility without proper excort  Outcome: Progressing  Flowsheets (Taken 2/17/2024 1607 by Rekha Burciaga, RN)  Nursing Interventions for Elopement Risk: (pt to section G)   Assist with personal care needs such as toileting, eating, dressing, as needed to reduce the risk of wandering   Communicate/escalate to charge nurse the risk of elopement   Place patient in room far away from exits and stairways   Reduce environmental triggers   Shoes and clothing collected and placed in gown attire     Problem: Self Harm/Suicidality  Goal: Will have no self-injury during hospital stay  Description: INTERVENTIONS:  1.  Ensure constant observer at bedside with Q15M safety checks  2.  Maintain a safe environment  3.  Secure patient belongings  4.  Ensure family/visitors adhere to safety recommendations  5.  Ensure safety tray has been added to patient's diet order  6.  Every shift and PRN: Re-assess suicidal risk via Frequent Screener    Outcome: Progressing     Problem: Depression  Goal: Will be euthymic at discharge  Description: INTERVENTIONS:  1. Administer medication as ordered  2. Provide emotional support via 1:1 interaction with staff  3. Encourage involvement in milieu/groups/activities  4. Monitor for social isolation  Outcome: Progressing     Problem: Behavior  Goal: Pt/Family maintain appropriate behavior and adhere to behavioral management agreement, if implemented  Description: INTERVENTIONS:  1. Assess patient/family's coping skills and  non-compliant behavior (including use of illegal substances)  2. Notify security of behavior or suspected illegal substances which indicate the need for search of the family and/or

## 2024-02-18 NOTE — GROUP NOTE
Group Therapy Note    Date: 2/18/2024    Group Start Time: 1030  Group End Time: 1050  Group Topic: Community Meeting    [facility] 2    Monalisa Duff CTRS    Group Therapy Note    Attendees: 11    Date: 2/18/2024  Start Time: 1030  End Time:  1050  Number of Participants: 11    Type of Group: Community Meeting    Was updated on expectations of the unit, staffing, and programming.  Patient shared goal for today as \"Get some rest.\"    Status After Intervention:  Improved    Participation Level: Active Listener and Interactive    Participation Quality: Appropriate, Attentive, Sharing, and Supportive      Speech:  normal      Thought Process/Content: Logical  Linear      Affective Functioning: Congruent      Mood:  Appropriate      Level of consciousness:  Alert and Attentive      Response to Learning: Able to verbalize current knowledge/experience, Able to verbalize/acknowledge new learning, Able to retain information, Capable of insight, Able to change behavior, and Progressing to goal      Endings: None Reported    Modes of Intervention: Education, Support, Socialization, Exploration, Clarifying, and Problem-solving      Discipline Responsible: Psychoeducational Specialist      Signature:  KT Mckee

## 2024-02-18 NOTE — GROUP NOTE
Group Therapy Note    Date: 2/18/2024    Group Start Time: 1050  Group End Time: 1120  Group Topic: Psychoeducation    [facility] 2    Monalisa Duff CTRS    Group Therapy Note    Attendees: 13    Date: 2/18/2024  Start Time: 1050  End Time:  1120  Number of Participants: 13    Type of Group: Psychoeducation    Name:  Values Clarification    Patient's Goal:  ID how values change and ID personal values.    Notes:  CTRS lead educational group discussion on values. Encouraged patients to share their experiences. Patient was actively engaged in group.    Status After Intervention:  Improved    Participation Level: Active Listener and Interactive    Participation Quality: Appropriate, Attentive, Sharing, and Supportive      Speech:  normal      Thought Process/Content: Logical  Linear      Affective Functioning: Congruent      Mood:  Appropriate      Level of consciousness:  Alert and Attentive      Response to Learning: Able to verbalize current knowledge/experience, Able to verbalize/acknowledge new learning, Able to retain information, Capable of insight, Able to change behavior, and Progressing to goal      Endings: None Reported    Modes of Intervention: Education, Support, Socialization, Exploration, Clarifying, and Problem-solving      Discipline Responsible: Psychoeducational Specialist      Signature:  KT Mckee

## 2024-02-19 LAB
CHOLEST SERPL-MCNC: 177 MG/DL
HBA1C MFR BLD: 5.3 % (ref 4–5.6)
HDLC SERPL-MCNC: 75 MG/DL
LDLC SERPL CALC-MCNC: 85 MG/DL
TRIGL SERPL-MCNC: 87 MG/DL
VLDLC SERPL CALC-MCNC: 17 MG/DL

## 2024-02-19 PROCEDURE — 6370000000 HC RX 637 (ALT 250 FOR IP): Performed by: PSYCHIATRY & NEUROLOGY

## 2024-02-19 PROCEDURE — 1240000000 HC EMOTIONAL WELLNESS R&B

## 2024-02-19 PROCEDURE — 6370000000 HC RX 637 (ALT 250 FOR IP): Performed by: NURSE PRACTITIONER

## 2024-02-19 PROCEDURE — 6370000000 HC RX 637 (ALT 250 FOR IP): Performed by: INTERNAL MEDICINE

## 2024-02-19 PROCEDURE — 99232 SBSQ HOSP IP/OBS MODERATE 35: CPT | Performed by: NURSE PRACTITIONER

## 2024-02-19 PROCEDURE — 36415 COLL VENOUS BLD VENIPUNCTURE: CPT

## 2024-02-19 PROCEDURE — 80061 LIPID PANEL: CPT

## 2024-02-19 PROCEDURE — 83036 HEMOGLOBIN GLYCOSYLATED A1C: CPT

## 2024-02-19 RX ORDER — GABAPENTIN 600 MG/1
600 TABLET ORAL DAILY
Status: ON HOLD | COMMUNITY
End: 2024-02-20 | Stop reason: HOSPADM

## 2024-02-19 RX ADMIN — MULTIVITAMIN TABLET 1 TABLET: TABLET at 09:54

## 2024-02-19 RX ADMIN — HYDROXYZINE PAMOATE 50 MG: 50 CAPSULE ORAL at 20:49

## 2024-02-19 RX ADMIN — MELATONIN 3 MG ORAL TABLET 3 MG: 3 TABLET ORAL at 20:49

## 2024-02-19 RX ADMIN — CARBAMAZEPINE 200 MG: 200 TABLET ORAL at 16:44

## 2024-02-19 RX ADMIN — CARBAMAZEPINE 200 MG: 200 TABLET ORAL at 09:53

## 2024-02-19 RX ADMIN — AMLODIPINE BESYLATE 10 MG: 10 TABLET ORAL at 09:53

## 2024-02-19 RX ADMIN — ACETAMINOPHEN 650 MG: 325 TABLET ORAL at 09:52

## 2024-02-19 RX ADMIN — ACETAMINOPHEN 650 MG: 325 TABLET ORAL at 03:53

## 2024-02-19 RX ADMIN — ACETAMINOPHEN 650 MG: 325 TABLET ORAL at 20:49

## 2024-02-19 RX ADMIN — Medication 100 MG: at 09:54

## 2024-02-19 ASSESSMENT — PAIN SCALES - GENERAL
PAINLEVEL_OUTOF10: 0
PAINLEVEL_OUTOF10: 9
PAINLEVEL_OUTOF10: 8
PAINLEVEL_OUTOF10: 8
PAINLEVEL_OUTOF10: 7

## 2024-02-19 ASSESSMENT — PAIN - FUNCTIONAL ASSESSMENT
PAIN_FUNCTIONAL_ASSESSMENT: ACTIVITIES ARE NOT PREVENTED

## 2024-02-19 ASSESSMENT — PAIN DESCRIPTION - LOCATION
LOCATION: HIP

## 2024-02-19 ASSESSMENT — PAIN DESCRIPTION - DESCRIPTORS
DESCRIPTORS: PINS AND NEEDLES;THROBBING
DESCRIPTORS: ACHING;DISCOMFORT;DULL;SORE
DESCRIPTORS: THROBBING

## 2024-02-19 ASSESSMENT — PAIN DESCRIPTION - ORIENTATION
ORIENTATION: RIGHT
ORIENTATION: RIGHT
ORIENTATION: RIGHT;LEFT

## 2024-02-19 NOTE — GROUP NOTE
Group Therapy Note    Date: 2/19/2024    Group Start Time: 1430  Group End Time: 1500  Group Topic: Recreational    [facility] 2    Monalisa Duff CTRS    Group Therapy Note    Attendees: 11    Date: 2/19/2024  Start Time: 1430  End Time:  1500  Number of Participants: 10    Type of Group: Recreational    Name:  Fact or Fiction    Patient's Goal:  Increased awareness of mental health topics.    Notes:  CTRS provided patients with 1-2 step instructions for game and a fact/fiction card. CTRS read statements and patients used their cards to answer if statement is fact or fiction. Encouraged patients to share their experiences and CTRS facilitated positive group discussion. Patient responded appropriately to questions.    Status After Intervention:  Improved    Participation Level: Active Listener and Interactive    Participation Quality: Appropriate, Attentive, Sharing, and Supportive      Speech:  normal      Thought Process/Content: Logical  Linear      Affective Functioning: Congruent      Mood:  Appropriate      Level of consciousness:  Alert and Attentive      Response to Learning: Able to verbalize current knowledge/experience, Able to verbalize/acknowledge new learning, Able to retain information, Capable of insight, and Progressing to goal      Endings: None Reported    Modes of Intervention: Education, Support, Socialization, Exploration, Clarifying, and Activity      Discipline Responsible: Psychoeducational Specialist      Signature:  KT Mckee

## 2024-02-19 NOTE — PLAN OF CARE
Patient is alert and oriented x 4. No noted signs or symptoms of distress.   Denies SI/HI, A/V/H, or thoughts of self harm when asked.    Rates Anxiety at 0/10 and Depression at 0/10.     Medication compliant.     Pleasant, polite, and cooperative.  Flat Affect, brightens with conversation.    Will continue to monitor for safety q 15 minute safety rounds and environmental assessments.       Problem: Risk for Elopement  Goal: Patient will not exit the unit/facility without proper excort  2/18/2024 2217 by Sunshine Weiss, RN  Outcome: Progressing     Problem: Self Harm/Suicidality  Goal: Will have no self-injury during hospital stay  Description: INTERVENTIONS:  1.  Ensure constant observer at bedside with Q15M safety checks  2.  Maintain a safe environment  3.  Secure patient belongings  4.  Ensure family/visitors adhere to safety recommendations  5.  Ensure safety tray has been added to patient's diet order  6.  Every shift and PRN: Re-assess suicidal risk via Frequent Screener    2/18/2024 2217 by Sunshine Weiss, RN  Outcome: Progressing     Problem: Depression  Goal: Will be euthymic at discharge  Description: INTERVENTIONS:  1. Administer medication as ordered  2. Provide emotional support via 1:1 interaction with staff  3. Encourage involvement in milieu/groups/activities  4. Monitor for social isolation  Outcome: Progressing

## 2024-02-19 NOTE — PLAN OF CARE
Patient denies SI/HI/Hallucinations. Patient in control of behaviors. Medication compliant. Patient complaining of right hip pain and wants to reschedule hip replacement. Will continue to monitor and will intervene as needed.       Problem: Risk for Elopement  Goal: Patient will not exit the unit/facility without proper excort  Outcome: Progressing     Problem: Self Harm/Suicidality  Goal: Will have no self-injury during hospital stay  Description: INTERVENTIONS:  1.  Ensure constant observer at bedside with Q15M safety checks  2.  Maintain a safe environment  3.  Secure patient belongings  4.  Ensure family/visitors adhere to safety recommendations  5.  Ensure safety tray has been added to patient's diet order  6.  Every shift and PRN: Re-assess suicidal risk via Frequent Screener    Outcome: Progressing     Problem: Depression  Goal: Will be euthymic at discharge  Description: INTERVENTIONS:  1. Administer medication as ordered  2. Provide emotional support via 1:1 interaction with staff  3. Encourage involvement in milieu/groups/activities  4. Monitor for social isolation  Outcome: Progressing

## 2024-02-19 NOTE — DISCHARGE INSTRUCTIONS
Follow up for Tobacco Cessation at:    [facility]                                 Date:  Friday 2/23 at 10am              [address]    [place], [place] [address]   (Inside [facility]    take B elevators to 7th floor)   Phone: [phone]   Fax: [phone]    [facility]  [address] [place] [address]   Phone: [phone]   Fax: [phone]    [facility]  [address] [place] [address]   Phone: [phone]  Fax: [phone]     [facility]  [address], [place] [address]   Phone: [phone]   Fax: [phone]    [facility]  [address], [place] [address]   Phone: [phone]   Fax: [phone]    [facility]  [address] , [place] [address]   Phone: [phone]   Fax: [phone]    [facility]  [address] [place], [place] [address]   Phone: [phone]   Fax: [phone]    [facility]  [address] [place] [address]   Phone: [phone]  Fax: [phone]     [facility]  [address], [place] [address]   Phone: [phone]   Fax: [phone]    [facility]  [address], [place] [address]   Phone: [phone]   Fax: [phone]    [facility]  [address] [place] [address]   Phone: [phone]   Fax: [phone]    [facility]  [address], [place] [address]   Phone: [phone]   Fax: [phone]    [facility]  [address] [place] [address]   Phone: [phone]   Fax: [phone]    [facility]  [address] [place] [address]   Phone: [phone]   Fax:[phone]     [facility]  [address] [place] [address]   Phone: [phone]   Fax: [phone]     [facility] [address] [place] [address]   Phone: [phone]   Fax:

## 2024-02-19 NOTE — CONSULTS
HPI: This is 43-year-old female admitted under behavioral service due to substance abuse.  She have past medical history of hypertension used to take amlodipine 10 mg p.o. daily.  Her blood pressure found to be elevated and patient was not taking any medication, hospitalist consult called for hypertension.  She denies any chest pain, shortness of breath or any abdominal pain.    Patient seen and examined at the bedside.    On examination:  Heart: S1-S2 audible and regular  Lungs: Clear no rhonchi or wheezing.  Neurological: Cranial nerves normal and speech normal.  Extremity: Pulses palpable in both extremities, no edema.    Impression: Hypertension due to not taking medication.  Plan: Continue amlodipine 10 mg p.o. daily, monitor blood pressure.

## 2024-02-20 VITALS
WEIGHT: 140 LBS | SYSTOLIC BLOOD PRESSURE: 118 MMHG | HEART RATE: 85 BPM | HEIGHT: 59 IN | BODY MASS INDEX: 28.22 KG/M2 | DIASTOLIC BLOOD PRESSURE: 70 MMHG | RESPIRATION RATE: 16 BRPM | OXYGEN SATURATION: 98 % | TEMPERATURE: 98.2 F

## 2024-02-20 PROCEDURE — 6370000000 HC RX 637 (ALT 250 FOR IP): Performed by: INTERNAL MEDICINE

## 2024-02-20 PROCEDURE — 6360000002 HC RX W HCPCS: Performed by: PSYCHIATRY & NEUROLOGY

## 2024-02-20 PROCEDURE — 90686 IIV4 VACC NO PRSV 0.5 ML IM: CPT | Performed by: PSYCHIATRY & NEUROLOGY

## 2024-02-20 PROCEDURE — 6370000000 HC RX 637 (ALT 250 FOR IP): Performed by: PSYCHIATRY & NEUROLOGY

## 2024-02-20 PROCEDURE — 99239 HOSP IP/OBS DSCHRG MGMT >30: CPT | Performed by: NURSE PRACTITIONER

## 2024-02-20 PROCEDURE — 6370000000 HC RX 637 (ALT 250 FOR IP): Performed by: NURSE PRACTITIONER

## 2024-02-20 PROCEDURE — G0008 ADMIN INFLUENZA VIRUS VAC: HCPCS | Performed by: PSYCHIATRY & NEUROLOGY

## 2024-02-20 RX ORDER — CARBAMAZEPINE 200 MG/1
200 TABLET ORAL 2 TIMES DAILY WITH MEALS
Qty: 60 TABLET | Refills: 0 | Status: SHIPPED | OUTPATIENT
Start: 2024-02-20 | End: 2024-03-21

## 2024-02-20 RX ORDER — LANOLIN ALCOHOL/MO/W.PET/CERES
100 CREAM (GRAM) TOPICAL DAILY
Qty: 30 TABLET | Refills: 0 | Status: SHIPPED | OUTPATIENT
Start: 2024-02-21 | End: 2024-03-22

## 2024-02-20 RX ORDER — NICOTINE 21 MG/24HR
1 PATCH, TRANSDERMAL 24 HOURS TRANSDERMAL DAILY
Qty: 30 PATCH | Refills: 0
Start: 2024-02-20 | End: 2024-03-21

## 2024-02-20 RX ORDER — MULTIVITAMIN WITH IRON
1 TABLET ORAL DAILY
Qty: 30 TABLET | Refills: 0 | Status: SHIPPED | OUTPATIENT
Start: 2024-02-21 | End: 2024-03-22

## 2024-02-20 RX ORDER — AMLODIPINE BESYLATE 10 MG/1
10 TABLET ORAL EVERY MORNING
Qty: 30 TABLET | Refills: 0 | Status: SHIPPED | OUTPATIENT
Start: 2024-02-20 | End: 2024-03-21

## 2024-02-20 RX ADMIN — AMLODIPINE BESYLATE 10 MG: 10 TABLET ORAL at 09:36

## 2024-02-20 RX ADMIN — Medication 100 MG: at 09:36

## 2024-02-20 RX ADMIN — INFLUENZA VIRUS VACCINE 0.5 ML: 15; 15; 15; 15 SUSPENSION INTRAMUSCULAR at 13:24

## 2024-02-20 RX ADMIN — MULTIVITAMIN TABLET 1 TABLET: TABLET at 09:36

## 2024-02-20 RX ADMIN — CARBAMAZEPINE 200 MG: 200 TABLET ORAL at 09:38

## 2024-02-20 ASSESSMENT — PAIN SCALES - GENERAL: PAINLEVEL_OUTOF10: 0

## 2024-02-20 NOTE — BH NOTE
Pt has received the Flu Vaccine Injection without immediate complications.   Pt has completed the discharge OQ Questionnaire.

## 2024-02-20 NOTE — PLAN OF CARE
Problem: Depression  Goal: Will be euthymic at discharge  Description: INTERVENTIONS:  1. Administer medication as ordered  2. Provide emotional support via 1:1 interaction with staff  3. Encourage involvement in milieu/groups/activities  4. Monitor for social isolation  2/20/2024 0936 by Jeison Boyle, RN  Outcome: Progressing  2/19/2024 2118 by Nan Travis RN  Outcome: Progressing     Problem: Behavior  Goal: Pt/Family maintain appropriate behavior and adhere to behavioral management agreement, if implemented  Description: INTERVENTIONS:  1. Assess patient/family's coping skills and  non-compliant behavior (including use of illegal substances)  2. Notify security of behavior or suspected illegal substances which indicate the need for search of the family and/or belongings  3. Encourage verbalization of thoughts and concerns in a socially appropriate manner  4. Utilize positive, consistent limit setting strategies supporting safety of patient, staff and others  5. Encourage participation in the decision making process about the behavioral management agreement  6. If a visitor's behavior poses a threat to safety call refer to organization policy.  7. Initiate consult with , Psychosocial CNS, Spiritual Care as appropriate  Outcome: Progressing

## 2024-02-20 NOTE — GROUP NOTE
Group Therapy Note    Date: 2/20/2024    Group Start Time: 1045  Group End Time: 1145  Group Topic: Psychotherapy    [facility]    Jose Roberto Maria MSW, LSW        Group Therapy Note    Attendees: 12       Patient's Goal:  To increase social interaction and improve relationships with others.      Notes:  Pt was attentive in group and was able to identify an agenda. They were also able to verbalize relating to others within the group.     Status After Intervention:  Unchanged    Participation Level: Active Listener and Interactive    Participation Quality: Appropriate and Attentive      Speech:  normal      Thought Process/Content: Logical      Affective Functioning: Congruent      Mood: euthymic      Level of consciousness:  Alert and Attentive      Response to Learning: Able to verbalize current knowledge/experience, Able to verbalize/acknowledge new learning, and Able to retain information      Endings: None Reported    Modes of Intervention: Education, Support, Socialization, Exploration, Clarifying, and Problem-solving      Discipline Responsible: /Counselor      Signature:  CLARE Gonzales, QUYEN

## 2024-02-20 NOTE — DISCHARGE SUMMARY
disintegrating tablet  Commonly known as: ZOFRAN-ODT  Take 1 tablet by mouth 3 times daily as needed for Nausea or Vomiting               Where to Get Your Medications        These medications were sent to [facility] [address] - P [phone] - F [phone]  [address], [place] [address]      Phone: [phone]   amLODIPine 10 MG tablet  carBAMazepine 200 MG tablet  multivitamin Tabs tablet  thiamine 100 MG tablet       Information about where to get these medications is not yet available    Ask your nurse or doctor about these medications  nicotine 21 MG/24HR       Patient is counseled if she continues to abuse drugs or alcohol she could act out impulsively causing serious harm to herself or others even though it may be unintentional.  She demonstrated understanding of this and has the capacity understand this    Patient is counseled her mental health treatment will be difficult to optimize with ongoing use of drugs or alcohol she demonstrated understanding of this and has the capacity to understand this     Patient is counseled that if she uses any amount of opiates after any clean time she could have an unintentional overdose she demonstrated understanding standing of this and has  the capacity understand this    Patient is counseled she must remain compliant with all medications outpatient follow-up appointments    Patient is discharged home in stable condition      TIME SPEND - 35 MINUTES TO COMPLETE THE EVALUATION, DISCHARGE SUMMARY, MEDICATION RECONCILIATION AND FOLLOW UP CARE     Signed:  MARK Mirza CNP  2/20/2024  1:35 PM

## 2024-02-20 NOTE — TRANSITION OF CARE
Behavioral Health Transition Record to Provider    Patient Name: Rachele Lomas  YOB: 1980   Medical Record Number: [id number]  Date of Admission: 2/17/2024  8:05 AM   Date of Discharge: 2/20/24       Attending Provider: Reza Gaytan MD   Discharging Provider: Vikki Gaytan  To contact this individual call hospital and ask the  to page.  If unavailable, ask to be transferred to Behavioral Health Provider on call.  A Behavioral Health Provider will be available on call 24/7 and during holidays.    Primary Care Provider: No primary care provider on file.    Allergies   Allergen Reactions    Ketorolac Itching and Nausea And Vomiting    Toradol [Ketorolac Tromethamine] Dizziness or Vertigo    Tomato Itching    Darvocet [Propoxyphene N-Acetaminophen] Swelling and Dizziness or Vertigo     FACIAL SWELLING    Ibuprofen Nausea And Vomiting    Meloxicam Hives    Naproxen Nausea And Vomiting       Reason for Admission:  Rachele Lomas has a significant past history of seizure disorder,  polysubstance abuse and PTSD presented to the ED brought in by EMS after patient is found unresponsive after taking an overdose of Benadryl.  Precipitating factors include substance abuse,  patient was pink slip by the ED physician       Admission Diagnosis: Major depression, single episode [F32.9]  Acute alcoholic intoxication without complication (HCC) [F10.920]  Overdose of undetermined intent, initial encounter [T50.904A]    * No surgery found *    Results for orders placed or performed during the hospital encounter of 02/17/24   CBC with Auto Differential   Result Value Ref Range    WBC 10.2 4.5 - 11.5 k/uL    RBC 5.50 3.50 - 5.50 m/uL    Hemoglobin 14.3 11.5 - 15.5 g/dL    Hematocrit 44.5 34.0 - 48.0 %    MCV 80.9 80.0 - 99.9 fL    MCH 26.0 26.0 - 35.0 pg    MCHC 32.1 32.0 - 34.5 g/dL    RDW 15.2 (H) 11.5 - 15.0 %    Platelets 321 130 - 450 k/uL    MPV 9.2 7.0 - 12.0 fL    Neutrophils % 54 43.0 - 80.0 %

## 2024-02-20 NOTE — BH NOTE
Pt denies suicidal / homicidal ideations.  Pt denies hallucinations.  Pt is cooperative though appears to be discharge focused.   Negative behavioral concerns at this time.   Pt advises that she has bilateral hip pain of varying intensities. Ambulation is not severely compromised. Pt advises this is an ongoing condition and is not from current hospitalization.

## 2024-02-20 NOTE — PROGRESS NOTES
Hospitalist Progress Note      Synopsis: Patient admitted on 2/17/2024        HPI: This is 43-year-old female admitted under behavioral service due to substance abuse.  She have past medical history of hypertension used to take amlodipine 10 mg p.o. daily.  Her blood pressure found to be elevated and patient was not taking any medication, hospitalist consult called for hypertension.  She denies any chest pain, shortness of breath or any abdominal pain.           Subjective  Patient seen and examined chart reviewed discussed with the nursing staff no overnight events reported blood pressure seems to be better controlled today.  Patient has no chest pain shortness of breath she only complains of hip pain and she reports that she is scheduled for outpatient hip surgery.    Exam:  /88   Pulse 76   Temp 97 °F (36.1 °C) (Temporal)   Resp 14   Ht 1.499 m (4' 11\")   Wt 63.5 kg (140 lb)   SpO2 99%   BMI 28.28 kg/m²   -General:  Awake, alert, oriented X 3.  Well developed, well nourished, well groomed.  No apparent distress.    -HEENT:  Normocephalic, atraumatic.  Pupils equal, round, reactive to light.  No scleral icterus.  No conjunctival injection.  Normal lips, teeth, and gums.  No nasal discharge.  Neck:  Supple    -Heart:  RRR, no murmurs, gallops, rubs    -Lungs:  CTA bilaterally, bilat symmetrical expansion, no wheeze, rales, or rhonchi    -Abdomen:  Bowel sounds present, soft, nontender, no masses, no organomegaly, no peritoneal signs    -Extremities: normal ROM. No Cyanosis clubbing or edema    -Skin: Warm and dry    -Neuro:  AAO x 3 .Cranial nerves 2-12 intact, no focal deficits     -Psych : Anxious.    Medications:  Reviewed    Infusion Medications    sodium chloride       Scheduled Medications    lidocaine  1 patch TransDERmal Daily    carBAMazepine  200 mg Oral BID WC    amLODIPine  10 mg Oral Daily    nicotine  1 patch TransDERmal Daily    sodium chloride flush  5-40 mL IntraVENous 2 times per 
 [facility] [place]  Admission Note         Patient is a 43 year female that came from section G in the ED via wheelchair.  Patient currently suicidal ideations, homicidal ideations and AVH.  Patient states that prior to admission, \"I really have no clue, they say I tried to take some pills.\"  Patient denies remembering this and states that she only usus marijuana occasionally and drinks alcohol monthly about 4-5 (24 oz) cans.  Librium protocol ordered, see eMAR.  CIWA 3.  COWS 2.  Patient states recent stressors are \"I was suppose to have a hip replaced yesterday but they rescheduled my surgery.\"  Patient reports feeling down, depressed and loss of interest x3 months.  Patient reports treating previously at [facility] but denies any prior suicide attempts and/or self injurious behaviors.  Patient denies any family history of suicide.  Patient denies any trauma/abuse.  Patient unable to rate sleep stating it's \"hard for me to sleep.\"  Patient reports to taking Rx: Remeron and Trazodone in the past.  Patient reports positive support from family/friends.  Patient reports to living with her step-dad and uncle.  Patient oriented to room and unit.  Consents signed.  Will continue to monitor and support.  Purposeful rounds continued for safety.             Admission Type:   Admission Type: Involuntary    Reason for admission:  Reason for Admission: \"I really have no clue, they say I tried to take some pills.\"      Addictive Behavior:   Addictive Behavior  In the Past 3 Months, Have You Felt or Has Someone Told You That You Have a Problem With  : None    Medical Problems:   Past Medical History:   Diagnosis Date    Asthma     Bronchitis     Movement disorder     Seizure disorder (HCC)     Seizure disorder (HCC)     Suicidal overdose (HCC) 6/27/2014    Tobacco abuse     Tobacco abuse        Status EXAM:  Mental Status and Behavioral Exam  Normal: No  Level of Assistance: Independent/Self  Facial Expression: Flat, 
4 Eyes Skin Assessment     NAME:  Rachele Lomas  YOB: 1980  MEDICAL RECORD NUMBER:  [id number]    The patient is being assessed for  Admission    I agree that at least one RN has performed a thorough Head to Toe Skin Assessment on the patient. ALL assessment sites listed below have been assessed.      Areas assessed by both nurses:    Head, Face, Ears, Shoulders, Back, Chest, Arms, Elbows, Hands, Sacrum. Buttock, Coccyx, Ischium, and Legs. Feet and Heels        Does the Patient have a Wound? No noted wound(s)       Familia Prevention initiated by RN: Yes  Wound Care Orders initiated by RN: No    Pressure Injury (Stage 3,4, Unstageable, DTI, NWPT, and Complex wounds) if present, place Wound referral order by RN under : No    New Ostomies, if present place, Ostomy referral order under : No     Nurse 1 eSignature: Electronically signed by Velvet De Guzman RN on 2/17/24 at 11:50 PM EST    **SHARE this note so that the co-signing nurse can place an eSignature**    Nurse 2 eSignature: {Esignature:[id number]}  
CLINICAL PHARMACY NOTE: MEDS TO BEDS    Total # of Prescriptions Filled: 4   The following medications were delivered to the patient:  Thiamine 100 mg  Amlodipine 10 mg  Therems tabs  Carbamazepine 200 mg    Additional Documentation:     Delivered meds to anna gomez on the unit  
Leisure assessment completed.  
No changes since previous assessment. Patient denies SI/HI/Hallucinations. Patient in control of behaviors. Will continue to monitor and will intervene as needed.   
No changes to previous assessment. Patient denies SI/HI/Hallucinations. Patient in control of behaviors. No active distress noted. Will continue to monitor and will intervene as needed with close 15 minute rounds.   
Patient declined invitation to the following groups:    Community Meeting  Education    Patient will continue to be provided with opportunities to enhance leisure skills/interests and/or coping mechanisms.  
Patient declined verbal invitation to student nursing evening group- music and mood.  Patient will continue to be provided with opportunities to enhance leisure skills/interests and/or coping mechanisms.   
Patient denies suicidal ideation, homicidal ideations and AVH.  Presents calm and cooperative during assessment. Pt is tearful stating her hip hurts and she frustrated she can not get anything stronger than tylenol. Pt informed of pending order for Gabapentin and a muscle relaxer.  Patient is isolative to room.  Medications taken without issue.  No other complaints or concerns verbalized at this time.  No unit problems reported.  Will continue to observe and support.  
Patient resting with eyes closed. Respirations even and unlabored. No signs of distress. Purposeful rounding continued.  
Spoke with Bogdan at [facility].    Rx: Zanaflex 4 mg Q8 hrs #90 last filled 1/30/24  Gabapentin 600 mg Q AM #30 last filed 1/24/24  Bentyl 20 mg Q6 hrs #120 last filled 1/24/24  Tramadol 50 mg Q8 hrs #28 last filled 1/9/24  Norvasc 10 mg QD #30 last filled 10/31/23  Norvasc 5 mg QD #30 last filled 10/31/23      
patient.   Risks, benefits, side effects, drug-to-drug interactions and alternatives to treatment were discussed.  Collateral information:   CD evaluation  Encourage patient to attend group and other milieu activities.  Discharge planning discussed with the patient and treatment team.    Continue Tegretol 200 mg twice daily for mood and seizures    PSYCHOTHERAPY/COUNSELING:  [x] Therapeutic interview  [x] Supportive  [] CBT  [] Ongoing  [] Other    [x] Patient continues to need, on a daily basis, active treatment furnished directly by or requiring the supervision of inpatient psychiatric personnel      Anticipated Length of stay: 3 to 7 days based on stability            Electronically signed by MARK Mirza CNP on 2/19/2024 at 11:55 AM

## 2024-02-20 NOTE — GROUP NOTE
Group Therapy Note    Date: 2/20/2024    Group Start Time: 0930  Group End Time: 1000  Group Topic: Community Meeting    [facility] 1    Carine Hitchcock, CTRS    Date: 2/20/2024      Type of Group: Psychoeducation    Wellness Binder Information  Module Name:  key coping skills     Patient's Goal:  Patient will be able to identify 6 different types of coping skills used to manage stressors.     Notes:  pleasant and sharing when prompted. Able to contribute in positive manner.     Status After Intervention:  Improved    Participation Level: Active Listener and Interactive    Participation Quality: Appropriate, Attentive, and Sharing      Speech:  normal      Thought Process/Content: Logical      Affective Functioning: Congruent      Mood: euthymic      Level of consciousness:  Alert, Oriented x4, and Attentive      Response to Learning: Able to verbalize/acknowledge new learning, Able to retain information, and Progressing to goal      Endings: None Reported    Modes of Intervention: Education, Support, Socialization, and Problem-solving      Discipline Responsible: Psychoeducational Specialist      Signature:  Carine Hitchcock CTRS

## 2024-02-20 NOTE — PLAN OF CARE
Problem: Depression  Goal: Will be euthymic at discharge  Description: INTERVENTIONS:  1. Administer medication as ordered  2. Provide emotional support via 1:1 interaction with staff  3. Encourage involvement in milieu/groups/activities  4. Monitor for social isolation  2/19/2024 2118 by Nan Travis, RN  Outcome: Progressing     Problem: Anxiety  Goal: Will report anxiety at manageable levels  Description: INTERVENTIONS:  1. Administer medication as ordered  2. Teach and rehearse alternative coping skills  3. Provide emotional support with 1:1 interaction with staff  2/19/2024 2118 by Nan Travis, RN  Outcome: Progressing

## 2024-02-20 NOTE — BH NOTE
[facility] [place]  Discharge Note    Pt discharged with followings belongings:   Dental Appliances: None  Vision - Corrective Lenses: None  Hearing Aid: None  Jewelry: None  Body Piercings Removed: N/A  Clothing: Jacket/Coat, Shirt, Undergarments, Pants, Footwear, Other (Comment), At bedside (hoodie, shirt, bra, leggings, socks, boots)  Other Valuables: Wig, Purse, Other (Comment), Lighter/Matches, Cigarettes (Purse with vape, loose cigarettes, lighter, gift card, cell phone, . Wig)   Valuables sent home with pt or returned to patient. Patient educated on aftercare instructions: yes.  .Patient verbalize understanding of AVS:  yes..    Status EXAM upon discharge:  Mental Status and Behavioral Exam  Normal: No  Level of Assistance: Independent/Self  Facial Expression: Brightened  Affect: Congruent  Level of Consciousness: Alert  Frequency of Checks: 4 times per hour, close  Mood:Normal: Yes  Mood: Depressed, Anxious  Motor Activity:Normal: Yes  Motor Activity: Decreased  Eye Contact: Good  Observed Behavior: Cooperative  Sexual Misconduct History: Current - no  Preception: [place] to person, [place] to time, [place] to place, [place] to situation  Attention:Normal: No  Attention: Distractible  Thought Processes: Circumstantial  Thought Content:Normal: No  Thought Content: Preoccupations  Depression Symptoms: Isolative  Anxiety Symptoms: No problems reported or observed.  Taryn Symptoms: No problems reported or observed.  Hallucinations: None  Delusions: No  Memory:Normal: Yes  Memory: Poor recent, Poor remote  Insight and Judgment: No  Insight and Judgment: Poor insight, Poor judgment    Tobacco Screening:  Practical Counseling, on admission, kanchan X, if applicable and completed (first 3 are required if patient doesn't refuse):            ( ) Recognizing danger situations (included triggers and roadblocks)                    ( ) Coping skills (new ways to manage stress,relaxation techniques, changing

## 2024-02-21 NOTE — CARE COORDINATION
JAILYN contacted pt for post follow up post discharge phone call. Pt confirmed their follow up appointment. Pt did not have any further questions or concerns at this time.  
JAILYN contacted pt sister Tatiana [phone] (ILSA signed) and notified her of pt's discharge for today. Tatiana has no concerns for the pt taking the bus home. Tatiana had questions about the pt's medications, no additional questions for SW. JAILYN transferred the call to the nurses station.    In order to ensure appropriate transition and discharge planning is in place, the following documents have been transmitted to [facility], as the new outpatient provider:    The d/c diagnosis was transmitted to the next care provider  The reason for hospitalization was transmitted to the next care provider  The d/c medications (dosage and indication) were transmitted to the next care provider   The continuing care plan was transmitted to the next care provider  
JAILYN met with the pt. Pt reports feeling good today, denied SI/HI/AVH. Pt expressed feeling better than when she came in and feels ready to be discharged. Pt plans to return home with her step father and will take the bus at time of discharge. Pt will continue to treat with [facility]. Pt does not want any outpatient/inpatient substance use treatment. SW placed outpatient/inpatient substance use resources in her discharge paperwork for future reference. Collateral was gained from pt's sister who confirmed the pt will return home with her step father and she does not have access to any guns or weapons. Pt cooperative, bright, pleasant, with good eye contact, clear speech, improved insight/judgement.   
Pt was seen during treatment team. Pt states that she is doing good and feeling better today. She states that she lives with her step father and plans to return. Pt states that she does not plan to visit her friend in the future as it only led to negative consequences. She states that she was not trying to kill herself and that she is happy to be alive. Pt denied SI/HI/AVH.     SW contacted pt sister Tatiana [phone] (ILSA signed) to gain collateral. She states that she has concerns for pt, that she has been worried about her due to her using drugs \"really bad\". She states that she has been trying to get pt some help as the \"crack is taking her life away\". She states that pt went to [facility] for inpatient tx, but then walked out. She states that she is constantly making up excuses as to why she needs to discharge home, such as her hip hurting. She states that pt hip will not be much better at home as she will be sleeping on a couch.     She states that her main concern is pt substance use. She states that she has been talking with pt and she sounds ok, but she is just \"making up excuses\" so that she is able to come home, is not help seeking and wants to use drugs again.    She is understanding that pt is refusing treatment at this time and substance abuse treatment after discharge is up to pt to decide.     She states that pt does live with her step father and is able to return. Pt is supposed to have surgery, but she is unsure if they will do pt surgery due to her substance use. She states that her daughter will transport at discharge. She denied pt access to guns/weapons.     She would like a call when pt is being discharged.   
SW contacted pt sister Tatiana [phone] (ILSA signed) to gain collateral. Phone goes straight to voicemail, SW left voicemail.     SW contacted [facility]  to schedule appointment for pt. Pt has appointment 2/22 at 10am.   
months)  [x] Reports [] Denies     Trauma History  [] Reports [x] Denies     Self Injurious/Self Mutilation Behaviors:   [] Reports:    [] Past [] Present   [x] Denies    Legal History:  []  Yes (Specify)    [x] No    Collateral Contact (ILSA signed)  Name: Tatiana   Relationship: Sister  Number: [phone]    Collateral Information: Collateral needed.     Access to Weapons per Collateral Contact: [] Reports [] Denies     After consideration of C-SSRS screening results, C-SSRS assessments, and this professional's assessment the patient's overall suicide risk assessed to be:  [] None   [] Low   [x] Moderate   [] High     [x] Discussed current suicide risk, protective and risk factors with RN and NP/Psychiatrist.    Discharge Plan:  [x] Home: with step-father  [] Shelter:  [] Crisis Unit:  [] Substance Abuse Rehab:  [] Nursing Facility:  [] Other (Specify):    Follow up Provider:  [facility]

## 2024-03-04 ENCOUNTER — APPOINTMENT (OUTPATIENT)
Dept: CT IMAGING | Age: 44
End: 2024-03-04
Payer: MEDICAID

## 2024-03-04 ENCOUNTER — APPOINTMENT (OUTPATIENT)
Dept: GENERAL RADIOLOGY | Age: 44
End: 2024-03-04
Payer: MEDICAID

## 2024-03-04 ENCOUNTER — HOSPITAL ENCOUNTER (INPATIENT)
Age: 44
LOS: 3 days | Discharge: HOME | End: 2024-03-07
Attending: EMERGENCY MEDICINE | Admitting: PSYCHIATRY & NEUROLOGY
Payer: MEDICAID

## 2024-03-04 DIAGNOSIS — F19.10 SUBSTANCE ABUSE (HCC): ICD-10-CM

## 2024-03-04 DIAGNOSIS — E87.20 LACTIC ACIDOSIS: ICD-10-CM

## 2024-03-04 DIAGNOSIS — R41.82 ALTERED MENTAL STATUS, UNSPECIFIED ALTERED MENTAL STATUS TYPE: Primary | ICD-10-CM

## 2024-03-04 DIAGNOSIS — R44.3 HALLUCINATIONS: ICD-10-CM

## 2024-03-04 PROBLEM — F32.9 MDD (MAJOR DEPRESSIVE DISORDER), SINGLE EPISODE: Status: ACTIVE | Noted: 2024-03-04

## 2024-03-04 LAB
ALBUMIN SERPL-MCNC: 4.4 G/DL (ref 3.5–5.2)
ALP SERPL-CCNC: 137 U/L (ref 35–104)
ALT SERPL-CCNC: 22 U/L (ref 0–32)
AMPHET UR QL SCN: NEGATIVE
ANION GAP SERPL CALCULATED.3IONS-SCNC: 25 MMOL/L (ref 7–16)
APAP SERPL-MCNC: <5 UG/ML (ref 10–30)
AST SERPL-CCNC: 33 U/L (ref 0–31)
BARBITURATES UR QL SCN: NEGATIVE
BASOPHILS # BLD: 0.03 K/UL (ref 0–0.2)
BASOPHILS NFR BLD: 0 % (ref 0–2)
BENZODIAZ UR QL: NEGATIVE
BILIRUB SERPL-MCNC: 0.3 MG/DL (ref 0–1.2)
BUN SERPL-MCNC: 12 MG/DL (ref 6–20)
BUPRENORPHINE UR QL: NEGATIVE
CALCIUM SERPL-MCNC: 9 MG/DL (ref 8.6–10.2)
CANNABINOIDS UR QL SCN: NEGATIVE
CARBAMAZEPINE DOSE: ABNORMAL MG
CARBAMAZEPINE SERPL-MCNC: <2 UG/ML (ref 4–10)
CHLORIDE SERPL-SCNC: 91 MMOL/L (ref 98–107)
CK SERPL-CCNC: 176 U/L (ref 20–180)
CO2 SERPL-SCNC: 16 MMOL/L (ref 22–29)
COCAINE UR QL SCN: POSITIVE
CREAT SERPL-MCNC: 1 MG/DL (ref 0.5–1)
DATE LAST DOSE: ABNORMAL
EKG ATRIAL RATE: 114 BPM
EKG P AXIS: 58 DEGREES
EKG P-R INTERVAL: 128 MS
EKG Q-T INTERVAL: 356 MS
EKG QRS DURATION: 78 MS
EKG QTC CALCULATION (BAZETT): 490 MS
EKG R AXIS: 89 DEGREES
EKG T AXIS: 20 DEGREES
EKG VENTRICULAR RATE: 114 BPM
EOSINOPHIL # BLD: 0.14 K/UL (ref 0.05–0.5)
EOSINOPHILS RELATIVE PERCENT: 2 % (ref 0–6)
ERYTHROCYTE [DISTWIDTH] IN BLOOD BY AUTOMATED COUNT: 16 % (ref 11.5–15)
ETHANOLAMINE SERPL-MCNC: <10 MG/DL
FENTANYL UR QL: NEGATIVE
GFR SERPL CREATININE-BSD FRML MDRD: >60 ML/MIN/1.73M2
GLUCOSE SERPL-MCNC: 105 MG/DL (ref 74–99)
HCG SERPL QL: NEGATIVE
HCG UR QL: NEGATIVE
HCT VFR BLD AUTO: 42.1 % (ref 34–48)
HGB BLD-MCNC: 13.6 G/DL (ref 11.5–15.5)
IMM GRANULOCYTES # BLD AUTO: 0.06 K/UL (ref 0–0.58)
IMM GRANULOCYTES NFR BLD: 1 % (ref 0–5)
LACTATE BLDV-SCNC: 0.9 MMOL/L (ref 0.5–2.2)
LACTATE BLDV-SCNC: 9 MMOL/L (ref 0.5–2.2)
LYMPHOCYTES NFR BLD: 1.17 K/UL (ref 1.5–4)
LYMPHOCYTES RELATIVE PERCENT: 12 % (ref 20–42)
MAGNESIUM SERPL-MCNC: 2.5 MG/DL (ref 1.6–2.6)
MCH RBC QN AUTO: 26.3 PG (ref 26–35)
MCHC RBC AUTO-ENTMCNC: 32.3 G/DL (ref 32–34.5)
MCV RBC AUTO: 81.4 FL (ref 80–99.9)
METHADONE UR QL: NEGATIVE
MONOCYTES NFR BLD: 0.74 K/UL (ref 0.1–0.95)
MONOCYTES NFR BLD: 8 % (ref 2–12)
NEUTROPHILS NFR BLD: 77 % (ref 43–80)
NEUTS SEG NFR BLD: 7.27 K/UL (ref 1.8–7.3)
OPIATES UR QL SCN: NEGATIVE
OXYCODONE UR QL SCN: NEGATIVE
PCP UR QL SCN: NEGATIVE
PLATELET # BLD AUTO: 242 K/UL (ref 130–450)
PMV BLD AUTO: 9.4 FL (ref 7–12)
POTASSIUM SERPL-SCNC: 3.5 MMOL/L (ref 3.5–5)
PROT SERPL-MCNC: 8.2 G/DL (ref 6.4–8.3)
RBC # BLD AUTO: 5.17 M/UL (ref 3.5–5.5)
SALICYLATES SERPL-MCNC: <0.3 MG/DL (ref 0–30)
SODIUM SERPL-SCNC: 132 MMOL/L (ref 132–146)
TEST INFORMATION: ABNORMAL
TME LAST DOSE: ABNORMAL H
TOXIC TRICYCLIC SC,BLOOD: NEGATIVE
TROPONIN I SERPL HS-MCNC: 7 NG/L (ref 0–9)
TSH SERPL DL<=0.05 MIU/L-ACNC: 1.27 UIU/ML (ref 0.27–4.2)
WBC OTHER # BLD: 9.4 K/UL (ref 4.5–11.5)

## 2024-03-04 PROCEDURE — 93010 ELECTROCARDIOGRAM REPORT: CPT | Performed by: INTERNAL MEDICINE

## 2024-03-04 PROCEDURE — 6360000002 HC RX W HCPCS: Performed by: EMERGENCY MEDICINE

## 2024-03-04 PROCEDURE — 1240000000 HC EMOTIONAL WELLNESS R&B

## 2024-03-04 PROCEDURE — 84703 CHORIONIC GONADOTROPIN ASSAY: CPT

## 2024-03-04 PROCEDURE — 71045 X-RAY EXAM CHEST 1 VIEW: CPT

## 2024-03-04 PROCEDURE — 80179 DRUG ASSAY SALICYLATE: CPT

## 2024-03-04 PROCEDURE — 84484 ASSAY OF TROPONIN QUANT: CPT

## 2024-03-04 PROCEDURE — 73130 X-RAY EXAM OF HAND: CPT

## 2024-03-04 PROCEDURE — 80307 DRUG TEST PRSMV CHEM ANLYZR: CPT

## 2024-03-04 PROCEDURE — 96374 THER/PROPH/DIAG INJ IV PUSH: CPT

## 2024-03-04 PROCEDURE — 80143 DRUG ASSAY ACETAMINOPHEN: CPT

## 2024-03-04 PROCEDURE — 72131 CT LUMBAR SPINE W/O DYE: CPT

## 2024-03-04 PROCEDURE — 6370000000 HC RX 637 (ALT 250 FOR IP)

## 2024-03-04 PROCEDURE — 6360000002 HC RX W HCPCS: Performed by: STUDENT IN AN ORGANIZED HEALTH CARE EDUCATION/TRAINING PROGRAM

## 2024-03-04 PROCEDURE — 72125 CT NECK SPINE W/O DYE: CPT

## 2024-03-04 PROCEDURE — 84443 ASSAY THYROID STIM HORMONE: CPT

## 2024-03-04 PROCEDURE — 80156 ASSAY CARBAMAZEPINE TOTAL: CPT

## 2024-03-04 PROCEDURE — 72128 CT CHEST SPINE W/O DYE: CPT

## 2024-03-04 PROCEDURE — 82550 ASSAY OF CK (CPK): CPT

## 2024-03-04 PROCEDURE — 93005 ELECTROCARDIOGRAM TRACING: CPT | Performed by: EMERGENCY MEDICINE

## 2024-03-04 PROCEDURE — 2580000003 HC RX 258: Performed by: EMERGENCY MEDICINE

## 2024-03-04 PROCEDURE — 70450 CT HEAD/BRAIN W/O DYE: CPT

## 2024-03-04 PROCEDURE — 96361 HYDRATE IV INFUSION ADD-ON: CPT

## 2024-03-04 PROCEDURE — 85025 COMPLETE CBC W/AUTO DIFF WBC: CPT

## 2024-03-04 PROCEDURE — G0480 DRUG TEST DEF 1-7 CLASSES: HCPCS

## 2024-03-04 PROCEDURE — 83605 ASSAY OF LACTIC ACID: CPT

## 2024-03-04 PROCEDURE — 99285 EMERGENCY DEPT VISIT HI MDM: CPT

## 2024-03-04 PROCEDURE — 80053 COMPREHEN METABOLIC PANEL: CPT

## 2024-03-04 PROCEDURE — 96372 THER/PROPH/DIAG INJ SC/IM: CPT

## 2024-03-04 PROCEDURE — 6370000000 HC RX 637 (ALT 250 FOR IP): Performed by: STUDENT IN AN ORGANIZED HEALTH CARE EDUCATION/TRAINING PROGRAM

## 2024-03-04 PROCEDURE — 83735 ASSAY OF MAGNESIUM: CPT

## 2024-03-04 RX ORDER — ACETAMINOPHEN 325 MG/1
650 TABLET ORAL ONCE
Status: COMPLETED | OUTPATIENT
Start: 2024-03-04 | End: 2024-03-04

## 2024-03-04 RX ORDER — HALOPERIDOL 5 MG/ML
5 INJECTION INTRAMUSCULAR EVERY 6 HOURS PRN
Status: DISCONTINUED | OUTPATIENT
Start: 2024-03-04 | End: 2024-03-07 | Stop reason: HOSPADM

## 2024-03-04 RX ORDER — HALOPERIDOL 5 MG/1
5 TABLET ORAL EVERY 6 HOURS PRN
Status: DISCONTINUED | OUTPATIENT
Start: 2024-03-04 | End: 2024-03-07 | Stop reason: HOSPADM

## 2024-03-04 RX ORDER — 0.9 % SODIUM CHLORIDE 0.9 %
1000 INTRAVENOUS SOLUTION INTRAVENOUS ONCE
Status: COMPLETED | OUTPATIENT
Start: 2024-03-04 | End: 2024-03-04

## 2024-03-04 RX ORDER — ORPHENADRINE CITRATE 30 MG/ML
30 INJECTION INTRAMUSCULAR; INTRAVENOUS ONCE
Status: COMPLETED | OUTPATIENT
Start: 2024-03-04 | End: 2024-03-04

## 2024-03-04 RX ORDER — NICOTINE 21 MG/24HR
1 PATCH, TRANSDERMAL 24 HOURS TRANSDERMAL DAILY
Status: DISCONTINUED | OUTPATIENT
Start: 2024-03-05 | End: 2024-03-07 | Stop reason: HOSPADM

## 2024-03-04 RX ORDER — MAGNESIUM HYDROXIDE/ALUMINUM HYDROXICE/SIMETHICONE 120; 1200; 1200 MG/30ML; MG/30ML; MG/30ML
30 SUSPENSION ORAL PRN
Status: DISCONTINUED | OUTPATIENT
Start: 2024-03-04 | End: 2024-03-07 | Stop reason: HOSPADM

## 2024-03-04 RX ORDER — LANOLIN ALCOHOL/MO/W.PET/CERES
3 CREAM (GRAM) TOPICAL NIGHTLY PRN
Status: DISCONTINUED | OUTPATIENT
Start: 2024-03-04 | End: 2024-03-07 | Stop reason: HOSPADM

## 2024-03-04 RX ORDER — CYCLOBENZAPRINE HCL 10 MG
10 TABLET ORAL ONCE
Status: COMPLETED | OUTPATIENT
Start: 2024-03-04 | End: 2024-03-04

## 2024-03-04 RX ORDER — CHLORDIAZEPOXIDE HYDROCHLORIDE 25 MG/1
25 CAPSULE, GELATIN COATED ORAL EVERY 6 HOURS PRN
Status: DISCONTINUED | OUTPATIENT
Start: 2024-03-04 | End: 2024-03-07 | Stop reason: HOSPADM

## 2024-03-04 RX ORDER — HYDROXYZINE PAMOATE 50 MG/1
50 CAPSULE ORAL 3 TIMES DAILY PRN
Status: DISCONTINUED | OUTPATIENT
Start: 2024-03-04 | End: 2024-03-07 | Stop reason: HOSPADM

## 2024-03-04 RX ORDER — HYDRALAZINE HYDROCHLORIDE 20 MG/ML
10 INJECTION INTRAMUSCULAR; INTRAVENOUS ONCE
Status: COMPLETED | OUTPATIENT
Start: 2024-03-04 | End: 2024-03-04

## 2024-03-04 RX ADMIN — SODIUM CHLORIDE 1000 ML: 9 INJECTION, SOLUTION INTRAVENOUS at 02:00

## 2024-03-04 RX ADMIN — SODIUM CHLORIDE 1000 ML: 9 INJECTION, SOLUTION INTRAVENOUS at 05:29

## 2024-03-04 RX ADMIN — ACETAMINOPHEN 650 MG: 325 TABLET ORAL at 11:57

## 2024-03-04 RX ADMIN — ORPHENADRINE CITRATE 30 MG: 60 INJECTION INTRAMUSCULAR; INTRAVENOUS at 05:32

## 2024-03-04 RX ADMIN — CYCLOBENZAPRINE 10 MG: 10 TABLET, FILM COATED ORAL at 18:01

## 2024-03-04 RX ADMIN — HYDRALAZINE HYDROCHLORIDE 10 MG: 20 INJECTION INTRAMUSCULAR; INTRAVENOUS at 15:28
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
## 2024-03-04 NOTE — ED PROVIDER NOTES
sodium chloride 0.9 % bolus 1,000 mL (0 mLs IntraVENous Stopped 3/4/24 0713)   orphenadrine (NORFLEX) injection 30 mg (30 mg IntraMUSCular Given 3/4/24 0532)   acetaminophen (TYLENOL) tablet 650 mg (650 mg Oral Given 3/4/24 1157)   hydrALAZINE (APRESOLINE) injection 10 mg (10 mg IntraVENous Given 3/4/24 1528)   cyclobenzaprine (FLEXERIL) tablet 10 mg (10 mg Oral Given 3/4/24 1801)       Medical Decision Making/Differential Diagnosis:  CC/HPI Summary, Social Determinants of health, Records Reviewed, DDx, testing done/not done, ED Course, Reassessment, disposition considerations/shared decision making with patient, consults, disposition:        CC/HPI Summary, DDx, ED Course, Reassessment, Tests Considered, Patient expectation:   43-year-old female past medical history of seizures, substance abuse, anxiety and depression presents today with concern for hallucinations following first time trying crack cocaine.  Hemodynamically stable on arrival to emergency room, nontoxic in no acute distress per patient is agitated on physical exam, she does demonstrate global weakness without any obvious deformity or traumatic findings.  Cranial nerves appropriate.  No abdominal tenderness to palpation.  Does not appear to be clinically dehydrated.  Differential diagnosis to include not limited to ingestion, overdose, electrolyte abnormality, intracranial abnormality.    Given IV fluid bolus in the emergency room.  Patient had been pink slipped by police secondary to erratic behavior.  EKG without acute ST ovation or other concerning arrhythmias.  Metabolic panel reassuring with normal electrolytes however anion gap is 25 likely secondary to a lactic acidosis of 9.  No infectious type symptoms appreciated, no leukocytosis or anemia, troponin, TSH, serum drug screen and CK within normal limits.  Camazepam appropriate.  Given second fluid bolus.  Urinalysis and repeat EKG pending at this time.  Patient is more awake alert and

## 2024-03-05 PROBLEM — R41.82 ALTERED MENTAL STATUS: Status: ACTIVE | Noted: 2024-03-05

## 2024-03-05 PROBLEM — F14.10 COCAINE ABUSE (HCC): Status: ACTIVE | Noted: 2024-03-05

## 2024-03-05 PROBLEM — F32.9 MDD (MAJOR DEPRESSIVE DISORDER), SINGLE EPISODE: Status: RESOLVED | Noted: 2024-03-04 | Resolved: 2024-03-05

## 2024-03-05 PROCEDURE — 90792 PSYCH DIAG EVAL W/MED SRVCS: CPT | Performed by: NURSE PRACTITIONER

## 2024-03-05 PROCEDURE — 97161 PT EVAL LOW COMPLEX 20 MIN: CPT

## 2024-03-05 PROCEDURE — 97530 THERAPEUTIC ACTIVITIES: CPT

## 2024-03-05 PROCEDURE — 6370000000 HC RX 637 (ALT 250 FOR IP): Performed by: NURSE PRACTITIONER

## 2024-03-05 PROCEDURE — 1240000000 HC EMOTIONAL WELLNESS R&B

## 2024-03-05 PROCEDURE — 6370000000 HC RX 637 (ALT 250 FOR IP): Performed by: PSYCHIATRY & NEUROLOGY

## 2024-03-05 PROCEDURE — 97535 SELF CARE MNGMENT TRAINING: CPT

## 2024-03-05 PROCEDURE — 97165 OT EVAL LOW COMPLEX 30 MIN: CPT

## 2024-03-05 RX ORDER — GABAPENTIN 600 MG/1
600 TABLET ORAL DAILY
Status: ON HOLD | COMMUNITY
End: 2024-03-06 | Stop reason: HOSPADM

## 2024-03-05 RX ORDER — CARBAMAZEPINE 200 MG/1
200 TABLET ORAL 2 TIMES DAILY WITH MEALS
Status: DISCONTINUED | OUTPATIENT
Start: 2024-03-05 | End: 2024-03-07 | Stop reason: HOSPADM

## 2024-03-05 RX ORDER — LIDOCAINE 4 G/G
1 PATCH TOPICAL DAILY
Status: DISCONTINUED | OUTPATIENT
Start: 2024-03-05 | End: 2024-03-07 | Stop reason: HOSPADM

## 2024-03-05 RX ADMIN — Medication 3 MG: at 20:53

## 2024-03-05 RX ADMIN — CHLORDIAZEPOXIDE HYDROCHLORIDE 25 MG: 25 CAPSULE ORAL at 20:53

## 2024-03-05 RX ADMIN — CARBAMAZEPINE 200 MG: 200 TABLET ORAL at 10:51

## 2024-03-05 RX ADMIN — CARBAMAZEPINE 200 MG: 200 TABLET ORAL at 17:26

## 2024-03-05 ASSESSMENT — PATIENT HEALTH QUESTIONNAIRE - PHQ9
SUM OF ALL RESPONSES TO PHQ QUESTIONS 1-9: 0
1. LITTLE INTEREST OR PLEASURE IN DOING THINGS: 0
SUM OF ALL RESPONSES TO PHQ QUESTIONS 1-9: 0
SUM OF ALL RESPONSES TO PHQ QUESTIONS 1-9: 0
SUM OF ALL RESPONSES TO PHQ9 QUESTIONS 1 & 2: 0
SUM OF ALL RESPONSES TO PHQ QUESTIONS 1-9: 0
2. FEELING DOWN, DEPRESSED OR HOPELESS: 0

## 2024-03-05 ASSESSMENT — PAIN DESCRIPTION - LOCATION: LOCATION: HIP

## 2024-03-05 ASSESSMENT — LIFESTYLE VARIABLES
HOW MANY STANDARD DRINKS CONTAINING ALCOHOL DO YOU HAVE ON A TYPICAL DAY: 1 OR 2
HOW OFTEN DO YOU HAVE A DRINK CONTAINING ALCOHOL: PATIENT UNABLE TO ANSWER

## 2024-03-05 ASSESSMENT — SLEEP AND FATIGUE QUESTIONNAIRES
SLEEP PATTERN: DIFFICULTY FALLING ASLEEP;RESTLESSNESS;INSOMNIA;DISTURBED/INTERRUPTED SLEEP
DO YOU HAVE DIFFICULTY SLEEPING: YES
DO YOU USE A SLEEP AID: YES
AVERAGE NUMBER OF SLEEP HOURS: 1
SLEEP PATTERN: DIFFICULTY FALLING ASLEEP;DISTURBED/INTERRUPTED SLEEP;RESTLESSNESS
DO YOU HAVE DIFFICULTY SLEEPING: YES
DO YOU USE A SLEEP AID: YES

## 2024-03-05 ASSESSMENT — PAIN DESCRIPTION - FREQUENCY: FREQUENCY: CONTINUOUS

## 2024-03-05 ASSESSMENT — PAIN DESCRIPTION - DESCRIPTORS: DESCRIPTORS: THROBBING

## 2024-03-05 ASSESSMENT — PAIN SCALES - GENERAL
PAINLEVEL_OUTOF10: 0
PAINLEVEL_OUTOF10: 0
PAINLEVEL_OUTOF10: 10

## 2024-03-05 ASSESSMENT — PAIN DESCRIPTION - ONSET: ONSET: ON-GOING

## 2024-03-05 ASSESSMENT — PAIN - FUNCTIONAL ASSESSMENT: PAIN_FUNCTIONAL_ASSESSMENT: PREVENTS OR INTERFERES WITH ALL ACTIVE AND SOME PASSIVE ACTIVITIES

## 2024-03-05 ASSESSMENT — PAIN DESCRIPTION - PAIN TYPE: TYPE: CHRONIC PAIN

## 2024-03-05 ASSESSMENT — PAIN DESCRIPTION - ORIENTATION: ORIENTATION: RIGHT

## 2024-03-05 NOTE — PLAN OF CARE
Problem: Taryn  Goal: Will exhibit normal sleep and speech and no impulsivity  Description: INTERVENTIONS:  1. Administer medication as ordered  2. Set limits on impulsive behavior  3. Make attempts to decrease external stimuli as possible  Outcome: Progressing     Problem: Psychosis  Goal: Will report no hallucinations or delusions  Description: INTERVENTIONS:  1. Administer medication as  ordered  2. Assist with reality testing to support increasing orientation  3. Assess if patient's hallucinations or delusions are encouraging self harm or harm to others and intervene as appropriate  Outcome: Progressing     Problem: Behavior  Goal: Pt/Family maintain appropriate behavior and adhere to behavioral management agreement, if implemented  Description: INTERVENTIONS:  1. Assess patient/family's coping skills and  non-compliant behavior (including use of illegal substances)  2. Notify security of behavior or suspected illegal substances which indicate the need for search of the family and/or belongings  3. Encourage verbalization of thoughts and concerns in a socially appropriate manner  4. Utilize positive, consistent limit setting strategies supporting safety of patient, staff and others  5. Encourage participation in the decision making process about the behavioral management agreement  6. If a visitor's behavior poses a threat to safety call refer to organization policy.  7. Initiate consult with , Psychosocial CNS, Spiritual Care as appropriate  Outcome: Progressing     Problem: Sleep Disturbance  Goal: Will exhibit normal sleeping pattern  Description: INTERVENTIONS:  1. Administer medication as ordered  2. Decrease environmental stimuli, including noise, as appropriate  3. Discourage social isolation and naps during the day  Outcome: Progressing     Problem: Risk for Elopement  Goal: Patient will not exit the unit/facility without proper excort  Outcome: Progressing     Problem: Skin/Tissue

## 2024-03-05 NOTE — H&P
communication skills                                                  Goals and hope for the future                                                  Access to essential needs                                                  Stable housing                                                  No access to weapons                                                  Denied hx of suicide attempt      Behavioral Services  Medicare Certification Upon Admission    I certify that this patient's inpatient psychiatric hospital admission is medically necessary for:    [x] (1) Treatment which could reasonably be expected to improve this patient's condition,       [ ] (2) Or for diagnostic study;     AND     [x](2) The inpatient psychiatric services are provided while the individual is under the care of a physician and are included in the individualized plan of care.    Estimated length of stay/service 3 to 7 days based on stability    Plan for post-hospital care outpatient psychiatric and counseling services      Electronically signed by MARK Mirza CNP on 3/5/2024 at 6:40 AM

## 2024-03-06 PROCEDURE — 6370000000 HC RX 637 (ALT 250 FOR IP): Performed by: NURSE PRACTITIONER

## 2024-03-06 PROCEDURE — 1240000000 HC EMOTIONAL WELLNESS R&B

## 2024-03-06 PROCEDURE — 6370000000 HC RX 637 (ALT 250 FOR IP): Performed by: PSYCHIATRY & NEUROLOGY

## 2024-03-06 RX ORDER — CARBAMAZEPINE 200 MG/1
200 TABLET ORAL 2 TIMES DAILY WITH MEALS
Qty: 60 TABLET | Refills: 0 | Status: SHIPPED | OUTPATIENT
Start: 2024-03-06 | End: 2024-04-05

## 2024-03-06 RX ADMIN — CARBAMAZEPINE 200 MG: 200 TABLET ORAL at 09:02

## 2024-03-06 RX ADMIN — CARBAMAZEPINE 200 MG: 200 TABLET ORAL at 18:04

## 2024-03-06 RX ADMIN — HYDROXYZINE PAMOATE 50 MG: 50 CAPSULE ORAL at 21:11

## 2024-03-06 RX ADMIN — Medication 3 MG: at 21:11

## 2024-03-06 RX ADMIN — CHLORDIAZEPOXIDE HYDROCHLORIDE 25 MG: 25 CAPSULE ORAL at 21:11

## 2024-03-06 ASSESSMENT — PAIN DESCRIPTION - FREQUENCY: FREQUENCY: INTERMITTENT

## 2024-03-06 ASSESSMENT — PAIN DESCRIPTION - LOCATION: LOCATION: KNEE

## 2024-03-06 ASSESSMENT — PAIN SCALES - GENERAL
PAINLEVEL_OUTOF10: 6
PAINLEVEL_OUTOF10: 0

## 2024-03-06 ASSESSMENT — PAIN DESCRIPTION - PAIN TYPE: TYPE: ACUTE PAIN

## 2024-03-06 ASSESSMENT — PAIN DESCRIPTION - DESCRIPTORS: DESCRIPTORS: THROBBING

## 2024-03-06 ASSESSMENT — PAIN DESCRIPTION - ORIENTATION: ORIENTATION: LEFT

## 2024-03-06 ASSESSMENT — PAIN - FUNCTIONAL ASSESSMENT: PAIN_FUNCTIONAL_ASSESSMENT: ACTIVITIES ARE NOT PREVENTED

## 2024-03-06 ASSESSMENT — PAIN DESCRIPTION - ONSET: ONSET: GRADUAL

## 2024-03-06 NOTE — PLAN OF CARE
Patient A/O x 4. Denies SI/HI/AVH. Denies anxiety and depression. Reports sleep and appetite are adequate. Appears flat on approach but brightened on assessment. Responses are quick and abrupt. Appears guarded. Patient social when approached by peers, otherwise withdrawn. No scheduled medications at this time. PRN medications melatonin and Librium administered per patient request and taken without issues. Q 15 minute rounding maintained. Will continue to monitor and intervene as needed.       Problem: Pain  Goal: Verbalizes/displays adequate comfort level or baseline comfort level  3/5/2024 2200 by Karen Hughes RN  Outcome: Not Progressing  3/5/2024 1210 by Terrie Ryder RN  Outcome: Not Progressing     Problem: Taryn  Goal: Will exhibit normal sleep and speech and no impulsivity  Description: INTERVENTIONS:  1. Administer medication as ordered  2. Set limits on impulsive behavior  3. Make attempts to decrease external stimuli as possible  3/5/2024 2200 by Karen Hughes RN  Outcome: Progressing  3/5/2024 1210 by Terrie Ryder RN  Outcome: Progressing     Problem: Psychosis  Goal: Will report no hallucinations or delusions  Description: INTERVENTIONS:  1. Administer medication as  ordered  2. Assist with reality testing to support increasing orientation  3. Assess if patient's hallucinations or delusions are encouraging self harm or harm to others and intervene as appropriate  3/5/2024 2200 by Karen Hughes RN  Outcome: Progressing  3/5/2024 1210 by Terrie Ryder RN  Outcome: Progressing     Problem: Behavior  Goal: Pt/Family maintain appropriate behavior and adhere to behavioral management agreement, if implemented  Description: INTERVENTIONS:  1. Assess patient/family's coping skills and  non-compliant behavior (including use of illegal substances)  2. Notify security of behavior or suspected illegal substances which indicate the need for search of the family and/or belongings  3. Encourage verbalization

## 2024-03-06 NOTE — DISCHARGE INSTRUCTIONS
Follow up for Tobacco Cessation at:    [facility]                                 Date:  Friday 3/8 at 10am              [address]    [place] [address]   (Inside [facility]    take B elevators to 7th floor)   Phone: [phone]   Fax: [phone]

## 2024-03-06 NOTE — GROUP NOTE
Shared goal for the day as to focus on my well being.                                                                       Group Therapy Note    Date: 3/6/2024    Group Start Time: 0900  Group End Time: 0920  Group Topic: Community Meeting    [facility]  1    Carine Hitchcock, KT     Name:  Community Meeting     Patient's Goal:  Patient will be able to id daily staffing assignments, policy and flow of floor and expectations of individual patents during his/her stay.     Notes:  Active listener and willing to share goal for the day.     Status After Intervention:  Improved    Participation Level: Active Listener and Interactive    Participation Quality: Appropriate, Attentive, and Sharing      Speech:  normal      Thought Process/Content: Logical      Affective Functioning: Congruent      Mood: euthymic      Level of consciousness:  Alert, Oriented x4, and Attentive      Response to Learning: Able to retain information and Progressing to goal      Endings: None Reported    Modes of Intervention: Education, Socialization, Exploration, and Clarifying      Discipline Responsible: Psychoeducational Specialist      Signature:  KT Rowland

## 2024-03-06 NOTE — GROUP NOTE
Group Therapy Note    Date: 3/6/2024    Group Start Time: 1100  Group End Time: 1140  Group Topic: Psychoeducation    [facility] 1    Carine Hitchcock, CTRS    Date: 3/6/2024   Name:  owning your feelings     Patient's Goal:  Patient will be able to identify prompts to sharing his/her own feelings, and why it is important.    Notes:  engaged and sharing when prompted, able to share stereo types and actions one has used in the past to share emotions.     Status After Intervention:  Improved    Participation Level: Active Listener and Interactive    Participation Quality: Appropriate, Attentive, and Sharing      Speech:  normal      Thought Process/Content: Logical      Affective Functioning: Congruent      Mood: euthymic      Level of consciousness:  Alert, Oriented x4, and Attentive      Response to Learning: Able to verbalize/acknowledge new learning, Able to retain information, and Progressing to goal      Endings: None Reported    Modes of Intervention: Education, Support, Socialization, Clarifying, and Problem-solving      Discipline Responsible: Psychoeducational Specialist      Signature:  Carine Hitchcock, CTRS

## 2024-03-06 NOTE — PLAN OF CARE
Problem: Taryn  Goal: Will exhibit normal sleep and speech and no impulsivity  Description: INTERVENTIONS:  1. Administer medication as ordered  2. Set limits on impulsive behavior  3. Make attempts to decrease external stimuli as possible  Outcome: Progressing     Problem: Behavior  Goal: Pt/Family maintain appropriate behavior and adhere to behavioral management agreement, if implemented  Description: INTERVENTIONS:  1. Assess patient/family's coping skills and  non-compliant behavior (including use of illegal substances)  2. Notify security of behavior or suspected illegal substances which indicate the need for search of the family and/or belongings  3. Encourage verbalization of thoughts and concerns in a socially appropriate manner  4. Utilize positive, consistent limit setting strategies supporting safety of patient, staff and others  5. Encourage participation in the decision making process about the behavioral management agreement  6. If a visitor's behavior poses a threat to safety call refer to organization policy.  7. Initiate consult with , Psychosocial CNS, Spiritual Care as appropriate  Outcome: Progressing     Pt is calm and pleasant. She is attending groups and taking meds. She denies anxiety and depression. Pt is social with peers on unit and appropriately participates in conversation. She denies homicidal/suicidal thoughts/hallucinations.

## 2024-03-06 NOTE — BH NOTE
Patient is resting quietly in bed with eyes closed at this time. No signs of distress or discomfort noted. No PRN medications given at this time. Safety needs met. No unit problems reported. Q 15 minute rounding continued. Will continue to monitor and intervene as needed.

## 2024-03-07 VITALS
WEIGHT: 140 LBS | RESPIRATION RATE: 18 BRPM | SYSTOLIC BLOOD PRESSURE: 152 MMHG | HEIGHT: 59 IN | TEMPERATURE: 98.8 F | OXYGEN SATURATION: 100 % | HEART RATE: 94 BPM | BODY MASS INDEX: 28.22 KG/M2 | DIASTOLIC BLOOD PRESSURE: 72 MMHG

## 2024-03-07 PROCEDURE — 6370000000 HC RX 637 (ALT 250 FOR IP): Performed by: NURSE PRACTITIONER

## 2024-03-07 RX ADMIN — CARBAMAZEPINE 200 MG: 200 TABLET ORAL at 08:56

## 2024-03-07 NOTE — DISCHARGE SUMMARY
DISCHARGE SUMMARY      Patient ID:  Rachele Lomas  [id number]  43 y.o.  1980    Admit date: 3/4/2024    Discharge date and time: 3/7/2024    Admitting Physician: Reza Gaytan MD     Discharge Physician: Dr Lukas BAZZI    Discharge Diagnoses:   Patient Active Problem List   Diagnosis    Tobacco abuse    Seizure disorder (HCC)    Asthma    Carbamazepine overdose with self-harm intent    Depression    Suicidal overdose (HCC)    Pneumonia    Epigastric pain    Generalized abdominal pain    Intractable abdominal pain    Sepsis (HCC)    Substance induced mood disorder (HCC)    Polysubstance abuse (HCC)    History of posttraumatic stress disorder (PTSD)    Overdose of undetermined intent    Cocaine abuse (HCC)    Altered mental status       Admission Condition: poor    Discharged Condition: stable    Admission Circumstance:    Rachele Lomas has a significant past history of substance-induced mood disorder and PTSD presented to the ED brought in by EMS after patient was placed on an involuntary hold by police after patient took an unknown drug was found naked in the [place] unable to speak precipitating factors include drug use unknown duration.         PAST MEDICAL/PSYCHIATRIC HISTORY:   Past Medical History:   Diagnosis Date    Asthma     Bronchitis     Movement disorder     Seizure disorder (HCC)     Seizure disorder (HCC)     Suicidal overdose (HCC) 6/27/2014    Tobacco abuse     Tobacco abuse        FAMILY/SOCIAL HISTORY:  Family History   Problem Relation Age of Onset    Cancer Mother         colon     Social History     Socioeconomic History    Marital status:      Spouse name: Not on file    Number of children: 0    Years of education: 12    Highest education level: Not on file   Occupational History     Comment: SSDI   Tobacco Use    Smoking status: Every Day     Current packs/day: 1.00     Average packs/day: 1 pack/day for 15.0 years (15.0 ttl pk-yrs)     Types: Cigarettes    Smokeless tobacco:

## 2024-03-07 NOTE — PLAN OF CARE
Patient denies SI/HI/AVH. Denies anxiety and depression. Appears bright and friendly on assessment. Observed on the unit, socializing with peers while watching movies. Reports sleep and appetite are good and medications are helping. Medications taken without issues. Q 15 minute rounding maintained. Will continue to monitor and intervene as needed.     Problem: Taryn  Goal: Will exhibit normal sleep and speech and no impulsivity  Description: INTERVENTIONS:  1. Administer medication as ordered  2. Set limits on impulsive behavior  3. Make attempts to decrease external stimuli as possible  3/6/2024 2206 by Karen Hughes, RN  Outcome: Progressing  3/6/2024 1215 by Kaitlin Davila, RN  Outcome: Progressing     Problem: Psychosis  Goal: Will report no hallucinations or delusions  Description: INTERVENTIONS:  1. Administer medication as  ordered  2. Assist with reality testing to support increasing orientation  3. Assess if patient's hallucinations or delusions are encouraging self harm or harm to others and intervene as appropriate  Outcome: Progressing     Problem: Behavior  Goal: Pt/Family maintain appropriate behavior and adhere to behavioral management agreement, if implemented  Description: INTERVENTIONS:  1. Assess patient/family's coping skills and  non-compliant behavior (including use of illegal substances)  2. Notify security of behavior or suspected illegal substances which indicate the need for search of the family and/or belongings  3. Encourage verbalization of thoughts and concerns in a socially appropriate manner  4. Utilize positive, consistent limit setting strategies supporting safety of patient, staff and others  5. Encourage participation in the decision making process about the behavioral management agreement  6. If a visitor's behavior poses a threat to safety call refer to organization policy.  7. Initiate consult with , Psychosocial CNS, Spiritual Care as appropriate  3/6/2024 2206

## 2024-03-07 NOTE — PROGRESS NOTES
4 Eyes Skin Assessment     NAME:  Rachele Lomas  YOB: 1980  MEDICAL RECORD NUMBER:  [id number]    The patient is being assessed for  Admission    I agree that at least one RN has performed a thorough Head to Toe Skin Assessment on the patient. ALL assessment sites listed below have been assessed.      Areas assessed by both nurses:    Head, Face, Ears, Shoulders, Back, Chest, Arms, Elbows, Hands, Sacrum. Buttock, Coccyx, Ischium, and Legs. Feet and Heels        Does the Patient have a Wound? No noted wound(s)       Familia Prevention initiated by RN: Yes  Wound Care Orders initiated by RN: No    Pressure Injury (Stage 3,4, Unstageable, DTI, NWPT, and Complex wounds) if present, place Wound referral order by RN under : No    New Ostomies, if present place, Ostomy referral order under : No     Nurse 1 eSignature: Electronically signed by Nan Travis RN on 3/5/24 at 3:58 AM EST    **SHARE this note so that the co-signing nurse can place an eSignature**    Nurse 2 eSignature: Electronically signed by Juancarlos Esposito RN on 3/5/24 at 4:00 AM EST  
BEHAVIORAL HEALTH FOLLOW-UP NOTE     3/6/2024     Patient was seen and examined in person, Chart reviewed   Patient's case discussed with staff/team    Chief Complaint: \" When can I go home?\"    Interim History:   Rachele is out in the day room today.  She is bright pleasant interactive with peers and staff.  No behavioral disturbances.  She does complain about some hip pain.  But otherwise she states she is doing well and feeling much better than when she came in.  No overt or covert signs of psychosis or paranoia.  Thought process coherent and linear goal directed.  Devoid of suicidal or homicidal ideation intent or plan    Appetite:   [x] Normal/Unchanged  [] Increased  [] Decreased      Sleep:       [x] Normal/Unchanged  [] Fair       [] Poor              Energy:    [x] Normal/Unchanged  [] Increased  [] Decreased        SI [] Present  [x] Absent    HI  []Present  [x] Absent     Aggression:  [] yes  [x] no    Patient is [x] able  [] unable to CONTRACT FOR SAFETY     PAST MEDICAL/PSYCHIATRIC HISTORY:   Past Medical History:   Diagnosis Date    Asthma     Bronchitis     Movement disorder     Seizure disorder (HCC)     Seizure disorder (HCC)     Suicidal overdose (HCC) 6/27/2014    Tobacco abuse     Tobacco abuse        FAMILY/SOCIAL HISTORY:  Family History   Problem Relation Age of Onset    Cancer Mother         colon     Social History     Socioeconomic History    Marital status:      Spouse name: Not on file    Number of children: 0    Years of education: 12    Highest education level: Not on file   Occupational History     Comment: SSDI   Tobacco Use    Smoking status: Every Day     Current packs/day: 1.00     Average packs/day: 1 pack/day for 15.0 years (15.0 ttl pk-yrs)     Types: Cigarettes    Smokeless tobacco: Never    Tobacco comments:     SAYS CANNOT USE PATCHES OR GUM   Vaping Use    Vaping Use: Never used   Substance and Sexual Activity    Alcohol use: Yes     Alcohol/week: 0.0 standard drinks 
[facility]  Discharge Note    Pt discharged with followings belongings:   Dental Appliances: None  Vision - Corrective Lenses: None  Hearing Aid: None  Jewelry: None  Body Piercings Removed: N/A  Clothing: Footwear, Jacket/Coat, Pants, Shirt, Undergarments  Other Valuables: Wallet, Wig   Patient sent home via bus with all belongings that she arrived to the ER with Patient denies SI/HI/Hallucinations Will continue to monitor and will intervene as needed with close monitoring.  Status EXAM upon discharge:  Mental Status and Behavioral Exam  Normal: No (MONICA - patient resting in bed quietly with eyes closed at this time.)  Level of Assistance: Independent/Self  Facial Expression: Brightened  Affect: Congruent  Level of Consciousness: Alert (MONICA - patient resting in bed quietly with eyes closed at this time.)  Frequency of Checks: 4 times per hour, close  Mood:Normal: Yes  Mood: Ambivalent  Motor Activity:Normal: No  Motor Activity: Unusual posture/gait  Eye Contact: Good  Observed Behavior: Cooperative, Friendly  Sexual Misconduct History: Current - no  Preception: [place] to person, [place] to time, [place] to place, [place] to situation  Attention:Normal: Yes  Attention: Distractible  Thought Processes: Unremarkable  Thought Content:Normal: Yes  Thought Content: Preoccupations  Depression Symptoms: No problems reported or observed.  Anxiety Symptoms: No problems reported or observed.  Taryn Symptoms: No problems reported or observed.  Hallucinations: None  Delusions: No  Memory:Normal: Yes  Memory: Poor recent  Insight and Judgment: No  Insight and Judgment: Other (comment) (improving)    Tobacco Screening:  Practical Counseling, on admission, kanchan X, if applicable and completed (first 3 are required if patient doesn't refuse)         ( x) Recognizing danger situations (included triggers and roadblocks)                    (x ) Coping skills (new ways to manage stress,relaxation techniques, changing 
[facility] [place]  Day 3 Interdisciplinary Treatment Plan NOTE    Review Date & Time: 03/06/2024 1412    Patient was not in treatment team    Estimated Length of Stay Update:  3-5 days  Estimated Discharge Date Update: 03/07/2024    EDUCATION:   Learner Progress Toward Treatment Goals: Reviewed results and recommendations of this team, Reviewed group plan and strategies, and Reviewed signs, symptoms and risk of self harm and violent behavior    Method: Verbal    Outcome: Verbalized understanding    PATIENT GOALS: discharged    PLAN/TREATMENT RECOMMENDATIONS UPDATE:none    GOALS UPDATE:   Time frame for Short-Term Goals: one day       Gia Gardner RN  
Box lunch provided.  Pt in pain.  Dr. Browning made aware as well as Herminio [facility]   
CLINICAL PHARMACY NOTE: MEDS TO BEDS    Total # of Prescriptions Filled: 1   The following medications were delivered to the patient:  Carbamazepine 200mg    Additional Documentation:  Renee delivered to Gia LOWE 3-6-24  
Leisure assessment completed.  
No changes to previous assessment. Patient states that she is missing her cell phone and cash graciela card. Patient remains in control of behavior. Will continue to monitor and will intervene as needed.     
OCCUPATIONAL THERAPY INITIAL EVALUATION    [facility]  [address], [place]      Date:3/5/2024                                                  Patient Name: Rachele Lomas  MRN: [id number]  : 1980  Room: [address] [place] [address]    Evaluating OT: RITESH Marshall, OTR/L  # [id number]    Referring Provider:  Rosie Reno, MARK Bro CNP    Specific Provider Orders:  \"OT Eval and Treat\"  3-5-24    Diagnosis: Hallucinations [R44.3]  MDD (major depressive disorder), single episode [F32.9]  Altered mental status, unspecified altered mental status type [R41.82]    Pt was admitted after being found hallucinating, running naked in the streets    Pertinent Medical History:  Pt has a past medical history of Asthma, Bronchitis, Movement disorder, Seizure disorder (HCC), Seizure disorder (HCC), Suicidal overdose (HCC), Tobacco abuse, and Tobacco abuse.,  has a past surgical history that includes fracture surgery; Cholecystectomy; Rotator cuff repair; Upper gastrointestinal endoscopy (N/A, 5/3/2021); Upper gastrointestinal endoscopy (N/A, 10/29/2022); and Colonoscopy (N/A, 10/31/2022).    Surgeries this admission: None     Precautions:  Fall Risk      Assessment of current deficits   [x] Functional mobility  [x]ADLs  [x] Strength               []Cognition   [x] Functional transfers   [x] IADLs         [x] Safety Awareness   [x]Endurance   [] Fine Coordination              [x] Balance     [] Vision/perception   []Sensation    []Gross Motor Coordination  [] ROM  [] Delirium                  [] Motor Control       OT PLAN OF CARE   OT POC based on physician orders, patient diagnosis and results of clinical assessment    Frequency/Duration 1-3 days/wk for 2 weeks PRN   Specific OT Treatment to include:   * Instruction/training on adapted ADL techniques and AE recommendations to increase functional independence within precautions       * Training on energy 
OQ assessment completed. Safety plan completed.  
Patient declined invitation to the following groups:    Community Meeting  Education  Spiritual Care    Patient will continue to be provided with opportunities to enhance leisure skills/interests and/or coping mechanisms.  
Patient resting with eyes closed. Respirations even and unlabored. No signs of distress. Purposeful rounding continued.  
Physical Therapy  Physical Therapy Initial Assessment     Name: Rachele Lomas  : 1980  MRN: [id number]      Date of Service: 3/5/2024    Evaluating PT:  Eyal Motley PT, DPT    Room #:  7303/7303-B  Diagnosis:  Hallucinations [R44.3]  MDD (major depressive disorder), single episode [F32.9]  Altered mental status, unspecified altered mental status type [R41.82]  PMHx/PSHx:  depression, tobacco abuse, seizure disorder, asthma, polysubstance abuse  Procedure/Surgery:  N/A  Precautions:  fall risk  Equipment Owned: w/c, cane  Equipment Needs:  TBD    SUBJECTIVE:    Pt lives with step dad in a 1 story home with 2 steps to enter and B handrail.  Bed/bath is on 1st floor.  Pt ambulated with cane for short distances and used w/c for longer distances PTA.    OBJECTIVE:   Initial Evaluation  Date: 3/5/24 Treatment Short Term/ Long Term   Goals   AM-PAC 6 Clicks 15/24     Was pt agreeable to Eval/treatment? yes     Does pt have pain? 10/10 back and BLE     Bed Mobility  Rolling: NT  Supine to sit: NT  Sit to supine: NT  Scooting: SBA  Rolling: IND  Supine to sit: IND  Sit to supine: IND  Scooting: IND   Transfers Sit to stand: CGA  Stand to sit: CGA  Stand pivot: min A with ww  Sit to stand: mod I  Stand to sit: mod I  Stand pivot: mod I with AAD   Ambulation    10'x1 with ww min A  25'+ with AAD mod I   W/c mobility 10' with BUE min A  250'+ with BUE mod I   Stair negotiation: ascended and descended  NT  2 steps with B handrail mod I     Strength/ROM:   BLE grossly 4/5  BLE AROM WFL    Balance:   Static Sitting: Supervision  Dynamic Sitting: SBA  Static Standing: CGA with ww  Dynamic Standing: min A with ww    Pt is A & O x 3  Sensation:  Pt denies numbness and tingling to extremities  Edema:  unremarkable    Vitals:  SpO2 and HR were stable during session    Therapeutic Exercises:    Transfers: STS x3, cued for hand placement and postural correction  Ambulation: 10' with ww  BLE AROM    Patient education  Pt 
Pt stated that her preferred pharmacy was [facility] in [place] on [address]. [facility] pharmacist spoken to and only one med was verified at this pharmacy; Gabapentin 600 mg daily.   
TRANSFERRED TO [address] [place] [address] VIA WHEELCHAIR WITHOUT EVENT. REPORT GIVEN TO NADER GROSSMAN.  
problems reported or observed.  Anxiety Symptoms: Generalized  Taryn Symptoms: Less need to sleep  Hallucinations: None  Delusions: No  Memory:Normal: No  Memory: Poor recent  Insight and Judgment: No  Insight and Judgment: Poor judgment, Poor insight    Tobacco Screening:  Practical Counseling, on admission, kanchan X, if applicable and completed (first 3 are required if patient doesn't refuse):            ( ) Recognizing danger situations (included triggers and roadblocks)                    ( ) Coping skills (new ways to manage stress,relaxation techniques, changing routine, distraction)                                                           ( ) Basic information about quitting (benefits of quitting, techniques in how to quit, available resources  ( ) [facility]                                                                                                                   (x ) Patient refused counseling  ( ) Patient has not smoked in the last 30 days    Metabolic Screening:    Lab Results   Component Value Date    LABA1C 5.3 02/19/2024       Lab Results   Component Value Date    CHOL 177 02/19/2024     Lab Results   Component Value Date    TRIG 87 02/19/2024     Lab Results   Component Value Date    HDL 75 02/19/2024     No components found for: \"LDLCAL\"  No components found for: \"LABVLDL\"      Body mass index is 28.28 kg/m².    BP Readings from Last 2 Encounters:   03/04/24 (!) 158/74   02/20/24 118/70       Recliner Assessment:  Patient is able to demonstrate the ability to move from a reclining position to an upright position within the recliner.    Pt admitted with followings belongings:  Clothing: Footwear, Jacket/Coat, Pants, Shirt, Undergarments  Other Valuables: Wallet, Wig  The following personal items were collected during admission. Items secured in locker/safe. Items will be returned to patient at discharge.     Nan Travis RN       
3/14/2024 9:00 am in office mental health intake appointment for counseling and medication [place] [address]    Phone: [phone]   Fax: [phone]             Advanced Directive:   Does the patient have an appointed surrogate decision maker? No  Does the patient have a Medical Advance Directive? No  Does the patient have a Psychiatric Advance Directive? No  If the patient does not have a surrogate or Medical Advance Directive AND Psychiatric Advance Directive, the patient was offered information on these advance directives     Patient Instructions: Please continue all medications until otherwise directed by physician.  Lukas    Tobacco Cessation Discharge Plan:   Is the patient a tobacco user  and needs referral for tobacco cessation? Yes  Patient referred to the following for tobacco cessation with an appointment? Yes  Patient was offered medication to assist with tobacco cessation at discharge? Yes    Alcohol/Substance Abuse Discharge Plan:   Does the patient have a history of substance/alcohol abuse and requires a referral for treatment? Yes  Patient referred to the following for substance/alcohol abuse treatment with an appointment? Yes  Patient was offered medication to assist with alcohol cessation at discharge? Yes      Patient discharged to: Home; discussed with patient/caregiver and provided to the patient/caregiver either in hard copy or electronically.

## 2024-03-08 NOTE — CARE COORDINATION
Gladis called [phone] and left voicemail to order PT/OT evaluations.   
JAILYN met with the pt to discuss her discharge. Pt reports feeling good today, denied SI/HI/AVH. Pt expressed feeling ready to discharge home with her stepdad and she plans to take the bus home. Pt will continue to treat with [facility] at time of discharge. Pt is not interested in any substance use treatment at this time. Pt cooperative, pleasant, brightened with conversation, with good eye contact, clear speech, improved insight/judgement.     JAILYN contacted pt sister Tatiana [phone] (ILSA signed) to notify her of pt discharge for today. No answer, a voicemail was left.    In order to ensure appropriate transition and discharge planning is in place, the following documents have been transmitted to [facility], as the new outpatient provider:    The d/c diagnosis was transmitted to the next care provider  The reason for hospitalization was transmitted to the next care provider  The d/c medications (dosage and indication) were transmitted to the next care provider   The continuing care plan was transmitted to the next care provider  
JAILYN met with the pt. Pt reports feeling good today, denied SI/HI/AVH. Pt is asking when she will be discharged. Pt plans to return home with her stepdad and will continue to treat with [facility] at time of discharge. Pt stated her legs are already feeling better and she will not need a wheelchair at time of discharge. Pt plans to take the bus home. Pt cooperative, pleasant, with good eye contact, clear speech, poor insight/judgement.     JAILYN contacted [facility] [phone] and scheduled an intake appointment on 3/14.   
SW attempted to contact pt post discharge for follow up call. Pt was unable to be reached at this time.  
Sw received a returned phone call from pt's sister, Tatiana.  Tatiana Lomas - sister [phone]  stated that she is upset with her sister because she doesn't listen and went right back to the house that she was at prior to her last admission. She stated that she is unsiure if she has access to guns or weapons and that she does not want to speak with her sister as she has enough issues to worry about and she causes her blood pressure to increase worrying about her.   
    [] Discussed current suicide risk, protective and risk factors with RN and NP/Psychiatrist.    Discharge Plan:  [x] Home:  with step-dad  [] Shelter:  [] Crisis Unit:  [] Substance Abuse Rehab:  [] Nursing Facility:  [] Other (Specify):    Follow up Provider:  [facility]

## 2024-03-20 ENCOUNTER — APPOINTMENT (OUTPATIENT)
Dept: GENERAL RADIOLOGY | Age: 44
End: 2024-03-20
Payer: MEDICAID

## 2024-03-20 ENCOUNTER — HOSPITAL ENCOUNTER (EMERGENCY)
Age: 44
Discharge: HOME | End: 2024-03-20
Attending: STUDENT IN AN ORGANIZED HEALTH CARE EDUCATION/TRAINING PROGRAM
Payer: MEDICAID

## 2024-03-20 ENCOUNTER — APPOINTMENT (OUTPATIENT)
Dept: CT IMAGING | Age: 44
End: 2024-03-20
Payer: MEDICAID

## 2024-03-20 VITALS
HEIGHT: 59 IN | BODY MASS INDEX: 30.24 KG/M2 | DIASTOLIC BLOOD PRESSURE: 79 MMHG | RESPIRATION RATE: 18 BRPM | SYSTOLIC BLOOD PRESSURE: 121 MMHG | HEART RATE: 92 BPM | WEIGHT: 150 LBS | TEMPERATURE: 97.9 F | OXYGEN SATURATION: 98 %

## 2024-03-20 DIAGNOSIS — R10.84 GENERALIZED ABDOMINAL PAIN: ICD-10-CM

## 2024-03-20 DIAGNOSIS — F10.920 ACUTE ALCOHOLIC INTOXICATION WITHOUT COMPLICATION (HCC): Primary | ICD-10-CM

## 2024-03-20 LAB
ALBUMIN SERPL-MCNC: 4.4 G/DL (ref 3.5–5.2)
ALP SERPL-CCNC: 92 U/L (ref 35–104)
ALT SERPL-CCNC: 22 U/L (ref 0–32)
AMPHET UR QL SCN: NEGATIVE
ANION GAP SERPL CALCULATED.3IONS-SCNC: 18 MMOL/L (ref 7–16)
APAP SERPL-MCNC: <5 UG/ML (ref 10–30)
AST SERPL-CCNC: 24 U/L (ref 0–31)
B-HCG SERPL EIA 3RD IS-ACNC: <0.1 MIU/ML (ref 0–7)
BACTERIA URNS QL MICRO: ABNORMAL
BARBITURATES UR QL SCN: NEGATIVE
BASOPHILS # BLD: 0.05 K/UL (ref 0–0.2)
BASOPHILS NFR BLD: 1 % (ref 0–2)
BENZODIAZ UR QL: NEGATIVE
BILIRUB SERPL-MCNC: 0.4 MG/DL (ref 0–1.2)
BILIRUB UR QL STRIP: NEGATIVE
BNP SERPL-MCNC: 83 PG/ML (ref 0–125)
BUN SERPL-MCNC: 15 MG/DL (ref 6–20)
BUPRENORPHINE UR QL: NEGATIVE
CALCIUM SERPL-MCNC: 9.2 MG/DL (ref 8.6–10.2)
CANNABINOIDS UR QL SCN: NEGATIVE
CARBAMAZEPINE SERPL-MCNC: <2 UG/ML (ref 4–10)
CHLORIDE SERPL-SCNC: 100 MMOL/L (ref 98–107)
CLARITY UR: CLEAR
CO2 SERPL-SCNC: 20 MMOL/L (ref 22–29)
COCAINE UR QL SCN: POSITIVE
COLOR UR: YELLOW
CREAT SERPL-MCNC: 0.6 MG/DL (ref 0.5–1)
EKG ATRIAL RATE: 86 BPM
EKG P AXIS: 57 DEGREES
EKG P-R INTERVAL: 144 MS
EKG Q-T INTERVAL: 366 MS
EKG QRS DURATION: 82 MS
EKG QTC CALCULATION (BAZETT): 437 MS
EKG R AXIS: 65 DEGREES
EKG T AXIS: 38 DEGREES
EKG VENTRICULAR RATE: 86 BPM
EOSINOPHIL # BLD: 0.11 K/UL (ref 0.05–0.5)
EOSINOPHILS RELATIVE PERCENT: 1 % (ref 0–6)
EPI CELLS #/AREA URNS HPF: ABNORMAL /HPF
ERYTHROCYTE [DISTWIDTH] IN BLOOD BY AUTOMATED COUNT: 17.2 % (ref 11.5–15)
ETHANOLAMINE SERPL-MCNC: 185 MG/DL
ETHANOLAMINE SERPL-MCNC: 48 MG/DL
FENTANYL UR QL: NEGATIVE
GFR SERPL CREATININE-BSD FRML MDRD: >60 ML/MIN/1.73M2
GLUCOSE SERPL-MCNC: 87 MG/DL (ref 74–99)
GLUCOSE UR STRIP-MCNC: NEGATIVE MG/DL
HCG UR QL: NEGATIVE
HCT VFR BLD AUTO: 43.9 % (ref 34–48)
HGB BLD-MCNC: 14.2 G/DL (ref 11.5–15.5)
HGB UR QL STRIP.AUTO: ABNORMAL
IMM GRANULOCYTES # BLD AUTO: 0.04 K/UL (ref 0–0.58)
IMM GRANULOCYTES NFR BLD: 0 % (ref 0–5)
KETONES UR STRIP-MCNC: NEGATIVE MG/DL
LACTATE BLDV-SCNC: 3.1 MMOL/L (ref 0.5–2.2)
LACTATE BLDV-SCNC: 3.7 MMOL/L (ref 0.5–2.2)
LEUKOCYTE ESTERASE UR QL STRIP: ABNORMAL
LIPASE SERPL-CCNC: 32 U/L (ref 13–60)
LYMPHOCYTES NFR BLD: 2.82 K/UL (ref 1.5–4)
LYMPHOCYTES RELATIVE PERCENT: 29 % (ref 20–42)
MAGNESIUM SERPL-MCNC: 2.3 MG/DL (ref 1.6–2.6)
MCH RBC QN AUTO: 26.5 PG (ref 26–35)
MCHC RBC AUTO-ENTMCNC: 32.3 G/DL (ref 32–34.5)
MCV RBC AUTO: 82.1 FL (ref 80–99.9)
METHADONE UR QL: NEGATIVE
MONOCYTES NFR BLD: 0.74 K/UL (ref 0.1–0.95)
MONOCYTES NFR BLD: 8 % (ref 2–12)
NEUTROPHILS NFR BLD: 62 % (ref 43–80)
NEUTS SEG NFR BLD: 6.15 K/UL (ref 1.8–7.3)
NITRITE UR QL STRIP: NEGATIVE
OPIATES UR QL SCN: NEGATIVE
OXYCODONE UR QL SCN: NEGATIVE
PCP UR QL SCN: NEGATIVE
PH UR STRIP: 6 [PH] (ref 5–9)
PLATELET # BLD AUTO: 274 K/UL (ref 130–450)
PMV BLD AUTO: 9.3 FL (ref 7–12)
POTASSIUM SERPL-SCNC: 3.5 MMOL/L (ref 3.5–5)
PROT SERPL-MCNC: 8.3 G/DL (ref 6.4–8.3)
PROT UR STRIP-MCNC: NEGATIVE MG/DL
RBC # BLD AUTO: 5.35 M/UL (ref 3.5–5.5)
RBC #/AREA URNS HPF: ABNORMAL /HPF
SALICYLATES SERPL-MCNC: <0.3 MG/DL (ref 0–30)
SARS-COV-2 RDRP RESP QL NAA+PROBE: NOT DETECTED
SODIUM SERPL-SCNC: 138 MMOL/L (ref 132–146)
SP GR UR STRIP: <1.005 (ref 1–1.03)
SPECIMEN DESCRIPTION: NORMAL
TEST INFORMATION: ABNORMAL
TOXIC TRICYCLIC SC,BLOOD: NEGATIVE
TROPONIN I SERPL HS-MCNC: <6 NG/L (ref 0–9)
UROBILINOGEN UR STRIP-ACNC: 0.2 EU/DL (ref 0–1)
WBC #/AREA URNS HPF: ABNORMAL /HPF
WBC OTHER # BLD: 9.9 K/UL (ref 4.5–11.5)

## 2024-03-20 PROCEDURE — 80156 ASSAY CARBAMAZEPINE TOTAL: CPT

## 2024-03-20 PROCEDURE — 85025 COMPLETE CBC W/AUTO DIFF WBC: CPT

## 2024-03-20 PROCEDURE — 81001 URINALYSIS AUTO W/SCOPE: CPT

## 2024-03-20 PROCEDURE — 80143 DRUG ASSAY ACETAMINOPHEN: CPT

## 2024-03-20 PROCEDURE — 80179 DRUG ASSAY SALICYLATE: CPT

## 2024-03-20 PROCEDURE — 99285 EMERGENCY DEPT VISIT HI MDM: CPT

## 2024-03-20 PROCEDURE — 80053 COMPREHEN METABOLIC PANEL: CPT

## 2024-03-20 PROCEDURE — 93005 ELECTROCARDIOGRAM TRACING: CPT | Performed by: STUDENT IN AN ORGANIZED HEALTH CARE EDUCATION/TRAINING PROGRAM

## 2024-03-20 PROCEDURE — 83880 ASSAY OF NATRIURETIC PEPTIDE: CPT

## 2024-03-20 PROCEDURE — 83605 ASSAY OF LACTIC ACID: CPT

## 2024-03-20 PROCEDURE — 87635 SARS-COV-2 COVID-19 AMP PRB: CPT

## 2024-03-20 PROCEDURE — 83735 ASSAY OF MAGNESIUM: CPT

## 2024-03-20 PROCEDURE — 84703 CHORIONIC GONADOTROPIN ASSAY: CPT

## 2024-03-20 PROCEDURE — G0480 DRUG TEST DEF 1-7 CLASSES: HCPCS

## 2024-03-20 PROCEDURE — 2580000003 HC RX 258: Performed by: STUDENT IN AN ORGANIZED HEALTH CARE EDUCATION/TRAINING PROGRAM

## 2024-03-20 PROCEDURE — 74177 CT ABD & PELVIS W/CONTRAST: CPT

## 2024-03-20 PROCEDURE — 93010 ELECTROCARDIOGRAM REPORT: CPT | Performed by: INTERNAL MEDICINE

## 2024-03-20 PROCEDURE — 6360000004 HC RX CONTRAST MEDICATION: Performed by: RADIOLOGY

## 2024-03-20 PROCEDURE — 80307 DRUG TEST PRSMV CHEM ANLYZR: CPT

## 2024-03-20 PROCEDURE — 84484 ASSAY OF TROPONIN QUANT: CPT

## 2024-03-20 PROCEDURE — 84702 CHORIONIC GONADOTROPIN TEST: CPT

## 2024-03-20 PROCEDURE — 71045 X-RAY EXAM CHEST 1 VIEW: CPT

## 2024-03-20 PROCEDURE — 83690 ASSAY OF LIPASE: CPT

## 2024-03-20 RX ORDER — 0.9 % SODIUM CHLORIDE 0.9 %
1000 INTRAVENOUS SOLUTION INTRAVENOUS ONCE
Status: COMPLETED | OUTPATIENT
Start: 2024-03-20 | End: 2024-03-20

## 2024-03-20 RX ADMIN — SODIUM CHLORIDE 1000 ML: 9 INJECTION, SOLUTION INTRAVENOUS at 05:17

## 2024-03-20 RX ADMIN — IOPAMIDOL 75 ML: 755 INJECTION, SOLUTION INTRAVENOUS at 08:47

## 2024-03-20 RX ADMIN — SODIUM CHLORIDE 1000 ML: 9 INJECTION, SOLUTION INTRAVENOUS at 07:22

## 2024-03-20 NOTE — ED PROVIDER NOTES
[facility] EMERGENCY DEPARTMENT  EMERGENCY DEPARTMENT ENCOUNTER        Pt Name: Rachele Lomas  MRN: [id number]  Birthdate 1980  Date of evaluation: 3/20/2024  Provider: Rach Ocampo DO  PCP: Stefan Contreras MD  Note Started: 4:48 AM EDT 3/20/24    CHIEF COMPLAINT       Chief Complaint   Patient presents with    Abdominal Pain    Nausea       HISTORY OF PRESENT ILLNESS: 1 or more Elements   History From: patient    Limitations to history : None    Rachele Lomas is a 43 y.o. female with a history of polysubstance abuse, asthma, depression presenting to the emergency department via EMS from home complaining of nausea, vomiting.  Patient states that she was not feeling well.  She states that she was taken her uncle's house and they were painting and there recently and she thinks she ingested some fumes.  She states she was nauseated and vomited several times prior to arrival.  She states that she woke up and she also was feeling some shortness of breath.  She also was concerned that she was possibly pregnant.  She states that she went to a doctor at [facility] yesterday and they told her that she was approximately 5 months pregnant.  However, she then states that she went to a doctor at [facility] and they told her she was 5 months pregnant.  States her last menstrual period was a month ago.  Patient denies any fevers, chills, lightheadedness, dizziness, syncope, vaginal bleeding, vaginal discharge, dysuria, hematuria, diarrhea, recent hospitalization, recent was, or other acute symptoms or concerns.  Patient does admit to drinking 1 beer last night prior to going to bed but states that she did not drink anything else.  She was also recently mated at [place] to the psychiatry floor.    Nursing Notes were all reviewed and agreed with or any disagreements were addressed in the HPI.    REVIEW OF SYSTEMS :      Review of Systems    Positives and

## 2024-03-20 NOTE — ED NOTES
Pt. Has been resting comfortably sleeping and ambulating to bathroom as needed. Pt. Has had no nausea while in this nurses care.

## 2024-03-31 ENCOUNTER — APPOINTMENT (OUTPATIENT)
Dept: GENERAL RADIOLOGY | Age: 44
DRG: 241 | End: 2024-03-31
Payer: MEDICAID

## 2024-03-31 ENCOUNTER — HOSPITAL ENCOUNTER (INPATIENT)
Age: 44
LOS: 1 days | Discharge: HOME | DRG: 241 | End: 2024-04-01
Attending: EMERGENCY MEDICINE | Admitting: INTERNAL MEDICINE
Payer: MEDICAID

## 2024-03-31 ENCOUNTER — APPOINTMENT (OUTPATIENT)
Dept: CT IMAGING | Age: 44
DRG: 241 | End: 2024-03-31
Payer: MEDICAID

## 2024-03-31 DIAGNOSIS — F10.920 ACUTE ALCOHOLIC INTOXICATION WITHOUT COMPLICATION (HCC): ICD-10-CM

## 2024-03-31 DIAGNOSIS — R11.2 NAUSEA VOMITING AND DIARRHEA: Primary | ICD-10-CM

## 2024-03-31 DIAGNOSIS — R19.7 NAUSEA VOMITING AND DIARRHEA: Primary | ICD-10-CM

## 2024-03-31 DIAGNOSIS — R10.10 PAIN OF UPPER ABDOMEN: ICD-10-CM

## 2024-03-31 DIAGNOSIS — F19.10 POLYSUBSTANCE ABUSE (HCC): ICD-10-CM

## 2024-03-31 PROBLEM — D62 ACUTE BLOOD LOSS ANEMIA: Status: ACTIVE | Noted: 2024-03-31

## 2024-03-31 LAB
ALBUMIN SERPL-MCNC: 4.5 G/DL (ref 3.5–5.2)
ALP SERPL-CCNC: 115 U/L (ref 35–104)
ALT SERPL-CCNC: 21 U/L (ref 0–32)
AMPHET UR QL SCN: NEGATIVE
ANION GAP SERPL CALCULATED.3IONS-SCNC: 13 MMOL/L (ref 7–16)
ANION GAP SERPL CALCULATED.3IONS-SCNC: 14 MMOL/L (ref 7–16)
ANION GAP SERPL CALCULATED.3IONS-SCNC: 19 MMOL/L (ref 7–16)
APAP SERPL-MCNC: <5 UG/ML (ref 10–30)
AST SERPL-CCNC: 23 U/L (ref 0–31)
BACTERIA URNS QL MICRO: ABNORMAL
BARBITURATES UR QL SCN: NEGATIVE
BASOPHILS # BLD: 0.01 K/UL (ref 0–0.2)
BASOPHILS # BLD: 0.02 K/UL (ref 0–0.2)
BASOPHILS # BLD: 0.03 K/UL (ref 0–0.2)
BASOPHILS NFR BLD: 0 % (ref 0–2)
BENZODIAZ UR QL: NEGATIVE
BILIRUB SERPL-MCNC: 0.3 MG/DL (ref 0–1.2)
BILIRUB UR QL STRIP: NEGATIVE
BUN SERPL-MCNC: 15 MG/DL (ref 6–20)
BUN SERPL-MCNC: 16 MG/DL (ref 6–20)
BUN SERPL-MCNC: 18 MG/DL (ref 6–20)
BUPRENORPHINE UR QL: NEGATIVE
CA-I BLD-SCNC: 1.11 MMOL/L (ref 1.15–1.33)
CALCIUM SERPL-MCNC: 8.3 MG/DL (ref 8.6–10.2)
CALCIUM SERPL-MCNC: 8.6 MG/DL (ref 8.6–10.2)
CALCIUM SERPL-MCNC: 8.9 MG/DL (ref 8.6–10.2)
CANNABINOIDS UR QL SCN: NEGATIVE
CHLORIDE SERPL-SCNC: 100 MMOL/L (ref 98–107)
CHLORIDE SERPL-SCNC: 101 MMOL/L (ref 98–107)
CHLORIDE SERPL-SCNC: 97 MMOL/L (ref 98–107)
CK SERPL-CCNC: 108 U/L (ref 20–180)
CLARITY UR: ABNORMAL
CO2 SERPL-SCNC: 17 MMOL/L (ref 22–29)
CO2 SERPL-SCNC: 21 MMOL/L (ref 22–29)
CO2 SERPL-SCNC: 22 MMOL/L (ref 22–29)
COCAINE UR QL SCN: POSITIVE
COLOR UR: YELLOW
CREAT SERPL-MCNC: 0.7 MG/DL (ref 0.5–1)
CREAT SERPL-MCNC: 0.7 MG/DL (ref 0.5–1)
CREAT SERPL-MCNC: 0.8 MG/DL (ref 0.5–1)
EOSINOPHIL # BLD: 0.07 K/UL (ref 0.05–0.5)
EOSINOPHIL # BLD: 0.07 K/UL (ref 0.05–0.5)
EOSINOPHIL # BLD: 0.12 K/UL (ref 0.05–0.5)
EOSINOPHILS RELATIVE PERCENT: 1 % (ref 0–6)
EPI CELLS #/AREA URNS HPF: ABNORMAL /HPF
ERYTHROCYTE [DISTWIDTH] IN BLOOD BY AUTOMATED COUNT: 16.4 % (ref 11.5–15)
ETHANOLAMINE SERPL-MCNC: 97 MG/DL
FENTANYL UR QL: NEGATIVE
FERRITIN SERPL-MCNC: 22 NG/ML
FLUAV RNA RESP QL NAA+PROBE: NOT DETECTED
FLUBV RNA RESP QL NAA+PROBE: NOT DETECTED
FOLATE SERPL-MCNC: >20 NG/ML (ref 4.8–24.2)
GFR SERPL CREATININE-BSD FRML MDRD: >90 ML/MIN/1.73M2
GLUCOSE SERPL-MCNC: 105 MG/DL (ref 74–99)
GLUCOSE SERPL-MCNC: 108 MG/DL (ref 74–99)
GLUCOSE SERPL-MCNC: 77 MG/DL (ref 74–99)
GLUCOSE UR STRIP-MCNC: NEGATIVE MG/DL
HCG, URINE, POC: NEGATIVE
HCT VFR BLD AUTO: 33 % (ref 34–48)
HCT VFR BLD AUTO: 37.9 % (ref 34–48)
HCT VFR BLD AUTO: 40.5 % (ref 34–48)
HCT VFR BLD AUTO: 40.9 % (ref 34–48)
HGB BLD-MCNC: 10.9 G/DL (ref 11.5–15.5)
HGB BLD-MCNC: 12.5 G/DL (ref 11.5–15.5)
HGB BLD-MCNC: 13.3 G/DL (ref 11.5–15.5)
HGB BLD-MCNC: 13.6 G/DL (ref 11.5–15.5)
HGB UR QL STRIP.AUTO: ABNORMAL
IMM GRANULOCYTES # BLD AUTO: 0.03 K/UL (ref 0–0.58)
IMM GRANULOCYTES # BLD AUTO: 0.04 K/UL (ref 0–0.58)
IMM GRANULOCYTES # BLD AUTO: 0.09 K/UL (ref 0–0.58)
IMM GRANULOCYTES NFR BLD: 0 % (ref 0–5)
IMM GRANULOCYTES NFR BLD: 1 % (ref 0–5)
IMM GRANULOCYTES NFR BLD: 1 % (ref 0–5)
IMM RETICS NFR: 13.9 % (ref 3–15.9)
INR PPP: 1
IRON SATN MFR SERPL: 22 % (ref 15–50)
IRON SERPL-MCNC: 90 UG/DL (ref 37–145)
KETONES UR STRIP-MCNC: NEGATIVE MG/DL
LACTATE BLDV-SCNC: 1.5 MMOL/L (ref 0.5–2.2)
LACTATE BLDV-SCNC: 4.6 MMOL/L (ref 0.5–2.2)
LDH SERPL-CCNC: 166 U/L (ref 135–214)
LEUKOCYTE ESTERASE UR QL STRIP: ABNORMAL
LIPASE SERPL-CCNC: 25 U/L (ref 13–60)
LYMPHOCYTES NFR BLD: 1.39 K/UL (ref 1.5–4)
LYMPHOCYTES NFR BLD: 2.15 K/UL (ref 1.5–4)
LYMPHOCYTES NFR BLD: 2.74 K/UL (ref 1.5–4)
LYMPHOCYTES RELATIVE PERCENT: 19 % (ref 20–42)
LYMPHOCYTES RELATIVE PERCENT: 26 % (ref 20–42)
LYMPHOCYTES RELATIVE PERCENT: 28 % (ref 20–42)
Lab: NORMAL
MCH RBC QN AUTO: 26.7 PG (ref 26–35)
MCH RBC QN AUTO: 26.8 PG (ref 26–35)
MCH RBC QN AUTO: 27.1 PG (ref 26–35)
MCHC RBC AUTO-ENTMCNC: 33 G/DL (ref 32–34.5)
MCHC RBC AUTO-ENTMCNC: 33 G/DL (ref 32–34.5)
MCHC RBC AUTO-ENTMCNC: 33.3 G/DL (ref 32–34.5)
MCV RBC AUTO: 80.8 FL (ref 80–99.9)
MCV RBC AUTO: 81.3 FL (ref 80–99.9)
MCV RBC AUTO: 81.5 FL (ref 80–99.9)
METHADONE UR QL: NEGATIVE
MONOCYTES NFR BLD: 0.58 K/UL (ref 0.1–0.95)
MONOCYTES NFR BLD: 0.63 K/UL (ref 0.1–0.95)
MONOCYTES NFR BLD: 0.82 K/UL (ref 0.1–0.95)
MONOCYTES NFR BLD: 8 % (ref 2–12)
MONOCYTES NFR BLD: 8 % (ref 2–12)
MONOCYTES NFR BLD: 9 % (ref 2–12)
NEGATIVE QC PASS/FAIL: NORMAL
NEUTROPHILS NFR BLD: 61 % (ref 43–80)
NEUTROPHILS NFR BLD: 65 % (ref 43–80)
NEUTROPHILS NFR BLD: 72 % (ref 43–80)
NEUTS SEG NFR BLD: 5.31 K/UL (ref 1.8–7.3)
NEUTS SEG NFR BLD: 5.4 K/UL (ref 1.8–7.3)
NEUTS SEG NFR BLD: 5.9 K/UL (ref 1.8–7.3)
NITRITE UR QL STRIP: NEGATIVE
OPIATES UR QL SCN: POSITIVE
OXYCODONE UR QL SCN: NEGATIVE
PARTIAL THROMBOPLASTIN TIME: 31.6 SEC (ref 24.5–35.1)
PCP UR QL SCN: NEGATIVE
PH UR STRIP: 5.5 [PH] (ref 5–9)
PLATELET # BLD AUTO: 214 K/UL (ref 130–450)
PLATELET # BLD AUTO: 220 K/UL (ref 130–450)
PLATELET # BLD AUTO: 236 K/UL (ref 130–450)
PMV BLD AUTO: 9.1 FL (ref 7–12)
PMV BLD AUTO: 9.2 FL (ref 7–12)
PMV BLD AUTO: 9.4 FL (ref 7–12)
POSITIVE QC PASS/FAIL: NORMAL
POTASSIUM SERPL-SCNC: 4.2 MMOL/L (ref 3.5–5)
POTASSIUM SERPL-SCNC: 4.2 MMOL/L (ref 3.5–5)
POTASSIUM SERPL-SCNC: 4.3 MMOL/L (ref 3.5–5)
PROT SERPL-MCNC: 8.2 G/DL (ref 6.4–8.3)
PROT UR STRIP-MCNC: NEGATIVE MG/DL
PROTHROMBIN TIME: 11.4 SEC (ref 9.3–12.4)
RBC # BLD AUTO: 4.06 M/UL (ref 3.5–5.5)
RBC # BLD AUTO: 4.69 M/UL (ref 3.5–5.5)
RBC # BLD AUTO: 5.02 M/UL (ref 3.5–5.5)
RBC #/AREA URNS HPF: ABNORMAL /HPF
RETIC HEMOGLOBIN: 27.5 PG (ref 28.2–36.6)
RETICS # AUTO: 0.07 M/UL
RETICS/RBC NFR AUTO: 1.4 % (ref 0.4–1.9)
SALICYLATES SERPL-MCNC: <0.3 MG/DL (ref 0–30)
SARS-COV-2 RNA RESP QL NAA+PROBE: NOT DETECTED
SODIUM SERPL-SCNC: 133 MMOL/L (ref 132–146)
SODIUM SERPL-SCNC: 135 MMOL/L (ref 132–146)
SODIUM SERPL-SCNC: 136 MMOL/L (ref 132–146)
SOURCE: NORMAL
SP GR UR STRIP: 1.02 (ref 1–1.03)
SPECIMEN DESCRIPTION: NORMAL
TEST INFORMATION: ABNORMAL
TIBC SERPL-MCNC: 402 UG/DL (ref 250–450)
TOXIC TRICYCLIC SC,BLOOD: NEGATIVE
TRICHOMONAS #/AREA URNS HPF: PRESENT /[HPF]
TROPONIN I SERPL HS-MCNC: <6 NG/L (ref 0–9)
TROPONIN I SERPL HS-MCNC: <6 NG/L (ref 0–9)
UROBILINOGEN UR STRIP-ACNC: 0.2 EU/DL (ref 0–1)
VIT B12 SERPL-MCNC: 687 PG/ML (ref 211–946)
WBC #/AREA URNS HPF: ABNORMAL /HPF
WBC OTHER # BLD: 7.4 K/UL (ref 4.5–11.5)
WBC OTHER # BLD: 8.3 K/UL (ref 4.5–11.5)
WBC OTHER # BLD: 9.7 K/UL (ref 4.5–11.5)

## 2024-03-31 PROCEDURE — 36415 COLL VENOUS BLD VENIPUNCTURE: CPT

## 2024-03-31 PROCEDURE — 2500000003 HC RX 250 WO HCPCS

## 2024-03-31 PROCEDURE — 82550 ASSAY OF CK (CPK): CPT

## 2024-03-31 PROCEDURE — 96376 TX/PRO/DX INJ SAME DRUG ADON: CPT

## 2024-03-31 PROCEDURE — 99221 1ST HOSP IP/OBS SF/LOW 40: CPT | Performed by: INTERNAL MEDICINE

## 2024-03-31 PROCEDURE — 85025 COMPLETE CBC W/AUTO DIFF WBC: CPT

## 2024-03-31 PROCEDURE — 99285 EMERGENCY DEPT VISIT HI MDM: CPT

## 2024-03-31 PROCEDURE — 82330 ASSAY OF CALCIUM: CPT

## 2024-03-31 PROCEDURE — 85014 HEMATOCRIT: CPT

## 2024-03-31 PROCEDURE — 81001 URINALYSIS AUTO W/SCOPE: CPT

## 2024-03-31 PROCEDURE — 99222 1ST HOSP IP/OBS MODERATE 55: CPT | Performed by: SURGERY

## 2024-03-31 PROCEDURE — 82746 ASSAY OF FOLIC ACID SERUM: CPT

## 2024-03-31 PROCEDURE — 85730 THROMBOPLASTIN TIME PARTIAL: CPT

## 2024-03-31 PROCEDURE — 96367 TX/PROPH/DG ADDL SEQ IV INF: CPT

## 2024-03-31 PROCEDURE — 74177 CT ABD & PELVIS W/CONTRAST: CPT

## 2024-03-31 PROCEDURE — 6360000002 HC RX W HCPCS: Performed by: EMERGENCY MEDICINE

## 2024-03-31 PROCEDURE — 2580000003 HC RX 258: Performed by: EMERGENCY MEDICINE

## 2024-03-31 PROCEDURE — 84484 ASSAY OF TROPONIN QUANT: CPT

## 2024-03-31 PROCEDURE — 80307 DRUG TEST PRSMV CHEM ANLYZR: CPT

## 2024-03-31 PROCEDURE — A4216 STERILE WATER/SALINE, 10 ML: HCPCS

## 2024-03-31 PROCEDURE — 2580000003 HC RX 258

## 2024-03-31 PROCEDURE — C9113 INJ PANTOPRAZOLE SODIUM, VIA: HCPCS

## 2024-03-31 PROCEDURE — 83690 ASSAY OF LIPASE: CPT

## 2024-03-31 PROCEDURE — G0480 DRUG TEST DEF 1-7 CLASSES: HCPCS

## 2024-03-31 PROCEDURE — 83615 LACTATE (LD) (LDH) ENZYME: CPT

## 2024-03-31 PROCEDURE — G0378 HOSPITAL OBSERVATION PER HR: HCPCS

## 2024-03-31 PROCEDURE — 80053 COMPREHEN METABOLIC PANEL: CPT

## 2024-03-31 PROCEDURE — 6360000002 HC RX W HCPCS

## 2024-03-31 PROCEDURE — 96374 THER/PROPH/DIAG INJ IV PUSH: CPT

## 2024-03-31 PROCEDURE — 80179 DRUG ASSAY SALICYLATE: CPT

## 2024-03-31 PROCEDURE — 85045 AUTOMATED RETICULOCYTE COUNT: CPT

## 2024-03-31 PROCEDURE — 85018 HEMOGLOBIN: CPT

## 2024-03-31 PROCEDURE — 6360000004 HC RX CONTRAST MEDICATION: Performed by: RADIOLOGY

## 2024-03-31 PROCEDURE — 85610 PROTHROMBIN TIME: CPT

## 2024-03-31 PROCEDURE — 80048 BASIC METABOLIC PNL TOTAL CA: CPT

## 2024-03-31 PROCEDURE — 82607 VITAMIN B-12: CPT

## 2024-03-31 PROCEDURE — 6370000000 HC RX 637 (ALT 250 FOR IP)

## 2024-03-31 PROCEDURE — 96361 HYDRATE IV INFUSION ADD-ON: CPT

## 2024-03-31 PROCEDURE — 83540 ASSAY OF IRON: CPT

## 2024-03-31 PROCEDURE — 87086 URINE CULTURE/COLONY COUNT: CPT

## 2024-03-31 PROCEDURE — 93005 ELECTROCARDIOGRAM TRACING: CPT | Performed by: EMERGENCY MEDICINE

## 2024-03-31 PROCEDURE — 96375 TX/PRO/DX INJ NEW DRUG ADDON: CPT

## 2024-03-31 PROCEDURE — 71045 X-RAY EXAM CHEST 1 VIEW: CPT

## 2024-03-31 PROCEDURE — 82728 ASSAY OF FERRITIN: CPT

## 2024-03-31 PROCEDURE — 83550 IRON BINDING TEST: CPT

## 2024-03-31 PROCEDURE — 83605 ASSAY OF LACTIC ACID: CPT

## 2024-03-31 PROCEDURE — 87636 SARSCOV2 & INF A&B AMP PRB: CPT

## 2024-03-31 PROCEDURE — 96366 THER/PROPH/DIAG IV INF ADDON: CPT

## 2024-03-31 PROCEDURE — 96365 THER/PROPH/DIAG IV INF INIT: CPT

## 2024-03-31 PROCEDURE — 2060000000 HC ICU INTERMEDIATE R&B

## 2024-03-31 PROCEDURE — 80143 DRUG ASSAY ACETAMINOPHEN: CPT

## 2024-03-31 RX ORDER — ARFORMOTEROL TARTRATE 15 UG/2ML
15 SOLUTION RESPIRATORY (INHALATION) 2 TIMES DAILY PRN
Status: DISCONTINUED | OUTPATIENT
Start: 2024-03-31 | End: 2024-04-01 | Stop reason: HOSPADM

## 2024-03-31 RX ORDER — GLUCAGON 1 MG/ML
1 KIT INJECTION PRN
Status: DISCONTINUED | OUTPATIENT
Start: 2024-03-31 | End: 2024-04-01 | Stop reason: HOSPADM

## 2024-03-31 RX ORDER — 0.9 % SODIUM CHLORIDE 0.9 %
250 INTRAVENOUS SOLUTION INTRAVENOUS ONCE
Status: COMPLETED | OUTPATIENT
Start: 2024-03-31 | End: 2024-03-31

## 2024-03-31 RX ORDER — ONDANSETRON 2 MG/ML
4 INJECTION INTRAMUSCULAR; INTRAVENOUS ONCE
Status: COMPLETED | OUTPATIENT
Start: 2024-03-31 | End: 2024-03-31

## 2024-03-31 RX ORDER — FOLIC ACID 1 MG/1
1 TABLET ORAL DAILY
Status: DISCONTINUED | OUTPATIENT
Start: 2024-03-31 | End: 2024-04-01 | Stop reason: HOSPADM

## 2024-03-31 RX ORDER — METRONIDAZOLE 500 MG/100ML
500 INJECTION, SOLUTION INTRAVENOUS EVERY 12 HOURS
Status: DISCONTINUED | OUTPATIENT
Start: 2024-03-31 | End: 2024-04-01 | Stop reason: HOSPADM

## 2024-03-31 RX ORDER — AMLODIPINE BESYLATE 5 MG/1
10 TABLET ORAL ONCE
Status: COMPLETED | OUTPATIENT
Start: 2024-03-31 | End: 2024-03-31

## 2024-03-31 RX ORDER — CALCIUM GLUCONATE 10 MG/ML
1000 INJECTION, SOLUTION INTRAVENOUS ONCE
Status: COMPLETED | OUTPATIENT
Start: 2024-03-31 | End: 2024-03-31

## 2024-03-31 RX ORDER — DIPHENHYDRAMINE HYDROCHLORIDE 50 MG/ML
50 INJECTION INTRAMUSCULAR; INTRAVENOUS ONCE
Status: COMPLETED | OUTPATIENT
Start: 2024-03-31 | End: 2024-03-31

## 2024-03-31 RX ORDER — AMLODIPINE BESYLATE 10 MG/1
10 TABLET ORAL EVERY MORNING
Status: DISCONTINUED | OUTPATIENT
Start: 2024-04-01 | End: 2024-04-01 | Stop reason: HOSPADM

## 2024-03-31 RX ORDER — SODIUM CHLORIDE 0.9 % (FLUSH) 0.9 %
5-40 SYRINGE (ML) INJECTION EVERY 12 HOURS SCHEDULED
Status: DISCONTINUED | OUTPATIENT
Start: 2024-03-31 | End: 2024-04-01 | Stop reason: HOSPADM

## 2024-03-31 RX ORDER — 0.9 % SODIUM CHLORIDE 0.9 %
1000 INTRAVENOUS SOLUTION INTRAVENOUS ONCE
Status: COMPLETED | OUTPATIENT
Start: 2024-03-31 | End: 2024-03-31

## 2024-03-31 RX ORDER — DEXTROSE MONOHYDRATE 100 MG/ML
INJECTION, SOLUTION INTRAVENOUS CONTINUOUS PRN
Status: DISCONTINUED | OUTPATIENT
Start: 2024-03-31 | End: 2024-04-01 | Stop reason: HOSPADM

## 2024-03-31 RX ORDER — MORPHINE SULFATE 2 MG/ML
2 INJECTION, SOLUTION INTRAMUSCULAR; INTRAVENOUS ONCE
Status: COMPLETED | OUTPATIENT
Start: 2024-03-31 | End: 2024-03-31

## 2024-03-31 RX ORDER — SODIUM CHLORIDE 9 MG/ML
INJECTION, SOLUTION INTRAVENOUS PRN
Status: DISCONTINUED | OUTPATIENT
Start: 2024-03-31 | End: 2024-04-01 | Stop reason: HOSPADM

## 2024-03-31 RX ORDER — CARBAMAZEPINE 200 MG/1
200 TABLET ORAL 2 TIMES DAILY WITH MEALS
Status: DISCONTINUED | OUTPATIENT
Start: 2024-03-31 | End: 2024-04-01 | Stop reason: HOSPADM

## 2024-03-31 RX ORDER — SODIUM CHLORIDE 0.9 % (FLUSH) 0.9 %
5-40 SYRINGE (ML) INJECTION PRN
Status: DISCONTINUED | OUTPATIENT
Start: 2024-03-31 | End: 2024-04-01 | Stop reason: HOSPADM

## 2024-03-31 RX ORDER — ONDANSETRON 4 MG/1
4 TABLET, ORALLY DISINTEGRATING ORAL EVERY 8 HOURS PRN
Status: DISCONTINUED | OUTPATIENT
Start: 2024-03-31 | End: 2024-04-01 | Stop reason: HOSPADM

## 2024-03-31 RX ORDER — DEXTROSE MONOHYDRATE, SODIUM CHLORIDE, AND POTASSIUM CHLORIDE 50; 2.98; 4.5 G/1000ML; G/1000ML; G/1000ML
INJECTION, SOLUTION INTRAVENOUS CONTINUOUS
Status: DISCONTINUED | OUTPATIENT
Start: 2024-03-31 | End: 2024-03-31

## 2024-03-31 RX ORDER — DEXTROSE AND SODIUM CHLORIDE 5; .45 G/100ML; G/100ML
INJECTION, SOLUTION INTRAVENOUS CONTINUOUS
Status: ACTIVE | OUTPATIENT
Start: 2024-03-31 | End: 2024-03-31

## 2024-03-31 RX ORDER — LANOLIN ALCOHOL/MO/W.PET/CERES
100 CREAM (GRAM) TOPICAL DAILY
Status: DISCONTINUED | OUTPATIENT
Start: 2024-03-31 | End: 2024-04-01 | Stop reason: HOSPADM

## 2024-03-31 RX ORDER — GABAPENTIN 600 MG/1
600 TABLET ORAL 3 TIMES DAILY
COMMUNITY

## 2024-03-31 RX ORDER — ONDANSETRON 2 MG/ML
4 INJECTION INTRAMUSCULAR; INTRAVENOUS EVERY 6 HOURS PRN
Status: DISCONTINUED | OUTPATIENT
Start: 2024-03-31 | End: 2024-04-01 | Stop reason: HOSPADM

## 2024-03-31 RX ORDER — POLYETHYLENE GLYCOL 3350 17 G/17G
17 POWDER, FOR SOLUTION ORAL DAILY PRN
Status: DISCONTINUED | OUTPATIENT
Start: 2024-03-31 | End: 2024-04-01 | Stop reason: HOSPADM

## 2024-03-31 RX ORDER — AMLODIPINE BESYLATE 5 MG/1
10 TABLET ORAL EVERY MORNING
Status: DISCONTINUED | OUTPATIENT
Start: 2024-03-31 | End: 2024-03-31

## 2024-03-31 RX ADMIN — METRONIDAZOLE 500 MG: 500 INJECTION, SOLUTION INTRAVENOUS at 14:51

## 2024-03-31 RX ADMIN — DIPHENHYDRAMINE HYDROCHLORIDE 50 MG: 50 INJECTION, SOLUTION INTRAMUSCULAR; INTRAVENOUS at 05:49

## 2024-03-31 RX ADMIN — ONDANSETRON 4 MG: 2 INJECTION INTRAMUSCULAR; INTRAVENOUS at 01:55

## 2024-03-31 RX ADMIN — DEXTROSE AND SODIUM CHLORIDE: 5; 450 INJECTION, SOLUTION INTRAVENOUS at 15:57

## 2024-03-31 RX ADMIN — SODIUM CHLORIDE, PRESERVATIVE FREE 10 ML: 5 INJECTION INTRAVENOUS at 20:53

## 2024-03-31 RX ADMIN — SODIUM CHLORIDE 250 ML: 9 INJECTION, SOLUTION INTRAVENOUS at 17:41

## 2024-03-31 RX ADMIN — MORPHINE SULFATE 2 MG: 2 INJECTION, SOLUTION INTRAMUSCULAR; INTRAVENOUS at 01:58

## 2024-03-31 RX ADMIN — DEXTROSE AND SODIUM CHLORIDE: 5; 450 INJECTION, SOLUTION INTRAVENOUS at 14:16

## 2024-03-31 RX ADMIN — PANTOPRAZOLE SODIUM 40 MG: 40 INJECTION, POWDER, FOR SOLUTION INTRAVENOUS at 20:53

## 2024-03-31 RX ADMIN — CARBAMAZEPINE 200 MG: 200 TABLET ORAL at 18:32

## 2024-03-31 RX ADMIN — FOLIC ACID 1 MG: 1 TABLET ORAL at 09:19

## 2024-03-31 RX ADMIN — CALCIUM GLUCONATE 1000 MG: 10 INJECTION, SOLUTION INTRAVENOUS at 18:44

## 2024-03-31 RX ADMIN — MORPHINE SULFATE 2 MG: 2 INJECTION, SOLUTION INTRAMUSCULAR; INTRAVENOUS at 04:19

## 2024-03-31 RX ADMIN — LIDOCAINE HYDROCHLORIDE: 20 SOLUTION ORAL at 03:29

## 2024-03-31 RX ADMIN — IOPAMIDOL 75 ML: 755 INJECTION, SOLUTION INTRAVENOUS at 05:01

## 2024-03-31 RX ADMIN — FAMOTIDINE 20 MG: 10 INJECTION, SOLUTION INTRAVENOUS at 01:56

## 2024-03-31 RX ADMIN — PANTOPRAZOLE SODIUM 80 MG: 40 INJECTION, POWDER, FOR SOLUTION INTRAVENOUS at 10:53

## 2024-03-31 RX ADMIN — AMLODIPINE BESYLATE 10 MG: 5 TABLET ORAL at 06:12

## 2024-03-31 RX ADMIN — CARBAMAZEPINE 200 MG: 200 TABLET ORAL at 10:00

## 2024-03-31 RX ADMIN — Medication 100 MG: at 09:19

## 2024-03-31 RX ADMIN — SODIUM CHLORIDE 1000 ML: 9 INJECTION, SOLUTION INTRAVENOUS at 02:04

## 2024-03-31 ASSESSMENT — LIFESTYLE VARIABLES
HOW OFTEN DO YOU HAVE A DRINK CONTAINING ALCOHOL: 2-4 TIMES A MONTH
HOW MANY STANDARD DRINKS CONTAINING ALCOHOL DO YOU HAVE ON A TYPICAL DAY: 3 OR 4
HOW MANY STANDARD DRINKS CONTAINING ALCOHOL DO YOU HAVE ON A TYPICAL DAY: 3 OR 4
HOW OFTEN DO YOU HAVE A DRINK CONTAINING ALCOHOL: 2-4 TIMES A MONTH

## 2024-03-31 ASSESSMENT — PAIN DESCRIPTION - DESCRIPTORS
DESCRIPTORS: ACHING
DESCRIPTORS: ACHING;CRAMPING;SHOOTING;SHARP

## 2024-03-31 ASSESSMENT — PAIN SCALES - GENERAL
PAINLEVEL_OUTOF10: 8
PAINLEVEL_OUTOF10: 0

## 2024-03-31 ASSESSMENT — PAIN DESCRIPTION - LOCATION
LOCATION: ABDOMEN
LOCATION: ABDOMEN

## 2024-03-31 ASSESSMENT — PAIN DESCRIPTION - ORIENTATION
ORIENTATION: LEFT;MID;RIGHT
ORIENTATION: MID;UPPER

## 2024-03-31 ASSESSMENT — PAIN DESCRIPTION - PAIN TYPE: TYPE: ACUTE PAIN

## 2024-03-31 ASSESSMENT — PAIN DESCRIPTION - FREQUENCY: FREQUENCY: CONTINUOUS

## 2024-03-31 ASSESSMENT — PAIN - FUNCTIONAL ASSESSMENT: PAIN_FUNCTIONAL_ASSESSMENT: 0-10

## 2024-03-31 NOTE — PLAN OF CARE
Problem: Discharge Planning  Goal: Discharge to home or other facility with appropriate resources  Outcome: Progressing  Flowsheets  Taken 3/31/2024 1615  Discharge to home or other facility with appropriate resources: Identify barriers to discharge with patient and caregiver  Taken 3/31/2024 1606  Discharge to home or other facility with appropriate resources: Identify barriers to discharge with patient and caregiver

## 2024-03-31 NOTE — ED NOTES
Report given to MYNOR Biggs from [facility].   Report method by phone   The following was reviewed with receiving RN:   Current vital signs:  BP (!) 142/91   Pulse 94   Temp 98.2 °F (36.8 °C) (Oral)   Resp 18   Ht 1.499 m (4' 11\")   Wt 72.6 kg (160 lb)   LMP  (LMP Unknown)   SpO2 99%   BMI 32.32 kg/m²                MEWS Score: 1     Any medication or safety alerts were reviewed. Any pending diagnostics and notifications were also reviewed, as well as any safety concerns or issues, abnormal labs, abnormal imaging, and abnormal assessment findings. Questions were answered.

## 2024-03-31 NOTE — ED NOTES
Pt had bowel movement when throwing up. Pt cleaned up and new green pad paced. Pt states no additional needs at this time

## 2024-03-31 NOTE — PROGRESS NOTES
IM HT1 resident messaged via perfect serve to notify that surgery has no plans for inpatient scopes and repeat H/H 12.5/37.9 and does she still need to be admitted and if so, does she need telemetry.  Dr Arnold taking messages

## 2024-03-31 NOTE — ED NOTES
Pt insisted on walking back from bathroom unassisted, fall band places on pt and non slip socks, pt advised to please call for assistance wit mobilizing, pt usually walks with a cane

## 2024-03-31 NOTE — ED NOTES
ATTENDING PROVIDER ATTESTATION:     I have personally performed and/or participated in the history, exam, medical decision making, and procedures and agree with all pertinent clinical information.      I have also reviewed and agree with the past medical, family and social history unless otherwise noted.    I have discussed this patient in detail with the resident, and provided the instruction and education regarding abdominal pain chest pain.    My findings/Plan: I was the primary provider for patient patient with history of asthma history of bronchitis history of seizure disorder history of suicide attempt presenting here because of upper abdominal pain.  Patient reports the pain started several days ago she reports nausea vomiting today.  She reports no hematemesis but does report dark stools.  Patient reporting no fever no chills she does report pains in her chest related to her vomiting repeatedly.  Patient reporting no syncopal event she reports no calf pain or swelling she reports no headache she reports no productive cough.  Patient is awake alert orient x 3 heart exam normal lungs are clear she has tenderness in her upper abdomen.  There is no rebound or guarding she has normal strength in all extremities there is no edema.  Patient does smoke.  Patient was last seen  On 3/20/2024 for alcohol intoxication at [facility] there is no recent echo or stress test on review of chart.  Patient differential includes microinfarction as well as pancreatitis as well as gastritis as well as upper GI bleed.      EKG:  This EKG is signed and interpreted by me.    Rate: 95  Rhythm: Sinus  Interpretation: non-specific EKG  Comparison: stable as compared to patient's most recent EKG 3/20/24  Patient while here in the emergency room had lab work obtained sodium is 135 potassium is 4.3 patient's BUN is 18 creatinine 0.8 glucose is 105 patient's white count was 9.7 hemoglobin 12.5 patient's platelet count was 220 patient's

## 2024-03-31 NOTE — H&P
[facility]  Internal Medicine Residency Program  History and Physical    Patient:  Rachele Lomas 43 y.o. female   MRN: [id number]       Date of Service: 3/31/2024          Chief complaint: had concerns including Abdominal Pain, Nausea, and Diarrhea (States black stool. ).    History of Present Illness   The patient is a 43 y.o. female , with past medical history of asthma, seizure disorder, suicidal attempts, alcohol intoxication, presented with the complaint of upper abdominal pain and dark stool.  History obtained from the patient.  According to the patient she has been experiencing epigastric pain for last 2 days which has worsened yesterday.  Patient drank 2 beers and 1 shot yesterday due to her mother's passing.  She also had spicy spaghetti last night.  Patient also mentions having diarrhea for last 2 days.  She had 1 episode of black stool yesterday.  Patient has been experiencing dry heaving for last 2 to 3 days but denies any vomiting.  No previous history of dark stool.  Patient denies any burning with urination, no vaginal discharge, but has some itching on vulvar skin. Patient mentions she usually drinks very occasionally about once a month.  She denies any recreational drug abuse.    Of note, patient has several recent ED visits and hospitalizations for alcohol intoxication, substance induced mood disorders, PTSD.      ED Course: In the ED the patient was hypertensive 168/105.  CBC showed hemoglobin 10.9 (14.2 on 3/20/2024), lactic acid 4.6.  COVID and influenza negative.  Troponin <6.  SDS positive for ethanol 97, CMP showing anion gap 19, bicarb 17, alk phos 115.  EKG was done.  Lipase 25, normal.  Chest x-ray shows no acute process.  Urine pregnancy test negative.  Urinalysis showed slightly cloudy urine with moderate hemoglobin, small leukocyte esterase, 6-9 WBC, 6-9 RBC, 3+ bacteria and trichomonas present.  UDS positive for cocaine and opiates.  CT abdomen pelvis with IV

## 2024-03-31 NOTE — PROGRESS NOTES
4 Eyes Skin Assessment     NAME:  Rachele Lomas  YOB: 1980  MEDICAL RECORD NUMBER:  [id number]    The patient is being assessed for  Admission    I agree that at least one RN has performed a thorough Head to Toe Skin Assessment on the patient. ALL assessment sites listed below have been assessed.      Areas assessed by both nurses:    Head, Face, Ears, Shoulders, Back, Chest, Arms, Elbows, Hands, Sacrum. Buttock, Coccyx, Ischium, and Legs. Feet and Heels        Does the Patient have a Wound? No noted wound(s)       Familia Prevention initiated by RN: No  Wound Care Orders initiated by RN: No    Pressure Injury (Stage 3,4, Unstageable, DTI, NWPT, and Complex wounds) if present, place Wound referral order by RN under : No    New Ostomies, if present place, Ostomy referral order under : No     Nurse 1 eSignature: Electronically signed by Dian Nowak RN on 3/31/24 at 5:17 PM EDT    **SHARE this note so that the co-signing nurse can place an eSignature**    Nurse 2 eSignature: Electronically signed by Shari Moya RN on 4/1/24 at 10:55 AM EDT

## 2024-03-31 NOTE — CONSULTS
GENERAL SURGERY  CONSULT NOTE    Patient's Name/Date of Birth: Rachele Lomas / 1980    Date: March 31, 2024     PCP: Stefan Contreras MD     Chief Complaint:   Chief Complaint   Patient presents with    Abdominal Pain    Nausea    Diarrhea     States black stool.        Physician Consulted: Dr. Reveles  Reason for Consult: UGI bleed      HPI  Rachele Lomas is a 43 y.o. female with history of alcohol abuse, tobacco abuse, substance abuse, seizure disorder, cholecystectomy, EGD in 2022 showing mild gastritis with superficial antral erosions, additionally EGD completed in 2022 showing normal colon and terminal ileum.  Patient presents to the ED secondary to nausea and dry heaving as well as some nonspecific epigastric pain.  Per ED note patient believes she is 5 months pregnant, and additionally mentioned possibly breathing in fumes from her uncle's house; patient does not mention any of this during our encounter.  Patient recently discharged from ED 3/20 after workup for generalized abdominal pain and acute alcohol intoxication.  Additionally patient recently discharged from inpatient psychiatry for altered mental status with hallucinations secondary to substance abuse and alcohol intoxication.  During encounter patient reports she only has a beer\" 1-2 times a month\" when asked if she also drinks liquor she reports she had a shot yesterday but states that this was only because she is in the process of losing her mother.  Patient states that she has been nauseated and reports dry heaving with very minimal vomitus.  She denies any hematemesis or hematochezia.  She reports 1 episode of melena yesterday, no episodes since then, reports her bowel movements have been normal since that episode.    CT abdomen and pelvis reviewed, no evidence of acute pathology to explain patient's generalized abdominal pain.  No evidence of GI bleed.  Patient was hypertensive upon arrival, afebrile, nontachycardic, saturating 99% on

## 2024-03-31 NOTE — ED PROVIDER NOTES
[facility]  EMERGENCY DEPARTMENT ENCOUNTER        Pt Name: Rachele Lomas  MRN: [id number]  Birthdate 1980  Date of evaluation: 3/31/2024  Provider: Gideon Ross DO  PCP: Stefan Contreras MD  Note Started: 2:03 AM EDT 3/31/24    CHIEF COMPLAINT       Chief Complaint   Patient presents with    Abdominal Pain    Nausea    Diarrhea     States black stool.        HISTORY OF PRESENT ILLNESS: 1 or more Elements   History From: Patient      Rachele Lomas is a 43 y.o. female who presents to the emergency department via ambulance for abdominal pain and dark stools.  Patient states she has had abdominal pain for the last 3 days.  Patient states today she had dark stools.  Patient denies any recent illness or fever or chills.  Patient has noted with associated symptoms.  Patient denies any recent travel.  Patient states she has a history of an ulcer.  Patient has had 1 episode of emesis.  Patient denies any shortness of breath associated with this.  Patient has no history of bowel obstructions.  Patient does have a history of a cholecystectomy.  Patient's pain is located on the left side.    Review of Systems   Constitutional:  Negative for appetite change, chills, fatigue and fever.   HENT:  Negative for congestion, rhinorrhea and sore throat.    Eyes:  Negative for photophobia and visual disturbance.   Respiratory:  Negative for cough, chest tightness and shortness of breath.    Cardiovascular:  Negative for chest pain and palpitations.   Gastrointestinal:  Positive for abdominal pain, nausea and vomiting. Negative for diarrhea.   Endocrine: Negative for polydipsia and polyuria.   Genitourinary:  Negative for dysuria.   Musculoskeletal:  Negative for back pain and neck pain.   Skin:  Negative for rash.   Neurological:  Negative for dizziness and headaches.         Nursing Notes were all reviewed and agreed with or any disagreements were addressed in the HPI.    REVIEW OF SYSTEMS :      Positives and Pertinent

## 2024-04-01 VITALS
HEIGHT: 59 IN | HEART RATE: 73 BPM | TEMPERATURE: 98.2 F | OXYGEN SATURATION: 98 % | BODY MASS INDEX: 32.25 KG/M2 | SYSTOLIC BLOOD PRESSURE: 148 MMHG | DIASTOLIC BLOOD PRESSURE: 40 MMHG | RESPIRATION RATE: 20 BRPM | WEIGHT: 160 LBS

## 2024-04-01 LAB
ANION GAP SERPL CALCULATED.3IONS-SCNC: 12 MMOL/L (ref 7–16)
BASOPHILS # BLD: 0.01 K/UL (ref 0–0.2)
BASOPHILS NFR BLD: 0 % (ref 0–2)
BUN SERPL-MCNC: 7 MG/DL (ref 6–20)
CALCIUM SERPL-MCNC: 8.8 MG/DL (ref 8.6–10.2)
CHLORIDE SERPL-SCNC: 102 MMOL/L (ref 98–107)
CO2 SERPL-SCNC: 23 MMOL/L (ref 22–29)
CREAT SERPL-MCNC: 0.7 MG/DL (ref 0.5–1)
EOSINOPHIL # BLD: 0.08 K/UL (ref 0.05–0.5)
EOSINOPHILS RELATIVE PERCENT: 1 % (ref 0–6)
ERYTHROCYTE [DISTWIDTH] IN BLOOD BY AUTOMATED COUNT: 16 % (ref 11.5–15)
GFR SERPL CREATININE-BSD FRML MDRD: >90 ML/MIN/1.73M2
GLUCOSE SERPL-MCNC: 103 MG/DL (ref 74–99)
HCT VFR BLD AUTO: 39.1 % (ref 34–48)
HGB BLD-MCNC: 13 G/DL (ref 11.5–15.5)
IMM GRANULOCYTES # BLD AUTO: <0.03 K/UL (ref 0–0.58)
IMM GRANULOCYTES NFR BLD: 0 % (ref 0–5)
LYMPHOCYTES NFR BLD: 1.57 K/UL (ref 1.5–4)
LYMPHOCYTES RELATIVE PERCENT: 26 % (ref 20–42)
MCH RBC QN AUTO: 27 PG (ref 26–35)
MCHC RBC AUTO-ENTMCNC: 33.2 G/DL (ref 32–34.5)
MCV RBC AUTO: 81.1 FL (ref 80–99.9)
MONOCYTES NFR BLD: 0.62 K/UL (ref 0.1–0.95)
MONOCYTES NFR BLD: 10 % (ref 2–12)
NEUTROPHILS NFR BLD: 62 % (ref 43–80)
NEUTS SEG NFR BLD: 3.72 K/UL (ref 1.8–7.3)
PATH REV BLD -IMP: NORMAL
PLATELET # BLD AUTO: 206 K/UL (ref 130–450)
PMV BLD AUTO: 9.4 FL (ref 7–12)
POTASSIUM SERPL-SCNC: 3.7 MMOL/L (ref 3.5–5)
RBC # BLD AUTO: 4.82 M/UL (ref 3.5–5.5)
SODIUM SERPL-SCNC: 137 MMOL/L (ref 132–146)
WBC OTHER # BLD: 6 K/UL (ref 4.5–11.5)

## 2024-04-01 PROCEDURE — G0378 HOSPITAL OBSERVATION PER HR: HCPCS

## 2024-04-01 PROCEDURE — 6370000000 HC RX 637 (ALT 250 FOR IP)

## 2024-04-01 PROCEDURE — 99238 HOSP IP/OBS DSCHRG MGMT 30/<: CPT | Performed by: INTERNAL MEDICINE

## 2024-04-01 PROCEDURE — 96376 TX/PRO/DX INJ SAME DRUG ADON: CPT

## 2024-04-01 PROCEDURE — 85025 COMPLETE CBC W/AUTO DIFF WBC: CPT

## 2024-04-01 PROCEDURE — 2580000003 HC RX 258

## 2024-04-01 PROCEDURE — 96366 THER/PROPH/DIAG IV INF ADDON: CPT

## 2024-04-01 PROCEDURE — 6360000002 HC RX W HCPCS

## 2024-04-01 PROCEDURE — C9113 INJ PANTOPRAZOLE SODIUM, VIA: HCPCS

## 2024-04-01 PROCEDURE — 80048 BASIC METABOLIC PNL TOTAL CA: CPT

## 2024-04-01 PROCEDURE — A4216 STERILE WATER/SALINE, 10 ML: HCPCS

## 2024-04-01 PROCEDURE — 36415 COLL VENOUS BLD VENIPUNCTURE: CPT

## 2024-04-01 RX ORDER — METRONIDAZOLE 500 MG/1
2000 TABLET ORAL ONCE
Qty: 4 TABLET | Refills: 0 | Status: SHIPPED | OUTPATIENT
Start: 2024-04-01 | End: 2024-04-01

## 2024-04-01 RX ORDER — PANTOPRAZOLE SODIUM 40 MG/1
40 TABLET, DELAYED RELEASE ORAL
Qty: 14 TABLET | Refills: 0 | Status: SHIPPED | OUTPATIENT
Start: 2024-04-01 | End: 2024-04-15

## 2024-04-01 RX ADMIN — CARBAMAZEPINE 200 MG: 200 TABLET ORAL at 09:17

## 2024-04-01 RX ADMIN — METRONIDAZOLE 500 MG: 500 INJECTION, SOLUTION INTRAVENOUS at 01:13

## 2024-04-01 RX ADMIN — FOLIC ACID 1 MG: 1 TABLET ORAL at 09:17

## 2024-04-01 RX ADMIN — Medication 100 MG: at 09:17

## 2024-04-01 RX ADMIN — PANTOPRAZOLE SODIUM 40 MG: 40 INJECTION, POWDER, FOR SOLUTION INTRAVENOUS at 09:16

## 2024-04-01 RX ADMIN — SODIUM CHLORIDE, PRESERVATIVE FREE 10 ML: 5 INJECTION INTRAVENOUS at 09:18

## 2024-04-01 RX ADMIN — AMLODIPINE BESYLATE 10 MG: 10 TABLET ORAL at 09:17

## 2024-04-01 ASSESSMENT — PAIN SCALES - GENERAL
PAINLEVEL_OUTOF10: 0

## 2024-04-01 ASSESSMENT — PAIN DESCRIPTION - LOCATION: LOCATION: ABDOMEN

## 2024-04-01 NOTE — DISCHARGE SUMMARY
Phone: [phone]   metroNIDAZOLE 500 MG tablet  pantoprazole 40 MG tablet        Internal medicine    Follow ups:  Please follow up with your primary care physician ( ) within 10 days of discharge from hospital. Please call as soon as possible to make an appointment. Please contact the internal medicine clinic for an appointment if you are unable to get an appointment with your PCP.   Other Follow-Ups:  No future appointments.    Changes in healthcare   Please take all medications as indicated  Diet: regular diet   Activity: activity as tolerated  New Medications started during this hospital stay  Metronidazole - no alcohol  Changes to your medications  none  Medications you should stop taking   none  Additional labs, testing or imaging needed after discharge   None  New medications, changes to your medications and medications you should stop taking are reflected in your After Visit Summary.   Other than any new prescriptions given to you today, the list of home going meds on this After Visit Summary are based on information provided to us from you. This information, including the list, dose, and frequency of medications is only as accurate as the information you provided. If you have any questions or concerns about your home medications, please contact your Primary Care Physician for further clarification.  Please contact us if you have any concerns, wish to change or make an appointment:  Internal medicine clinic   Phone: [phone]  Fax: [phone]  [facility] [address]  Or please call the nurse line at [phone].  Should you have further questions in regards to this visit, you can review your clinical note and after visit summary document on your [employer] account.  Other questions can be directed to our nurse line at [phone].      Zenaida Zepeda MD  PGY- 1   4:05 PM 4/1/2024      Attending physician: Dr. Santos

## 2024-04-01 NOTE — PLAN OF CARE
Problem: Discharge Planning  Goal: Discharge to home or other facility with appropriate resources  4/1/2024 0927 by Daniel Sanchez, RN  Outcome: Progressing  Flowsheets (Taken 4/1/2024 0700)  Discharge to home or other facility with appropriate resources: Identify barriers to discharge with patient and caregiver  3/31/2024 2217 by Ayleen Espana, RN  Outcome: Progressing

## 2024-04-01 NOTE — CARE COORDINATION
Medications: No  Meds-to-Beds request: Yes      [facility] - [place], [place] - [address] - P [phone] - F [phone]  [facility] [address] [place] [address]  Phone: [phone] Fax: [phone]      Notes:    Factors facilitating achievement of predicted outcomes: Cooperative    Barriers to discharge: Medical complications    Additional Case Management Notes:     The Plan for Transition of Care is related to the following treatment goals of Acute blood loss anemia [D62]  Polysubstance abuse (HCC) [F19.10]  Nausea vomiting and diarrhea [R11.2, R19.7]  Pain of upper abdomen [R10.10]  Acute alcoholic intoxication without complication (HCC) [F10.920]    IF APPLICABLE: The Patient and/or patient representative Rahcele and her family were provided with a choice of provider and agrees with the discharge plan. Freedom of choice list with basic dialogue that supports the patient's individualized plan of care/goals and shares the quality data associated with the providers was provided to:     Patient Representative Name:       The Patient and/or Patient Representative Agree with the Discharge Plan?      Mariel Rudolph RN  Case Management Department  Ph: [phone] Fax:

## 2024-04-01 NOTE — DISCHARGE INSTRUCTIONS
Internal medicine    Follow ups:  Please follow up with your primary care physician ( ) within 10 days of discharge from hospital. Please call as soon as possible to make an appointment. Please contact the internal medicine clinic for an appointment if you are unable to get an appointment with your PCP.   Other Follow-Ups:  No future appointments.    Changes in healthcare   Please take all medications as indicated  Diet: regular diet   Activity: activity as tolerated  New Medications started during this hospital stay  Metronidazole - no alcohol  Changes to your medications  none  Medications you should stop taking   none  Additional labs, testing or imaging needed after discharge   None  New medications, changes to your medications and medications you should stop taking are reflected in your After Visit Summary.   Other than any new prescriptions given to you today, the list of home going meds on this After Visit Summary are based on information provided to us from you. This information, including the list, dose, and frequency of medications is only as accurate as the information you provided. If you have any questions or concerns about your home medications, please contact your Primary Care Physician for further clarification.  Please contact us if you have any concerns, wish to change or make an appointment:  Internal medicine clinic   Phone: [phone]  Fax: [phone] [address] [facility] [address]  Or please call the nurse line at [phone].  Should you have further questions in regards to this visit, you can review your clinical note and after visit summary document on your [employer] account.  Other questions can be directed to our nurse line at [phone].

## 2024-04-01 NOTE — PROGRESS NOTES
[facility]  Internal Medicine Residency / House Medicine Service    Attending Physician Statement  I have discussed the case, including pertinent history and exam findings with the resident and the team.  I have seen and examined the patient and the key elements of the encounter have been performed by me.  I agree with the assessment, plan and orders as documented by the resident.      A&O  Much less epigastric pain on PPI  Abdomen soft  No further melena  Hb stable>13  Impression; probable ETOH hemorrhagic gastritis                      Now Stable  Plan Discharge     Remainder of medical problems as per resident note.      Rashard Santos MD FRCP [facility]  Internal Medicine Residency Faculty

## 2024-04-02 LAB
EKG ATRIAL RATE: 95 BPM
EKG P AXIS: 57 DEGREES
EKG P-R INTERVAL: 134 MS
EKG Q-T INTERVAL: 374 MS
EKG QRS DURATION: 78 MS
EKG QTC CALCULATION (BAZETT): 469 MS
EKG R AXIS: 76 DEGREES
EKG T AXIS: 33 DEGREES
EKG VENTRICULAR RATE: 95 BPM

## 2024-04-03 LAB
MICROORGANISM SPEC CULT: ABNORMAL
SPECIMEN DESCRIPTION: ABNORMAL

## 2024-04-04 ENCOUNTER — HOSPITAL ENCOUNTER (EMERGENCY)
Age: 44
Discharge: HOME | End: 2024-04-04
Attending: EMERGENCY MEDICINE
Payer: MEDICARE

## 2024-04-04 ENCOUNTER — APPOINTMENT (OUTPATIENT)
Dept: GENERAL RADIOLOGY | Age: 44
End: 2024-04-04
Payer: MEDICARE

## 2024-04-04 ENCOUNTER — APPOINTMENT (OUTPATIENT)
Dept: CT IMAGING | Age: 44
End: 2024-04-04
Attending: EMERGENCY MEDICINE
Payer: MEDICARE

## 2024-04-04 VITALS
SYSTOLIC BLOOD PRESSURE: 160 MMHG | TEMPERATURE: 98.3 F | HEIGHT: 59 IN | BODY MASS INDEX: 32.25 KG/M2 | RESPIRATION RATE: 19 BRPM | DIASTOLIC BLOOD PRESSURE: 83 MMHG | HEART RATE: 68 BPM | OXYGEN SATURATION: 98 % | WEIGHT: 160 LBS

## 2024-04-04 DIAGNOSIS — R10.10 UPPER ABDOMINAL PAIN: Primary | ICD-10-CM

## 2024-04-04 DIAGNOSIS — I10 ESSENTIAL HYPERTENSION: ICD-10-CM

## 2024-04-04 DIAGNOSIS — R11.0 NAUSEA: ICD-10-CM

## 2024-04-04 LAB
ALBUMIN SERPL-MCNC: 4 G/DL (ref 3.5–5.2)
ALP SERPL-CCNC: 109 U/L (ref 35–104)
ALT SERPL-CCNC: 15 U/L (ref 0–32)
ANION GAP SERPL CALCULATED.3IONS-SCNC: 11 MMOL/L (ref 7–16)
AST SERPL-CCNC: 18 U/L (ref 0–31)
BASOPHILS # BLD: 0.01 K/UL (ref 0–0.2)
BASOPHILS NFR BLD: 0 % (ref 0–2)
BILIRUB SERPL-MCNC: 0.2 MG/DL (ref 0–1.2)
BILIRUB UR QL STRIP: NEGATIVE
BUN SERPL-MCNC: 11 MG/DL (ref 6–20)
CALCIUM SERPL-MCNC: 8.6 MG/DL (ref 8.6–10.2)
CHLORIDE SERPL-SCNC: 100 MMOL/L (ref 98–107)
CK SERPL-CCNC: 99 U/L (ref 20–180)
CLARITY UR: CLEAR
CO2 SERPL-SCNC: 23 MMOL/L (ref 22–29)
COLOR UR: YELLOW
CREAT SERPL-MCNC: 0.6 MG/DL (ref 0.5–1)
EKG ATRIAL RATE: 83 BPM
EKG P AXIS: 51 DEGREES
EKG P-R INTERVAL: 138 MS
EKG Q-T INTERVAL: 372 MS
EKG QRS DURATION: 78 MS
EKG QTC CALCULATION (BAZETT): 437 MS
EKG R AXIS: 73 DEGREES
EKG T AXIS: 14 DEGREES
EKG VENTRICULAR RATE: 83 BPM
EOSINOPHIL # BLD: 0.06 K/UL (ref 0.05–0.5)
EOSINOPHILS RELATIVE PERCENT: 1 % (ref 0–6)
ERYTHROCYTE [DISTWIDTH] IN BLOOD BY AUTOMATED COUNT: 15.9 % (ref 11.5–15)
GFR SERPL CREATININE-BSD FRML MDRD: >90 ML/MIN/1.73M2
GLUCOSE SERPL-MCNC: 91 MG/DL (ref 74–99)
GLUCOSE UR STRIP-MCNC: NEGATIVE MG/DL
HCG UR QL: NEGATIVE
HCT VFR BLD AUTO: 37.4 % (ref 34–48)
HGB BLD-MCNC: 12.2 G/DL (ref 11.5–15.5)
HGB UR QL STRIP.AUTO: ABNORMAL
IMM GRANULOCYTES # BLD AUTO: 0.04 K/UL (ref 0–0.58)
IMM GRANULOCYTES NFR BLD: 1 % (ref 0–5)
KETONES UR STRIP-MCNC: NEGATIVE MG/DL
LEUKOCYTE ESTERASE UR QL STRIP: NEGATIVE
LIPASE SERPL-CCNC: 42 U/L (ref 13–60)
LYMPHOCYTES NFR BLD: 1.73 K/UL (ref 1.5–4)
LYMPHOCYTES RELATIVE PERCENT: 22 % (ref 20–42)
MCH RBC QN AUTO: 26.3 PG (ref 26–35)
MCHC RBC AUTO-ENTMCNC: 32.6 G/DL (ref 32–34.5)
MCV RBC AUTO: 80.8 FL (ref 80–99.9)
MONOCYTES NFR BLD: 0.69 K/UL (ref 0.1–0.95)
MONOCYTES NFR BLD: 9 % (ref 2–12)
NEUTROPHILS NFR BLD: 68 % (ref 43–80)
NEUTS SEG NFR BLD: 5.38 K/UL (ref 1.8–7.3)
NITRITE UR QL STRIP: NEGATIVE
PH UR STRIP: 6 [PH] (ref 5–9)
PLATELET # BLD AUTO: 181 K/UL (ref 130–450)
PMV BLD AUTO: 9.5 FL (ref 7–12)
POTASSIUM SERPL-SCNC: 3.6 MMOL/L (ref 3.5–5)
PROT SERPL-MCNC: 7.3 G/DL (ref 6.4–8.3)
PROT UR STRIP-MCNC: NEGATIVE MG/DL
RBC # BLD AUTO: 4.63 M/UL (ref 3.5–5.5)
RBC #/AREA URNS HPF: ABNORMAL /HPF
SODIUM SERPL-SCNC: 134 MMOL/L (ref 132–146)
SP GR UR STRIP: 1.02 (ref 1–1.03)
TROPONIN I SERPL HS-MCNC: <6 NG/L (ref 0–9)
UROBILINOGEN UR STRIP-ACNC: 0.2 EU/DL (ref 0–1)
WBC #/AREA URNS HPF: ABNORMAL /HPF
WBC OTHER # BLD: 7.9 K/UL (ref 4.5–11.5)

## 2024-04-04 PROCEDURE — 83690 ASSAY OF LIPASE: CPT

## 2024-04-04 PROCEDURE — C9113 INJ PANTOPRAZOLE SODIUM, VIA: HCPCS | Performed by: EMERGENCY MEDICINE

## 2024-04-04 PROCEDURE — 84703 CHORIONIC GONADOTROPIN ASSAY: CPT

## 2024-04-04 PROCEDURE — 80053 COMPREHEN METABOLIC PANEL: CPT

## 2024-04-04 PROCEDURE — 96376 TX/PRO/DX INJ SAME DRUG ADON: CPT

## 2024-04-04 PROCEDURE — 81001 URINALYSIS AUTO W/SCOPE: CPT

## 2024-04-04 PROCEDURE — 6370000000 HC RX 637 (ALT 250 FOR IP): Performed by: EMERGENCY MEDICINE

## 2024-04-04 PROCEDURE — 93010 ELECTROCARDIOGRAM REPORT: CPT | Performed by: INTERNAL MEDICINE

## 2024-04-04 PROCEDURE — 99285 EMERGENCY DEPT VISIT HI MDM: CPT

## 2024-04-04 PROCEDURE — 82550 ASSAY OF CK (CPK): CPT

## 2024-04-04 PROCEDURE — 84484 ASSAY OF TROPONIN QUANT: CPT

## 2024-04-04 PROCEDURE — 93005 ELECTROCARDIOGRAM TRACING: CPT

## 2024-04-04 PROCEDURE — 96374 THER/PROPH/DIAG INJ IV PUSH: CPT

## 2024-04-04 PROCEDURE — 6360000002 HC RX W HCPCS: Performed by: EMERGENCY MEDICINE

## 2024-04-04 PROCEDURE — 96361 HYDRATE IV INFUSION ADD-ON: CPT

## 2024-04-04 PROCEDURE — 96375 TX/PRO/DX INJ NEW DRUG ADDON: CPT

## 2024-04-04 PROCEDURE — 6360000002 HC RX W HCPCS

## 2024-04-04 PROCEDURE — 6360000004 HC RX CONTRAST MEDICATION: Performed by: RADIOLOGY

## 2024-04-04 PROCEDURE — 2580000003 HC RX 258

## 2024-04-04 PROCEDURE — 85025 COMPLETE CBC W/AUTO DIFF WBC: CPT

## 2024-04-04 PROCEDURE — 74177 CT ABD & PELVIS W/CONTRAST: CPT

## 2024-04-04 PROCEDURE — 71045 X-RAY EXAM CHEST 1 VIEW: CPT

## 2024-04-04 RX ORDER — ONDANSETRON 4 MG/1
4 TABLET, FILM COATED ORAL EVERY 8 HOURS PRN
Qty: 12 TABLET | Refills: 0 | Status: SHIPPED | OUTPATIENT
Start: 2024-04-04 | End: 2024-04-09

## 2024-04-04 RX ORDER — MORPHINE SULFATE 2 MG/ML
2 INJECTION, SOLUTION INTRAMUSCULAR; INTRAVENOUS
Status: DISCONTINUED | OUTPATIENT
Start: 2024-04-04 | End: 2024-04-04 | Stop reason: HOSPADM

## 2024-04-04 RX ORDER — 0.9 % SODIUM CHLORIDE 0.9 %
1000 INTRAVENOUS SOLUTION INTRAVENOUS ONCE
Status: COMPLETED | OUTPATIENT
Start: 2024-04-04 | End: 2024-04-04

## 2024-04-04 RX ORDER — MORPHINE SULFATE 4 MG/ML
4 INJECTION, SOLUTION INTRAMUSCULAR; INTRAVENOUS
Status: DISCONTINUED | OUTPATIENT
Start: 2024-04-04 | End: 2024-04-04 | Stop reason: HOSPADM

## 2024-04-04 RX ORDER — SUCRALFATE 1 G/1
1 TABLET ORAL 4 TIMES DAILY
Qty: 120 TABLET | Refills: 3 | Status: SHIPPED | OUTPATIENT
Start: 2024-04-04

## 2024-04-04 RX ORDER — MORPHINE SULFATE 4 MG/ML
4 INJECTION, SOLUTION INTRAMUSCULAR; INTRAVENOUS ONCE
Status: COMPLETED | OUTPATIENT
Start: 2024-04-04 | End: 2024-04-04

## 2024-04-04 RX ORDER — AMLODIPINE BESYLATE 5 MG/1
10 TABLET ORAL ONCE
Status: COMPLETED | OUTPATIENT
Start: 2024-04-04 | End: 2024-04-04

## 2024-04-04 RX ORDER — ONDANSETRON 2 MG/ML
4 INJECTION INTRAMUSCULAR; INTRAVENOUS ONCE
Status: COMPLETED | OUTPATIENT
Start: 2024-04-04 | End: 2024-04-04

## 2024-04-04 RX ORDER — PANTOPRAZOLE SODIUM 40 MG/10ML
40 INJECTION, POWDER, LYOPHILIZED, FOR SOLUTION INTRAVENOUS ONCE
Status: COMPLETED | OUTPATIENT
Start: 2024-04-04 | End: 2024-04-04

## 2024-04-04 RX ADMIN — ONDANSETRON 4 MG: 2 INJECTION INTRAMUSCULAR; INTRAVENOUS at 05:47

## 2024-04-04 RX ADMIN — IOPAMIDOL 75 ML: 755 INJECTION, SOLUTION INTRAVENOUS at 10:51

## 2024-04-04 RX ADMIN — PANTOPRAZOLE SODIUM 40 MG: 40 INJECTION, POWDER, FOR SOLUTION INTRAVENOUS at 14:04

## 2024-04-04 RX ADMIN — MORPHINE SULFATE 4 MG: 4 INJECTION, SOLUTION INTRAMUSCULAR; INTRAVENOUS at 12:29

## 2024-04-04 RX ADMIN — AMLODIPINE BESYLATE 10 MG: 5 TABLET ORAL at 08:02

## 2024-04-04 RX ADMIN — SODIUM CHLORIDE 1000 ML: 9 INJECTION, SOLUTION INTRAVENOUS at 05:52

## 2024-04-04 RX ADMIN — LIDOCAINE HYDROCHLORIDE: 20 SOLUTION ORAL at 14:03

## 2024-04-04 RX ADMIN — MORPHINE SULFATE 4 MG: 4 INJECTION, SOLUTION INTRAMUSCULAR; INTRAVENOUS at 05:48

## 2024-04-04 RX ADMIN — MORPHINE SULFATE 2 MG: 2 INJECTION, SOLUTION INTRAMUSCULAR; INTRAVENOUS at 08:03

## 2024-04-04 ASSESSMENT — PAIN SCALES - GENERAL
PAINLEVEL_OUTOF10: 5
PAINLEVEL_OUTOF10: 10
PAINLEVEL_OUTOF10: 8

## 2024-04-04 ASSESSMENT — PAIN DESCRIPTION - DESCRIPTORS: DESCRIPTORS: ACHING;BURNING;CRAMPING;THROBBING

## 2024-04-04 ASSESSMENT — PAIN DESCRIPTION - LOCATION
LOCATION: ABDOMEN

## 2024-04-04 ASSESSMENT — PAIN - FUNCTIONAL ASSESSMENT
PAIN_FUNCTIONAL_ASSESSMENT: 0-10
PAIN_FUNCTIONAL_ASSESSMENT: 0-10

## 2024-04-04 NOTE — PROGRESS NOTES
CLINICAL PHARMACY NOTE: MEDS TO BEDS    Total # of Prescriptions Filled: 2   The following medications were delivered to the patient:  Carafate 1gm  Zofran 4mg odt    Additional Documentation:  Patient picked up in pharmacy 4-4-24

## 2024-04-04 NOTE — PROGRESS NOTES
Patient has a CT scan with contrast ordered. Patient needs a pregnancy test resulted or a waiver placed in chart prior to exam being done.

## 2024-04-04 NOTE — CONSULTS
GENERAL SURGERY  CONSULT NOTE  4/4/2024    Physician Consulted: Dr. Kahn  Reason for Consult: Anushka pt - was told she needed upper scope    HPI  Rachele Lomas is a 43 y.o. female with history of alcohol abuse, tobacco abuse, substance abuse, seizure disorder, cholecystectomy, EGD in 2022 showing mild gastritis with superficial antral erosions, and colonoscopy in 2022 showing normal colon and terminal ileum.  Patient presents to the ED secondary to nausea and dry heaving as well as some nonspecific epigastric pain.  Patient recently discharged from ED 3/20 after workup for generalized abdominal pain and acute alcohol intoxication, and again on 3/31 for similar presentation.  Additionally patient recently discharged from inpatient psychiatry for altered mental status with hallucinations secondary to substance abuse and alcohol intoxication.  Patient says she has not been able to eat or drink much over the past few days since her last ED presentation as eating worsens her epigastric pain. Admits to emesis w some meals, but denies any hematemesis or hematochezia or melena.      CT abdomen and pelvis reviewed, no evidence of acute pathology to explain patient's generalized abdominal pain.  No evidence of GI bleed.  Hgb stable, within baseline      Past Medical History:   Diagnosis Date    Asthma     Bronchitis     Movement disorder     Seizure disorder (HCC)     Seizure disorder (HCC)     Suicidal overdose (HCC) 6/27/2014    Tobacco abuse     Tobacco abuse        Past Surgical History:   Procedure Laterality Date    CHOLECYSTECTOMY      COLONOSCOPY N/A 10/31/2022    COLONOSCOPY WITH BIOPSY performed by Jenifer Burris MD at [facility] ENDOSCOPY    FRACTURE SURGERY      R ANKLE TORN LIGAMENTS    ROTATOR CUFF REPAIR      UPPER GASTROINTESTINAL ENDOSCOPY N/A 5/3/2021    EGD BIOPSY performed by Eh HOLLOWAY MD at [facility] ENDOSCOPY    UPPER GASTROINTESTINAL ENDOSCOPY N/A 10/29/2022    EGD

## 2024-04-04 NOTE — ED PROVIDER NOTES
Department of Emergency Medicine    ED  Provider Note - Sign out / Oncoming Provider   Admit Date/RoomTime: 4/4/2024  2:45 AM   0700 EDT      I received this patient at sign out from Dr. Osborn.  I have discussed the patient's initial exam, treatment and plan of care with the out going physician.  I have introduced my self to the patient / family and have answered their questions to this point.  I have examined the patient myself and reviewed ordered tests / medications and  reviewed any available results to this point.   If a resident is involved in the Emergency Department care, I have discussed my findings and plan with them as well.    Labs Reviewed   CBC WITH AUTO DIFFERENTIAL - Abnormal; Notable for the following components:       Result Value    RDW 15.9 (*)     All other components within normal limits   COMPREHENSIVE METABOLIC PANEL - Abnormal; Notable for the following components:    Alkaline Phosphatase 109 (*)     All other components within normal limits   URINALYSIS WITH MICROSCOPIC - Abnormal; Notable for the following components:    Urine Hgb TRACE (*)     All other components within normal limits   LIPASE   TROPONIN   CK   PREGNANCY, URINE     CT ABDOMEN PELVIS W IV CONTRAST Additional Contrast? None   Final Result   1. No acute abnormality of the abdomen or pelvis is identified.   2. Stable 2.2 cm left adnexal cyst.   3. Small pelvic free fluid, new from previous exam.   4. Moderate to severe osteoarthritis of both hips.         XR CHEST PORTABLE   Final Result   No acute disease.               MDM:    Medical Decision Making  Patient signed out to me pending repeat vitals and CT of the abdomen.  This was reviewed, still having upper abdominal pain, she reports she was supposed to have scope with surgery, spoke with surgery to evaluate the patient recommend outpatient scope, she meets no objective criteria for admission, will discharge home with outpatient follow-up have return for seen signs 
for last 2 days.  She had 1 episode of black stool yesterday.  Patient has been experiencing dry heaving for last 2 to 3 days but denies any vomiting.  No previous history of dark stool.  Patient denies any burning with urination, no vaginal discharge, but has some itching on vulvar skin. Patient mentions she usually drinks very occasionally about once a month.  She denies any recreational drug abuse.    Of note, patient has several recent ED visits and hospitalizations for alcohol intoxication, substance induced mood disorders, PTSD.    REVIEW OF SYSTEMS :           Positives and Pertinent negatives as per HPI.     SURGICAL HISTORY     Past Surgical History:   Procedure Laterality Date    CHOLECYSTECTOMY      COLONOSCOPY N/A 10/31/2022    COLONOSCOPY WITH BIOPSY performed by Jenifer Burris MD at [facility] ENDOSCOPY    FRACTURE SURGERY      R ANKLE TORN LIGAMENTS    ROTATOR CUFF REPAIR      UPPER GASTROINTESTINAL ENDOSCOPY N/A 5/3/2021    EGD BIOPSY performed by Eh HOLLOWAY MD at [facility] ENDOSCOPY    UPPER GASTROINTESTINAL ENDOSCOPY N/A 10/29/2022    EGD ESOPHAGOGASTRODUODENOSCOPY performed by Santos Triana MD at [facility] OR       CURRENTMEDICATIONS       Previous Medications    ALBUTEROL SULFATE HFA (PROVENTIL;VENTOLIN;PROAIR) 108 (90 BASE) MCG/ACT INHALER    Inhale 2 puffs into the lungs 4 times daily as needed for Wheezing or Shortness of Breath    AMLODIPINE (NORVASC) 10 MG TABLET    Take 1 tablet by mouth every morning    CARBAMAZEPINE (TEGRETOL) 200 MG TABLET    Take 1 tablet by mouth 2 times daily (with meals)    CYCLOBENZAPRINE (FLEXERIL) 10 MG TABLET    Take 1 tablet by mouth 2 times daily as needed for Muscle spasms    DICYCLOMINE (BENTYL) 10 MG CAPSULE    Take 1 capsule by mouth 4 times daily    GABAPENTIN (NEURONTIN) 600 MG TABLET    Take 1 tablet by mouth 3 times daily.    MENTHOL, TOPICAL ANALGESIC, (BIOFREEZE) 4 % GEL    Apply 1 cm topically 4 times daily as needed (as needed)    MULTIPLE VITAMIN

## 2024-04-05 ENCOUNTER — TELEPHONE (OUTPATIENT)
Dept: SURGERY | Age: 44
End: 2024-04-05

## 2024-04-10 ENCOUNTER — HOSPITAL ENCOUNTER (EMERGENCY)
Age: 44
Discharge: HOME | End: 2024-04-10
Attending: STUDENT IN AN ORGANIZED HEALTH CARE EDUCATION/TRAINING PROGRAM
Payer: MEDICARE

## 2024-04-10 ENCOUNTER — APPOINTMENT (OUTPATIENT)
Dept: GENERAL RADIOLOGY | Age: 44
End: 2024-04-10
Payer: MEDICARE

## 2024-04-10 VITALS
DIASTOLIC BLOOD PRESSURE: 85 MMHG | SYSTOLIC BLOOD PRESSURE: 150 MMHG | RESPIRATION RATE: 16 BRPM | BODY MASS INDEX: 32.25 KG/M2 | WEIGHT: 160 LBS | OXYGEN SATURATION: 100 % | HEIGHT: 59 IN | TEMPERATURE: 98.4 F | HEART RATE: 82 BPM

## 2024-04-10 DIAGNOSIS — M25.559 ACUTE HIP PAIN, UNSPECIFIED LATERALITY: Primary | ICD-10-CM

## 2024-04-10 PROCEDURE — 6370000000 HC RX 637 (ALT 250 FOR IP)

## 2024-04-10 PROCEDURE — 99284 EMERGENCY DEPT VISIT MOD MDM: CPT

## 2024-04-10 PROCEDURE — 6360000002 HC RX W HCPCS

## 2024-04-10 PROCEDURE — 96372 THER/PROPH/DIAG INJ SC/IM: CPT

## 2024-04-10 PROCEDURE — 73521 X-RAY EXAM HIPS BI 2 VIEWS: CPT

## 2024-04-10 RX ORDER — ORPHENADRINE CITRATE 30 MG/ML
60 INJECTION INTRAMUSCULAR; INTRAVENOUS ONCE
Status: COMPLETED | OUTPATIENT
Start: 2024-04-10 | End: 2024-04-10

## 2024-04-10 RX ORDER — HYDROCODONE BITARTRATE AND ACETAMINOPHEN 5; 325 MG/1; MG/1
1 TABLET ORAL
Status: COMPLETED | OUTPATIENT
Start: 2024-04-10 | End: 2024-04-10

## 2024-04-10 RX ORDER — CYCLOBENZAPRINE HCL 10 MG
10 TABLET ORAL 3 TIMES DAILY PRN
Qty: 21 TABLET | Refills: 0 | Status: SHIPPED | OUTPATIENT
Start: 2024-04-10 | End: 2024-04-20

## 2024-04-10 RX ADMIN — ORPHENADRINE CITRATE 60 MG: 60 INJECTION INTRAMUSCULAR; INTRAVENOUS at 01:08

## 2024-04-10 RX ADMIN — HYDROCODONE BITARTRATE AND ACETAMINOPHEN 1 TABLET: 5; 325 TABLET ORAL at 01:07

## 2024-04-10 ASSESSMENT — PAIN SCALES - GENERAL
PAINLEVEL_OUTOF10: 10
PAINLEVEL_OUTOF10: 10

## 2024-04-10 ASSESSMENT — PAIN DESCRIPTION - ORIENTATION
ORIENTATION: RIGHT;LEFT
ORIENTATION: RIGHT;LEFT

## 2024-04-10 ASSESSMENT — PAIN DESCRIPTION - LOCATION
LOCATION: HIP
LOCATION: HIP

## 2024-04-10 ASSESSMENT — LIFESTYLE VARIABLES
HOW OFTEN DO YOU HAVE A DRINK CONTAINING ALCOHOL: MONTHLY OR LESS
HOW MANY STANDARD DRINKS CONTAINING ALCOHOL DO YOU HAVE ON A TYPICAL DAY: 1 OR 2

## 2024-04-10 ASSESSMENT — PAIN DESCRIPTION - DESCRIPTORS
DESCRIPTORS: SHARP;SHOOTING
DESCRIPTORS: SHARP;SHOOTING

## 2024-04-10 NOTE — ED NOTES
Patient placed out in the waiting room following discharge. Patient states she is going to catch the bus home.

## 2024-04-10 NOTE — ED PROVIDER NOTES
at this time and they are agreeable with the plan.       --------------------------------- IMPRESSION AND DISPOSITION ---------------------------------    IMPRESSION  1. Acute hip pain, unspecified laterality        DISPOSITION  Disposition: Discharge to home  Patient condition is stable    NOTE: This report was transcribed using voice recognition software. Every effort was made to ensure accuracy; however, inadvertent computerized transcription errors may be present.    Also please note that patient was seen and examined by attending physician. Plan of care and disposition discussed with attending physician and they were immediately available or present for all procedures performed.       -- Le Angeles D.O. PGY-2     Emergency Medicine      4/10/2024 7:57 AM

## 2024-04-26 ENCOUNTER — HOSPITAL ENCOUNTER (EMERGENCY)
Age: 44
Discharge: HOME | End: 2024-04-26
Payer: MEDICARE

## 2024-04-26 VITALS
RESPIRATION RATE: 16 BRPM | HEART RATE: 85 BPM | DIASTOLIC BLOOD PRESSURE: 92 MMHG | BODY MASS INDEX: 32.25 KG/M2 | TEMPERATURE: 98.8 F | OXYGEN SATURATION: 100 % | WEIGHT: 160 LBS | HEIGHT: 59 IN | SYSTOLIC BLOOD PRESSURE: 151 MMHG

## 2024-04-26 DIAGNOSIS — M25.552 CHRONIC PAIN OF BOTH HIPS: ICD-10-CM

## 2024-04-26 DIAGNOSIS — J45.901 MODERATE ACUTE EXACERBATION OF ASTHMA: Primary | ICD-10-CM

## 2024-04-26 DIAGNOSIS — G89.29 CHRONIC PAIN OF BOTH HIPS: ICD-10-CM

## 2024-04-26 DIAGNOSIS — M25.551 CHRONIC PAIN OF BOTH HIPS: ICD-10-CM

## 2024-04-26 PROCEDURE — 99283 EMERGENCY DEPT VISIT LOW MDM: CPT

## 2024-04-26 RX ORDER — ALBUTEROL SULFATE 90 UG/1
2 AEROSOL, METERED RESPIRATORY (INHALATION) 4 TIMES DAILY PRN
Qty: 18 G | Refills: 0 | Status: SHIPPED | OUTPATIENT
Start: 2024-04-26

## 2024-04-26 RX ORDER — TRAMADOL HYDROCHLORIDE 50 MG/1
50 TABLET ORAL EVERY 6 HOURS PRN
Qty: 12 TABLET | Refills: 0 | Status: SHIPPED | OUTPATIENT
Start: 2024-04-26 | End: 2024-04-29

## 2024-04-26 RX ORDER — PREDNISONE 10 MG/1
TABLET ORAL
Qty: 20 TABLET | Refills: 0 | Status: SHIPPED | OUTPATIENT
Start: 2024-04-26 | End: 2024-05-06

## 2024-04-26 ASSESSMENT — PAIN DESCRIPTION - ORIENTATION: ORIENTATION: LEFT;RIGHT

## 2024-04-26 ASSESSMENT — PAIN SCALES - GENERAL: PAINLEVEL_OUTOF10: 10

## 2024-04-26 ASSESSMENT — PAIN DESCRIPTION - FREQUENCY: FREQUENCY: CONTINUOUS

## 2024-04-26 ASSESSMENT — PAIN DESCRIPTION - PAIN TYPE: TYPE: ACUTE PAIN;CHRONIC PAIN

## 2024-04-26 ASSESSMENT — PAIN - FUNCTIONAL ASSESSMENT: PAIN_FUNCTIONAL_ASSESSMENT: 0-10

## 2024-04-26 ASSESSMENT — PAIN DESCRIPTION - ONSET: ONSET: ON-GOING

## 2024-04-26 ASSESSMENT — PAIN DESCRIPTION - DESCRIPTORS: DESCRIPTORS: DISCOMFORT

## 2024-04-26 ASSESSMENT — PAIN DESCRIPTION - LOCATION: LOCATION: HIP

## 2024-04-26 NOTE — ED PROVIDER NOTES
distress  CV:  Regular rate. Regular rhythm. No murmurs, gallops, or rubs. 2+ distal pulses  Musculoskeletal: Moves all extremities x 4. Warm and well perfused, no clubbing, cyanosis, or edema.   Skin:  Warm and dry. No rashes.   Lymphatic: no lymphadenopathy noted  Neurologic: GCS 15, no focal deficits, symmetric strength 5/5 in the upper and lower extremities bilaterally  Psychiatric: Normal Affect    DIAGNOSTIC RESULTS   (All laboratory and radiology results have been personally reviewed by myself)  Labs:  No results found for this visit on 04/26/24.  Imaging:  All Radiology results interpreted by Radiologist unless otherwise noted.  No orders to display       ED COURSE   Vitals:    Vitals:    04/26/24 1349   BP: (!) 151/92   Pulse: 85   Resp: 16   Temp: 98.8 °F (37.1 °C)   TempSrc: Oral   SpO2: 100%   Weight: 72.6 kg (160 lb)   Height: 1.499 m (4' 11\")       Patient was given the following medications:  Medications - No data to display         CONSULTS:  None  DIFFERENTIAL DX_MDM   MDM:   Social Determinants : None    Records Reviewed : None_ n/a per encounter visit    CC/HPI Summary, DDx, ED Course, and Reassessment: Patient presents with Hip Pain (Bilat. Worsening. ) and URI (Started coughing up yellow mucous yesterday. )     I did not repeat any imaging today.  Her hip pain is chronic and she needs to follow-up with the orthopedic surgeon.  I will send a prescription for just a few tramadol to the pharmacy.  It looks like she is on muscle relaxers and gabapentin but she has not had any prescriptive pain meds since January.  Patient will be discharged with an albuterol inhaler and some steroids.  She was on Advair.  The patient is advised to call her PCP to get an appointment and get new prescriptions.  All scripts were sent to the local pharmacy.  I did not feel any need for other imaging or other workup today.  Patient is aware and agreeable to this    Plan of Care/Counseling:  Divya Brown PA-C

## 2024-04-30 ENCOUNTER — APPOINTMENT (OUTPATIENT)
Dept: GENERAL RADIOLOGY | Age: 44
End: 2024-04-30
Payer: MEDICARE

## 2024-04-30 ENCOUNTER — HOSPITAL ENCOUNTER (EMERGENCY)
Age: 44
Discharge: HOME | End: 2024-04-30
Attending: STUDENT IN AN ORGANIZED HEALTH CARE EDUCATION/TRAINING PROGRAM
Payer: MEDICARE

## 2024-04-30 VITALS
SYSTOLIC BLOOD PRESSURE: 186 MMHG | OXYGEN SATURATION: 96 % | DIASTOLIC BLOOD PRESSURE: 98 MMHG | HEART RATE: 82 BPM | TEMPERATURE: 97.6 F | RESPIRATION RATE: 16 BRPM

## 2024-04-30 DIAGNOSIS — M25.551 PAIN OF RIGHT HIP: Primary | ICD-10-CM

## 2024-04-30 PROCEDURE — 6370000000 HC RX 637 (ALT 250 FOR IP): Performed by: STUDENT IN AN ORGANIZED HEALTH CARE EDUCATION/TRAINING PROGRAM

## 2024-04-30 PROCEDURE — 6360000002 HC RX W HCPCS: Performed by: STUDENT IN AN ORGANIZED HEALTH CARE EDUCATION/TRAINING PROGRAM

## 2024-04-30 PROCEDURE — 73502 X-RAY EXAM HIP UNI 2-3 VIEWS: CPT

## 2024-04-30 PROCEDURE — 96372 THER/PROPH/DIAG INJ SC/IM: CPT

## 2024-04-30 PROCEDURE — 99284 EMERGENCY DEPT VISIT MOD MDM: CPT

## 2024-04-30 RX ORDER — OXYCODONE HYDROCHLORIDE AND ACETAMINOPHEN 5; 325 MG/1; MG/1
1 TABLET ORAL ONCE
Status: COMPLETED | OUTPATIENT
Start: 2024-04-30 | End: 2024-04-30

## 2024-04-30 RX ORDER — CYCLOBENZAPRINE HCL 5 MG
5 TABLET ORAL 2 TIMES DAILY PRN
Qty: 10 TABLET | Refills: 0 | Status: SHIPPED | OUTPATIENT
Start: 2024-04-30 | End: 2024-05-10

## 2024-04-30 RX ORDER — MORPHINE SULFATE 4 MG/ML
6 INJECTION, SOLUTION INTRAMUSCULAR; INTRAVENOUS ONCE
Status: COMPLETED | OUTPATIENT
Start: 2024-04-30 | End: 2024-04-30

## 2024-04-30 RX ORDER — ORPHENADRINE CITRATE 30 MG/ML
60 INJECTION INTRAMUSCULAR; INTRAVENOUS ONCE
Status: COMPLETED | OUTPATIENT
Start: 2024-04-30 | End: 2024-04-30

## 2024-04-30 RX ORDER — METHYLPREDNISOLONE 4 MG/1
TABLET ORAL
Qty: 1 KIT | Refills: 0 | Status: SHIPPED | OUTPATIENT
Start: 2024-04-30 | End: 2024-05-06

## 2024-04-30 RX ORDER — DEXAMETHASONE SODIUM PHOSPHATE 10 MG/ML
8 INJECTION INTRAMUSCULAR; INTRAVENOUS ONCE
Status: COMPLETED | OUTPATIENT
Start: 2024-04-30 | End: 2024-04-30

## 2024-04-30 RX ADMIN — MORPHINE SULFATE 6 MG: 4 INJECTION, SOLUTION INTRAMUSCULAR; INTRAVENOUS at 02:04

## 2024-04-30 RX ADMIN — ORPHENADRINE CITRATE 60 MG: 30 INJECTION, SOLUTION INTRAMUSCULAR; INTRAVENOUS at 02:17

## 2024-04-30 RX ADMIN — OXYCODONE HYDROCHLORIDE AND ACETAMINOPHEN 1 TABLET: 5; 325 TABLET ORAL at 06:36

## 2024-04-30 RX ADMIN — DEXAMETHASONE SODIUM PHOSPHATE 8 MG: 10 INJECTION INTRAMUSCULAR; INTRAVENOUS at 02:15

## 2024-04-30 ASSESSMENT — PAIN - FUNCTIONAL ASSESSMENT
PAIN_FUNCTIONAL_ASSESSMENT: PREVENTS OR INTERFERES SOME ACTIVE ACTIVITIES AND ADLS
PAIN_FUNCTIONAL_ASSESSMENT: PREVENTS OR INTERFERES SOME ACTIVE ACTIVITIES AND ADLS
PAIN_FUNCTIONAL_ASSESSMENT: NONE - DENIES PAIN

## 2024-04-30 ASSESSMENT — PAIN DESCRIPTION - ORIENTATION
ORIENTATION: RIGHT
ORIENTATION: RIGHT
ORIENTATION: RIGHT;LEFT

## 2024-04-30 ASSESSMENT — LIFESTYLE VARIABLES
HOW OFTEN DO YOU HAVE A DRINK CONTAINING ALCOHOL: 2-4 TIMES A MONTH
HOW MANY STANDARD DRINKS CONTAINING ALCOHOL DO YOU HAVE ON A TYPICAL DAY: PATIENT DOES NOT DRINK
HOW OFTEN DO YOU HAVE A DRINK CONTAINING ALCOHOL: NEVER
HOW MANY STANDARD DRINKS CONTAINING ALCOHOL DO YOU HAVE ON A TYPICAL DAY: 1 OR 2

## 2024-04-30 ASSESSMENT — PAIN DESCRIPTION - DESCRIPTORS
DESCRIPTORS: SHOOTING;SHARP
DESCRIPTORS: ACHING
DESCRIPTORS: SHARP;STABBING

## 2024-04-30 ASSESSMENT — PAIN DESCRIPTION - LOCATION
LOCATION: HIP

## 2024-04-30 ASSESSMENT — PAIN SCALES - GENERAL
PAINLEVEL_OUTOF10: 10
PAINLEVEL_OUTOF10: 10

## 2024-04-30 NOTE — ED PROVIDER NOTES
strength 5/5 in the upper and lower extremities bilaterally  Psychiatric: Normal Affect            DIAGNOSTIC RESULTS   LABS:    Labs Reviewed - No data to display    As interpreted by me, the above displayed labs are abnormal. All other labs obtained during this visit were within normal range or not returned as of this dictation.        RADIOLOGY:   Non-plain film images such as CT, Ultrasound and MRI are read by the radiologist. Plain radiographic images are visualized and preliminarily interpreted by the ED Provider with the below findings:    X-ray does not show any acute abnormal findings.    Interpretation per the Radiologist below, if available at the time of this note:    XR HIP RIGHT (2-3 VIEWS)   Final Result   No radiographic evidence of acute fracture or dislocation.      Advanced degenerative changes of the hips.           No results found.    No results found.    PROCEDURES   Unless otherwise noted below, none     Procedures    CRITICAL CARE TIME (.cct)   0    PAST MEDICAL HISTORY/Chronic Conditions Affecting Care      has a past medical history of Asthma, Bronchitis, Movement disorder, Seizure disorder (HCC), Seizure disorder (HCC), Suicidal overdose (HCC) (6/27/2014), Tobacco abuse, and Tobacco abuse.     EMERGENCY DEPARTMENT COURSE    Vitals:    Vitals:    04/30/24 0110 04/30/24 0634 04/30/24 0648   BP: (!) 194/113 (!) 196/103 (!) 186/98   Pulse: (!) 108 82    Resp: 15 16    Temp: 97.1 °F (36.2 °C) 97.6 °F (36.4 °C)    TempSrc: Oral Oral    SpO2: 97% 96%        Patient was given the following medications:  Medications   orphenadrine (NORFLEX) injection 60 mg (60 mg IntraMUSCular Given 4/30/24 0217)   dexAMETHasone (DECADRON) injection 8 mg (8 mg IntraMUSCular Given 4/30/24 0215)   morphine sulfate (PF) injection 6 mg (6 mg IntraMUSCular Given 4/30/24 0204)   oxyCODONE-acetaminophen (PERCOCET) 5-325 MG per tablet 1 tablet (1 tablet Oral Given 4/30/24 0636)         Medical Decision Making/Differential

## 2024-05-10 ENCOUNTER — APPOINTMENT (OUTPATIENT)
Dept: GENERAL RADIOLOGY | Age: 44
End: 2024-05-10
Payer: MEDICARE

## 2024-05-10 ENCOUNTER — HOSPITAL ENCOUNTER (EMERGENCY)
Age: 44
Discharge: HOME | End: 2024-05-10
Attending: EMERGENCY MEDICINE
Payer: MEDICARE

## 2024-05-10 VITALS
SYSTOLIC BLOOD PRESSURE: 132 MMHG | BODY MASS INDEX: 29.03 KG/M2 | RESPIRATION RATE: 18 BRPM | WEIGHT: 144 LBS | DIASTOLIC BLOOD PRESSURE: 76 MMHG | HEIGHT: 59 IN | TEMPERATURE: 98.3 F | HEART RATE: 99 BPM | OXYGEN SATURATION: 100 %

## 2024-05-10 DIAGNOSIS — F14.90 COCAINE USE: Primary | ICD-10-CM

## 2024-05-10 DIAGNOSIS — R07.9 CHEST PAIN, UNSPECIFIED TYPE: ICD-10-CM

## 2024-05-10 LAB
ALBUMIN SERPL-MCNC: 4.1 G/DL (ref 3.5–5.2)
ALP SERPL-CCNC: 101 U/L (ref 35–104)
ALT SERPL-CCNC: 18 U/L (ref 0–32)
AMPHET UR QL SCN: NEGATIVE
ANION GAP SERPL CALCULATED.3IONS-SCNC: 15 MMOL/L (ref 7–16)
APAP SERPL-MCNC: <5 UG/ML (ref 10–30)
AST SERPL-CCNC: 26 U/L (ref 0–31)
BACTERIA URNS QL MICRO: ABNORMAL
BARBITURATES UR QL SCN: NEGATIVE
BASOPHILS # BLD: 0.03 K/UL (ref 0–0.2)
BASOPHILS NFR BLD: 0 % (ref 0–2)
BENZODIAZ UR QL: NEGATIVE
BILIRUB SERPL-MCNC: 0.4 MG/DL (ref 0–1.2)
BILIRUB UR QL STRIP: NEGATIVE
BUN SERPL-MCNC: 14 MG/DL (ref 6–20)
BUPRENORPHINE UR QL: NEGATIVE
CALCIUM SERPL-MCNC: 8.3 MG/DL (ref 8.6–10.2)
CANNABINOIDS UR QL SCN: NEGATIVE
CHLORIDE SERPL-SCNC: 105 MMOL/L (ref 98–107)
CLARITY UR: CLEAR
CO2 SERPL-SCNC: 16 MMOL/L (ref 22–29)
COCAINE UR QL SCN: POSITIVE
COLOR UR: YELLOW
CREAT SERPL-MCNC: 0.5 MG/DL (ref 0.5–1)
CRYSTALS URNS MICRO: ABNORMAL /HPF
EKG ATRIAL RATE: 117 BPM
EKG ATRIAL RATE: 86 BPM
EKG P AXIS: 49 DEGREES
EKG P AXIS: 57 DEGREES
EKG P-R INTERVAL: 132 MS
EKG P-R INTERVAL: 150 MS
EKG Q-T INTERVAL: 326 MS
EKG Q-T INTERVAL: 370 MS
EKG QRS DURATION: 76 MS
EKG QRS DURATION: 84 MS
EKG QTC CALCULATION (BAZETT): 442 MS
EKG QTC CALCULATION (BAZETT): 454 MS
EKG R AXIS: 69 DEGREES
EKG R AXIS: 71 DEGREES
EKG T AXIS: 18 DEGREES
EKG T AXIS: 50 DEGREES
EKG VENTRICULAR RATE: 117 BPM
EKG VENTRICULAR RATE: 86 BPM
EOSINOPHIL # BLD: 0.03 K/UL (ref 0.05–0.5)
EOSINOPHILS RELATIVE PERCENT: 0 % (ref 0–6)
EPI CELLS #/AREA URNS HPF: ABNORMAL /HPF
ERYTHROCYTE [DISTWIDTH] IN BLOOD BY AUTOMATED COUNT: 15.4 % (ref 11.5–15)
ETHANOLAMINE SERPL-MCNC: <10 MG/DL
FENTANYL UR QL: NEGATIVE
GFR, ESTIMATED: >90 ML/MIN/1.73M2
GLUCOSE SERPL-MCNC: 80 MG/DL (ref 74–99)
GLUCOSE UR STRIP-MCNC: NEGATIVE MG/DL
HCG, URINE, POC: NEGATIVE
HCT VFR BLD AUTO: 41.8 % (ref 34–48)
HGB BLD-MCNC: 13.8 G/DL (ref 11.5–15.5)
HGB UR QL STRIP.AUTO: NEGATIVE
IMM GRANULOCYTES # BLD AUTO: 0.09 K/UL (ref 0–0.58)
IMM GRANULOCYTES NFR BLD: 1 % (ref 0–5)
KETONES UR STRIP-MCNC: NEGATIVE MG/DL
LEUKOCYTE ESTERASE UR QL STRIP: NEGATIVE
LYMPHOCYTES NFR BLD: 1.41 K/UL (ref 1.5–4)
LYMPHOCYTES RELATIVE PERCENT: 7 % (ref 20–42)
Lab: NORMAL
MCH RBC QN AUTO: 26 PG (ref 26–35)
MCHC RBC AUTO-ENTMCNC: 33 G/DL (ref 32–34.5)
MCV RBC AUTO: 78.7 FL (ref 80–99.9)
METHADONE UR QL: NEGATIVE
MONOCYTES NFR BLD: 1.08 K/UL (ref 0.1–0.95)
MONOCYTES NFR BLD: 6 % (ref 2–12)
MUCOUS THREADS URNS QL MICRO: PRESENT
NEGATIVE QC PASS/FAIL: NORMAL
NEUTROPHILS NFR BLD: 86 % (ref 43–80)
NEUTS SEG NFR BLD: 16.91 K/UL (ref 1.8–7.3)
NITRITE UR QL STRIP: NEGATIVE
OPIATES UR QL SCN: NEGATIVE
OXYCODONE UR QL SCN: NEGATIVE
PCP UR QL SCN: NEGATIVE
PH UR STRIP: 5.5 [PH] (ref 5–9)
PLATELET # BLD AUTO: 240 K/UL (ref 130–450)
PMV BLD AUTO: 9.4 FL (ref 7–12)
POSITIVE QC PASS/FAIL: NORMAL
POTASSIUM SERPL-SCNC: 4.2 MMOL/L (ref 3.5–5)
PROT SERPL-MCNC: 7.6 G/DL (ref 6.4–8.3)
PROT UR STRIP-MCNC: NEGATIVE MG/DL
RBC # BLD AUTO: 5.31 M/UL (ref 3.5–5.5)
RBC #/AREA URNS HPF: ABNORMAL /HPF
SALICYLATES SERPL-MCNC: <0.3 MG/DL (ref 0–30)
SODIUM SERPL-SCNC: 136 MMOL/L (ref 132–146)
SP GR UR STRIP: 1.02 (ref 1–1.03)
TEST INFORMATION: ABNORMAL
TOXIC TRICYCLIC SC,BLOOD: NEGATIVE
TROPONIN I SERPL HS-MCNC: <6 NG/L (ref 0–9)
TROPONIN I SERPL HS-MCNC: <6 NG/L (ref 0–9)
UROBILINOGEN UR STRIP-ACNC: 0.2 EU/DL (ref 0–1)
WBC #/AREA URNS HPF: ABNORMAL /HPF
WBC OTHER # BLD: 19.6 K/UL (ref 4.5–11.5)

## 2024-05-10 PROCEDURE — 96361 HYDRATE IV INFUSION ADD-ON: CPT

## 2024-05-10 PROCEDURE — 80143 DRUG ASSAY ACETAMINOPHEN: CPT

## 2024-05-10 PROCEDURE — 99285 EMERGENCY DEPT VISIT HI MDM: CPT

## 2024-05-10 PROCEDURE — 93005 ELECTROCARDIOGRAM TRACING: CPT

## 2024-05-10 PROCEDURE — 84484 ASSAY OF TROPONIN QUANT: CPT

## 2024-05-10 PROCEDURE — 80179 DRUG ASSAY SALICYLATE: CPT

## 2024-05-10 PROCEDURE — 96372 THER/PROPH/DIAG INJ SC/IM: CPT

## 2024-05-10 PROCEDURE — 80053 COMPREHEN METABOLIC PANEL: CPT

## 2024-05-10 PROCEDURE — G0480 DRUG TEST DEF 1-7 CLASSES: HCPCS

## 2024-05-10 PROCEDURE — 93010 ELECTROCARDIOGRAM REPORT: CPT | Performed by: INTERNAL MEDICINE

## 2024-05-10 PROCEDURE — 81001 URINALYSIS AUTO W/SCOPE: CPT

## 2024-05-10 PROCEDURE — 2580000003 HC RX 258

## 2024-05-10 PROCEDURE — 96375 TX/PRO/DX INJ NEW DRUG ADDON: CPT

## 2024-05-10 PROCEDURE — 85025 COMPLETE CBC W/AUTO DIFF WBC: CPT

## 2024-05-10 PROCEDURE — 71045 X-RAY EXAM CHEST 1 VIEW: CPT

## 2024-05-10 PROCEDURE — 6360000002 HC RX W HCPCS

## 2024-05-10 PROCEDURE — 96374 THER/PROPH/DIAG INJ IV PUSH: CPT

## 2024-05-10 PROCEDURE — 80307 DRUG TEST PRSMV CHEM ANLYZR: CPT

## 2024-05-10 PROCEDURE — 6360000002 HC RX W HCPCS: Performed by: EMERGENCY MEDICINE

## 2024-05-10 RX ORDER — 0.9 % SODIUM CHLORIDE 0.9 %
1000 INTRAVENOUS SOLUTION INTRAVENOUS ONCE
Status: COMPLETED | OUTPATIENT
Start: 2024-05-10 | End: 2024-05-10

## 2024-05-10 RX ORDER — LORAZEPAM 2 MG/ML
1 INJECTION INTRAMUSCULAR ONCE
Status: COMPLETED | OUTPATIENT
Start: 2024-05-10 | End: 2024-05-10

## 2024-05-10 RX ORDER — DROPERIDOL 2.5 MG/ML
1.25 INJECTION, SOLUTION INTRAMUSCULAR; INTRAVENOUS EVERY 6 HOURS PRN
Status: DISCONTINUED | OUTPATIENT
Start: 2024-05-10 | End: 2024-05-10 | Stop reason: HOSPADM

## 2024-05-10 RX ORDER — ONDANSETRON 2 MG/ML
4 INJECTION INTRAMUSCULAR; INTRAVENOUS ONCE
Status: COMPLETED | OUTPATIENT
Start: 2024-05-10 | End: 2024-05-10

## 2024-05-10 RX ADMIN — DROPERIDOL 1.25 MG: 2.5 INJECTION, SOLUTION INTRAMUSCULAR; INTRAVENOUS at 07:17

## 2024-05-10 RX ADMIN — SODIUM CHLORIDE 1000 ML: 9 INJECTION, SOLUTION INTRAVENOUS at 07:17

## 2024-05-10 RX ADMIN — SODIUM CHLORIDE 1000 ML: 9 INJECTION, SOLUTION INTRAVENOUS at 05:08

## 2024-05-10 RX ADMIN — ONDANSETRON 4 MG: 2 INJECTION INTRAMUSCULAR; INTRAVENOUS at 05:08

## 2024-05-10 RX ADMIN — LORAZEPAM 1 MG: 2 INJECTION INTRAMUSCULAR; INTRAVENOUS at 05:07

## 2024-05-10 ASSESSMENT — ENCOUNTER SYMPTOMS
ABDOMINAL DISTENTION: 0
ABDOMINAL PAIN: 0
SHORTNESS OF BREATH: 0

## 2024-05-10 ASSESSMENT — PAIN SCALES - GENERAL
PAINLEVEL_OUTOF10: 10
PAINLEVEL_OUTOF10: 10

## 2024-05-10 ASSESSMENT — PAIN - FUNCTIONAL ASSESSMENT
PAIN_FUNCTIONAL_ASSESSMENT: 0-10
PAIN_FUNCTIONAL_ASSESSMENT: 0-10

## 2024-05-10 ASSESSMENT — PAIN DESCRIPTION - LOCATION: LOCATION: CHEST

## 2024-05-10 NOTE — ED PROVIDER NOTES
HISTORY ---------------------------------------------  Past Medical History:  has a past medical history of Asthma, Bronchitis, Movement disorder, Seizure disorder (HCC), Seizure disorder (HCC), Suicidal overdose (HCC), Tobacco abuse, and Tobacco abuse.    Past Surgical History:  has a past surgical history that includes fracture surgery; Cholecystectomy; Rotator cuff repair; Upper gastrointestinal endoscopy (N/A, 5/3/2021); Upper gastrointestinal endoscopy (N/A, 10/29/2022); and Colonoscopy (N/A, 10/31/2022).    Social History:  reports that she has been smoking cigarettes. She has a 15.0 pack-year smoking history. She has never used smokeless tobacco. She reports current alcohol use. She reports current drug use. Drug: Cocaine.    Family History: family history includes Cancer in her mother.     The patient’s home medications have been reviewed.    Allergies: Ketorolac, Toradol [ketorolac tromethamine], Tomato, Darvocet [propoxyphene n-acetaminophen], Ibuprofen, Meloxicam, and Naproxen    -------------------------------------------------- RESULTS -------------------------------------------------  Labs:  Results for orders placed or performed during the hospital encounter of 05/10/24   CBC with Auto Differential   Result Value Ref Range    WBC 19.6 (H) 4.5 - 11.5 k/uL    RBC 5.31 3.50 - 5.50 m/uL    Hemoglobin 13.8 11.5 - 15.5 g/dL    Hematocrit 41.8 34.0 - 48.0 %    MCV 78.7 (L) 80.0 - 99.9 fL    MCH 26.0 26.0 - 35.0 pg    MCHC 33.0 32.0 - 34.5 g/dL    RDW 15.4 (H) 11.5 - 15.0 %    Platelets 240 130 - 450 k/uL    MPV 9.4 7.0 - 12.0 fL    Neutrophils % 86 (H) 43.0 - 80.0 %    Lymphocytes % 7 (L) 20.0 - 42.0 %    Monocytes % 6 2.0 - 12.0 %    Eosinophils % 0 0 - 6 %    Basophils % 0 0.0 - 2.0 %    Immature Granulocytes % 1 0.0 - 5.0 %    Neutrophils Absolute 16.91 (H) 1.80 - 7.30 k/uL    Lymphocytes Absolute 1.41 (L) 1.50 - 4.00 k/uL    Monocytes Absolute 1.08 (H) 0.10 - 0.95 k/uL    Eosinophils Absolute 0.03 (L)  mmol/L    Glucose 80 74 - 99 mg/dL    BUN 14 6 - 20 mg/dL    Creatinine 0.5 0.50 - 1.00 mg/dL    Est, Glom Filt Rate >90 >60 mL/min/1.73m2    Calcium 8.3 (L) 8.6 - 10.2 mg/dL    Total Protein 7.6 6.4 - 8.3 g/dL    Albumin 4.1 3.5 - 5.2 g/dL    Total Bilirubin 0.4 0.0 - 1.2 mg/dL    Alkaline Phosphatase 101 35 - 104 U/L    ALT 18 0 - 32 U/L    AST 26 0 - 31 U/L   Troponin   Result Value Ref Range    Troponin, High Sensitivity <6 0 - 9 ng/L   POC Pregnancy Urine Qual   Result Value Ref Range    HCG, Urine, POC Negative Negative    Lot Number [id number]     Positive QC Pass/Fail Pass     Negative QC Pass/Fail Pass    EKG 12 Lead   Result Value Ref Range    Ventricular Rate 117 BPM    Atrial Rate 117 BPM    P-R Interval 150 ms    QRS Duration 76 ms    Q-T Interval 326 ms    QTc Calculation (Bazett) 454 ms    P Axis 49 degrees    R Axis 69 degrees    T Axis 18 degrees   EKG 12 Lead   Result Value Ref Range    Ventricular Rate 86 BPM    Atrial Rate 86 BPM    P-R Interval 132 ms    QRS Duration 84 ms    Q-T Interval 370 ms    QTc Calculation (Bazett) 442 ms    P Axis 57 degrees    R Axis 71 degrees    T Axis 50 degrees       Radiology:  XR CHEST PORTABLE   Final Result   No acute disease.             ------------------------- NURSING NOTES AND VITALS REVIEWED ---------------------------  Date / Time Roomed:  5/10/2024  4:03 AM  ED Bed Assignment:  CHIN/CHIN    The nursing notes within the ED encounter and vital signs as below have been reviewed.   /76   Pulse 99   Temp 98.3 °F (36.8 °C) (Oral)   Resp 18   Ht 1.499 m (4' 11\")   Wt 65.3 kg (144 lb)   SpO2 100%   BMI 29.08 kg/m²   Oxygen Saturation Interpretation: Normal      ------------------------------------------ PROGRESS NOTES ------------------------------------------  8:10 PM EDT  I have spoken with the patient and discussed today’s results, in addition to providing specific details for the plan of care and counseling regarding the diagnosis and prognosis.

## 2024-05-10 NOTE — CARE COORDINATION
Peer Recovery Support Note    Name: Rachele Lomas  Date: 5/10/2024    Chief Complaint   Patient presents with    Smoking unknown substance    Chest Pain     Chest pain after smoking an unknown substance about 30 minutes ago.        Peer Support met with patient.  [] Support and education provided  [x] Resources provided   [] Treatment referral:   [x] Other: Left Peer contact information  [] Patient declined peer recovery services     Referred By:     Notes: Patient presented to ED for ingesting an unknown substance, which was found to be cocaine. Patient admits that she has struggled off and on with using cocaine, and she does not want to continue using it. However, she is not agreeable to inpatient treatment at this time. Peer strongly encouraged patient to get involved in some type of treatment, and she does claim that she will consider doing outpatient IOP or start going to recovery meetings/groups. Peer left multiple resources for patient to utilize while making her decision. Patient states she will speak with her insurance to see if they will be able to transport her regularly and reach out to Peer if she does decide to get engaged in treatment. Peer provided support, left contact information, and wished patient well.     Signed: Gia Napoles, 5/10/2024      [phone]

## 2024-05-10 NOTE — ED NOTES
Assumed care of patient. Introduced self to patient as RN.Patient complains of chest pain 10/10. Medication given. Awaiting diagnostic test results. Call light in reach.

## 2024-05-14 NOTE — PROGRESS NOTES
CLINICAL PHARMACY NOTE: MEDS TO BEDS    Total # of Prescriptions Filled: 1   The following medications were delivered to the patient:  Cyclobenzaprine 5mg    Additional Documentation:  Picked up

## 2024-05-29 ENCOUNTER — APPOINTMENT (OUTPATIENT)
Dept: ULTRASOUND IMAGING | Age: 44
DRG: 385 | End: 2024-05-29
Payer: MEDICAID

## 2024-05-29 ENCOUNTER — HOSPITAL ENCOUNTER (INPATIENT)
Age: 44
LOS: 2 days | Discharge: HOME | DRG: 385 | End: 2024-05-31
Attending: EMERGENCY MEDICINE | Admitting: STUDENT IN AN ORGANIZED HEALTH CARE EDUCATION/TRAINING PROGRAM
Payer: MEDICAID

## 2024-05-29 DIAGNOSIS — N61.1 ABSCESS OF RIGHT BREAST: Primary | ICD-10-CM

## 2024-05-29 LAB
ANION GAP SERPL CALCULATED.3IONS-SCNC: 9 MMOL/L (ref 7–16)
BASOPHILS # BLD: 0.01 K/UL (ref 0–0.2)
BASOPHILS NFR BLD: 0 % (ref 0–2)
BUN SERPL-MCNC: 11 MG/DL (ref 6–20)
CALCIUM SERPL-MCNC: 9.4 MG/DL (ref 8.6–10.2)
CHLORIDE SERPL-SCNC: 104 MMOL/L (ref 98–107)
CO2 SERPL-SCNC: 26 MMOL/L (ref 22–29)
CREAT SERPL-MCNC: 0.6 MG/DL (ref 0.5–1)
EOSINOPHIL # BLD: 0.1 K/UL (ref 0.05–0.5)
EOSINOPHILS RELATIVE PERCENT: 2 % (ref 0–6)
ERYTHROCYTE [DISTWIDTH] IN BLOOD BY AUTOMATED COUNT: 15.6 % (ref 11.5–15)
GFR, ESTIMATED: >90 ML/MIN/1.73M2
GLUCOSE SERPL-MCNC: 91 MG/DL (ref 74–99)
HCT VFR BLD AUTO: 41.3 % (ref 34–48)
HGB BLD-MCNC: 12.9 G/DL (ref 11.5–15.5)
IMM GRANULOCYTES # BLD AUTO: <0.03 K/UL (ref 0–0.58)
IMM GRANULOCYTES NFR BLD: 0 % (ref 0–5)
LACTATE BLDV-SCNC: 1.7 MMOL/L (ref 0.5–2.2)
LYMPHOCYTES NFR BLD: 1.28 K/UL (ref 1.5–4)
LYMPHOCYTES RELATIVE PERCENT: 19 % (ref 20–42)
MCH RBC QN AUTO: 26.3 PG (ref 26–35)
MCHC RBC AUTO-ENTMCNC: 31.2 G/DL (ref 32–34.5)
MCV RBC AUTO: 84.1 FL (ref 80–99.9)
MONOCYTES NFR BLD: 0.51 K/UL (ref 0.1–0.95)
MONOCYTES NFR BLD: 8 % (ref 2–12)
NEUTROPHILS NFR BLD: 71 % (ref 43–80)
NEUTS SEG NFR BLD: 4.74 K/UL (ref 1.8–7.3)
PLATELET # BLD AUTO: 241 K/UL (ref 130–450)
PMV BLD AUTO: 9.3 FL (ref 7–12)
POTASSIUM SERPL-SCNC: 4.1 MMOL/L (ref 3.5–5)
RBC # BLD AUTO: 4.91 M/UL (ref 3.5–5.5)
SODIUM SERPL-SCNC: 139 MMOL/L (ref 132–146)
WBC OTHER # BLD: 6.7 K/UL (ref 4.5–11.5)

## 2024-05-29 PROCEDURE — 87075 CULTR BACTERIA EXCEPT BLOOD: CPT

## 2024-05-29 PROCEDURE — 83605 ASSAY OF LACTIC ACID: CPT

## 2024-05-29 PROCEDURE — 6360000002 HC RX W HCPCS: Performed by: NURSE PRACTITIONER

## 2024-05-29 PROCEDURE — 2580000003 HC RX 258: Performed by: NURSE PRACTITIONER

## 2024-05-29 PROCEDURE — 99285 EMERGENCY DEPT VISIT HI MDM: CPT

## 2024-05-29 PROCEDURE — 6360000002 HC RX W HCPCS: Performed by: STUDENT IN AN ORGANIZED HEALTH CARE EDUCATION/TRAINING PROGRAM

## 2024-05-29 PROCEDURE — 87070 CULTURE OTHR SPECIMN AEROBIC: CPT

## 2024-05-29 PROCEDURE — 86403 PARTICLE AGGLUT ANTBDY SCRN: CPT

## 2024-05-29 PROCEDURE — 76642 ULTRASOUND BREAST LIMITED: CPT

## 2024-05-29 PROCEDURE — 85025 COMPLETE CBC W/AUTO DIFF WBC: CPT

## 2024-05-29 PROCEDURE — 2580000003 HC RX 258: Performed by: STUDENT IN AN ORGANIZED HEALTH CARE EDUCATION/TRAINING PROGRAM

## 2024-05-29 PROCEDURE — 96374 THER/PROPH/DIAG INJ IV PUSH: CPT

## 2024-05-29 PROCEDURE — 99223 1ST HOSP IP/OBS HIGH 75: CPT | Performed by: STUDENT IN AN ORGANIZED HEALTH CARE EDUCATION/TRAINING PROGRAM

## 2024-05-29 PROCEDURE — 87040 BLOOD CULTURE FOR BACTERIA: CPT

## 2024-05-29 PROCEDURE — 87205 SMEAR GRAM STAIN: CPT

## 2024-05-29 PROCEDURE — 6370000000 HC RX 637 (ALT 250 FOR IP): Performed by: STUDENT IN AN ORGANIZED HEALTH CARE EDUCATION/TRAINING PROGRAM

## 2024-05-29 PROCEDURE — 96375 TX/PRO/DX INJ NEW DRUG ADDON: CPT

## 2024-05-29 PROCEDURE — 80048 BASIC METABOLIC PNL TOTAL CA: CPT

## 2024-05-29 PROCEDURE — 1200000000 HC SEMI PRIVATE

## 2024-05-29 RX ORDER — AMLODIPINE BESYLATE 10 MG/1
10 TABLET ORAL EVERY MORNING
Status: DISCONTINUED | OUTPATIENT
Start: 2024-05-30 | End: 2024-05-31 | Stop reason: HOSPADM

## 2024-05-29 RX ORDER — ONDANSETRON 4 MG/1
4 TABLET, ORALLY DISINTEGRATING ORAL EVERY 8 HOURS PRN
Status: DISCONTINUED | OUTPATIENT
Start: 2024-05-29 | End: 2024-05-31 | Stop reason: HOSPADM

## 2024-05-29 RX ORDER — ALBUTEROL SULFATE 90 UG/1
2 AEROSOL, METERED RESPIRATORY (INHALATION) 4 TIMES DAILY PRN
Status: DISCONTINUED | OUTPATIENT
Start: 2024-05-29 | End: 2024-05-29 | Stop reason: CLARIF

## 2024-05-29 RX ORDER — POTASSIUM CHLORIDE 7.45 MG/ML
10 INJECTION INTRAVENOUS PRN
Status: DISCONTINUED | OUTPATIENT
Start: 2024-05-29 | End: 2024-05-31 | Stop reason: HOSPADM

## 2024-05-29 RX ORDER — HYDRALAZINE HYDROCHLORIDE 20 MG/ML
10 INJECTION INTRAMUSCULAR; INTRAVENOUS EVERY 6 HOURS PRN
Status: DISCONTINUED | OUTPATIENT
Start: 2024-05-29 | End: 2024-05-31 | Stop reason: HOSPADM

## 2024-05-29 RX ORDER — SODIUM CHLORIDE 0.9 % (FLUSH) 0.9 %
5-40 SYRINGE (ML) INJECTION EVERY 12 HOURS SCHEDULED
Status: DISCONTINUED | OUTPATIENT
Start: 2024-05-29 | End: 2024-05-31 | Stop reason: HOSPADM

## 2024-05-29 RX ORDER — ALBUTEROL SULFATE 2.5 MG/3ML
2.5 SOLUTION RESPIRATORY (INHALATION) 4 TIMES DAILY PRN
Status: DISCONTINUED | OUTPATIENT
Start: 2024-05-29 | End: 2024-05-31 | Stop reason: HOSPADM

## 2024-05-29 RX ORDER — GABAPENTIN 300 MG/1
600 CAPSULE ORAL 3 TIMES DAILY
Status: DISCONTINUED | OUTPATIENT
Start: 2024-05-29 | End: 2024-05-31 | Stop reason: HOSPADM

## 2024-05-29 RX ORDER — MORPHINE SULFATE 4 MG/ML
4 INJECTION, SOLUTION INTRAMUSCULAR; INTRAVENOUS ONCE
Status: COMPLETED | OUTPATIENT
Start: 2024-05-29 | End: 2024-05-30

## 2024-05-29 RX ORDER — SODIUM CHLORIDE 9 MG/ML
INJECTION, SOLUTION INTRAVENOUS PRN
Status: DISCONTINUED | OUTPATIENT
Start: 2024-05-29 | End: 2024-05-31 | Stop reason: HOSPADM

## 2024-05-29 RX ORDER — ENOXAPARIN SODIUM 100 MG/ML
40 INJECTION SUBCUTANEOUS DAILY
Status: DISCONTINUED | OUTPATIENT
Start: 2024-05-30 | End: 2024-05-31 | Stop reason: HOSPADM

## 2024-05-29 RX ORDER — SODIUM CHLORIDE 0.9 % (FLUSH) 0.9 %
5-40 SYRINGE (ML) INJECTION PRN
Status: DISCONTINUED | OUTPATIENT
Start: 2024-05-29 | End: 2024-05-31 | Stop reason: HOSPADM

## 2024-05-29 RX ORDER — POLYETHYLENE GLYCOL 3350 17 G/17G
17 POWDER, FOR SOLUTION ORAL DAILY PRN
Status: DISCONTINUED | OUTPATIENT
Start: 2024-05-29 | End: 2024-05-31 | Stop reason: HOSPADM

## 2024-05-29 RX ORDER — MAGNESIUM SULFATE IN WATER 40 MG/ML
2000 INJECTION, SOLUTION INTRAVENOUS PRN
Status: DISCONTINUED | OUTPATIENT
Start: 2024-05-29 | End: 2024-05-31 | Stop reason: HOSPADM

## 2024-05-29 RX ORDER — POTASSIUM CHLORIDE 20 MEQ/1
40 TABLET, EXTENDED RELEASE ORAL PRN
Status: DISCONTINUED | OUTPATIENT
Start: 2024-05-29 | End: 2024-05-31 | Stop reason: HOSPADM

## 2024-05-29 RX ORDER — FENTANYL CITRATE 50 UG/ML
25 INJECTION, SOLUTION INTRAMUSCULAR; INTRAVENOUS ONCE
Status: COMPLETED | OUTPATIENT
Start: 2024-05-29 | End: 2024-05-29

## 2024-05-29 RX ORDER — MORPHINE SULFATE 2 MG/ML
2 INJECTION, SOLUTION INTRAMUSCULAR; INTRAVENOUS EVERY 4 HOURS PRN
Status: DISCONTINUED | OUTPATIENT
Start: 2024-05-29 | End: 2024-05-31 | Stop reason: HOSPADM

## 2024-05-29 RX ORDER — ONDANSETRON 2 MG/ML
4 INJECTION INTRAMUSCULAR; INTRAVENOUS ONCE
Status: COMPLETED | OUTPATIENT
Start: 2024-05-29 | End: 2024-05-29

## 2024-05-29 RX ORDER — MORPHINE SULFATE 4 MG/ML
4 INJECTION, SOLUTION INTRAMUSCULAR; INTRAVENOUS ONCE
Status: COMPLETED | OUTPATIENT
Start: 2024-05-29 | End: 2024-05-29

## 2024-05-29 RX ORDER — TIZANIDINE 4 MG/1
4 TABLET ORAL EVERY 6 HOURS PRN
COMMUNITY

## 2024-05-29 RX ORDER — PANTOPRAZOLE SODIUM 40 MG/1
40 TABLET, DELAYED RELEASE ORAL
Status: DISCONTINUED | OUTPATIENT
Start: 2024-05-30 | End: 2024-05-31 | Stop reason: HOSPADM

## 2024-05-29 RX ORDER — ONDANSETRON 2 MG/ML
4 INJECTION INTRAMUSCULAR; INTRAVENOUS EVERY 6 HOURS PRN
Status: DISCONTINUED | OUTPATIENT
Start: 2024-05-29 | End: 2024-05-31 | Stop reason: HOSPADM

## 2024-05-29 RX ORDER — DIPHENHYDRAMINE HCL 25 MG
25 CAPSULE ORAL EVERY 6 HOURS PRN
COMMUNITY

## 2024-05-29 RX ORDER — CARBAMAZEPINE 200 MG/1
200 TABLET ORAL 2 TIMES DAILY WITH MEALS
Status: DISCONTINUED | OUTPATIENT
Start: 2024-05-30 | End: 2024-05-31 | Stop reason: HOSPADM

## 2024-05-29 RX ADMIN — GABAPENTIN 600 MG: 300 CAPSULE ORAL at 23:46

## 2024-05-29 RX ADMIN — MORPHINE SULFATE 2 MG: 2 INJECTION, SOLUTION INTRAMUSCULAR; INTRAVENOUS at 22:47

## 2024-05-29 RX ADMIN — WATER 2000 MG: 1 INJECTION INTRAMUSCULAR; INTRAVENOUS; SUBCUTANEOUS at 20:55

## 2024-05-29 RX ADMIN — FENTANYL CITRATE 25 MCG: 50 INJECTION, SOLUTION INTRAMUSCULAR; INTRAVENOUS at 18:22

## 2024-05-29 RX ADMIN — ONDANSETRON 4 MG: 2 INJECTION INTRAMUSCULAR; INTRAVENOUS at 18:23

## 2024-05-29 RX ADMIN — HYDRALAZINE HYDROCHLORIDE 10 MG: 20 INJECTION INTRAMUSCULAR; INTRAVENOUS at 23:46

## 2024-05-29 RX ADMIN — MORPHINE SULFATE 4 MG: 4 INJECTION, SOLUTION INTRAMUSCULAR; INTRAVENOUS at 20:54

## 2024-05-29 RX ADMIN — SODIUM CHLORIDE, PRESERVATIVE FREE 10 ML: 5 INJECTION INTRAVENOUS at 23:46

## 2024-05-29 ASSESSMENT — PAIN DESCRIPTION - ORIENTATION
ORIENTATION: RIGHT

## 2024-05-29 ASSESSMENT — PAIN DESCRIPTION - PAIN TYPE: TYPE: ACUTE PAIN

## 2024-05-29 ASSESSMENT — PAIN DESCRIPTION - DESCRIPTORS
DESCRIPTORS: ACHING
DESCRIPTORS: ACHING
DESCRIPTORS: ACHING;CRAMPING

## 2024-05-29 ASSESSMENT — PAIN - FUNCTIONAL ASSESSMENT
PAIN_FUNCTIONAL_ASSESSMENT: ACTIVITIES ARE NOT PREVENTED
PAIN_FUNCTIONAL_ASSESSMENT: ACTIVITIES ARE NOT PREVENTED
PAIN_FUNCTIONAL_ASSESSMENT: 0-10
PAIN_FUNCTIONAL_ASSESSMENT: 0-10

## 2024-05-29 ASSESSMENT — PAIN DESCRIPTION - LOCATION
LOCATION: BREAST

## 2024-05-29 ASSESSMENT — PAIN SCALES - GENERAL
PAINLEVEL_OUTOF10: 10

## 2024-05-29 NOTE — ED PROVIDER NOTES
Shared DANISHA-ED Attending Visit.  CC: No            [facility] EMERGENCY DEPARTMENT  EMERGENCY DEPARTMENT ENCOUNTER        Pt Name: Rachele Lomas  MRN: [id number]  Birthdate 1980  Date of evaluation: 5/29/2024  Provider: MARK Durbin - CNP  PCP: Stefan Contreras MD  Note Started: 5:55 PM EDT 5/29/24    CHIEF COMPLAINT       Chief Complaint   Patient presents with    Leg Pain     Bilaterally \"for years\"    Abscess     Right breast for the past 1 1/2 weeks       HISTORY OF PRESENT ILLNESS: 1 or more Elements   History From: Patient patient's medical record        Rachele Lomas is a 43 y.o. female who presents to the emergency department today for evaluation of right breast erythema and pain.  Patient states the pain /erythema has been worsening over the last 1.5 weeks.  Pain has been moderate to severe in severity, worsened by palpation of the area or trying to wear a bra, relieved by nothing in particular.  Patient denies any injury or trauma to the area.  Patient reports a history of breast abscess in the past which apparently resolved without treatment.  Patient denies any fevers or chills.  Patient reports that she had a mammogram about 1 year ago and states that she had some sort of mild abnormality on the mammogram but she does not remember exactly what they told her.  She follows with the [facility] women[facility] clinic on [facility].  Patient additionally is complaining of bilateral hip pain radiating toward the knees.  She states she follows with Dr. Maldonado for orthopedics.  She states that she saw him about 2 weeks ago and was advised that she needs a right hip replacement.  She states that she was prescribed Percocet for short course of treatment which seem to help and she did take her last pill last night so she currently does not have anything to take for pain control.  She has a follow-up appointment with her orthopedic doctor in the next 3 weeks.  She states  topically 4 times daily as needed (as needed)    MULTIPLE VITAMIN (MULTIVITAMIN) TABS TABLET    Take 1 tablet by mouth daily    NICOTINE (NICODERM CQ) 21 MG/24HR    Place 1 patch onto the skin daily    PANTOPRAZOLE (PROTONIX) 40 MG TABLET    Take 1 tablet by mouth every morning (before breakfast) for 14 days    SUCRALFATE (CARAFATE) 1 GM TABLET    Take 1 tablet by mouth 4 times daily    THIAMINE 100 MG TABLET    Take 1 tablet by mouth daily       ALLERGIES     Ketorolac, Toradol [ketorolac tromethamine], Tomato, Darvocet [propoxyphene n-acetaminophen], Ibuprofen, Meloxicam, and Naproxen    FAMILYHISTORY       Family History   Problem Relation Age of Onset    Cancer Mother         colon        SOCIAL HISTORY       Social History     Tobacco Use    Smoking status: Every Day     Current packs/day: 1.00     Average packs/day: 1 pack/day for 15.0 years (15.0 ttl pk-yrs)     Types: Cigarettes    Smokeless tobacco: Never    Tobacco comments:     SAYS CANNOT USE PATCHES OR GUM   Vaping Use    Vaping Use: Never used   Substance Use Topics    Alcohol use: Yes     Alcohol/week: 0.0 standard drinks of alcohol     Comment: occassional-WINE COOLERS    Drug use: Yes     Types: Cocaine       SCREENINGS        Bethel Coma Scale  Eye Opening: Spontaneous  Best Verbal Response: Oriented  Best Motor Response: Obeys commands  Hakan Coma Scale Score: 15                CIWA Assessment  BP: (!) 174/98  Pulse: 77           PHYSICAL EXAM  1 or more Elements     ED Triage Vitals   BP Temp Temp src Pulse Respirations SpO2 Height Weight   05/29/24 1629 05/29/24 1628 -- 05/29/24 1628 05/29/24 1628 05/29/24 1628 -- --   (!) 185/94 97.4 °F (36.3 °C)  78 18 100 %                  Constitutional/General: Alert and oriented x3  Head: Normocephalic and atraumatic  Eyes: PERRL, EOMI, conjunctiva normal, sclera non icteric  ENT:  Oropharynx clear, handling secretions, no trismus, no asymmetry of the posterior oropharynx or uvular edema  Neck: Supple,

## 2024-05-30 LAB
ALBUMIN SERPL-MCNC: 4.2 G/DL (ref 3.5–5.2)
ALP SERPL-CCNC: 135 U/L (ref 35–104)
ALT SERPL-CCNC: 20 U/L (ref 0–32)
ANION GAP SERPL CALCULATED.3IONS-SCNC: 12 MMOL/L (ref 7–16)
AST SERPL-CCNC: 21 U/L (ref 0–31)
BASOPHILS # BLD: 0.02 K/UL (ref 0–0.2)
BASOPHILS NFR BLD: 0 % (ref 0–2)
BILIRUB SERPL-MCNC: 0.2 MG/DL (ref 0–1.2)
BUN SERPL-MCNC: 10 MG/DL (ref 6–20)
CALCIUM SERPL-MCNC: 8.9 MG/DL (ref 8.6–10.2)
CHLORIDE SERPL-SCNC: 101 MMOL/L (ref 98–107)
CO2 SERPL-SCNC: 26 MMOL/L (ref 22–29)
CREAT SERPL-MCNC: 0.7 MG/DL (ref 0.5–1)
EOSINOPHIL # BLD: 0.18 K/UL (ref 0.05–0.5)
EOSINOPHILS RELATIVE PERCENT: 2 % (ref 0–6)
ERYTHROCYTE [DISTWIDTH] IN BLOOD BY AUTOMATED COUNT: 15.5 % (ref 11.5–15)
GFR, ESTIMATED: >90 ML/MIN/1.73M2
GLUCOSE SERPL-MCNC: 99 MG/DL (ref 74–99)
HCT VFR BLD AUTO: 42.9 % (ref 34–48)
HGB BLD-MCNC: 13.4 G/DL (ref 11.5–15.5)
IMM GRANULOCYTES # BLD AUTO: 0.03 K/UL (ref 0–0.58)
IMM GRANULOCYTES NFR BLD: 0 % (ref 0–5)
LYMPHOCYTES NFR BLD: 2.06 K/UL (ref 1.5–4)
LYMPHOCYTES RELATIVE PERCENT: 25 % (ref 20–42)
MCH RBC QN AUTO: 25.8 PG (ref 26–35)
MCHC RBC AUTO-ENTMCNC: 31.2 G/DL (ref 32–34.5)
MCV RBC AUTO: 82.5 FL (ref 80–99.9)
MONOCYTES NFR BLD: 0.85 K/UL (ref 0.1–0.95)
MONOCYTES NFR BLD: 10 % (ref 2–12)
NEUTROPHILS NFR BLD: 62 % (ref 43–80)
NEUTS SEG NFR BLD: 5.14 K/UL (ref 1.8–7.3)
PLATELET # BLD AUTO: 257 K/UL (ref 130–450)
PMV BLD AUTO: 9.7 FL (ref 7–12)
POTASSIUM SERPL-SCNC: 3.6 MMOL/L (ref 3.5–5)
PROT SERPL-MCNC: 7.1 G/DL (ref 6.4–8.3)
RBC # BLD AUTO: 5.2 M/UL (ref 3.5–5.5)
SODIUM SERPL-SCNC: 139 MMOL/L (ref 132–146)
WBC OTHER # BLD: 8.3 K/UL (ref 4.5–11.5)

## 2024-05-30 PROCEDURE — 87070 CULTURE OTHR SPECIMN AEROBIC: CPT

## 2024-05-30 PROCEDURE — 87075 CULTR BACTERIA EXCEPT BLOOD: CPT

## 2024-05-30 PROCEDURE — 2580000003 HC RX 258: Performed by: STUDENT IN AN ORGANIZED HEALTH CARE EDUCATION/TRAINING PROGRAM

## 2024-05-30 PROCEDURE — 6360000002 HC RX W HCPCS: Performed by: STUDENT IN AN ORGANIZED HEALTH CARE EDUCATION/TRAINING PROGRAM

## 2024-05-30 PROCEDURE — 0J960ZX DRAINAGE OF CHEST SUBCUTANEOUS TISSUE AND FASCIA, OPEN APPROACH, DIAGNOSTIC: ICD-10-PCS

## 2024-05-30 PROCEDURE — 6360000002 HC RX W HCPCS: Performed by: EMERGENCY MEDICINE

## 2024-05-30 PROCEDURE — 6370000000 HC RX 637 (ALT 250 FOR IP): Performed by: SPECIALIST

## 2024-05-30 PROCEDURE — 87205 SMEAR GRAM STAIN: CPT

## 2024-05-30 PROCEDURE — 88112 CYTOPATH CELL ENHANCE TECH: CPT

## 2024-05-30 PROCEDURE — 6370000000 HC RX 637 (ALT 250 FOR IP): Performed by: STUDENT IN AN ORGANIZED HEALTH CARE EDUCATION/TRAINING PROGRAM

## 2024-05-30 PROCEDURE — 2500000003 HC RX 250 WO HCPCS

## 2024-05-30 PROCEDURE — 88305 TISSUE EXAM BY PATHOLOGIST: CPT

## 2024-05-30 PROCEDURE — 6360000002 HC RX W HCPCS

## 2024-05-30 PROCEDURE — 1200000000 HC SEMI PRIVATE

## 2024-05-30 PROCEDURE — 99253 IP/OBS CNSLTJ NEW/EST LOW 45: CPT | Performed by: SURGERY

## 2024-05-30 PROCEDURE — 99232 SBSQ HOSP IP/OBS MODERATE 35: CPT | Performed by: INTERNAL MEDICINE

## 2024-05-30 PROCEDURE — 87077 CULTURE AEROBIC IDENTIFY: CPT

## 2024-05-30 PROCEDURE — 2060000000 HC ICU INTERMEDIATE R&B

## 2024-05-30 PROCEDURE — 80053 COMPREHEN METABOLIC PANEL: CPT

## 2024-05-30 PROCEDURE — 85025 COMPLETE CBC W/AUTO DIFF WBC: CPT

## 2024-05-30 RX ORDER — ACETAMINOPHEN 650 MG
TABLET, EXTENDED RELEASE ORAL PRN
Status: DISCONTINUED | OUTPATIENT
Start: 2024-05-30 | End: 2024-05-31 | Stop reason: HOSPADM

## 2024-05-30 RX ORDER — LIDOCAINE HYDROCHLORIDE 10 MG/ML
20 INJECTION, SOLUTION INFILTRATION; PERINEURAL ONCE
Status: COMPLETED | OUTPATIENT
Start: 2024-05-30 | End: 2024-05-30

## 2024-05-30 RX ORDER — AMLODIPINE BESYLATE 5 MG/1
5 TABLET ORAL 2 TIMES DAILY
Status: ON HOLD | COMMUNITY
End: 2024-05-31 | Stop reason: HOSPADM

## 2024-05-30 RX ORDER — FENTANYL CITRATE 50 UG/ML
50 INJECTION, SOLUTION INTRAMUSCULAR; INTRAVENOUS
Status: DISCONTINUED | OUTPATIENT
Start: 2024-05-31 | End: 2024-05-31 | Stop reason: HOSPADM

## 2024-05-30 RX ORDER — LINEZOLID 600 MG/1
600 TABLET, FILM COATED ORAL EVERY 12 HOURS SCHEDULED
Status: DISCONTINUED | OUTPATIENT
Start: 2024-05-30 | End: 2024-05-31 | Stop reason: HOSPADM

## 2024-05-30 RX ADMIN — MORPHINE SULFATE 2 MG: 2 INJECTION, SOLUTION INTRAMUSCULAR; INTRAVENOUS at 05:54

## 2024-05-30 RX ADMIN — AMLODIPINE BESYLATE 10 MG: 10 TABLET ORAL at 09:07

## 2024-05-30 RX ADMIN — CARBAMAZEPINE 200 MG: 200 TABLET ORAL at 16:37

## 2024-05-30 RX ADMIN — WATER 2000 MG: 1 INJECTION INTRAMUSCULAR; INTRAVENOUS; SUBCUTANEOUS at 20:38

## 2024-05-30 RX ADMIN — METOPROLOL TARTRATE 25 MG: 25 TABLET, FILM COATED ORAL at 09:07

## 2024-05-30 RX ADMIN — WATER 2000 MG: 1 INJECTION INTRAMUSCULAR; INTRAVENOUS; SUBCUTANEOUS at 14:05

## 2024-05-30 RX ADMIN — CARBAMAZEPINE 200 MG: 200 TABLET ORAL at 09:07

## 2024-05-30 RX ADMIN — METOPROLOL TARTRATE 25 MG: 25 TABLET, FILM COATED ORAL at 20:38

## 2024-05-30 RX ADMIN — MORPHINE SULFATE 2 MG: 2 INJECTION, SOLUTION INTRAMUSCULAR; INTRAVENOUS at 09:56

## 2024-05-30 RX ADMIN — Medication 0.5 MG: at 16:19

## 2024-05-30 RX ADMIN — WATER 2000 MG: 1 INJECTION INTRAMUSCULAR; INTRAVENOUS; SUBCUTANEOUS at 05:53

## 2024-05-30 RX ADMIN — SODIUM CHLORIDE, PRESERVATIVE FREE 10 ML: 5 INJECTION INTRAVENOUS at 20:40

## 2024-05-30 RX ADMIN — HYDROMORPHONE HYDROCHLORIDE 0.5 MG: 1 INJECTION, SOLUTION INTRAMUSCULAR; INTRAVENOUS; SUBCUTANEOUS at 16:19

## 2024-05-30 RX ADMIN — GABAPENTIN 600 MG: 300 CAPSULE ORAL at 09:07

## 2024-05-30 RX ADMIN — METOPROLOL TARTRATE 25 MG: 25 TABLET, FILM COATED ORAL at 02:07

## 2024-05-30 RX ADMIN — SODIUM CHLORIDE, PRESERVATIVE FREE 10 ML: 5 INJECTION INTRAVENOUS at 09:08

## 2024-05-30 RX ADMIN — GABAPENTIN 600 MG: 300 CAPSULE ORAL at 20:38

## 2024-05-30 RX ADMIN — MORPHINE SULFATE 4 MG: 4 INJECTION, SOLUTION INTRAMUSCULAR; INTRAVENOUS at 01:31

## 2024-05-30 RX ADMIN — MORPHINE SULFATE 2 MG: 2 INJECTION, SOLUTION INTRAMUSCULAR; INTRAVENOUS at 20:39

## 2024-05-30 RX ADMIN — MORPHINE SULFATE 2 MG: 2 INJECTION, SOLUTION INTRAMUSCULAR; INTRAVENOUS at 14:05

## 2024-05-30 RX ADMIN — LINEZOLID 600 MG: 600 TABLET, FILM COATED ORAL at 20:38

## 2024-05-30 RX ADMIN — LIDOCAINE HYDROCHLORIDE 20 ML: 10 INJECTION, SOLUTION INFILTRATION; PERINEURAL at 16:26

## 2024-05-30 RX ADMIN — ENOXAPARIN SODIUM 40 MG: 100 INJECTION SUBCUTANEOUS at 09:07

## 2024-05-30 RX ADMIN — GABAPENTIN 600 MG: 300 CAPSULE ORAL at 14:05

## 2024-05-30 RX ADMIN — PANTOPRAZOLE SODIUM 40 MG: 40 TABLET, DELAYED RELEASE ORAL at 05:54

## 2024-05-30 ASSESSMENT — PAIN DESCRIPTION - DESCRIPTORS
DESCRIPTORS: ACHING;BURNING;DISCOMFORT
DESCRIPTORS: SHARP;BURNING
DESCRIPTORS: DISCOMFORT;SORE
DESCRIPTORS: ACHING;CRAMPING;DISCOMFORT
DESCRIPTORS: ACHING;CRAMPING;DISCOMFORT

## 2024-05-30 ASSESSMENT — PAIN DESCRIPTION - ORIENTATION
ORIENTATION: RIGHT

## 2024-05-30 ASSESSMENT — PAIN SCALES - GENERAL
PAINLEVEL_OUTOF10: 9
PAINLEVEL_OUTOF10: 10
PAINLEVEL_OUTOF10: 9

## 2024-05-30 ASSESSMENT — PAIN DESCRIPTION - LOCATION
LOCATION: BREAST

## 2024-05-30 NOTE — ED NOTES
ED to Inpatient Handoff Report    Notified Binta that electronic handoff available and patient ready for transport to room 0613A.    Safety Risks: None identified    Patient in Restraints: no    Constant Observer or Patient : no    Telemetry Monitoring Ordered :No           Order to transfer to unit without monitor:NO    Last MEWS: 1 Time completed: 2252    Deterioration Index Score:   Predictive Model Details          20 (Normal)  Factor Value    Calculated 5/29/2024 22:51 38% Age 43 years old    Deterioration Index Model 36% Systolic 185     14% Respiratory rate 18     8% Pulse oximetry 100 %     3% Potassium 4.1 mmol/L     1% Temperature 97.6 °F (36.4 °C)     0% Hematocrit 41.3 %     0% Pulse 76     0% Sodium 139 mmol/L     0% WBC count 6.7 k/uL        Vitals:    05/29/24 1844 05/29/24 2016 05/29/24 2055 05/29/24 2219   BP: (!) 185/96 (!) 174/98  (!) 185/105   Pulse: 73 77 82 76   Resp: 16 15  18   Temp:    97.6 °F (36.4 °C)   TempSrc:    Oral   SpO2: 100%  100% 100%   Weight:             Opportunity for questions and clarification was provided.

## 2024-05-30 NOTE — CONSULTS
[facility]  [facility]  Consultation Note     Admit Date: 5/29/2024  4:55 PM    Reason for Consult:   Breast abscess    Attending Physician:  Mesha Hurt    HISTORY OF PRESENT ILLNESS:             The history is obtained from extensive review of available past medical records. The patient is a 43 y.o. female who is previously known to the ID service.  The patient presented to the ED at [facility] on 5/29/2020 RUBY for with right breast pain and redness.  This has been going on for approximately 10 days.  It was worsened by trying to wear a bra.  No relieving factors.  On presentation vital signs were unremarkable other than hypertension.  White count was 6.7.  CMP was unremarkable.  She was treated with Cefazolin.  Seen by surgery.  They are going to debride the abscess at bedside.  The area was aspirated by ED.  Gram stain showing GPC in pairs and clusters.    Past Medical History:        Diagnosis Date    Asthma     Bronchitis     Movement disorder     Seizure disorder (HCC)     Seizure disorder (HCC)     Suicidal overdose (HCC) 6/27/2014    Tobacco abuse     Tobacco abuse      November 2022.  Admitted to [facility] with right breast pain.  She was treated with vancomycin and Zosyn for a right breast retroareolar abscess.  Seen by Dr. Richey.  There was no drainable fluid collection.  She was discharged on Augmentin and Doxycycline x 10 days.    Past Surgical History:        Procedure Laterality Date    CHOLECYSTECTOMY      COLONOSCOPY N/A 10/31/2022    COLONOSCOPY WITH BIOPSY performed by Jenifer Burris MD at [facility] ENDOSCOPY    FRACTURE SURGERY      R ANKLE TORN LIGAMENTS    ROTATOR CUFF REPAIR      UPPER GASTROINTESTINAL ENDOSCOPY N/A 5/3/2021    EGD BIOPSY performed by Eh HOLLOWAY MD at [facility] ENDOSCOPY    UPPER GASTROINTESTINAL ENDOSCOPY N/A 10/29/2022    EGD ESOPHAGOGASTRODUODENOSCOPY performed by Santos Triana MD at [facility] OR     Current  Sometimes true   Transportation Needs: Unmet Transportation Needs (5/29/2024)    PRAPARE - Transportation     Lack of Transportation (Medical): Yes     Lack of Transportation (Non-Medical): Yes   Housing Stability: Low Risk  (5/29/2024)    Housing Stability Vital Sign     Unable to Pay for Housing in the Last Year: No     Number of Places Lived in the Last Year: 1     Unstable Housing in the Last Year: No      Pets: None  Travel: No  The patient lives with her father-in-law and uncle    Family History:       Problem Relation Age of Onset    Cancer Mother         colon   . Otherwise non-pertinent to the chief complaint.    REVIEW OF SYSTEMS:    Constitutional: Negative for fevers, chills, diaphoresis  Neurologic: Negative   Psychiatric: Negative  Rheumatologic: Negative   Endocrine: Negative  Hematologic: Negative  Immunologic: Negative  ENT: Negative  Respiratory: Negative   Cardiovascular: Negative  GI: Negative  : Negative  Musculoskeletal: Negative  Skin: As in the HPI    PHYSICAL EXAM:    Vitals:   BP (!) 144/79   Pulse 73   Temp 98.6 °F (37 °C) (Oral)   Resp 18   Ht 1.499 m (4' 11\")   Wt 79.4 kg (175 lb)   SpO2 97%   BMI 35.35 kg/m²   Constitutional: The patient is awake, alert, and oriented.  No distress.  He is a visitor in the room.  Skin: Warm and dry. No rashes were noted.   The right breast shows an abscess at 12:00.  There is some surrounding erythema and cellulitis.  HEENT: Eyes show round, and reactive pupils. No jaundice. Moist mucous membranes, no ulcerations, no thrush.   Neck: Supple to movements. No lymphadenopathy.   Chest: No use of accessory muscles to breathe. Symmetrical expansion. Auscultation reveals no wheezing, crackles, or rhonchi.   Cardiovascular: S1 and S2 are rhythmic and regular. No murmurs appreciated.   Abdomen: Positive bowel sounds to auscultation. Benign to palpation. No masses felt. No hepatosplenomegaly.  Extremities: No clubbing, no cyanosis, no edema.  Lines:

## 2024-05-30 NOTE — CONSULTS
and that available in the EMR including CT abd/pel from ED. My assessment is HPI    Assessment:  Rachele Lomas is a 43 y.o. female with R breast abscess    Patient Active Problem List   Diagnosis    Tobacco abuse    Seizure disorder (HCC)    Asthma    Carbamazepine overdose with self-harm intent    Depression    Suicidal overdose (HCC)    Pneumonia    Epigastric pain    Generalized abdominal pain    Intractable abdominal pain    Sepsis (HCC)    Substance induced mood disorder (HCC)    Polysubstance abuse (HCC)    History of posttraumatic stress disorder (PTSD)    Overdose of undetermined intent    Cocaine abuse (HCC)    Altered mental status    Acute blood loss anemia    Nausea vomiting and diarrhea    Breast abscess       Plan:  - ok for regular diet   - Will plan for bedside aspiration of abscess  - Will do skin biopsy as well   - Abx per primary       Beverley Lu DO  PGY-1 General Surgery Resident     I saw and examined the patient. I reviewed the above resident's note. All available labs and pathology were reviewed. All imaging was independently reviewed and interpreted. All provider notes were reviewed. The above was discussed with the patient at length.I agree with the assessment and plan as outlined.    Jenifer Burris MD  General Surgery

## 2024-05-30 NOTE — H&P
[facility] History and Physical      CHIEF COMPLAINT: Right breast pain and swelling    History of Present Illness: A 43-year-old lady with past medical history significant for seizure disorder and asthma presented to ED for evaluation of right breast pain and swelling and redness.  Symptoms started about 1 and half week ago as a small lump on the right breast which kept increasing in size and severity along with increasing pain.  She is having continuous pain in the right breast even with slight movement of the right arm.  Denies any injury.  Moderate to severe pain in the right breast about 7-8 out of 10 in intensity, shooting pain off and on.  She also has arthritis of both her hips and follows orthopedic outpatient, plan to get hip replacement in near future.    Denies any chest pain, belly pain, nausea vomiting diarrhea, cough or shortness of breath, no fever or chills, no focal deficits.    Informant(s) for H&P: Patient    REVIEW OF SYSTEMS:  A comprehensive review of systems was negative except for: what is in the HPI      PMH:  Past Medical History:   Diagnosis Date    Asthma     Bronchitis     Movement disorder     Seizure disorder (HCC)     Seizure disorder (HCC)     Suicidal overdose (HCC) 6/27/2014    Tobacco abuse     Tobacco abuse        Surgical History:  Past Surgical History:   Procedure Laterality Date    CHOLECYSTECTOMY      COLONOSCOPY N/A 10/31/2022    COLONOSCOPY WITH BIOPSY performed by Jenifer Burris MD at [facility] ENDOSCOPY    FRACTURE SURGERY      R ANKLE TORN LIGAMENTS    ROTATOR CUFF REPAIR      UPPER GASTROINTESTINAL ENDOSCOPY N/A 5/3/2021    EGD BIOPSY performed by Eh HOLLOWAY MD at [facility] ENDOSCOPY    UPPER GASTROINTESTINAL ENDOSCOPY N/A 10/29/2022    EGD ESOPHAGOGASTRODUODENOSCOPY performed by Santos Triana MD at [facility] OR       Medications Prior to Admission:    Prior to Admission medications    Medication Sig Start Date End Date Taking? Authorizing  *      PLAN:    1.  Breast abscess-right side, 12 o'clock position measuring 3.6 x 3.2 x 1.6 cm with surrounding hyperemia, surgery consulted, antibiotic Ancef started, will keep patient n.p.o. after midnight, pain control with morphine, will follow with repeat labs in morning.  WBC count normal patient afebrile.  2..  Osteoarthritis both hips-to follow with Ortho outpatient, pain control.  3.  Primary hypertension-controlled, continue home medications.  4.  Asthma-stable, no acute issue.  5.  History of seizures-continue home medications, no acute issue.    Code Status: Full code  DVT prophylaxis: Lovenox  PUD prophylaxis-PPI      45 minutes or more were spent in assessing patient, reviewing chart, discussing plan of care and documentation.      NOTE: This report was transcribed using voice recognition software. Every effort was made to ensure accuracy; however, inadvertent computerized transcription errors may be present.  Electronically signed by Willie Jimenez MD on 5/29/2024 at 9:25 PM

## 2024-05-30 NOTE — PLAN OF CARE
Problem: Discharge Planning  Goal: Discharge to home or other facility with appropriate resources  5/30/2024 1013 by Romelia Guardado, RN  Outcome: Progressing  5/30/2024 0048 by Nathaly Angeles RN  Outcome: Progressing  Flowsheets (Taken 5/29/2024 2300)  Discharge to home or other facility with appropriate resources: Identify barriers to discharge with patient and caregiver     Problem: Pain  Goal: Verbalizes/displays adequate comfort level or baseline comfort level  5/30/2024 1013 by Romelia Guardado, RN  Outcome: Progressing  5/30/2024 0048 by Nathaly Angeles, RN  Outcome: Progressing     Problem: Safety - Adult  Goal: Free from fall injury  5/30/2024 1013 by Romelia Guardado, RN  Outcome: Progressing  5/30/2024 0048 by Nathaly Angeles, RN  Outcome: Progressing

## 2024-05-30 NOTE — CARE COORDINATION
Introduced my self and provided explanation of CM role to patient. Admitted for breast abscess. s/p  I&D abscess right breast 5/29/24 ER bedside. Hx of poly substance abuse, asthma, jhonatan hip osteoarthritis, follows with Dr. Maldonado for orthopedics, states she needs jhonatan hip replacements. Pt. states that she needs PT rehab for legs that she has severe pain and cannot walk with out a walker. CM advised patient to f/u with pcp and ask for referral for rehab due to patient stating that Dr. Contreras listed in chart is not her pcp, she states she sees a DrYudith in [place] but cannot recall his name. Patient is agreeable to [facility], referral made to [facility]. CM printed addiction recovery resources and substance abuse facilities, and community resources. She voices she resides in a 1 story home with her step-dad and completes her adl's with independence. She ambulates with a ww. Patient uses [facility] on [facility]. General surgery is following, plan for bedside aspiration of abscess and skin biopsy. Currently on IV ancef. Will continue to follow and assist with discharge planning as needed.  Clarice Oconnor BSN, RN  Case Management

## 2024-05-30 NOTE — PROGRESS NOTES
4 Eyes Skin Assessment     NAME:  Rachele Lomas  YOB: 1980  MEDICAL RECORD NUMBER:  [id number]    The patient is being assessed for  Admission    I agree that at least one RN has performed a thorough Head to Toe Skin Assessment on the patient. ALL assessment sites listed below have been assessed.      Areas assessed by both nurses:    Head, Face, Ears, Shoulders, Back, Chest, Arms, Elbows, Hands, Sacrum. Buttock, Coccyx, Ischium, Legs. Feet and Heels, and Under Medical Devices         Does the Patient have a Wound? No noted wound(s)       Familia Prevention initiated by RN: No  Wound Care Orders initiated by RN: No    Pressure Injury (Stage 3,4, Unstageable, DTI, NWPT, and Complex wounds) if present, place Wound referral order by RN under : No    New Ostomies, if present place, Ostomy referral order under : No     Nurse 1 eSignature: Electronically signed by Nathaly Angeles RN on 5/30/24 at 5:14 AM EDT    **SHARE this note so that the co-signing nurse can place an eSignature**    Nurse 2 eSignature: Electronically signed by Binta Mark RN on 5/30/24 at 5:15 AM EDT

## 2024-05-30 NOTE — CARE COORDINATION
Peer Recovery Support Note    Name: Rachele Lomas  Date: 5/30/2024    Chief Complaint   Patient presents with    Leg Pain     Bilaterally \"for years\"    Abscess     Right breast for the past 1 1/2 weeks       Peer Support met with patient.  [] Support and education provided  [x] Resources provided   [x] Treatment referral[facility]  [x] Other: Left Peer contact information  [] Patient declined peer recovery services     Referred By: Reshma (JAILYN/MAMADOU)    Notes: Met with patient who appeared to be very frustrated. Patient was not interested in having a conversation in regards to substance abuse/treatment options; she was threatening to leave AMA if she did not quickly get the procedure she has been anticipating. (Patient has breast abscess and she states she expected to receive some sort of treatment for it \"hours ago\"). Peer acknowledged her frustration from having to wait, and tried to encourage her to remain calm. Patient was not receptive to Peer visit at this time but Peer will make another attempt on 5/31 if patient has not been discharged at that time.    (Peer would suggest [facility]; [phone]. However, several outpatient resources for other locations and services, along with Peer contact information, were all provided to patient as well.) Please feel free to contact Peer if any further assistance is required in the meantime.    Signed: Gia Napoles, 5/30/2024      [phone]

## 2024-05-30 NOTE — PLAN OF CARE
Problem: Discharge Planning  Goal: Discharge to home or other facility with appropriate resources  Outcome: Progressing  Flowsheets (Taken 5/29/2024 2300)  Discharge to home or other facility with appropriate resources: Identify barriers to discharge with patient and caregiver     Problem: Pain  Goal: Verbalizes/displays adequate comfort level or baseline comfort level  Outcome: Progressing     Problem: Safety - Adult  Goal: Free from fall injury  Outcome: Progressing

## 2024-05-30 NOTE — PROCEDURES
PROCEDURE NOTE  Date: 5/30/2024   Name: Rachele Lomas  YOB: 1980    Procedures    Incision and Drainage Procedure Note    Indication: R breast abscess     Procedure: The skin over the right breast was prepped with betadine and draped in the usual sterile fashion.  . Local anesthesia over this lesion was obtained by infiltration using 10 cc of lidocaine.  An incision was then made over the area of most induration down through the subcutaneous tissue. Dissection was carried down to the level of subcutaneous fat. Approximately 10 cc of bloody and purulent material was expressed. Cultures were obtained using a sterile swab . Loculations were broken , and approximately 5 cc of additional bloody material was then able to be expressed. A blade was used to take a sample for a biopsy. The drainage cavity was then irrigated with saline. The wound was packed with iodoform and covered with a dressing of clean gauze.    The abscess measured 3 cm x 2 cm x 3 cm    The patient tolerated the procedure well.    Complications: None    Dr. Burris was avaliable for the procedure    Electronically signed by Beverley Lu DO on 5/30/2024 at 4:37 PM

## 2024-05-30 NOTE — PROGRESS NOTES
[facility] Hospitalist   Progress Note    Admitting Date and Time: 5/29/2024  4:55 PM  Admit Dx: Breast abscess [N61.1]  Abscess of right breast [N61.1]    Subjective: Patient came to ED on 29th, due to right breast pain and erythema, prior abnormal mammogram, patient also needs hip replacement, ED impression of right breast abscess, ultrasound in ED had shown retroareolar fluid collection, admitted by medicine, smoker, also uses cocaine.  Patient also with osteoarthritis, hypertension, asthma, seizure disorder.  General surgery on consult, has plan for bedside aspiration of abscess, also skin biopsy, patient on cefazolin IV, wound culture with gram-positive cocci,    Patient was admitted with Breast abscess [N61.1]  Abscess of right breast [N61.1]. Patient feels in pain, at this time awake, alert, eating her lunch, examined patient with primary nurse present in the room, area with induration, 4 x 4 centimeter right to breast, no open wound, erythema present, tenderness present, patient does say Germoloids diabetes, also grandmother with breast cancer, patient with concern over bedside procedures as attempted to aspirate earlier was not successful.    Per RN: Patient with abnormal wound culture    ROS: denies fever, chills, cp, sob, n/v, HA unless stated above.     metoprolol tartrate  25 mg Oral BID    ceFAZolin  2,000 mg IntraVENous Q8H    sodium chloride flush  5-40 mL IntraVENous 2 times per day    enoxaparin  40 mg SubCUTAneous Daily    amLODIPine  10 mg Oral QAM    carBAMazepine  200 mg Oral BID WC    gabapentin  600 mg Oral TID    pantoprazole  40 mg Oral QAM AC     morphine, 2 mg, Q4H PRN  sodium chloride flush, 5-40 mL, PRN  sodium chloride, , PRN  potassium chloride, 40 mEq, PRN   Or  potassium alternative oral replacement, 40 mEq, PRN   Or  potassium chloride, 10 mEq, PRN  magnesium sulfate, 2,000 mg, PRN  ondansetron, 4 mg, Q8H PRN   Or  ondansetron, 4 mg, Q6H PRN  polyethylene glycol,

## 2024-05-30 NOTE — PROGRESS NOTES
SPIRITUAL HEALTH SERVICES - [facility]  PROGRESS NOTE    Name: Rachele Lomas                Catholic: Non-Taoism   Anointed (Last Rites): NA    Referral: Spiritual Care Consult    Assessment:  Upon entering the room  observes This  finds the patient reclined in bed and receptive to this  visit.      Intervention:  Attentive listening, Validating feelings, words of support, reassurance and encouragement as well as prayer.     Outcome:  Patient expresses appreciation.    Plan:  Chaplains will remain available to offer spiritual and emotional support as needed.      Electronically signed by Chaplain May, on 5/30/2024 at 12:36 PM.  Spiritual Care Department  [facility]  [phone]

## 2024-05-31 VITALS
RESPIRATION RATE: 16 BRPM | BODY MASS INDEX: 35.28 KG/M2 | HEART RATE: 81 BPM | HEIGHT: 59 IN | DIASTOLIC BLOOD PRESSURE: 90 MMHG | WEIGHT: 175 LBS | TEMPERATURE: 97.5 F | SYSTOLIC BLOOD PRESSURE: 139 MMHG | OXYGEN SATURATION: 100 %

## 2024-05-31 LAB
ALBUMIN SERPL-MCNC: 3.5 G/DL (ref 3.5–5.2)
ALP SERPL-CCNC: 110 U/L (ref 35–104)
ALT SERPL-CCNC: 12 U/L (ref 0–32)
ANION GAP SERPL CALCULATED.3IONS-SCNC: 11 MMOL/L (ref 7–16)
ASO AB SERPL-ACNC: 363 IU/ML (ref 0–200)
AST SERPL-CCNC: 14 U/L (ref 0–31)
BASOPHILS # BLD: 0.02 K/UL (ref 0–0.2)
BASOPHILS NFR BLD: 0 % (ref 0–2)
BILIRUB SERPL-MCNC: <0.2 MG/DL (ref 0–1.2)
BUN SERPL-MCNC: 16 MG/DL (ref 6–20)
CALCIUM SERPL-MCNC: 8.9 MG/DL (ref 8.6–10.2)
CHLORIDE SERPL-SCNC: 103 MMOL/L (ref 98–107)
CO2 SERPL-SCNC: 22 MMOL/L (ref 22–29)
CREAT SERPL-MCNC: 0.7 MG/DL (ref 0.5–1)
CRP SERPL HS-MCNC: 12 MG/L (ref 0–5)
EOSINOPHIL # BLD: 0.12 K/UL (ref 0.05–0.5)
EOSINOPHILS RELATIVE PERCENT: 1 % (ref 0–6)
ERYTHROCYTE [DISTWIDTH] IN BLOOD BY AUTOMATED COUNT: 15.4 % (ref 11.5–15)
ERYTHROCYTE [SEDIMENTATION RATE] IN BLOOD BY WESTERGREN METHOD: 25 MM/HR (ref 0–20)
GFR, ESTIMATED: >90 ML/MIN/1.73M2
GLUCOSE SERPL-MCNC: 108 MG/DL (ref 74–99)
HCT VFR BLD AUTO: 36.4 % (ref 34–48)
HGB BLD-MCNC: 11.2 G/DL (ref 11.5–15.5)
IMM GRANULOCYTES # BLD AUTO: 0.03 K/UL (ref 0–0.58)
IMM GRANULOCYTES NFR BLD: 0 % (ref 0–5)
LYMPHOCYTES NFR BLD: 1.68 K/UL (ref 1.5–4)
LYMPHOCYTES RELATIVE PERCENT: 20 % (ref 20–42)
MCH RBC QN AUTO: 25.9 PG (ref 26–35)
MCHC RBC AUTO-ENTMCNC: 30.8 G/DL (ref 32–34.5)
MCV RBC AUTO: 84.1 FL (ref 80–99.9)
MONOCYTES NFR BLD: 0.87 K/UL (ref 0.1–0.95)
MONOCYTES NFR BLD: 11 % (ref 2–12)
NEUTROPHILS NFR BLD: 67 % (ref 43–80)
NEUTS SEG NFR BLD: 5.56 K/UL (ref 1.8–7.3)
NON-GYN CYTOLOGY REPORT: NORMAL
PLATELET # BLD AUTO: 232 K/UL (ref 130–450)
PMV BLD AUTO: 9.8 FL (ref 7–12)
POTASSIUM SERPL-SCNC: 4.4 MMOL/L (ref 3.5–5)
PROT SERPL-MCNC: 6.6 G/DL (ref 6.4–8.3)
RBC # BLD AUTO: 4.33 M/UL (ref 3.5–5.5)
SODIUM SERPL-SCNC: 136 MMOL/L (ref 132–146)
WBC OTHER # BLD: 8.3 K/UL (ref 4.5–11.5)

## 2024-05-31 PROCEDURE — 99239 HOSP IP/OBS DSCHRG MGMT >30: CPT | Performed by: INTERNAL MEDICINE

## 2024-05-31 PROCEDURE — 6370000000 HC RX 637 (ALT 250 FOR IP): Performed by: STUDENT IN AN ORGANIZED HEALTH CARE EDUCATION/TRAINING PROGRAM

## 2024-05-31 PROCEDURE — 86063 ANTISTREPTOLYSIN O SCREEN: CPT

## 2024-05-31 PROCEDURE — 85652 RBC SED RATE AUTOMATED: CPT

## 2024-05-31 PROCEDURE — 85025 COMPLETE CBC W/AUTO DIFF WBC: CPT

## 2024-05-31 PROCEDURE — 6360000002 HC RX W HCPCS: Performed by: STUDENT IN AN ORGANIZED HEALTH CARE EDUCATION/TRAINING PROGRAM

## 2024-05-31 PROCEDURE — 80053 COMPREHEN METABOLIC PANEL: CPT

## 2024-05-31 PROCEDURE — 2580000003 HC RX 258: Performed by: STUDENT IN AN ORGANIZED HEALTH CARE EDUCATION/TRAINING PROGRAM

## 2024-05-31 PROCEDURE — 99232 SBSQ HOSP IP/OBS MODERATE 35: CPT | Performed by: SURGERY

## 2024-05-31 PROCEDURE — 86140 C-REACTIVE PROTEIN: CPT

## 2024-05-31 PROCEDURE — 6370000000 HC RX 637 (ALT 250 FOR IP): Performed by: SPECIALIST

## 2024-05-31 RX ORDER — OXYCODONE HYDROCHLORIDE AND ACETAMINOPHEN 5; 325 MG/1; MG/1
1 TABLET ORAL EVERY 6 HOURS PRN
Qty: 20 TABLET | Refills: 0 | Status: SHIPPED | OUTPATIENT
Start: 2024-05-31 | End: 2024-06-05

## 2024-05-31 RX ORDER — LINEZOLID 600 MG/1
600 TABLET, FILM COATED ORAL EVERY 12 HOURS SCHEDULED
Qty: 20 TABLET | Refills: 0 | Status: SHIPPED | OUTPATIENT
Start: 2024-05-31 | End: 2024-06-10

## 2024-05-31 RX ADMIN — ENOXAPARIN SODIUM 40 MG: 100 INJECTION SUBCUTANEOUS at 08:09

## 2024-05-31 RX ADMIN — GABAPENTIN 600 MG: 300 CAPSULE ORAL at 14:16

## 2024-05-31 RX ADMIN — CARBAMAZEPINE 200 MG: 200 TABLET ORAL at 08:09

## 2024-05-31 RX ADMIN — LINEZOLID 600 MG: 600 TABLET, FILM COATED ORAL at 08:08

## 2024-05-31 RX ADMIN — MORPHINE SULFATE 2 MG: 2 INJECTION, SOLUTION INTRAMUSCULAR; INTRAVENOUS at 08:09

## 2024-05-31 RX ADMIN — AMLODIPINE BESYLATE 10 MG: 10 TABLET ORAL at 08:15

## 2024-05-31 RX ADMIN — METOPROLOL TARTRATE 25 MG: 25 TABLET, FILM COATED ORAL at 08:08

## 2024-05-31 RX ADMIN — MORPHINE SULFATE 2 MG: 2 INJECTION, SOLUTION INTRAMUSCULAR; INTRAVENOUS at 03:36

## 2024-05-31 RX ADMIN — PANTOPRAZOLE SODIUM 40 MG: 40 TABLET, DELAYED RELEASE ORAL at 06:18

## 2024-05-31 RX ADMIN — SODIUM CHLORIDE, PRESERVATIVE FREE 10 ML: 5 INJECTION INTRAVENOUS at 08:10

## 2024-05-31 RX ADMIN — GABAPENTIN 600 MG: 300 CAPSULE ORAL at 08:08

## 2024-05-31 RX ADMIN — WATER 2000 MG: 1 INJECTION INTRAMUSCULAR; INTRAVENOUS; SUBCUTANEOUS at 06:18

## 2024-05-31 RX ADMIN — MORPHINE SULFATE 2 MG: 2 INJECTION, SOLUTION INTRAMUSCULAR; INTRAVENOUS at 14:16

## 2024-05-31 ASSESSMENT — PAIN SCALES - GENERAL
PAINLEVEL_OUTOF10: 9
PAINLEVEL_OUTOF10: 9
PAINLEVEL_OUTOF10: 8

## 2024-05-31 ASSESSMENT — PAIN DESCRIPTION - LOCATION
LOCATION: LEG;BREAST
LOCATION: BREAST
LOCATION: BREAST

## 2024-05-31 ASSESSMENT — PAIN DESCRIPTION - ORIENTATION
ORIENTATION: RIGHT
ORIENTATION: RIGHT
ORIENTATION: RIGHT;LEFT

## 2024-05-31 ASSESSMENT — PAIN DESCRIPTION - DESCRIPTORS
DESCRIPTORS: ACHING;DISCOMFORT
DESCRIPTORS: ACHING;SHARP;SORE;TENDER
DESCRIPTORS: ACHING;SORE;TENDER;BURNING

## 2024-05-31 NOTE — PROGRESS NOTES
Messaged Dr. Burris about patient being Ok for discharge and pain medication at discharge. Orders received.

## 2024-05-31 NOTE — PROGRESS NOTES
[facility]  [facility]  Progress Note    SUBJECTIVE:  Chief Complaint   Patient presents with    Leg Pain     Bilaterally \"for years\"    Abscess     Right breast for the past 1 1/2 weeks     Patient is tolerating medications. No reported adverse drug reactions.  No nausea, vomiting, diarrhea.  No fevers   Sitting up on the edge of the bed, eating lunch  Asking for pain medication    Review of systems:  As stated above in the chief complaint, otherwise negative.    Medications:  Scheduled Meds:   metoprolol tartrate  25 mg Oral BID    linezolid  600 mg Oral 2 times per day    fentanNYL  50 mcg IntraVENous On Call    ceFAZolin  2,000 mg IntraVENous Q8H    sodium chloride flush  5-40 mL IntraVENous 2 times per day    enoxaparin  40 mg SubCUTAneous Daily    amLODIPine  10 mg Oral QAM    carBAMazepine  200 mg Oral BID WC    gabapentin  600 mg Oral TID    pantoprazole  40 mg Oral QAM AC     Continuous Infusions:   sodium chloride       PRN Meds:povidone-iodine, morphine, sodium chloride flush, sodium chloride, potassium chloride **OR** potassium alternative oral replacement **OR** potassium chloride, magnesium sulfate, ondansetron **OR** ondansetron, polyethylene glycol, albuterol, hydrALAZINE    OBJECTIVE:  BP (!) 139/90   Pulse 81   Temp 97.5 °F (36.4 °C) (Oral)   Resp 16   Ht 1.499 m (4' 11\")   Wt 79.4 kg (175 lb)   SpO2 100%   BMI 35.35 kg/m²   Temp  Av.3 °F (36.8 °C)  Min: 97.5 °F (36.4 °C)  Max: 99 °F (37.2 °C)  Constitutional: The patient is awake, alert, and oriented. Sitting up on the edge of the bed  Skin: Warm and dry. No rashes were noted. Right breat is dressed post I&D   HEENT: Round and reactive pupils.  Moist mucous membranes.  No ulcerations or thrush.  Neck: Supple to movements.   Chest: No use of accessory muscles to breathe. Symmetrical expansion.  No wheezing, crackles or rhonchi.  Cardiovascular: S1 and S2 are rhythmic and regular. No murmurs

## 2024-05-31 NOTE — PLAN OF CARE
Problem: Discharge Planning  Goal: Discharge to home or other facility with appropriate resources  5/31/2024 1611 by Gia aPz RN  Outcome: Completed  5/31/2024 1256 by Gia Paz RN  Outcome: Progressing  Flowsheets (Taken 5/31/2024 0830)  Discharge to home or other facility with appropriate resources: Identify barriers to discharge with patient and caregiver     Problem: Pain  Goal: Verbalizes/displays adequate comfort level or baseline comfort level  5/31/2024 1611 by Gia Paz RN  Outcome: Completed  5/31/2024 1256 by Gia Paz RN  Outcome: Progressing  Flowsheets (Taken 5/31/2024 0808)  Verbalizes/displays adequate comfort level or baseline comfort level:   Encourage patient to monitor pain and request assistance   Assess pain using appropriate pain scale   Administer analgesics based on type and severity of pain and evaluate response   Implement non-pharmacological measures as appropriate and evaluate response   Consider cultural and social influences on pain and pain management   Notify Licensed Independent Practitioner if interventions unsuccessful or patient reports new pain     Problem: Safety - Adult  Goal: Free from fall injury  5/31/2024 1611 by Gia Paz RN  Outcome: Completed  5/31/2024 1256 by Gia Paz RN  Outcome: Progressing  Flowsheets (Taken 5/31/2024 0830)  Free From Fall Injury: Instruct family/caregiver on patient safety

## 2024-05-31 NOTE — DISCHARGE SUMMARY
[facility] Hospitalist       Hospitalist Physician Discharge Summary       No follow-up provider specified.    Activity level: As tolerated    Diet: ADULT DIET; Regular    Labs:    Dispo: Home    Condition at discharge: Stable    Continue supplemental oxygen via nasal canula @ 2 LPM round-the-clock.    Continue CPAP / BiPAP during sleep as prior to admission.    Patient ID:  Rachele Lomas  [id number]  43 y.o.  1980    Admit date: 5/29/2024    Discharge date and time:  5/31/2024  2:48 PM    Admission Diagnoses: Principal Problem:    Abscess of right breast  Resolved Problems:    * No resolved hospital problems. *      Discharge Diagnoses: Principal Problem:    Abscess of right breast  Resolved Problems:    * No resolved hospital problems. *      Consults:  IP CONSULT TO GENERAL SURGERY  IP CONSULT TO SPIRITUAL SERVICES  IP CONSULT TO INFECTIOUS DISEASES  IP CONSULT TO IV TEAM    Procedures: Incision and drainage involving the right breast    Hospital Course: Patient was admitted with Breast abscess [N61.1]  Abscess of right breast [N61.1]. Patient came to ED on 29th, due to right breast pain and erythema, prior abnormal mammogram, patient also needs hip replacement, ED impression of right breast abscess, ultrasound in ED had shown retroareolar fluid collection, admitted by medicine, smoker, also uses cocaine.  Patient also with osteoarthritis, hypertension, asthma, seizure disorder.  General surgery on consult has done bedside aspiration of abscess with removal of 10 cc of purulent drainage, also skin biopsy, patient on cefazolin IV, wound culture with gram-positive cocci, seen by ID, linezolid is added to cefazolin, , as of today ID has changed to linezolid, stop cefazolin, surgery okay for DC plans, wound care issues have been explained to the patient, wound itself is healing, packing inside, patient who has done nursing does say she has enough home health to help, will continue with DC  MG tablet  Commonly known as: PROTONIX  Take 1 tablet by mouth every morning (before breakfast) for 14 days     sucralfate 1 GM tablet  Commonly known as: Carafate  Take 1 tablet by mouth 4 times daily     thiamine 100 MG tablet  Take 1 tablet by mouth daily     tiZANidine 4 MG tablet  Commonly known as: ZANAFLEX            ASK your doctor about these medications      Biofreeze 4 % Gel  Generic drug: Menthol (Topical Analgesic)  Apply 1 cm topically 4 times daily as needed (as needed)     dicyclomine 10 MG capsule  Commonly known as: BENTYL  Take 1 capsule by mouth 4 times daily               Where to Get Your Medications        These medications were sent to [address] -  [phone] - F [phone]  [address] [place] [id number]      Phone: [phone]   metoprolol tartrate 25 MG tablet       You can get these medications from any pharmacy    Bring a paper prescription for each of these medications  linezolid 600 MG tablet  oxyCODONE-acetaminophen 5-325 MG per tablet           Note that more than 30 minutes was spent in preparing discharge papers, discussing discharge with patient, medication review, etc.    Signed:  Electronically signed by Mesha Hurt MD on 5/31/2024 at 2:48 PM    NOTE: This report was transcribed using voice recognition software. Every effort was made to ensure accuracy; however, inadvertent computerized transcription errors may be present.

## 2024-05-31 NOTE — PLAN OF CARE
Problem: Discharge Planning  Goal: Discharge to home or other facility with appropriate resources  5/31/2024 1256 by Gia Paz RN  Outcome: Progressing  Flowsheets (Taken 5/31/2024 0830)  Discharge to home or other facility with appropriate resources: Identify barriers to discharge with patient and caregiver  5/31/2024 0134 by Estefani Arevalo RN  Outcome: Progressing     Problem: Pain  Goal: Verbalizes/displays adequate comfort level or baseline comfort level  5/31/2024 1256 by Gai Paz RN  Outcome: Progressing  Flowsheets (Taken 5/31/2024 0808)  Verbalizes/displays adequate comfort level or baseline comfort level:   Encourage patient to monitor pain and request assistance   Assess pain using appropriate pain scale   Administer analgesics based on type and severity of pain and evaluate response   Implement non-pharmacological measures as appropriate and evaluate response   Consider cultural and social influences on pain and pain management   Notify Licensed Independent Practitioner if interventions unsuccessful or patient reports new pain  5/31/2024 0134 by Estefani Arevalo RN  Outcome: Progressing     Problem: Safety - Adult  Goal: Free from fall injury  5/31/2024 1256 by Gia Paz RN  Outcome: Progressing  Flowsheets (Taken 5/31/2024 0830)  Free From Fall Injury: Instruct family/caregiver on patient safety  5/31/2024 0134 by Estefani Arevalo RN  Outcome: Progressing

## 2024-05-31 NOTE — CARE COORDINATION
Peer Recovery Support Note    Name: Rachele Lomas  Date: 5/31/2024    Chief Complaint   Patient presents with    Leg Pain     Bilaterally \"for years\"    Abscess     Right breast for the past 1 1/2 weeks       Peer Support met with patient.  [] Support and education provided  [x] Resources provided   [x] [facility]  [x] Other: Left Peer contact information  [] Patient declined peer recovery services     Referred By: Reshma (MAMADOU)    Notes: Followed up with Patient who states she has been doing \"okay\" since her last visit, and did not report any use since her last admission (5/10/24). Peer attempted to schedule initial IOP appointment for patient.([facility]) Patient states she will need to \"heal up\" first, and requested the number to call and schedule on her own when that time comes. Peer did offer to schedule out (one month), however, patient confirms she would feel better making her own judgement. Patient did mention resources pertaining to public housing, therefore, Peer will return to deliver a few additional resources along with the ones already provided, and Peer contact information.    Please feel free to contact Peer if any further assistance is needed.    Signed: Gia Napoles, 5/31/2024      [phone]

## 2024-05-31 NOTE — ACP (ADVANCE CARE PLANNING)
Advance Care Planning   Healthcare Decision Maker:    Primary Decision Maker: Casie Stiles - Brother/Sister - [phone]    Click here to complete Healthcare Decision Makers including selection of the Healthcare Decision Maker Relationship (ie \"Primary\").  Today we documented Decision Maker(s) consistent with Legal Next of Kin hierarchy.

## 2024-05-31 NOTE — PROGRESS NOTES
General Surgery  Attending Physician Progress Note    Chief Complaint   Patient presents with    Leg Pain     Bilaterally \"for years\"    Abscess     Right breast for the past 1 1/2 weeks       Subjective:  I and D done at bedside yesterday. Said her pain has improved.    ROS: no CP.    I reviewed the patient's Past Medical History, Past Surgical History, Medications, and Allergies.    LABS:  CBC:   Lab Results   Component Value Date/Time    WBC 8.3 05/31/2024 03:40 AM    RBC 4.33 05/31/2024 03:40 AM    HGB 11.2 05/31/2024 03:40 AM    HCT 36.4 05/31/2024 03:40 AM    MCV 84.1 05/31/2024 03:40 AM    MCH 25.9 05/31/2024 03:40 AM    MCHC 30.8 05/31/2024 03:40 AM    RDW 15.4 05/31/2024 03:40 AM     05/31/2024 03:40 AM    MPV 9.8 05/31/2024 03:40 AM     CMP:    Lab Results   Component Value Date/Time     05/31/2024 03:40 AM    K 4.4 05/31/2024 03:40 AM    K 3.5 11/20/2022 06:39 AM     05/31/2024 03:40 AM    CO2 22 05/31/2024 03:40 AM    BUN 16 05/31/2024 03:40 AM    CREATININE 0.7 05/31/2024 03:40 AM    GFRAA >60 10/15/2022 03:31 AM    LABGLOM >90 05/31/2024 03:40 AM    LABGLOM >90 04/04/2024 05:31 AM    GLUCOSE 108 05/31/2024 03:40 AM    GLUCOSE 90 07/24/2011 02:45 AM    CALCIUM 8.9 05/31/2024 03:40 AM    BILITOT <0.2 05/31/2024 03:40 AM    ALKPHOS 110 05/31/2024 03:40 AM    AST 14 05/31/2024 03:40 AM    ALT 12 05/31/2024 03:40 AM     BMP:    Lab Results   Component Value Date/Time     05/31/2024 03:40 AM    K 4.4 05/31/2024 03:40 AM    K 3.5 11/20/2022 06:39 AM     05/31/2024 03:40 AM    CO2 22 05/31/2024 03:40 AM    BUN 16 05/31/2024 03:40 AM    CREATININE 0.7 05/31/2024 03:40 AM    CALCIUM 8.9 05/31/2024 03:40 AM    GFRAA >60 10/15/2022 03:31 AM    LABGLOM >90 05/31/2024 03:40 AM    LABGLOM >90 04/04/2024 05:31 AM     Hepatic Function Panel:    Lab Results   Component Value Date/Time    ALKPHOS 110 05/31/2024 03:40 AM    ALT 12 05/31/2024 03:40 AM    AST 14 05/31/2024 03:40 AM    BILITOT

## 2024-05-31 NOTE — CARE COORDINATION
Chart review -  General surgery is following, s/p incision and drainage right breast abscess at bedside 5/30/24. Cultures pending. ID is following - IV ancef, PO zyvox. Follow up with Ortho OP. Anticipated to discharge home with no needs. Will continue to follow.  Clarice HARRISN, RN  Case Management

## 2024-06-01 LAB
MICROORGANISM SPEC CULT: NORMAL
SPECIMEN DESCRIPTION: NORMAL

## 2024-06-02 LAB
MICROORGANISM SPEC CULT: ABNORMAL
MICROORGANISM SPEC CULT: NORMAL
MICROORGANISM/AGENT SPEC: ABNORMAL
SERVICE CMNT-IMP: ABNORMAL
SERVICE CMNT-IMP: NORMAL
SPECIMEN DESCRIPTION: ABNORMAL
SPECIMEN DESCRIPTION: ABNORMAL
SPECIMEN DESCRIPTION: NORMAL

## 2024-06-03 LAB
MICROORGANISM SPEC CULT: NORMAL
MICROORGANISM SPEC CULT: NORMAL
SERVICE CMNT-IMP: NORMAL
SERVICE CMNT-IMP: NORMAL
SPECIMEN DESCRIPTION: NORMAL
SPECIMEN DESCRIPTION: NORMAL

## 2024-06-03 NOTE — PROGRESS NOTES
Patient discharged without meds. Per Epic Metoprolol tartrate 25 mg was sent to [facility] [address]. Our script placed on hold

## 2024-06-10 LAB — SURGICAL PATHOLOGY REPORT: NORMAL

## 2024-06-25 ENCOUNTER — HOSPITAL ENCOUNTER (EMERGENCY)
Age: 44
Discharge: HOME | End: 2024-06-25
Attending: EMERGENCY MEDICINE
Payer: MEDICAID

## 2024-06-25 ENCOUNTER — APPOINTMENT (OUTPATIENT)
Dept: GENERAL RADIOLOGY | Age: 44
End: 2024-06-25
Payer: MEDICAID

## 2024-06-25 VITALS
WEIGHT: 160 LBS | BODY MASS INDEX: 32.25 KG/M2 | HEART RATE: 92 BPM | TEMPERATURE: 98.2 F | DIASTOLIC BLOOD PRESSURE: 101 MMHG | HEIGHT: 59 IN | SYSTOLIC BLOOD PRESSURE: 177 MMHG | OXYGEN SATURATION: 100 % | RESPIRATION RATE: 18 BRPM

## 2024-06-25 DIAGNOSIS — M25.552 BILATERAL HIP PAIN: Primary | ICD-10-CM

## 2024-06-25 DIAGNOSIS — M25.551 BILATERAL HIP PAIN: Primary | ICD-10-CM

## 2024-06-25 LAB
ALBUMIN SERPL-MCNC: 4.6 G/DL (ref 3.5–5.2)
ALP SERPL-CCNC: 105 U/L (ref 35–104)
ALT SERPL-CCNC: 13 U/L (ref 0–32)
ANION GAP SERPL CALCULATED.3IONS-SCNC: 12 MMOL/L (ref 7–16)
AST SERPL-CCNC: 17 U/L (ref 0–31)
BASOPHILS # BLD: 0.02 K/UL (ref 0–0.2)
BASOPHILS NFR BLD: 0 % (ref 0–2)
BILIRUB SERPL-MCNC: 0.3 MG/DL (ref 0–1.2)
BUN SERPL-MCNC: 15 MG/DL (ref 6–20)
CALCIUM SERPL-MCNC: 9.2 MG/DL (ref 8.6–10.2)
CHLORIDE SERPL-SCNC: 104 MMOL/L (ref 98–107)
CO2 SERPL-SCNC: 25 MMOL/L (ref 22–29)
CREAT SERPL-MCNC: 0.8 MG/DL (ref 0.5–1)
EOSINOPHIL # BLD: 0.22 K/UL (ref 0.05–0.5)
EOSINOPHILS RELATIVE PERCENT: 3 % (ref 0–6)
ERYTHROCYTE [DISTWIDTH] IN BLOOD BY AUTOMATED COUNT: 16.7 % (ref 11.5–15)
GFR, ESTIMATED: >90 ML/MIN/1.73M2
GLUCOSE SERPL-MCNC: 86 MG/DL (ref 74–99)
HCT VFR BLD AUTO: 40.7 % (ref 34–48)
HGB BLD-MCNC: 12.9 G/DL (ref 11.5–15.5)
IMM GRANULOCYTES # BLD AUTO: 0.03 K/UL (ref 0–0.58)
IMM GRANULOCYTES NFR BLD: 0 % (ref 0–5)
LYMPHOCYTES NFR BLD: 1.87 K/UL (ref 1.5–4)
LYMPHOCYTES RELATIVE PERCENT: 24 % (ref 20–42)
MCH RBC QN AUTO: 26.4 PG (ref 26–35)
MCHC RBC AUTO-ENTMCNC: 31.7 G/DL (ref 32–34.5)
MCV RBC AUTO: 83.4 FL (ref 80–99.9)
MONOCYTES NFR BLD: 0.85 K/UL (ref 0.1–0.95)
MONOCYTES NFR BLD: 11 % (ref 2–12)
NEUTROPHILS NFR BLD: 62 % (ref 43–80)
NEUTS SEG NFR BLD: 4.84 K/UL (ref 1.8–7.3)
PLATELET # BLD AUTO: 185 K/UL (ref 130–450)
PMV BLD AUTO: 9.6 FL (ref 7–12)
POTASSIUM SERPL-SCNC: 3.6 MMOL/L (ref 3.5–5)
PROT SERPL-MCNC: 8.1 G/DL (ref 6.4–8.3)
RBC # BLD AUTO: 4.88 M/UL (ref 3.5–5.5)
SODIUM SERPL-SCNC: 141 MMOL/L (ref 132–146)
WBC OTHER # BLD: 7.8 K/UL (ref 4.5–11.5)

## 2024-06-25 PROCEDURE — 96374 THER/PROPH/DIAG INJ IV PUSH: CPT

## 2024-06-25 PROCEDURE — 6360000002 HC RX W HCPCS: Performed by: EMERGENCY MEDICINE

## 2024-06-25 PROCEDURE — 96372 THER/PROPH/DIAG INJ SC/IM: CPT

## 2024-06-25 PROCEDURE — 85025 COMPLETE CBC W/AUTO DIFF WBC: CPT

## 2024-06-25 PROCEDURE — 80053 COMPREHEN METABOLIC PANEL: CPT

## 2024-06-25 PROCEDURE — 93005 ELECTROCARDIOGRAM TRACING: CPT | Performed by: EMERGENCY MEDICINE

## 2024-06-25 PROCEDURE — 6360000002 HC RX W HCPCS

## 2024-06-25 PROCEDURE — 6370000000 HC RX 637 (ALT 250 FOR IP)

## 2024-06-25 PROCEDURE — 73521 X-RAY EXAM HIPS BI 2 VIEWS: CPT

## 2024-06-25 PROCEDURE — 99285 EMERGENCY DEPT VISIT HI MDM: CPT

## 2024-06-25 RX ORDER — MORPHINE SULFATE 4 MG/ML
4 INJECTION, SOLUTION INTRAMUSCULAR; INTRAVENOUS ONCE
Status: DISCONTINUED | OUTPATIENT
Start: 2024-06-25 | End: 2024-06-25

## 2024-06-25 RX ORDER — LIDOCAINE 50 MG/G
1 PATCH TOPICAL DAILY
Qty: 10 PATCH | Refills: 0 | Status: SHIPPED | OUTPATIENT
Start: 2024-06-25 | End: 2024-07-05

## 2024-06-25 RX ORDER — PREDNISONE 20 MG/1
20 TABLET ORAL 2 TIMES DAILY
Qty: 10 TABLET | Refills: 0 | Status: SHIPPED | OUTPATIENT
Start: 2024-06-25 | End: 2024-06-30

## 2024-06-25 RX ORDER — ORPHENADRINE CITRATE 30 MG/ML
60 INJECTION INTRAMUSCULAR; INTRAVENOUS ONCE
Status: COMPLETED | OUTPATIENT
Start: 2024-06-25 | End: 2024-06-25

## 2024-06-25 RX ORDER — HYDROCODONE BITARTRATE AND ACETAMINOPHEN 5; 325 MG/1; MG/1
1 TABLET ORAL ONCE
Status: COMPLETED | OUTPATIENT
Start: 2024-06-25 | End: 2024-06-25

## 2024-06-25 RX ORDER — DEXAMETHASONE SODIUM PHOSPHATE 10 MG/ML
8 INJECTION INTRAMUSCULAR; INTRAVENOUS ONCE
Status: COMPLETED | OUTPATIENT
Start: 2024-06-25 | End: 2024-06-25

## 2024-06-25 RX ORDER — HYDRALAZINE HYDROCHLORIDE 20 MG/ML
10 INJECTION INTRAMUSCULAR; INTRAVENOUS ONCE
Status: COMPLETED | OUTPATIENT
Start: 2024-06-25 | End: 2024-06-25

## 2024-06-25 RX ADMIN — HYDRALAZINE HYDROCHLORIDE 10 MG: 20 INJECTION INTRAMUSCULAR; INTRAVENOUS at 15:48

## 2024-06-25 RX ADMIN — HYDROCODONE BITARTRATE AND ACETAMINOPHEN 1 TABLET: 5; 325 TABLET ORAL at 16:06

## 2024-06-25 RX ADMIN — ORPHENADRINE CITRATE 60 MG: 60 INJECTION INTRAMUSCULAR; INTRAVENOUS at 15:51

## 2024-06-25 RX ADMIN — DEXAMETHASONE SODIUM PHOSPHATE 8 MG: 10 INJECTION INTRAMUSCULAR; INTRAVENOUS at 15:46

## 2024-06-25 ASSESSMENT — PAIN - FUNCTIONAL ASSESSMENT
PAIN_FUNCTIONAL_ASSESSMENT: 0-10
PAIN_FUNCTIONAL_ASSESSMENT: 0-10

## 2024-06-25 ASSESSMENT — PAIN SCALES - GENERAL
PAINLEVEL_OUTOF10: 10

## 2024-06-25 ASSESSMENT — PAIN DESCRIPTION - DESCRIPTORS: DESCRIPTORS: SHARP

## 2024-06-25 ASSESSMENT — PAIN DESCRIPTION - LOCATION: LOCATION: GENERALIZED

## 2024-06-25 NOTE — ED NOTES
Pt requested to go outside and smoke.  Pt educated on not being able to go outside while having an IV in her arm and she was discouraged from smoking and was educated on delayed wound healing.  Pt verbalized understanding.

## 2024-06-25 NOTE — DISCHARGE INSTRUCTIONS
Arthralgia (Pain in a Joint)  Your caregiver has diagnosed you as suffering from an arthralgia. Arthralgia simply means pain in a joint. This can come from many reasons including:  Bruising the joint which causes soreness (inflammation) in the joint  From the wear and tear on the joints which occur as we grow older (osteoarthritis)  Overusing the joint,  Various forms of arthritis  Infections of the joint  Regardless of the cause of pain in your joint, most of these different pains respond to anti-inflammatory drugs and rest. The exception to this is when a joint is infected, and these cases are treated with medications which kill germs (antibiotics) if it is an infection caused by a germ (bacterial infection).  HOME CARE INSTRUCTIONS  Rest the injured area for 1 week or as directed by your caregiver. Then slowly start using the joint as directed by your caregiver and as the pain allows. Crutches as directed may be useful if the ankles, knees or hips are involved. If the knee was splinted or casted, continue use and care as directed. If an ace bandage (stretchy, elastic wrapping bandage) has been applied today, it should be removed and re-applied every 3 to 4 hours. It should not be applied tightly, but firmly enough to keep swelling down. Watch toes and feet for swelling, bluish discoloration, coldness, numbness or excessive pain. If any of these symptoms (problems) occur, remove the ace bandage and re-apply more loosely. If these symptoms persist, contact your caregiver or return to this location.  For the first 24 hours, keep the injured extremity elevated on 2 pillows while lying down.  Apply ice for fifteen minutes to the sore joint every couple hours while awake for the first half day, then four times per day for the first 48 hours. Put the ice in a plastic bag and place a towel between the bag of ice and your skin.  Wear any splinting, casting, elastic bandage applications, or slings as instructed.  Only take

## 2024-06-25 NOTE — ED TRIAGE NOTES
bp high 210/110 heart rate 103- b/l legs hurting,   recent surgery on right breast, biopsy thinnks infected, alos infection to left breast

## 2024-06-26 LAB
EKG ATRIAL RATE: 88 BPM
EKG P AXIS: 49 DEGREES
EKG P-R INTERVAL: 146 MS
EKG Q-T INTERVAL: 352 MS
EKG QRS DURATION: 76 MS
EKG QTC CALCULATION (BAZETT): 425 MS
EKG R AXIS: 72 DEGREES
EKG T AXIS: -4 DEGREES
EKG VENTRICULAR RATE: 88 BPM

## 2024-06-26 PROCEDURE — 93010 ELECTROCARDIOGRAM REPORT: CPT | Performed by: INTERNAL MEDICINE

## 2024-07-02 ENCOUNTER — HOSPITAL ENCOUNTER (EMERGENCY)
Age: 44
Discharge: HOME | End: 2024-07-02
Payer: MEDICAID

## 2024-07-02 ENCOUNTER — APPOINTMENT (OUTPATIENT)
Dept: CT IMAGING | Age: 44
End: 2024-07-02
Payer: MEDICAID

## 2024-07-02 VITALS
OXYGEN SATURATION: 99 % | SYSTOLIC BLOOD PRESSURE: 193 MMHG | WEIGHT: 160 LBS | DIASTOLIC BLOOD PRESSURE: 97 MMHG | BODY MASS INDEX: 32.25 KG/M2 | HEART RATE: 87 BPM | HEIGHT: 59 IN | RESPIRATION RATE: 14 BRPM | TEMPERATURE: 98 F

## 2024-07-02 DIAGNOSIS — K62.89 PROCTITIS: ICD-10-CM

## 2024-07-02 DIAGNOSIS — R10.30 LOWER ABDOMINAL PAIN: Primary | ICD-10-CM

## 2024-07-02 DIAGNOSIS — K52.9 COLITIS: ICD-10-CM

## 2024-07-02 LAB
ALBUMIN SERPL-MCNC: 4.3 G/DL (ref 3.5–5.2)
ALP SERPL-CCNC: 99 U/L (ref 35–104)
ALT SERPL-CCNC: 17 U/L (ref 0–32)
ANION GAP SERPL CALCULATED.3IONS-SCNC: 12 MMOL/L (ref 7–16)
AST SERPL-CCNC: 21 U/L (ref 0–31)
BASOPHILS # BLD: 0.03 K/UL (ref 0–0.2)
BASOPHILS NFR BLD: 0 % (ref 0–2)
BILIRUB SERPL-MCNC: 0.5 MG/DL (ref 0–1.2)
BILIRUB UR QL STRIP: NEGATIVE
BUN SERPL-MCNC: 13 MG/DL (ref 6–20)
CALCIUM SERPL-MCNC: 8.9 MG/DL (ref 8.6–10.2)
CHLORIDE SERPL-SCNC: 101 MMOL/L (ref 98–107)
CLARITY UR: CLEAR
CO2 SERPL-SCNC: 24 MMOL/L (ref 22–29)
COLOR UR: YELLOW
CREAT SERPL-MCNC: 0.8 MG/DL (ref 0.5–1)
EOSINOPHIL # BLD: 0.17 K/UL (ref 0.05–0.5)
EOSINOPHILS RELATIVE PERCENT: 2 % (ref 0–6)
ERYTHROCYTE [DISTWIDTH] IN BLOOD BY AUTOMATED COUNT: 16.7 % (ref 11.5–15)
GFR, ESTIMATED: >90 ML/MIN/1.73M2
GLUCOSE SERPL-MCNC: 116 MG/DL (ref 74–99)
GLUCOSE UR STRIP-MCNC: NEGATIVE MG/DL
HCG, URINE, POC: NEGATIVE
HCT VFR BLD AUTO: 41.4 % (ref 34–48)
HGB BLD-MCNC: 13.4 G/DL (ref 11.5–15.5)
HGB UR QL STRIP.AUTO: NEGATIVE
IMM GRANULOCYTES # BLD AUTO: <0.03 K/UL (ref 0–0.58)
IMM GRANULOCYTES NFR BLD: 0 % (ref 0–5)
KETONES UR STRIP-MCNC: NEGATIVE MG/DL
LACTATE BLDV-SCNC: 1.3 MMOL/L (ref 0.5–2.2)
LEUKOCYTE ESTERASE UR QL STRIP: NEGATIVE
LIPASE SERPL-CCNC: 28 U/L (ref 13–60)
LYMPHOCYTES NFR BLD: 2.14 K/UL (ref 1.5–4)
LYMPHOCYTES RELATIVE PERCENT: 24 % (ref 20–42)
Lab: NORMAL
MCH RBC QN AUTO: 26.5 PG (ref 26–35)
MCHC RBC AUTO-ENTMCNC: 32.4 G/DL (ref 32–34.5)
MCV RBC AUTO: 81.8 FL (ref 80–99.9)
MONOCYTES NFR BLD: 0.76 K/UL (ref 0.1–0.95)
MONOCYTES NFR BLD: 9 % (ref 2–12)
NEGATIVE QC PASS/FAIL: NORMAL
NEUTROPHILS NFR BLD: 65 % (ref 43–80)
NEUTS SEG NFR BLD: 5.81 K/UL (ref 1.8–7.3)
NITRITE UR QL STRIP: NEGATIVE
PH UR STRIP: 6 [PH] (ref 5–9)
PLATELET # BLD AUTO: 256 K/UL (ref 130–450)
PMV BLD AUTO: 9.7 FL (ref 7–12)
POSITIVE QC PASS/FAIL: NORMAL
POTASSIUM SERPL-SCNC: 4 MMOL/L (ref 3.5–5)
PROT SERPL-MCNC: 8 G/DL (ref 6.4–8.3)
PROT UR STRIP-MCNC: NEGATIVE MG/DL
RBC # BLD AUTO: 5.06 M/UL (ref 3.5–5.5)
RBC #/AREA URNS HPF: NORMAL /HPF
SODIUM SERPL-SCNC: 137 MMOL/L (ref 132–146)
SP GR UR STRIP: 1.02 (ref 1–1.03)
UROBILINOGEN UR STRIP-ACNC: 0.2 EU/DL (ref 0–1)
WBC #/AREA URNS HPF: NORMAL /HPF
WBC OTHER # BLD: 8.9 K/UL (ref 4.5–11.5)

## 2024-07-02 PROCEDURE — 6360000004 HC RX CONTRAST MEDICATION: Performed by: RADIOLOGY

## 2024-07-02 PROCEDURE — 83690 ASSAY OF LIPASE: CPT

## 2024-07-02 PROCEDURE — 96375 TX/PRO/DX INJ NEW DRUG ADDON: CPT

## 2024-07-02 PROCEDURE — 83605 ASSAY OF LACTIC ACID: CPT

## 2024-07-02 PROCEDURE — 74177 CT ABD & PELVIS W/CONTRAST: CPT

## 2024-07-02 PROCEDURE — 96374 THER/PROPH/DIAG INJ IV PUSH: CPT

## 2024-07-02 PROCEDURE — 99285 EMERGENCY DEPT VISIT HI MDM: CPT

## 2024-07-02 PROCEDURE — 80053 COMPREHEN METABOLIC PANEL: CPT

## 2024-07-02 PROCEDURE — 6360000002 HC RX W HCPCS: Performed by: NURSE PRACTITIONER

## 2024-07-02 PROCEDURE — 85025 COMPLETE CBC W/AUTO DIFF WBC: CPT

## 2024-07-02 PROCEDURE — 96372 THER/PROPH/DIAG INJ SC/IM: CPT

## 2024-07-02 PROCEDURE — 2580000003 HC RX 258: Performed by: NURSE PRACTITIONER

## 2024-07-02 PROCEDURE — 81001 URINALYSIS AUTO W/SCOPE: CPT

## 2024-07-02 RX ORDER — METHYLPREDNISOLONE SODIUM SUCCINATE 125 MG/2ML
60 INJECTION, POWDER, LYOPHILIZED, FOR SOLUTION INTRAMUSCULAR; INTRAVENOUS ONCE
Status: COMPLETED | OUTPATIENT
Start: 2024-07-02 | End: 2024-07-02

## 2024-07-02 RX ORDER — CEFDINIR 300 MG/1
300 CAPSULE ORAL 2 TIMES DAILY
Qty: 14 CAPSULE | Refills: 0 | Status: SHIPPED | OUTPATIENT
Start: 2024-07-02 | End: 2024-07-09

## 2024-07-02 RX ORDER — METHYLPREDNISOLONE 4 MG/1
TABLET ORAL
Qty: 1 KIT | Refills: 0 | Status: SHIPPED | OUTPATIENT
Start: 2024-07-02

## 2024-07-02 RX ORDER — FENTANYL CITRATE 50 UG/ML
25 INJECTION, SOLUTION INTRAMUSCULAR; INTRAVENOUS ONCE
Status: DISCONTINUED | OUTPATIENT
Start: 2024-07-02 | End: 2024-07-02

## 2024-07-02 RX ORDER — FENTANYL CITRATE 50 UG/ML
25 INJECTION, SOLUTION INTRAMUSCULAR; INTRAVENOUS ONCE
Status: COMPLETED | OUTPATIENT
Start: 2024-07-02 | End: 2024-07-02

## 2024-07-02 RX ORDER — 0.9 % SODIUM CHLORIDE 0.9 %
1000 INTRAVENOUS SOLUTION INTRAVENOUS ONCE
Status: COMPLETED | OUTPATIENT
Start: 2024-07-02 | End: 2024-07-02

## 2024-07-02 RX ORDER — METRONIDAZOLE 500 MG/1
500 TABLET ORAL 3 TIMES DAILY
Qty: 21 TABLET | Refills: 0 | Status: SHIPPED | OUTPATIENT
Start: 2024-07-02 | End: 2024-07-09

## 2024-07-02 RX ORDER — ONDANSETRON 2 MG/ML
4 INJECTION INTRAMUSCULAR; INTRAVENOUS ONCE
Status: COMPLETED | OUTPATIENT
Start: 2024-07-02 | End: 2024-07-02

## 2024-07-02 RX ADMIN — ONDANSETRON 4 MG: 2 INJECTION INTRAMUSCULAR; INTRAVENOUS at 16:08

## 2024-07-02 RX ADMIN — SODIUM CHLORIDE 1000 ML: 9 INJECTION, SOLUTION INTRAVENOUS at 16:08

## 2024-07-02 RX ADMIN — FENTANYL CITRATE 25 MCG: 50 INJECTION, SOLUTION INTRAMUSCULAR; INTRAVENOUS at 16:08

## 2024-07-02 RX ADMIN — METHYLPREDNISOLONE SODIUM SUCCINATE 60 MG: 125 INJECTION INTRAMUSCULAR; INTRAVENOUS at 20:25

## 2024-07-02 RX ADMIN — IOPAMIDOL 75 ML: 755 INJECTION, SOLUTION INTRAVENOUS at 17:06

## 2024-07-02 ASSESSMENT — PAIN DESCRIPTION - DESCRIPTORS: DESCRIPTORS: ACHING;SHARP

## 2024-07-02 ASSESSMENT — PAIN - FUNCTIONAL ASSESSMENT: PAIN_FUNCTIONAL_ASSESSMENT: 0-10

## 2024-07-02 ASSESSMENT — PAIN DESCRIPTION - LOCATION
LOCATION: ABDOMEN
LOCATION: ABDOMEN

## 2024-07-02 ASSESSMENT — LIFESTYLE VARIABLES
HOW OFTEN DO YOU HAVE A DRINK CONTAINING ALCOHOL: NEVER
HOW MANY STANDARD DRINKS CONTAINING ALCOHOL DO YOU HAVE ON A TYPICAL DAY: PATIENT DOES NOT DRINK

## 2024-07-02 ASSESSMENT — PAIN SCALES - GENERAL
PAINLEVEL_OUTOF10: 10
PAINLEVEL_OUTOF10: 10

## 2024-07-02 NOTE — ED PROVIDER NOTES
Independent DANISHA Visit.       [facility]  Department of Emergency Medicine   ED  Encounter Note  Admit Date/RoomTime: 2024  2:46 PM  [place] [facility]    NAME: Rachele Lomas  : 1980  MRN: [id number]     Chief Complaint:  Abdominal Pain (States for 3 days- n/v/d  ) and Hip Pain (Bilat- supposed to have a hip replacement next month )    History of Present Illness       Rachele Lomas is a 43 y.o. old female who presents to the emergency department by private vehicle, for sudden onset aching, cramping pain in the upper abdomen without radiation which began 3 day(s) prior to arrival.   There has been no similar episodes in the past.  Since onset the symptoms have been remaining constant.  The pain is associated with nausea, vomiting, and diarrhea.  The pain is aggravated by nothing in particular and relieved by nothing.  There has been NO back pain, chest pain, shortness of breath, fever, chills, sweating, anorexia, weight loss, constipation, dark/black stools, blood in stool, blood in emesis, cloudy urine, urinary frequency, dysuria, hematuria, urinary urgency, urinary incontinence, vaginal discharge, vaginal itching, vaginal spotting, or vaginal bleeding. Last Menstrual  Cycle or OB history not available or N/A.  GYN history non-contributory or N/A.  Patient has chronic bilateral hip pain and is scheduled for a hip replacement of the right side next month.  She states when it got cold yesterday she got increased pain.    .  ROS   Pertinent positives and negatives are stated within HPI, all other systems reviewed and are negative.    Past Medical History:  has a past medical history of Asthma, Bronchitis, Movement disorder, Seizure disorder (HCC), Seizure disorder (HCC), Suicidal overdose (HCC), Tobacco abuse, and Tobacco abuse.    Surgical History has a past surgical history that includes fracture surgery; Cholecystectomy; Rotator cuff repair; Upper gastrointestinal endoscopy

## 2024-07-03 NOTE — DISCHARGE INSTRUCTIONS
CT ABDOMEN PELVIS W IV CONTRAST Additional Contrast? None   Final Result   1. New moderate colitis of the ascending colon and colon at the hepatic   flexure. The cecum is spared.   2. New mild left colitis and proctitis.   3. Stable severe primary osteoarthritis of both hips.   4. Again seen are changes of cholecystectomy.

## 2024-07-23 ENCOUNTER — HOSPITAL ENCOUNTER (EMERGENCY)
Age: 44
Discharge: HOME | End: 2024-07-23
Attending: STUDENT IN AN ORGANIZED HEALTH CARE EDUCATION/TRAINING PROGRAM
Payer: MEDICAID

## 2024-07-23 ENCOUNTER — APPOINTMENT (OUTPATIENT)
Dept: CT IMAGING | Age: 44
End: 2024-07-23
Payer: MEDICAID

## 2024-07-23 VITALS
DIASTOLIC BLOOD PRESSURE: 99 MMHG | HEIGHT: 59 IN | OXYGEN SATURATION: 100 % | HEART RATE: 93 BPM | TEMPERATURE: 98.2 F | SYSTOLIC BLOOD PRESSURE: 173 MMHG | WEIGHT: 160 LBS | RESPIRATION RATE: 18 BRPM | BODY MASS INDEX: 32.25 KG/M2

## 2024-07-23 DIAGNOSIS — R10.13 ABDOMINAL PAIN, EPIGASTRIC: Primary | ICD-10-CM

## 2024-07-23 DIAGNOSIS — M25.552 CHRONIC HIP PAIN, LEFT: ICD-10-CM

## 2024-07-23 DIAGNOSIS — G89.29 CHRONIC HIP PAIN, LEFT: ICD-10-CM

## 2024-07-23 DIAGNOSIS — R11.2 NAUSEA AND VOMITING, UNSPECIFIED VOMITING TYPE: ICD-10-CM

## 2024-07-23 LAB
ALBUMIN SERPL-MCNC: 4.3 G/DL (ref 3.5–5.2)
ALP SERPL-CCNC: 105 U/L (ref 35–104)
ALT SERPL-CCNC: 11 U/L (ref 0–32)
ANION GAP SERPL CALCULATED.3IONS-SCNC: 14 MMOL/L (ref 7–16)
AST SERPL-CCNC: 16 U/L (ref 0–31)
BACTERIA URNS QL MICRO: ABNORMAL
BASOPHILS # BLD: 0.04 K/UL (ref 0–0.2)
BASOPHILS NFR BLD: 1 % (ref 0–2)
BILIRUB DIRECT SERPL-MCNC: <0.2 MG/DL (ref 0–0.3)
BILIRUB INDIRECT SERPL-MCNC: ABNORMAL MG/DL (ref 0–1)
BILIRUB SERPL-MCNC: 0.4 MG/DL (ref 0–1.2)
BILIRUB UR QL STRIP: NEGATIVE
BUN SERPL-MCNC: 14 MG/DL (ref 6–20)
CALCIUM SERPL-MCNC: 9 MG/DL (ref 8.6–10.2)
CHLORIDE SERPL-SCNC: 99 MMOL/L (ref 98–107)
CLARITY UR: CLEAR
CO2 SERPL-SCNC: 21 MMOL/L (ref 22–29)
COLOR UR: YELLOW
CREAT SERPL-MCNC: 0.7 MG/DL (ref 0.5–1)
EKG ATRIAL RATE: 88 BPM
EKG P AXIS: 57 DEGREES
EKG P-R INTERVAL: 136 MS
EKG Q-T INTERVAL: 374 MS
EKG QRS DURATION: 80 MS
EKG QTC CALCULATION (BAZETT): 452 MS
EKG R AXIS: 69 DEGREES
EKG T AXIS: 41 DEGREES
EKG VENTRICULAR RATE: 88 BPM
EOSINOPHIL # BLD: 0.15 K/UL (ref 0.05–0.5)
EOSINOPHILS RELATIVE PERCENT: 2 % (ref 0–6)
ERYTHROCYTE [DISTWIDTH] IN BLOOD BY AUTOMATED COUNT: 15.9 % (ref 11.5–15)
GFR, ESTIMATED: >90 ML/MIN/1.73M2
GLUCOSE SERPL-MCNC: 69 MG/DL (ref 74–99)
GLUCOSE UR STRIP-MCNC: NEGATIVE MG/DL
HCG, URINE, POC: NEGATIVE
HCT VFR BLD AUTO: 44 % (ref 34–48)
HGB BLD-MCNC: 13.9 G/DL (ref 11.5–15.5)
HGB UR QL STRIP.AUTO: ABNORMAL
IMM GRANULOCYTES # BLD AUTO: 0.04 K/UL (ref 0–0.58)
IMM GRANULOCYTES NFR BLD: 1 % (ref 0–5)
KETONES UR STRIP-MCNC: ABNORMAL MG/DL
LACTATE BLDV-SCNC: 1 MMOL/L (ref 0.5–2.2)
LEUKOCYTE ESTERASE UR QL STRIP: NEGATIVE
LIPASE SERPL-CCNC: 22 U/L (ref 13–60)
LYMPHOCYTES NFR BLD: 1.97 K/UL (ref 1.5–4)
LYMPHOCYTES RELATIVE PERCENT: 23 % (ref 20–42)
Lab: NORMAL
MAGNESIUM SERPL-MCNC: 2.2 MG/DL (ref 1.6–2.6)
MCH RBC QN AUTO: 25.9 PG (ref 26–35)
MCHC RBC AUTO-ENTMCNC: 31.6 G/DL (ref 32–34.5)
MCV RBC AUTO: 81.9 FL (ref 80–99.9)
MONOCYTES NFR BLD: 0.89 K/UL (ref 0.1–0.95)
MONOCYTES NFR BLD: 10 % (ref 2–12)
NEGATIVE QC PASS/FAIL: NORMAL
NEUTROPHILS NFR BLD: 64 % (ref 43–80)
NEUTS SEG NFR BLD: 5.46 K/UL (ref 1.8–7.3)
NITRITE UR QL STRIP: NEGATIVE
PH UR STRIP: 5.5 [PH] (ref 5–9)
PLATELET # BLD AUTO: 277 K/UL (ref 130–450)
PMV BLD AUTO: 9.2 FL (ref 7–12)
POSITIVE QC PASS/FAIL: NORMAL
POTASSIUM SERPL-SCNC: 4.4 MMOL/L (ref 3.5–5)
PROT SERPL-MCNC: 8.2 G/DL (ref 6.4–8.3)
PROT UR STRIP-MCNC: NEGATIVE MG/DL
RBC # BLD AUTO: 5.37 M/UL (ref 3.5–5.5)
RBC #/AREA URNS HPF: ABNORMAL /HPF
SODIUM SERPL-SCNC: 134 MMOL/L (ref 132–146)
SP GR UR STRIP: 1.02 (ref 1–1.03)
TROPONIN I SERPL HS-MCNC: <6 NG/L (ref 0–9)
UROBILINOGEN UR STRIP-ACNC: 0.2 EU/DL (ref 0–1)
WBC #/AREA URNS HPF: ABNORMAL /HPF
WBC OTHER # BLD: 8.6 K/UL (ref 4.5–11.5)

## 2024-07-23 PROCEDURE — 82248 BILIRUBIN DIRECT: CPT

## 2024-07-23 PROCEDURE — 83605 ASSAY OF LACTIC ACID: CPT

## 2024-07-23 PROCEDURE — 96374 THER/PROPH/DIAG INJ IV PUSH: CPT

## 2024-07-23 PROCEDURE — 99285 EMERGENCY DEPT VISIT HI MDM: CPT

## 2024-07-23 PROCEDURE — 74177 CT ABD & PELVIS W/CONTRAST: CPT

## 2024-07-23 PROCEDURE — 93010 ELECTROCARDIOGRAM REPORT: CPT | Performed by: INTERNAL MEDICINE

## 2024-07-23 PROCEDURE — 96372 THER/PROPH/DIAG INJ SC/IM: CPT

## 2024-07-23 PROCEDURE — 96375 TX/PRO/DX INJ NEW DRUG ADDON: CPT

## 2024-07-23 PROCEDURE — 2580000003 HC RX 258: Performed by: STUDENT IN AN ORGANIZED HEALTH CARE EDUCATION/TRAINING PROGRAM

## 2024-07-23 PROCEDURE — 85025 COMPLETE CBC W/AUTO DIFF WBC: CPT

## 2024-07-23 PROCEDURE — 84484 ASSAY OF TROPONIN QUANT: CPT

## 2024-07-23 PROCEDURE — 81001 URINALYSIS AUTO W/SCOPE: CPT

## 2024-07-23 PROCEDURE — 83735 ASSAY OF MAGNESIUM: CPT

## 2024-07-23 PROCEDURE — 2500000003 HC RX 250 WO HCPCS: Performed by: STUDENT IN AN ORGANIZED HEALTH CARE EDUCATION/TRAINING PROGRAM

## 2024-07-23 PROCEDURE — 6360000004 HC RX CONTRAST MEDICATION: Performed by: RADIOLOGY

## 2024-07-23 PROCEDURE — 6360000002 HC RX W HCPCS: Performed by: STUDENT IN AN ORGANIZED HEALTH CARE EDUCATION/TRAINING PROGRAM

## 2024-07-23 PROCEDURE — 80053 COMPREHEN METABOLIC PANEL: CPT

## 2024-07-23 PROCEDURE — 83690 ASSAY OF LIPASE: CPT

## 2024-07-23 PROCEDURE — 71260 CT THORAX DX C+: CPT

## 2024-07-23 PROCEDURE — 6370000000 HC RX 637 (ALT 250 FOR IP): Performed by: STUDENT IN AN ORGANIZED HEALTH CARE EDUCATION/TRAINING PROGRAM

## 2024-07-23 PROCEDURE — 93005 ELECTROCARDIOGRAM TRACING: CPT | Performed by: STUDENT IN AN ORGANIZED HEALTH CARE EDUCATION/TRAINING PROGRAM

## 2024-07-23 RX ORDER — ONDANSETRON 4 MG/1
4 TABLET, ORALLY DISINTEGRATING ORAL EVERY 8 HOURS PRN
Qty: 15 TABLET | Refills: 0 | Status: SHIPPED | OUTPATIENT
Start: 2024-07-23

## 2024-07-23 RX ORDER — DICYCLOMINE HYDROCHLORIDE 10 MG/ML
20 INJECTION INTRAMUSCULAR ONCE
Status: COMPLETED | OUTPATIENT
Start: 2024-07-23 | End: 2024-07-23

## 2024-07-23 RX ORDER — 0.9 % SODIUM CHLORIDE 0.9 %
1000 INTRAVENOUS SOLUTION INTRAVENOUS ONCE
Status: COMPLETED | OUTPATIENT
Start: 2024-07-23 | End: 2024-07-23

## 2024-07-23 RX ORDER — DICYCLOMINE HCL 20 MG
20 TABLET ORAL EVERY 6 HOURS PRN
Qty: 20 TABLET | Refills: 0 | Status: SHIPPED | OUTPATIENT
Start: 2024-07-23

## 2024-07-23 RX ORDER — ONDANSETRON 2 MG/ML
4 INJECTION INTRAMUSCULAR; INTRAVENOUS ONCE
Status: COMPLETED | OUTPATIENT
Start: 2024-07-23 | End: 2024-07-23

## 2024-07-23 RX ORDER — ORPHENADRINE CITRATE 30 MG/ML
60 INJECTION INTRAMUSCULAR; INTRAVENOUS ONCE
Status: DISCONTINUED | OUTPATIENT
Start: 2024-07-23 | End: 2024-07-23 | Stop reason: HOSPADM

## 2024-07-23 RX ADMIN — ALUMINUM HYDROXIDE, MAGNESIUM HYDROXIDE, AND SIMETHICONE: 1200; 120; 1200 SUSPENSION ORAL at 14:37

## 2024-07-23 RX ADMIN — FAMOTIDINE 20 MG: 10 INJECTION, SOLUTION INTRAVENOUS at 14:42

## 2024-07-23 RX ADMIN — DICYCLOMINE HYDROCHLORIDE 20 MG: 10 INJECTION, SOLUTION INTRAMUSCULAR at 14:42

## 2024-07-23 RX ADMIN — SODIUM CHLORIDE 1000 ML: 9 INJECTION, SOLUTION INTRAVENOUS at 14:40

## 2024-07-23 RX ADMIN — IOPAMIDOL 75 ML: 755 INJECTION, SOLUTION INTRAVENOUS at 15:42

## 2024-07-23 RX ADMIN — ONDANSETRON 4 MG: 2 INJECTION INTRAMUSCULAR; INTRAVENOUS at 14:44

## 2024-07-23 ASSESSMENT — ENCOUNTER SYMPTOMS
VOMITING: 1
NAUSEA: 1
SHORTNESS OF BREATH: 0
ABDOMINAL PAIN: 1
COUGH: 0

## 2024-07-23 NOTE — ED PROVIDER NOTES
[facility] EMERGENCY DEPARTMENT  EMERGENCY DEPARTMENT ENCOUNTER        Pt Name: Rachele Lomas  MRN: [id number]  Birthdate 1980  Date of evaluation: 7/23/2024  Provider: Saray Browning DO  PCP: Stefan Contreras MD  Note Started: 2:05 PM EDT 7/23/24    CHIEF COMPLAINT       Chief Complaint   Patient presents with    Abdominal Pain    Leg Pain     left    Other     Right nipple drainage       HISTORY OF PRESENT ILLNESS: 1 or more Elements     Limitations to history : None    Rachele Lomas is a 44 y.o. female with past medical history of bronchitis, seizure disorder, history of intractable abdominal pain, PTSD, polysubstance abuse, asthma.  She presents to the ED for evaluation of multiple concerns.  Patient had a breast abscess on the right back in May 2024 s/p bedside aspiration and skin biopsy.  Patient says the area at the biopsy seems like it was draining some purulent fluid yesterday.  It is not tender.  She denies any fevers or chills.  Additionally, she has been having upper abdominal pain with nausea and vomiting since yesterday.  This is a recurrent issue for her.  No diarrhea black or bloody stools.  No fevers or chills, chest pain palpitations or shortness of breath.  Finally, she would like evaluated for left hip pain, this is also chronic for her.  No falls or injuries.  No numbness or weakness anywhere.  No lower extremity edema or tenderness otherwise.      Nursing Notes were all reviewed and agreed with or any disagreements were addressed in the HPI.      REVIEW OF EXTERNAL NOTE :       Reviewed documentation from ED encounter on 7/2/2024 when the patient was seen for lower abdominal pain, 6/25/2024 when the patient was seen for bilateral hip pain, and admission to the hospital in May 2024, 5/29/2024 for evaluation of abscess of right breast.    Chart Review/External Note Review    Last Echo reviewed by Me:  No results found for: \"LVEF\",

## 2024-07-23 NOTE — DISCHARGE INSTR - COC
Continuity of Care Form    Patient Name: Rachele Lomas   :  1980  MRN:  [id number]    Admit date:  2024  Discharge date:  ***    Code Status Order: Prior   Advance Directives:     Admitting Physician:  No admitting provider for patient encounter.  PCP: Stefan Contreras MD    Discharging Nurse: ***  Discharging Hospital Unit/Room#: SGWR/SG-WR  Discharging Unit Phone Number: ***    Emergency Contact:   Extended Emergency Contact Information  Primary Emergency Contact: Casie Stiles   [facility]  Home Phone: [phone]  Mobile Phone: [phone]  Relation: Brother/Sister   needed? No  Secondary Emergency Contact: Jaelyn Stiles  Home Phone: [phone]  Mobile Phone: [phone]  Relation: Niece/Nephew   needed? No    Past Surgical History:  Past Surgical History:   Procedure Laterality Date    CHOLECYSTECTOMY      COLONOSCOPY N/A 10/31/2022    COLONOSCOPY WITH BIOPSY performed by Jenifer Burris MD at [facility] ENDOSCOPY    FRACTURE SURGERY      R ANKLE TORN LIGAMENTS    ROTATOR CUFF REPAIR      UPPER GASTROINTESTINAL ENDOSCOPY N/A 5/3/2021    EGD BIOPSY performed by Eh HOLLOWAY MD at [facility] ENDOSCOPY    UPPER GASTROINTESTINAL ENDOSCOPY N/A 10/29/2022    EGD ESOPHAGOGASTRODUODENOSCOPY performed by Santos Triana MD at [facility] OR       Immunization History:   Immunization History   Administered Date(s) Administered    Influenza, FLUARIX, FLULAVAL, FLUZONE (age 6 mo+) AND AFLURIA, (age 3 y+), PF, 0.5mL 2024    TD 2LF, TDVAX, (age 7y+), IM, 0.5mL 2022    TDaP, ADACEL (age 10y-64y), BOOSTRIX (age 10y+), IM, 0.5mL 2015, 2018, 2019, 2019       Active Problems:  Patient Active Problem List   Diagnosis Code    Tobacco abuse Z72.0    Seizure disorder ([facility]) G40.909    Asthma J45.909    Carbamazepine overdose with self-harm intent T42.1X4A    Depression F32.A    Suicidal overdose ([facility]) T50.902A    Pneumonia J18.9    Epigastric pain

## 2024-07-23 NOTE — DISCHARGE INSTRUCTIONS
Please return to the ER for any new or worsening symptoms  If prescribed, please be sure to  your prescriptions from the pharmacy  Please follow-up with Primary care provider and breast clinic  as instructed

## 2024-08-04 ENCOUNTER — HOSPITAL ENCOUNTER (EMERGENCY)
Age: 44
Discharge: HOME | End: 2024-08-04
Attending: STUDENT IN AN ORGANIZED HEALTH CARE EDUCATION/TRAINING PROGRAM
Payer: MEDICAID

## 2024-08-04 ENCOUNTER — APPOINTMENT (OUTPATIENT)
Dept: GENERAL RADIOLOGY | Age: 44
End: 2024-08-04
Payer: MEDICAID

## 2024-08-04 ENCOUNTER — APPOINTMENT (OUTPATIENT)
Dept: CT IMAGING | Age: 44
End: 2024-08-04
Attending: STUDENT IN AN ORGANIZED HEALTH CARE EDUCATION/TRAINING PROGRAM
Payer: MEDICAID

## 2024-08-04 VITALS
OXYGEN SATURATION: 98 % | DIASTOLIC BLOOD PRESSURE: 101 MMHG | TEMPERATURE: 98.6 F | RESPIRATION RATE: 18 BRPM | SYSTOLIC BLOOD PRESSURE: 186 MMHG | HEART RATE: 97 BPM

## 2024-08-04 DIAGNOSIS — R10.84 GENERALIZED ABDOMINAL PAIN: Primary | ICD-10-CM

## 2024-08-04 DIAGNOSIS — K29.00 ACUTE GASTRITIS WITHOUT HEMORRHAGE, UNSPECIFIED GASTRITIS TYPE: ICD-10-CM

## 2024-08-04 LAB
ALBUMIN SERPL-MCNC: 4.1 G/DL (ref 3.5–5.2)
ALP SERPL-CCNC: 112 U/L (ref 35–104)
ALT SERPL-CCNC: 20 U/L (ref 0–32)
ANION GAP SERPL CALCULATED.3IONS-SCNC: 23 MMOL/L (ref 7–16)
AST SERPL-CCNC: 25 U/L (ref 0–31)
BACTERIA URNS QL MICRO: ABNORMAL
BASOPHILS # BLD: 0.05 K/UL (ref 0–0.2)
BASOPHILS NFR BLD: 0 % (ref 0–2)
BILIRUB SERPL-MCNC: 0.4 MG/DL (ref 0–1.2)
BILIRUB UR QL STRIP: NEGATIVE
BUN SERPL-MCNC: 13 MG/DL (ref 6–20)
CALCIUM SERPL-MCNC: 8.5 MG/DL (ref 8.6–10.2)
CHLORIDE SERPL-SCNC: 96 MMOL/L (ref 98–107)
CLARITY UR: ABNORMAL
CO2 SERPL-SCNC: 14 MMOL/L (ref 22–29)
COLOR UR: YELLOW
CREAT SERPL-MCNC: 0.6 MG/DL (ref 0.5–1)
EOSINOPHIL # BLD: 0.12 K/UL (ref 0.05–0.5)
EOSINOPHILS RELATIVE PERCENT: 1 % (ref 0–6)
ERYTHROCYTE [DISTWIDTH] IN BLOOD BY AUTOMATED COUNT: 15.8 % (ref 11.5–15)
GFR, ESTIMATED: >90 ML/MIN/1.73M2
GLUCOSE SERPL-MCNC: 68 MG/DL (ref 74–99)
GLUCOSE UR STRIP-MCNC: NEGATIVE MG/DL
HCG, URINE, POC: NEGATIVE
HCT VFR BLD AUTO: 43 % (ref 34–48)
HGB BLD-MCNC: 14.1 G/DL (ref 11.5–15.5)
HGB UR QL STRIP.AUTO: ABNORMAL
IMM GRANULOCYTES # BLD AUTO: 0.07 K/UL (ref 0–0.58)
IMM GRANULOCYTES NFR BLD: 1 % (ref 0–5)
KETONES UR STRIP-MCNC: 15 MG/DL
LACTATE BLDV-SCNC: 1.6 MMOL/L (ref 0.5–2.2)
LACTATE BLDV-SCNC: 5.5 MMOL/L (ref 0.5–2.2)
LEUKOCYTE ESTERASE UR QL STRIP: NEGATIVE
LIPASE SERPL-CCNC: 19 U/L (ref 13–60)
LYMPHOCYTES NFR BLD: 2.96 K/UL (ref 1.5–4)
LYMPHOCYTES RELATIVE PERCENT: 23 % (ref 20–42)
Lab: NORMAL
MCH RBC QN AUTO: 26.7 PG (ref 26–35)
MCHC RBC AUTO-ENTMCNC: 32.8 G/DL (ref 32–34.5)
MCV RBC AUTO: 81.4 FL (ref 80–99.9)
MONOCYTES NFR BLD: 1.01 K/UL (ref 0.1–0.95)
MONOCYTES NFR BLD: 8 % (ref 2–12)
NEGATIVE QC PASS/FAIL: NORMAL
NEUTROPHILS NFR BLD: 67 % (ref 43–80)
NEUTS SEG NFR BLD: 8.51 K/UL (ref 1.8–7.3)
NITRITE UR QL STRIP: NEGATIVE
PH UR STRIP: 6 [PH] (ref 5–9)
PLATELET # BLD AUTO: 264 K/UL (ref 130–450)
PMV BLD AUTO: 9.4 FL (ref 7–12)
POSITIVE QC PASS/FAIL: NORMAL
POTASSIUM SERPL-SCNC: 4 MMOL/L (ref 3.5–5)
PROT SERPL-MCNC: 7.9 G/DL (ref 6.4–8.3)
PROT UR STRIP-MCNC: 30 MG/DL
RBC # BLD AUTO: 5.28 M/UL (ref 3.5–5.5)
RBC #/AREA URNS HPF: ABNORMAL /HPF
SODIUM SERPL-SCNC: 133 MMOL/L (ref 132–146)
SP GR UR STRIP: 1.02 (ref 1–1.03)
TROPONIN I SERPL HS-MCNC: <6 NG/L (ref 0–9)
TROPONIN I SERPL HS-MCNC: <6 NG/L (ref 0–9)
UROBILINOGEN UR STRIP-ACNC: 0.2 EU/DL (ref 0–1)
WBC #/AREA URNS HPF: ABNORMAL /HPF
WBC OTHER # BLD: 12.7 K/UL (ref 4.5–11.5)

## 2024-08-04 PROCEDURE — 93005 ELECTROCARDIOGRAM TRACING: CPT | Performed by: STUDENT IN AN ORGANIZED HEALTH CARE EDUCATION/TRAINING PROGRAM

## 2024-08-04 PROCEDURE — 74177 CT ABD & PELVIS W/CONTRAST: CPT

## 2024-08-04 PROCEDURE — 84484 ASSAY OF TROPONIN QUANT: CPT

## 2024-08-04 PROCEDURE — 80053 COMPREHEN METABOLIC PANEL: CPT

## 2024-08-04 PROCEDURE — 99285 EMERGENCY DEPT VISIT HI MDM: CPT

## 2024-08-04 PROCEDURE — 2580000003 HC RX 258: Performed by: STUDENT IN AN ORGANIZED HEALTH CARE EDUCATION/TRAINING PROGRAM

## 2024-08-04 PROCEDURE — 6370000000 HC RX 637 (ALT 250 FOR IP): Performed by: EMERGENCY MEDICINE

## 2024-08-04 PROCEDURE — 83690 ASSAY OF LIPASE: CPT

## 2024-08-04 PROCEDURE — 96374 THER/PROPH/DIAG INJ IV PUSH: CPT

## 2024-08-04 PROCEDURE — 6360000002 HC RX W HCPCS: Performed by: STUDENT IN AN ORGANIZED HEALTH CARE EDUCATION/TRAINING PROGRAM

## 2024-08-04 PROCEDURE — 87086 URINE CULTURE/COLONY COUNT: CPT

## 2024-08-04 PROCEDURE — 6360000002 HC RX W HCPCS: Performed by: EMERGENCY MEDICINE

## 2024-08-04 PROCEDURE — 96375 TX/PRO/DX INJ NEW DRUG ADDON: CPT

## 2024-08-04 PROCEDURE — 96361 HYDRATE IV INFUSION ADD-ON: CPT

## 2024-08-04 PROCEDURE — 81001 URINALYSIS AUTO W/SCOPE: CPT

## 2024-08-04 PROCEDURE — 83605 ASSAY OF LACTIC ACID: CPT

## 2024-08-04 PROCEDURE — 85025 COMPLETE CBC W/AUTO DIFF WBC: CPT

## 2024-08-04 PROCEDURE — 71045 X-RAY EXAM CHEST 1 VIEW: CPT

## 2024-08-04 PROCEDURE — 6370000000 HC RX 637 (ALT 250 FOR IP): Performed by: STUDENT IN AN ORGANIZED HEALTH CARE EDUCATION/TRAINING PROGRAM

## 2024-08-04 PROCEDURE — 6360000004 HC RX CONTRAST MEDICATION: Performed by: RADIOLOGY

## 2024-08-04 PROCEDURE — 2500000003 HC RX 250 WO HCPCS: Performed by: STUDENT IN AN ORGANIZED HEALTH CARE EDUCATION/TRAINING PROGRAM

## 2024-08-04 RX ORDER — 0.9 % SODIUM CHLORIDE 0.9 %
1000 INTRAVENOUS SOLUTION INTRAVENOUS ONCE
Status: COMPLETED | OUTPATIENT
Start: 2024-08-04 | End: 2024-08-04

## 2024-08-04 RX ORDER — PANTOPRAZOLE SODIUM 40 MG/1
40 TABLET, DELAYED RELEASE ORAL
Qty: 30 TABLET | Refills: 0 | Status: SHIPPED | OUTPATIENT
Start: 2024-08-04

## 2024-08-04 RX ORDER — DICYCLOMINE HYDROCHLORIDE 10 MG/1
20 CAPSULE ORAL ONCE
Status: COMPLETED | OUTPATIENT
Start: 2024-08-04 | End: 2024-08-04

## 2024-08-04 RX ORDER — PANTOPRAZOLE SODIUM 40 MG/1
40 TABLET, DELAYED RELEASE ORAL ONCE
Status: COMPLETED | OUTPATIENT
Start: 2024-08-04 | End: 2024-08-04

## 2024-08-04 RX ORDER — ONDANSETRON 2 MG/ML
4 INJECTION INTRAMUSCULAR; INTRAVENOUS ONCE
Status: COMPLETED | OUTPATIENT
Start: 2024-08-04 | End: 2024-08-04

## 2024-08-04 RX ORDER — DICYCLOMINE HCL 20 MG
20 TABLET ORAL 4 TIMES DAILY
Qty: 40 TABLET | Refills: 0 | Status: SHIPPED | OUTPATIENT
Start: 2024-08-04 | End: 2024-08-14

## 2024-08-04 RX ORDER — MORPHINE SULFATE 4 MG/ML
4 INJECTION, SOLUTION INTRAMUSCULAR; INTRAVENOUS ONCE
Status: COMPLETED | OUTPATIENT
Start: 2024-08-04 | End: 2024-08-04

## 2024-08-04 RX ADMIN — SODIUM CHLORIDE 1000 ML: 9 INJECTION, SOLUTION INTRAVENOUS at 04:02

## 2024-08-04 RX ADMIN — FAMOTIDINE 20 MG: 10 INJECTION, SOLUTION INTRAVENOUS at 04:07

## 2024-08-04 RX ADMIN — MORPHINE SULFATE 4 MG: 4 INJECTION, SOLUTION INTRAMUSCULAR; INTRAVENOUS at 09:33

## 2024-08-04 RX ADMIN — ONDANSETRON 4 MG: 2 INJECTION INTRAMUSCULAR; INTRAVENOUS at 04:06

## 2024-08-04 RX ADMIN — PANTOPRAZOLE SODIUM 40 MG: 40 TABLET, DELAYED RELEASE ORAL at 11:36

## 2024-08-04 RX ADMIN — IOPAMIDOL 75 ML: 755 INJECTION, SOLUTION INTRAVENOUS at 10:40

## 2024-08-04 RX ADMIN — LIDOCAINE HYDROCHLORIDE: 20 SOLUTION ORAL at 04:03

## 2024-08-04 RX ADMIN — SODIUM CHLORIDE 1000 ML: 9 INJECTION, SOLUTION INTRAVENOUS at 05:26

## 2024-08-04 RX ADMIN — DICYCLOMINE HYDROCHLORIDE 20 MG: 10 CAPSULE ORAL at 11:36

## 2024-08-04 ASSESSMENT — PAIN SCALES - GENERAL: PAINLEVEL_OUTOF10: 10

## 2024-08-04 NOTE — ED NOTES
Patient unable to be located in assigned chair or department waiting room to complete work up at this time.

## 2024-08-04 NOTE — ED PROVIDER NOTES
[facility] EMERGENCY DEPARTMENT  EMERGENCY DEPARTMENT ENCOUNTER        Pt Name: Rachele Lomas  MRN: [id number]  Birthdate 1980  Date of evaluation: 8/4/2024  Provider: Rach Ocampo DO  PCP: Stefan Contreras MD  Note Started: 2:28 AM EDT 8/4/24    CHIEF COMPLAINT       Chief Complaint   Patient presents with    Chest Pain     Patient complaining of chest pain and abdominal pain for the past 2 days.  States that she has been urinating on herself.         HISTORY OF PRESENT ILLNESS: 1 or more Elements   History From: patient    Limitations to history : None    Rachele Lomas is a 44 y.o. female with a history of chronic abdominal pain presenting to the emergency department complaining of chest pain.  Patient states she has had ongoing chest pain abdominal pain over the past 2 days.  She states that she is in so much pain when she was in the bathroom she is urinating on herself.  She states that he feels the urge to urinate but it takes her is a long walk to the bathroom due to her pain that sometimes she cannot make it there quite an time.  She describes the pain as throbbing in her upper abdomen and chest as well.  Nothing makes it better or worse.  She is nauseated at times.  Patient denies any fevers, chills, vomiting, diarrhea, shortness of breath, headedness, dizziness, syncope, reconsultation, recent illness, or other acute symptoms or concerns.  Of note, the patient does tell me that her mom recently passed and she was at her memorial and she feels like some of this may be stress-induced.  She states that she is not an alcoholic and does not drink daily but was drinking earlier.    Nursing Notes were all reviewed and agreed with or any disagreements were addressed in the HPI.    REVIEW OF SYSTEMS :      Review of Systems    Positives and Pertinent negatives as per HPI.     SURGICAL HISTORY     Past Surgical History:   Procedure Laterality Date

## 2024-08-05 LAB
EKG ATRIAL RATE: 103 BPM
EKG P AXIS: 71 DEGREES
EKG P-R INTERVAL: 136 MS
EKG Q-T INTERVAL: 344 MS
EKG QRS DURATION: 78 MS
EKG QTC CALCULATION (BAZETT): 450 MS
EKG R AXIS: 90 DEGREES
EKG T AXIS: 50 DEGREES
EKG VENTRICULAR RATE: 103 BPM
MICROORGANISM SPEC CULT: ABNORMAL
SERVICE CMNT-IMP: ABNORMAL
SPECIMEN DESCRIPTION: ABNORMAL

## 2024-08-05 PROCEDURE — 93010 ELECTROCARDIOGRAM REPORT: CPT | Performed by: INTERNAL MEDICINE

## 2024-08-23 ENCOUNTER — HOSPITAL ENCOUNTER (EMERGENCY)
Age: 44
Discharge: HOME | End: 2024-08-23
Payer: MEDICAID

## 2024-08-23 VITALS
SYSTOLIC BLOOD PRESSURE: 170 MMHG | HEART RATE: 96 BPM | OXYGEN SATURATION: 100 % | TEMPERATURE: 98.2 F | HEIGHT: 59 IN | DIASTOLIC BLOOD PRESSURE: 102 MMHG | WEIGHT: 170 LBS | RESPIRATION RATE: 16 BRPM | BODY MASS INDEX: 34.27 KG/M2

## 2024-08-23 DIAGNOSIS — G89.29 CHRONIC RIGHT HIP PAIN: ICD-10-CM

## 2024-08-23 DIAGNOSIS — M25.551 CHRONIC RIGHT HIP PAIN: ICD-10-CM

## 2024-08-23 DIAGNOSIS — T25.121A SUPERFICIAL BURN OF RIGHT FOOT, INITIAL ENCOUNTER: ICD-10-CM

## 2024-08-23 DIAGNOSIS — T23.252A PARTIAL THICKNESS BURN OF PALM OF LEFT HAND, INITIAL ENCOUNTER: Primary | ICD-10-CM

## 2024-08-23 PROCEDURE — 99283 EMERGENCY DEPT VISIT LOW MDM: CPT

## 2024-08-23 RX ORDER — BACITRACIN ZINC AND POLYMYXIN B SULFATE 500; 1000 [USP'U]/G; [USP'U]/G
OINTMENT TOPICAL
Qty: 28.4 G | Refills: 0 | Status: SHIPPED | OUTPATIENT
Start: 2024-08-23 | End: 2024-08-30

## 2024-08-23 RX ORDER — OXYCODONE AND ACETAMINOPHEN 5; 325 MG/1; MG/1
1 TABLET ORAL EVERY 6 HOURS PRN
Qty: 12 TABLET | Refills: 0 | Status: SHIPPED | OUTPATIENT
Start: 2024-08-23 | End: 2024-08-26

## 2024-08-23 RX ORDER — CEPHALEXIN 500 MG/1
500 CAPSULE ORAL 4 TIMES DAILY
Qty: 28 CAPSULE | Refills: 0 | Status: SHIPPED | OUTPATIENT
Start: 2024-08-23 | End: 2024-08-30

## 2024-08-23 RX ORDER — GINSENG 100 MG
CAPSULE ORAL ONCE
Status: DISCONTINUED | OUTPATIENT
Start: 2024-08-23 | End: 2024-08-23 | Stop reason: HOSPADM

## 2024-08-23 ASSESSMENT — PAIN DESCRIPTION - LOCATION: LOCATION: HIP

## 2024-08-23 ASSESSMENT — PAIN SCALES - GENERAL: PAINLEVEL_OUTOF10: 10

## 2024-08-23 ASSESSMENT — PAIN - FUNCTIONAL ASSESSMENT
PAIN_FUNCTIONAL_ASSESSMENT: 0-10
PAIN_FUNCTIONAL_ASSESSMENT: PREVENTS OR INTERFERES WITH ALL ACTIVE AND SOME PASSIVE ACTIVITIES

## 2024-08-23 ASSESSMENT — PAIN DESCRIPTION - ORIENTATION: ORIENTATION: RIGHT

## 2024-08-23 ASSESSMENT — PAIN DESCRIPTION - DESCRIPTORS: DESCRIPTORS: ACHING

## 2024-08-23 ASSESSMENT — PAIN DESCRIPTION - PAIN TYPE: TYPE: ACUTE PAIN
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
## 2024-09-22 ENCOUNTER — HOSPITAL ENCOUNTER (OUTPATIENT)
Age: 44
Setting detail: OBSERVATION
Discharge: HOME | End: 2024-09-24
Attending: EMERGENCY MEDICINE | Admitting: INTERNAL MEDICINE
Payer: COMMERCIAL

## 2024-09-22 ENCOUNTER — APPOINTMENT (OUTPATIENT)
Dept: GENERAL RADIOLOGY | Age: 44
End: 2024-09-22
Payer: COMMERCIAL

## 2024-09-22 DIAGNOSIS — I10 PRIMARY HYPERTENSION: ICD-10-CM

## 2024-09-22 DIAGNOSIS — R94.31 ACUTE ELECTROCARDIOGRAM CHANGES: ICD-10-CM

## 2024-09-22 DIAGNOSIS — R07.9 ACUTE CHEST PAIN: Primary | ICD-10-CM

## 2024-09-22 DIAGNOSIS — I20.9 ANGINA PECTORIS (HCC): ICD-10-CM

## 2024-09-22 DIAGNOSIS — R07.9 CHEST PAIN, UNSPECIFIED TYPE: ICD-10-CM

## 2024-09-22 LAB
ALBUMIN SERPL-MCNC: 4.3 G/DL (ref 3.5–5.2)
ALP SERPL-CCNC: 95 U/L (ref 35–104)
ALT SERPL-CCNC: 18 U/L (ref 0–32)
ANION GAP SERPL CALCULATED.3IONS-SCNC: 19 MMOL/L (ref 7–16)
AST SERPL-CCNC: 22 U/L (ref 0–31)
BASOPHILS # BLD: 0.03 K/UL (ref 0–0.2)
BASOPHILS NFR BLD: 0 % (ref 0–2)
BILIRUB SERPL-MCNC: 0.3 MG/DL (ref 0–1.2)
BUN SERPL-MCNC: 17 MG/DL (ref 6–20)
CALCIUM SERPL-MCNC: 8.6 MG/DL (ref 8.6–10.2)
CHLORIDE SERPL-SCNC: 102 MMOL/L (ref 98–107)
CO2 SERPL-SCNC: 17 MMOL/L (ref 22–29)
CREAT SERPL-MCNC: 0.7 MG/DL (ref 0.5–1)
EOSINOPHIL # BLD: 0.09 K/UL (ref 0.05–0.5)
EOSINOPHILS RELATIVE PERCENT: 1 % (ref 0–6)
ERYTHROCYTE [DISTWIDTH] IN BLOOD BY AUTOMATED COUNT: 16.2 % (ref 11.5–15)
GFR, ESTIMATED: >90 ML/MIN/1.73M2
GLUCOSE SERPL-MCNC: 63 MG/DL (ref 74–99)
HCT VFR BLD AUTO: 41.4 % (ref 34–48)
HGB BLD-MCNC: 13.6 G/DL (ref 11.5–15.5)
IMM GRANULOCYTES # BLD AUTO: 0.04 K/UL (ref 0–0.58)
IMM GRANULOCYTES NFR BLD: 0 % (ref 0–5)
LYMPHOCYTES NFR BLD: 2.01 K/UL (ref 1.5–4)
LYMPHOCYTES RELATIVE PERCENT: 21 % (ref 20–42)
MAGNESIUM SERPL-MCNC: 2 MG/DL (ref 1.6–2.6)
MCH RBC QN AUTO: 25.7 PG (ref 26–35)
MCHC RBC AUTO-ENTMCNC: 32.9 G/DL (ref 32–34.5)
MCV RBC AUTO: 78.3 FL (ref 80–99.9)
MONOCYTES NFR BLD: 0.84 K/UL (ref 0.1–0.95)
MONOCYTES NFR BLD: 9 % (ref 2–12)
NEUTROPHILS NFR BLD: 68 % (ref 43–80)
NEUTS SEG NFR BLD: 6.44 K/UL (ref 1.8–7.3)
PLATELET # BLD AUTO: 263 K/UL (ref 130–450)
PMV BLD AUTO: 9.5 FL (ref 7–12)
POTASSIUM SERPL-SCNC: 4.3 MMOL/L (ref 3.5–5)
PROT SERPL-MCNC: 7.9 G/DL (ref 6.4–8.3)
RBC # BLD AUTO: 5.29 M/UL (ref 3.5–5.5)
SARS-COV-2 RDRP RESP QL NAA+PROBE: NOT DETECTED
SODIUM SERPL-SCNC: 138 MMOL/L (ref 132–146)
SPECIMEN DESCRIPTION: NORMAL
TROPONIN I SERPL HS-MCNC: <6 NG/L (ref 0–9)
TROPONIN I SERPL HS-MCNC: <6 NG/L (ref 0–9)
WBC OTHER # BLD: 9.5 K/UL (ref 4.5–11.5)

## 2024-09-22 PROCEDURE — 6370000000 HC RX 637 (ALT 250 FOR IP): Performed by: EMERGENCY MEDICINE

## 2024-09-22 PROCEDURE — 99285 EMERGENCY DEPT VISIT HI MDM: CPT

## 2024-09-22 PROCEDURE — G0378 HOSPITAL OBSERVATION PER HR: HCPCS

## 2024-09-22 PROCEDURE — 83735 ASSAY OF MAGNESIUM: CPT

## 2024-09-22 PROCEDURE — 71046 X-RAY EXAM CHEST 2 VIEWS: CPT

## 2024-09-22 PROCEDURE — 84484 ASSAY OF TROPONIN QUANT: CPT

## 2024-09-22 PROCEDURE — 80053 COMPREHEN METABOLIC PANEL: CPT

## 2024-09-22 PROCEDURE — 93005 ELECTROCARDIOGRAM TRACING: CPT | Performed by: NURSE PRACTITIONER

## 2024-09-22 PROCEDURE — 85025 COMPLETE CBC W/AUTO DIFF WBC: CPT

## 2024-09-22 PROCEDURE — 87635 SARS-COV-2 COVID-19 AMP PRB: CPT

## 2024-09-22 RX ORDER — ENOXAPARIN SODIUM 100 MG/ML
40 INJECTION SUBCUTANEOUS DAILY
Status: DISCONTINUED | OUTPATIENT
Start: 2024-09-23 | End: 2024-09-24 | Stop reason: HOSPADM

## 2024-09-22 RX ORDER — SODIUM CHLORIDE 0.9 % (FLUSH) 0.9 %
5-40 SYRINGE (ML) INJECTION PRN
Status: DISCONTINUED | OUTPATIENT
Start: 2024-09-22 | End: 2024-09-24 | Stop reason: HOSPADM

## 2024-09-22 RX ORDER — SODIUM CHLORIDE 0.9 % (FLUSH) 0.9 %
5-40 SYRINGE (ML) INJECTION EVERY 12 HOURS SCHEDULED
Status: DISCONTINUED | OUTPATIENT
Start: 2024-09-22 | End: 2024-09-24 | Stop reason: HOSPADM

## 2024-09-22 RX ORDER — SUCRALFATE 1 G/1
1 TABLET ORAL 4 TIMES DAILY
Status: DISCONTINUED | OUTPATIENT
Start: 2024-09-23 | End: 2024-09-24

## 2024-09-22 RX ORDER — ASPIRIN 81 MG/1
324 TABLET, CHEWABLE ORAL ONCE
Status: COMPLETED | OUTPATIENT
Start: 2024-09-22 | End: 2024-09-22

## 2024-09-22 RX ORDER — SODIUM CHLORIDE 9 MG/ML
INJECTION, SOLUTION INTRAVENOUS PRN
Status: DISCONTINUED | OUTPATIENT
Start: 2024-09-22 | End: 2024-09-24 | Stop reason: HOSPADM

## 2024-09-22 RX ORDER — ACETAMINOPHEN 500 MG
1000 TABLET ORAL ONCE
Status: COMPLETED | OUTPATIENT
Start: 2024-09-22 | End: 2024-09-22

## 2024-09-22 RX ORDER — GABAPENTIN 300 MG/1
600 CAPSULE ORAL DAILY
Status: DISCONTINUED | OUTPATIENT
Start: 2024-09-23 | End: 2024-09-24 | Stop reason: HOSPADM

## 2024-09-22 RX ORDER — PANTOPRAZOLE SODIUM 40 MG/1
40 TABLET, DELAYED RELEASE ORAL
Status: DISCONTINUED | OUTPATIENT
Start: 2024-09-23 | End: 2024-09-24 | Stop reason: HOSPADM

## 2024-09-22 RX ORDER — INDOMETHACIN 25 MG/1
25 CAPSULE ORAL
Status: DISCONTINUED | OUTPATIENT
Start: 2024-09-23 | End: 2024-09-24 | Stop reason: HOSPADM

## 2024-09-22 RX ORDER — POLYETHYLENE GLYCOL 3350 17 G/17G
17 POWDER, FOR SOLUTION ORAL DAILY PRN
Status: DISCONTINUED | OUTPATIENT
Start: 2024-09-22 | End: 2024-09-24 | Stop reason: HOSPADM

## 2024-09-22 RX ORDER — ACETAMINOPHEN 650 MG/1
650 SUPPOSITORY RECTAL EVERY 6 HOURS PRN
Status: DISCONTINUED | OUTPATIENT
Start: 2024-09-22 | End: 2024-09-24 | Stop reason: HOSPADM

## 2024-09-22 RX ORDER — ACETAMINOPHEN 325 MG/1
650 TABLET ORAL EVERY 6 HOURS PRN
Status: DISCONTINUED | OUTPATIENT
Start: 2024-09-22 | End: 2024-09-24 | Stop reason: HOSPADM

## 2024-09-22 RX ORDER — METOPROLOL TARTRATE 25 MG/1
25 TABLET, FILM COATED ORAL 2 TIMES DAILY
Status: DISCONTINUED | OUTPATIENT
Start: 2024-09-23 | End: 2024-09-23

## 2024-09-22 RX ORDER — AMLODIPINE BESYLATE 10 MG/1
10 TABLET ORAL EVERY MORNING
Status: DISCONTINUED | OUTPATIENT
Start: 2024-09-23 | End: 2024-09-24 | Stop reason: HOSPADM

## 2024-09-22 RX ORDER — LANOLIN ALCOHOL/MO/W.PET/CERES
100 CREAM (GRAM) TOPICAL DAILY
Status: DISCONTINUED | OUTPATIENT
Start: 2024-09-23 | End: 2024-09-24 | Stop reason: HOSPADM

## 2024-09-22 RX ORDER — ONDANSETRON 2 MG/ML
4 INJECTION INTRAMUSCULAR; INTRAVENOUS EVERY 6 HOURS PRN
Status: DISCONTINUED | OUTPATIENT
Start: 2024-09-22 | End: 2024-09-24 | Stop reason: HOSPADM

## 2024-09-22 RX ORDER — INDOMETHACIN 25 MG/1
25 CAPSULE ORAL
Status: DISCONTINUED | OUTPATIENT
Start: 2024-09-23 | End: 2024-09-22 | Stop reason: SDUPTHER

## 2024-09-22 RX ORDER — ONDANSETRON 4 MG/1
4 TABLET, ORALLY DISINTEGRATING ORAL EVERY 8 HOURS PRN
Status: DISCONTINUED | OUTPATIENT
Start: 2024-09-22 | End: 2024-09-24 | Stop reason: HOSPADM

## 2024-09-22 RX ADMIN — ACETAMINOPHEN 1000 MG: 500 TABLET ORAL at 16:42

## 2024-09-22 RX ADMIN — ASPIRIN 81 MG 324 MG: 81 TABLET ORAL at 20:59

## 2024-09-22 ASSESSMENT — PAIN - FUNCTIONAL ASSESSMENT: PAIN_FUNCTIONAL_ASSESSMENT: NONE - DENIES PAIN

## 2024-09-23 ENCOUNTER — APPOINTMENT (OUTPATIENT)
Age: 44
End: 2024-09-23
Payer: COMMERCIAL

## 2024-09-23 ENCOUNTER — APPOINTMENT (OUTPATIENT)
Dept: NUCLEAR MEDICINE | Age: 44
End: 2024-09-23
Payer: COMMERCIAL

## 2024-09-23 LAB
AMPHET UR QL SCN: NEGATIVE
ANION GAP SERPL CALCULATED.3IONS-SCNC: 16 MMOL/L (ref 7–16)
B-HCG SERPL EIA 3RD IS-ACNC: <0.1 MIU/ML (ref 0–7)
BARBITURATES UR QL SCN: NEGATIVE
BASOPHILS # BLD: 0.01 K/UL (ref 0–0.2)
BASOPHILS NFR BLD: 0 % (ref 0–2)
BENZODIAZ UR QL: NEGATIVE
BUN SERPL-MCNC: 10 MG/DL (ref 6–20)
BUPRENORPHINE UR QL: NEGATIVE
CALCIUM SERPL-MCNC: 8.9 MG/DL (ref 8.6–10.2)
CANNABINOIDS UR QL SCN: POSITIVE
CHLORIDE SERPL-SCNC: 101 MMOL/L (ref 98–107)
CHOLEST SERPL-MCNC: 188 MG/DL
CO2 SERPL-SCNC: 19 MMOL/L (ref 22–29)
COCAINE UR QL SCN: POSITIVE
CREAT SERPL-MCNC: 0.6 MG/DL (ref 0.5–1)
ECHO BSA: 1.74 M2
EKG ATRIAL RATE: 82 BPM
EKG P AXIS: 53 DEGREES
EKG P-R INTERVAL: 138 MS
EKG Q-T INTERVAL: 400 MS
EKG QRS DURATION: 80 MS
EKG QTC CALCULATION (BAZETT): 467 MS
EKG R AXIS: 70 DEGREES
EKG T AXIS: 46 DEGREES
EKG VENTRICULAR RATE: 82 BPM
EOSINOPHIL # BLD: 0.11 K/UL (ref 0.05–0.5)
EOSINOPHILS RELATIVE PERCENT: 2 % (ref 0–6)
ERYTHROCYTE [DISTWIDTH] IN BLOOD BY AUTOMATED COUNT: 15.9 % (ref 11.5–15)
FENTANYL UR QL: NEGATIVE
GFR, ESTIMATED: >90 ML/MIN/1.73M2
GLUCOSE SERPL-MCNC: 80 MG/DL (ref 74–99)
HBA1C MFR BLD: 5.3 % (ref 4–5.6)
HCT VFR BLD AUTO: 41.7 % (ref 34–48)
HDLC SERPL-MCNC: 83 MG/DL
HGB BLD-MCNC: 13.2 G/DL (ref 11.5–15.5)
IMM GRANULOCYTES # BLD AUTO: 0.03 K/UL (ref 0–0.58)
IMM GRANULOCYTES NFR BLD: 1 % (ref 0–5)
LDLC SERPL CALC-MCNC: 72 MG/DL
LYMPHOCYTES NFR BLD: 1.03 K/UL (ref 1.5–4)
LYMPHOCYTES RELATIVE PERCENT: 17 % (ref 20–42)
MAGNESIUM SERPL-MCNC: 1.9 MG/DL (ref 1.6–2.6)
MCH RBC QN AUTO: 25 PG (ref 26–35)
MCHC RBC AUTO-ENTMCNC: 31.7 G/DL (ref 32–34.5)
MCV RBC AUTO: 79 FL (ref 80–99.9)
METHADONE UR QL: NEGATIVE
MONOCYTES NFR BLD: 0.58 K/UL (ref 0.1–0.95)
MONOCYTES NFR BLD: 10 % (ref 2–12)
NEUTROPHILS NFR BLD: 71 % (ref 43–80)
NEUTS SEG NFR BLD: 4.29 K/UL (ref 1.8–7.3)
NUC STRESS EJECTION FRACTION: 93 %
OPIATES UR QL SCN: NEGATIVE
OXYCODONE UR QL SCN: NEGATIVE
PCP UR QL SCN: NEGATIVE
PHOSPHATE SERPL-MCNC: 4 MG/DL (ref 2.5–4.5)
PLATELET # BLD AUTO: 225 K/UL (ref 130–450)
PMV BLD AUTO: 9.2 FL (ref 7–12)
POTASSIUM SERPL-SCNC: 3.8 MMOL/L (ref 3.5–5)
RBC # BLD AUTO: 5.28 M/UL (ref 3.5–5.5)
SODIUM SERPL-SCNC: 136 MMOL/L (ref 132–146)
STRESS BASELINE DIAS BP: 100 MMHG
STRESS BASELINE HR: 79 BPM
STRESS BASELINE SYS BP: 202 MMHG
STRESS ESTIMATED WORKLOAD: 1 METS
STRESS PEAK DIAS BP: 100 MMHG
STRESS PEAK SYS BP: 202 MMHG
STRESS PERCENT HR ACHIEVED: 65 %
STRESS POST PEAK HR: 115 BPM
STRESS RATE PRESSURE PRODUCT: NORMAL BPM*MMHG
STRESS ST DEPRESSION: 0 MM
STRESS TARGET HR: 177 BPM
TEST INFORMATION: ABNORMAL
TID: 0.96
TRIGL SERPL-MCNC: 164 MG/DL
TROPONIN I SERPL HS-MCNC: <6 NG/L (ref 0–9)
VLDLC SERPL CALC-MCNC: 33 MG/DL
WBC OTHER # BLD: 6.1 K/UL (ref 4.5–11.5)

## 2024-09-23 PROCEDURE — G0378 HOSPITAL OBSERVATION PER HR: HCPCS

## 2024-09-23 PROCEDURE — 80048 BASIC METABOLIC PNL TOTAL CA: CPT

## 2024-09-23 PROCEDURE — 99232 SBSQ HOSP IP/OBS MODERATE 35: CPT | Performed by: CHIROPRACTOR

## 2024-09-23 PROCEDURE — 78452 HT MUSCLE IMAGE SPECT MULT: CPT

## 2024-09-23 PROCEDURE — 6370000000 HC RX 637 (ALT 250 FOR IP)

## 2024-09-23 PROCEDURE — A9500 TC99M SESTAMIBI: HCPCS | Performed by: RADIOLOGY

## 2024-09-23 PROCEDURE — 93018 CV STRESS TEST I&R ONLY: CPT | Performed by: INTERNAL MEDICINE

## 2024-09-23 PROCEDURE — 93017 CV STRESS TEST TRACING ONLY: CPT

## 2024-09-23 PROCEDURE — 78452 HT MUSCLE IMAGE SPECT MULT: CPT | Performed by: INTERNAL MEDICINE

## 2024-09-23 PROCEDURE — 6360000002 HC RX W HCPCS: Performed by: CHIROPRACTOR

## 2024-09-23 PROCEDURE — 84484 ASSAY OF TROPONIN QUANT: CPT

## 2024-09-23 PROCEDURE — 83036 HEMOGLOBIN GLYCOSYLATED A1C: CPT

## 2024-09-23 PROCEDURE — 84702 CHORIONIC GONADOTROPIN TEST: CPT

## 2024-09-23 PROCEDURE — 93016 CV STRESS TEST SUPVJ ONLY: CPT | Performed by: INTERNAL MEDICINE

## 2024-09-23 PROCEDURE — 85025 COMPLETE CBC W/AUTO DIFF WBC: CPT

## 2024-09-23 PROCEDURE — 80307 DRUG TEST PRSMV CHEM ANLYZR: CPT

## 2024-09-23 PROCEDURE — 84100 ASSAY OF PHOSPHORUS: CPT

## 2024-09-23 PROCEDURE — 83735 ASSAY OF MAGNESIUM: CPT

## 2024-09-23 PROCEDURE — 36415 COLL VENOUS BLD VENIPUNCTURE: CPT

## 2024-09-23 PROCEDURE — 2580000003 HC RX 258

## 2024-09-23 PROCEDURE — 3430000000 HC RX DIAGNOSTIC RADIOPHARMACEUTICAL: Performed by: RADIOLOGY

## 2024-09-23 PROCEDURE — 93010 ELECTROCARDIOGRAM REPORT: CPT | Performed by: INTERNAL MEDICINE

## 2024-09-23 PROCEDURE — 93005 ELECTROCARDIOGRAM TRACING: CPT

## 2024-09-23 PROCEDURE — 80061 LIPID PANEL: CPT

## 2024-09-23 RX ORDER — TETRAKIS(2-METHOXYISOBUTYLISOCYANIDE)COPPER(I) TETRAFLUOROBORATE 1 MG/ML
30 INJECTION, POWDER, LYOPHILIZED, FOR SOLUTION INTRAVENOUS
Status: COMPLETED | OUTPATIENT
Start: 2024-09-23 | End: 2024-09-23

## 2024-09-23 RX ORDER — REGADENOSON 0.08 MG/ML
0.4 INJECTION, SOLUTION INTRAVENOUS
Status: COMPLETED | OUTPATIENT
Start: 2024-09-23 | End: 2024-09-23

## 2024-09-23 RX ORDER — AMLODIPINE BESYLATE 5 MG/1
10 TABLET ORAL DAILY
Status: ON HOLD | COMMUNITY
Start: 2022-10-12 | End: 2024-09-24 | Stop reason: HOSPADM

## 2024-09-23 RX ORDER — TETRAKIS(2-METHOXYISOBUTYLISOCYANIDE)COPPER(I) TETRAFLUOROBORATE 1 MG/ML
11.4 INJECTION, POWDER, LYOPHILIZED, FOR SOLUTION INTRAVENOUS
Status: COMPLETED | OUTPATIENT
Start: 2024-09-23 | End: 2024-09-23

## 2024-09-23 RX ORDER — FAMOTIDINE 20 MG/1
20 TABLET, FILM COATED ORAL 2 TIMES DAILY
Status: ON HOLD | COMMUNITY
Start: 2021-08-24 | End: 2024-09-24 | Stop reason: HOSPADM

## 2024-09-23 RX ORDER — TRAZODONE HYDROCHLORIDE 50 MG/1
100 TABLET, FILM COATED ORAL NIGHTLY
COMMUNITY
Start: 2024-07-16

## 2024-09-23 RX ORDER — LISINOPRIL 5 MG/1
5 TABLET ORAL DAILY
Status: ON HOLD | COMMUNITY
Start: 2024-04-30 | End: 2024-09-24 | Stop reason: HOSPADM

## 2024-09-23 RX ORDER — LISINOPRIL 5 MG/1
5 TABLET ORAL DAILY
Status: DISCONTINUED | OUTPATIENT
Start: 2024-09-23 | End: 2024-09-24

## 2024-09-23 RX ORDER — CARBAMAZEPINE 200 MG/1
200 TABLET ORAL 2 TIMES DAILY
COMMUNITY

## 2024-09-23 RX ORDER — BUSPIRONE HYDROCHLORIDE 5 MG/1
5 TABLET ORAL 2 TIMES DAILY
COMMUNITY
Start: 2024-07-16

## 2024-09-23 RX ORDER — REGADENOSON 0.08 MG/ML
0.4 INJECTION, SOLUTION INTRAVENOUS
Status: CANCELLED | OUTPATIENT
Start: 2024-09-23

## 2024-09-23 RX ORDER — SAW/PYGEUM/BETA/HERB/D3/B6/ZN 30 MG-25MG
10 CAPSULE ORAL NIGHTLY
COMMUNITY
Start: 2022-12-05

## 2024-09-23 RX ORDER — LANOLIN ALCOHOL/MO/W.PET/CERES
3 CREAM (GRAM) TOPICAL ONCE
Status: COMPLETED | OUTPATIENT
Start: 2024-09-24 | End: 2024-09-24

## 2024-09-23 RX ORDER — ASPIRIN 81 MG/1
81 TABLET, CHEWABLE ORAL DAILY
Status: DISCONTINUED | OUTPATIENT
Start: 2024-09-23 | End: 2024-09-24 | Stop reason: HOSPADM

## 2024-09-23 RX ORDER — OXYCODONE AND ACETAMINOPHEN 5; 325 MG/1; MG/1
1 TABLET ORAL EVERY 6 HOURS PRN
Status: ON HOLD | COMMUNITY
End: 2024-09-24 | Stop reason: HOSPADM

## 2024-09-23 RX ORDER — TIZANIDINE HYDROCHLORIDE 6 MG/1
6 CAPSULE, GELATIN COATED ORAL 3 TIMES DAILY PRN
Status: ON HOLD | COMMUNITY
Start: 2024-04-30 | End: 2024-09-24 | Stop reason: HOSPADM

## 2024-09-23 RX ADMIN — SUCRALFATE 1 G: 1 TABLET ORAL at 15:28

## 2024-09-23 RX ADMIN — INDOMETHACIN 25 MG: 25 CAPSULE ORAL at 15:29

## 2024-09-23 RX ADMIN — PANTOPRAZOLE SODIUM 40 MG: 40 TABLET, DELAYED RELEASE ORAL at 15:28

## 2024-09-23 RX ADMIN — ASPIRIN 81 MG 81 MG: 81 TABLET ORAL at 15:28

## 2024-09-23 RX ADMIN — SUCRALFATE 1 G: 1 TABLET ORAL at 10:18

## 2024-09-23 RX ADMIN — Medication 30 MILLICURIE: at 13:55

## 2024-09-23 RX ADMIN — AMLODIPINE BESYLATE 10 MG: 10 TABLET ORAL at 10:18

## 2024-09-23 RX ADMIN — SODIUM CHLORIDE, PRESERVATIVE FREE 10 ML: 5 INJECTION INTRAVENOUS at 10:19

## 2024-09-23 RX ADMIN — REGADENOSON 0.4 MG: 0.08 INJECTION, SOLUTION INTRAVENOUS at 13:58

## 2024-09-23 RX ADMIN — SODIUM CHLORIDE, PRESERVATIVE FREE 10 ML: 5 INJECTION INTRAVENOUS at 01:30

## 2024-09-23 RX ADMIN — SODIUM CHLORIDE, PRESERVATIVE FREE 10 ML: 5 INJECTION INTRAVENOUS at 21:19

## 2024-09-23 RX ADMIN — GABAPENTIN 600 MG: 300 CAPSULE ORAL at 10:18

## 2024-09-23 RX ADMIN — Medication 100 MG: at 15:30

## 2024-09-23 RX ADMIN — ACETAMINOPHEN 650 MG: 325 TABLET ORAL at 01:36

## 2024-09-23 RX ADMIN — ACETAMINOPHEN 650 MG: 325 TABLET ORAL at 21:17

## 2024-09-23 RX ADMIN — Medication 11.4 MILLICURIE: at 11:46

## 2024-09-23 RX ADMIN — LISINOPRIL 5 MG: 5 TABLET ORAL at 15:28

## 2024-09-23 RX ADMIN — SUCRALFATE 1 G: 1 TABLET ORAL at 21:17

## 2024-09-23 ASSESSMENT — PAIN DESCRIPTION - DESCRIPTORS
DESCRIPTORS: ACHING;DISCOMFORT;DULL
DESCRIPTORS: ACHING;THROBBING;TIGHTNESS;DISCOMFORT
DESCRIPTORS: ACHING;DISCOMFORT;DULL
DESCRIPTORS: ACHING;THROBBING;DISCOMFORT
DESCRIPTORS: ACHING
DESCRIPTORS: ACHING;THROBBING;DISCOMFORT

## 2024-09-23 ASSESSMENT — PAIN DESCRIPTION - LOCATION
LOCATION: BACK;CHEST
LOCATION: GENERALIZED
LOCATION: LEG
LOCATION: BACK;CHEST
LOCATION: HIP

## 2024-09-23 ASSESSMENT — PAIN SCALES - GENERAL
PAINLEVEL_OUTOF10: 9
PAINLEVEL_OUTOF10: 10
PAINLEVEL_OUTOF10: 10
PAINLEVEL_OUTOF10: 9
PAINLEVEL_OUTOF10: 10
PAINLEVEL_OUTOF10: 10
PAINLEVEL_OUTOF10: 7

## 2024-09-23 ASSESSMENT — PAIN DESCRIPTION - ORIENTATION
ORIENTATION: RIGHT;LEFT
ORIENTATION: RIGHT;LEFT
ORIENTATION: LOWER
ORIENTATION: RIGHT;LEFT
ORIENTATION: LOWER

## 2024-09-23 ASSESSMENT — PAIN - FUNCTIONAL ASSESSMENT: PAIN_FUNCTIONAL_ASSESSMENT: 0-10

## 2024-09-24 VITALS
BODY MASS INDEX: 32.25 KG/M2 | HEART RATE: 71 BPM | OXYGEN SATURATION: 100 % | TEMPERATURE: 98.5 F | WEIGHT: 160 LBS | RESPIRATION RATE: 18 BRPM | SYSTOLIC BLOOD PRESSURE: 133 MMHG | HEIGHT: 59 IN | DIASTOLIC BLOOD PRESSURE: 81 MMHG

## 2024-09-24 PROBLEM — R07.9 ACUTE CHEST PAIN: Status: RESOLVED | Noted: 2024-09-22 | Resolved: 2024-09-24

## 2024-09-24 LAB
ANION GAP SERPL CALCULATED.3IONS-SCNC: 13 MMOL/L (ref 7–16)
BASOPHILS # BLD: 0.01 K/UL (ref 0–0.2)
BASOPHILS NFR BLD: 0 % (ref 0–2)
BUN SERPL-MCNC: 15 MG/DL (ref 6–20)
CALCIUM SERPL-MCNC: 8.4 MG/DL (ref 8.6–10.2)
CHLORIDE SERPL-SCNC: 102 MMOL/L (ref 98–107)
CO2 SERPL-SCNC: 21 MMOL/L (ref 22–29)
CREAT SERPL-MCNC: 0.6 MG/DL (ref 0.5–1)
EKG ATRIAL RATE: 81 BPM
EKG P AXIS: 52 DEGREES
EKG P-R INTERVAL: 144 MS
EKG Q-T INTERVAL: 420 MS
EKG QRS DURATION: 86 MS
EKG QTC CALCULATION (BAZETT): 487 MS
EKG R AXIS: 74 DEGREES
EKG T AXIS: 50 DEGREES
EKG VENTRICULAR RATE: 81 BPM
EOSINOPHIL # BLD: 0.13 K/UL (ref 0.05–0.5)
EOSINOPHILS RELATIVE PERCENT: 2 % (ref 0–6)
ERYTHROCYTE [DISTWIDTH] IN BLOOD BY AUTOMATED COUNT: 15.9 % (ref 11.5–15)
GFR, ESTIMATED: >90 ML/MIN/1.73M2
GLUCOSE SERPL-MCNC: 105 MG/DL (ref 74–99)
HCT VFR BLD AUTO: 39.1 % (ref 34–48)
HGB BLD-MCNC: 12.4 G/DL (ref 11.5–15.5)
IMM GRANULOCYTES # BLD AUTO: <0.03 K/UL (ref 0–0.58)
IMM GRANULOCYTES NFR BLD: 0 % (ref 0–5)
LYMPHOCYTES NFR BLD: 1.41 K/UL (ref 1.5–4)
LYMPHOCYTES RELATIVE PERCENT: 25 % (ref 20–42)
MAGNESIUM SERPL-MCNC: 2 MG/DL (ref 1.6–2.6)
MCH RBC QN AUTO: 25.6 PG (ref 26–35)
MCHC RBC AUTO-ENTMCNC: 31.7 G/DL (ref 32–34.5)
MCV RBC AUTO: 80.8 FL (ref 80–99.9)
MONOCYTES NFR BLD: 0.75 K/UL (ref 0.1–0.95)
MONOCYTES NFR BLD: 13 % (ref 2–12)
NEUTROPHILS NFR BLD: 60 % (ref 43–80)
NEUTS SEG NFR BLD: 3.43 K/UL (ref 1.8–7.3)
PHOSPHATE SERPL-MCNC: 3.6 MG/DL (ref 2.5–4.5)
PLATELET # BLD AUTO: 234 K/UL (ref 130–450)
PMV BLD AUTO: 10.4 FL (ref 7–12)
POTASSIUM SERPL-SCNC: 3.9 MMOL/L (ref 3.5–5)
RBC # BLD AUTO: 4.84 M/UL (ref 3.5–5.5)
SODIUM SERPL-SCNC: 136 MMOL/L (ref 132–146)
WBC OTHER # BLD: 5.8 K/UL (ref 4.5–11.5)

## 2024-09-24 PROCEDURE — 36415 COLL VENOUS BLD VENIPUNCTURE: CPT

## 2024-09-24 PROCEDURE — 99239 HOSP IP/OBS DSCHRG MGMT >30: CPT | Performed by: CHIROPRACTOR

## 2024-09-24 PROCEDURE — 6370000000 HC RX 637 (ALT 250 FOR IP): Performed by: STUDENT IN AN ORGANIZED HEALTH CARE EDUCATION/TRAINING PROGRAM

## 2024-09-24 PROCEDURE — G0378 HOSPITAL OBSERVATION PER HR: HCPCS

## 2024-09-24 PROCEDURE — 85025 COMPLETE CBC W/AUTO DIFF WBC: CPT

## 2024-09-24 PROCEDURE — 84100 ASSAY OF PHOSPHORUS: CPT

## 2024-09-24 PROCEDURE — 83735 ASSAY OF MAGNESIUM: CPT

## 2024-09-24 PROCEDURE — 2580000003 HC RX 258

## 2024-09-24 PROCEDURE — 80048 BASIC METABOLIC PNL TOTAL CA: CPT

## 2024-09-24 PROCEDURE — 6370000000 HC RX 637 (ALT 250 FOR IP)

## 2024-09-24 RX ORDER — LISINOPRIL 10 MG/1
10 TABLET ORAL DAILY
Qty: 30 TABLET | Refills: 3 | Status: SHIPPED | OUTPATIENT
Start: 2024-09-24

## 2024-09-24 RX ORDER — LISINOPRIL 10 MG/1
10 TABLET ORAL DAILY
Status: DISCONTINUED | OUTPATIENT
Start: 2024-09-24 | End: 2024-09-24 | Stop reason: HOSPADM

## 2024-09-24 RX ADMIN — GABAPENTIN 600 MG: 300 CAPSULE ORAL at 09:12

## 2024-09-24 RX ADMIN — AMLODIPINE BESYLATE 10 MG: 10 TABLET ORAL at 09:13

## 2024-09-24 RX ADMIN — ACETAMINOPHEN 650 MG: 325 TABLET ORAL at 06:34

## 2024-09-24 RX ADMIN — LISINOPRIL 10 MG: 10 TABLET ORAL at 09:13

## 2024-09-24 RX ADMIN — INDOMETHACIN 25 MG: 25 CAPSULE ORAL at 09:32

## 2024-09-24 RX ADMIN — ASPIRIN 81 MG 81 MG: 81 TABLET ORAL at 09:13

## 2024-09-24 RX ADMIN — Medication 100 MG: at 09:33

## 2024-09-24 RX ADMIN — INDOMETHACIN 25 MG: 25 CAPSULE ORAL at 12:47

## 2024-09-24 RX ADMIN — SUCRALFATE 1 G: 1 TABLET ORAL at 09:12

## 2024-09-24 RX ADMIN — PANTOPRAZOLE SODIUM 40 MG: 40 TABLET, DELAYED RELEASE ORAL at 09:13

## 2024-09-24 RX ADMIN — SODIUM CHLORIDE, PRESERVATIVE FREE 10 ML: 5 INJECTION INTRAVENOUS at 09:14

## 2024-09-24 RX ADMIN — Medication 3 MG: at 00:05

## 2024-09-24 ASSESSMENT — PAIN DESCRIPTION - DESCRIPTORS
DESCRIPTORS: ACHING;CRAMPING;PRESSURE;DISCOMFORT
DESCRIPTORS: ACHING
DESCRIPTORS: ACHING
DESCRIPTORS: ACHING;DISCOMFORT

## 2024-09-24 ASSESSMENT — PAIN DESCRIPTION - LOCATION
LOCATION: HIP
LOCATION: CHEST
LOCATION: CHEST
LOCATION: HIP

## 2024-09-24 ASSESSMENT — PAIN SCALES - GENERAL
PAINLEVEL_OUTOF10: 10
PAINLEVEL_OUTOF10: 5
PAINLEVEL_OUTOF10: 8

## 2024-09-24 ASSESSMENT — PAIN DESCRIPTION - ORIENTATION
ORIENTATION: RIGHT;LEFT;MID
ORIENTATION: RIGHT;LEFT
ORIENTATION: RIGHT;LEFT

## 2024-09-27 PROCEDURE — 99285 EMERGENCY DEPT VISIT HI MDM: CPT

## 2024-09-27 PROCEDURE — 96374 THER/PROPH/DIAG INJ IV PUSH: CPT

## 2024-09-27 ASSESSMENT — PAIN - FUNCTIONAL ASSESSMENT: PAIN_FUNCTIONAL_ASSESSMENT: 0-10

## 2024-09-27 ASSESSMENT — PAIN SCALES - GENERAL: PAINLEVEL_OUTOF10: 10

## 2024-09-27 ASSESSMENT — PAIN DESCRIPTION - DESCRIPTORS: DESCRIPTORS: ACHING

## 2024-09-27 ASSESSMENT — PAIN DESCRIPTION - LOCATION: LOCATION: ABDOMEN

## 2024-09-27 ASSESSMENT — PAIN DESCRIPTION - PAIN TYPE: TYPE: ACUTE PAIN;CHRONIC PAIN

## 2024-09-27 ASSESSMENT — PAIN DESCRIPTION - FREQUENCY: FREQUENCY: CONTINUOUS

## 2024-09-28 ENCOUNTER — APPOINTMENT (OUTPATIENT)
Dept: CT IMAGING | Age: 44
End: 2024-09-28
Payer: MEDICAID

## 2024-09-28 ENCOUNTER — HOSPITAL ENCOUNTER (EMERGENCY)
Age: 44
Discharge: HOME | End: 2024-09-28
Attending: STUDENT IN AN ORGANIZED HEALTH CARE EDUCATION/TRAINING PROGRAM
Payer: MEDICAID

## 2024-09-28 VITALS
WEIGHT: 160 LBS | BODY MASS INDEX: 32.25 KG/M2 | OXYGEN SATURATION: 99 % | DIASTOLIC BLOOD PRESSURE: 88 MMHG | RESPIRATION RATE: 18 BRPM | HEART RATE: 90 BPM | SYSTOLIC BLOOD PRESSURE: 140 MMHG | HEIGHT: 59 IN | TEMPERATURE: 97.6 F

## 2024-09-28 DIAGNOSIS — R10.9 ABDOMINAL PAIN, UNSPECIFIED ABDOMINAL LOCATION: Primary | ICD-10-CM

## 2024-09-28 DIAGNOSIS — K52.9 COLITIS: ICD-10-CM

## 2024-09-28 LAB
ALBUMIN SERPL-MCNC: 4.2 G/DL (ref 3.5–5.2)
ALP SERPL-CCNC: 94 U/L (ref 35–104)
ALT SERPL-CCNC: 22 U/L (ref 0–32)
ANION GAP SERPL CALCULATED.3IONS-SCNC: 13 MMOL/L (ref 7–16)
AST SERPL-CCNC: 27 U/L (ref 0–31)
BACTERIA URNS QL MICRO: ABNORMAL
BASOPHILS # BLD: 0.03 K/UL (ref 0–0.2)
BASOPHILS NFR BLD: 0 % (ref 0–2)
BILIRUB SERPL-MCNC: <0.2 MG/DL (ref 0–1.2)
BILIRUB UR QL STRIP: NEGATIVE
BUN SERPL-MCNC: 15 MG/DL (ref 6–20)
CALCIUM SERPL-MCNC: 8.7 MG/DL (ref 8.6–10.2)
CHLORIDE SERPL-SCNC: 104 MMOL/L (ref 98–107)
CLARITY UR: ABNORMAL
CO2 SERPL-SCNC: 21 MMOL/L (ref 22–29)
COLOR UR: YELLOW
CREAT SERPL-MCNC: 0.7 MG/DL (ref 0.5–1)
EKG ATRIAL RATE: 83 BPM
EKG P AXIS: 71 DEGREES
EKG P-R INTERVAL: 116 MS
EKG Q-T INTERVAL: 392 MS
EKG QRS DURATION: 72 MS
EKG QTC CALCULATION (BAZETT): 460 MS
EKG R AXIS: 73 DEGREES
EKG T AXIS: 6 DEGREES
EKG VENTRICULAR RATE: 83 BPM
EOSINOPHIL # BLD: 0.14 K/UL (ref 0.05–0.5)
EOSINOPHILS RELATIVE PERCENT: 2 % (ref 0–6)
EPI CELLS #/AREA URNS HPF: ABNORMAL /HPF
ERYTHROCYTE [DISTWIDTH] IN BLOOD BY AUTOMATED COUNT: 16.1 % (ref 11.5–15)
GFR, ESTIMATED: >90 ML/MIN/1.73M2
GLUCOSE SERPL-MCNC: 85 MG/DL (ref 74–99)
GLUCOSE UR STRIP-MCNC: NEGATIVE MG/DL
HCG, URINE, POC: NEGATIVE
HCT VFR BLD AUTO: 37.4 % (ref 34–48)
HCT VFR BLD AUTO: 40.2 % (ref 34–48)
HGB BLD-MCNC: 11.9 G/DL (ref 11.5–15.5)
HGB BLD-MCNC: 12.7 G/DL (ref 11.5–15.5)
HGB UR QL STRIP.AUTO: ABNORMAL
IMM GRANULOCYTES # BLD AUTO: 0.04 K/UL (ref 0–0.58)
IMM GRANULOCYTES NFR BLD: 1 % (ref 0–5)
KETONES UR STRIP-MCNC: NEGATIVE MG/DL
LACTATE BLDV-SCNC: 0.9 MMOL/L (ref 0.5–2.2)
LEUKOCYTE ESTERASE UR QL STRIP: NEGATIVE
LIPASE SERPL-CCNC: 47 U/L (ref 13–60)
LYMPHOCYTES NFR BLD: 1.84 K/UL (ref 1.5–4)
LYMPHOCYTES RELATIVE PERCENT: 24 % (ref 20–42)
Lab: NORMAL
MCH RBC QN AUTO: 25.3 PG (ref 26–35)
MCHC RBC AUTO-ENTMCNC: 31.6 G/DL (ref 32–34.5)
MCV RBC AUTO: 80.2 FL (ref 80–99.9)
MONOCYTES NFR BLD: 0.65 K/UL (ref 0.1–0.95)
MONOCYTES NFR BLD: 9 % (ref 2–12)
NEGATIVE QC PASS/FAIL: NORMAL
NEUTROPHILS NFR BLD: 64 % (ref 43–80)
NEUTS SEG NFR BLD: 4.85 K/UL (ref 1.8–7.3)
NITRITE UR QL STRIP: NEGATIVE
PH UR STRIP: 6 [PH] (ref 5–9)
PLATELET # BLD AUTO: 227 K/UL (ref 130–450)
PMV BLD AUTO: 9.4 FL (ref 7–12)
POSITIVE QC PASS/FAIL: NORMAL
POTASSIUM SERPL-SCNC: 4 MMOL/L (ref 3.5–5)
PROT SERPL-MCNC: 7.4 G/DL (ref 6.4–8.3)
PROT UR STRIP-MCNC: NEGATIVE MG/DL
RBC # BLD AUTO: 5.01 M/UL (ref 3.5–5.5)
RBC #/AREA URNS HPF: ABNORMAL /HPF
SODIUM SERPL-SCNC: 138 MMOL/L (ref 132–146)
SP GR UR STRIP: >1.03 (ref 1–1.03)
TROPONIN I SERPL HS-MCNC: <6 NG/L (ref 0–9)
UROBILINOGEN UR STRIP-ACNC: 0.2 EU/DL (ref 0–1)
WBC #/AREA URNS HPF: ABNORMAL /HPF
WBC OTHER # BLD: 7.6 K/UL (ref 4.5–11.5)

## 2024-09-28 PROCEDURE — 81001 URINALYSIS AUTO W/SCOPE: CPT

## 2024-09-28 PROCEDURE — 85025 COMPLETE CBC W/AUTO DIFF WBC: CPT

## 2024-09-28 PROCEDURE — 6360000004 HC RX CONTRAST MEDICATION: Performed by: RADIOLOGY

## 2024-09-28 PROCEDURE — 85018 HEMOGLOBIN: CPT

## 2024-09-28 PROCEDURE — 6360000002 HC RX W HCPCS: Performed by: STUDENT IN AN ORGANIZED HEALTH CARE EDUCATION/TRAINING PROGRAM

## 2024-09-28 PROCEDURE — 84484 ASSAY OF TROPONIN QUANT: CPT

## 2024-09-28 PROCEDURE — 2500000003 HC RX 250 WO HCPCS: Performed by: PHYSICIAN ASSISTANT

## 2024-09-28 PROCEDURE — 74177 CT ABD & PELVIS W/CONTRAST: CPT

## 2024-09-28 PROCEDURE — 2580000003 HC RX 258: Performed by: STUDENT IN AN ORGANIZED HEALTH CARE EDUCATION/TRAINING PROGRAM

## 2024-09-28 PROCEDURE — 2580000003 HC RX 258: Performed by: PHYSICIAN ASSISTANT

## 2024-09-28 PROCEDURE — 83690 ASSAY OF LIPASE: CPT

## 2024-09-28 PROCEDURE — 93005 ELECTROCARDIOGRAM TRACING: CPT | Performed by: PHYSICIAN ASSISTANT

## 2024-09-28 PROCEDURE — 96375 TX/PRO/DX INJ NEW DRUG ADDON: CPT

## 2024-09-28 PROCEDURE — 96372 THER/PROPH/DIAG INJ SC/IM: CPT

## 2024-09-28 PROCEDURE — 83605 ASSAY OF LACTIC ACID: CPT

## 2024-09-28 PROCEDURE — 85014 HEMATOCRIT: CPT

## 2024-09-28 PROCEDURE — 80053 COMPREHEN METABOLIC PANEL: CPT

## 2024-09-28 RX ORDER — IOPAMIDOL 755 MG/ML
75 INJECTION, SOLUTION INTRAVASCULAR
Status: COMPLETED | OUTPATIENT
Start: 2024-09-28 | End: 2024-09-28

## 2024-09-28 RX ORDER — DROPERIDOL 2.5 MG/ML
1.25 INJECTION, SOLUTION INTRAMUSCULAR; INTRAVENOUS ONCE
Status: DISCONTINUED | OUTPATIENT
Start: 2024-09-28 | End: 2024-09-28 | Stop reason: HOSPADM

## 2024-09-28 RX ORDER — ONDANSETRON 4 MG/1
4 TABLET, ORALLY DISINTEGRATING ORAL 3 TIMES DAILY PRN
Qty: 21 TABLET | Refills: 0 | Status: SHIPPED | OUTPATIENT
Start: 2024-09-28

## 2024-09-28 RX ORDER — DICYCLOMINE HYDROCHLORIDE 10 MG/1
10 CAPSULE ORAL
Qty: 120 CAPSULE | Refills: 0 | Status: SHIPPED | OUTPATIENT
Start: 2024-09-28

## 2024-09-28 RX ORDER — MORPHINE SULFATE 2 MG/ML
2 INJECTION, SOLUTION INTRAMUSCULAR; INTRAVENOUS ONCE
Status: DISCONTINUED | OUTPATIENT
Start: 2024-09-28 | End: 2024-09-28

## 2024-09-28 RX ORDER — 0.9 % SODIUM CHLORIDE 0.9 %
1000 INTRAVENOUS SOLUTION INTRAVENOUS ONCE
Status: COMPLETED | OUTPATIENT
Start: 2024-09-28 | End: 2024-09-28

## 2024-09-28 RX ORDER — DICYCLOMINE HYDROCHLORIDE 10 MG/ML
20 INJECTION INTRAMUSCULAR ONCE
Status: COMPLETED | OUTPATIENT
Start: 2024-09-28 | End: 2024-09-28

## 2024-09-28 RX ADMIN — FAMOTIDINE 20 MG: 10 INJECTION, SOLUTION INTRAVENOUS at 04:10

## 2024-09-28 RX ADMIN — IOPAMIDOL 75 ML: 755 INJECTION, SOLUTION INTRAVENOUS at 06:59

## 2024-09-28 RX ADMIN — DICYCLOMINE HYDROCHLORIDE 20 MG: 10 INJECTION, SOLUTION INTRAMUSCULAR at 03:49

## 2024-09-28 RX ADMIN — SODIUM CHLORIDE 1000 ML: 9 INJECTION, SOLUTION INTRAVENOUS at 03:50

## 2024-09-28 RX ADMIN — PANTOPRAZOLE SODIUM 80 MG: 40 INJECTION, POWDER, FOR SOLUTION INTRAVENOUS at 06:11

## 2024-09-28 ASSESSMENT — ENCOUNTER SYMPTOMS
NAUSEA: 0
SORE THROAT: 0
PHOTOPHOBIA: 0
ABDOMINAL PAIN: 1
COUGH: 0
SHORTNESS OF BREATH: 0
BACK PAIN: 0
DIARRHEA: 1

## 2024-09-28 ASSESSMENT — PAIN DESCRIPTION - DESCRIPTORS
DESCRIPTORS: SHARP
DESCRIPTORS: SHARP

## 2024-09-28 ASSESSMENT — PAIN DESCRIPTION - FREQUENCY: FREQUENCY: CONTINUOUS

## 2024-09-28 ASSESSMENT — PAIN DESCRIPTION - ORIENTATION
ORIENTATION: RIGHT;LEFT;LOWER;MID;UPPER
ORIENTATION: RIGHT;LEFT;LOWER;UPPER;MID

## 2024-09-28 ASSESSMENT — PAIN DESCRIPTION - PAIN TYPE: TYPE: ACUTE PAIN;CHRONIC PAIN

## 2024-09-28 ASSESSMENT — PAIN DESCRIPTION - LOCATION
LOCATION: ABDOMEN
LOCATION: ABDOMEN

## 2024-09-28 ASSESSMENT — PAIN SCALES - GENERAL
PAINLEVEL_OUTOF10: 10
PAINLEVEL_OUTOF10: 10

## 2024-09-28 NOTE — ED NOTES
Patient expressed dissatisfaction with the medications the provider has prescribed for pain, stating she needs \"stronger meds;\" provider notified.

## 2024-09-28 NOTE — ED PROVIDER NOTES
[facility] EMERGENCY DEPARTMENT  EMERGENCY DEPARTMENT ENCOUNTER        Pt Name: Rachele Lomas  MRN: [id number]  Birthdate 1980  Date of evaluation: 9/27/2024  Provider: Denise Gary MD  PCP: Stefan Contreras MD  Note Started: 3:00 AM EDT 9/28/24    HPI  44 y.o. female presenting for abdominal pain, diarrhea.  Patient complains of diffuse abdominal pain x 2 days.  It is worsened with eating she complains of diarrhea and endorses black tarry stools.  She denies fever, chest pain, shortness of breath.  She denies alcohol use to me.  She denies being on blood thinners.  She denies aspirin use.  She states she has not had scopes done previously.      --------------------------------------------- PAST HISTORY ---------------------------------------------  Past Medical History:  has a past medical history of Acute chest pain, Asthma, Bronchitis, Movement disorder, Seizure disorder (HCC), Seizure disorder (HCC), Suicidal overdose (HCC), Tobacco abuse, and Tobacco abuse.    Past Surgical History:  has a past surgical history that includes fracture surgery; Cholecystectomy; Rotator cuff repair; Upper gastrointestinal endoscopy (N/A, 5/3/2021); Upper gastrointestinal endoscopy (N/A, 10/29/2022); and Colonoscopy (N/A, 10/31/2022).    Social History:  reports that she has been smoking cigarettes. She has a 15 pack-year smoking history. She has never used smokeless tobacco. She reports current alcohol use. She reports that she does not currently use drugs after having used the following drugs: Cocaine.    Family History: family history includes Cancer in her mother.     The patient’s home medications have been reviewed.    Allergies: Ketorolac, Toradol [ketorolac tromethamine], Tomato, Darvocet [propoxyphene n-acetaminophen], Ibuprofen, Meloxicam, and Naproxen      Review of Systems   Constitutional:  Negative for chills and fever.   HENT:  Negative for congestion and sore throat.   TRACE (A) None   Hemoglobin and Hematocrit   Result Value Ref Range    Hemoglobin 11.9 11.5 - 15.5 g/dL    Hematocrit 37.4 34.0 - 48.0 %   POC Pregnancy Urine Qual   Result Value Ref Range    HCG, Urine, POC Negative Negative    Lot Number [id number]     Positive QC Pass/Fail Pass     Negative QC Pass/Fail Pass    EKG 12 Lead   Result Value Ref Range    Ventricular Rate 83 BPM    Atrial Rate 83 BPM    P-R Interval 116 ms    QRS Duration 72 ms    Q-T Interval 392 ms    QTc Calculation (Bazett) 460 ms    P Axis 71 degrees    R Axis 73 degrees    T Axis 6 degrees       RADIOLOGY:  CT ABDOMEN PELVIS W IV CONTRAST Additional Contrast? None   Final Result   1. There is questionable very mild wall thickening of the colon more   prominent within the transverse colon.  Please note the colon is not   distended with gas and there is no oral contrast on board for the   examination.  I do not appreciate any pericolonic inflammatory changes.   Subtle findings of early colitis cannot be excluded.  Please correlate with   any clinical findings or history of inflammatory or infectious colitis.  If   clinically warranted follow-up CT scan of the abdomen can be obtained with   oral contrast.   2. There is no intra-abdominal free air or abscess   3. There are no findings of appendicitis.  The gallbladder is surgically   absent.   4. No pelvic inflammatory process is noted.               ------------------------- NURSING NOTES AND VITALS REVIEWED ---------------------------  Date / Time Roomed:  9/28/2024  2:44 AM  ED Bed Assignment:  11/11    The nursing notes within the ED encounter and vital signs as below have been reviewed.     Patient Vitals for the past 24 hrs:   BP Temp Temp src Pulse Resp SpO2   09/28/24 0615 (!) 140/88 97.6 °F (36.4 °C) Oral 90 18 99 %       Oxygen Saturation Interpretation: Normal      Medical Decision Making  44-year-old female with history of polysubstance abuse presenting for abdominal pain, diarrhea.  Patient

## 2024-09-28 NOTE — ED TRIAGE NOTES
Department of Emergency Medicine  PROVIDER IN TRIAGE NOTE                        9/27/24  10:58 PM EDT    Date of Encounter: 9/27/24   MRN: [id number]      HPI: Rachele Lomas is a 44 y.o. female who presents to the ED for Abdominal Pain ([place]   but her stomach hurts)       Abdominal pain and epigastric pain.  Unable to eat anything.  Anytime she does it makes the pain worse.  Positive nausea, vomiting and diarrhea.  Just discharged September 22 from [facility].  Admitted for chest pain at that time.  Last alcohol use 1 week ago.      Provider-Patient relationship only established for Provider In Triage (PIT).  Supervisor request for APC to initiate contact and input an assessment note in triage during high volume surges.  Full assessment, HPI and examination not performed.  Secondary to high volume, low staffing, and/or boarding- patient to await bed availability.  This ends my PIT-Patient relationship.   Electronically signed by Sulema Doss PA-C   DD: 9/27/24

## 2024-09-28 NOTE — ED PROVIDER NOTES
Department of Emergency Medicine    ED  Provider Note - Sign out / Oncoming Provider   Admit Date/RoomTime: 9/28/2024  2:44 AM  7:43 AM EDT      I received this patient at sign out from Dr. Gary  I have discussed the patient's initial exam, treatment and plan of care with the out going physician.  I have introduced my self to the patient / family and have answered their questions to this point.  I have examined the patient myself and reviewed ordered tests / medications and  reviewed any available results to this point.   If a resident is involved in the Emergency Department care, I have discussed my findings and plan with them as well.    MDM:       Case was signed out pending CT imaging, revealing possible, early colitis, patient given Bentyl and Zofran for home with instructions to follow up with PCP     --------------------------------- IMPRESSION AND DISPOSITION ---------------------------------    IMPRESSION  1. Abdominal pain, unspecified abdominal location    2. Colitis        DISPOSITION  Disposition: Discharge to home  Patient condition is stable         Arun Funez DO  09/28/24 0807

## 2024-09-30 LAB
EKG ATRIAL RATE: 83 BPM
EKG P AXIS: 71 DEGREES
EKG P-R INTERVAL: 116 MS
EKG Q-T INTERVAL: 392 MS
EKG QRS DURATION: 72 MS
EKG QTC CALCULATION (BAZETT): 460 MS
EKG R AXIS: 73 DEGREES
EKG T AXIS: 6 DEGREES
EKG VENTRICULAR RATE: 83 BPM

## 2024-09-30 PROCEDURE — 93010 ELECTROCARDIOGRAM REPORT: CPT | Performed by: INTERNAL MEDICINE

## 2024-11-07 ENCOUNTER — APPOINTMENT (OUTPATIENT)
Dept: GENERAL RADIOLOGY | Age: 44
End: 2024-11-07
Payer: MEDICAID

## 2024-11-07 ENCOUNTER — HOSPITAL ENCOUNTER (EMERGENCY)
Age: 44
Discharge: HOME | End: 2024-11-07
Attending: EMERGENCY MEDICINE
Payer: MEDICAID

## 2024-11-07 VITALS
BODY MASS INDEX: 32.25 KG/M2 | RESPIRATION RATE: 14 BRPM | OXYGEN SATURATION: 100 % | DIASTOLIC BLOOD PRESSURE: 100 MMHG | HEART RATE: 90 BPM | TEMPERATURE: 97.9 F | WEIGHT: 160 LBS | HEIGHT: 59 IN | SYSTOLIC BLOOD PRESSURE: 194 MMHG

## 2024-11-07 DIAGNOSIS — M19.90 OSTEOARTHRITIS, UNSPECIFIED OSTEOARTHRITIS TYPE, UNSPECIFIED SITE: Primary | ICD-10-CM

## 2024-11-07 PROCEDURE — 73521 X-RAY EXAM HIPS BI 2 VIEWS: CPT

## 2024-11-07 PROCEDURE — 99283 EMERGENCY DEPT VISIT LOW MDM: CPT

## 2024-11-07 RX ORDER — HYDROCODONE BITARTRATE AND ACETAMINOPHEN 5; 325 MG/1; MG/1
1-2 TABLET ORAL EVERY 6 HOURS PRN
Qty: 10 TABLET | Refills: 0 | Status: SHIPPED | OUTPATIENT
Start: 2024-11-07 | End: 2024-11-10

## 2024-11-07 ASSESSMENT — PAIN DESCRIPTION - ORIENTATION: ORIENTATION: RIGHT;LEFT

## 2024-11-07 ASSESSMENT — PAIN SCALES - GENERAL: PAINLEVEL_OUTOF10: 10

## 2024-11-07 ASSESSMENT — PAIN - FUNCTIONAL ASSESSMENT: PAIN_FUNCTIONAL_ASSESSMENT: 0-10

## 2024-11-07 ASSESSMENT — PAIN DESCRIPTION - FREQUENCY: FREQUENCY: CONTINUOUS

## 2024-11-07 ASSESSMENT — PAIN DESCRIPTION - PAIN TYPE: TYPE: ACUTE PAIN

## 2024-11-07 ASSESSMENT — PAIN DESCRIPTION - LOCATION: LOCATION: HIP;LEG

## 2024-11-07 NOTE — ED PROVIDER NOTES
HPI:  11/7/24,   Time: 8:49 AM RICKY Lomas is a 44 y.o. female presenting to the ED for evaluation of bilateral hip pain.  Patient states she has had chronic bilateral hip pain for well over a year.  She states she has been seeing orthopedics to schedule bilateral hip replacements.  She normally takes Percocet at home but is out of it today and she was directed to come to the emergency department to get x-rays make sure there was no change by her orthopedic physician's office.  She denies numbness or weakness.  She is ambulatory with discomfort.  No other symptoms    ROS:   Pertinent positives and negatives are stated within HPI, all other systems reviewed and are negative.  --------------------------------------------- PAST HISTORY ---------------------------------------------  Past Medical History:  has a past medical history of Acute chest pain, Asthma, Bronchitis, Movement disorder, Seizure disorder (HCC), Seizure disorder (HCC), Suicidal overdose (HCC), Tobacco abuse, and Tobacco abuse.    Past Surgical History:  has a past surgical history that includes fracture surgery; Cholecystectomy; Rotator cuff repair; Upper gastrointestinal endoscopy (N/A, 5/3/2021); Upper gastrointestinal endoscopy (N/A, 10/29/2022); and Colonoscopy (N/A, 10/31/2022).    Social History:  reports that she has been smoking cigarettes. She has a 15 pack-year smoking history. She has never used smokeless tobacco. She reports current alcohol use. She reports that she does not currently use drugs after having used the following drugs: Cocaine.    Family History: family history includes Cancer in her mother.     The patient’s home medications have been reviewed.    Allergies: Ketorolac, Toradol [ketorolac tromethamine], Tomato, Darvocet [propoxyphene n-acetaminophen], Ibuprofen, Meloxicam, and Naproxen    -------------------------------------------------- RESULTS -------------------------------------------------  All

## 2024-11-18 ENCOUNTER — HOSPITAL ENCOUNTER (EMERGENCY)
Age: 44
Discharge: HOME | End: 2024-11-18
Attending: EMERGENCY MEDICINE
Payer: MEDICAID

## 2024-11-18 ENCOUNTER — APPOINTMENT (OUTPATIENT)
Dept: ULTRASOUND IMAGING | Age: 44
End: 2024-11-18
Payer: MEDICAID

## 2024-11-18 VITALS
WEIGHT: 160 LBS | DIASTOLIC BLOOD PRESSURE: 70 MMHG | HEIGHT: 59 IN | RESPIRATION RATE: 16 BRPM | OXYGEN SATURATION: 100 % | HEART RATE: 80 BPM | TEMPERATURE: 97.9 F | SYSTOLIC BLOOD PRESSURE: 95 MMHG | BODY MASS INDEX: 32.25 KG/M2

## 2024-11-18 DIAGNOSIS — M79.604 CHRONIC PAIN OF BOTH LOWER EXTREMITIES: ICD-10-CM

## 2024-11-18 DIAGNOSIS — N61.1 BREAST ABSCESS: Primary | ICD-10-CM

## 2024-11-18 DIAGNOSIS — G89.29 CHRONIC PAIN OF BOTH LOWER EXTREMITIES: ICD-10-CM

## 2024-11-18 DIAGNOSIS — M79.605 CHRONIC PAIN OF BOTH LOWER EXTREMITIES: ICD-10-CM

## 2024-11-18 LAB
ANION GAP SERPL CALCULATED.3IONS-SCNC: 13 MMOL/L (ref 7–16)
BASOPHILS # BLD: 0.03 K/UL (ref 0–0.2)
BASOPHILS NFR BLD: 0 % (ref 0–2)
BUN SERPL-MCNC: 17 MG/DL (ref 6–20)
CALCIUM SERPL-MCNC: 9.8 MG/DL (ref 8.6–10.2)
CHLORIDE SERPL-SCNC: 98 MMOL/L (ref 98–107)
CO2 SERPL-SCNC: 25 MMOL/L (ref 22–29)
CREAT SERPL-MCNC: 0.7 MG/DL (ref 0.5–1)
EOSINOPHIL # BLD: 0.14 K/UL (ref 0.05–0.5)
EOSINOPHILS RELATIVE PERCENT: 2 % (ref 0–6)
ERYTHROCYTE [DISTWIDTH] IN BLOOD BY AUTOMATED COUNT: 16.3 % (ref 11.5–15)
GFR, ESTIMATED: >90 ML/MIN/1.73M2
GLUCOSE SERPL-MCNC: 97 MG/DL (ref 74–99)
HCT VFR BLD AUTO: 42.3 % (ref 34–48)
HGB BLD-MCNC: 13.5 G/DL (ref 11.5–15.5)
IMM GRANULOCYTES # BLD AUTO: 0.05 K/UL (ref 0–0.58)
IMM GRANULOCYTES NFR BLD: 1 % (ref 0–5)
LYMPHOCYTES NFR BLD: 1.68 K/UL (ref 1.5–4)
LYMPHOCYTES RELATIVE PERCENT: 22 % (ref 20–42)
MCH RBC QN AUTO: 25.6 PG (ref 26–35)
MCHC RBC AUTO-ENTMCNC: 31.9 G/DL (ref 32–34.5)
MCV RBC AUTO: 80.3 FL (ref 80–99.9)
MONOCYTES NFR BLD: 0.67 K/UL (ref 0.1–0.95)
MONOCYTES NFR BLD: 9 % (ref 2–12)
NEUTROPHILS NFR BLD: 67 % (ref 43–80)
NEUTS SEG NFR BLD: 5.15 K/UL (ref 1.8–7.3)
PLATELET # BLD AUTO: 301 K/UL (ref 130–450)
PMV BLD AUTO: 9.1 FL (ref 7–12)
POTASSIUM SERPL-SCNC: 3.8 MMOL/L (ref 3.5–5)
RBC # BLD AUTO: 5.27 M/UL (ref 3.5–5.5)
SODIUM SERPL-SCNC: 136 MMOL/L (ref 132–146)
WBC OTHER # BLD: 7.7 K/UL (ref 4.5–11.5)

## 2024-11-18 PROCEDURE — 6370000000 HC RX 637 (ALT 250 FOR IP)

## 2024-11-18 PROCEDURE — 80048 BASIC METABOLIC PNL TOTAL CA: CPT

## 2024-11-18 PROCEDURE — 99284 EMERGENCY DEPT VISIT MOD MDM: CPT

## 2024-11-18 PROCEDURE — 85025 COMPLETE CBC W/AUTO DIFF WBC: CPT

## 2024-11-18 PROCEDURE — 10060 I&D ABSCESS SIMPLE/SINGLE: CPT

## 2024-11-18 PROCEDURE — 76642 ULTRASOUND BREAST LIMITED: CPT

## 2024-11-18 PROCEDURE — 6370000000 HC RX 637 (ALT 250 FOR IP): Performed by: EMERGENCY MEDICINE

## 2024-11-18 PROCEDURE — 2500000003 HC RX 250 WO HCPCS: Performed by: EMERGENCY MEDICINE

## 2024-11-18 RX ORDER — HYDROCODONE BITARTRATE AND ACETAMINOPHEN 5; 325 MG/1; MG/1
1 TABLET ORAL ONCE
Status: COMPLETED | OUTPATIENT
Start: 2024-11-18 | End: 2024-11-18

## 2024-11-18 RX ORDER — CEPHALEXIN 500 MG/1
500 CAPSULE ORAL EVERY 6 HOURS
Qty: 28 CAPSULE | Refills: 0 | Status: SHIPPED | OUTPATIENT
Start: 2024-11-18 | End: 2024-11-25

## 2024-11-18 RX ORDER — LIDOCAINE HYDROCHLORIDE AND EPINEPHRINE 10; 10 MG/ML; UG/ML
20 INJECTION, SOLUTION INFILTRATION; PERINEURAL ONCE
Status: COMPLETED | OUTPATIENT
Start: 2024-11-18 | End: 2024-11-18

## 2024-11-18 RX ORDER — CEPHALEXIN 500 MG/1
500 CAPSULE ORAL ONCE
Status: COMPLETED | OUTPATIENT
Start: 2024-11-18 | End: 2024-11-18

## 2024-11-18 RX ORDER — DOXYCYCLINE 100 MG/1
100 CAPSULE ORAL ONCE
Status: COMPLETED | OUTPATIENT
Start: 2024-11-18 | End: 2024-11-18

## 2024-11-18 RX ORDER — OXYCODONE HYDROCHLORIDE 5 MG/1
5 TABLET ORAL ONCE
Status: COMPLETED | OUTPATIENT
Start: 2024-11-18 | End: 2024-11-18

## 2024-11-18 RX ORDER — DOXYCYCLINE 100 MG/1
100 CAPSULE ORAL 2 TIMES DAILY
Qty: 14 CAPSULE | Refills: 0 | Status: SHIPPED | OUTPATIENT
Start: 2024-11-18 | End: 2024-11-25

## 2024-11-18 RX ADMIN — OXYCODONE HYDROCHLORIDE 5 MG: 5 TABLET ORAL at 21:18

## 2024-11-18 RX ADMIN — HYDROCODONE BITARTRATE AND ACETAMINOPHEN 1 TABLET: 5; 325 TABLET ORAL at 17:08

## 2024-11-18 RX ADMIN — DOXYCYCLINE HYCLATE 100 MG: 100 CAPSULE ORAL at 21:03

## 2024-11-18 RX ADMIN — CEPHALEXIN 500 MG: 500 CAPSULE ORAL at 21:03

## 2024-11-18 RX ADMIN — LIDOCAINE HYDROCHLORIDE AND EPINEPHRINE 20 ML: 10; 10 INJECTION, SOLUTION INFILTRATION; PERINEURAL at 21:03

## 2024-11-18 ASSESSMENT — PAIN DESCRIPTION - ORIENTATION: ORIENTATION: RIGHT;LEFT

## 2024-11-18 ASSESSMENT — PAIN SCALES - GENERAL: PAINLEVEL_OUTOF10: 10

## 2024-11-18 ASSESSMENT — PAIN DESCRIPTION - DESCRIPTORS: DESCRIPTORS: THROBBING;ACHING

## 2024-11-18 ASSESSMENT — PAIN - FUNCTIONAL ASSESSMENT: PAIN_FUNCTIONAL_ASSESSMENT: PREVENTS OR INTERFERES SOME ACTIVE ACTIVITIES AND ADLS

## 2024-11-18 ASSESSMENT — PAIN DESCRIPTION - PAIN TYPE: TYPE: ACUTE PAIN

## 2024-11-18 ASSESSMENT — PAIN DESCRIPTION - LOCATION: LOCATION: LEG

## 2024-11-18 NOTE — ED PROVIDER NOTES
HPI:  11/18/24, Time: 5:06 PM RICKY Lomas is a 44 y.o. female presenting to the ED for pain and mass to right breast beginning a few days ago. Patient states that approximately 3-4 months ago she had na infection in her right breast which was drained in the ED. She states she recently noticed recurrent pain with a lump. No nipple discharge. No fevers.    Patient also reports chronic leg pain which is unchanged from baseline. No recent trauma. She states the pain is from her hips, and she is in the process of having hip surgery scheduled at [facility].     --------------------------------------------- PAST HISTORY ---------------------------------------------  Past Medical History:  has a past medical history of Acute chest pain, Asthma, Bronchitis, Movement disorder, Seizure disorder (HCC), Seizure disorder (HCC), Suicidal overdose (HCC), Tobacco abuse, and Tobacco abuse.    Past Surgical History:  has a past surgical history that includes fracture surgery; Cholecystectomy; Rotator cuff repair; Upper gastrointestinal endoscopy (N/A, 5/3/2021); Upper gastrointestinal endoscopy (N/A, 10/29/2022); and Colonoscopy (N/A, 10/31/2022).    Social History:  reports that she has been smoking cigarettes. She has a 15 pack-year smoking history. She has never used smokeless tobacco. She reports current alcohol use. She reports that she does not currently use drugs after having used the following drugs: Cocaine.    Family History: family history includes Cancer in her mother.     The patient’s home medications have been reviewed.    Allergies: Ketorolac, Toradol [ketorolac tromethamine], Tomato, Darvocet [propoxyphene n-acetaminophen], Ibuprofen, Meloxicam, and Naproxen    -------------------------------------------------- RESULTS -------------------------------------------------  All laboratory and radiology results have been personally reviewed by myself   LABS:  Results for orders placed or performed during

## 2024-12-03 ENCOUNTER — TELEPHONE (OUTPATIENT)
Dept: BREAST CENTER | Age: 44
End: 2024-12-03

## 2024-12-03 NOTE — TELEPHONE ENCOUNTER
RN made first attempt to schedule patient for consult with [facility] breast clinic. Pt was referred to office for right breast lump, dx with abscess by Dr Betsy Dooley.  RN was unable to leave voicemail with office contact information for patient to call back and schedule referral appt as it was full. Office will attempt to call patient again.     Electronically signed by Rebeca Palomares RN on 12/3/24 at 10:07 AM EST

## 2024-12-04 ENCOUNTER — TELEPHONE (OUTPATIENT)
Dept: BREAST CENTER | Age: 44
End: 2024-12-04

## 2024-12-04 NOTE — TELEPHONE ENCOUNTER
Attempted to call patient and was speaking with her regarding her referral and then got disconnected. Attempted to call back but kept getting voice mail with full mailbox. will need to try at another time     Electronically signed by Scarlet Rodriguez RN on 12/4/24 at 10:53 AM EST

## 2024-12-09 ENCOUNTER — TELEPHONE (OUTPATIENT)
Dept: BREAST CENTER | Age: 44
End: 2024-12-09

## 2024-12-09 NOTE — TELEPHONE ENCOUNTER
Call placed to patient. After 2 attempts, patient answered. Discussed referral information with patient. She is being referred for breast abscess right breast. She states she had surgery on that breast 4-5 months ago but it has come back. She states \"it broke open and is draining white\". She is not on antibiotics at this time. Her last breast imaging was 11/18/24.  Appointment date given as 12/12/24 for 10 am. Details of appointment, including address to facility given to patient.    NEW PATIENT: right breast abscess, still draining white, slightly painful. Not on antibiotics. Had surgery 4-5 months ago on this breast.Last imaging 11/18/24: [facility]  Ref: Soumya Brown.    Electronically signed by Scarlet Rodriguez RN on 12/9/24 at 11:10 AM EST

## 2024-12-12 ENCOUNTER — TELEPHONE (OUTPATIENT)
Dept: BREAST CENTER | Age: 44
End: 2024-12-12

## 2024-12-12 NOTE — TELEPHONE ENCOUNTER
MYNOR Palomares called patient regarding her missed appointment with Dr. Reyes on 12/12/24. Patient did not answer and her voicemail is full so unable to leave message.       Electronically signed by Rebeca Palomares RN on 12/12/24 at 10:44 AM EST

## 2024-12-13 ENCOUNTER — HOSPITAL ENCOUNTER (EMERGENCY)
Age: 44
Discharge: HOME | End: 2024-12-13
Payer: MEDICAID

## 2024-12-13 VITALS
HEART RATE: 89 BPM | SYSTOLIC BLOOD PRESSURE: 176 MMHG | WEIGHT: 160 LBS | BODY MASS INDEX: 32.25 KG/M2 | TEMPERATURE: 98 F | DIASTOLIC BLOOD PRESSURE: 99 MMHG | RESPIRATION RATE: 17 BRPM | HEIGHT: 59 IN | OXYGEN SATURATION: 100 %

## 2024-12-13 DIAGNOSIS — M16.0 OSTEOARTHRITIS OF BOTH HIPS, UNSPECIFIED OSTEOARTHRITIS TYPE: ICD-10-CM

## 2024-12-13 DIAGNOSIS — M25.552 BILATERAL HIP PAIN: Primary | ICD-10-CM

## 2024-12-13 DIAGNOSIS — M25.551 BILATERAL HIP PAIN: Primary | ICD-10-CM

## 2024-12-13 PROCEDURE — 6370000000 HC RX 637 (ALT 250 FOR IP): Performed by: PHYSICIAN ASSISTANT

## 2024-12-13 PROCEDURE — 99283 EMERGENCY DEPT VISIT LOW MDM: CPT

## 2024-12-13 RX ORDER — OXYCODONE AND ACETAMINOPHEN 5; 325 MG/1; MG/1
1 TABLET ORAL EVERY 6 HOURS PRN
Qty: 10 TABLET | Refills: 0 | Status: SHIPPED | OUTPATIENT
Start: 2024-12-13 | End: 2024-12-16

## 2024-12-13 RX ORDER — OXYCODONE AND ACETAMINOPHEN 5; 325 MG/1; MG/1
1 TABLET ORAL ONCE
Status: COMPLETED | OUTPATIENT
Start: 2024-12-13 | End: 2024-12-13

## 2024-12-13 RX ADMIN — OXYCODONE HYDROCHLORIDE AND ACETAMINOPHEN 1 TABLET: 5; 325 TABLET ORAL at 15:33

## 2024-12-13 ASSESSMENT — PAIN DESCRIPTION - ORIENTATION: ORIENTATION: RIGHT;LEFT

## 2024-12-13 ASSESSMENT — PAIN DESCRIPTION - DESCRIPTORS: DESCRIPTORS: ACHING;STABBING;SHOOTING

## 2024-12-13 ASSESSMENT — PAIN DESCRIPTION - LOCATION: LOCATION: HIP

## 2024-12-13 ASSESSMENT — PAIN - FUNCTIONAL ASSESSMENT: PAIN_FUNCTIONAL_ASSESSMENT: PREVENTS OR INTERFERES WITH MANY ACTIVE NOT PASSIVE ACTIVITIES

## 2024-12-13 ASSESSMENT — PAIN SCALES - GENERAL: PAINLEVEL_OUTOF10: 10

## 2024-12-13 NOTE — ED PROVIDER NOTES
Independent DANISHA Visit.             [facility] EMERGENCY DEPARTMENT  ED  Encounter Note  Admit Date/RoomTime: 2024  3:34 PM  ED Room: WAITING RESULTS/WAITING *  NAME: Rachele Lomas  : 1980  MRN: [id number]  PCP: Stefan Contreras MD    CHIEF COMPLAINT     Hip Pain (Bilateral but more on left- denies injury x4 days, chronic pain)    HISTORY OF PRESENT ILLNESS        Rachele Lomas is a 44 y.o. female who presents to the ED by private vehicle for bilateral hip pain, left worse than right, beginning chronic but worse in past few days. The complaint has been persistent and are moderate in severity.  The patient states she has severe degenerative arthritis in her hips and needs hip replacement surgeries.  She plans to follow-up with Dr. Vidal.  States that he wants to do hip replacement surgeries but he is out with COVID at this time.  Patient states she took her last Percocet last evening.  Denies any injury, trauma or fall.  States that this is all part of her regular and chronic pain but she is out of meds and cannot get into see Ortho      REVIEW OF SYSTEMS     Pertinent positives and negatives are stated within HPI, all other systems reviewed and are negative.    Past Medical History:  has a past medical history of Acute chest pain, Asthma, Bronchitis, Movement disorder, Seizure disorder (HCC), Seizure disorder (HCC), Suicidal overdose (HCC), Tobacco abuse, and Tobacco abuse.  Surgical History:  has a past surgical history that includes fracture surgery; Cholecystectomy; Rotator cuff repair; Upper gastrointestinal endoscopy (N/A, 5/3/2021); Upper gastrointestinal endoscopy (N/A, 10/29/2022); and Colonoscopy (N/A, 10/31/2022).  Social History:  reports that she has been smoking cigarettes. She has a 15 pack-year smoking history. She has never used smokeless tobacco. She reports current alcohol use. She reports that she does not currently use drugs after having used the

## 2024-12-13 NOTE — ED TRIAGE NOTES
FIRST PROVIDER CONTACT ASSESSMENT NOTE       Department of Emergency Medicine                 First Provider Note            24  3:28 PM EST    Date of Encounter: No admission date for patient encounter.    Patient Name: Rachele Lomas  : 1980  MRN: [id number]    Chief Complaint: Hip Pain (Bilateral but more on left- denies injury x4 days)      History of Present Illness:   Rachele Lomas is a 44 y.o. female who presents to the ED for bilateral hip pain.   Worse on left.   States they want to do surgery.    Has Percocet for pain.   Denies recent fall     Focused Physical Exam:  VS:    ED Triage Vitals [24 1527]   BP Systolic BP Percentile Diastolic BP Percentile Temp Temp Source Pulse Respirations SpO2   (!) 176/99 -- -- 98 °F (36.7 °C) Temporal 89 17 100 %      Height Weight - Scale         1.499 m (4' 11\") 72.6 kg (160 lb)              Physical Ex: Constitutional: Alert and non-toxic.    Medical History:  has a past medical history of Acute chest pain, Asthma, Bronchitis, Movement disorder, Seizure disorder (HCC), Seizure disorder (HCC), Suicidal overdose (HCC), Tobacco abuse, and Tobacco abuse.  Surgical History:  has a past surgical history that includes fracture surgery; Cholecystectomy; Rotator cuff repair; Upper gastrointestinal endoscopy (N/A, 5/3/2021); Upper gastrointestinal endoscopy (N/A, 10/29/2022); and Colonoscopy (N/A, 10/31/2022).  Social History:  reports that she has been smoking cigarettes. She has a 15 pack-year smoking history. She has never used smokeless tobacco. She reports current alcohol use. She reports that she does not currently use drugs after having used the following drugs: Cocaine.  Family History: family history includes Cancer in her mother.    Allergies: Ketorolac, Toradol [ketorolac tromethamine], Tomato, Darvocet [propoxyphene n-acetaminophen], Ibuprofen, Meloxicam, and Naproxen     Initial Plan of Care: Initiate Treatment-Testing, Proceed toTreatment

## 2024-12-16 ENCOUNTER — TELEPHONE (OUTPATIENT)
Dept: BREAST CENTER | Age: 44
End: 2024-12-16

## 2024-12-16 NOTE — TELEPHONE ENCOUNTER
Patient did not show for her consultation on 12/12/24. We attempted to call her to reschedule but have not heard back. Will update referring provider, Dr. Soumya Brown.
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
## 2025-01-02 ENCOUNTER — HOSPITAL ENCOUNTER (EMERGENCY)
Age: 45
Discharge: HOME | End: 2025-01-02
Attending: EMERGENCY MEDICINE
Payer: MEDICAID

## 2025-01-02 ENCOUNTER — APPOINTMENT (OUTPATIENT)
Dept: GENERAL RADIOLOGY | Age: 45
End: 2025-01-02
Payer: MEDICAID

## 2025-01-02 ENCOUNTER — APPOINTMENT (OUTPATIENT)
Dept: CT IMAGING | Age: 45
End: 2025-01-02
Payer: MEDICAID

## 2025-01-02 VITALS
OXYGEN SATURATION: 100 % | SYSTOLIC BLOOD PRESSURE: 199 MMHG | RESPIRATION RATE: 19 BRPM | HEIGHT: 59 IN | BODY MASS INDEX: 32.25 KG/M2 | WEIGHT: 160 LBS | DIASTOLIC BLOOD PRESSURE: 97 MMHG | HEART RATE: 98 BPM | TEMPERATURE: 98.6 F

## 2025-01-02 DIAGNOSIS — S60.222A CONTUSION OF LEFT HAND, INITIAL ENCOUNTER: Primary | ICD-10-CM

## 2025-01-02 DIAGNOSIS — R51.9 NONINTRACTABLE HEADACHE, UNSPECIFIED CHRONICITY PATTERN, UNSPECIFIED HEADACHE TYPE: ICD-10-CM
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
## 2025-01-02 PROCEDURE — 73130 X-RAY EXAM OF HAND: CPT

## 2025-01-02 PROCEDURE — 70450 CT HEAD/BRAIN W/O DYE: CPT

## 2025-01-02 PROCEDURE — 6370000000 HC RX 637 (ALT 250 FOR IP): Performed by: EMERGENCY MEDICINE

## 2025-01-02 PROCEDURE — 99284 EMERGENCY DEPT VISIT MOD MDM: CPT

## 2025-01-02 PROCEDURE — 73090 X-RAY EXAM OF FOREARM: CPT

## 2025-01-02 RX ORDER — HYDROCODONE BITARTRATE AND ACETAMINOPHEN 5; 325 MG/1; MG/1
1 TABLET ORAL ONCE
Status: COMPLETED | OUTPATIENT
Start: 2025-01-02 | End: 2025-01-02

## 2025-01-02 RX ORDER — METHOCARBAMOL 750 MG/1
750 TABLET, FILM COATED ORAL 4 TIMES DAILY
Qty: 20 TABLET | Refills: 0 | Status: SHIPPED | OUTPATIENT
Start: 2025-01-02 | End: 2025-01-07

## 2025-01-02 RX ADMIN — HYDROCODONE BITARTRATE AND ACETAMINOPHEN 1 TABLET: 5; 325 TABLET ORAL at 15:39

## 2025-01-02 ASSESSMENT — PAIN DESCRIPTION - PAIN TYPE: TYPE: ACUTE PAIN

## 2025-01-02 ASSESSMENT — PAIN DESCRIPTION - LOCATION
LOCATION: HEAD;HAND
LOCATION: HAND

## 2025-01-02 ASSESSMENT — ENCOUNTER SYMPTOMS
VOMITING: 0
SINUS PRESSURE: 0
EYE REDNESS: 0
EYE DISCHARGE: 0
SHORTNESS OF BREATH: 0
WHEEZING: 0
ABDOMINAL PAIN: 0
ABDOMINAL DISTENTION: 0
SORE THROAT: 0
BACK PAIN: 0
DIARRHEA: 0
EYE PAIN: 0
NAUSEA: 0
COUGH: 0

## 2025-01-02 ASSESSMENT — PAIN - FUNCTIONAL ASSESSMENT
PAIN_FUNCTIONAL_ASSESSMENT: 0-10
PAIN_FUNCTIONAL_ASSESSMENT: PREVENTS OR INTERFERES SOME ACTIVE ACTIVITIES AND ADLS

## 2025-01-02 ASSESSMENT — PAIN DESCRIPTION - DESCRIPTORS
DESCRIPTORS: ACHING
DESCRIPTORS: SHARP

## 2025-01-02 ASSESSMENT — PAIN DESCRIPTION - ORIENTATION
ORIENTATION: LEFT
ORIENTATION: LEFT

## 2025-01-02 ASSESSMENT — PAIN SCALES - GENERAL
PAINLEVEL_OUTOF10: 10
PAINLEVEL_OUTOF10: 10

## 2025-01-02 ASSESSMENT — PAIN DESCRIPTION - FREQUENCY: FREQUENCY: CONTINUOUS
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
## 2025-01-02 NOTE — DISCHARGE INSTR - COC
Continuity of Care Form    Patient Name: Rachele Lomas   :  1980  MRN:  [id number]    Admit date:  2025  Discharge date:  ***    Code Status Order: Prior   Advance Directives:   Advance Care Flowsheet Documentation             Admitting Physician:  No admitting provider for patient encounter.  PCP: Stefan Contreras MD    Discharging Nurse: ***  Discharging Hospital Unit/Room#: [facility]04/[facility]-04  Discharging Unit Phone Number: ***    Emergency Contact:   Extended Emergency Contact Information  Primary Emergency Contact: Casie Stiles   [facility]  Home Phone: [phone]  Mobile Phone: [phone]  Relation: Brother/Sister   needed? No  Secondary Emergency Contact: Jaelyn Stiles  Home Phone: [phone]  Mobile Phone: [phone]  Relation: Niece/Nephew   needed? No    Past Surgical History:  Past Surgical History:   Procedure Laterality Date    CHOLECYSTECTOMY      COLONOSCOPY N/A 10/31/2022    COLONOSCOPY WITH BIOPSY performed by Jenifer Burris MD at [facility] ENDOSCOPY    FRACTURE SURGERY      R ANKLE TORN LIGAMENTS    ROTATOR CUFF REPAIR      UPPER GASTROINTESTINAL ENDOSCOPY N/A 5/3/2021    EGD BIOPSY performed by Eh HOLLOWAY MD at [facility] ENDOSCOPY    UPPER GASTROINTESTINAL ENDOSCOPY N/A 10/29/2022    EGD ESOPHAGOGASTRODUODENOSCOPY performed by Santos Triana MD at [facility] OR       Immunization History:   Immunization History   Administered Date(s) Administered    Influenza, FLUARIX, FLULAVAL, FLUZONE (age 6 mo+) and AFLURIA, (age 3 y+), Quadv PF, 0.5mL 2024    TD 2LF, TDVAX, (age 7y+), IM, 0.5mL 2022    TDaP, ADACEL (age 10y-64y), BOOSTRIX (age 10y+), IM, 0.5mL 2015, 2018, 2019, 2019       Active Problems:  Patient Active Problem List   Diagnosis Code    Tobacco abuse Z72.0    Seizure disorder (HCC) G40.909    Asthma J45.909    Carbamazepine overdose with self-harm intent T42.1X4A    Depression F32.A    Suicidal overdose
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
## 2025-01-20 ENCOUNTER — HOSPITAL ENCOUNTER (EMERGENCY)
Age: 45
Discharge: HOME | End: 2025-01-20
Payer: MEDICAID

## 2025-01-20 VITALS
RESPIRATION RATE: 18 BRPM | WEIGHT: 160 LBS | SYSTOLIC BLOOD PRESSURE: 175 MMHG | OXYGEN SATURATION: 98 % | DIASTOLIC BLOOD PRESSURE: 90 MMHG | BODY MASS INDEX: 32.32 KG/M2 | HEART RATE: 86 BPM | TEMPERATURE: 97.3 F

## 2025-01-20 DIAGNOSIS — M25.551 CHRONIC PAIN OF BOTH HIPS: Primary | ICD-10-CM

## 2025-01-20 DIAGNOSIS — M25.552 CHRONIC PAIN OF BOTH HIPS: Primary | ICD-10-CM

## 2025-01-20 DIAGNOSIS — G89.29 CHRONIC PAIN OF BOTH HIPS: Primary | ICD-10-CM

## 2025-01-20 DIAGNOSIS — Z76.5 DRUG-SEEKING BEHAVIOR: ICD-10-CM

## 2025-01-20 PROCEDURE — 6370000000 HC RX 637 (ALT 250 FOR IP): Performed by: NURSE PRACTITIONER

## 2025-01-20 PROCEDURE — 99283 EMERGENCY DEPT VISIT LOW MDM: CPT

## 2025-01-20 RX ORDER — HYDROCODONE BITARTRATE AND ACETAMINOPHEN 5; 325 MG/1; MG/1
1 TABLET ORAL ONCE
Status: COMPLETED | OUTPATIENT
Start: 2025-01-20 | End: 2025-01-20

## 2025-01-20 RX ADMIN — HYDROCODONE BITARTRATE AND ACETAMINOPHEN 1 TABLET: 5; 325 TABLET ORAL at 10:36

## 2025-01-20 ASSESSMENT — PAIN SCALES - GENERAL
PAINLEVEL_OUTOF10: 10
PAINLEVEL_OUTOF10: 10

## 2025-01-20 ASSESSMENT — PAIN DESCRIPTION - PAIN TYPE: TYPE: ACUTE PAIN

## 2025-01-20 ASSESSMENT — LIFESTYLE VARIABLES: HOW MANY STANDARD DRINKS CONTAINING ALCOHOL DO YOU HAVE ON A TYPICAL DAY: PATIENT DOES NOT DRINK

## 2025-01-20 ASSESSMENT — PAIN DESCRIPTION - DESCRIPTORS
DESCRIPTORS: ACHING;THROBBING;POUNDING;PRESSURE
DESCRIPTORS: ACHING

## 2025-01-20 ASSESSMENT — PAIN DESCRIPTION - ONSET: ONSET: ON-GOING

## 2025-01-20 ASSESSMENT — PAIN DESCRIPTION - ORIENTATION
ORIENTATION: RIGHT;LEFT
ORIENTATION: RIGHT;LEFT

## 2025-01-20 ASSESSMENT — PAIN DESCRIPTION - FREQUENCY: FREQUENCY: CONTINUOUS

## 2025-01-20 ASSESSMENT — PAIN DESCRIPTION - LOCATION
LOCATION: HIP
LOCATION: HIP

## 2025-01-20 ASSESSMENT — PAIN - FUNCTIONAL ASSESSMENT: PAIN_FUNCTIONAL_ASSESSMENT: 0-10

## 2025-01-20 NOTE — DISCHARGE INSTRUCTIONS
FOLLOW UP WITH YOUR PRIMARY DOCTOR, USE THE INFORMATION ATTACHED TO GET PAIN MANAGEMENT AND FOLLOW UP WITH ORTHO.

## 2025-01-20 NOTE — ED PROVIDER NOTES
Independent DANISHA Visit.      [facility]  Department of Emergency Medicine   ED  Encounter Note  Admit Date/RoomTime: 2025  9:55 AM  ED Room: PR4/PR4    NAME: Rachele Lomas  : 1980  MRN: [id number]     Chief Complaint:  Hip Pain (\"Ran out of pain pills\" needs hip replacement. Severe arthritic changes both hips)    History of Present Illness      Rachele Lomas is a 44 y.o. old female presents to the emergency department via by private vehicle requesting pain medications.  She said she has chronic hip pain and her orthopedic surgeon told her today to come here for pain management.  She said this pain has been ongoing for some time.  No new injuries.    She said she is not in pain management.  She had a hand written paper from [facility] stating she has chronic pain and needs pain medications.  She said she was at their office today.  She has no other complaints.     ROS   Pertinent positives and negatives are stated within HPI, all other systems reviewed and are negative.    Past Medical History:  has a past medical history of Acute chest pain, Asthma, Bronchitis, Movement disorder, Seizure disorder (HCC), Seizure disorder (HCC), Suicidal overdose (HCC), Tobacco abuse, and Tobacco abuse.    Surgical History:  has a past surgical history that includes fracture surgery; Cholecystectomy; Rotator cuff repair; Upper gastrointestinal endoscopy (N/A, 5/3/2021); Upper gastrointestinal endoscopy (N/A, 10/29/2022); and Colonoscopy (N/A, 10/31/2022).    Social History:  reports that she has been smoking cigarettes. She has a 15 pack-year smoking history. She has never used smokeless tobacco. She reports current alcohol use. She reports that she does not currently use drugs after having used the following drugs: Cocaine.    Family History: family history includes Cancer in her mother.     Allergies: Ketorolac, Toradol [ketorolac tromethamine], Tomato, Darvocet [propoxyphene

## 2025-02-26 ENCOUNTER — HOSPITAL ENCOUNTER (EMERGENCY)
Age: 45
Discharge: HOME | End: 2025-02-26
Payer: MEDICAID

## 2025-02-26 VITALS
WEIGHT: 160 LBS | TEMPERATURE: 98 F | DIASTOLIC BLOOD PRESSURE: 81 MMHG | HEIGHT: 59 IN | BODY MASS INDEX: 32.25 KG/M2 | RESPIRATION RATE: 18 BRPM | SYSTOLIC BLOOD PRESSURE: 150 MMHG | HEART RATE: 80 BPM | OXYGEN SATURATION: 99 %

## 2025-02-26 DIAGNOSIS — M25.552 CHRONIC PAIN OF BOTH HIPS: ICD-10-CM

## 2025-02-26 DIAGNOSIS — Z76.5 DRUG-SEEKING BEHAVIOR: ICD-10-CM

## 2025-02-26 DIAGNOSIS — G89.29 CHRONIC PAIN OF BOTH HIPS: ICD-10-CM

## 2025-02-26 DIAGNOSIS — Z76.0 ENCOUNTER FOR MEDICATION REFILL: Primary | ICD-10-CM

## 2025-02-26 DIAGNOSIS — Z53.21 ELOPED FROM EMERGENCY DEPARTMENT: ICD-10-CM

## 2025-02-26 DIAGNOSIS — M25.551 CHRONIC PAIN OF BOTH HIPS: ICD-10-CM

## 2025-02-26 PROCEDURE — 6370000000 HC RX 637 (ALT 250 FOR IP): Performed by: NURSE PRACTITIONER

## 2025-02-26 PROCEDURE — 99283 EMERGENCY DEPT VISIT LOW MDM: CPT

## 2025-02-26 RX ORDER — HYDROCODONE BITARTRATE AND ACETAMINOPHEN 5; 325 MG/1; MG/1
1 TABLET ORAL ONCE
Status: COMPLETED | OUTPATIENT
Start: 2025-02-26 | End: 2025-02-26

## 2025-02-26 RX ADMIN — HYDROCODONE BITARTRATE AND ACETAMINOPHEN 1 TABLET: 5; 325 TABLET ORAL at 17:03

## 2025-02-26 NOTE — ED PROVIDER NOTES
Independent DANISHA Visit.          [facility]  Department of Emergency Medicine   ED  Encounter Note  Admit Date/RoomTime: 2025  4:47 PM  ED Room: DISPO/D01    NAME: Rachele Lomas  : 1980  MRN: [id number]     Chief Complaint:  Medication Refill (Leg pain, ran out of pain meds. )    History of Present Illness      Rachele Lomas is a 44 y.o. old female presents to the emergency department via by private vehicle requesting narcotic pain medication(s) as result of chronic hip pain and states she needs a hip replacement and has seen orthopedic Dr. Maldonado in the past who has prescribed narcotics.  She denies any new injury.  She has no other complaints at this time              She is not enrolled in a pain management program. She has associated symptoms of chronic bilateral hip pain and denies any fever, chills, nausea, vomiting, rash, leg swelling, back pain, abdominal pain or any other symptoms.    ROS   Pertinent positives and negatives are stated within HPI, all other systems reviewed and are negative.    Past Medical History:  has a past medical history of Acute chest pain, Asthma, Bronchitis, Movement disorder, Seizure disorder (HCC), Seizure disorder (HCC), Suicidal overdose (HCC), Tobacco abuse, and Tobacco abuse.    Surgical History:  has a past surgical history that includes fracture surgery; Cholecystectomy; Rotator cuff repair; Upper gastrointestinal endoscopy (N/A, 5/3/2021); Upper gastrointestinal endoscopy (N/A, 10/29/2022); and Colonoscopy (N/A, 10/31/2022).    Social History:  reports that she has been smoking cigarettes. She has a 15 pack-year smoking history. She has never used smokeless tobacco. She reports current alcohol use. She reports that she does not currently use drugs after having used the following drugs: Cocaine.    Family History: family history includes Cancer in her mother.     Allergies: Ketorolac, Toradol [ketorolac tromethamine], Tomato, Darvocet

## 2025-03-04 ENCOUNTER — HOSPITAL ENCOUNTER (EMERGENCY)
Age: 45
Discharge: HOME | End: 2025-03-04
Attending: EMERGENCY MEDICINE
Payer: MEDICAID

## 2025-03-04 ENCOUNTER — APPOINTMENT (OUTPATIENT)
Dept: CT IMAGING | Age: 45
End: 2025-03-04
Payer: MEDICAID

## 2025-03-04 VITALS
RESPIRATION RATE: 16 BRPM | TEMPERATURE: 98.7 F | DIASTOLIC BLOOD PRESSURE: 87 MMHG | OXYGEN SATURATION: 97 % | HEART RATE: 95 BPM | SYSTOLIC BLOOD PRESSURE: 160 MMHG
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
## 2025-03-04 DIAGNOSIS — F10.920 ACUTE ALCOHOLIC INTOXICATION WITHOUT COMPLICATION: Primary | ICD-10-CM

## 2025-03-04 LAB
AMPHET UR QL SCN: NEGATIVE
ANION GAP SERPL CALCULATED.3IONS-SCNC: 27 MMOL/L (ref 7–16)
APAP SERPL-MCNC: <5 UG/ML (ref 10–30)
BACTERIA URNS QL MICRO: ABNORMAL
BARBITURATES UR QL SCN: NEGATIVE
BASOPHILS # BLD: 0.03 K/UL (ref 0–0.2)
BASOPHILS NFR BLD: 0 % (ref 0–2)
BENZODIAZ UR QL: NEGATIVE
BILIRUB UR QL STRIP: NEGATIVE
BUN SERPL-MCNC: 18 MG/DL (ref 6–20)
BUPRENORPHINE UR QL: NEGATIVE
CALCIUM SERPL-MCNC: 8.6 MG/DL (ref 8.6–10.2)
CANNABINOIDS UR QL SCN: POSITIVE
CHLORIDE SERPL-SCNC: 103 MMOL/L (ref 98–107)
CK SERPL-CCNC: 123 U/L (ref 20–180)
CLARITY UR: CLEAR
CO2 SERPL-SCNC: 10 MMOL/L (ref 22–29)
COCAINE UR QL SCN: POSITIVE
COLOR UR: YELLOW
CREAT SERPL-MCNC: 1 MG/DL (ref 0.5–1)
EOSINOPHIL # BLD: 0.02 K/UL (ref 0.05–0.5)
EOSINOPHILS RELATIVE PERCENT: 0 % (ref 0–6)
EPI CELLS #/AREA URNS HPF: ABNORMAL /HPF
ERYTHROCYTE [DISTWIDTH] IN BLOOD BY AUTOMATED COUNT: 16.8 % (ref 11.5–15)
ETHANOLAMINE SERPL-MCNC: 143 MG/DL (ref 0–0.08)
FENTANYL UR QL: NEGATIVE
FLUAV RNA RESP QL NAA+PROBE: NOT DETECTED
FLUBV RNA RESP QL NAA+PROBE: NOT DETECTED
GFR, ESTIMATED: 76 ML/MIN/1.73M2
GLUCOSE SERPL-MCNC: 65 MG/DL (ref 74–99)
GLUCOSE UR STRIP-MCNC: NEGATIVE MG/DL
HCG UR QL: NEGATIVE
HCT VFR BLD AUTO: 41 % (ref 34–48)
HGB BLD-MCNC: 13.1 G/DL (ref 11.5–15.5)
HGB UR QL STRIP.AUTO: ABNORMAL
IMM GRANULOCYTES # BLD AUTO: 0.09 K/UL (ref 0–0.58)
IMM GRANULOCYTES NFR BLD: 1 % (ref 0–5)
KETONES UR STRIP-MCNC: NEGATIVE MG/DL
LEUKOCYTE ESTERASE UR QL STRIP: NEGATIVE
LYMPHOCYTES NFR BLD: 1.7 K/UL (ref 1.5–4)
LYMPHOCYTES RELATIVE PERCENT: 21 % (ref 20–42)
MCH RBC QN AUTO: 26.3 PG (ref 26–35)
MCHC RBC AUTO-ENTMCNC: 32 G/DL (ref 32–34.5)
MCV RBC AUTO: 82.2 FL (ref 80–99.9)
METHADONE UR QL: NEGATIVE
MONOCYTES NFR BLD: 0.6 K/UL (ref 0.1–0.95)
MONOCYTES NFR BLD: 8 % (ref 2–12)
NEUTROPHILS NFR BLD: 70 % (ref 43–80)
NEUTS SEG NFR BLD: 5.57 K/UL (ref 1.8–7.3)
NITRITE UR QL STRIP: NEGATIVE
OPIATES UR QL SCN: NEGATIVE
OXYCODONE UR QL SCN: NEGATIVE
PCP UR QL SCN: NEGATIVE
PH UR STRIP: 5.5 [PH] (ref 5–8)
PLATELET # BLD AUTO: 243 K/UL (ref 130–450)
PMV BLD AUTO: 9.2 FL (ref 7–12)
POTASSIUM SERPL-SCNC: 3.8 MMOL/L (ref 3.5–5)
PROT UR STRIP-MCNC: 100 MG/DL
RBC # BLD AUTO: 4.99 M/UL (ref 3.5–5.5)
RBC #/AREA URNS HPF: ABNORMAL /HPF
SALICYLATES SERPL-MCNC: <0.3 MG/DL (ref 0–30)
SARS-COV-2 RNA RESP QL NAA+PROBE: NOT DETECTED
SODIUM SERPL-SCNC: 140 MMOL/L (ref 132–146)
SOURCE: NORMAL
SP GR UR STRIP: >1.03 (ref 1–1.03)
SPECIMEN DESCRIPTION: NORMAL
TEST INFORMATION: ABNORMAL
TOXIC TRICYCLIC SC,BLOOD: NEGATIVE
UROBILINOGEN UR STRIP-ACNC: 0.2 EU/DL (ref 0–1)
WBC #/AREA URNS HPF: ABNORMAL /HPF
WBC OTHER # BLD: 8 K/UL (ref 4.5–11.5)

## 2025-03-04 PROCEDURE — 70450 CT HEAD/BRAIN W/O DYE: CPT

## 2025-03-04 PROCEDURE — G0480 DRUG TEST DEF 1-7 CLASSES: HCPCS

## 2025-03-04 PROCEDURE — 85025 COMPLETE CBC W/AUTO DIFF WBC: CPT

## 2025-03-04 PROCEDURE — 80307 DRUG TEST PRSMV CHEM ANLYZR: CPT

## 2025-03-04 PROCEDURE — 87636 SARSCOV2 & INF A&B AMP PRB: CPT

## 2025-03-04 PROCEDURE — 99285 EMERGENCY DEPT VISIT HI MDM: CPT

## 2025-03-04 PROCEDURE — 80143 DRUG ASSAY ACETAMINOPHEN: CPT

## 2025-03-04 PROCEDURE — 71260 CT THORAX DX C+: CPT

## 2025-03-04 PROCEDURE — 70498 CT ANGIOGRAPHY NECK: CPT

## 2025-03-04 PROCEDURE — 74177 CT ABD & PELVIS W/CONTRAST: CPT

## 2025-03-04 PROCEDURE — 80048 BASIC METABOLIC PNL TOTAL CA: CPT

## 2025-03-04 PROCEDURE — 6360000004 HC RX CONTRAST MEDICATION: Performed by: RADIOLOGY

## 2025-03-04 PROCEDURE — 6370000000 HC RX 637 (ALT 250 FOR IP)

## 2025-03-04 PROCEDURE — 80179 DRUG ASSAY SALICYLATE: CPT

## 2025-03-04 PROCEDURE — 2500000003 HC RX 250 WO HCPCS: Performed by: RADIOLOGY

## 2025-03-04 PROCEDURE — 82550 ASSAY OF CK (CPK): CPT

## 2025-03-04 PROCEDURE — 84703 CHORIONIC GONADOTROPIN ASSAY: CPT

## 2025-03-04 PROCEDURE — 81001 URINALYSIS AUTO W/SCOPE: CPT

## 2025-03-04 PROCEDURE — 72125 CT NECK SPINE W/O DYE: CPT

## 2025-03-04 RX ORDER — IOPAMIDOL 755 MG/ML
75 INJECTION, SOLUTION INTRAVASCULAR
Status: COMPLETED | OUTPATIENT
Start: 2025-03-04 | End: 2025-03-04

## 2025-03-04 RX ORDER — SODIUM CHLORIDE 0.9 % (FLUSH) 0.9 %
10 SYRINGE (ML) INJECTION PRN
Status: DISCONTINUED | OUTPATIENT
Start: 2025-03-04 | End: 2025-03-04 | Stop reason: HOSPADM

## 2025-03-04 RX ORDER — ACETAMINOPHEN 500 MG
1000 TABLET ORAL ONCE
Status: COMPLETED | OUTPATIENT
Start: 2025-03-04 | End: 2025-03-04

## 2025-03-04 RX ADMIN — Medication 10 ML: at 12:29

## 2025-03-04 RX ADMIN — ACETAMINOPHEN 1000 MG: 500 TABLET ORAL at 13:49

## 2025-03-04 RX ADMIN — IOPAMIDOL 75 ML: 755 INJECTION, SOLUTION INTRAVENOUS at 12:36

## 2025-03-04 ASSESSMENT — PAIN SCALES - GENERAL: PAINLEVEL_OUTOF10: 3

## 2025-03-04 ASSESSMENT — LIFESTYLE VARIABLES: HOW OFTEN DO YOU HAVE A DRINK CONTAINING ALCOHOL: 2-4 TIMES A MONTH

## 2025-03-04 ASSESSMENT — PAIN DESCRIPTION - DESCRIPTORS: DESCRIPTORS: ACHING;CRAMPING;DISCOMFORT

## 2025-03-04 ASSESSMENT — PAIN DESCRIPTION - LOCATION: LOCATION: HIP
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
## 2025-03-21 ENCOUNTER — HOSPITAL ENCOUNTER (EMERGENCY)
Age: 45
Discharge: LEFT BEFORE DISCHARGE | End: 2025-03-21
Attending: STUDENT IN AN ORGANIZED HEALTH CARE EDUCATION/TRAINING PROGRAM
Payer: MEDICAID

## 2025-03-21 ENCOUNTER — APPOINTMENT (OUTPATIENT)
Dept: ULTRASOUND IMAGING | Age: 45
End: 2025-03-21
Payer: MEDICAID

## 2025-03-21 VITALS
WEIGHT: 160 LBS | BODY MASS INDEX: 32.25 KG/M2 | OXYGEN SATURATION: 100 % | HEART RATE: 88 BPM | DIASTOLIC BLOOD PRESSURE: 88 MMHG | SYSTOLIC BLOOD PRESSURE: 202 MMHG | HEIGHT: 59 IN | RESPIRATION RATE: 14 BRPM | TEMPERATURE: 97.7 F

## 2025-03-21 DIAGNOSIS — M25.551 CHRONIC PAIN OF BOTH HIPS: Primary | ICD-10-CM

## 2025-03-21 DIAGNOSIS — I10 ESSENTIAL HYPERTENSION: ICD-10-CM

## 2025-03-21 DIAGNOSIS — N64.4 BREAST PAIN: ICD-10-CM

## 2025-03-21 DIAGNOSIS — G89.29 CHRONIC PAIN OF BOTH HIPS: Primary | ICD-10-CM

## 2025-03-21 DIAGNOSIS — M25.552 CHRONIC PAIN OF BOTH HIPS: Primary | ICD-10-CM

## 2025-03-21 LAB
ALBUMIN SERPL-MCNC: 4.3 G/DL (ref 3.5–5.2)
ALP SERPL-CCNC: 103 U/L (ref 35–104)
ALT SERPL-CCNC: 20 U/L (ref 0–32)
ANION GAP SERPL CALCULATED.3IONS-SCNC: 13 MMOL/L (ref 7–16)
AST SERPL-CCNC: 20 U/L (ref 0–31)
BASOPHILS # BLD: 0.03 K/UL (ref 0–0.2)
BASOPHILS NFR BLD: 0 % (ref 0–2)
BILIRUB SERPL-MCNC: <0.2 MG/DL (ref 0–1.2)
BUN SERPL-MCNC: 19 MG/DL (ref 6–20)
CALCIUM SERPL-MCNC: 9 MG/DL (ref 8.6–10.2)
CHLORIDE SERPL-SCNC: 103 MMOL/L (ref 98–107)
CO2 SERPL-SCNC: 21 MMOL/L (ref 22–29)
CREAT SERPL-MCNC: 0.7 MG/DL (ref 0.5–1)
EOSINOPHIL # BLD: 0.16 K/UL (ref 0.05–0.5)
EOSINOPHILS RELATIVE PERCENT: 2 % (ref 0–6)
ERYTHROCYTE [DISTWIDTH] IN BLOOD BY AUTOMATED COUNT: 16.5 % (ref 11.5–15)
GFR, ESTIMATED: >90 ML/MIN/1.73M2
GLUCOSE SERPL-MCNC: 82 MG/DL (ref 74–99)
HCT VFR BLD AUTO: 42 % (ref 34–48)
HGB BLD-MCNC: 13.2 G/DL (ref 11.5–15.5)
IMM GRANULOCYTES # BLD AUTO: 0.03 K/UL (ref 0–0.58)
IMM GRANULOCYTES NFR BLD: 0 % (ref 0–5)
LYMPHOCYTES NFR BLD: 2.17 K/UL (ref 1.5–4)
LYMPHOCYTES RELATIVE PERCENT: 30 % (ref 20–42)
MCH RBC QN AUTO: 26.2 PG (ref 26–35)
MCHC RBC AUTO-ENTMCNC: 31.4 G/DL (ref 32–34.5)
MCV RBC AUTO: 83.5 FL (ref 80–99.9)
MONOCYTES NFR BLD: 0.75 K/UL (ref 0.1–0.95)
MONOCYTES NFR BLD: 10 % (ref 2–12)
NEUTROPHILS NFR BLD: 57 % (ref 43–80)
NEUTS SEG NFR BLD: 4.21 K/UL (ref 1.8–7.3)
PLATELET # BLD AUTO: 239 K/UL (ref 130–450)
PMV BLD AUTO: 9.9 FL (ref 7–12)
POTASSIUM SERPL-SCNC: 4.2 MMOL/L (ref 3.5–5)
PROT SERPL-MCNC: 7.4 G/DL (ref 6.4–8.3)
RBC # BLD AUTO: 5.03 M/UL (ref 3.5–5.5)
SODIUM SERPL-SCNC: 137 MMOL/L (ref 132–146)
WBC OTHER # BLD: 7.4 K/UL (ref 4.5–11.5)

## 2025-03-21 PROCEDURE — 85025 COMPLETE CBC W/AUTO DIFF WBC: CPT

## 2025-03-21 PROCEDURE — 76642 ULTRASOUND BREAST LIMITED: CPT

## 2025-03-21 PROCEDURE — 99284 EMERGENCY DEPT VISIT MOD MDM: CPT

## 2025-03-21 PROCEDURE — 6370000000 HC RX 637 (ALT 250 FOR IP): Performed by: STUDENT IN AN ORGANIZED HEALTH CARE EDUCATION/TRAINING PROGRAM

## 2025-03-21 PROCEDURE — 80053 COMPREHEN METABOLIC PANEL: CPT

## 2025-03-21 RX ORDER — OXYCODONE AND ACETAMINOPHEN 5; 325 MG/1; MG/1
1 TABLET ORAL ONCE
Refills: 0 | Status: COMPLETED | OUTPATIENT
Start: 2025-03-21 | End: 2025-03-21

## 2025-03-21 RX ADMIN — OXYCODONE HYDROCHLORIDE AND ACETAMINOPHEN 1 TABLET: 5; 325 TABLET ORAL at 08:17

## 2025-03-21 ASSESSMENT — ENCOUNTER SYMPTOMS
DIARRHEA: 0
SORE THROAT: 0
NAUSEA: 0
SHORTNESS OF BREATH: 0
EYE REDNESS: 0
SINUS PRESSURE: 0
ABDOMINAL DISTENTION: 0
EYE PAIN: 0
VOMITING: 0
WHEEZING: 0
COUGH: 0
EYE DISCHARGE: 0
ROS SKIN COMMENTS: BREAST PAIN
BACK PAIN: 0

## 2025-03-21 ASSESSMENT — LIFESTYLE VARIABLES
HOW MANY STANDARD DRINKS CONTAINING ALCOHOL DO YOU HAVE ON A TYPICAL DAY: 1 OR 2
HOW OFTEN DO YOU HAVE A DRINK CONTAINING ALCOHOL: 2-4 TIMES A MONTH

## 2025-03-21 ASSESSMENT — PAIN DESCRIPTION - LOCATION
LOCATION: BREAST;HIP
LOCATION: BREAST;HIP

## 2025-03-21 ASSESSMENT — PAIN DESCRIPTION - DESCRIPTORS: DESCRIPTORS: THROBBING

## 2025-03-21 ASSESSMENT — PAIN SCALES - GENERAL
PAINLEVEL_OUTOF10: 10
PAINLEVEL_OUTOF10: 10

## 2025-03-21 ASSESSMENT — PAIN - FUNCTIONAL ASSESSMENT: PAIN_FUNCTIONAL_ASSESSMENT: 0-10

## 2025-03-21 ASSESSMENT — PAIN DESCRIPTION - ORIENTATION
ORIENTATION: RIGHT;LEFT
ORIENTATION: RIGHT;LEFT

## 2025-03-21 NOTE — DISCHARGE INSTRUCTIONS
Follow-up with your general surgeon and family physician.  If symptoms worsen or concern arises please return for reevaluation.  Despite leaving AGAINST MEDICAL ADVICE, we are happy to see

## 2025-03-21 NOTE — ED PROVIDER NOTES
Department of Emergency Medicine   ED  Provider Note  Admit Date/RoomTime: 3/21/2025  7:26 AM  ED Room: 31/31              [facility]     Rcahele Lomas is a 44 y.o. female with a PMHx significant for  polysubstance abuse, PTSD, breast abscess, frequent visits for hip pain  who presents for evaluation of hip pain and discomfort as well as right breast pain and discomfort, beginning prior to arrival. The complaint has been persistent, moderate in severity, and worsened by nothing.   The patient states that she has longstanding history of hip issues and problems.  She is due to have hip replacements next month.  Notes that she also has history of a breast abscess which she had to go to the OR for about 6 to 8 months ago.  Notes that yesterday while in the shower she started noticing redness and swelling and discomfort around the right nipple.  Denies any history of breast CA in the past.  Denies any fevers, chills, chest pain, shortness of breath, nausea, vomiting, diarrhea.     Review of Systems   Constitutional:  Negative for chills and fever.   HENT:  Negative for ear pain, sinus pressure and sore throat.    Eyes:  Negative for pain, discharge and redness.   Respiratory:  Negative for cough, shortness of breath and wheezing.    Cardiovascular:  Negative for chest pain.   Gastrointestinal:  Negative for abdominal distention, diarrhea, nausea and vomiting.   Genitourinary:  Negative for dysuria and frequency.   Musculoskeletal:  Negative for arthralgias and back pain.        Hip pain   Skin:  Negative for rash and wound.        Breast pain   Neurological:  Negative for weakness and headaches.   Hematological:  Negative for adenopathy.   All other systems reviewed and are negative.       Physical Exam  Vitals and nursing note reviewed.   Constitutional:       General: She is not in acute distress.     Appearance: Normal appearance. She is well-developed. She is not ill-appearing.   HENT:      Head: Normocephalic and  Breast pain    3. Essential hypertension        Disposition:  Patient's disposition: Darci Killian,   03/22/25 0854

## 2025-04-07 ENCOUNTER — TELEPHONE (OUTPATIENT)
Dept: GENERAL RADIOLOGY | Age: 45
End: 2025-04-07

## 2025-04-07 NOTE — TELEPHONE ENCOUNTER
Per Dr. Felix's request, I called patient to cancel her biopsy appointment for tomorrow (lesion too close to nipple).  I left Rachele a detailed message regarding cancellation of breast biopsy appointment and surgical consultation appointment with Dr. Nicole Reyes on 4/17/25 at 9:30am at [facility].  Requested Rachele to call me back to confirm receipt of message and that she can make the surgical consultation appointment. Electronically signed by Glenys Jaimes RN, BSN on 4/7/2025 at 4:00 PM

## 2025-04-08 ENCOUNTER — TELEPHONE (OUTPATIENT)
Dept: GENERAL RADIOLOGY | Age: 45
End: 2025-04-08

## 2025-04-08 NOTE — TELEPHONE ENCOUNTER
Left second message for patient informing her not to come for breast biopsy appointment today, but to report to breast care center on 4/17/25 at 9:30am for appointment with Dr. Nicole Reyes.   Requested Rachele to call me to confirm receipt of this message. Electronically signed by Glenys Jaimes RN, BSN on 4/8/2025 at 8:47 AM

## 2025-04-14 ENCOUNTER — HOSPITAL ENCOUNTER (EMERGENCY)
Age: 45
Discharge: HOME | End: 2025-04-14
Attending: EMERGENCY MEDICINE
Payer: MEDICAID

## 2025-04-14 VITALS
SYSTOLIC BLOOD PRESSURE: 178 MMHG | WEIGHT: 170 LBS | HEIGHT: 59 IN | BODY MASS INDEX: 34.27 KG/M2 | RESPIRATION RATE: 18 BRPM | TEMPERATURE: 97.7 F | HEART RATE: 85 BPM | OXYGEN SATURATION: 100 % | DIASTOLIC BLOOD PRESSURE: 90 MMHG

## 2025-04-14 DIAGNOSIS — N61.1 ABSCESS OF RIGHT BREAST: Primary | ICD-10-CM

## 2025-04-14 PROCEDURE — 99283 EMERGENCY DEPT VISIT LOW MDM: CPT

## 2025-04-14 RX ORDER — HYDROCODONE BITARTRATE AND ACETAMINOPHEN 5; 325 MG/1; MG/1
1 TABLET ORAL EVERY 4 HOURS PRN
Qty: 18 TABLET | Refills: 0 | Status: SHIPPED | OUTPATIENT
Start: 2025-04-14 | End: 2025-04-17

## 2025-04-14 RX ORDER — DOXYCYCLINE 100 MG/1
100 CAPSULE ORAL 2 TIMES DAILY
Qty: 28 CAPSULE | Refills: 0 | Status: SHIPPED | OUTPATIENT
Start: 2025-04-14 | End: 2025-04-28

## 2025-04-14 ASSESSMENT — PAIN SCALES - GENERAL: PAINLEVEL_OUTOF10: 10

## 2025-04-14 ASSESSMENT — PAIN - FUNCTIONAL ASSESSMENT: PAIN_FUNCTIONAL_ASSESSMENT: 0-10

## 2025-04-14 NOTE — ED PROVIDER NOTES
HPI:  4/14/25,   Time: 4:34 PM EDT         Rachele Lomas is a 44 y.o. female presenting to the ED for evaluation of recurrent drainage from her right nipple.  Patient was seen in the hospital in November and again this past month for evaluation of the same.  Initially November she was found to have a subareolar abscess that was treated.  She returned here to the emergency department approximate 1 and half weeks ago with a complaint of some drainage from her right nipple but also has chronic pain in her right hip.  She has had no fevers or chills.  She states the pain in her hip is chronic ongoing ongoing and not changed.  Today she presents because she has recurrent drainage from her right breast.  Denies fevers or chills.  Drainage was clear and did not have a foul smell.  Drainage was from the nipple.    ROS:   Pertinent positives and negatives are stated within HPI, all other systems reviewed and are negative.  --------------------------------------------- PAST HISTORY ---------------------------------------------  Past Medical History:  has a past medical history of Acute chest pain, Asthma, Bronchitis, Movement disorder, Seizure disorder (HCC), Seizure disorder (HCC), Suicidal overdose (HCC), Tobacco abuse, and Tobacco abuse.    Past Surgical History:  has a past surgical history that includes fracture surgery; Cholecystectomy; Rotator cuff repair; Upper gastrointestinal endoscopy (N/A, 5/3/2021); Upper gastrointestinal endoscopy (N/A, 10/29/2022); and Colonoscopy (N/A, 10/31/2022).    Social History:  reports that she has been smoking cigarettes. She has a 15 pack-year smoking history. She has never used smokeless tobacco. She reports current alcohol use. She reports that she does not currently use drugs after having used the following drugs: Cocaine.    Family History: family history includes Cancer in her mother.     The patient’s home medications have been reviewed.    Allergies: Ketorolac, Toradol

## 2025-04-17 ENCOUNTER — TELEPHONE (OUTPATIENT)
Dept: BREAST CENTER | Age: 45
End: 2025-04-17

## 2025-04-17 NOTE — TELEPHONE ENCOUNTER
Patient was scheduled for 4/17/25 for office visit - patient did not show for appointment - MA attempted to contact patient, left message on patient voice mail - waiting for a return call.

## 2025-04-26 ENCOUNTER — APPOINTMENT (OUTPATIENT)
Dept: GENERAL RADIOLOGY | Age: 45
End: 2025-04-26
Payer: MEDICAID

## 2025-04-26 ENCOUNTER — HOSPITAL ENCOUNTER (EMERGENCY)
Age: 45
Discharge: HOME | End: 2025-04-27
Payer: MEDICAID

## 2025-04-26 DIAGNOSIS — M16.0 OSTEOARTHRITIS OF BOTH HIPS, UNSPECIFIED OSTEOARTHRITIS TYPE: Primary | ICD-10-CM

## 2025-04-26 DIAGNOSIS — M25.561 PAIN IN BOTH KNEES, UNSPECIFIED CHRONICITY: ICD-10-CM

## 2025-04-26 DIAGNOSIS — M25.562 PAIN IN BOTH KNEES, UNSPECIFIED CHRONICITY: ICD-10-CM

## 2025-04-26 LAB
HCG, URINE, POC: NEGATIVE
Lab: NORMAL
NEGATIVE QC PASS/FAIL: NORMAL
POSITIVE QC PASS/FAIL: NORMAL

## 2025-04-26 PROCEDURE — 6370000000 HC RX 637 (ALT 250 FOR IP)

## 2025-04-26 PROCEDURE — 73560 X-RAY EXAM OF KNEE 1 OR 2: CPT

## 2025-04-26 PROCEDURE — 99284 EMERGENCY DEPT VISIT MOD MDM: CPT

## 2025-04-26 PROCEDURE — 73521 X-RAY EXAM HIPS BI 2 VIEWS: CPT

## 2025-04-26 RX ORDER — ACETAMINOPHEN 500 MG
1000 TABLET ORAL ONCE
Status: COMPLETED | OUTPATIENT
Start: 2025-04-26 | End: 2025-04-26

## 2025-04-26 RX ADMIN — ACETAMINOPHEN 1000 MG: 500 TABLET ORAL at 23:00

## 2025-04-26 ASSESSMENT — PAIN - FUNCTIONAL ASSESSMENT
PAIN_FUNCTIONAL_ASSESSMENT: PREVENTS OR INTERFERES SOME ACTIVE ACTIVITIES AND ADLS
PAIN_FUNCTIONAL_ASSESSMENT: 0-10

## 2025-04-26 ASSESSMENT — PAIN DESCRIPTION - DESCRIPTORS: DESCRIPTORS: THROBBING;BURNING

## 2025-04-26 ASSESSMENT — PAIN DESCRIPTION - LOCATION: LOCATION: KNEE;HIP

## 2025-04-26 ASSESSMENT — PAIN DESCRIPTION - ORIENTATION: ORIENTATION: RIGHT;LEFT

## 2025-04-27 VITALS
HEIGHT: 59 IN | SYSTOLIC BLOOD PRESSURE: 168 MMHG | BODY MASS INDEX: 32.25 KG/M2 | HEART RATE: 91 BPM | WEIGHT: 160 LBS | DIASTOLIC BLOOD PRESSURE: 93 MMHG | OXYGEN SATURATION: 99 % | RESPIRATION RATE: 18 BRPM | TEMPERATURE: 98 F

## 2025-04-27 PROCEDURE — 96372 THER/PROPH/DIAG INJ SC/IM: CPT

## 2025-04-27 PROCEDURE — 6360000002 HC RX W HCPCS

## 2025-04-27 RX ORDER — HYDROCODONE BITARTRATE AND ACETAMINOPHEN 5; 325 MG/1; MG/1
1 TABLET ORAL EVERY 8 HOURS PRN
Qty: 9 TABLET | Refills: 0 | Status: SHIPPED | OUTPATIENT
Start: 2025-04-27 | End: 2025-04-27

## 2025-04-27 RX ORDER — HYDROCODONE BITARTRATE AND ACETAMINOPHEN 5; 325 MG/1; MG/1
1 TABLET ORAL EVERY 8 HOURS PRN
Qty: 9 TABLET | Refills: 0 | Status: SHIPPED | OUTPATIENT
Start: 2025-04-27 | End: 2025-04-30

## 2025-04-27 RX ORDER — KETOROLAC TROMETHAMINE 30 MG/ML
30 INJECTION, SOLUTION INTRAMUSCULAR; INTRAVENOUS ONCE
Status: COMPLETED | OUTPATIENT
Start: 2025-04-27 | End: 2025-04-27

## 2025-04-27 RX ADMIN — KETOROLAC TROMETHAMINE 30 MG: 30 INJECTION, SOLUTION INTRAMUSCULAR at 01:27

## 2025-04-27 NOTE — ED TRIAGE NOTES
Department of Emergency Medicine  FIRST PROVIDER TRIAGE NOTE             Independent MLP           4/26/25  9:22 PM EDT    Date of Encounter: 4/26/25   MRN: [id number]      HPI: Rachlee Lomas is a 44 y.o. female who presents to the ED for Hip Pain (Bilateral x 1 month) and Knee Pain (Bilateral x1 month)       ROS: Negative for cp, Suicidal ideation, or Homicidal Ideation.    PE: Gen Appearance/Constitutional: alert  CV: regular rate     Initial Plan of Care: All treatment areas with department are currently occupied. Plan to order/Initiate the following while awaiting opening in ED: imaging studies and POC pregnancy urine.  Initiate Treatment-Testing, Proceed toTreatment Area When Bed Available for ED Attending/MLP to Continue Care    The provider-patient relationship was only established for Provider In Triage (PIT) note.  A full assessment, HPI, and examination was not performed and therefore it is not yet possible to state whether or not an emergency medical condition exists.  This is not a comprehensive evaluation.  The provider-patient relationship was terminated after triage completion.    Electronically signed by MARK Woodard CNP   DD: 4/26/25

## 2025-04-27 NOTE — ED PROVIDER NOTES
abnormality of the knee.  X-ray right knee showed no acute abnormality of the knee.  Results reviewed with patient.  Patient appeared more comfortable after receiving medication.  Patient stated that her orthopedic doctor has prescribed her narcotics in the past for this pain.  Ace wraps applied as noted above in procedure notes.  Discussed diagnosis and plan to follow-up with orthopedics and PCP along with the use of Norco.  Patient advised to return to the ED if any new or worsening symptoms.  Patient verbalized understanding and is in agreement with the plan.         Disposition Considerations (include 1 Tests not done, Shared Decision Making, Pt Expectation of Test or Tx.): Based on HPI, PE, and imaging findings, no further testing is indicated at this time.  Diagnosis of osteoarthritis of both hips and pain in both knees is consistent with exam findings.  Appropriate for outpatient management follow-up with orthopedics and PCP.    The emergency provider has spoken with patient and discussed today's results, in addition to providing specific details for the plan of care and counseling regarding the diagnosis and prognosis.  Norco prescribed and prescription printed.  Patient instructed to follow-up with orthopedics and PCP.  Patient advised to return to ED if any new or worsening symptoms.  Patients questions were answered at this time and they were agreeable with plan.    I am the Primary Clinician of Record.    FINAL IMPRESSION      1. Osteoarthritis of both hips, unspecified osteoarthritis type    2. Pain in both knees, unspecified chronicity          DISPOSITION/PLAN     DISPOSITION Decision To Discharge 04/27/2025 01:03:44 AM   DISPOSITION CONDITION Stable           PATIENT REFERRED TO:  Benton Maldonado MD  [address] [facility] [address] [phone]    Schedule an appointment as soon as possible for a visit       Stefan Contreras MD  [address] [place] [address] [phone]    Schedule an

## 2025-05-27 ENCOUNTER — APPOINTMENT (OUTPATIENT)
Dept: GENERAL RADIOLOGY | Age: 45
End: 2025-05-27
Payer: MEDICAID

## 2025-05-27 ENCOUNTER — HOSPITAL ENCOUNTER (EMERGENCY)
Age: 45
Discharge: HOME | End: 2025-05-27
Payer: MEDICAID

## 2025-05-27 VITALS
WEIGHT: 160 LBS | HEIGHT: 59 IN | HEART RATE: 96 BPM | DIASTOLIC BLOOD PRESSURE: 84 MMHG | OXYGEN SATURATION: 98 % | TEMPERATURE: 98.2 F | BODY MASS INDEX: 32.25 KG/M2 | SYSTOLIC BLOOD PRESSURE: 167 MMHG | RESPIRATION RATE: 16 BRPM

## 2025-05-27 DIAGNOSIS — S46.912A LEFT SHOULDER STRAIN, INITIAL ENCOUNTER: Primary | ICD-10-CM

## 2025-05-27 PROCEDURE — 6370000000 HC RX 637 (ALT 250 FOR IP): Performed by: PHYSICIAN ASSISTANT

## 2025-05-27 PROCEDURE — 99283 EMERGENCY DEPT VISIT LOW MDM: CPT

## 2025-05-27 PROCEDURE — 73030 X-RAY EXAM OF SHOULDER: CPT

## 2025-05-27 RX ORDER — LIDOCAINE 4 G/G
1 PATCH TOPICAL DAILY
Status: DISCONTINUED | OUTPATIENT
Start: 2025-05-27 | End: 2025-05-27 | Stop reason: HOSPADM

## 2025-05-27 ASSESSMENT — PAIN DESCRIPTION - LOCATION: LOCATION: SHOULDER

## 2025-05-27 ASSESSMENT — PAIN - FUNCTIONAL ASSESSMENT: PAIN_FUNCTIONAL_ASSESSMENT: 0-10

## 2025-05-27 ASSESSMENT — PAIN SCALES - GENERAL: PAINLEVEL_OUTOF10: 10

## 2025-05-27 ASSESSMENT — PAIN DESCRIPTION - ORIENTATION: ORIENTATION: LEFT
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
## 2025-05-27 NOTE — ED NOTES
Pt states that when she takes Tylenol she gets dizzy spells. Pt states that she usually takes percocet and last took it around 4 hours ago.

## 2025-05-29 ENCOUNTER — APPOINTMENT (OUTPATIENT)
Dept: CT IMAGING | Age: 45
End: 2025-05-29
Payer: MEDICAID

## 2025-05-29 ENCOUNTER — HOSPITAL ENCOUNTER (EMERGENCY)
Age: 45
Discharge: HOME | End: 2025-05-29
Payer: MEDICAID

## 2025-05-29 VITALS
HEART RATE: 88 BPM | DIASTOLIC BLOOD PRESSURE: 97 MMHG | TEMPERATURE: 98.6 F | SYSTOLIC BLOOD PRESSURE: 177 MMHG | RESPIRATION RATE: 19 BRPM | OXYGEN SATURATION: 99 %
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
## 2025-05-29 DIAGNOSIS — S40.012A CONTUSION OF MULTIPLE SITES OF LEFT SHOULDER AND UPPER ARM, INITIAL ENCOUNTER: ICD-10-CM

## 2025-05-29 DIAGNOSIS — S40.022A CONTUSION OF MULTIPLE SITES OF LEFT SHOULDER AND UPPER ARM, INITIAL ENCOUNTER: ICD-10-CM

## 2025-05-29 DIAGNOSIS — S20.212A RIB CONTUSION, LEFT, INITIAL ENCOUNTER: ICD-10-CM

## 2025-05-29 DIAGNOSIS — M79.602 LEFT ARM PAIN: ICD-10-CM

## 2025-05-29 DIAGNOSIS — S16.1XXA STRAIN OF NECK MUSCLE, INITIAL ENCOUNTER: ICD-10-CM

## 2025-05-29 LAB
HCG, URINE, POC: NEGATIVE
Lab: NORMAL
NEGATIVE QC PASS/FAIL: NORMAL
POSITIVE QC PASS/FAIL: NORMAL

## 2025-05-29 PROCEDURE — 6360000002 HC RX W HCPCS: Performed by: NURSE PRACTITIONER

## 2025-05-29 PROCEDURE — 70450 CT HEAD/BRAIN W/O DYE: CPT

## 2025-05-29 PROCEDURE — 96372 THER/PROPH/DIAG INJ SC/IM: CPT

## 2025-05-29 PROCEDURE — 73200 CT UPPER EXTREMITY W/O DYE: CPT

## 2025-05-29 PROCEDURE — 71250 CT THORAX DX C-: CPT

## 2025-05-29 PROCEDURE — 99284 EMERGENCY DEPT VISIT MOD MDM: CPT

## 2025-05-29 PROCEDURE — 72125 CT NECK SPINE W/O DYE: CPT

## 2025-05-29 PROCEDURE — 6370000000 HC RX 637 (ALT 250 FOR IP): Performed by: NURSE PRACTITIONER

## 2025-05-29 RX ORDER — METHOCARBAMOL 750 MG/1
750 TABLET, FILM COATED ORAL 3 TIMES DAILY PRN
Qty: 15 TABLET | Refills: 0 | Status: SHIPPED | OUTPATIENT
Start: 2025-05-29 | End: 2025-06-08

## 2025-05-29 RX ORDER — FENTANYL CITRATE 50 UG/ML
50 INJECTION, SOLUTION INTRAMUSCULAR; INTRAVENOUS ONCE
Status: COMPLETED | OUTPATIENT
Start: 2025-05-29 | End: 2025-05-29

## 2025-05-29 RX ORDER — METHOCARBAMOL 500 MG/1
500 TABLET, FILM COATED ORAL ONCE
Status: COMPLETED | OUTPATIENT
Start: 2025-05-29 | End: 2025-05-29

## 2025-05-29 RX ADMIN — FENTANYL CITRATE 50 MCG: 50 INJECTION INTRAMUSCULAR; INTRAVENOUS at 01:18

## 2025-05-29 RX ADMIN — METHOCARBAMOL 500 MG: 500 TABLET ORAL at 01:18

## 2025-05-29 ASSESSMENT — PAIN DESCRIPTION - LOCATION: LOCATION: ARM;NECK

## 2025-05-29 ASSESSMENT — PAIN SCALES - GENERAL: PAINLEVEL_OUTOF10: 10

## 2025-05-29 ASSESSMENT — PAIN DESCRIPTION - ORIENTATION: ORIENTATION: LEFT

## 2025-05-29 ASSESSMENT — PAIN - FUNCTIONAL ASSESSMENT: PAIN_FUNCTIONAL_ASSESSMENT: 0-10

## 2025-05-29 ASSESSMENT — PAIN DESCRIPTION - DESCRIPTORS: DESCRIPTORS: SHARP;DISCOMFORT

## 2025-05-29 NOTE — DISCHARGE INSTRUCTIONS
Today CAT scans were all negative.  Perform passive range of motion.  Take extra strength Tylenol to assist with additional pain relief as well as the lidocaine patch that was previously prescribed.  If pain does not improve then follow-up with orthopedics call as needed for an appointment but today CAT scans were all reassuring.
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
## 2025-07-09 ENCOUNTER — HOSPITAL ENCOUNTER (EMERGENCY)
Age: 45
Discharge: LEFT BEFORE DISCHARGE | End: 2025-07-09

## 2025-07-10 NOTE — ED NOTES
Patient refused EKG and did not want to wait any longer. Patient did sign an ED withdraw form and stated she will follow up with her doctor tomorrow.

## 2025-07-16 ENCOUNTER — APPOINTMENT (OUTPATIENT)
Dept: ULTRASOUND IMAGING | Age: 45
End: 2025-07-16
Payer: MEDICAID

## 2025-07-16 ENCOUNTER — HOSPITAL ENCOUNTER (EMERGENCY)
Age: 45
Discharge: HOME | End: 2025-07-16
Attending: EMERGENCY MEDICINE
Payer: MEDICAID

## 2025-07-16 ENCOUNTER — APPOINTMENT (OUTPATIENT)
Dept: CT IMAGING | Age: 45
End: 2025-07-16
Payer: MEDICAID

## 2025-07-16 VITALS
BODY MASS INDEX: 32.25 KG/M2 | HEART RATE: 90 BPM | RESPIRATION RATE: 17 BRPM | OXYGEN SATURATION: 99 % | TEMPERATURE: 98.1 F | SYSTOLIC BLOOD PRESSURE: 147 MMHG | HEIGHT: 59 IN | DIASTOLIC BLOOD PRESSURE: 113 MMHG | WEIGHT: 160 LBS

## 2025-07-16 DIAGNOSIS — R07.9 CHEST PAIN, UNSPECIFIED TYPE: Primary | ICD-10-CM

## 2025-07-16 DIAGNOSIS — N61.1 BREAST ABSCESS: ICD-10-CM

## 2025-07-16 LAB
ALBUMIN SERPL-MCNC: 4.1 G/DL (ref 3.5–5.2)
ALP SERPL-CCNC: 110 U/L (ref 35–104)
ALT SERPL-CCNC: 21 U/L (ref 0–35)
ANION GAP SERPL CALCULATED.3IONS-SCNC: 18 MMOL/L (ref 7–16)
AST SERPL-CCNC: 43 U/L (ref 0–35)
B PARAP IS1001 DNA NPH QL NAA+NON-PROBE: NOT DETECTED
B PERT DNA SPEC QL NAA+PROBE: NOT DETECTED
BASOPHILS # BLD: 0.05 K/UL (ref 0–0.2)
BASOPHILS NFR BLD: 1 % (ref 0–2)
BILIRUB SERPL-MCNC: 0.3 MG/DL (ref 0–1.2)
BNP SERPL-MCNC: 47 PG/ML (ref 0–125)
BUN SERPL-MCNC: 14 MG/DL (ref 6–20)
C PNEUM DNA NPH QL NAA+NON-PROBE: NOT DETECTED
CALCIUM SERPL-MCNC: 9 MG/DL (ref 8.6–10)
CHLORIDE SERPL-SCNC: 100 MMOL/L (ref 98–107)
CO2 SERPL-SCNC: 15 MMOL/L (ref 22–29)
CREAT SERPL-MCNC: 0.6 MG/DL (ref 0.5–1)
EKG ATRIAL RATE: 108 BPM
EKG P AXIS: 62 DEGREES
EKG P-R INTERVAL: 134 MS
EKG Q-T INTERVAL: 342 MS
EKG QRS DURATION: 80 MS
EKG QTC CALCULATION (BAZETT): 458 MS
EKG R AXIS: 73 DEGREES
EKG T AXIS: 36 DEGREES
EKG VENTRICULAR RATE: 108 BPM
EOSINOPHIL # BLD: 0.06 K/UL (ref 0.05–0.5)
EOSINOPHILS RELATIVE PERCENT: 1 % (ref 0–6)
ERYTHROCYTE [DISTWIDTH] IN BLOOD BY AUTOMATED COUNT: 14.8 % (ref 11.5–15)
FLUAV RNA NPH QL NAA+NON-PROBE: NOT DETECTED
FLUBV RNA NPH QL NAA+NON-PROBE: NOT DETECTED
GFR, ESTIMATED: >90 ML/MIN/1.73M2
GLUCOSE SERPL-MCNC: 82 MG/DL (ref 74–99)
HADV DNA NPH QL NAA+NON-PROBE: NOT DETECTED
HCG, URINE, POC: NEGATIVE
HCOV 229E RNA NPH QL NAA+NON-PROBE: NOT DETECTED
HCOV HKU1 RNA NPH QL NAA+NON-PROBE: NOT DETECTED
HCOV NL63 RNA NPH QL NAA+NON-PROBE: NOT DETECTED
HCOV OC43 RNA NPH QL NAA+NON-PROBE: NOT DETECTED
HCT VFR BLD AUTO: 40.2 % (ref 34–48)
HGB BLD-MCNC: 13.5 G/DL (ref 11.5–15.5)
HMPV RNA NPH QL NAA+NON-PROBE: NOT DETECTED
HPIV1 RNA NPH QL NAA+NON-PROBE: NOT DETECTED
HPIV2 RNA NPH QL NAA+NON-PROBE: NOT DETECTED
HPIV3 RNA NPH QL NAA+NON-PROBE: NOT DETECTED
HPIV4 RNA NPH QL NAA+NON-PROBE: NOT DETECTED
IMM GRANULOCYTES # BLD AUTO: 0.04 K/UL (ref 0–0.58)
IMM GRANULOCYTES NFR BLD: 0 % (ref 0–5)
LYMPHOCYTES NFR BLD: 2.68 K/UL (ref 1.5–4)
LYMPHOCYTES RELATIVE PERCENT: 26 % (ref 20–42)
Lab: NORMAL
M PNEUMO DNA NPH QL NAA+NON-PROBE: NOT DETECTED
MCH RBC QN AUTO: 26.4 PG (ref 26–35)
MCHC RBC AUTO-ENTMCNC: 33.6 G/DL (ref 32–34.5)
MCV RBC AUTO: 78.7 FL (ref 80–99.9)
MONOCYTES NFR BLD: 0.62 K/UL (ref 0.1–0.95)
MONOCYTES NFR BLD: 6 % (ref 2–12)
NEGATIVE QC PASS/FAIL: NORMAL
NEUTROPHILS NFR BLD: 66 % (ref 43–80)
NEUTS SEG NFR BLD: 6.74 K/UL (ref 1.8–7.3)
PLATELET CONFIRMATION: NORMAL
PLATELET, FLUORESCENCE: 254 K/UL (ref 130–450)
PMV BLD AUTO: 9.2 FL (ref 7–12)
POSITIVE QC PASS/FAIL: NORMAL
POTASSIUM SERPL-SCNC: 4.8 MMOL/L (ref 3.5–5.1)
PROT SERPL-MCNC: 7.9 G/DL (ref 6.4–8.3)
RBC # BLD AUTO: 5.11 M/UL (ref 3.5–5.5)
RSV RNA NPH QL NAA+NON-PROBE: NOT DETECTED
RV+EV RNA NPH QL NAA+NON-PROBE: NOT DETECTED
SARS-COV-2 RNA NPH QL NAA+NON-PROBE: NOT DETECTED
SODIUM SERPL-SCNC: 132 MMOL/L (ref 136–145)
SPECIMEN DESCRIPTION: NORMAL
TROPONIN I SERPL HS-MCNC: <6 NG/L (ref 0–14)
TROPONIN I SERPL HS-MCNC: <6 NG/L (ref 0–14)
WBC OTHER # BLD: 10.2 K/UL (ref 4.5–11.5)

## 2025-07-16 PROCEDURE — 96376 TX/PRO/DX INJ SAME DRUG ADON: CPT

## 2025-07-16 PROCEDURE — 80053 COMPREHEN METABOLIC PANEL: CPT

## 2025-07-16 PROCEDURE — 83880 ASSAY OF NATRIURETIC PEPTIDE: CPT

## 2025-07-16 PROCEDURE — 6370000000 HC RX 637 (ALT 250 FOR IP): Performed by: EMERGENCY MEDICINE

## 2025-07-16 PROCEDURE — 87070 CULTURE OTHR SPECIMN AEROBIC: CPT

## 2025-07-16 PROCEDURE — 6370000000 HC RX 637 (ALT 250 FOR IP)

## 2025-07-16 PROCEDURE — 6360000002 HC RX W HCPCS

## 2025-07-16 PROCEDURE — 76641 ULTRASOUND BREAST COMPLETE: CPT

## 2025-07-16 PROCEDURE — 2500000003 HC RX 250 WO HCPCS

## 2025-07-16 PROCEDURE — 99285 EMERGENCY DEPT VISIT HI MDM: CPT

## 2025-07-16 PROCEDURE — 85025 COMPLETE CBC W/AUTO DIFF WBC: CPT

## 2025-07-16 PROCEDURE — 87205 SMEAR GRAM STAIN: CPT

## 2025-07-16 PROCEDURE — 87077 CULTURE AEROBIC IDENTIFY: CPT

## 2025-07-16 PROCEDURE — 84484 ASSAY OF TROPONIN QUANT: CPT

## 2025-07-16 PROCEDURE — 93010 ELECTROCARDIOGRAM REPORT: CPT | Performed by: INTERNAL MEDICINE

## 2025-07-16 PROCEDURE — 0202U NFCT DS 22 TRGT SARS-COV-2: CPT

## 2025-07-16 PROCEDURE — 2580000003 HC RX 258

## 2025-07-16 PROCEDURE — 6360000004 HC RX CONTRAST MEDICATION: Performed by: RADIOLOGY

## 2025-07-16 PROCEDURE — 93005 ELECTROCARDIOGRAM TRACING: CPT

## 2025-07-16 PROCEDURE — 71275 CT ANGIOGRAPHY CHEST: CPT

## 2025-07-16 PROCEDURE — 94640 AIRWAY INHALATION TREATMENT: CPT

## 2025-07-16 PROCEDURE — 96374 THER/PROPH/DIAG INJ IV PUSH: CPT

## 2025-07-16 PROCEDURE — 96375 TX/PRO/DX INJ NEW DRUG ADDON: CPT

## 2025-07-16 RX ORDER — OXYCODONE HYDROCHLORIDE 5 MG/1
5 TABLET ORAL ONCE
Refills: 0 | Status: COMPLETED | OUTPATIENT
Start: 2025-07-16 | End: 2025-07-16

## 2025-07-16 RX ORDER — LIDOCAINE HYDROCHLORIDE AND EPINEPHRINE 10; 10 MG/ML; UG/ML
INJECTION, SOLUTION INFILTRATION; PERINEURAL
Status: COMPLETED
Start: 2025-07-16 | End: 2025-07-16

## 2025-07-16 RX ORDER — CEPHALEXIN 500 MG/1
500 CAPSULE ORAL 2 TIMES DAILY
Qty: 20 CAPSULE | Refills: 0 | Status: SHIPPED | OUTPATIENT
Start: 2025-07-16 | End: 2025-07-26

## 2025-07-16 RX ORDER — LIDOCAINE HYDROCHLORIDE AND EPINEPHRINE 10; 10 MG/ML; UG/ML
20 INJECTION, SOLUTION INFILTRATION; PERINEURAL ONCE
Status: COMPLETED | OUTPATIENT
Start: 2025-07-16 | End: 2025-07-16

## 2025-07-16 RX ORDER — FENTANYL CITRATE 50 UG/ML
50 INJECTION, SOLUTION INTRAMUSCULAR; INTRAVENOUS ONCE
Status: COMPLETED | OUTPATIENT
Start: 2025-07-16 | End: 2025-07-16

## 2025-07-16 RX ORDER — IPRATROPIUM BROMIDE AND ALBUTEROL SULFATE 2.5; .5 MG/3ML; MG/3ML
3 SOLUTION RESPIRATORY (INHALATION) ONCE
Status: COMPLETED | OUTPATIENT
Start: 2025-07-16 | End: 2025-07-16

## 2025-07-16 RX ORDER — FENTANYL CITRATE 50 UG/ML
INJECTION, SOLUTION INTRAMUSCULAR; INTRAVENOUS
Status: COMPLETED
Start: 2025-07-16 | End: 2025-07-16

## 2025-07-16 RX ORDER — LIDOCAINE HYDROCHLORIDE AND EPINEPHRINE 10; 10 MG/ML; UG/ML
20 INJECTION, SOLUTION INFILTRATION; PERINEURAL ONCE
Status: CANCELLED | OUTPATIENT
Start: 2025-07-16 | End: 2025-07-16

## 2025-07-16 RX ORDER — 0.9 % SODIUM CHLORIDE 0.9 %
500 INTRAVENOUS SOLUTION INTRAVENOUS ONCE
Status: COMPLETED | OUTPATIENT
Start: 2025-07-16 | End: 2025-07-16

## 2025-07-16 RX ORDER — FENTANYL CITRATE 50 UG/ML
100 INJECTION, SOLUTION INTRAMUSCULAR; INTRAVENOUS ONCE
Status: COMPLETED | OUTPATIENT
Start: 2025-07-16 | End: 2025-07-16

## 2025-07-16 RX ORDER — IOPAMIDOL 755 MG/ML
75 INJECTION, SOLUTION INTRAVASCULAR
Status: COMPLETED | OUTPATIENT
Start: 2025-07-16 | End: 2025-07-16

## 2025-07-16 RX ORDER — ASPIRIN 81 MG/1
324 TABLET, CHEWABLE ORAL ONCE
Status: COMPLETED | OUTPATIENT
Start: 2025-07-16 | End: 2025-07-16

## 2025-07-16 RX ADMIN — FENTANYL CITRATE 100 MCG: 50 INJECTION, SOLUTION INTRAMUSCULAR; INTRAVENOUS at 17:58

## 2025-07-16 RX ADMIN — OXYCODONE 5 MG: 5 TABLET ORAL at 10:07

## 2025-07-16 RX ADMIN — LIDOCAINE HYDROCHLORIDE AND EPINEPHRINE 20 ML: 10; 10 INJECTION, SOLUTION INFILTRATION; PERINEURAL at 18:16

## 2025-07-16 RX ADMIN — IPRATROPIUM BROMIDE AND ALBUTEROL SULFATE 3 DOSE: 2.5; .5 SOLUTION RESPIRATORY (INHALATION) at 02:47

## 2025-07-16 RX ADMIN — FENTANYL CITRATE 50 MCG: 50 INJECTION INTRAMUSCULAR; INTRAVENOUS at 04:14

## 2025-07-16 RX ADMIN — ASPIRIN 324 MG: 81 TABLET, CHEWABLE ORAL at 03:25

## 2025-07-16 RX ADMIN — SODIUM CHLORIDE 500 ML: 0.9 INJECTION, SOLUTION INTRAVENOUS at 02:55

## 2025-07-16 RX ADMIN — WATER 125 MG: 1 INJECTION INTRAMUSCULAR; INTRAVENOUS; SUBCUTANEOUS at 02:54

## 2025-07-16 RX ADMIN — HYDROMORPHONE HYDROCHLORIDE 0.5 MG: 1 INJECTION, SOLUTION INTRAMUSCULAR; INTRAVENOUS; SUBCUTANEOUS at 17:30

## 2025-07-16 RX ADMIN — IOPAMIDOL 75 ML: 755 INJECTION, SOLUTION INTRAVENOUS at 05:46

## 2025-07-16 ASSESSMENT — PAIN DESCRIPTION - LOCATION
LOCATION: CHEST;LEG
LOCATION: CHEST;LEG
LOCATION: CHEST
LOCATION: CHEST
LOCATION: GENERALIZED

## 2025-07-16 ASSESSMENT — PAIN DESCRIPTION - DESCRIPTORS
DESCRIPTORS: ACHING;SHARP;PRESSURE
DESCRIPTORS: ACHING;CRUSHING;DISCOMFORT
DESCRIPTORS: DULL;TENDER;SORE
DESCRIPTORS: ACHING;CRAMPING;THROBBING
DESCRIPTORS: STABBING;DISCOMFORT;SORE

## 2025-07-16 ASSESSMENT — PAIN DESCRIPTION - ORIENTATION
ORIENTATION: RIGHT
ORIENTATION: LEFT
ORIENTATION: LEFT

## 2025-07-16 ASSESSMENT — PAIN SCALES - GENERAL
PAINLEVEL_OUTOF10: 3
PAINLEVEL_OUTOF10: 10
PAINLEVEL_OUTOF10: 9
PAINLEVEL_OUTOF10: 10
PAINLEVEL_OUTOF10: 8

## 2025-07-16 ASSESSMENT — LIFESTYLE VARIABLES: HOW OFTEN DO YOU HAVE A DRINK CONTAINING ALCOHOL: MONTHLY OR LESS

## 2025-07-16 ASSESSMENT — PAIN - FUNCTIONAL ASSESSMENT: PAIN_FUNCTIONAL_ASSESSMENT: 0-10

## 2025-07-16 NOTE — PROCEDURES
PROCEDURE NOTE  Date: 7/16/2025   Name: Rachele Lomas  YOB: 1980    Procedures  Incision and Drainage Procedure Note    Indication: L breast abscess    Procedure: The skin over the L breast was prepped with betadine and draped in the usual sterile fashion. Abscess was present to the L superior areola. Local anesthesia over this lesion was obtained by infiltration using 10cc lidocaine with epinephrine.  An incision was then made over the abscess down through the subcutaneous tissue and into the cavity. Dissection was carried down to the level of posterior abscess cavity. Approximately 5cc of seropurulent material was expressed. Cultures were obtained using a sterile swab. Loculations were broken up. The drainage cavity was then irrigated with saline. The wound was packed with iodoform gauze and covered with a dressing of clean gauze.    The abscess measured 2cm x 2cm x 2cm    The patient tolerated the procedure well.    Complications: None    Dr. Reveles was available for the procedure.    Electronically signed by Rakesh Mann DO on 7/16/2025 at 6:28 PM

## 2025-07-16 NOTE — CONSULTS
GENERAL SURGERY  CONSULT NOTE  7/16/2025    Physician Consulted: Dr. Reveles  Reason for Consult: Concern for L breast abscess  Referring Physician: Dr. Tam HAGER  Rachele Lomas is a 44 y.o. female who presents to the general surgery service for evaluation of possible L breast abscess. Patient presented to the ED with shortness of breath and chest pain that radiates to her L arm. She has had a growing L breast mass for the last month. She has previously had a breast abscess on the R side that was drained on 5/30/2024 in [place]. Most recent mammography was in 2022 with BIRADs 3 on the R breast. U/S done in March 2025 on R breast with BIRADs 4 and she has not followed up. This is the first time she has noticed anything on the L breast. She says that it will swell and get very warm. Denies any fevers, chills, nausea, or vomiting.     Medical history is significant for COPD, seizure disorder, substance abuse. The patient is not taking any blood thinning medications. Family history is significant for Breast Cancer in her grandmother.    She reports that she has been smoking cigarettes. She has a 15 pack-year smoking history. She has never used smokeless tobacco. She reports current alcohol use. She reports that she does not currently use drugs after having used the following drugs: Cocaine.    Past Medical History:   Diagnosis Date    Acute chest pain 09/22/2024    Asthma     Bronchitis     Movement disorder     Seizure disorder (HCC)     Seizure disorder (HCC)     Suicidal overdose (HCC) 06/27/2014    Tobacco abuse     Tobacco abuse        Past Surgical History:   Procedure Laterality Date    CHOLECYSTECTOMY      COLONOSCOPY N/A 10/31/2022    COLONOSCOPY WITH BIOPSY performed by Jenifer Burris MD at [facility] ENDOSCOPY    FRACTURE SURGERY      R ANKLE TORN LIGAMENTS    ROTATOR CUFF REPAIR      UPPER GASTROINTESTINAL ENDOSCOPY N/A 5/3/2021    EGD BIOPSY performed by Eh HOLLOAWY MD at [facility] ENDOSCOPY

## 2025-07-16 NOTE — DISCHARGE INSTR - COC
Continuity of Care Form    Patient Name: Rachele Lomas   :  1980  MRN:  [id number]    Admit date:  2025  Discharge date:  ***    Code Status Order: Prior   Advance Directives:     Admitting Physician:  No admitting provider for patient encounter.  PCP: Stefan Contreras MD    Discharging Nurse: ***  Discharging Hospital Unit/Room#: [facility]  Discharging Unit Phone Number: ***    Emergency Contact:   Extended Emergency Contact Information  Primary Emergency Contact: Casie Stiles   [facility]  Home Phone: [phone]  Mobile Phone: [phone]  Relation: Brother/Sister   needed? No  Secondary Emergency Contact: Jaelyn Stiles  Home Phone: [phone]  Mobile Phone: [phone]  Relation: Niece/Nephew   needed? No    Past Surgical History:  Past Surgical History:   Procedure Laterality Date    CHOLECYSTECTOMY      COLONOSCOPY N/A 10/31/2022    COLONOSCOPY WITH BIOPSY performed by Jenifer Burris MD at [facility] ENDOSCOPY    FRACTURE SURGERY      R ANKLE TORN LIGAMENTS    ROTATOR CUFF REPAIR      UPPER GASTROINTESTINAL ENDOSCOPY N/A 5/3/2021    EGD BIOPSY performed by Eh HOLLOWAY MD at [facility] ENDOSCOPY    UPPER GASTROINTESTINAL ENDOSCOPY N/A 10/29/2022    EGD ESOPHAGOGASTRODUODENOSCOPY performed by Santos Triana MD at [facility] OR       Immunization History:   Immunization History   Administered Date(s) Administered    Influenza, FLUARIX, FLULAVAL, FLUZONE (age 6 mo+) and AFLURIA, (age 3 y+), Quadv PF, 0.5mL 2024    TD 2LF, TDVAX, (age 7y+), IM, 0.5mL 2022    TDaP, ADACEL (age 10y-64y), BOOSTRIX (age 10y+), IM, 0.5mL 2015, 2018, 2019, 2019       Active Problems:  Patient Active Problem List   Diagnosis Code    Tobacco abuse Z72.0    Seizure disorder ([facility]) G40.909    Asthma J45.909    Carbamazepine overdose with self-harm intent T42.1X4A    Depression F32.A    Suicidal overdose ([facility]) T50.902A    Pneumonia J18.9    Epigastric pain

## 2025-07-16 NOTE — ED NOTES
Discharge instructions reviewed , including diagnosis, medications, follow up appointments, home care, and also when to call 911.  All discharge instructions questions answered.     IV removed    Pt left ED ambulatory   
Patient was provided a lunch by this RN and kitchen staff. Patient tolerating PO diet  
Report given to Inez,RN  
Alert and oriented, no focal deficits, no motor or sensory deficits.

## 2025-07-16 NOTE — ED PROVIDER NOTES
(125 mg IntraVENous Given 7/16/25 0254)   sodium chloride 0.9 % bolus 500 mL (0 mLs IntraVENous Stopped 7/16/25 0455)   aspirin chewable tablet 324 mg (324 mg Oral Given 7/16/25 0325)   fentaNYL (SUBLIMAZE) injection 50 mcg (50 mcg IntraVENous Given 7/16/25 0414)   iopamidol (ISOVUE-370) 76 % injection 75 mL (75 mLs IntraVENous Given 7/16/25 0546)   oxyCODONE (ROXICODONE) immediate release tablet 5 mg (5 mg Oral Given 7/16/25 1007)   HYDROmorphone (DILAUDID) injection 0.5 mg (0.5 mg IntraVENous Given 7/16/25 1730)   fentaNYL (SUBLIMAZE) injection 100 mcg (100 mcg IntraVENous Given 7/16/25 1758)   lidocaine-EPINEPHrine 1 %-1:143847 injection 20 mL (20 mLs IntraDERmal Given 7/16/25 1816)     44-year-old female who presents with complaints of left-sided chest pain that radiated to the left arm that started about an hour ago.  Patient also complains of shortness of breath.  On arrival, patient in distress, she is tachycardic.  Saturating on room air.  She does have tenderness on the left sternocostal region.  Patient also concerned about her breast abscess.  There is a 3 x 3 cm area of induration and erythema overlying the left breast tissue.  EKG denies any ischemic changes.  Patient was given aspirin.  Fentanyl given for pain.  She was given breathing treatments and steroids for shortness of breath with history of COPD.  No leukocytes, hemoglobin stable.  Sodium 132, normal renal function.  Viral panel negative.  Pregnancy negative.  Troponin trended x 2 and not elevated.  Low suspicion for cardiac chest pain.  CT pulmonary was done, no any evidence of PE or pneumonia.  Patient did have a stress test done on 9/23/2024 without any concerning findings.  Low concerns for cardiac chest pain at this time.  Will consult surgical team for evaluation of breast abscess and prescribe antibiotics, planning discharge.    On reevaluation patient had significant symptomatic relief. Patient would like to go home.     Please see ED

## 2025-07-19 LAB
MICROORGANISM SPEC CULT: ABNORMAL
MICROORGANISM/AGENT SPEC: ABNORMAL
SERVICE CMNT-IMP: ABNORMAL
SPECIMEN DESCRIPTION: ABNORMAL

## 2025-08-01 ENCOUNTER — APPOINTMENT (OUTPATIENT)
Dept: GENERAL RADIOLOGY | Age: 45
End: 2025-08-01
Payer: MEDICAID

## 2025-08-01 ENCOUNTER — HOSPITAL ENCOUNTER (EMERGENCY)
Age: 45
Discharge: LEFT BEFORE DISCHARGE | End: 2025-08-02
Attending: EMERGENCY MEDICINE
Payer: MEDICAID

## 2025-08-01 ENCOUNTER — APPOINTMENT (OUTPATIENT)
Dept: CT IMAGING | Age: 45
End: 2025-08-01
Payer: MEDICAID

## 2025-08-01 VITALS
TEMPERATURE: 98.2 F | BODY MASS INDEX: 32.25 KG/M2 | HEART RATE: 105 BPM | RESPIRATION RATE: 20 BRPM | SYSTOLIC BLOOD PRESSURE: 172 MMHG | WEIGHT: 160 LBS | DIASTOLIC BLOOD PRESSURE: 91 MMHG | OXYGEN SATURATION: 100 % | HEIGHT: 59 IN

## 2025-08-01 DIAGNOSIS — M25.551 BILATERAL HIP PAIN: ICD-10-CM

## 2025-08-01 DIAGNOSIS — R07.9 CHEST PAIN, UNSPECIFIED TYPE: Primary | ICD-10-CM

## 2025-08-01 DIAGNOSIS — M25.552 BILATERAL HIP PAIN: ICD-10-CM

## 2025-08-01 DIAGNOSIS — Z53.21 ELOPED FROM EMERGENCY DEPARTMENT: ICD-10-CM

## 2025-08-01 LAB
ALBUMIN SERPL-MCNC: 4.4 G/DL (ref 3.5–5.2)
ALP SERPL-CCNC: 126 U/L (ref 35–104)
ALT SERPL-CCNC: 25 U/L (ref 0–35)
ANION GAP SERPL CALCULATED.3IONS-SCNC: 17 MMOL/L (ref 7–16)
AST SERPL-CCNC: 26 U/L (ref 0–35)
BASOPHILS # BLD: 0.06 K/UL (ref 0–0.2)
BASOPHILS NFR BLD: 0 % (ref 0–2)
BILIRUB SERPL-MCNC: 0.4 MG/DL (ref 0–1.2)
BNP SERPL-MCNC: 76 PG/ML (ref 0–125)
BUN SERPL-MCNC: 15 MG/DL (ref 6–20)
CALCIUM SERPL-MCNC: 9.1 MG/DL (ref 8.6–10)
CHLORIDE SERPL-SCNC: 101 MMOL/L (ref 98–107)
CO2 SERPL-SCNC: 18 MMOL/L (ref 22–29)
CREAT SERPL-MCNC: 0.8 MG/DL (ref 0.5–1)
D-DIMER QUANTITATIVE: 1460 NG/ML DDU (ref 0–230)
EOSINOPHIL # BLD: 0.03 K/UL (ref 0.05–0.5)
EOSINOPHILS RELATIVE PERCENT: 0 % (ref 0–6)
ERYTHROCYTE [DISTWIDTH] IN BLOOD BY AUTOMATED COUNT: 15.5 % (ref 11.5–15)
GFR, ESTIMATED: >90 ML/MIN/1.73M2
GLUCOSE SERPL-MCNC: 85 MG/DL (ref 74–99)
HCT VFR BLD AUTO: 39.6 % (ref 34–48)
HGB BLD-MCNC: 13.6 G/DL (ref 11.5–15.5)
IMM GRANULOCYTES # BLD AUTO: 0.08 K/UL (ref 0–0.58)
IMM GRANULOCYTES NFR BLD: 1 % (ref 0–5)
LYMPHOCYTES NFR BLD: 2.36 K/UL (ref 1.5–4)
LYMPHOCYTES RELATIVE PERCENT: 14 % (ref 20–42)
MCH RBC QN AUTO: 26.8 PG (ref 26–35)
MCHC RBC AUTO-ENTMCNC: 34.3 G/DL (ref 32–34.5)
MCV RBC AUTO: 78.1 FL (ref 80–99.9)
MONOCYTES NFR BLD: 1.21 K/UL (ref 0.1–0.95)
MONOCYTES NFR BLD: 7 % (ref 2–12)
NEUTROPHILS NFR BLD: 78 % (ref 43–80)
NEUTS SEG NFR BLD: 13.51 K/UL (ref 1.8–7.3)
PLATELET # BLD AUTO: 280 K/UL (ref 130–450)
PMV BLD AUTO: 8.8 FL (ref 7–12)
POTASSIUM SERPL-SCNC: 4.3 MMOL/L (ref 3.5–5.1)
PROT SERPL-MCNC: 8.1 G/DL (ref 6.4–8.3)
RBC # BLD AUTO: 5.07 M/UL (ref 3.5–5.5)
SODIUM SERPL-SCNC: 136 MMOL/L (ref 136–145)
TROPONIN I SERPL HS-MCNC: 12 NG/L (ref 0–14)
WBC OTHER # BLD: 17.3 K/UL (ref 4.5–11.5)

## 2025-08-01 PROCEDURE — 99285 EMERGENCY DEPT VISIT HI MDM: CPT

## 2025-08-01 PROCEDURE — 71275 CT ANGIOGRAPHY CHEST: CPT

## 2025-08-01 PROCEDURE — 93005 ELECTROCARDIOGRAM TRACING: CPT

## 2025-08-01 PROCEDURE — 80053 COMPREHEN METABOLIC PANEL: CPT

## 2025-08-01 PROCEDURE — 83880 ASSAY OF NATRIURETIC PEPTIDE: CPT

## 2025-08-01 PROCEDURE — 71046 X-RAY EXAM CHEST 2 VIEWS: CPT

## 2025-08-01 PROCEDURE — 84484 ASSAY OF TROPONIN QUANT: CPT

## 2025-08-01 PROCEDURE — 85025 COMPLETE CBC W/AUTO DIFF WBC: CPT

## 2025-08-01 PROCEDURE — 73521 X-RAY EXAM HIPS BI 2 VIEWS: CPT

## 2025-08-01 PROCEDURE — 85379 FIBRIN DEGRADATION QUANT: CPT

## 2025-08-01 PROCEDURE — 6360000004 HC RX CONTRAST MEDICATION: Performed by: RADIOLOGY

## 2025-08-01 RX ORDER — IOPAMIDOL 755 MG/ML
75 INJECTION, SOLUTION INTRAVASCULAR
Status: COMPLETED | OUTPATIENT
Start: 2025-08-01 | End: 2025-08-01

## 2025-08-01 RX ADMIN — IOPAMIDOL 75 ML: 755 INJECTION, SOLUTION INTRAVENOUS at 21:47

## 2025-08-01 ASSESSMENT — PAIN DESCRIPTION - ORIENTATION: ORIENTATION: LEFT;RIGHT

## 2025-08-01 ASSESSMENT — PAIN DESCRIPTION - LOCATION: LOCATION: HIP;LEG;KNEE

## 2025-08-01 ASSESSMENT — PAIN DESCRIPTION - PAIN TYPE: TYPE: ACUTE PAIN

## 2025-08-01 ASSESSMENT — PAIN DESCRIPTION - ONSET: ONSET: SUDDEN

## 2025-08-01 ASSESSMENT — PAIN - FUNCTIONAL ASSESSMENT
PAIN_FUNCTIONAL_ASSESSMENT: INTOLERABLE, UNABLE TO DO ANY ACTIVE OR PASSIVE ACTIVITIES
PAIN_FUNCTIONAL_ASSESSMENT: 0-10

## 2025-08-01 ASSESSMENT — PAIN DESCRIPTION - FREQUENCY: FREQUENCY: CONTINUOUS

## 2025-08-01 ASSESSMENT — PAIN DESCRIPTION - DESCRIPTORS: DESCRIPTORS: THROBBING

## 2025-08-01 ASSESSMENT — PAIN SCALES - GENERAL: PAINLEVEL_OUTOF10: 10
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
## 2025-08-04 LAB
EKG ATRIAL RATE: 107 BPM
EKG P AXIS: 56 DEGREES
EKG P-R INTERVAL: 132 MS
EKG Q-T INTERVAL: 344 MS
EKG QRS DURATION: 74 MS
EKG QTC CALCULATION (BAZETT): 459 MS
EKG R AXIS: 70 DEGREES
EKG T AXIS: 39 DEGREES
EKG VENTRICULAR RATE: 107 BPM

## 2025-08-04 PROCEDURE — 93010 ELECTROCARDIOGRAM REPORT: CPT | Performed by: INTERNAL MEDICINE

## (undated) DEVICE — SPONGE GZ W4XL4IN RAYON POLY FILL CVR W/ NONWOVEN FAB

## (undated) DEVICE — FORCEPS BX OVL CUP FEN DISPOSABLE CAP L 160CM RAD JAW 4

## (undated) DEVICE — BLOCK BITE 60FR RUBBER ADLT DENTAL

## (undated) DEVICE — GRADUATE TRIANG MEASURE 1000ML BLK PRNT

## (undated) DEVICE — FORCEPS BX L240CM JAW DIA2.4MM ORNG L CAP W/ NDL DISP RAD